# Patient Record
Sex: FEMALE | Race: WHITE | Employment: OTHER | URBAN - NONMETROPOLITAN AREA
[De-identification: names, ages, dates, MRNs, and addresses within clinical notes are randomized per-mention and may not be internally consistent; named-entity substitution may affect disease eponyms.]

---

## 2017-01-27 ENCOUNTER — HOSPITAL ENCOUNTER (OUTPATIENT)
Dept: CT IMAGING | Age: 72
Discharge: HOME | End: 2017-01-27
Payer: MEDICARE

## 2017-01-27 DIAGNOSIS — R91.1 LUNG NODULE: ICD-10-CM

## 2017-01-27 PROCEDURE — 71250 CT THORAX DX C-: CPT

## 2017-03-23 ENCOUNTER — TRANSCRIBE ORDERS (OUTPATIENT)
Dept: ADMINISTRATIVE | Facility: HOSPITAL | Age: 72
End: 2017-03-23

## 2017-03-23 DIAGNOSIS — Z12.31 VISIT FOR SCREENING MAMMOGRAM: Primary | ICD-10-CM

## 2017-03-27 ENCOUNTER — HOSPITAL ENCOUNTER (OUTPATIENT)
Dept: MAMMOGRAPHY | Facility: HOSPITAL | Age: 72
Discharge: HOME | End: 2017-03-27
Attending: FAMILY MEDICINE | Admitting: FAMILY MEDICINE

## 2017-03-27 DIAGNOSIS — Z12.31 VISIT FOR SCREENING MAMMOGRAM: ICD-10-CM

## 2017-03-27 PROCEDURE — 77063 BREAST TOMOSYNTHESIS BI: CPT

## 2017-03-27 PROCEDURE — G0202 SCR MAMMO BI INCL CAD: HCPCS

## 2017-04-06 DIAGNOSIS — K76.0 FATTY LIVER: Primary | ICD-10-CM

## 2017-04-24 ENCOUNTER — APPOINTMENT (OUTPATIENT)
Dept: ULTRASOUND IMAGING | Facility: HOSPITAL | Age: 72
End: 2017-04-24

## 2017-05-11 ENCOUNTER — HOSPITAL ENCOUNTER (OUTPATIENT)
Dept: ULTRASOUND IMAGING | Facility: HOSPITAL | Age: 72
Discharge: HOME | End: 2017-05-11
Admitting: CLINICAL NURSE SPECIALIST

## 2017-05-11 DIAGNOSIS — K76.0 FATTY LIVER: ICD-10-CM

## 2017-05-11 PROCEDURE — 76705 ECHO EXAM OF ABDOMEN: CPT

## 2017-05-15 ENCOUNTER — HOSPITAL ENCOUNTER (OUTPATIENT)
Dept: GENERAL RADIOLOGY | Facility: HOSPITAL | Age: 72
Discharge: HOME | End: 2017-05-15
Admitting: ORTHOPAEDIC SURGERY

## 2017-05-15 ENCOUNTER — APPOINTMENT (OUTPATIENT)
Dept: PREADMISSION TESTING | Facility: HOSPITAL | Age: 72
End: 2017-05-15

## 2017-05-15 VITALS
HEART RATE: 62 BPM | RESPIRATION RATE: 18 BRPM | SYSTOLIC BLOOD PRESSURE: 118 MMHG | WEIGHT: 111 LBS | OXYGEN SATURATION: 99 % | DIASTOLIC BLOOD PRESSURE: 62 MMHG | BODY MASS INDEX: 18.95 KG/M2 | HEIGHT: 64 IN

## 2017-05-15 LAB
ALBUMIN SERPL-MCNC: 4.2 G/DL (ref 3.5–5)
ALBUMIN/GLOB SERPL: 1.4 G/DL (ref 1.1–2.5)
ALP SERPL-CCNC: 54 U/L (ref 24–120)
ALT SERPL W P-5'-P-CCNC: 22 U/L (ref 0–54)
ANION GAP SERPL CALCULATED.3IONS-SCNC: 10 MMOL/L (ref 4–13)
APTT PPP: 31.5 SECONDS (ref 24.1–34.8)
AST SERPL-CCNC: 45 U/L (ref 7–45)
BACTERIA UR QL AUTO: ABNORMAL /HPF
BASOPHILS # BLD AUTO: 0.02 10*3/MM3 (ref 0–0.2)
BASOPHILS NFR BLD AUTO: 0.3 % (ref 0–2)
BILIRUB SERPL-MCNC: 0.4 MG/DL (ref 0.1–1)
BILIRUB UR QL STRIP: NEGATIVE
BUN BLD-MCNC: 15 MG/DL (ref 5–21)
BUN/CREAT SERPL: 19.5 (ref 7–25)
CALCIUM SPEC-SCNC: 9.5 MG/DL (ref 8.4–10.4)
CHLORIDE SERPL-SCNC: 100 MMOL/L (ref 98–110)
CLARITY UR: CLEAR
CO2 SERPL-SCNC: 30 MMOL/L (ref 24–31)
COLOR UR: YELLOW
CREAT BLD-MCNC: 0.77 MG/DL (ref 0.5–1.4)
DEPRECATED RDW RBC AUTO: 46.3 FL (ref 40–54)
EOSINOPHIL # BLD AUTO: 0.21 10*3/MM3 (ref 0–0.7)
EOSINOPHIL NFR BLD AUTO: 3.6 % (ref 0–4)
ERYTHROCYTE [DISTWIDTH] IN BLOOD BY AUTOMATED COUNT: 14 % (ref 12–15)
GFR SERPL CREATININE-BSD FRML MDRD: 74 ML/MIN/1.73
GLOBULIN UR ELPH-MCNC: 2.9 GM/DL
GLUCOSE BLD-MCNC: 81 MG/DL (ref 70–100)
GLUCOSE UR STRIP-MCNC: NEGATIVE MG/DL
HCT VFR BLD AUTO: 37.5 % (ref 37–47)
HGB BLD-MCNC: 12.2 G/DL (ref 12–16)
HGB UR QL STRIP.AUTO: NEGATIVE
HYALINE CASTS UR QL AUTO: ABNORMAL /LPF
IMM GRANULOCYTES # BLD: 0 10*3/MM3 (ref 0–0.03)
IMM GRANULOCYTES NFR BLD: 0 % (ref 0–5)
INR PPP: 0.92 (ref 0.91–1.09)
KETONES UR QL STRIP: NEGATIVE
LEUKOCYTE ESTERASE UR QL STRIP.AUTO: ABNORMAL
LYMPHOCYTES # BLD AUTO: 2.61 10*3/MM3 (ref 0.72–4.86)
LYMPHOCYTES NFR BLD AUTO: 44.5 % (ref 15–45)
MCH RBC QN AUTO: 29.5 PG (ref 28–32)
MCHC RBC AUTO-ENTMCNC: 32.5 G/DL (ref 33–36)
MCV RBC AUTO: 90.8 FL (ref 82–98)
MONOCYTES # BLD AUTO: 0.58 10*3/MM3 (ref 0.19–1.3)
MONOCYTES NFR BLD AUTO: 9.9 % (ref 4–12)
NEUTROPHILS # BLD AUTO: 2.44 10*3/MM3 (ref 1.87–8.4)
NEUTROPHILS NFR BLD AUTO: 41.7 % (ref 39–78)
NITRITE UR QL STRIP: NEGATIVE
PH UR STRIP.AUTO: 6.5 [PH] (ref 5–8)
PLATELET # BLD AUTO: 152 10*3/MM3 (ref 130–400)
PMV BLD AUTO: 10.4 FL (ref 6–12)
POTASSIUM BLD-SCNC: 4 MMOL/L (ref 3.5–5.3)
PROT SERPL-MCNC: 7.1 G/DL (ref 6.3–8.7)
PROT UR QL STRIP: NEGATIVE
PROTHROMBIN TIME: 12.6 SECONDS (ref 11.9–14.6)
RBC # BLD AUTO: 4.13 10*6/MM3 (ref 4.2–5.4)
RBC # UR: ABNORMAL /HPF
REF LAB TEST METHOD: ABNORMAL
SODIUM BLD-SCNC: 140 MMOL/L (ref 135–145)
SP GR UR STRIP: 1.01 (ref 1–1.03)
SQUAMOUS #/AREA URNS HPF: ABNORMAL /HPF
UROBILINOGEN UR QL STRIP: ABNORMAL
WBC NRBC COR # BLD: 5.86 10*3/MM3 (ref 4.8–10.8)
WBC UR QL AUTO: ABNORMAL /HPF

## 2017-05-15 PROCEDURE — 71010 HC CHEST PA OR AP: CPT

## 2017-05-15 PROCEDURE — 85610 PROTHROMBIN TIME: CPT | Performed by: ORTHOPAEDIC SURGERY

## 2017-05-15 PROCEDURE — 81001 URINALYSIS AUTO W/SCOPE: CPT | Performed by: ORTHOPAEDIC SURGERY

## 2017-05-15 PROCEDURE — 85730 THROMBOPLASTIN TIME PARTIAL: CPT | Performed by: ORTHOPAEDIC SURGERY

## 2017-05-15 PROCEDURE — 93005 ELECTROCARDIOGRAM TRACING: CPT

## 2017-05-15 PROCEDURE — 93010 ELECTROCARDIOGRAM REPORT: CPT | Performed by: INTERNAL MEDICINE

## 2017-05-15 PROCEDURE — 85025 COMPLETE CBC W/AUTO DIFF WBC: CPT | Performed by: ORTHOPAEDIC SURGERY

## 2017-05-15 PROCEDURE — 80053 COMPREHEN METABOLIC PANEL: CPT | Performed by: ORTHOPAEDIC SURGERY

## 2017-05-15 RX ORDER — UBIDECARENONE 75 MG
50 CAPSULE ORAL DAILY
COMMUNITY
End: 2020-07-27 | Stop reason: ALTCHOICE

## 2017-05-15 RX ORDER — CHLORAL HYDRATE 500 MG
1000 CAPSULE ORAL
COMMUNITY
End: 2020-07-27 | Stop reason: ALTCHOICE

## 2017-05-15 RX ORDER — ASPIRIN 81 MG/1
81 TABLET ORAL DAILY
Status: ON HOLD | COMMUNITY
End: 2017-05-22

## 2017-05-22 ENCOUNTER — HOSPITAL ENCOUNTER (INPATIENT)
Facility: HOSPITAL | Age: 72
LOS: 1 days | Discharge: HOME | End: 2017-05-23
Attending: ORTHOPAEDIC SURGERY | Admitting: ORTHOPAEDIC SURGERY

## 2017-05-22 ENCOUNTER — ANESTHESIA EVENT (OUTPATIENT)
Dept: PERIOP | Facility: HOSPITAL | Age: 72
End: 2017-05-22

## 2017-05-22 ENCOUNTER — APPOINTMENT (OUTPATIENT)
Dept: GENERAL RADIOLOGY | Facility: HOSPITAL | Age: 72
End: 2017-05-22

## 2017-05-22 ENCOUNTER — ANESTHESIA (OUTPATIENT)
Dept: PERIOP | Facility: HOSPITAL | Age: 72
End: 2017-05-22

## 2017-05-22 DIAGNOSIS — Z78.9 DECREASED ACTIVITIES OF DAILY LIVING (ADL): ICD-10-CM

## 2017-05-22 PROBLEM — M48.02 SPINAL STENOSIS, CERVICAL REGION: Status: ACTIVE | Noted: 2017-05-22

## 2017-05-22 LAB
ABO GROUP BLD: NORMAL
BLD GP AB SCN SERPL QL: NEGATIVE
RH BLD: POSITIVE

## 2017-05-22 PROCEDURE — C1713 ANCHOR/SCREW BN/BN,TIS/BN: HCPCS | Performed by: ORTHOPAEDIC SURGERY

## 2017-05-22 PROCEDURE — 25010000002 MIDAZOLAM PER 1 MG: Performed by: ANESTHESIOLOGY

## 2017-05-22 PROCEDURE — 25010000002 PROPOFOL 1000 MG/ML EMULSION: Performed by: NURSE ANESTHETIST, CERTIFIED REGISTERED

## 2017-05-22 PROCEDURE — 25010000002 PROPOFOL 10 MG/ML EMULSION: Performed by: NURSE ANESTHETIST, CERTIFIED REGISTERED

## 2017-05-22 PROCEDURE — 25010000002 SUCCINYLCHOLINE PER 20 MG: Performed by: NURSE ANESTHETIST, CERTIFIED REGISTERED

## 2017-05-22 PROCEDURE — 86850 RBC ANTIBODY SCREEN: CPT | Performed by: ORTHOPAEDIC SURGERY

## 2017-05-22 PROCEDURE — 86900 BLOOD TYPING SEROLOGIC ABO: CPT | Performed by: ORTHOPAEDIC SURGERY

## 2017-05-22 PROCEDURE — 25010000002 DEXAMETHASONE PER 1 MG: Performed by: NURSE ANESTHETIST, CERTIFIED REGISTERED

## 2017-05-22 PROCEDURE — 25010000003 CEFAZOLIN PER 500 MG: Performed by: ORTHOPAEDIC SURGERY

## 2017-05-22 PROCEDURE — 63710000001 DIPHENHYDRAMINE PER 50 MG: Performed by: ORTHOPAEDIC SURGERY

## 2017-05-22 PROCEDURE — 0RB50ZZ EXCISION OF CERVICOTHORACIC VERTEBRAL DISC, OPEN APPROACH: ICD-10-PCS | Performed by: ORTHOPAEDIC SURGERY

## 2017-05-22 PROCEDURE — 25010000002 ONDANSETRON PER 1 MG: Performed by: NURSE ANESTHETIST, CERTIFIED REGISTERED

## 2017-05-22 PROCEDURE — 86901 BLOOD TYPING SEROLOGIC RH(D): CPT | Performed by: ORTHOPAEDIC SURGERY

## 2017-05-22 PROCEDURE — 76000 FLUOROSCOPY <1 HR PHYS/QHP: CPT

## 2017-05-22 PROCEDURE — 25010000002 HYDROMORPHONE PER 4 MG: Performed by: ORTHOPAEDIC SURGERY

## 2017-05-22 PROCEDURE — 94799 UNLISTED PULMONARY SVC/PX: CPT

## 2017-05-22 PROCEDURE — 72040 X-RAY EXAM NECK SPINE 2-3 VW: CPT

## 2017-05-22 PROCEDURE — 0RG40A0 FUSION OF CERVICOTHORACIC VERTEBRAL JOINT WITH INTERBODY FUSION DEVICE, ANTERIOR APPROACH, ANTERIOR COLUMN, OPEN APPROACH: ICD-10-PCS | Performed by: ORTHOPAEDIC SURGERY

## 2017-05-22 PROCEDURE — 25010000003 CEFAZOLIN PER 500 MG: Performed by: NURSE ANESTHETIST, CERTIFIED REGISTERED

## 2017-05-22 DEVICE — MATRX BONE PRIMAGEN CELL 1CC: Type: IMPLANTABLE DEVICE | Status: FUNCTIONAL

## 2017-05-22 DEVICE — SPACR PEEK COHERE 7D LRD 7X14X16MM: Type: IMPLANTABLE DEVICE | Status: FUNCTIONAL

## 2017-05-22 DEVICE — SCRW BONE MAXAN VA 4X14MM: Type: IMPLANTABLE DEVICE | Status: FUNCTIONAL

## 2017-05-22 DEVICE — IMPLANTABLE DEVICE: Type: IMPLANTABLE DEVICE | Status: FUNCTIONAL

## 2017-05-22 RX ORDER — SODIUM CHLORIDE 0.9 % (FLUSH) 0.9 %
1-10 SYRINGE (ML) INJECTION AS NEEDED
Status: DISCONTINUED | OUTPATIENT
Start: 2017-05-22 | End: 2017-05-23 | Stop reason: HOSPADM

## 2017-05-22 RX ORDER — SODIUM CHLORIDE 9 MG/ML
75 INJECTION, SOLUTION INTRAVENOUS CONTINUOUS
Status: DISCONTINUED | OUTPATIENT
Start: 2017-05-22 | End: 2017-05-23 | Stop reason: HOSPADM

## 2017-05-22 RX ORDER — LIDOCAINE HYDROCHLORIDE 20 MG/ML
INJECTION, SOLUTION INFILTRATION; PERINEURAL AS NEEDED
Status: DISCONTINUED | OUTPATIENT
Start: 2017-05-22 | End: 2017-05-22 | Stop reason: SURG

## 2017-05-22 RX ORDER — FAMOTIDINE 10 MG/ML
20 INJECTION, SOLUTION INTRAVENOUS 2 TIMES DAILY
Status: DISCONTINUED | OUTPATIENT
Start: 2017-05-22 | End: 2017-05-22 | Stop reason: SDUPTHER

## 2017-05-22 RX ORDER — NALOXONE HCL 0.4 MG/ML
0.04 VIAL (ML) INJECTION AS NEEDED
Status: DISCONTINUED | OUTPATIENT
Start: 2017-05-22 | End: 2017-05-22 | Stop reason: HOSPADM

## 2017-05-22 RX ORDER — BENAZEPRIL HYDROCHLORIDE 5 MG/1
5 TABLET, FILM COATED ORAL DAILY
Status: DISCONTINUED | OUTPATIENT
Start: 2017-05-22 | End: 2017-05-23 | Stop reason: HOSPADM

## 2017-05-22 RX ORDER — MIDAZOLAM HYDROCHLORIDE 1 MG/ML
1 INJECTION INTRAMUSCULAR; INTRAVENOUS
Status: DISCONTINUED | OUTPATIENT
Start: 2017-05-22 | End: 2017-05-23 | Stop reason: HOSPADM

## 2017-05-22 RX ORDER — SODIUM CHLORIDE, SODIUM LACTATE, POTASSIUM CHLORIDE, CALCIUM CHLORIDE 600; 310; 30; 20 MG/100ML; MG/100ML; MG/100ML; MG/100ML
INJECTION, SOLUTION INTRAVENOUS CONTINUOUS PRN
Status: DISCONTINUED | OUTPATIENT
Start: 2017-05-22 | End: 2017-05-22 | Stop reason: SURG

## 2017-05-22 RX ORDER — ONDANSETRON 2 MG/ML
INJECTION INTRAMUSCULAR; INTRAVENOUS AS NEEDED
Status: DISCONTINUED | OUTPATIENT
Start: 2017-05-22 | End: 2017-05-22 | Stop reason: SURG

## 2017-05-22 RX ORDER — SODIUM CHLORIDE 9 MG/ML
INJECTION, SOLUTION INTRAVENOUS AS NEEDED
Status: DISCONTINUED | OUTPATIENT
Start: 2017-05-22 | End: 2017-05-22 | Stop reason: HOSPADM

## 2017-05-22 RX ORDER — MORPHINE SULFATE 2 MG/ML
2 INJECTION, SOLUTION INTRAMUSCULAR; INTRAVENOUS AS NEEDED
Status: DISCONTINUED | OUTPATIENT
Start: 2017-05-22 | End: 2017-05-22 | Stop reason: HOSPADM

## 2017-05-22 RX ORDER — ROCURONIUM BROMIDE 10 MG/ML
INJECTION, SOLUTION INTRAVENOUS AS NEEDED
Status: DISCONTINUED | OUTPATIENT
Start: 2017-05-22 | End: 2017-05-22 | Stop reason: SURG

## 2017-05-22 RX ORDER — OXYCODONE HCL 20 MG/1
20 TABLET, FILM COATED, EXTENDED RELEASE ORAL ONCE
Status: COMPLETED | OUTPATIENT
Start: 2017-05-22 | End: 2017-05-22

## 2017-05-22 RX ORDER — SODIUM CHLORIDE, SODIUM LACTATE, POTASSIUM CHLORIDE, CALCIUM CHLORIDE 600; 310; 30; 20 MG/100ML; MG/100ML; MG/100ML; MG/100ML
100 INJECTION, SOLUTION INTRAVENOUS CONTINUOUS
Status: DISCONTINUED | OUTPATIENT
Start: 2017-05-22 | End: 2017-05-23 | Stop reason: HOSPADM

## 2017-05-22 RX ORDER — METOCLOPRAMIDE HYDROCHLORIDE 5 MG/ML
5 INJECTION INTRAMUSCULAR; INTRAVENOUS
Status: DISCONTINUED | OUTPATIENT
Start: 2017-05-22 | End: 2017-05-22 | Stop reason: HOSPADM

## 2017-05-22 RX ORDER — SUCCINYLCHOLINE CHLORIDE 20 MG/ML
INJECTION INTRAMUSCULAR; INTRAVENOUS AS NEEDED
Status: DISCONTINUED | OUTPATIENT
Start: 2017-05-22 | End: 2017-05-22 | Stop reason: SURG

## 2017-05-22 RX ORDER — CARVEDILOL 6.25 MG/1
12.5 TABLET ORAL 2 TIMES DAILY WITH MEALS
Status: DISCONTINUED | OUTPATIENT
Start: 2017-05-22 | End: 2017-05-23 | Stop reason: HOSPADM

## 2017-05-22 RX ORDER — DEXAMETHASONE SODIUM PHOSPHATE 4 MG/ML
INJECTION, SOLUTION INTRA-ARTICULAR; INTRALESIONAL; INTRAMUSCULAR; INTRAVENOUS; SOFT TISSUE AS NEEDED
Status: DISCONTINUED | OUTPATIENT
Start: 2017-05-22 | End: 2017-05-22 | Stop reason: SURG

## 2017-05-22 RX ORDER — ONDANSETRON 2 MG/ML
4 INJECTION INTRAMUSCULAR; INTRAVENOUS EVERY 6 HOURS PRN
Status: DISCONTINUED | OUTPATIENT
Start: 2017-05-22 | End: 2017-05-22 | Stop reason: SDUPTHER

## 2017-05-22 RX ORDER — MAGNESIUM HYDROXIDE 1200 MG/15ML
LIQUID ORAL AS NEEDED
Status: DISCONTINUED | OUTPATIENT
Start: 2017-05-22 | End: 2017-05-22 | Stop reason: HOSPADM

## 2017-05-22 RX ORDER — MEPERIDINE HYDROCHLORIDE 25 MG/ML
12.5 INJECTION INTRAMUSCULAR; INTRAVENOUS; SUBCUTANEOUS
Status: DISCONTINUED | OUTPATIENT
Start: 2017-05-22 | End: 2017-05-22 | Stop reason: HOSPADM

## 2017-05-22 RX ORDER — IPRATROPIUM BROMIDE AND ALBUTEROL SULFATE 2.5; .5 MG/3ML; MG/3ML
3 SOLUTION RESPIRATORY (INHALATION) ONCE AS NEEDED
Status: DISCONTINUED | OUTPATIENT
Start: 2017-05-22 | End: 2017-05-22 | Stop reason: HOSPADM

## 2017-05-22 RX ORDER — OXYCODONE AND ACETAMINOPHEN 10; 325 MG/1; MG/1
2 TABLET ORAL EVERY 4 HOURS PRN
Status: DISCONTINUED | OUTPATIENT
Start: 2017-05-22 | End: 2017-05-23 | Stop reason: HOSPADM

## 2017-05-22 RX ORDER — PROPOFOL 10 MG/ML
VIAL (ML) INTRAVENOUS AS NEEDED
Status: DISCONTINUED | OUTPATIENT
Start: 2017-05-22 | End: 2017-05-22 | Stop reason: SURG

## 2017-05-22 RX ORDER — ONDANSETRON 4 MG/1
4 TABLET, FILM COATED ORAL EVERY 6 HOURS PRN
Status: DISCONTINUED | OUTPATIENT
Start: 2017-05-22 | End: 2017-05-23 | Stop reason: HOSPADM

## 2017-05-22 RX ORDER — MIDAZOLAM HYDROCHLORIDE 1 MG/ML
2 INJECTION INTRAMUSCULAR; INTRAVENOUS
Status: DISCONTINUED | OUTPATIENT
Start: 2017-05-22 | End: 2017-05-23 | Stop reason: HOSPADM

## 2017-05-22 RX ORDER — ONDANSETRON 4 MG/1
4 TABLET, ORALLY DISINTEGRATING ORAL EVERY 6 HOURS PRN
Status: DISCONTINUED | OUTPATIENT
Start: 2017-05-22 | End: 2017-05-23 | Stop reason: HOSPADM

## 2017-05-22 RX ORDER — HYDRALAZINE HYDROCHLORIDE 20 MG/ML
5 INJECTION INTRAMUSCULAR; INTRAVENOUS
Status: DISCONTINUED | OUTPATIENT
Start: 2017-05-22 | End: 2017-05-22 | Stop reason: HOSPADM

## 2017-05-22 RX ORDER — ONDANSETRON 2 MG/ML
4 INJECTION INTRAMUSCULAR; INTRAVENOUS EVERY 6 HOURS PRN
Status: DISCONTINUED | OUTPATIENT
Start: 2017-05-22 | End: 2017-05-23 | Stop reason: HOSPADM

## 2017-05-22 RX ORDER — HYDROCHLOROTHIAZIDE 25 MG/1
12.5 TABLET ORAL DAILY
Status: DISCONTINUED | OUTPATIENT
Start: 2017-05-22 | End: 2017-05-23 | Stop reason: HOSPADM

## 2017-05-22 RX ORDER — FAMOTIDINE 20 MG/1
20 TABLET, FILM COATED ORAL 2 TIMES DAILY
Status: DISCONTINUED | OUTPATIENT
Start: 2017-05-22 | End: 2017-05-22 | Stop reason: SDUPTHER

## 2017-05-22 RX ORDER — FLUMAZENIL 0.1 MG/ML
0.2 INJECTION INTRAVENOUS AS NEEDED
Status: DISCONTINUED | OUTPATIENT
Start: 2017-05-22 | End: 2017-05-22 | Stop reason: HOSPADM

## 2017-05-22 RX ORDER — LIDOCAINE HYDROCHLORIDE 40 MG/ML
SOLUTION TOPICAL AS NEEDED
Status: DISCONTINUED | OUTPATIENT
Start: 2017-05-22 | End: 2017-05-22 | Stop reason: SURG

## 2017-05-22 RX ORDER — LABETALOL HYDROCHLORIDE 5 MG/ML
5 INJECTION, SOLUTION INTRAVENOUS
Status: DISCONTINUED | OUTPATIENT
Start: 2017-05-22 | End: 2017-05-22 | Stop reason: HOSPADM

## 2017-05-22 RX ORDER — ONDANSETRON 2 MG/ML
4 INJECTION INTRAMUSCULAR; INTRAVENOUS AS NEEDED
Status: DISCONTINUED | OUTPATIENT
Start: 2017-05-22 | End: 2017-05-22 | Stop reason: HOSPADM

## 2017-05-22 RX ORDER — SUFENTANIL CITRATE 50 UG/ML
INJECTION EPIDURAL; INTRAVENOUS AS NEEDED
Status: DISCONTINUED | OUTPATIENT
Start: 2017-05-22 | End: 2017-05-22 | Stop reason: SURG

## 2017-05-22 RX ORDER — CEFAZOLIN SODIUM 1 G/3ML
INJECTION, POWDER, FOR SOLUTION INTRAMUSCULAR; INTRAVENOUS AS NEEDED
Status: DISCONTINUED | OUTPATIENT
Start: 2017-05-22 | End: 2017-05-22 | Stop reason: SURG

## 2017-05-22 RX ORDER — DIPHENHYDRAMINE HCL 25 MG
25 CAPSULE ORAL NIGHTLY PRN
Status: DISCONTINUED | OUTPATIENT
Start: 2017-05-22 | End: 2017-05-23 | Stop reason: HOSPADM

## 2017-05-22 RX ORDER — FAMOTIDINE 20 MG/1
40 TABLET, FILM COATED ORAL 2 TIMES DAILY
Status: DISCONTINUED | OUTPATIENT
Start: 2017-05-22 | End: 2017-05-23 | Stop reason: HOSPADM

## 2017-05-22 RX ADMIN — LIDOCAINE HYDROCHLORIDE 1 EACH: 40 SOLUTION TOPICAL at 11:10

## 2017-05-22 RX ADMIN — LIDOCAINE HYDROCHLORIDE 0.5 ML: 10 INJECTION, SOLUTION EPIDURAL; INFILTRATION; INTRACAUDAL; PERINEURAL at 10:10

## 2017-05-22 RX ADMIN — CARVEDILOL 12.5 MG: 6.25 TABLET, FILM COATED ORAL at 17:00

## 2017-05-22 RX ADMIN — PROPOFOL 75 MCG/KG/MIN: 10 INJECTION, EMULSION INTRAVENOUS at 11:13

## 2017-05-22 RX ADMIN — SUFENTANIL CITRATE 10 MCG: 50 INJECTION, SOLUTION EPIDURAL; INTRAVENOUS at 11:45

## 2017-05-22 RX ADMIN — CEFAZOLIN SODIUM 1 G: 1 INJECTION, SOLUTION INTRAVENOUS at 18:05

## 2017-05-22 RX ADMIN — SODIUM CHLORIDE 75 ML/HR: 9 INJECTION, SOLUTION INTRAVENOUS at 14:14

## 2017-05-22 RX ADMIN — SUCCINYLCHOLINE CHLORIDE 100 MG: 20 INJECTION, SOLUTION INTRAMUSCULAR; INTRAVENOUS at 11:09

## 2017-05-22 RX ADMIN — OXYCODONE HYDROCHLORIDE AND ACETAMINOPHEN 2 TABLET: 10; 325 TABLET ORAL at 23:48

## 2017-05-22 RX ADMIN — OXYCODONE HYDROCHLORIDE 20 MG: 20 TABLET, FILM COATED, EXTENDED RELEASE ORAL at 09:33

## 2017-05-22 RX ADMIN — FAMOTIDINE 40 MG: 20 TABLET, FILM COATED ORAL at 17:00

## 2017-05-22 RX ADMIN — SODIUM CHLORIDE, POTASSIUM CHLORIDE, SODIUM LACTATE AND CALCIUM CHLORIDE: 600; 310; 30; 20 INJECTION, SOLUTION INTRAVENOUS at 11:05

## 2017-05-22 RX ADMIN — HYDROCHLOROTHIAZIDE 12.5 MG: 25 TABLET ORAL at 14:48

## 2017-05-22 RX ADMIN — LIDOCAINE HYDROCHLORIDE 50 MG: 20 INJECTION, SOLUTION INFILTRATION; PERINEURAL at 11:09

## 2017-05-22 RX ADMIN — PROPOFOL 100 MG: 10 INJECTION, EMULSION INTRAVENOUS at 11:09

## 2017-05-22 RX ADMIN — DEXAMETHASONE SODIUM PHOSPHATE 4 MG: 4 INJECTION, SOLUTION INTRAMUSCULAR; INTRAVENOUS at 13:19

## 2017-05-22 RX ADMIN — ONDANSETRON HYDROCHLORIDE 4 MG: 2 SOLUTION INTRAMUSCULAR; INTRAVENOUS at 13:19

## 2017-05-22 RX ADMIN — DIPHENHYDRAMINE HYDROCHLORIDE 25 MG: 25 CAPSULE ORAL at 23:50

## 2017-05-22 RX ADMIN — ROCURONIUM BROMIDE 5 MG: 10 INJECTION INTRAVENOUS at 11:09

## 2017-05-22 RX ADMIN — MIDAZOLAM HYDROCHLORIDE 2 MG: 1 INJECTION, SOLUTION INTRAMUSCULAR; INTRAVENOUS at 10:10

## 2017-05-22 RX ADMIN — HYDROMORPHONE HYDROCHLORIDE 1 MG: 1 INJECTION, SOLUTION INTRAMUSCULAR; INTRAVENOUS; SUBCUTANEOUS at 14:31

## 2017-05-22 RX ADMIN — SODIUM CHLORIDE, POTASSIUM CHLORIDE, SODIUM LACTATE AND CALCIUM CHLORIDE: 600; 310; 30; 20 INJECTION, SOLUTION INTRAVENOUS at 12:22

## 2017-05-22 RX ADMIN — SUFENTANIL CITRATE 10 MCG: 50 INJECTION, SOLUTION EPIDURAL; INTRAVENOUS at 12:21

## 2017-05-22 RX ADMIN — SUFENTANIL CITRATE 20 MCG: 50 INJECTION, SOLUTION EPIDURAL; INTRAVENOUS at 11:35

## 2017-05-22 RX ADMIN — HYDROMORPHONE HYDROCHLORIDE 1 MG: 1 INJECTION, SOLUTION INTRAMUSCULAR; INTRAVENOUS; SUBCUTANEOUS at 18:06

## 2017-05-22 RX ADMIN — SUFENTANIL CITRATE 10 MCG: 50 INJECTION, SOLUTION EPIDURAL; INTRAVENOUS at 11:09

## 2017-05-22 RX ADMIN — OXYCODONE HYDROCHLORIDE AND ACETAMINOPHEN 2 TABLET: 10; 325 TABLET ORAL at 19:48

## 2017-05-22 RX ADMIN — SODIUM CHLORIDE, POTASSIUM CHLORIDE, SODIUM LACTATE AND CALCIUM CHLORIDE 100 ML/HR: 600; 310; 30; 20 INJECTION, SOLUTION INTRAVENOUS at 10:10

## 2017-05-22 RX ADMIN — BENAZEPRIL HYDROCHLORIDE 5 MG: 5 TABLET ORAL at 14:47

## 2017-05-22 RX ADMIN — CEFAZOLIN 1 G: 1 INJECTION, POWDER, FOR SOLUTION INTRAVENOUS at 11:30

## 2017-05-23 VITALS
TEMPERATURE: 99 F | RESPIRATION RATE: 18 BRPM | DIASTOLIC BLOOD PRESSURE: 64 MMHG | SYSTOLIC BLOOD PRESSURE: 129 MMHG | BODY MASS INDEX: 22.87 KG/M2 | WEIGHT: 116.5 LBS | HEART RATE: 87 BPM | OXYGEN SATURATION: 99 % | HEIGHT: 60 IN

## 2017-05-23 LAB
ANION GAP SERPL CALCULATED.3IONS-SCNC: 9 MMOL/L (ref 4–13)
BASOPHILS # BLD AUTO: 0.01 10*3/MM3 (ref 0–0.2)
BASOPHILS NFR BLD AUTO: 0.1 % (ref 0–2)
BUN BLD-MCNC: 13 MG/DL (ref 5–21)
BUN/CREAT SERPL: 15.7 (ref 7–25)
CALCIUM SPEC-SCNC: 9.1 MG/DL (ref 8.4–10.4)
CHLORIDE SERPL-SCNC: 104 MMOL/L (ref 98–110)
CO2 SERPL-SCNC: 29 MMOL/L (ref 24–31)
CREAT BLD-MCNC: 0.83 MG/DL (ref 0.5–1.4)
DEPRECATED RDW RBC AUTO: 47.6 FL (ref 40–54)
EOSINOPHIL # BLD AUTO: 0 10*3/MM3 (ref 0–0.7)
EOSINOPHIL NFR BLD AUTO: 0 % (ref 0–4)
ERYTHROCYTE [DISTWIDTH] IN BLOOD BY AUTOMATED COUNT: 14.3 % (ref 12–15)
GFR SERPL CREATININE-BSD FRML MDRD: 68 ML/MIN/1.73
GLUCOSE BLD-MCNC: 133 MG/DL (ref 70–100)
HCT VFR BLD AUTO: 35.4 % (ref 37–47)
HGB BLD-MCNC: 11.5 G/DL (ref 12–16)
IMM GRANULOCYTES # BLD: 0.01 10*3/MM3 (ref 0–0.03)
IMM GRANULOCYTES NFR BLD: 0.1 % (ref 0–5)
LYMPHOCYTES # BLD AUTO: 1.28 10*3/MM3 (ref 0.72–4.86)
LYMPHOCYTES NFR BLD AUTO: 16.1 % (ref 15–45)
MCH RBC QN AUTO: 29.7 PG (ref 28–32)
MCHC RBC AUTO-ENTMCNC: 32.5 G/DL (ref 33–36)
MCV RBC AUTO: 91.5 FL (ref 82–98)
MONOCYTES # BLD AUTO: 0.84 10*3/MM3 (ref 0.19–1.3)
MONOCYTES NFR BLD AUTO: 10.6 % (ref 4–12)
NEUTROPHILS # BLD AUTO: 5.82 10*3/MM3 (ref 1.87–8.4)
NEUTROPHILS NFR BLD AUTO: 73.1 % (ref 39–78)
PLATELET # BLD AUTO: 172 10*3/MM3 (ref 130–400)
PMV BLD AUTO: 10.4 FL (ref 6–12)
POTASSIUM BLD-SCNC: 4.7 MMOL/L (ref 3.5–5.3)
RBC # BLD AUTO: 3.87 10*6/MM3 (ref 4.2–5.4)
SODIUM BLD-SCNC: 142 MMOL/L (ref 135–145)
WBC NRBC COR # BLD: 7.96 10*3/MM3 (ref 4.8–10.8)

## 2017-05-23 PROCEDURE — G8988 SELF CARE GOAL STATUS: HCPCS

## 2017-05-23 PROCEDURE — G8989 SELF CARE D/C STATUS: HCPCS

## 2017-05-23 PROCEDURE — G8987 SELF CARE CURRENT STATUS: HCPCS

## 2017-05-23 PROCEDURE — 85025 COMPLETE CBC W/AUTO DIFF WBC: CPT | Performed by: ORTHOPAEDIC SURGERY

## 2017-05-23 PROCEDURE — 80048 BASIC METABOLIC PNL TOTAL CA: CPT | Performed by: ORTHOPAEDIC SURGERY

## 2017-05-23 PROCEDURE — 25010000003 CEFAZOLIN PER 500 MG: Performed by: ORTHOPAEDIC SURGERY

## 2017-05-23 PROCEDURE — 97165 OT EVAL LOW COMPLEX 30 MIN: CPT

## 2017-05-23 RX ORDER — OXYCODONE AND ACETAMINOPHEN 10; 325 MG/1; MG/1
1 TABLET ORAL EVERY 4 HOURS PRN
Qty: 120 TABLET | Refills: 0
Start: 2017-05-23 | End: 2019-01-23

## 2017-05-23 RX ADMIN — CEFAZOLIN SODIUM 1 G: 1 INJECTION, SOLUTION INTRAVENOUS at 03:28

## 2017-05-23 RX ADMIN — OXYCODONE HYDROCHLORIDE AND ACETAMINOPHEN 2 TABLET: 10; 325 TABLET ORAL at 03:58

## 2017-05-23 RX ADMIN — HYDROCHLOROTHIAZIDE 12.5 MG: 25 TABLET ORAL at 08:52

## 2017-05-23 RX ADMIN — CARVEDILOL 12.5 MG: 6.25 TABLET, FILM COATED ORAL at 08:52

## 2017-05-23 RX ADMIN — OXYCODONE HYDROCHLORIDE AND ACETAMINOPHEN 2 TABLET: 10; 325 TABLET ORAL at 08:53

## 2017-05-23 RX ADMIN — BENAZEPRIL HYDROCHLORIDE 5 MG: 5 TABLET ORAL at 08:53

## 2017-05-23 RX ADMIN — FAMOTIDINE 40 MG: 20 TABLET, FILM COATED ORAL at 08:53

## 2017-05-23 RX ADMIN — SODIUM CHLORIDE 75 ML/HR: 9 INJECTION, SOLUTION INTRAVENOUS at 03:28

## 2017-07-05 ENCOUNTER — HOSPITAL ENCOUNTER (OUTPATIENT)
Dept: CT IMAGING | Age: 72
Discharge: HOME | End: 2017-07-05
Payer: MEDICARE

## 2017-07-05 DIAGNOSIS — R91.8 LUNG NODULES: ICD-10-CM

## 2017-07-05 PROCEDURE — 71250 CT THORAX DX C-: CPT

## 2018-01-09 ENCOUNTER — HOSPITAL ENCOUNTER (OUTPATIENT)
Dept: CT IMAGING | Age: 73
Discharge: HOME | End: 2018-01-09
Payer: MEDICARE

## 2018-01-09 DIAGNOSIS — R91.8 LUNG NODULES: ICD-10-CM

## 2018-01-09 PROCEDURE — 71250 CT THORAX DX C-: CPT

## 2018-04-04 ENCOUNTER — TRANSCRIBE ORDERS (OUTPATIENT)
Dept: ADMINISTRATIVE | Facility: HOSPITAL | Age: 73
End: 2018-04-04

## 2018-04-04 DIAGNOSIS — Z12.31 ENCOUNTER FOR SCREENING MAMMOGRAM FOR MALIGNANT NEOPLASM OF BREAST: Primary | ICD-10-CM

## 2018-04-18 ENCOUNTER — TRANSCRIBE ORDERS (OUTPATIENT)
Dept: ADMINISTRATIVE | Facility: HOSPITAL | Age: 73
End: 2018-04-18

## 2018-04-18 ENCOUNTER — HOSPITAL ENCOUNTER (OUTPATIENT)
Dept: MAMMOGRAPHY | Facility: HOSPITAL | Age: 73
Discharge: HOME | End: 2018-04-18
Attending: FAMILY MEDICINE | Admitting: NURSE PRACTITIONER

## 2018-04-18 DIAGNOSIS — Z12.31 ENCOUNTER FOR SCREENING MAMMOGRAM FOR MALIGNANT NEOPLASM OF BREAST: ICD-10-CM

## 2018-04-18 DIAGNOSIS — Z12.39 SCREENING BREAST EXAMINATION: ICD-10-CM

## 2018-04-18 DIAGNOSIS — Z12.39 SCREENING BREAST EXAMINATION: Primary | ICD-10-CM

## 2018-04-18 PROCEDURE — 77063 BREAST TOMOSYNTHESIS BI: CPT

## 2018-04-18 PROCEDURE — 77067 SCR MAMMO BI INCL CAD: CPT

## 2018-06-08 ENCOUNTER — TRANSCRIBE ORDERS (OUTPATIENT)
Dept: ADMINISTRATIVE | Facility: HOSPITAL | Age: 73
End: 2018-06-08

## 2018-06-08 DIAGNOSIS — R91.8 LUNG MASS: Primary | ICD-10-CM

## 2018-07-16 ENCOUNTER — HOSPITAL ENCOUNTER (OUTPATIENT)
Dept: CT IMAGING | Age: 73
Discharge: HOME | End: 2018-07-16
Payer: MEDICARE

## 2018-07-16 DIAGNOSIS — R91.8 LUNG MASS: ICD-10-CM

## 2018-07-16 PROCEDURE — 71250 CT THORAX DX C-: CPT

## 2018-08-02 ENCOUNTER — HOSPITAL ENCOUNTER (OUTPATIENT)
Dept: ULTRASOUND IMAGING | Age: 73
Discharge: HOME | End: 2018-08-02
Payer: MEDICARE

## 2018-08-02 DIAGNOSIS — N28.1 ACQUIRED CYST OF KIDNEY: ICD-10-CM

## 2018-08-02 PROCEDURE — 76770 US EXAM ABDO BACK WALL COMP: CPT

## 2018-11-26 ENCOUNTER — OFFICE VISIT (OUTPATIENT)
Dept: OTOLARYNGOLOGY | Age: 73
End: 2018-11-26
Payer: MEDICARE

## 2018-11-26 VITALS
BODY MASS INDEX: 19.63 KG/M2 | OXYGEN SATURATION: 97 % | WEIGHT: 115 LBS | SYSTOLIC BLOOD PRESSURE: 140 MMHG | HEART RATE: 67 BPM | TEMPERATURE: 98.1 F | RESPIRATION RATE: 16 BRPM | DIASTOLIC BLOOD PRESSURE: 80 MMHG | HEIGHT: 64 IN

## 2018-11-26 DIAGNOSIS — H92.02 REFERRED OTALGIA OF LEFT EAR: ICD-10-CM

## 2018-11-26 DIAGNOSIS — M77.9 STYLOHYOID TENDINITIS: ICD-10-CM

## 2018-11-26 DIAGNOSIS — M79.18 MYOFASCIAL PAIN: ICD-10-CM

## 2018-11-26 DIAGNOSIS — Z98.890 HX OF CERVICAL SPINE SURGERY: ICD-10-CM

## 2018-11-26 DIAGNOSIS — Z87.898 HISTORY OF MULTIPLE PULMONARY NODULES: Primary | ICD-10-CM

## 2018-11-26 PROCEDURE — G8420 CALC BMI NORM PARAMETERS: HCPCS | Performed by: OTOLARYNGOLOGY

## 2018-11-26 PROCEDURE — 99203 OFFICE O/P NEW LOW 30 MIN: CPT | Performed by: OTOLARYNGOLOGY

## 2018-11-26 PROCEDURE — 1090F PRES/ABSN URINE INCON ASSESS: CPT | Performed by: OTOLARYNGOLOGY

## 2018-11-26 PROCEDURE — G8598 ASA/ANTIPLAT THER USED: HCPCS | Performed by: OTOLARYNGOLOGY

## 2018-11-26 PROCEDURE — G8427 DOCREV CUR MEDS BY ELIG CLIN: HCPCS | Performed by: OTOLARYNGOLOGY

## 2018-11-26 PROCEDURE — 1123F ACP DISCUSS/DSCN MKR DOCD: CPT | Performed by: OTOLARYNGOLOGY

## 2018-11-26 PROCEDURE — 1036F TOBACCO NON-USER: CPT | Performed by: OTOLARYNGOLOGY

## 2018-11-26 PROCEDURE — 4040F PNEUMOC VAC/ADMIN/RCVD: CPT | Performed by: OTOLARYNGOLOGY

## 2018-11-26 PROCEDURE — 1101F PT FALLS ASSESS-DOCD LE1/YR: CPT | Performed by: OTOLARYNGOLOGY

## 2018-11-26 PROCEDURE — G8484 FLU IMMUNIZE NO ADMIN: HCPCS | Performed by: OTOLARYNGOLOGY

## 2018-11-26 PROCEDURE — 3017F COLORECTAL CA SCREEN DOC REV: CPT | Performed by: OTOLARYNGOLOGY

## 2018-11-26 PROCEDURE — G8400 PT W/DXA NO RESULTS DOC: HCPCS | Performed by: OTOLARYNGOLOGY

## 2018-11-26 NOTE — PROGRESS NOTES
Pulse 67   Temp 98.1 °F (36.7 °C)   Resp 16   Ht 5' 3.5\" (1.613 m)   Wt 115 lb (52.2 kg)   SpO2 97%   BMI 20.05 kg/m²     Assessment:      Diagnosis Orders   1. History of multiple pulmonary nodules      2 nodules     2. Referred otalgia of left ear           Plan:      Impression:  Myofacial pain and associated symptoms. The patient has a negative head and neck examination other than the discomfort or pain that can be elicited upon palpation indicating a myofascial pain phenomenon most notably of the muscles, tendons, and ligaments of the deep neck and along the stylohyoid ligament. Return to spine surgeon to manage neck muscle discomfort. Antoinette Wu am scribing for and in the presence of Dr. Anjana Davidson November 26, 2018/9:56 AM/Conchis Davidson. Kaylie Merritt MD,  I personally performed the services described in this documentation as scribed by Waldemar Guzman in my presence and it appears accurate and complete.

## 2018-12-14 ENCOUNTER — TRANSCRIBE ORDERS (OUTPATIENT)
Dept: PULMONOLOGY | Facility: CLINIC | Age: 73
End: 2018-12-14

## 2018-12-14 DIAGNOSIS — R91.8 LUNG NODULE, MULTIPLE: Primary | ICD-10-CM

## 2019-01-15 ENCOUNTER — HOSPITAL ENCOUNTER (OUTPATIENT)
Dept: CT IMAGING | Age: 74
Discharge: HOME | End: 2019-01-15
Payer: MEDICARE

## 2019-01-15 DIAGNOSIS — R91.8 LUNG NODULES: ICD-10-CM

## 2019-01-15 PROCEDURE — 71250 CT THORAX DX C-: CPT

## 2019-01-16 DIAGNOSIS — R91.8 LUNG MASS: ICD-10-CM

## 2019-01-23 ENCOUNTER — OFFICE VISIT (OUTPATIENT)
Dept: PULMONOLOGY | Facility: CLINIC | Age: 74
End: 2019-01-23

## 2019-01-23 VITALS
HEART RATE: 71 BPM | SYSTOLIC BLOOD PRESSURE: 120 MMHG | DIASTOLIC BLOOD PRESSURE: 70 MMHG | OXYGEN SATURATION: 97 % | WEIGHT: 121 LBS | BODY MASS INDEX: 21.44 KG/M2 | HEIGHT: 63 IN

## 2019-01-23 DIAGNOSIS — R91.8 LUNG NODULES: ICD-10-CM

## 2019-01-23 DIAGNOSIS — R63.6 UNDERWEIGHT: ICD-10-CM

## 2019-01-23 DIAGNOSIS — J47.9 BRONCHIECTASIS WITHOUT COMPLICATION (HCC): Primary | ICD-10-CM

## 2019-01-23 PROCEDURE — 99214 OFFICE O/P EST MOD 30 MIN: CPT | Performed by: NURSE PRACTITIONER

## 2019-01-23 RX ORDER — LEVOTHYROXINE SODIUM 50 UG/1
CAPSULE ORAL
Refills: 0 | COMMUNITY
Start: 2019-01-08 | End: 2020-07-27 | Stop reason: ALTCHOICE

## 2019-01-23 NOTE — PROGRESS NOTES
GEREMIAS Mathis  [facility]   [place] [address]  Phone: [phone]  Fax: [phone]     Trinidad Zhu is a 73 y.o. female.   : 1945  CC:   Chief Complaint   Patient presents with   • Lung Nodule     had a scan has 3 nodules on right side      HPI: Trinidad Zhu is a pleasant 73 y.o. female. The patient is here today for follow up of lung nodules.  CT of the chest reviewed as stated below.  The patient has no shortness of breath, fever, chills.  The patient's PCP is Andrew Layne MD.    The following portions of the patient's history were reviewed and updated as appropriate: allergies, current medications, past family history, past medical history, past social history, past surgical history and problem list.  Past Medical History:   Diagnosis Date   • Antral ulcer    • Bladder cystocele    • C. difficile diarrhea    • CAD (coronary artery disease)    • Colon cancer screening     PREOPERATIVE    • Colon polyp    • Diarrhea    • Difficulty swallowing    • Gastritis    • Generalized OA    • GERD (gastroesophageal reflux disease)    • Hyperlipidemia    • Hypertension    • Liver cyst    • Lung nodule    • Microscopic colitis    • Neck pain    • Neuropathy    • Normal body mass index    • NSAID induced gastritis    • Numbness in both hands    • Ulcer of pyloric antrum     UNSPECIFIED ULCER CHRONICITY   • UTI (urinary tract infection)    • Weight loss      Family History   Problem Relation Age of Onset   • Breast cancer Maternal Aunt    • GI problems Neg Hx         MALIGNANCIES   • Colon cancer Neg Hx    • Colon polyps Neg Hx      Social History     Socioeconomic History   • Marital status:      Spouse name: Not on file   • Number of children: Not on file   • Years of education: Not on file   • Highest education level: Not on file   Social Needs   • Financial resource strain: Not on file   • Food insecurity - worry: Not  "on file   • Food insecurity - inability: Not on file   • Transportation needs - medical: Not on file   • Transportation needs - non-medical: Not on file   Occupational History   • Not on file   Tobacco Use   • Smoking status: Former Smoker   • Smokeless tobacco: Never Used   • Tobacco comment: QUIT 20 YRS AGO   Substance and Sexual Activity   • Alcohol use: No   • Drug use: No   • Sexual activity: Defer   Other Topics Concern   • Not on file   Social History Narrative   • Not on file     Review of Systems   Constitutional: Negative for chills and fever.   HENT: Negative for congestion.    Eyes: Negative for blurred vision.   Respiratory: Negative for cough and shortness of breath.    Cardiovascular: Negative for chest pain.   Gastrointestinal: Negative for diarrhea, nausea and vomiting.   Endocrine: Negative for cold intolerance and heat intolerance.   Genitourinary: Negative for dysuria.   Musculoskeletal: Negative for arthralgias.   Skin: Negative for rash.   Neurological: Negative for dizziness, weakness and light-headedness.   Hematological: Does not bruise/bleed easily.   Psychiatric/Behavioral: Negative for agitation. The patient is not nervous/anxious.      /70   Pulse 71   Ht 160 cm (63\")   Wt 54.9 kg (121 lb)   SpO2 97% Comment: RA  Breastfeeding? No   BMI 21.43 kg/m²   Physical Exam   Constitutional: She is oriented to person, place, and time. She appears well-developed and well-nourished. No distress.   HENT:   Head: Normocephalic and atraumatic.   Eyes: Conjunctivae and EOM are normal. Pupils are equal, round, and reactive to light. No scleral icterus.   Neck: Normal range of motion. Neck supple.   Cardiovascular: Normal rate, regular rhythm and normal heart sounds. Exam reveals no friction rub.   No murmur heard.  Pulmonary/Chest: Effort normal and breath sounds normal. No respiratory distress. She has no wheezes. She has no rales.   Abdominal: Soft. Bowel sounds are normal. She exhibits no " distension. There is no tenderness.   Musculoskeletal: Normal range of motion. She exhibits no edema.   Neurological: She is alert and oriented to person, place, and time.   Skin: Skin is warm and dry.   Psychiatric: She has a normal mood and affect. Her behavior is normal. Judgment and thought content normal.   Nursing note and vitals reviewed.    Pulmonary Functions Testing Results:  No results found for: FEV1, FVC, NEU0OPU, TLC, DLCO  My PFT Interpretation: none   Imaging: CT scan of the chest from [facility] was reviewed by myself and compared to previous study 7/16/18.  It does appear that the 13 mm nodule is stable.    Radiologist interpretation:  1. Stable CT of the chest without contrast with multiple noncalcified  pulmonary nodules in the right upper lobe, with associated bronchial  wall thickening and mild tubular bronchiectasis, most likely related  to a chronic indolent infectious or inflammatory process. No new  nodules are identified. The largest nodule, in the right lung apex  abuts the lateral pleural surface and measures approximately 13 mm.  There is surrounding mild retractive fibrotic change. Consider repeat  chest CT in 6 months. There are are also moderate changes of COPD has  before. No intrathoracic lymphadenopathy is identified. Stable heavy  atherosclerotic calcification is noted within the aortic arch and  coronary arteries.    Assessment and Plan:   Trinidad was seen today for lung nodule.    Diagnoses and all orders for this visit:    Bronchiectasis without complication (CMS/HCC)  Stable.  No respiratory infections or hospitalizations since last office visit.  Lung nodules  -     CT Chest Without Contrast; Future  CT of the chest is stable.  We will repeat in 6 months.  Underweight     Health maintenance:   Influenza vaccine: yes    Pneumovax 23:yes  Prevnar 13:yes  Patient's Body mass index is 21.43 kg/m². BMI is below normal parameters. Recommendations include: treating the  underlying disease process.    Follow up: 6 months with complete pulmonary function tests and follow-up CT scan.  Brittni Christianson, APRN  1/23/2019  1:57 PM

## 2019-03-07 ENCOUNTER — TRANSCRIBE ORDERS (OUTPATIENT)
Dept: ADMINISTRATIVE | Facility: HOSPITAL | Age: 74
End: 2019-03-07

## 2019-03-07 DIAGNOSIS — Z78.0 POSTMENOPAUSAL: ICD-10-CM

## 2019-03-07 DIAGNOSIS — Z12.39 SCREENING BREAST EXAMINATION: Primary | ICD-10-CM

## 2019-04-25 ENCOUNTER — HOSPITAL ENCOUNTER (OUTPATIENT)
Dept: BONE DENSITY | Facility: HOSPITAL | Age: 74
Discharge: HOME | End: 2019-04-25

## 2019-04-25 ENCOUNTER — HOSPITAL ENCOUNTER (OUTPATIENT)
Dept: MAMMOGRAPHY | Facility: HOSPITAL | Age: 74
Discharge: HOME | End: 2019-04-25
Admitting: FAMILY MEDICINE

## 2019-04-25 PROCEDURE — 77063 BREAST TOMOSYNTHESIS BI: CPT

## 2019-04-25 PROCEDURE — 77067 SCR MAMMO BI INCL CAD: CPT

## 2019-04-25 PROCEDURE — 77080 DXA BONE DENSITY AXIAL: CPT

## 2019-05-22 ENCOUNTER — TRANSCRIBE ORDERS (OUTPATIENT)
Dept: ADMINISTRATIVE | Facility: HOSPITAL | Age: 74
End: 2019-05-22

## 2019-05-22 ENCOUNTER — HOSPITAL ENCOUNTER (OUTPATIENT)
Dept: GENERAL RADIOLOGY | Facility: HOSPITAL | Age: 74
Discharge: HOME | End: 2019-05-22
Admitting: FAMILY MEDICINE

## 2019-05-22 ENCOUNTER — HOSPITAL ENCOUNTER (OUTPATIENT)
Dept: GENERAL RADIOLOGY | Facility: HOSPITAL | Age: 74
Discharge: HOME | End: 2019-05-22

## 2019-05-22 DIAGNOSIS — M25.572 ACUTE LEFT ANKLE PAIN: ICD-10-CM

## 2019-05-22 DIAGNOSIS — M25.572 ACUTE LEFT ANKLE PAIN: Primary | ICD-10-CM

## 2019-05-22 DIAGNOSIS — M79.672 LEFT FOOT PAIN: ICD-10-CM

## 2019-05-22 PROCEDURE — 73610 X-RAY EXAM OF ANKLE: CPT

## 2019-05-22 PROCEDURE — 73630 X-RAY EXAM OF FOOT: CPT

## 2019-06-26 ENCOUNTER — OFFICE VISIT (OUTPATIENT)
Dept: OBSTETRICS AND GYNECOLOGY | Facility: CLINIC | Age: 74
End: 2019-06-26

## 2019-06-26 VITALS
SYSTOLIC BLOOD PRESSURE: 118 MMHG | WEIGHT: 122 LBS | DIASTOLIC BLOOD PRESSURE: 68 MMHG | HEIGHT: 63 IN | BODY MASS INDEX: 21.62 KG/M2

## 2019-06-26 DIAGNOSIS — R35.1 NOCTURIA: ICD-10-CM

## 2019-06-26 DIAGNOSIS — N81.11 CYSTOCELE, MIDLINE: Primary | ICD-10-CM

## 2019-06-26 DIAGNOSIS — N39.0 FREQUENT UTI: ICD-10-CM

## 2019-06-26 PROCEDURE — 99214 OFFICE O/P EST MOD 30 MIN: CPT | Performed by: NURSE PRACTITIONER

## 2019-06-26 RX ORDER — FAMOTIDINE 40 MG/1
40 TABLET, FILM COATED ORAL 2 TIMES DAILY
Refills: 3 | COMMUNITY
Start: 2019-04-15

## 2019-06-26 RX ORDER — BENAZEPRIL HYDROCHLORIDE 5 MG/1
5 TABLET, FILM COATED ORAL DAILY
Status: ON HOLD | COMMUNITY
Start: 2019-06-19 | End: 2023-04-06 | Stop reason: SDUPTHER

## 2019-06-26 RX ORDER — DIAZEPAM 5 MG/1
2.5 TABLET ORAL EVERY 6 HOURS PRN
Refills: 0 | COMMUNITY
Start: 2019-05-26 | End: 2022-01-04 | Stop reason: ALTCHOICE

## 2019-06-26 RX ORDER — LEVOTHYROXINE SODIUM 0.05 MG/1
50 TABLET ORAL DAILY
Refills: 3 | COMMUNITY
Start: 2019-05-08

## 2019-06-26 RX ORDER — ALENDRONATE SODIUM 70 MG/1
70 TABLET ORAL
Refills: 3 | COMMUNITY
Start: 2019-05-21

## 2019-06-26 RX ORDER — TRAMADOL HYDROCHLORIDE 50 MG/1
50 TABLET ORAL
Refills: 0 | COMMUNITY
Start: 2019-05-28

## 2019-06-26 NOTE — PROGRESS NOTES
Trinidad Zhu is a 74 y.o.      Chief Complaint   Patient presents with   • Vaginal Prolapse     Pt is here for bladder prolapse PT is having recurrent UTI's PT has had a bladder lift in  PT is having alot of bladder infections.             HPI - Trinidad is in today for evaluation of bladder prolapse  She has had pelvic prolapse repair and states several years later, she had an A and P repair by Dr. Wu.. She had  a sling procedure  With one of these procedure  She has recurrent UTI's, several this year and just finished an antibiotic.  She states she has had blood at times on her urinalysis.  A few years ago she saw Dr. Mckeon. She is interested in surgical repair if possible.  Her records from Dr. Louise's   do not date that far back in [employer]..  Dr. Layne's NP examined her and told her bladder had dropped.  She has no incontinence, feels like she empties her bladder, up 3 plus times per night but no frequency during the day. She want the prolapse surgically repaired if possible.          The following portions of the patient's history were reviewed and updated as appropriate:vital signs, allergies, current medications, past family history, past medical history, past social history, past surgical history and problem list.      Current Outpatient Medications:   •  alendronate (FOSAMAX) 70 MG tablet, , Disp: , Rfl: 3  •  benazepril (LOTENSIN) 5 MG tablet, , Disp: , Rfl:   •  Calcium Citrate (CITRACAL PO), Take 2 tablets by mouth 2 (Two) Times a Day., Disp: , Rfl:   •  carvedilol (COREG) 12.5 MG tablet, Take 12.5 mg by mouth 2 (Two) Times a Day With Meals., Disp: , Rfl:   •  cetirizine (ZyrTEC) 10 MG tablet, Take 10 mg by mouth as needed for allergies., Disp: , Rfl:   •  colestipol (COLESTID) 1 G tablet, Take 1 g by mouth 2 (two) times a day as needed (ONLT TAKES ON DAY 3 OF HAVING DIARRHEA)., Disp: , Rfl:   •  diazePAM (VALIUM) 5 MG tablet, TK 1/2 T PO QID PRN, Disp: , Rfl: 0  •   diclofenac (VOLTAREN) 1 % gel gel, MOHSEN EXT AA BID UTD, Disp: , Rfl: 3  •  famotidine (PEPCID) 40 MG tablet, Take 40 mg by mouth 2 (Two) Times a Day., Disp: , Rfl: 3  •  levothyroxine (SYNTHROID, LEVOTHROID) 50 MCG tablet, TK 1 T PO QAM ON AN EMPTY STOMACH, Disp: , Rfl: 3  •  MULTIPLE MINERALS-VITAMINS PO, Take  by mouth., Disp: , Rfl:   •  Multiple Vitamins-Minerals (MULTIVITAMIN PO), Take  by mouth daily., Disp: , Rfl:   •  Omega-3 Fatty Acids (FISH OIL) 1000 MG capsule capsule, Take 1,000 mg by mouth Daily With Breakfast., Disp: , Rfl:   •  pravastatin (PRAVACHOL) 80 MG tablet, Take 80 mg by mouth daily. DOESN'T TAKE ON DAYS SHE HAS TO TAKE COLESTID, Disp: , Rfl:   •  Pyridoxine HCl (VITAMIN B6 PO), Take 1 tablet by mouth Daily., Disp: , Rfl:   •  traMADol (ULTRAM) 50 MG tablet, TK 1 T PO Q 6 H PRN, Disp: , Rfl: 0  •  vitamin B-12 (CYANOCOBALAMIN) 100 MCG tablet, Take 50 mcg by mouth Daily., Disp: , Rfl:   •  hydrochlorothiazide (HYDRODIURIL) 25 MG tablet, Take 12.5 mg by mouth Daily., Disp: , Rfl:   •  levothyroxine (SYNTHROID, LEVOTHROID) 25 MCG tablet, TK 1 T PO QAM ON AN EMPTY STOMACH, Disp: , Rfl: 0  •  MULTIPLE VITAMINS-MINERALS PO, Take 1 tablet by mouth., Disp: , Rfl:     Review of Systems   Constitutional: Negative for activity change, appetite change and fatigue.   HENT: Negative for congestion, drooling and hearing loss.    Eyes: Negative for pain.   Respiratory: Negative for apnea, choking and stridor.    Cardiovascular: Negative for chest pain.   Gastrointestinal: Negative for blood in stool and indigestion.        History of ulcer   Endocrine: Negative for cold intolerance and polydipsia.   Genitourinary: Negative for difficulty urinating, dyspareunia, vaginal bleeding, vaginal discharge and vaginal pain.        Frequent UTI's  with hematuria  Frequency at HS   Musculoskeletal: Positive for arthralgias, myalgias and neck pain.   Neurological: Negative for seizures, light-headedness and confusion.      "   Neuropathy   Psychiatric/Behavioral: Negative for agitation, decreased concentration and hallucinations.           Objective      /68   Ht 160 cm (63\")   Wt 55.3 kg (122 lb)   BMI 21.61 kg/m²       Physical Exam   Constitutional: She is oriented to person, place, and time. She appears well-developed and well-nourished.   HENT:   Head: Normocephalic and atraumatic.   Eyes: Right eye exhibits no discharge. Left eye exhibits no discharge.   Pulmonary/Chest: Effort normal. No respiratory distress.   Abdominal: Soft. She exhibits no distension. There is no tenderness.   Genitourinary: There is no rash, tenderness or lesion on the right labia. There is no rash, tenderness or lesion on the left labia. There is erythema in the vagina. No tenderness or bleeding in the vagina. No foreign body in the vagina. No signs of injury around the vagina. No vaginal discharge found. There is a cystocele (grade 2, with valsalva at introitus) present in the vagina.  Genitourinary Comments: Large cystocele  Large cystocele with minimal relaxation of the posterior wall.     Musculoskeletal: She exhibits no edema or deformity.   Neurological: She is alert and oriented to person, place, and time.   Skin: Skin is warm.   Psychiatric: Her behavior is normal.   Nursing note and vitals reviewed.       Assessment/Plan     Trinidad was seen today for vaginal prolapse.    Diagnoses and all orders for this visit:    Cystocele, midline  Comments:  2 previous surgeries (pelvic prolapse) by Dr. Wu, the last was A and P repair.  She has had a sling also.  She is interested in surgical correction of the prolapse.   I suggested a trial of a pessary which would be temporary to see how her bladder functions when in the correct position since she denies any incontinence now.  She declined, I explained the reason behind doing this.  She wants to talk to Dr. Irby first.  Her old records will have to be obtained., I reviewed Dr. Louise's " office notes (2012)  and there was mention of clipping an area of exposed mesh.        Frequent UTI  Comments:  She just finishes her antibiotics , she states she has had blood in her microscopic urinalysis (per the patient)  If she has persistent microscopic blood , she will need a cystoscope.    Orders:  -     Urinalysis With Microscopic - Urine, Clean Catch  -     Urine Culture, Comprehen ([facility]) - Urine, Clean Catch    Nocturia  Comments:  up at HS 3 plus times per night, she states she feels like she is emptying her bladder, patient is likely not completely emptying her bladder.      I will have [place] try to get her records (Dr. Wu's from Dr. Louise's records)              Stephanie Greenberg, APRN  6/26/2019

## 2019-06-30 LAB
APPEARANCE UR: CLEAR
BACTERIA #/AREA URNS HPF: ABNORMAL /HPF
BACTERIA UR CULT: ABNORMAL
BACTERIA UR CULT: ABNORMAL
BILIRUB UR QL STRIP: NEGATIVE
COLOR UR: YELLOW
EPI CELLS #/AREA URNS HPF: ABNORMAL /HPF
GLUCOSE UR QL: NEGATIVE
HGB UR QL STRIP: NEGATIVE
KETONES UR QL STRIP: NEGATIVE
LEUKOCYTE ESTERASE UR QL STRIP: (no result)
NITRITE UR QL STRIP: NEGATIVE
OTHER ANTIBIOTIC SUSC ISLT: ABNORMAL
PH UR STRIP: 6 [PH] (ref 5–8)
PROT UR QL STRIP: NEGATIVE
RBC #/AREA URNS HPF: ABNORMAL /HPF
SP GR UR: 1.02 (ref 1–1.03)
UROBILINOGEN UR STRIP-MCNC: (no result) MG/DL
WBC #/AREA URNS HPF: ABNORMAL /HPF

## 2019-07-03 ENCOUNTER — TELEPHONE (OUTPATIENT)
Dept: OBSTETRICS AND GYNECOLOGY | Facility: CLINIC | Age: 74
End: 2019-07-03

## 2019-07-08 ENCOUNTER — TELEPHONE (OUTPATIENT)
Dept: OBSTETRICS AND GYNECOLOGY | Facility: CLINIC | Age: 74
End: 2019-07-08

## 2019-07-08 NOTE — TELEPHONE ENCOUNTER
Trinidad is advised that her urinalysis was ok.  She has anappointment with Dr. Irby.  GEREMIAS Mcdonnell

## 2019-07-22 ENCOUNTER — HOSPITAL ENCOUNTER (OUTPATIENT)
Dept: CT IMAGING | Age: 74
Discharge: HOME | End: 2019-07-22
Payer: MEDICARE

## 2019-07-22 DIAGNOSIS — R91.1 LUNG NODULE: ICD-10-CM

## 2019-07-22 PROCEDURE — 71250 CT THORAX DX C-: CPT

## 2019-07-23 DIAGNOSIS — R91.8 LUNG NODULES: ICD-10-CM

## 2019-07-30 ENCOUNTER — OFFICE VISIT (OUTPATIENT)
Dept: PULMONOLOGY | Facility: CLINIC | Age: 74
End: 2019-07-30

## 2019-07-30 ENCOUNTER — OFFICE VISIT (OUTPATIENT)
Dept: OBSTETRICS AND GYNECOLOGY | Facility: CLINIC | Age: 74
End: 2019-07-30

## 2019-07-30 VITALS
BODY MASS INDEX: 21.26 KG/M2 | HEIGHT: 63 IN | HEART RATE: 68 BPM | OXYGEN SATURATION: 94 % | SYSTOLIC BLOOD PRESSURE: 130 MMHG | WEIGHT: 120 LBS | DIASTOLIC BLOOD PRESSURE: 70 MMHG

## 2019-07-30 VITALS
HEIGHT: 63 IN | DIASTOLIC BLOOD PRESSURE: 60 MMHG | SYSTOLIC BLOOD PRESSURE: 100 MMHG | BODY MASS INDEX: 21.09 KG/M2 | WEIGHT: 119 LBS

## 2019-07-30 DIAGNOSIS — R91.8 LUNG NODULES: Primary | ICD-10-CM

## 2019-07-30 DIAGNOSIS — J47.9 BRONCHIECTASIS WITHOUT COMPLICATION (HCC): ICD-10-CM

## 2019-07-30 DIAGNOSIS — N81.9 VAGINAL VAULT PROLAPSE: ICD-10-CM

## 2019-07-30 DIAGNOSIS — R63.6 UNDERWEIGHT: ICD-10-CM

## 2019-07-30 DIAGNOSIS — N81.11 CYSTOCELE, MIDLINE: Primary | ICD-10-CM

## 2019-07-30 LAB
DIFF CAP.CO: NORMAL ML/MMHG SEC
FEV1/FVC: NORMAL %
FEV1: NORMAL LITERS
FVC VOL RESPIRATORY: NORMAL LITERS
TLC: NORMAL LITERS

## 2019-07-30 PROCEDURE — 99214 OFFICE O/P EST MOD 30 MIN: CPT | Performed by: NURSE PRACTITIONER

## 2019-07-30 PROCEDURE — 94010 BREATHING CAPACITY TEST: CPT | Performed by: NURSE PRACTITIONER

## 2019-07-30 PROCEDURE — 94727 GAS DIL/WSHOT DETER LNG VOL: CPT | Performed by: NURSE PRACTITIONER

## 2019-07-30 PROCEDURE — 94729 DIFFUSING CAPACITY: CPT | Performed by: NURSE PRACTITIONER

## 2019-07-30 PROCEDURE — 99214 OFFICE O/P EST MOD 30 MIN: CPT | Performed by: OBSTETRICS & GYNECOLOGY

## 2019-07-30 NOTE — PROGRESS NOTES
"Subjective     Chief Complaint   Patient presents with   • Bladder Prolapse     Pt recently saw Estrella with c/o bladder prolapse. She is here to discuss surgery.       Trinidad Zhu is a 74 y.o. year old who presents to be seen for pelvic prolapse.  She was recently seen by Estrella Greenberg and referred to me.  She has a h/o a \"very involved\" \"four-and-a-half hour\" pelvic reconstruction with \"bladder tie up\" in 2004 in [place].     The patient is s/p hysterectomy.  She reports positive bulge outside her body, urinary incontinence (only when having an infection) and urinary retention causing recurrent bladder infections, but denies pelvic pain and stool trapping.  The patient feels like the problem began about 7 years ago.  She is not currently sexually active, and is not interested in being sexually active in the future.  Trinidad Zhu has had surgery for this problem in the past.    Past Medical History:   Diagnosis Date   • Antral ulcer    • Bladder cystocele    • C. difficile diarrhea    • CAD (coronary artery disease)    • Colon cancer screening     PREOPERATIVE    • Colon polyp    • Diarrhea    • Difficulty swallowing    • Gastritis    • Generalized OA    • GERD (gastroesophageal reflux disease)    • Hyperlipidemia    • Hypertension    • Liver cyst    • Lung nodule    • Microscopic colitis    • Neck pain    • Neuropathy    • Normal body mass index    • NSAID induced gastritis    • Numbness in both hands    • Ulcer of pyloric antrum     UNSPECIFIED ULCER CHRONICITY   • UTI (urinary tract infection)    • Weight loss        Current Outpatient Medications:   •  alendronate (FOSAMAX) 70 MG tablet, , Disp: , Rfl: 3  •  benazepril (LOTENSIN) 5 MG tablet, , Disp: , Rfl:   •  Calcium Citrate (CITRACAL PO), Take 2 tablets by mouth 2 (Two) Times a Day., Disp: , Rfl:   •  carvedilol (COREG) 12.5 MG tablet, Take 12.5 mg by mouth 2 (Two) Times a Day With Meals., Disp: , Rfl:   •  cetirizine (ZyrTEC) 10 MG tablet, Take 10 " mg by mouth as needed for allergies., Disp: , Rfl:   •  colestipol (COLESTID) 1 G tablet, Take 1 g by mouth 2 (two) times a day as needed (ONLT TAKES ON DAY 3 OF HAVING DIARRHEA)., Disp: , Rfl:   •  diazePAM (VALIUM) 5 MG tablet, TK 1/2 T PO QID PRN, Disp: , Rfl: 0  •  diclofenac (VOLTAREN) 1 % gel gel, MOHSEN EXT AA BID UTD, Disp: , Rfl: 3  •  famotidine (PEPCID) 40 MG tablet, Take 40 mg by mouth 2 (Two) Times a Day., Disp: , Rfl: 3  •  hydrochlorothiazide (HYDRODIURIL) 25 MG tablet, Take 12.5 mg by mouth Daily., Disp: , Rfl:   •  levothyroxine (SYNTHROID, LEVOTHROID) 50 MCG tablet, TK 1 T PO QAM ON AN EMPTY STOMACH, Disp: , Rfl: 3  •  MULTIPLE MINERALS-VITAMINS PO, Take  by mouth., Disp: , Rfl:   •  Omega-3 Fatty Acids (FISH OIL) 1000 MG capsule capsule, Take 1,000 mg by mouth Daily With Breakfast., Disp: , Rfl:   •  pravastatin (PRAVACHOL) 80 MG tablet, Take 80 mg by mouth daily. DOESN'T TAKE ON DAYS SHE HAS TO TAKE COLESTID, Disp: , Rfl:   •  Pyridoxine HCl (VITAMIN B6 PO), Take 1 tablet by mouth Daily., Disp: , Rfl:   •  traMADol (ULTRAM) 50 MG tablet, TK 1 T PO Q 6 H PRN, Disp: , Rfl: 0  •  vitamin B-12 (CYANOCOBALAMIN) 100 MCG tablet, Take 50 mcg by mouth Daily., Disp: , Rfl:   •  levothyroxine (SYNTHROID, LEVOTHROID) 25 MCG tablet, TK 1 T PO QAM ON AN EMPTY STOMACH, Disp: , Rfl: 0  •  Multiple Vitamins-Minerals (MULTIVITAMIN PO), Take  by mouth daily., Disp: , Rfl:   •  MULTIPLE VITAMINS-MINERALS PO, Take 1 tablet by mouth., Disp: , Rfl:   Family History   Problem Relation Age of Onset   • Breast cancer Maternal Aunt    • GI problems Neg Hx         MALIGNANCIES   • Colon cancer Neg Hx    • Colon polyps Neg Hx      Social History     Socioeconomic History   • Marital status:      Spouse name: Not on file   • Number of children: Not on file   • Years of education: Not on file   • Highest education level: Not on file   Tobacco Use   • Smoking status: Former Smoker   • Smokeless tobacco: Never Used   •  "Tobacco comment: QUIT 20 YRS AGO   Substance and Sexual Activity   • Alcohol use: No   • Drug use: No   • Sexual activity: Not Currently     Allergies   Allergen Reactions   • Baclofen Other (See Comments)     MUSCULOSKELETAL THERAPY AGENTS knocked her out for a day     • Gabapentin Confusion   • Sulfa Antibiotics Rash     Mouth blisters       Family History   Problem Relation Age of Onset   • Breast cancer Maternal Aunt    • GI problems Neg Hx         MALIGNANCIES   • Colon cancer Neg Hx    • Colon polyps Neg Hx      Review of Systems   Constitutional: Negative for activity change and unexpected weight change.   Respiratory: Negative for shortness of breath.    Cardiovascular: Negative for chest pain.   Genitourinary: Positive for difficulty urinating (has to lean forward to get completely empty), enuresis (occasionally, more when she has an infection) and vaginal pain (pressure from \"bulge\"). Negative for urgency, vaginal bleeding and vaginal discharge.        + Nocturia at night, q2 hours           Objective   /60   Ht 160 cm (63\")   Wt 54 kg (119 lb)   Breastfeeding? No   BMI 21.08 kg/m²     Physical Exam   Constitutional: She is oriented to person, place, and time. She appears well-developed and well-nourished. No distress.   HENT:   Head: Normocephalic and atraumatic.   Eyes: EOM are normal.   Neck: Normal range of motion.   Pulmonary/Chest: Effort normal.   Abdominal: Soft. She exhibits no distension. There is no tenderness.   Genitourinary:   Genitourinary Comments: Vaginal mucosa with atrophy.  Grade III cystocele with poor vault support.  External  unremarkable.   Musculoskeletal: Normal range of motion.   Neurological: She is alert and oriented to person, place, and time.   Skin: Skin is warm and dry.   Psychiatric: She has a normal mood and affect. Her behavior is normal. Judgment normal.   Nursing note and vitals reviewed.      Imaging   No data reviewed       Assessment & Plan    Trinidad was " seen today for bladder prolapse.    Diagnoses and all orders for this visit:    Cystocele, midline: Anterior repair and/or Posterior repair were discussed with the patient as one options for management of her condition.  The patient was advised that expectant management or the use of a pessary were more conservative options and that surgery for prolapse is only necessary if the patient is having bladder or bowel dysfunction.  As surgical options we have reviewed laparoscopic sacral colpopexy, anterior posterior repair, or colpocleisis.  Based on bimanual exam, I do not feel that an anterior posterior repair would last the patient very long as I do not palpate a good place to put vault suspension sutures.  The patient has not been sexually active in over 10 years; I have encouraged her to consider colpocleisis.  The patient feels like she has a lot of options to consider, after reviewing all the surgical risks and benefits of each option.  She will return the office in 3 to 4 weeks for further discussion.    Vaginal vault prolapse    BMI 21.0-21.9, adult      Clary Irby MD  7/30/2019  9:15 AM

## 2019-07-30 NOTE — PROGRESS NOTES
GEREMIAS Mathis  [facility]   Respiratory [place] [address]  Phone: [phone]  Fax: [phone]     Trinidad Zhu is a 74 y.o. female.   : 1945  CC:   Chief Complaint   Patient presents with   • Lung Nodule      HPI: Trinidad Zhu is a pleasant 74 y.o. female. The patient is here today for follow up of lung nodules and bronchiectasis.    The patient states she is been doing well from a pulmonary standpoint.  No recent hospitalizations. No increasing shortness of breath, no fever, no chills, no cough with purulent sputum.     The patient's PCP is Andrew Layne MD.    The following portions of the patient's history were reviewed and updated as appropriate: allergies, current medications, past family history, past medical history, past social history, past surgical history and problem list.  Past Medical History:   Diagnosis Date   • Antral ulcer    • Bladder cystocele    • C. difficile diarrhea    • CAD (coronary artery disease)    • Colon cancer screening     PREOPERATIVE    • Colon polyp    • Diarrhea    • Difficulty swallowing    • Gastritis    • Generalized OA    • GERD (gastroesophageal reflux disease)    • Hyperlipidemia    • Hypertension    • Liver cyst    • Lung nodule    • Microscopic colitis    • Neck pain    • Neuropathy    • Normal body mass index    • NSAID induced gastritis    • Numbness in both hands    • Ulcer of pyloric antrum     UNSPECIFIED ULCER CHRONICITY   • UTI (urinary tract infection)    • Weight loss      Family History   Problem Relation Age of Onset   • Breast cancer Maternal Aunt    • GI problems Neg Hx         MALIGNANCIES   • Colon cancer Neg Hx    • Colon polyps Neg Hx      Social History     Socioeconomic History   • Marital status:      Spouse name: Not on file   • Number of children: Not on file   • Years of education: Not on file   • Highest education level: Not on file   Tobacco Use   •  "Smoking status: Former Smoker   • Smokeless tobacco: Never Used   • Tobacco comment: QUIT 20 YRS AGO   Substance and Sexual Activity   • Alcohol use: No   • Drug use: No   • Sexual activity: Not Currently     Review of Systems   Constitutional: Negative for chills and fever.   HENT: Negative for congestion.    Eyes: Negative for blurred vision.   Respiratory: Negative for cough and shortness of breath.    Cardiovascular: Negative for chest pain.   Gastrointestinal: Negative for diarrhea, nausea and vomiting.   Endocrine: Negative for cold intolerance and heat intolerance.   Genitourinary: Negative for dysuria.   Musculoskeletal: Negative for arthralgias.   Skin: Negative for rash.   Neurological: Negative for dizziness, weakness and light-headedness.   Hematological: Does not bruise/bleed easily.   Psychiatric/Behavioral: Negative for agitation. The patient is not nervous/anxious.      /70   Pulse 68   Ht 160 cm (63\")   Wt 54.4 kg (120 lb)   SpO2 94% Comment: RA  Breastfeeding? No   BMI 21.26 kg/m²   Physical Exam   Constitutional: She is oriented to person, place, and time. She appears well-developed and well-nourished. No distress.   HENT:   Head: Normocephalic and atraumatic.   Eyes: Conjunctivae and EOM are normal. Pupils are equal, round, and reactive to light. No scleral icterus.   Neck: Normal range of motion. Neck supple.   Cardiovascular: Normal rate, regular rhythm and normal heart sounds. Exam reveals no friction rub.   No murmur heard.  Pulmonary/Chest: Effort normal and breath sounds normal. No respiratory distress. She has no wheezes. She has no rales.   Abdominal: Soft. Bowel sounds are normal. She exhibits no distension. There is no tenderness.   Musculoskeletal: Normal range of motion. She exhibits no edema.   Neurological: She is alert and oriented to person, place, and time.   Skin: Skin is warm and dry.   Psychiatric: She has a normal mood and affect. Her behavior is normal. Judgment " and thought content normal.   Nursing note and vitals reviewed.    Pulmonary Functions Testing Results:  FEV1   Date Value Ref Range Status   07/30/2019 69% liters Final     FVC   Date Value Ref Range Status   07/30/2019 75% liters Final     FEV1/FVC   Date Value Ref Range Status   07/30/2019 71.41% % Final     TLC   Date Value Ref Range Status   07/30/2019 82% liters Final     DLCO   Date Value Ref Range Status   07/30/2019 51% ml/mmHg sec Final     My PFT Interpretation: Spirometry shows moderate restrictive lung disease and moderate small airway disease.  Lung volumes show a normal total lung capacity, reduced vital capacity, and normal residual volume.  Diffusion shows a moderate diffusion impairment that remains moderate when corrected for alveolar volume.  Imaging:   [facility] CT chest wo contrast 7/22/19  The stable CT scan of the chest. The stable infiltrate and  nodules in the right upper and middle lobe since the previous study in  2016.  A follow-up unenhanced CT scan of the chest in one year may be  obtained.    Assessment and Plan:   Trinidad was seen today for lung nodule.    Diagnoses and all orders for this visit:    Lung nodules  -     Pulmonary Function Test  -     CT Chest Without Contrast; Future  Lung nodules are stable.  Repeat CT of the chest in 1 year.  Bronchiectasis without complication (CMS/HCC)  Stable.  No recent hospitalizations.  No recent respiratory infections.  Underweight  Defer to PCP.    Health maintenance:   Influenza vaccine: yes    Pneumovax 23: yes   Prevnar 13: yes   Patient's Body mass index is 21.26 kg/m². BMI is below normal parameters. Recommendations include: defer to PCP.    Follow up: 1 year   GEREMIAS Mathis  7/30/2019  4:05 PM    Please note that portions of this note were completed with a voice recognition program.

## 2019-08-23 ENCOUNTER — OFFICE VISIT (OUTPATIENT)
Dept: OBSTETRICS AND GYNECOLOGY | Facility: CLINIC | Age: 74
End: 2019-08-23

## 2019-08-23 VITALS
SYSTOLIC BLOOD PRESSURE: 118 MMHG | BODY MASS INDEX: 21.62 KG/M2 | DIASTOLIC BLOOD PRESSURE: 72 MMHG | WEIGHT: 122 LBS | HEIGHT: 63 IN

## 2019-08-23 DIAGNOSIS — N81.11 CYSTOCELE, MIDLINE: ICD-10-CM

## 2019-08-23 DIAGNOSIS — N81.9 VAGINAL VAULT PROLAPSE: ICD-10-CM

## 2019-08-23 DIAGNOSIS — N39.0 URINARY TRACT INFECTION WITHOUT HEMATURIA, SITE UNSPECIFIED: Primary | ICD-10-CM

## 2019-08-23 PROCEDURE — 99214 OFFICE O/P EST MOD 30 MIN: CPT | Performed by: OBSTETRICS & GYNECOLOGY

## 2019-08-23 RX ORDER — NITROFURANTOIN 25; 75 MG/1; MG/1
100 CAPSULE ORAL 2 TIMES DAILY
Qty: 10 CAPSULE | Refills: 0 | Status: SHIPPED | OUTPATIENT
Start: 2019-08-23 | End: 2019-08-28

## 2019-08-23 NOTE — PROGRESS NOTES
"Subjective     Chief Complaint   Patient presents with   • Vaginal Prolapse     pt here today to discuss options given to her at last visit for prolapse. pt also voices she may have a UTI. pt voices no concerns.        Trinidad Zhu is a 74 y.o. year old who presents to be seen for pelvic prolapse.  She was recently seen by Etsrella Greenberg and referred to me for discussion of her surgical options.  She has a h/o a \"very involved\" \"four-and-a-half hour\" pelvic reconstruction with \"bladder tie up\" in 2004 in [place] - although I do not have the OP note.  We will obtain previous records if patient decides to proceed in a way that makes those details relevant.  We first talked about three weeks ago, and the patient returns today with her  to re-discuss those same options because she wants him to hear.     The patient is s/p hysterectomy.  She reports positive bulge outside her body, urinary incontinence (only when having an infection) and urinary retention causing recurrent bladder infections, but denies pelvic pain and stool trapping.  The patient feels like the problem began about 7 years ago.  She is not currently sexually active, and is not interested in being sexually active in the future.  Trinidad Zhu has had surgery for this problem in the past.    Past Medical History:   Diagnosis Date   • Antral ulcer    • Bladder cystocele    • C. difficile diarrhea    • CAD (coronary artery disease)    • Colon cancer screening     PREOPERATIVE    • Colon polyp    • Diarrhea    • Difficulty swallowing    • Gastritis    • Generalized OA    • GERD (gastroesophageal reflux disease)    • Hyperlipidemia    • Hypertension    • Liver cyst    • Lung nodule    • Microscopic colitis    • Neck pain    • Neuropathy    • Normal body mass index    • NSAID induced gastritis    • Numbness in both hands    • Ulcer of pyloric antrum     UNSPECIFIED ULCER CHRONICITY   • UTI (urinary tract infection)    • Weight loss        Current " Outpatient Medications:   •  alendronate (FOSAMAX) 70 MG tablet, , Disp: , Rfl: 3  •  benazepril (LOTENSIN) 5 MG tablet, , Disp: , Rfl:   •  Calcium Citrate (CITRACAL PO), Take 2 tablets by mouth 2 (Two) Times a Day., Disp: , Rfl:   •  carvedilol (COREG) 12.5 MG tablet, Take 12.5 mg by mouth 2 (Two) Times a Day With Meals., Disp: , Rfl:   •  cetirizine (ZyrTEC) 10 MG tablet, Take 10 mg by mouth as needed for allergies., Disp: , Rfl:   •  colestipol (COLESTID) 1 G tablet, Take 1 g by mouth 2 (two) times a day as needed (ONLT TAKES ON DAY 3 OF HAVING DIARRHEA)., Disp: , Rfl:   •  diazePAM (VALIUM) 5 MG tablet, TK 1/2 T PO QID PRN, Disp: , Rfl: 0  •  diclofenac (VOLTAREN) 1 % gel gel, MOHSEN EXT AA BID UTD, Disp: , Rfl: 3  •  famotidine (PEPCID) 40 MG tablet, Take 40 mg by mouth 2 (Two) Times a Day., Disp: , Rfl: 3  •  hydrochlorothiazide (HYDRODIURIL) 25 MG tablet, Take 12.5 mg by mouth Daily., Disp: , Rfl:   •  levothyroxine (SYNTHROID, LEVOTHROID) 50 MCG tablet, TK 1 T PO QAM ON AN EMPTY STOMACH, Disp: , Rfl: 3  •  levothyroxine sodium (TIROSINT) 50 MCG capsule, TK 1 T PO QAM ON AN EMPTY STOMACH, Disp: , Rfl: 0  •  MULTIPLE MINERALS-VITAMINS PO, Take  by mouth., Disp: , Rfl:   •  Multiple Vitamins-Minerals (MULTIVITAMIN PO), Take  by mouth daily., Disp: , Rfl:   •  MULTIPLE VITAMINS-MINERALS PO, Take 1 tablet by mouth., Disp: , Rfl:   •  Omega-3 Fatty Acids (FISH OIL) 1000 MG capsule capsule, Take 1,000 mg by mouth Daily With Breakfast., Disp: , Rfl:   •  pravastatin (PRAVACHOL) 80 MG tablet, Take 80 mg by mouth daily. DOESN'T TAKE ON DAYS SHE HAS TO TAKE COLESTID, Disp: , Rfl:   •  Pyridoxine HCl (VITAMIN B6 PO), Take 1 tablet by mouth Daily., Disp: , Rfl:   •  traMADol (ULTRAM) 50 MG tablet, TK 1 T PO Q 6 H PRN, Disp: , Rfl: 0  •  vitamin B-12 (CYANOCOBALAMIN) 100 MCG tablet, Take 50 mcg by mouth Daily., Disp: , Rfl:      Family History   Problem Relation Age of Onset   • Breast cancer Maternal Aunt    • GI problems  "Neg Hx         MALIGNANCIES   • Colon cancer Neg Hx    • Colon polyps Neg Hx      Social History     Socioeconomic History   • Marital status:      Spouse name: Not on file   • Number of children: Not on file   • Years of education: Not on file   • Highest education level: Not on file   Tobacco Use   • Smoking status: Former Smoker   • Smokeless tobacco: Never Used   • Tobacco comment: QUIT 20 YRS AGO   Substance and Sexual Activity   • Alcohol use: No   • Drug use: No   • Sexual activity: Not Currently     Allergies   Allergen Reactions   • Baclofen Other (See Comments)     MUSCULOSKELETAL THERAPY AGENTS knocked her out for a day     • Gabapentin Confusion   • Sulfa Antibiotics Rash     Mouth blisters       Family History   Problem Relation Age of Onset   • Breast cancer Maternal Aunt    • GI problems Neg Hx         MALIGNANCIES   • Colon cancer Neg Hx    • Colon polyps Neg Hx      Review of Systems   Constitutional: Negative for activity change and unexpected weight change.   Respiratory: Negative for shortness of breath.    Cardiovascular: Negative for chest pain.   Genitourinary: Positive for difficulty urinating (has to lean forward to get completely empty), enuresis (occasionally, more when she has an infection) and vaginal pain (pressure from \"bulge\"). Negative for urgency, vaginal bleeding and vaginal discharge.        + Nocturia at night, q2 hours           Objective   /72   Ht 160 cm (63\")   Wt 55.3 kg (122 lb)   BMI 21.61 kg/m²     Physical Exam   Constitutional: She is oriented to person, place, and time. She appears well-developed and well-nourished. No distress.   HENT:   Head: Normocephalic and atraumatic.   Eyes: EOM are normal.   Neck: Normal range of motion.   Pulmonary/Chest: Effort normal.   Abdominal: Soft. She exhibits no distension. There is no tenderness.   Genitourinary:   Genitourinary Comments: Vaginal mucosa with atrophy.  Grade III cystocele with poor vault support.  " External  unremarkable.   Musculoskeletal: Normal range of motion.   Neurological: She is alert and oriented to person, place, and time.   Skin: Skin is warm and dry.   Psychiatric: She has a normal mood and affect. Her behavior is normal. Judgment normal.   Nursing note and vitals reviewed.      Imaging   No data reviewed       Assessment & Plan    Trinidad was seen today for bladder prolapse.    Diagnoses and all orders for this visit:    Cystocele, midline: Anterior repair and/or Posterior repair were discussed with the patient as one option for management of her condition - although she has been advised against this since I do not feel that it would have good durability based on exam, and since she has already failed a previous repair.  The patient was advised that expectant management or the use of a pessary were more conservative options and that surgery for prolapse is only necessary if the patient is having bladder or bowel dysfunction.  As surgical options we have reviewed laparoscopic sacral colpopexy, anterior/posterior repair, or colpocleisis.  Based on bimanual exam, I do not feel that an anterior posterior repair would last the patient very long as I do not palpate a good place to put vault suspension sutures.  The patient has not been sexually active in over 10 years; I have encouraged her to consider colpocleisis.  The patient feels like she has a lot of options to consider, after reviewing all the surgical risks and benefits of each option.  She and  will discuss options.  45 minutes spent with couple today.    Vaginal vault prolapse    BMI 21.0-21.9, adult    Dysuria: macrobid sent to pharmacy.  Will also sent culture and sensitivities on today's urine      Clary Irby MD  8/23/2019  11:43 AM

## 2019-08-27 LAB
APPEARANCE UR: ABNORMAL
BACTERIA #/AREA URNS HPF: ABNORMAL /HPF
BACTERIA UR CULT: ABNORMAL
BACTERIA UR CULT: ABNORMAL
BILIRUB UR QL STRIP: NEGATIVE
CASTS URNS MICRO: ABNORMAL
COLOR UR: YELLOW
EPI CELLS #/AREA URNS HPF: ABNORMAL /HPF
GLUCOSE UR QL: NEGATIVE
HGB UR QL STRIP: ABNORMAL
KETONES UR QL STRIP: NEGATIVE
LEUKOCYTE ESTERASE UR QL STRIP: ABNORMAL
NITRITE UR QL STRIP: NEGATIVE
OTHER ANTIBIOTIC SUSC ISLT: ABNORMAL
PH UR STRIP: 7.5 [PH] (ref 5–8)
PROT UR QL STRIP: NEGATIVE
RBC #/AREA URNS HPF: ABNORMAL /HPF
SP GR UR: 1.01 (ref 1–1.03)
UROBILINOGEN UR STRIP-MCNC: ABNORMAL MG/DL
WBC #/AREA URNS HPF: ABNORMAL /HPF

## 2019-08-27 NOTE — PROGRESS NOTES
Please let patient know that macrobid sent for her UTI a few days ago looks like a great choice to treat her infection, based on culture sensitivities.  She needs to let us know if symptoms do not resolved.  Jorge

## 2020-03-20 ENCOUNTER — TRANSCRIBE ORDERS (OUTPATIENT)
Dept: ADMINISTRATIVE | Facility: HOSPITAL | Age: 75
End: 2020-03-20

## 2020-03-20 DIAGNOSIS — Z12.31 ENCOUNTER FOR SCREENING MAMMOGRAM FOR MALIGNANT NEOPLASM OF BREAST: Primary | ICD-10-CM

## 2020-05-08 ENCOUNTER — HOSPITAL ENCOUNTER (OUTPATIENT)
Dept: MAMMOGRAPHY | Facility: HOSPITAL | Age: 75
Discharge: HOME | End: 2020-05-08
Admitting: FAMILY MEDICINE

## 2020-05-08 DIAGNOSIS — Z12.31 ENCOUNTER FOR SCREENING MAMMOGRAM FOR MALIGNANT NEOPLASM OF BREAST: ICD-10-CM

## 2020-05-08 PROCEDURE — 77063 BREAST TOMOSYNTHESIS BI: CPT

## 2020-05-08 PROCEDURE — 77067 SCR MAMMO BI INCL CAD: CPT

## 2020-06-19 NOTE — PROGRESS NOTES
Subjective    Ms. Zhu is 75 y.o. female    Chief Complaint: Urge Incontinence    History of Present Illness     Urinary Incontinence  Patient complains of urinary incontinence. This has been present for several months. Symptoms have gradually worsened . Patient uses 2 pads/day.   The patient leaks urine with with urge, with a full bladder. Patient describes the symptoms as urge to urinate with little or no warning. Factors associated with symptoms include following abdominal sacrocolpopexy. Evaluation to date includes none. Treatment to date includes none.        The following portions of the patient's history were reviewed and updated as appropriate: allergies, current medications, past family history, past medical history, past social history, past surgical history and problem list.    Review of Systems   Constitutional: Negative for appetite change, diaphoresis and fever.   HENT: Negative for facial swelling and sore throat.    Eyes: Negative for discharge and visual disturbance.   Respiratory: Negative for cough and shortness of breath.    Cardiovascular: Negative for chest pain and leg swelling.   Gastrointestinal: Negative for anal bleeding and vomiting.   Endocrine: Negative for cold intolerance and heat intolerance.   Genitourinary: Positive for frequency and urgency. Negative for dysuria, flank pain, hematuria and pelvic pain.   Musculoskeletal: Negative for back pain and gait problem.   Skin: Negative for pallor and rash.   Allergic/Immunologic: Negative for immunocompromised state.   Neurological: Negative for seizures and headaches.   Hematological: Negative for adenopathy. Does not bruise/bleed easily.   Psychiatric/Behavioral: Negative for dysphoric mood, self-injury and suicidal ideas.         Current Outpatient Medications:   •  amLODIPine (NORVASC) 10 MG tablet, , Disp: , Rfl:   •  aspirin 81 MG chewable tablet, Chew 81 mg Daily., Disp: , Rfl:   •  benazepril (LOTENSIN) 5 MG tablet, , Disp: ,  Rfl:   •  Calcium Citrate (CITRACAL PO), Take 2 tablets by mouth 2 (Two) Times a Day., Disp: , Rfl:   •  carvedilol (COREG) 12.5 MG tablet, Take 12.5 mg by mouth 2 (Two) Times a Day With Meals., Disp: , Rfl:   •  estradiol (ESTRACE) 0.1 MG/GM vaginal cream, Apply nightly to  vagina for 1 week, then Monday/Wednesday/ Friday, Disp: , Rfl:   •  famotidine (PEPCID) 40 MG tablet, Take 40 mg by mouth 2 (Two) Times a Day., Disp: , Rfl: 3  •  hydrochlorothiazide (HYDRODIURIL) 25 MG tablet, Take 12.5 mg by mouth Daily., Disp: , Rfl:   •  levothyroxine (SYNTHROID, LEVOTHROID) 50 MCG tablet, TK 1 T PO QAM ON AN EMPTY STOMACH, Disp: , Rfl: 3  •  MULTIPLE MINERALS-VITAMINS PO, Take  by mouth., Disp: , Rfl:   •  Omega-3 Fatty Acids (FISH OIL) 1000 MG capsule capsule, Take 1,000 mg by mouth Daily With Breakfast., Disp: , Rfl:   •  pravastatin (PRAVACHOL) 80 MG tablet, Take 80 mg by mouth daily. DOESN'T TAKE ON DAYS SHE HAS TO TAKE COLESTID, Disp: , Rfl:   •  vitamin B-12 (CYANOCOBALAMIN) 100 MCG tablet, Take 50 mcg by mouth Daily., Disp: , Rfl:   •  alendronate (FOSAMAX) 70 MG tablet, , Disp: , Rfl: 3  •  cetirizine (ZyrTEC) 10 MG tablet, Take 10 mg by mouth as needed for allergies., Disp: , Rfl:   •  colestipol (COLESTID) 1 G tablet, Take 1 g by mouth 2 (two) times a day as needed (ONLT TAKES ON DAY 3 OF HAVING DIARRHEA)., Disp: , Rfl:   •  diazePAM (VALIUM) 5 MG tablet, TK 1/2 T PO QID PRN, Disp: , Rfl: 0  •  diclofenac (VOLTAREN) 1 % gel gel, MOHSEN EXT AA BID UTD, Disp: , Rfl: 3  •  levothyroxine sodium (TIROSINT) 50 MCG capsule, TK 1 T PO QAM ON AN EMPTY STOMACH, Disp: , Rfl: 0  •  Multiple Vitamins-Minerals (MULTIVITAMIN PO), Take  by mouth daily., Disp: , Rfl:   •  MULTIPLE VITAMINS-MINERALS PO, Take 1 tablet by mouth., Disp: , Rfl:   •  Pyridoxine HCl (VITAMIN B6 PO), Take 1 tablet by mouth Daily., Disp: , Rfl:   •  traMADol (ULTRAM) 50 MG tablet, TK 1 T PO Q 6 H PRN, Disp: , Rfl: 0    Past Medical History:   Diagnosis Date    • Antral ulcer    • Bladder cystocele    • C. difficile diarrhea    • CAD (coronary artery disease)    • Colon cancer screening     PREOPERATIVE    • Colon polyp    • Diarrhea    • Difficulty swallowing    • Gastritis    • Generalized OA    • GERD (gastroesophageal reflux disease)    • Hyperlipidemia    • Hypertension    • Liver cyst    • Lung nodule    • Microscopic colitis    • Neck pain    • Neuropathy    • Normal body mass index    • NSAID induced gastritis    • Numbness in both hands    • Ulcer of pyloric antrum     UNSPECIFIED ULCER CHRONICITY   • UTI (urinary tract infection)    • Weight loss        Past Surgical History:   Procedure Laterality Date   • ANTERIOR CERVICAL DISCECTOMY W/ FUSION N/A 5/22/2017    Procedure: CERVICAL DISCECTOMY ANTERIOR FUSION WITH INSTRUMENTATION;  Surgeon: NADINE Sarabia MD;  Location:  [facility] OR;  Service:    • BLADDER SUSPENSION      also had pelvic reconstructive surgery   • BREAST EXCISIONAL BIOPSY Right 1985   • CERVICAL CORPECTOMY N/A 5/22/2017    Procedure:  ANTERIOR CERVICAL DISCECTOMY FUSION C-6 to T1,  ANTERIOR FUSION WITH INSTRUMENTATION C-5 T-1;  Surgeon: NADINE Sarabia MD;  Location:  [facility] OR;  Service:    • COLONOSCOPY  08/19/2015    TIC BX RT COLON   • COLONOSCOPY  12/04/2013    RT COLON BX RECALL 5YR   • COLONOSCOPY N/A 11/29/2016    Procedure: LOWER LIMITED COLONOSCOPY WITH ANESTHESIA;  Surgeon: Marco Antonio Vallejo MD;  Location:  [facility] ENDOSCOPY;  Service:    • ENDOSCOPY  12/03/2015    HEALED ULCER SLANT BX   • HYSTERECTOMY     • OTHER SURGICAL HISTORY      KNEE CARTILAGE REMOVED   • OTHER SURGICAL HISTORY      BENIGN FIBROID TUMOR OF BREAST 1985 REMOVED   • TONSILLECTOMY         Social History     Socioeconomic History   • Marital status:      Spouse name: Not on file   • Number of children: Not on file   • Years of education: Not on file   • Highest education level: Not on file   Tobacco Use   • Smoking status: Former Smoker   • Smokeless tobacco:  "Never Used   • Tobacco comment: QUIT 20 YRS AGO   Substance and Sexual Activity   • Alcohol use: No   • Drug use: No   • Sexual activity: Not Currently       Family History   Problem Relation Age of Onset   • Breast cancer Maternal Aunt    • GI problems Neg Hx         MALIGNANCIES   • Colon cancer Neg Hx    • Colon polyps Neg Hx        Objective    Temp 97.6 °F (36.4 °C) (Temporal)   Ht 162.6 cm (64\")   Wt 53.5 kg (118 lb)   BMI 20.25 kg/m²     Physical Exam   Constitutional: She is oriented to person, place, and time. She appears well-developed and well-nourished. No distress.   HENT:   Head: Normocephalic and atraumatic.   Right Ear: External ear and ear canal normal.   Left Ear: External ear and ear canal normal.   Nose: No nasal deformity. No epistaxis.   Mouth/Throat: Oropharynx is clear and moist. Mucous membranes are not pale, not dry and not cyanotic. Normal dentition. No oropharyngeal exudate.   Neck: Trachea normal. No tracheal tenderness present. No tracheal deviation present. No thyroid mass and no thyromegaly present.   Pulmonary/Chest: Effort normal. No accessory muscle usage. No respiratory distress. Chest wall is not dull to percussion (No flatness or hyperresonance). She exhibits no mass and no tenderness.   On palpation, no tactile fremitus. All movements are symmetric. No intercostal retraction noted.    Abdominal: Soft. Normal appearance. She exhibits no distension and no mass. There is no hepatosplenomegaly. There is tenderness. No hernia.   Rectal examination or stool specimen is not indicated.    Musculoskeletal:   Normal gait and station. The spine, ribs, and pelvis are examined. No obvious misalignment or asymmetry. ROM is reasonable for age. No instability. No obvious atrophy, flaccidity or spasticity.    Lymphadenopathy:     She has no cervical adenopathy.        Right: No inguinal adenopathy present.        Left: No inguinal adenopathy present.   Neurological: She is alert and oriented " to person, place, and time.   Skin: Skin is warm, dry and intact. No lesion and no rash noted. She is not diaphoretic. No cyanosis. No pallor. Nails show no clubbing.   On palpation, there were no induration, subcutaneous nodules, or tightening   Psychiatric: Her speech is normal and behavior is normal. Judgment and thought content normal. Her mood appears not anxious. Her affect is not labile. She does not exhibit a depressed mood.   Vitals reviewed.          Results for orders placed or performed in visit on 06/22/20   POC Urinalysis Dipstick, Multipro   Result Value Ref Range    Color Yellow Yellow, Straw, Dark Yellow, Clary    Clarity, UA Clear Clear    Glucose, UA Negative Negative, 1000 mg/dL (3+) mg/dL    Bilirubin Negative Negative    Ketones, UA Trace (A) Negative    Specific Gravity  1.020 1.005 - 1.030    Blood, UA Trace (A) Negative    pH, Urine 6.0 5.0 - 8.0    Protein, POC 1+ (A) Negative mg/dL    Urobilinogen, UA Normal Normal    Nitrite, UA Negative Negative    Leukocytes Negative Negative   Bladder Scan interpretation  Estimation of residual urine via abdominal ultrasound  Residual Urine: 29 ml  Indication: Incontinence  Position: Supine  Examination: Incremental scanning of the suprapubic area using 3 MHz transducer using copious amounts of acoustic gel.   Findings: An anechoic area was demonstrated which represented the bladder, with measurement of residual urine as noted. I inspected this myself. In that the residual urine was stable or insignificant, no treatment will be necessary at this time.         Assessment and Plan    Diagnoses and all orders for this visit:    Urge incontinence of urine  -     POC Urinalysis Dipstick, Multipro          Patient is status post abdominal sacrocolpopexy with rectus fascia in January 2020 at [facility].  She is had subsequent urgency incontinence since then.  I reviewed all pertinent notes related to this.  She is having significant urge  incontinence.  She has not tried any sort of medication.      I discussed anticholinergics as well as Myrbetriq with her she does not want to try anticholinergics due to the risk of dementia as well as dry mouth and constipation.  I am going to have her start Myrbetriq.  She will follow-up in 3 months.  We also discussed InterStim as well as Botox if she fails medications.

## 2020-06-22 ENCOUNTER — OFFICE VISIT (OUTPATIENT)
Dept: UROLOGY | Facility: CLINIC | Age: 75
End: 2020-06-22

## 2020-06-22 VITALS — HEIGHT: 64 IN | BODY MASS INDEX: 20.14 KG/M2 | WEIGHT: 118 LBS | TEMPERATURE: 97.6 F

## 2020-06-22 DIAGNOSIS — N39.41 URGE INCONTINENCE OF URINE: Primary | ICD-10-CM

## 2020-06-22 LAB
BILIRUB BLD-MCNC: NEGATIVE MG/DL
CLARITY, POC: CLEAR
COLOR UR: YELLOW
GLUCOSE UR STRIP-MCNC: NEGATIVE MG/DL
KETONES UR QL: ABNORMAL
LEUKOCYTE EST, POC: NEGATIVE
NITRITE UR-MCNC: NEGATIVE MG/ML
PH UR: 6 [PH] (ref 5–8)
PROT UR STRIP-MCNC: ABNORMAL MG/DL
RBC # UR STRIP: ABNORMAL /UL
SP GR UR: 1.02 (ref 1–1.03)
UROBILINOGEN UR QL: NORMAL

## 2020-06-22 PROCEDURE — 51798 US URINE CAPACITY MEASURE: CPT | Performed by: UROLOGY

## 2020-06-22 PROCEDURE — 99204 OFFICE O/P NEW MOD 45 MIN: CPT | Performed by: UROLOGY

## 2020-06-22 PROCEDURE — 81003 URINALYSIS AUTO W/O SCOPE: CPT | Performed by: UROLOGY

## 2020-06-22 RX ORDER — ESTRADIOL 0.1 MG/G
CREAM VAGINAL
COMMUNITY
Start: 2020-06-01 | End: 2020-07-27 | Stop reason: ALTCHOICE

## 2020-06-22 RX ORDER — ASPIRIN 81 MG/1
81 TABLET, CHEWABLE ORAL NIGHTLY
COMMUNITY

## 2020-06-22 RX ORDER — AMLODIPINE BESYLATE 10 MG/1
TABLET ORAL
COMMUNITY
Start: 2009-04-27 | End: 2020-07-27 | Stop reason: ALTCHOICE

## 2020-07-06 ENCOUNTER — TRANSCRIBE ORDERS (OUTPATIENT)
Dept: ADMINISTRATIVE | Facility: HOSPITAL | Age: 75
End: 2020-07-06

## 2020-07-06 DIAGNOSIS — R91.8 LUNG MASS: Primary | ICD-10-CM

## 2020-07-20 ENCOUNTER — HOSPITAL ENCOUNTER (OUTPATIENT)
Dept: CT IMAGING | Age: 75
Discharge: HOME | End: 2020-07-20
Payer: MEDICARE

## 2020-07-20 DIAGNOSIS — R91.8 LUNG MASS: ICD-10-CM

## 2020-07-20 PROCEDURE — 71250 CT THORAX DX C-: CPT

## 2020-07-26 PROBLEM — Z87.891 PERSONAL HISTORY OF NICOTINE DEPENDENCE: Status: ACTIVE | Noted: 2020-07-26

## 2020-07-26 PROBLEM — J98.4 SMALL AIRWAYS DISEASE: Status: ACTIVE | Noted: 2020-07-26

## 2020-07-26 NOTE — PROGRESS NOTES
GEREMIAS Mathis  [facility]   Respiratory [place] [address]  Phone: [phone]  Fax: [phone]     Trinidad Zhu is a 75 y.o. female.   : 1945  CC:   Chief Complaint   Patient presents with   • lung nodules     finished last of the azithromycin yesterday      HPI: Trinidad Zhu is a pleasant 75 y.o. female. The patient is here today for follow up of lung nodules and bronchiectasis.  The patient states that she just finished Zithromax for respiratory infection.  A HRCT was reviewed as noted below.   The patient states she has had some increasing shortness of breath and sweating.  She denies fevers, chills, purulent sputum production.  The patient's PCP is Andrew Layne MD.    The following portions of the patient's history were reviewed and updated as appropriate: allergies, current medications, past family history, past medical history, past social history, past surgical history and problem list.  Past Medical History:   Diagnosis Date   • Antral ulcer    • Bladder cystocele    • C. difficile diarrhea    • CAD (coronary artery disease)    • Colon cancer screening     PREOPERATIVE    • Colon polyp    • Diarrhea    • Difficulty swallowing    • Gastritis    • Generalized OA    • GERD (gastroesophageal reflux disease)    • Hyperlipidemia    • Hypertension    • Liver cyst    • Lung nodule    • Microscopic colitis    • Neck pain    • Neuropathy    • Normal body mass index    • NSAID induced gastritis    • Numbness in both hands    • Ulcer of pyloric antrum     UNSPECIFIED ULCER CHRONICITY   • UTI (urinary tract infection)    • Weight loss      Family History   Problem Relation Age of Onset   • Breast cancer Maternal Aunt    • GI problems Neg Hx         MALIGNANCIES   • Colon cancer Neg Hx    • Colon polyps Neg Hx      Social History     Socioeconomic History   • Marital status:      Spouse name: Not on file   • Number of  "children: Not on file   • Years of education: Not on file   • Highest education level: Not on file   Tobacco Use   • Smoking status: Former Smoker     Packs/day: 1.50     Years: 20.00     Pack years: 30.00     Last attempt to quit:      Years since quittin.5   • Smokeless tobacco: Never Used   Substance and Sexual Activity   • Alcohol use: No   • Drug use: No   • Sexual activity: Not Currently     Review of Systems   Constitutional: Negative for chills and fever.   HENT: Negative for congestion.    Eyes: Negative for blurred vision.   Respiratory: Positive for cough and shortness of breath.    Cardiovascular: Negative for chest pain.   Gastrointestinal: Negative for diarrhea, nausea and vomiting.   Endocrine: Negative for cold intolerance and heat intolerance.   Genitourinary: Negative for dysuria.   Musculoskeletal: Negative for arthralgias.   Skin: Negative for rash.   Neurological: Negative for dizziness, weakness and light-headedness.   Hematological: Does not bruise/bleed easily.   Psychiatric/Behavioral: Negative for agitation. The patient is not nervous/anxious.      /80   Pulse 87   Temp 99.7 °F (37.6 °C)   Ht 162.6 cm (64\")   Wt 54 kg (119 lb)   SpO2 95% Comment: RA  Breastfeeding No   BMI 20.43 kg/m²   Physical Exam   Constitutional: She is oriented to person, place, and time. She appears well-developed and well-nourished. No distress.   HENT:   Head: Normocephalic and atraumatic.   Eyes: Pupils are equal, round, and reactive to light. Conjunctivae and EOM are normal. No scleral icterus.   Neck: Normal range of motion. Neck supple.   Cardiovascular: Normal rate, regular rhythm and normal heart sounds. Exam reveals no friction rub.   No murmur heard.  Pulmonary/Chest: Effort normal and breath sounds normal. No respiratory distress. She has no wheezes. She has no rales.   Abdominal: Soft. Bowel sounds are normal. She exhibits no distension. There is no tenderness.   Musculoskeletal: " Normal range of motion. She exhibits no edema.   Neurological: She is alert and oriented to person, place, and time.   Skin: Skin is warm and dry.   Psychiatric: She has a normal mood and affect. Her behavior is normal. Judgment and thought content normal.   Nursing note and vitals reviewed.    Pulmonary Functions Testing Results:  PFT Values        Some values may be hidden. Unless noted otherwise, only the newest values recorded on each date are displayed.         Old Values PFT Results 7/30/19   FVC 75%   FEV1 69%   FEV1/FVC 71.41%   DLCO 51%   TLC 82%      Pre Drug PFT Results 7/30/19   No data to display.      Post Drug PFT Results 7/30/19   No data to display.      Other Tests PFT Results 7/30/19   No data to display.              My PFT Interpretation: none to review today     Imaging:   CT CHEST HIGH RESOLUTION - 7/20/2020 12:25 PM  Indication: Follow-up lung mass and pulmonary nodules  Comparison: 7/22/2019, 1/15/2019  Findings:  Irregular masslike lesion in the RIGHT apex is unchanged in size from  recent studies measuring 1.5 cm. However, comparison to studies dating  back to 2016 demonstrates very slight interval growth (previously  measured 1.2 cm). There is some surrounding groundglass opacity which also appears unchanged. There is a new area of clustered pulmonary nodules in the RIGHT lower lobe on series 3 image 43 numerous clustered pulmonary nodules in the inferior RIGHT upper lobe have  increased in size. For example 1.3 cm nodule on image 33. No new or  enlarging pulmonary nodule in the LEFT lung.  Central airways are clear. Moderate emphysematous change. No pleural  effusion. No enlarged axillary, supraclavicular, mediastinal, or hilar  lymph nodes. Main pulmonary trunk is nondilated. Thoracic aorta is  nonaneurysmal contains atherosclerotic calcification. Thyroid is  uniform. No pericardial effusion. Moderate coronary artery  calcification. No aggressive osseous lesions.  IMPRESSION:  1.  No  change in 1.5 cm RIGHT upper lobe pulmonary nodule. However,  there has been very slight increase in size compared to multiple prior  studies dating back to 2016. Recommend continued imaging surveillance.  2.  Increased size of clustered nodules in the inferior RIGHT upper  lobe and new cluster of tree-in-bud nodularity in the RIGHT lower  lobe. Differential includes an indolent infectious/inflammatory  process such as atypical mycobacterial infection (NAE). Recommend  continued follow-up.  Signed by Dr Patrick Hylton on 7/20/2020 1:59 PM    Assessment and Plan:   Trinidad was seen today for lung nodules.    Diagnoses and all orders for this visit:    Lung nodules  HRCT 7/20/20 No change in 1.5 cm RIGHT upper lobe pulmonary nodule. However, there has been very slight increase in size compared to multiple prior studies dating back to 2016. Recommend continued imaging surveillance. Increased size of clustered nodules in the inferior RIGHT upper lobe and new cluster of tree-in-bud nodularity in the RIGHT lower lobe.   Bronchiectasis without complication (CMS/HCC)  Consider obtaining immunoglobulins   Restrictive lung disease  Repeat FVL when covid-19 restrictions are lifted   Small airways disease  Repeat FVL when covid-19 restrictions are lifted   Personal history of nicotine dependence  Former smoker   Pulmonary emphysema, unspecified emphysema type (CMS/HCC)  Continue to monitor   Ground glass opacity present on imaging of lung  -     CT Chest Without Contrast; Future  -     methylPREDNISolone acetate (DEPO-medrol) injection 80 mg  Repeat CT chest in 4 weeks     Health maintenance:   Influenza vaccine: yes  Pneumovax 23: yes   Prevnar 13: yes   Patient's Body mass index is 20.43 kg/m². BMI is below normal parameters. Recommendations include: defer to PCP .    Follow up: CT chest in 4 weeks   Brittni Christianson, APRN  7/27/2020  17:11    Please note that portions of this note were completed with a voice recognition  program.

## 2020-07-27 ENCOUNTER — OFFICE VISIT (OUTPATIENT)
Dept: PULMONOLOGY | Facility: CLINIC | Age: 75
End: 2020-07-27

## 2020-07-27 VITALS
OXYGEN SATURATION: 95 % | HEART RATE: 87 BPM | DIASTOLIC BLOOD PRESSURE: 80 MMHG | HEIGHT: 64 IN | WEIGHT: 119 LBS | SYSTOLIC BLOOD PRESSURE: 160 MMHG | TEMPERATURE: 99.7 F | BODY MASS INDEX: 20.32 KG/M2

## 2020-07-27 DIAGNOSIS — J98.4 SMALL AIRWAYS DISEASE: ICD-10-CM

## 2020-07-27 DIAGNOSIS — Z87.891 PERSONAL HISTORY OF NICOTINE DEPENDENCE: ICD-10-CM

## 2020-07-27 DIAGNOSIS — R91.8 GROUND GLASS OPACITY PRESENT ON IMAGING OF LUNG: ICD-10-CM

## 2020-07-27 DIAGNOSIS — R91.8 LUNG NODULES: Primary | ICD-10-CM

## 2020-07-27 DIAGNOSIS — J43.9 PULMONARY EMPHYSEMA, UNSPECIFIED EMPHYSEMA TYPE (HCC): ICD-10-CM

## 2020-07-27 DIAGNOSIS — J47.9 BRONCHIECTASIS WITHOUT COMPLICATION (HCC): ICD-10-CM

## 2020-07-27 DIAGNOSIS — J98.4 RESTRICTIVE LUNG DISEASE: ICD-10-CM

## 2020-07-27 PROCEDURE — 96372 THER/PROPH/DIAG INJ SC/IM: CPT | Performed by: NURSE PRACTITIONER

## 2020-07-27 PROCEDURE — 99214 OFFICE O/P EST MOD 30 MIN: CPT | Performed by: NURSE PRACTITIONER

## 2020-07-27 RX ORDER — METHYLPREDNISOLONE ACETATE 80 MG/ML
80 INJECTION, SUSPENSION INTRA-ARTICULAR; INTRALESIONAL; INTRAMUSCULAR; SOFT TISSUE ONCE
Status: COMPLETED | OUTPATIENT
Start: 2020-07-27 | End: 2020-07-27

## 2020-07-27 RX ADMIN — METHYLPREDNISOLONE ACETATE 80 MG: 80 INJECTION, SUSPENSION INTRA-ARTICULAR; INTRALESIONAL; INTRAMUSCULAR; SOFT TISSUE at 14:57

## 2020-07-27 NOTE — PATIENT INSTRUCTIONS
Bronchiectasis    Bronchiectasis is a condition in which the airways in the lungs (bronchi) are damaged and widened. The condition makes it hard for the lungs to get rid of mucus, and it causes mucus to gather in the bronchi. This condition often leads to lung infections, which can make the condition worse.  What are the causes?  You can be born with this condition or you can develop it later in life. Common causes of this condition include:  · Cystic fibrosis.  · Repeated lung infections, such as pneumonia or tuberculosis.  · An object or other blockage in the lungs.  · Breathing in fluid, food, or other objects (aspiration).  · A problem with the immune system and lung structure that is present at birth (congenital).  Sometimes the cause is not known.  What are the signs or symptoms?  Common symptoms of this condition include:  · A daily cough that brings up mucus and lasts for more than 3 weeks.  · Lung infections that happen often.  · Shortness of breath and wheezing.  · Weakness and fatigue.  How is this diagnosed?  This condition is diagnosed with tests, such as:  · Chest X-rays or CT scans. These are done to check for changes in the lungs.  · Breathing tests. These are done to check how well your lungs are working.  · A test of a sample of your saliva (sputum culture). This test is done to check for infection.  · Blood tests and other tests. These are done to check for related diseases or causes.  How is this treated?  Treatment for this condition depends on the severity of the illness and its cause. Treatment may include:  · Medicines that loosen mucus so it can be coughed up (expectorants).  · Medicines that relax the muscles of the bronchi (bronchodilators).  · Antibiotic medicines to prevent or treat infection.  · Physical therapy to help clear mucus from the lungs. Techniques may include:  ? Postural drainage. This is when you sit or lie in certain positions so that mucus can drain by gravity.  ? Chest  percussion. This involves tapping the chest or back with a cupped hand.  ? Chest vibration. For this therapy, a hand or special equipment vibrates your chest and back.  · Surgery to remove the affected part of the lung. This may be done in severe cases.  Follow these instructions at home:  Medicines  · Take over-the-counter and prescription medicines only as told by your health care provider.  · If you were prescribed an antibiotic medicine, take it as told by your health care provider. Do not stop taking the antibiotic even if you start to feel better.  · Avoid taking sedatives and antihistamines unless your health care provider tells you to take them. These medicines tend to thicken the mucus in the lungs.  Managing symptoms  · Perform breathing exercises or techniques to clear your lungs as told by your health care provider.  · Consider using a cold steam vaporizer or humidifier in your room or home to help loosen secretions.  · If you have a cough that gets worse at night, try sleeping in a semi-upright position.  General instructions  · Get plenty of rest.  · Drink enough fluid to keep your urine clear or pale yellow.  · Stay inside when pollution and ozone levels are high.  · Stay up to date with vaccinations and immunizations.  · Avoid cigarette smoke and other lung irritants.  · Do not use any products that contain nicotine or tobacco, such as cigarettes and e-cigarettes. If you need help quitting, ask your health care provider.  · Keep all follow-up visits as told by your health care provider. This is important.  Contact a health care provider if:  · You cough up more sputum than before and the sputum is yellow or green in color.  · You have a fever.  · You cannot control your cough and are losing sleep.  Get help right away if:  · You cough up blood.  · You have chest pain.  · You have increasing shortness of breath.  · You have pain that gets worse or is not controlled with medicines.  · You have a fever  and your symptoms suddenly get worse.  Summary  · Bronchiectasis is a condition in which the airways in the lungs (bronchi) are damaged and widened. The condition makes it hard for the lungs to get rid of mucus, and it causes mucus to gather in the bronchi.  · Treatment usually includes therapy to help clear mucus from the lungs.  · Stay up to date with vaccinations and immunizations.  This information is not intended to replace advice given to you by your health care provider. Make sure you discuss any questions you have with your health care provider.  Document Released: 10/14/2008 Document Revised: 11/30/2018 Document Reviewed: 01/22/2018  Elsevier Patient Education © 2020 [employer]

## 2020-09-03 ENCOUNTER — HOSPITAL ENCOUNTER (OUTPATIENT)
Dept: CT IMAGING | Age: 75
Discharge: HOME | End: 2020-09-03
Payer: MEDICARE

## 2020-09-03 PROCEDURE — 71250 CT THORAX DX C-: CPT

## 2020-09-04 DIAGNOSIS — R91.8 GROUND GLASS OPACITY PRESENT ON IMAGING OF LUNG: ICD-10-CM

## 2020-09-10 NOTE — PROGRESS NOTES
GEREMIAS Mathis  [facility]   [place] [address]  Phone: [phone]  Fax: [phone]     Trinidad Zhu is a 75 y.o. female.   : 1945  CC:   Chief Complaint   Patient presents with   • lung nodules      HPI: Trinidad Zhu is a pleasant 75 y.o. female. The patient is here today for follow up of lung nodules.  The patient has known restrictive lung disease, lung nodules, bronchiectasis, emphysema, and personal history of nicotine dependence.  CT of the chest reviewed as noted below.  The patient has no new pulmonary complaints today.  She is still concerned about playing bridge with her friends.  She states that she does use her mask but her friends are not using their mask.  I recommend she use caution in that type of environment during the coronavirus pandemic.  She states that she understands.  The patient is agreeable to having immunoglobulins obtained given her bronchiectasis.  Labs were drawn today.  He denies fevers, chills, cough with purulent sputum.  The patient's PCP is Andrew Layne MD.    The following portions of the patient's history were reviewed and updated as appropriate: allergies, current medications, past family history, past medical history, past social history, past surgical history and problem list.  Past Medical History:   Diagnosis Date   • Antral ulcer    • Bladder cystocele    • C. difficile diarrhea    • CAD (coronary artery disease)    • Colon cancer screening     PREOPERATIVE    • Colon polyp    • Diarrhea    • Difficulty swallowing    • Gastritis    • Generalized OA    • GERD (gastroesophageal reflux disease)    • History of bladder surgery 2020   • Hyperlipidemia    • Hypertension    • Liver cyst    • Lung nodule    • Microscopic colitis    • Neck pain    • Neuropathy    • Normal body mass index    • NSAID induced gastritis    • Numbness in both hands    • Ulcer of pyloric antrum      "UNSPECIFIED ULCER CHRONICITY   • UTI (urinary tract infection)    • Weight loss      Family History   Problem Relation Age of Onset   • Breast cancer Maternal Aunt    • GI problems Neg Hx         MALIGNANCIES   • Colon cancer Neg Hx    • Colon polyps Neg Hx      Social History     Socioeconomic History   • Marital status:      Spouse name: Not on file   • Number of children: Not on file   • Years of education: Not on file   • Highest education level: Not on file   Tobacco Use   • Smoking status: Former Smoker     Packs/day: 1.50     Years: 20.00     Pack years: 30.00     Last attempt to quit:      Years since quittin.7   • Smokeless tobacco: Never Used   Substance and Sexual Activity   • Alcohol use: No   • Drug use: No   • Sexual activity: Not Currently     Review of Systems   Constitutional: Negative for chills and fever.   HENT: Negative for congestion.    Eyes: Negative for blurred vision.   Respiratory: Negative for cough and shortness of breath.    Cardiovascular: Negative for chest pain.   Gastrointestinal: Negative for diarrhea, nausea and vomiting.   Endocrine: Negative for cold intolerance and heat intolerance.   Genitourinary: Negative for dysuria.   Musculoskeletal: Negative for arthralgias.   Skin: Negative for rash.   Neurological: Negative for dizziness, weakness and light-headedness.   Hematological: Does not bruise/bleed easily.   Psychiatric/Behavioral: Negative for agitation. The patient is not nervous/anxious.      /74   Pulse 76   Temp 97.9 °F (36.6 °C)   Ht 162.6 cm (64\")   Wt 54.1 kg (119 lb 3.2 oz)   SpO2 97% Comment: RA  BMI 20.46 kg/m²   Physical Exam   Constitutional: She is oriented to person, place, and time. She appears well-developed and well-nourished. No distress. Face mask in place.   HENT:   Head: Normocephalic and atraumatic.   Eyes: Pupils are equal, round, and reactive to light. Conjunctivae and EOM are normal. No scleral icterus.   Neck: Normal range " of motion. Neck supple.   Cardiovascular: Normal rate, regular rhythm and normal heart sounds. Exam reveals no friction rub.   No murmur heard.  Pulmonary/Chest: Effort normal and breath sounds normal. No respiratory distress. She has no wheezes. She has no rales.   Abdominal: Soft. Bowel sounds are normal. She exhibits no distension. There is no tenderness.   Musculoskeletal: Normal range of motion. She exhibits no edema.   Neurological: She is alert and oriented to person, place, and time.   Skin: Skin is warm and dry.   Psychiatric: She has a normal mood and affect. Her behavior is normal. Judgment and thought content normal.   Nursing note and vitals reviewed.    Pulmonary Functions Testing Results:  PFT Values        Some values may be hidden. Unless noted otherwise, only the newest values recorded on each date are displayed.         Old Values PFT Results 7/30/19   FVC 75%   FEV1 69%   FEV1/FVC 71.41%   DLCO 51%   TLC 82%      Pre Drug PFT Results 7/30/19   No data to display.      Post Drug PFT Results 7/30/19   No data to display.      Other Tests PFT Results 7/30/19   No data to display.            My PFT Interpretation: none to review today     Imaging:   Examination. CT CHEST WO CONTRAST 9/3/2020 2:37 PM  History: Groundglass opacity and nodules in the lungs from a previous  examination.  DLP: 188 mGycm.  A CT scan of the chest is performed without intravenous contrast  enhancement. The images are acquired in axial plane with subsequent  reconstruction in coronal and sagittal planes.  The comparison is made with the previous study dated 7/20/2020,  7/22/2019 and 1/9/2018.  A spiculated nodule in the right upper lobe laterally, image #13 axial  plane, is reidentified. The solid component measures 1.3 cm x 1.2 cm  and has not significantly changed since the previous studies.  A nodular infiltrate in the anterior inferior part of the right upper  lobe adjacent and above the right minor fissure has not  significantly  changed since 2018. There are dilated bronchi and bronchioles with  evidence of mucous plugging and urethral atelectasis. No significant  change since 2018.  The remaining lungs bilaterally are well-expanded and clear. No other  areas of infiltrate, consolidation, mass or nodule. The included part  of the neck appears unremarkable. No mass or lymphadenopathy.  The visualized thyroid gland appears unremarkable.  There is no axillary lymphadenopathy.  Atheromatous changes thoracic aorta are seen. No visible dilatation.  Severe atheromatous changes of coronary arteries are noted.  There is no evidence of mediastinal lymphadenopathy.  There is a small sliding-type hiatal hernia.  The included part of the unenhanced liver and spleen appear  unremarkable. The gallbladder and kidneys are incompletely visualized  and not evaluated. There do glands are normal. The pancreas is  incompletely visualized and not evaluated.  The images reviewed in bone window show no acute bony abnormality.  There is a mild levoscoliosis of the cervicothoracic junction. There  is hardware fusion of the lower cervical spine which is incompletely  visualized and evaluated. The ribs appear unremarkable.  IMPRESSION:  The stable spiculated nodule in the right upper lobe and  nodular infiltrate with bronchiectasis and mucus plugging in the  anterior inferior right upper lobe are stable since 2018. This is  represent a chronic benign process. The etiology and clinical  significance is not certain. This may represent an atypical MAC  infection as suggested previously.  The remaining lungs are unremarkable.  Signed by Dr Clayton Raines on 9/3/2020 4:02 PM    Assessment and Plan:   Trinidad was seen today for lung nodules.    Diagnoses and all orders for this visit:    Restrictive lung disease  Stable.  Repeat flow volume loop when COVID-19 restrictions are lifted.  Lung nodules  -     CT Chest Without Contrast; Future  The patient has a  stable spiculated nodule in the right upper lobe present since 2018.  We will repeat CT of the chest in 1 year.  Bronchiectasis without complication (CMS/HCC)  -     IgE  -     IgG  -     IgM  -     IgA  -     IgG subclasses (1-4)  Obtain immunoglobulins today.  Continue Mucinex as needed.  Pulmonary emphysema, unspecified emphysema type (CMS/HCC)  Stable  Personal history of nicotine dependence  Former smoker    Health maintenance:   Influenza vaccine: yes  Pneumovax 23: yes  Prevnar 13: yes   Patient's Body mass index is 20.46 kg/m². BMI is within normal parameters. No follow-up required.    Follow up: 6 months  Brittni Christianson, APRN  9/11/2020  14:27    Please note that portions of this note were completed with a voice recognition program.

## 2020-09-11 ENCOUNTER — OFFICE VISIT (OUTPATIENT)
Dept: PULMONOLOGY | Facility: CLINIC | Age: 75
End: 2020-09-11

## 2020-09-11 VITALS
SYSTOLIC BLOOD PRESSURE: 122 MMHG | HEART RATE: 76 BPM | DIASTOLIC BLOOD PRESSURE: 74 MMHG | WEIGHT: 119.2 LBS | OXYGEN SATURATION: 97 % | TEMPERATURE: 97.9 F | HEIGHT: 64 IN | BODY MASS INDEX: 20.35 KG/M2

## 2020-09-11 DIAGNOSIS — J98.4 RESTRICTIVE LUNG DISEASE: Primary | ICD-10-CM

## 2020-09-11 DIAGNOSIS — R91.8 LUNG NODULES: ICD-10-CM

## 2020-09-11 DIAGNOSIS — J43.9 PULMONARY EMPHYSEMA, UNSPECIFIED EMPHYSEMA TYPE (HCC): ICD-10-CM

## 2020-09-11 DIAGNOSIS — Z87.891 PERSONAL HISTORY OF NICOTINE DEPENDENCE: ICD-10-CM

## 2020-09-11 DIAGNOSIS — J47.9 BRONCHIECTASIS WITHOUT COMPLICATION (HCC): ICD-10-CM

## 2020-09-11 PROCEDURE — 99214 OFFICE O/P EST MOD 30 MIN: CPT | Performed by: NURSE PRACTITIONER

## 2020-09-14 LAB
IGA SERPL-MCNC: 280 MG/DL (ref 64–422)
IGE SERPL-ACNC: 18 IU/ML (ref 6–495)
IGG SERPL-MCNC: 671 MG/DL (ref 586–1602)
IGG1 SER-MCNC: 383 MG/DL (ref 248–810)
IGG2 SER-MCNC: 150 MG/DL (ref 130–555)
IGG3 SER-MCNC: 24 MG/DL (ref 15–102)
IGG4 SER-MCNC: 12 MG/DL (ref 2–96)
IGM SERPL-MCNC: 35 MG/DL (ref 26–217)

## 2020-09-17 ENCOUNTER — TELEPHONE (OUTPATIENT)
Dept: UROLOGY | Facility: CLINIC | Age: 75
End: 2020-09-17

## 2020-09-17 NOTE — TELEPHONE ENCOUNTER
Pt called just to make sure we had received all of her records. Pt stated that she does not need a call back.

## 2020-09-21 NOTE — PROGRESS NOTES
Subjective    Ms. Zhu is 75 y.o. female    Chief Complaint: Urge Incontinence.     History of Present Illness  Urinary Incontinence  Patient complains of urinary incontinence. This has been present for several months. Symptoms have gradually worsened . Patient uses 0 pads/day; sometimes liner.   The patient leaks urine with with urge, with a full bladder. Patient describes the symptoms as urge to urinate with little or no warning. Factors associated with symptoms include following abdominal sacrocolpopexy. Evaluation to date includes none. Treatment to date includes none.    The following portions of the patient's history were reviewed and updated as appropriate: allergies, current medications, past family history, past medical history, past social history, past surgical history and problem list.    Review of Systems   Constitutional: Negative for chills and fever.   Gastrointestinal: Negative for abdominal pain, anal bleeding and blood in stool.   Genitourinary: Negative for dysuria, frequency, hematuria and urgency.         Current Outpatient Medications:   •  alendronate (FOSAMAX) 70 MG tablet, , Disp: , Rfl: 3  •  aspirin 81 MG chewable tablet, Chew 81 mg Daily., Disp: , Rfl:   •  benazepril (LOTENSIN) 5 MG tablet, , Disp: , Rfl:   •  Calcium Citrate (CITRACAL PO), Take 2 tablets by mouth 2 (Two) Times a Day., Disp: , Rfl:   •  carvedilol (COREG) 12.5 MG tablet, Take 12.5 mg by mouth 2 (Two) Times a Day With Meals., Disp: , Rfl:   •  cetirizine (ZyrTEC) 10 MG tablet, Take 10 mg by mouth as needed for allergies., Disp: , Rfl:   •  diazePAM (VALIUM) 5 MG tablet, TK 1/2 T PO QID PRN, Disp: , Rfl: 0  •  famotidine (PEPCID) 40 MG tablet, Take 40 mg by mouth 2 (Two) Times a Day., Disp: , Rfl: 3  •  hydrochlorothiazide (HYDRODIURIL) 25 MG tablet, Take 12.5 mg by mouth Daily., Disp: , Rfl:   •  levothyroxine (SYNTHROID, LEVOTHROID) 50 MCG tablet, 88 mcg., Disp: , Rfl: 3  •  MULTIPLE MINERALS-VITAMINS PO, Take  by  mouth., Disp: , Rfl:   •  MULTIPLE VITAMINS-MINERALS PO, Take 1 tablet by mouth., Disp: , Rfl:   •  pravastatin (PRAVACHOL) 80 MG tablet, Take 80 mg by mouth daily. DOESN'T TAKE ON DAYS SHE HAS TO TAKE COLESTID, Disp: , Rfl:   •  traMADol (ULTRAM) 50 MG tablet, TK 1 T PO Q 6 H PRN, Disp: , Rfl: 0    Past Medical History:   Diagnosis Date   • Antral ulcer    • Bladder cystocele    • C. difficile diarrhea    • CAD (coronary artery disease)    • Colon cancer screening     PREOPERATIVE    • Colon polyp    • Diarrhea    • Difficulty swallowing    • Gastritis    • Generalized OA    • GERD (gastroesophageal reflux disease)    • History of bladder surgery 01/07/2020   • Hyperlipidemia    • Hypertension    • Liver cyst    • Lung nodule    • Microscopic colitis    • Neck pain    • Neuropathy    • Normal body mass index    • NSAID induced gastritis    • Numbness in both hands    • Ulcer of pyloric antrum     UNSPECIFIED ULCER CHRONICITY   • UTI (urinary tract infection)    • Weight loss        Past Surgical History:   Procedure Laterality Date   • ANTERIOR CERVICAL DISCECTOMY W/ FUSION N/A 5/22/2017    Procedure: CERVICAL DISCECTOMY ANTERIOR FUSION WITH INSTRUMENTATION;  Surgeon: NADINE Sarabia MD;  Location: [facility] OR;  Service:    • BLADDER SUSPENSION      also had pelvic reconstructive surgery   • BREAST EXCISIONAL BIOPSY Right 1985   • CERVICAL CORPECTOMY N/A 5/22/2017    Procedure:  ANTERIOR CERVICAL DISCECTOMY FUSION C-6 to T1,  ANTERIOR FUSION WITH INSTRUMENTATION C-5 T-1;  Surgeon: NADINE Sarabia MD;  Location: [facility] OR;  Service:    • COLONOSCOPY  08/19/2015    TIC BX RT COLON   • COLONOSCOPY  12/04/2013    RT COLON BX RECALL 5YR   • COLONOSCOPY N/A 11/29/2016    Procedure: LOWER LIMITED COLONOSCOPY WITH ANESTHESIA;  Surgeon: Marco Antonio Vallejo MD;  Location: [facility] ENDOSCOPY;  Service:    • ENDOSCOPY  12/03/2015    HEALED ULCER SLANT BX   • HYSTERECTOMY     • OTHER SURGICAL HISTORY      KNEE CARTILAGE REMOVED  "  • OTHER SURGICAL HISTORY      BENIGN FIBROID TUMOR OF BREAST 1985 REMOVED   • TONSILLECTOMY         Social History     Socioeconomic History   • Marital status:      Spouse name: Not on file   • Number of children: Not on file   • Years of education: Not on file   • Highest education level: Not on file   Tobacco Use   • Smoking status: Former Smoker     Packs/day: 1.50     Years: 20.00     Pack years: 30.00     Quit date:      Years since quittin.7   • Smokeless tobacco: Never Used   Substance and Sexual Activity   • Alcohol use: No   • Drug use: No   • Sexual activity: Not Currently       Family History   Problem Relation Age of Onset   • Breast cancer Maternal Aunt    • GI problems Neg Hx         MALIGNANCIES   • Colon cancer Neg Hx    • Colon polyps Neg Hx        Objective    Temp 97.2 °F (36.2 °C) (Temporal)   Ht 161.3 cm (63.5\")   Wt 54.5 kg (120 lb 3.2 oz)   BMI 20.96 kg/m²     Physical Exam  Vitals signs reviewed.   Constitutional:       General: She is not in acute distress.     Appearance: She is well-developed. She is not diaphoretic.   Pulmonary:      Effort: Pulmonary effort is normal.   Abdominal:      General: There is no distension.      Palpations: Abdomen is soft. There is no mass.      Tenderness: There is no abdominal tenderness. There is no guarding or rebound.      Hernia: No hernia is present.   Skin:     General: Skin is warm and dry.   Neurological:      Mental Status: She is alert and oriented to person, place, and time.             Results for orders placed or performed in visit on 20   IgE    Specimen: Arm, Left; Blood    BLOOD   Result Value Ref Range    IgE 18 6 - 495 IU/mL   IgM    Specimen: Arm, Left; Blood    BLOOD   Result Value Ref Range    IgM 35 26 - 217 mg/dL   IgA    Specimen: Arm, Left; Blood    BLOOD   Result Value Ref Range    IgA 280 64 - 422 mg/dL   IgG subclasses (1-4)    Specimen: Arm, Left; Blood    BLOOD   Result Value Ref Range    IgG 671 586 " - 1,602 mg/dL    IgG Subclass 1 383 248 - 810 mg/dL    IgG Subclass 2 150 130 - 555 mg/dL    IgG Subclass 3 24 15 - 102 mg/dL    IgG Subclass 4 12 2 - 96 mg/dL     Assessment and Plan    Diagnoses and all orders for this visit:    Urge incontinence of urine  -     Cancel: POC Urinalysis Dipstick, Multipro    Nocturia    Patient is status post abdominal sacrocolpopexy with rectus fascia in January 2020 at [facility].  She is had subsequent urgency incontinence since then.  I reviewed all pertinent notes related to this.  She is having significant urge incontinence.  She has not tried any sort of medication.       I discussed anticholinergics as well as Myrbetriq with her she does not want to try anticholinergics due to the risk of dementia as well as dry mouth and constipation.    Myrbetriq has been very effective.  Follow up in one year.

## 2020-09-23 ENCOUNTER — OFFICE VISIT (OUTPATIENT)
Dept: UROLOGY | Facility: CLINIC | Age: 75
End: 2020-09-23

## 2020-09-23 VITALS — WEIGHT: 120.2 LBS | TEMPERATURE: 97.2 F | BODY MASS INDEX: 20.52 KG/M2 | HEIGHT: 64 IN

## 2020-09-23 DIAGNOSIS — R35.1 NOCTURIA: ICD-10-CM

## 2020-09-23 DIAGNOSIS — N39.41 URGE INCONTINENCE OF URINE: Primary | ICD-10-CM

## 2020-09-23 PROCEDURE — 99213 OFFICE O/P EST LOW 20 MIN: CPT | Performed by: UROLOGY

## 2020-09-28 ENCOUNTER — TRANSCRIBE ORDERS (OUTPATIENT)
Dept: ADMINISTRATIVE | Facility: HOSPITAL | Age: 75
End: 2020-09-28

## 2020-09-28 ENCOUNTER — HOSPITAL ENCOUNTER (OUTPATIENT)
Dept: ULTRASOUND IMAGING | Facility: HOSPITAL | Age: 75
Discharge: HOME | End: 2020-09-28
Admitting: PODIATRIST

## 2020-09-28 DIAGNOSIS — I73.9 PERIPHERAL VASCULAR DISEASE (HCC): Primary | ICD-10-CM

## 2020-09-28 DIAGNOSIS — I73.9 PERIPHERAL VASCULAR DISEASE (HCC): ICD-10-CM

## 2020-09-28 PROCEDURE — 93923 UPR/LXTR ART STDY 3+ LVLS: CPT

## 2020-10-19 ENCOUNTER — TRANSCRIBE ORDERS (OUTPATIENT)
Dept: ADMINISTRATIVE | Facility: HOSPITAL | Age: 75
End: 2020-10-19

## 2020-10-19 DIAGNOSIS — M54.16 LUMBAR RADICULOPATHY: Primary | ICD-10-CM

## 2020-10-22 ENCOUNTER — TELEPHONE (OUTPATIENT)
Dept: VASCULAR SURGERY | Facility: CLINIC | Age: 75
End: 2020-10-22

## 2020-10-22 NOTE — TELEPHONE ENCOUNTER
Left message reminding Mrs Zhu of her appointment for Friday, October 23rd, 2020 at 11 am with Silvia ARCHULETA. Also advised Mrs Zhu if she had any questions or needed to reschedule to please call the office at [phone].

## 2020-10-23 ENCOUNTER — OFFICE VISIT (OUTPATIENT)
Dept: VASCULAR SURGERY | Facility: CLINIC | Age: 75
End: 2020-10-23

## 2020-10-23 VITALS
HEART RATE: 65 BPM | BODY MASS INDEX: 20.49 KG/M2 | OXYGEN SATURATION: 97 % | SYSTOLIC BLOOD PRESSURE: 126 MMHG | DIASTOLIC BLOOD PRESSURE: 78 MMHG | WEIGHT: 120 LBS | HEIGHT: 64 IN

## 2020-10-23 DIAGNOSIS — Z51.81 ENCOUNTER FOR MONITORING ANTIPLATELET THERAPY: ICD-10-CM

## 2020-10-23 DIAGNOSIS — Z79.02 ENCOUNTER FOR MONITORING ANTIPLATELET THERAPY: ICD-10-CM

## 2020-10-23 DIAGNOSIS — Z01.818 PREOP TESTING: ICD-10-CM

## 2020-10-23 DIAGNOSIS — I73.9 PAD (PERIPHERAL ARTERY DISEASE) (HCC): Primary | ICD-10-CM

## 2020-10-23 PROCEDURE — 99214 OFFICE O/P EST MOD 30 MIN: CPT | Performed by: NURSE PRACTITIONER

## 2020-10-23 RX ORDER — MIRABEGRON 50 MG/1
50 TABLET, FILM COATED, EXTENDED RELEASE ORAL DAILY
COMMUNITY
Start: 2020-10-15 | End: 2021-09-13

## 2020-10-23 NOTE — PROGRESS NOTES
10/23/2020      Sherif Gonzáles, SHIMAM  [address] [place] [address]    Trinidad Zhu  1945    Chief Complaint   Patient presents with   • Establish Care     Referred over by Dr Sherif Gonzáles for Abnormal OTTO and Leg Pain. Test [id number]  pad ankle / brach ind ext comp. Patient denies any stroke like symptoms.    • other     Patient states she been having trouble with Left Ankle and was getting injections but then Dr Gonzáles asked her bilateral feet were always this cold, she stated yes so he done testing Left Leg is worse than the Right.    • Smoking/Non-Smoker     Patient is Former Smoker - Quit 1996   • Med Management     Verified medications from list patient brought in. Ms Zhu verbalized all medications are correct and up to date.        Dear Sherif Gonzáles DPM:      HPI  I had the pleasure of seeing your patient Trinidad Zhu in the office today.  Thank you kindly for this consultation.  As you recall, Trinidad Zhu is a 75 y.o.  female who you are currently following for left ankle problems.  During exam, her feet were noted to be cold.  Dr. Gonzáles did order noninvasive testing, which I have reviewed.  She is maintained on aspirin and Pravachol.  Upon questioning, she does report claudication to her left leg.  She does state she used to walk a lot and has not been able to do that in the past 2 years.  She had a previous CT however this was from 4 years ago that showed extensive aortoiliofemoral calcifications.  She does also report cramping in her feet and toes at night.  She denies any significant swelling to her lower extremities.      Past Medical History:   Diagnosis Date   • Antral ulcer    • Bladder cystocele    • C. difficile diarrhea    • CAD (coronary artery disease)    • Colon cancer screening     PREOPERATIVE    • Colon polyp    • Diarrhea    • Difficulty swallowing    • Gastritis    • Generalized OA    • GERD (gastroesophageal reflux disease)    • History of  bladder surgery 01/07/2020   • Hyperlipidemia    • Hypertension    • Liver cyst    • Lung nodule    • Microscopic colitis    • Neck pain    • Neuropathy    • Normal body mass index    • NSAID induced gastritis    • Numbness in both hands    • Ulcer of pyloric antrum     UNSPECIFIED ULCER CHRONICITY   • UTI (urinary tract infection)    • Weight loss        Past Surgical History:   Procedure Laterality Date   • ANTERIOR CERVICAL DISCECTOMY W/ FUSION N/A 5/22/2017    Procedure: CERVICAL DISCECTOMY ANTERIOR FUSION WITH INSTRUMENTATION;  Surgeon: NADINE Sarabia MD;  Location: [facility] OR;  Service:    • BLADDER SUSPENSION      also had pelvic reconstructive surgery   • BREAST EXCISIONAL BIOPSY Right 1985   • CERVICAL CORPECTOMY N/A 5/22/2017    Procedure:  ANTERIOR CERVICAL DISCECTOMY FUSION C-6 to T1,  ANTERIOR FUSION WITH INSTRUMENTATION C-5 T-1;  Surgeon: NADINE Sarabia MD;  Location: [facility] OR;  Service:    • COLONOSCOPY  08/19/2015    TIC BX RT COLON   • COLONOSCOPY  12/04/2013    RT COLON BX RECALL 5YR   • COLONOSCOPY N/A 11/29/2016    Procedure: LOWER LIMITED COLONOSCOPY WITH ANESTHESIA;  Surgeon: Marco Antonio Vallejo MD;  Location:  [facility] ENDOSCOPY;  Service:    • ENDOSCOPY  12/03/2015    HEALED ULCER SLANT BX   • HYSTERECTOMY     • OTHER SURGICAL HISTORY      KNEE CARTILAGE REMOVED   • OTHER SURGICAL HISTORY      BENIGN FIBROID TUMOR OF BREAST 1985 REMOVED   • TONSILLECTOMY         Family History   Problem Relation Age of Onset   • Breast cancer Maternal Aunt    • GI problems Neg Hx         MALIGNANCIES   • Colon cancer Neg Hx    • Colon polyps Neg Hx        Social History     Socioeconomic History   • Marital status:      Spouse name: Not on file   • Number of children: Not on file   • Years of education: Not on file   • Highest education level: Not on file   Tobacco Use   • Smoking status: Former Smoker     Packs/day: 1.50     Years: 20.00     Pack years: 30.00     Quit date: 1996     Years since  "quittin.8   • Smokeless tobacco: Never Used   Substance and Sexual Activity   • Alcohol use: No   • Drug use: No   • Sexual activity: Not Currently       Allergies   Allergen Reactions   • Oxycodone GI Intolerance   • Baclofen Other (See Comments)     MUSCULOSKELETAL THERAPY AGENTS knocked her out for a day     • Gabapentin Confusion   • Sulfa Antibiotics Rash     Mouth blisters   • Amitriptyline Hcl Confusion   • Oxycodone-Acetaminophen GI Intolerance     Current Outpatient Medications   Medication Instructions   • alendronate (FOSAMAX) 70 MG tablet No dose, route, or frequency recorded.   • aspirin 81 mg, Oral, Daily   • benazepril (LOTENSIN) 5 MG tablet No dose, route, or frequency recorded.   • Calcium Citrate (CITRACAL PO) 2 tablets, Oral, 2 Times Daily   • carvedilol (COREG) 12.5 mg, Oral, 2 Times Daily With Meals   • cetirizine (ZYRTEC) 10 mg, Oral, As Needed   • diazePAM (VALIUM) 5 MG tablet TK 1/2 T PO QID PRN   • famotidine (PEPCID) 40 mg, Oral, 2 Times Daily   • hydroCHLOROthiazide (HYDRODIURIL) 12.5 mg, Oral, Daily   • levothyroxine (SYNTHROID, LEVOTHROID) 50 MCG tablet 50 mcg.   • Myrbetriq 50 mg, Oral, Daily   • pravastatin (PRAVACHOL) 80 mg, Oral, Daily, DOESN'T TAKE ON DAYS SHE HAS TO TAKE COLESTID   • traMADol (ULTRAM) 50 MG tablet TK 1 T PO Q 6 H PRN        Review of Systems   Constitutional: Negative.    HENT: Negative.    Eyes: Negative.    Respiratory: Negative.    Cardiovascular: Negative.    Gastrointestinal: Negative.    Endocrine: Negative.    Genitourinary: Negative.    Musculoskeletal: Positive for back pain.   Skin: Negative.    Allergic/Immunologic: Negative.    Neurological: Positive for numbness (Tingling to 2 toes on her left foot).   Hematological: Negative.    Psychiatric/Behavioral: Negative.        /78 (BP Location: Right arm, Patient Position: Sitting, Cuff Size: Adult)   Pulse 65   Ht 161.9 cm (63.75\")   Wt 54.4 kg (120 lb)   SpO2 97%   BMI 20.76 kg/m²   Physical " Exam  Vitals signs and nursing note reviewed.   Constitutional:       General: She is not in acute distress.     Appearance: She is well-developed. She is not diaphoretic.   HENT:      Head: Normocephalic and atraumatic.   Eyes:      General: No scleral icterus.     Pupils: Pupils are equal, round, and reactive to light.   Neck:      Musculoskeletal: Normal range of motion and neck supple.      Thyroid: No thyromegaly.      Vascular: No carotid bruit or JVD.   Cardiovascular:      Rate and Rhythm: Normal rate and regular rhythm.      Pulses:           Femoral pulses are 2+ on the right side and 2+ on the left side.       Dorsalis pedis pulses are 2+ on the right side and detected w/ Doppler on the left side.        Posterior tibial pulses are detected w/ Doppler on the right side and detected w/ Doppler on the left side.      Heart sounds: Normal heart sounds and S2 normal. No murmur. No friction rub. No gallop.       Comments: Varicose veins to bilateral lower extremities  Pulmonary:      Effort: Pulmonary effort is normal.      Breath sounds: Normal breath sounds.   Abdominal:      General: Bowel sounds are normal.      Palpations: Abdomen is soft.   Musculoskeletal: Normal range of motion.   Skin:     General: Skin is warm and dry.   Neurological:      Mental Status: She is alert and oriented to person, place, and time.      Cranial Nerves: No cranial nerve deficit.   Psychiatric:         Behavior: Behavior normal.         Thought Content: Thought content normal.         Judgment: Judgment normal.         Us Ankle / Brachial Indices Extremity Complete    Result Date: 9/28/2020  Narrative: US ANKLE / BRACHIAL INDICES EXTREMITY COMPLETE- 9/28/2020 3:31 PM CDT  HISTORY: peripheral vascular disease; I73.9-Peripheral vascular disease, unspecified  COMPARISON: NONE  FINDINGS: Systolic pressures were obtained from bilateral brachial, posterior tibial and dorsalis pedis arteries. Systolic pressure within the right  brachial artery was measured at 154 mmHg, while pressure within the left brachial artery was measured at 154 mmHg.  Systolic pressures within bilateral posterior tibial and dorsalis pedis arteries are less than the corresponding brachial pressures.  Calculated ankle/brachial index is 0.87 on the right and 0.69 on the left.      Impression: 1. Abnormal OTTO Doppler study. 2. Low OTTO bilaterally, left greater than right.  This report was finalized on 09/28/2020 16:40 by Dr. Mirna Segura MD.      Patient Active Problem List   Diagnosis   • Spinal stenosis, cervical region   • Lung nodules   • Bronchiectasis without complication (CMS/[facility])   • Underweight   • Personal history of nicotine dependence   • Restrictive lung disease   • Pulmonary emphysema (CMS/[facility])         ICD-10-CM ICD-9-CM   1. PAD (peripheral artery disease) (CMS/[facility])  I73.9 443.9   2. Preop testing  Z01.818 V72.84   3. Encounter for monitoring antiplatelet therapy  Z51.81 V58.83    Z79.02 V58.63         Plan: After thoroughly evaluating Trinidda Zhu, I believe the best course of action is to proceed with a left lower extremity angiogram.  I did review her testing which shows her OTTO to be 0.69 on the left and 0.87 on the right.  Upon questioning she does have claudication to her calf as well as this new pain to her ankle.  She has palpable femoral pulses however did see previous CAT scan from [facility] showing extensive aorto iliofemoral calcifications.  Risks of angiogram were discussed.  These include, but are not limited to, bleeding, infection, vessel damage, nerve damage, embolus, and loss of limb.  The patient understands these risks and wishes to proceed with procedure.  I did discuss vascular risk factors as they pertain to the progression of vascular disease including controlling her hypertension and hyperlipidemia.  Her blood pressure is stable on her current medication regimen.  She is maintained on Pravachol for her hyperlipidemia.   The patient can continue taking their current medication regimen as previously planned.  This was all discussed in full with complete understanding.    Thank you for allowing me to participate in the care of your patient.  Please do not hesitate with any questions or concerns.  I will keep you aware of any further encounters with Trinidad Zhu.        Sincerely yours,         GEREMIAS Head

## 2020-10-26 ENCOUNTER — TELEPHONE (OUTPATIENT)
Dept: VASCULAR SURGERY | Facility: CLINIC | Age: 75
End: 2020-10-26

## 2020-10-26 PROBLEM — I73.9 PAD (PERIPHERAL ARTERY DISEASE): Status: ACTIVE | Noted: 2020-10-26

## 2020-10-26 PROBLEM — Z01.818 PREOP TESTING: Status: ACTIVE | Noted: 2020-10-26

## 2020-10-26 NOTE — TELEPHONE ENCOUNTER
Spoke with patient and advised of upcoming procedure.  Patient pre work is scheduled for 11/2/2020 at 145 pm.  Patient procedure is scheduled for 11/9/2020 at 500 am.  Patient advised of covid 19, masking, and visitation. Patient advised of location time and prep.  Patient expressed understanding for all that was discussed.

## 2020-10-27 ENCOUNTER — APPOINTMENT (OUTPATIENT)
Dept: MRI IMAGING | Facility: HOSPITAL | Age: 75
End: 2020-10-27

## 2020-10-29 ENCOUNTER — HOSPITAL ENCOUNTER (OUTPATIENT)
Dept: MRI IMAGING | Facility: HOSPITAL | Age: 75
Discharge: HOME | End: 2020-10-29
Admitting: PAIN MEDICINE

## 2020-10-29 DIAGNOSIS — M54.16 LUMBAR RADICULOPATHY: ICD-10-CM

## 2020-10-29 PROCEDURE — 72148 MRI LUMBAR SPINE W/O DYE: CPT

## 2020-11-02 ENCOUNTER — APPOINTMENT (OUTPATIENT)
Dept: PREADMISSION TESTING | Facility: HOSPITAL | Age: 75
End: 2020-11-02

## 2020-11-02 ENCOUNTER — TRANSCRIBE ORDERS (OUTPATIENT)
Dept: ADMINISTRATIVE | Facility: HOSPITAL | Age: 75
End: 2020-11-02

## 2020-11-02 VITALS
DIASTOLIC BLOOD PRESSURE: 84 MMHG | RESPIRATION RATE: 18 BRPM | OXYGEN SATURATION: 97 % | HEART RATE: 89 BPM | BODY MASS INDEX: 20.59 KG/M2 | WEIGHT: 120.59 LBS | HEIGHT: 64 IN | SYSTOLIC BLOOD PRESSURE: 144 MMHG

## 2020-11-02 DIAGNOSIS — Z79.02 ENCOUNTER FOR MONITORING ANTIPLATELET THERAPY: ICD-10-CM

## 2020-11-02 DIAGNOSIS — Z01.818 PREOP TESTING: Primary | ICD-10-CM

## 2020-11-02 DIAGNOSIS — Z01.818 PREOP TESTING: ICD-10-CM

## 2020-11-02 DIAGNOSIS — Z51.81 ENCOUNTER FOR MONITORING ANTIPLATELET THERAPY: ICD-10-CM

## 2020-11-02 DIAGNOSIS — I73.9 PAD (PERIPHERAL ARTERY DISEASE) (HCC): ICD-10-CM

## 2020-11-02 LAB
ANION GAP SERPL CALCULATED.3IONS-SCNC: 9 MMOL/L (ref 5–15)
APTT PPP: 29.4 SECONDS (ref 24.1–35)
BASOPHILS # BLD AUTO: 0.04 10*3/MM3 (ref 0–0.2)
BASOPHILS NFR BLD AUTO: 0.6 % (ref 0–1.5)
BUN SERPL-MCNC: 14 MG/DL (ref 8–23)
BUN/CREAT SERPL: 19.4 (ref 7–25)
CALCIUM SPEC-SCNC: 10 MG/DL (ref 8.6–10.5)
CHLORIDE SERPL-SCNC: 98 MMOL/L (ref 98–107)
CO2 SERPL-SCNC: 31 MMOL/L (ref 22–29)
CREAT SERPL-MCNC: 0.72 MG/DL (ref 0.57–1)
DEPRECATED RDW RBC AUTO: 43 FL (ref 37–54)
EOSINOPHIL # BLD AUTO: 0.19 10*3/MM3 (ref 0–0.4)
EOSINOPHIL NFR BLD AUTO: 2.8 % (ref 0.3–6.2)
ERYTHROCYTE [DISTWIDTH] IN BLOOD BY AUTOMATED COUNT: 13 % (ref 12.3–15.4)
GFR SERPL CREATININE-BSD FRML MDRD: 79 ML/MIN/1.73
GLUCOSE SERPL-MCNC: 80 MG/DL (ref 65–99)
HCT VFR BLD AUTO: 34.8 % (ref 34–46.6)
HGB BLD-MCNC: 12 G/DL (ref 12–15.9)
IMM GRANULOCYTES # BLD AUTO: 0.02 10*3/MM3 (ref 0–0.05)
IMM GRANULOCYTES NFR BLD AUTO: 0.3 % (ref 0–0.5)
INR PPP: 1.04 (ref 0.91–1.09)
LYMPHOCYTES # BLD AUTO: 2.12 10*3/MM3 (ref 0.7–3.1)
LYMPHOCYTES NFR BLD AUTO: 31 % (ref 19.6–45.3)
MCH RBC QN AUTO: 31.7 PG (ref 26.6–33)
MCHC RBC AUTO-ENTMCNC: 34.5 G/DL (ref 31.5–35.7)
MCV RBC AUTO: 91.8 FL (ref 79–97)
MONOCYTES # BLD AUTO: 0.76 10*3/MM3 (ref 0.1–0.9)
MONOCYTES NFR BLD AUTO: 11.1 % (ref 5–12)
NEUTROPHILS NFR BLD AUTO: 3.7 10*3/MM3 (ref 1.7–7)
NEUTROPHILS NFR BLD AUTO: 54.2 % (ref 42.7–76)
NRBC BLD AUTO-RTO: 0 /100 WBC (ref 0–0.2)
PLATELET # BLD AUTO: 225 10*3/MM3 (ref 140–450)
PMV BLD AUTO: 9.6 FL (ref 6–12)
POTASSIUM SERPL-SCNC: 4.3 MMOL/L (ref 3.5–5.2)
PROTHROMBIN TIME: 13.2 SECONDS (ref 11.9–14.6)
RBC # BLD AUTO: 3.79 10*6/MM3 (ref 3.77–5.28)
SODIUM SERPL-SCNC: 138 MMOL/L (ref 136–145)
WBC # BLD AUTO: 6.83 10*3/MM3 (ref 3.4–10.8)

## 2020-11-02 PROCEDURE — 80048 BASIC METABOLIC PNL TOTAL CA: CPT | Performed by: NURSE PRACTITIONER

## 2020-11-02 PROCEDURE — 93005 ELECTROCARDIOGRAM TRACING: CPT

## 2020-11-02 PROCEDURE — 93010 ELECTROCARDIOGRAM REPORT: CPT | Performed by: INTERNAL MEDICINE

## 2020-11-02 PROCEDURE — 36415 COLL VENOUS BLD VENIPUNCTURE: CPT

## 2020-11-02 PROCEDURE — 85610 PROTHROMBIN TIME: CPT | Performed by: NURSE PRACTITIONER

## 2020-11-02 PROCEDURE — 85025 COMPLETE CBC W/AUTO DIFF WBC: CPT | Performed by: NURSE PRACTITIONER

## 2020-11-02 PROCEDURE — 85730 THROMBOPLASTIN TIME PARTIAL: CPT | Performed by: NURSE PRACTITIONER

## 2020-11-02 RX ORDER — IBUPROFEN 400 MG/1
400 TABLET ORAL EVERY 6 HOURS PRN
COMMUNITY
End: 2021-03-09

## 2020-11-02 NOTE — DISCHARGE INSTRUCTIONS
DAY OF SURGERY INSTRUCTIONS        YOUR SURGEON: Dr. Sukhdev Condon    PROCEDURE: Left lower extremity angiogram    DATE OF SURGERY: November 9, 2020    ARRIVAL TIME: AS DIRECTED BY OFFICE    YOU MAY TAKE THE FOLLOWING MEDICATION(S) THE MORNING OF SURGERY WITH A SIP OF WATER: Carvedilol (COREG), Diazepam (VALIUM) if needed for anxiety, Tramadol (ULTRAM) if needed for pain      ALL OTHER HOME MEDICATION CHECK WITH YOUR PHYSICIAN      DO NOT TAKE ANY ERECTILE DYSFUNCTION MEDICATIONS (EX: CIALIS, VIAGRA) 24 HOURS PRIOR TO SURGERY                      MANAGING PAIN AFTER SURGERY    We know you are probably wondering what your pain will be like after surgery.  Following surgery it is unrealistic to expect you will not have pain.   Pain is how our bodies let us know that something is wrong or cautions us to be careful.  That said, our goal is to make your pain tolerable.    Methods we may use to treat your pain include (oral or IV medications, PCAs, epidurals, nerve blocks, etc.)   While some procedures require IV pain medications for a short time after surgery, transitioning to pain medications by mouth allows for better management of pain.   Your nurse will encourage you to take oral pain medications whenever possible.  IV medications work almost immediately, but only last a short while.  Taking medications by mouth allows for a more constant level of medication in your blood stream for a longer period of time.      Once your pain is out of control it is harder to get back under control.  It is important you are aware when your next dose of pain medication is due.  If you are admitted, your nurse may write the time of your next dose on the white board in your room to help you remember.      We are interested in your pain and encourage you to inform us about aggravating factors during your visit.   Many times a simple repositioning every few hours can make a big difference.    If your physician says it is okay, do not  let your pain prevent you from getting out of bed. Be sure to call your nurse for assistance prior to getting up so you do not fall.      Before surgery, please decide your tolerable pain goal.  These faces help describe the pain ratings we use on a 0-10 scale.   Be prepared to tell us your goal and whether or not you take pain or anxiety medications at home.          BEFORE YOU COME TO THE HOSPITAL  (Pre-op instructions)  • Do not eat, drink, smoke or chew gum after midnight the night before surgery.  This also includes no mints.  • Morning of surgery take only the medicines you have been instructed with a sip of water unless otherwise instructed  by your physician.  • Do not shave, wear makeup or dark nail polish.  • Remove all jewelry including rings.  • Leave anything you consider valuable at home.  • Leave your suitcase in the car until after your surgery.  • Bring the following with you if applicable:  o Picture ID and insurance, Medicare or Medicaid cards  o Co-pay/deductible required by insurance (cash, check, credit card)  o Copy of advance directive, living will or power-of- documents if not brought to PAT  o CPAP or BIPAP mask and tubing  o Relaxation aids ( book, magazine), etc.  o Hearing aids                        ON THE DAY OF SURGERY  · On the day of surgery check in at registration located at the main entrance of the hospital.   ? You will be registered and given a beeper with instructions where to wait in the main lobby.  ? When your beeper lights up and vibrates a member of the Outpatient Surgery staff will meet you at the double doors under the stair steps and escort you to your preoperative room.   · You may have cloth compression devices placed on your legs. These help to prevent blood clots and reduce swelling in your legs.  · An IV may be inserted into one of your veins.  · In the operating room, you may be given one or more of the following:  ? A medicine to help you relax  "(sedative).  ? A medicine to numb the area (local anesthetic).  ? A medicine to make you fall asleep (general anesthetic).  ? A medicine that is injected into an area of your body to numb everything below the injection site (regional anesthetic).  · Your surgical site will be marked or identified.  · You may be given an antibiotic through your IV to help prevent infection.  Contact a health care provider if you:  · Develop a fever of more than 100.4°F (38°C) or other feelings of illness during the 48 hours before your surgery.  · Have symptoms that get worse.  Have questions or concerns about your surgery    General Anesthesia/Surgery, Adult  General anesthesia is the use of medicines to make a person \"go to sleep\" (unconscious) for a medical procedure. General anesthesia must be used for certain procedures, and is often recommended for procedures that:  · Last a long time.  · Require you to be still or in an unusual position.  · Are major and can cause blood loss.  The medicines used for general anesthesia are called general anesthetics. As well as making you unconscious for a certain amount of time, these medicines:  · Prevent pain.  · Control your blood pressure.  · Relax your muscles.  Tell a health care provider about:  · Any allergies you have.  · All medicines you are taking, including vitamins, herbs, eye drops, creams, and over-the-counter medicines.  · Any problems you or family members have had with anesthetic medicines.  · Types of anesthetics you have had in the past.  · Any blood disorders you have.  · Any surgeries you have had.  · Any medical conditions you have.  · Any recent upper respiratory, chest, or ear infections.  · Any history of:  ? Heart or lung conditions, such as heart failure, sleep apnea, asthma, or chronic obstructive pulmonary disease (COPD).  ?  service.  ? Depression or anxiety.  · Any tobacco or drug use, including marijuana or alcohol use.  · Whether you are pregnant or " may be pregnant.  What are the risks?  Generally, this is a safe procedure. However, problems may occur, including:  · Allergic reaction.  · Lung and heart problems.  · Inhaling food or liquid from the stomach into the lungs (aspiration).  · Nerve injury.  · Air in the bloodstream, which can lead to stroke.  · Extreme agitation or confusion (delirium) when you wake up from the anesthetic.  · Waking up during your procedure and being unable to move. This is rare.  These problems are more likely to develop if you are having a major surgery or if you have an advanced or serious medical condition. You can prevent some of these complications by answering all of your health care provider's questions thoroughly and by following all instructions before your procedure.  General anesthesia can cause side effects, including:  · Nausea or vomiting.  · A sore throat from the breathing tube.  · Hoarseness.  · Wheezing or coughing.  · Shaking chills.  · Tiredness.  · Body aches.  · Anxiety.  · Sleepiness or drowsiness.  · Confusion or agitation.  RISKS AND COMPLICATIONS OF SURGERY  Your health care provider will discuss possible risks and complications with you before surgery. Common risks and complications include:    · Problems due to the use of anesthetics.  · Blood loss and replacement (does not apply to minor surgical procedures).  · Temporary increase in pain due to surgery.  · Uncorrected pain or problems that the surgery was meant to correct.  · Infection.  · New damage.    What happens before the procedure?    Medicines  Ask your health care provider about:  · Changing or stopping your regular medicines. This is especially important if you are taking diabetes medicines or blood thinners.  · Taking medicines such as aspirin and ibuprofen. These medicines can thin your blood. Do not take these medicines unless your health care provider tells you to take them.  · Taking over-the-counter medicines, vitamins, herbs, and  supplements. Do not take these during the week before your procedure unless your health care provider approves them.  General instructions  · Starting 3-6 weeks before the procedure, do not use any products that contain nicotine or tobacco, such as cigarettes and e-cigarettes. If you need help quitting, ask your health care provider.  · If you brush your teeth on the morning of the procedure, make sure to spit out all of the toothpaste.  · Tell your health care provider if you become ill or develop a cold, cough, or fever.  · If instructed by your health care provider, bring your sleep apnea device with you on the day of your surgery (if applicable).  · Ask your health care provider if you will be going home the same day, the following day, or after a longer hospital stay.  ? Plan to have someone take you home from the hospital or clinic.  ? Plan to have a responsible adult care for you for at least 24 hours after you leave the hospital or clinic. This is important.  What happens during the procedure?  · You will be given anesthetics through both of the following:  ? A mask placed over your nose and mouth.  ? An IV in one of your veins.  · You may receive a medicine to help you relax (sedative).  · After you are unconscious, a breathing tube may be inserted down your throat to help you breathe. This will be removed before you wake up.  · An anesthesia specialist will stay with you throughout your procedure. He or she will:  ? Keep you comfortable and safe by continuing to give you medicines and adjusting the amount of medicine that you get.  ? Monitor your blood pressure, pulse, and oxygen levels to make sure that the anesthetics do not cause any problems.  The procedure may vary among health care providers and hospitals.  What happens after the procedure?  · Your blood pressure, temperature, heart rate, breathing rate, and blood oxygen level will be monitored until the medicines you were given have worn off.  · You  will wake up in a recovery area. You may wake up slowly.  · If you feel anxious or agitated, you may be given medicine to help you calm down.  · If you will be going home the same day, your health care provider may check to make sure you can walk, drink, and urinate.  · Your health care provider will treat any pain or side effects you have before you go home.  · Do not drive for 24 hours if you were given a sedative.  Summary  · General anesthesia is used to keep you still and prevent pain during a procedure.  · It is important to tell your healthcare provider about your medical history and any surgeries you have had, and previous experience with anesthesia.  · Follow your healthcare provider’s instructions about when to stop eating, drinking, or taking certain medicines before your procedure.  · Plan to have someone take you home from the hospital or clinic.  This information is not intended to replace advice given to you by your health care provider. Make sure you discuss any questions you have with your health care provider.  Document Released: 03/26/2009 Document Revised: 08/03/2018 Document Reviewed: 08/03/2018  [employer] Interactive Patient Education © 2019 [employer]       Fall Prevention in Hospitals, Adult  As a hospital patient, your condition and the treatments you receive can increase your risk for falls. Some additional risk factors for falls in a hospital include:  · Being in an unfamiliar environment.  · Being on bed rest.  · Your surgery.  · Taking certain medicines.  · Your tubing requirements, such as intravenous (IV) therapy or catheters.  It is important that you learn how to decrease fall risks while at the hospital. Below are important tips that can help prevent falls.  SAFETY TIPS FOR PREVENTING FALLS  Talk about your risk of falling.  · Ask your health care provider why you are at risk for falling. Is it your medicine, illness, tubing placement, or something else?  · Make a plan with your  health care provider to keep you safe from falls.  · Ask your health care provider or pharmacist about side effects of your medicines. Some medicines can make you dizzy or affect your coordination.  Ask for help.  · Ask for help before getting out of bed. You may need to press your call button.  · Ask for assistance in getting safely to the toilet.  · Ask for a walker or cane to be put at your bedside. Ask that most of the side rails on your bed be placed up before your health care provider leaves the room.  · Ask family or friends to sit with you.  · Ask for things that are out of your reach, such as your glasses, hearing aids, telephone, bedside table, or call button.  Follow these tips to avoid falling:  · Stay lying or seated, rather than standing, while waiting for help.  · Wear rubber-soled slippers or shoes whenever you walk in the hospital.  · Avoid quick, sudden movements.  ¨ Change positions slowly.  ¨ Sit on the side of your bed before standing.  ¨ Stand up slowly and wait before you start to walk.  · Let your health care provider know if there is a spill on the floor.  · Pay careful attention to the medical equipment, electrical cords, and tubes around you.  · When you need help, use your call button by your bed or in the bathroom. Wait for one of your health care providers to help you.  · If you feel dizzy or unsure of your footing, return to bed and wait for assistance.  · Avoid being distracted by the TV, telephone, or another person in your room.  · Do not lean or support yourself on rolling objects, such as IV poles or bedside tables.     This information is not intended to replace advice given to you by your health care provider. Make sure you discuss any questions you have with your health care provider.     Document Released: 12/15/2001 Document Revised: 01/08/2016 Document Reviewed: 08/25/2013  [employer] Interactive Patient Education ©2016 [employer]       [facility]  Patient Instruction Sheet    Chlorhexidine Before Surgery  Chlorhexidine gluconate (CHG) is a germ-killing (antiseptic) solution that is used to clean the skin. It gets rid of the bacteria that normally live on the skin. Cleaning your skin with CHG before surgery helps lower the risk for infection after surgery.    How to use CHG solution  · You will take 2 showers, one shower the night before surgery, the second shower the morning of surgery before coming to the hospital.  · Use CHG only as told by your health care provider, and follow the instructions on the label.  · Use CHG solution while taking a shower. Follow these steps when using CHG solution (unless your health care provider gives you different instructions):  1. Start the shower.  2. Use your normal soap and shampoo to wash your face and hair.  3. Turn off the shower or move out of the shower stream.  4. Pour the CHG onto a clean washcloth. Do not use any type of brush or rough-edged sponge.  5. Starting at your neck, lather your body down to your toes. Make sure you:  6. Pay special attention to the part of your body where you will be having surgery. Scrub this area for at least 1 minute.  7. Use the full amount of CHG as directed. Usually, this is one half bottle for each shower.  8. Do not use CHG on your head or face. If the solution gets into your ears or eyes, rinse them well with water.  9. Avoid your genital area.  10. Avoid any areas of skin that have broken skin, cuts, or scrapes.  11. Scrub your back and under your arms. Make sure to wash skin folds.  12. Let the lather sit on your skin for 1-2 minutes or as long as told by your health care  provider.  13. Thoroughly rinse your entire body in the shower. Make sure that all body creases and crevices are rinsed well.  14. Dry off with a clean towel. Do not put any substances on your body afterward, such as powder, lotion, or perfume.  15. Put on clean clothes or pajamas.  16. If it is the night  before your surgery, sleep in clean sheets.    What are the risks?  Risks of using CHG include:  · A skin reaction.  · Hearing loss, if CHG gets in your ears.  · Eye injury, if CHG gets in your eyes and is not rinsed out.  · The CHG product catching fire.  Make sure that you avoid smoking and flames after applying CHG to your skin.  Do not use CHG:  · If you have a chlorhexidine allergy or have previously reacted to chlorhexidine.  · On babies younger than 2 months of age.      On the day of surgery, when you are taken to your room in Outpatient Surgery you will be given a CHG prepackaged cloth to wipe the site for your surgery.  How to use CHG prepackaged cloths  · Follow the instructions on the label.  · Use the CHG cloth on clean, dry skin. Follow these steps when using a CHG cloth (unless your health care provider gives you different instructions):  1. Using the CHG cloth, vigorously scrub the part of your body where you will be having surgery. Scrub using a back-and-forth motion for 3 minutes. The area on your body should be completely wet with CHG when you are finished scrubbing.  2. Do not rinse. Discard the cloth and let the area air-dry for 1 minute. Do not put any substances on your body afterward, such as powder, lotion, or perfume.  Contact a health care provider if:  · Your skin gets irritated after scrubbing.  · You have questions about using your solution or cloth.  Get help right away if:  · Your eyes become very red or swollen.  · Your eyes itch badly.  · Your skin itches badly and is red or swollen.  · Your hearing changes.  · You have trouble seeing.  · You have swelling or tingling in your mouth or throat.  · You have trouble breathing.  · You swallow any chlorhexidine.  Summary  · Chlorhexidine gluconate (CHG) is a germ-killing (antiseptic) solution that is used to clean the skin. Cleaning your skin with CHG before surgery helps lower the risk for infection after surgery.  · You may be given CHG  to use at home. It may be in a bottle or in a prepackaged cloth to use on your skin. Carefully follow your health care provider's instructions and the instructions on the product label.  · Do not use CHG if you have a chlorhexidine allergy.  · Contact your health care provider if your skin gets irritated after scrubbing.  This information is not intended to replace advice given to you by your health care provider. Make sure you discuss any questions you have with your health care provider.  Document Released: 09/11/2013 Document Revised: 11/15/2018 Document Reviewed: 11/15/2018  [employer] Interactive Patient Education © 2019 [employer]          PATIENT/FAMILY/RESPONSIBLE PARTY VERBALIZES UNDERSTANDING OF ABOVE EDUCATION.  COPY OF PAIN SCALE GIVEN AND REVIEWED WITH VERBALIZED UNDERSTANDING.

## 2020-11-03 LAB
QT INTERVAL: 380 MS
QTC INTERVAL: 430 MS

## 2020-11-06 ENCOUNTER — LAB (OUTPATIENT)
Dept: LAB | Facility: HOSPITAL | Age: 75
End: 2020-11-06

## 2020-11-06 ENCOUNTER — TELEPHONE (OUTPATIENT)
Dept: VASCULAR SURGERY | Facility: CLINIC | Age: 75
End: 2020-11-06

## 2020-11-06 PROCEDURE — C9803 HOPD COVID-19 SPEC COLLECT: HCPCS | Performed by: SURGERY

## 2020-11-06 PROCEDURE — U0003 INFECTIOUS AGENT DETECTION BY NUCLEIC ACID (DNA OR RNA); SEVERE ACUTE RESPIRATORY SYNDROME CORONAVIRUS 2 (SARS-COV-2) (CORONAVIRUS DISEASE [COVID-19]), AMPLIFIED PROBE TECHNIQUE, MAKING USE OF HIGH THROUGHPUT TECHNOLOGIES AS DESCRIBED BY CMS-2020-01-R: HCPCS | Performed by: SURGERY

## 2020-11-06 NOTE — TELEPHONE ENCOUNTER
Spoke with patient and advised that her arrival time for her procedure with Dr. Condon had been moved to 700 am.  Patient expressed understanding for all that was discussed.

## 2020-11-08 LAB
COVID LABCORP PRIORITY: NORMAL
SARS-COV-2 RNA RESP QL NAA+PROBE: NOT DETECTED

## 2020-11-09 ENCOUNTER — APPOINTMENT (OUTPATIENT)
Dept: INTERVENTIONAL RADIOLOGY/VASCULAR | Facility: HOSPITAL | Age: 75
End: 2020-11-09

## 2020-11-09 ENCOUNTER — HOSPITAL ENCOUNTER (OUTPATIENT)
Facility: HOSPITAL | Age: 75
Setting detail: HOSPITAL OUTPATIENT SURGERY
Discharge: HOME | End: 2020-11-09
Attending: SURGERY | Admitting: SURGERY

## 2020-11-09 ENCOUNTER — APPOINTMENT (OUTPATIENT)
Dept: ULTRASOUND IMAGING | Facility: HOSPITAL | Age: 75
End: 2020-11-09

## 2020-11-09 ENCOUNTER — ANESTHESIA (OUTPATIENT)
Dept: PERIOP | Facility: HOSPITAL | Age: 75
End: 2020-11-09

## 2020-11-09 ENCOUNTER — ANESTHESIA EVENT (OUTPATIENT)
Dept: PERIOP | Facility: HOSPITAL | Age: 75
End: 2020-11-09

## 2020-11-09 VITALS
DIASTOLIC BLOOD PRESSURE: 67 MMHG | SYSTOLIC BLOOD PRESSURE: 124 MMHG | OXYGEN SATURATION: 99 % | RESPIRATION RATE: 16 BRPM | TEMPERATURE: 97 F | HEART RATE: 61 BPM

## 2020-11-09 DIAGNOSIS — I73.9 PAD (PERIPHERAL ARTERY DISEASE) (HCC): ICD-10-CM

## 2020-11-09 DIAGNOSIS — Z01.818 PREOP TESTING: ICD-10-CM

## 2020-11-09 LAB
ABO GROUP BLD: NORMAL
BLD GP AB SCN SERPL QL: NEGATIVE
RH BLD: POSITIVE
T&S EXPIRATION DATE: NORMAL

## 2020-11-09 PROCEDURE — C1884 EMBOLIZATION PROTECT SYST: HCPCS | Performed by: SURGERY

## 2020-11-09 PROCEDURE — 37227 PR REVSC OPN/PRQ FEM/POP W/STNT/ATHRC/ANGIOP SM VSL: CPT | Performed by: SURGERY

## 2020-11-09 PROCEDURE — C2623 CATH, TRANSLUMIN, DRUG-COAT: HCPCS | Performed by: SURGERY

## 2020-11-09 PROCEDURE — C1876 STENT, NON-COA/NON-COV W/DEL: HCPCS | Performed by: SURGERY

## 2020-11-09 PROCEDURE — 75625 CONTRAST EXAM ABDOMINL AORTA: CPT

## 2020-11-09 PROCEDURE — 86850 RBC ANTIBODY SCREEN: CPT | Performed by: NURSE PRACTITIONER

## 2020-11-09 PROCEDURE — 75710 ARTERY X-RAYS ARM/LEG: CPT

## 2020-11-09 PROCEDURE — 25010000002 HEPARIN (PORCINE) PER 1000 UNITS: Performed by: NURSE ANESTHETIST, CERTIFIED REGISTERED

## 2020-11-09 PROCEDURE — 25010000002 HEPARIN (PORCINE) PER 1000 UNITS: Performed by: SURGERY

## 2020-11-09 PROCEDURE — 75710 ARTERY X-RAYS ARM/LEG: CPT | Performed by: SURGERY

## 2020-11-09 PROCEDURE — C1760 CLOSURE DEV, VASC: HCPCS | Performed by: SURGERY

## 2020-11-09 PROCEDURE — C1894 INTRO/SHEATH, NON-LASER: HCPCS | Performed by: SURGERY

## 2020-11-09 PROCEDURE — C1769 GUIDE WIRE: HCPCS | Performed by: SURGERY

## 2020-11-09 PROCEDURE — C1887 CATHETER, GUIDING: HCPCS | Performed by: SURGERY

## 2020-11-09 PROCEDURE — 86900 BLOOD TYPING SEROLOGIC ABO: CPT | Performed by: NURSE PRACTITIONER

## 2020-11-09 PROCEDURE — 86901 BLOOD TYPING SEROLOGIC RH(D): CPT | Performed by: NURSE PRACTITIONER

## 2020-11-09 PROCEDURE — 25010000002 ONDANSETRON PER 1 MG: Performed by: SURGERY

## 2020-11-09 PROCEDURE — 76000 FLUOROSCOPY <1 HR PHYS/QHP: CPT

## 2020-11-09 PROCEDURE — 25010000002 IOPAMIDOL 61 % SOLUTION: Performed by: SURGERY

## 2020-11-09 PROCEDURE — C1725 CATH, TRANSLUMIN NON-LASER: HCPCS | Performed by: SURGERY

## 2020-11-09 PROCEDURE — 75625 CONTRAST EXAM ABDOMINL AORTA: CPT | Performed by: SURGERY

## 2020-11-09 PROCEDURE — C1714 CATH, TRANS ATHERECTOMY, DIR: HCPCS | Performed by: SURGERY

## 2020-11-09 PROCEDURE — 25010000002 PROPOFOL 10 MG/ML EMULSION: Performed by: NURSE ANESTHETIST, CERTIFIED REGISTERED

## 2020-11-09 PROCEDURE — 94799 UNLISTED PULMONARY SVC/PX: CPT

## 2020-11-09 DEVICE — STNT PERIPH EVERFLEX ENTRUST 5F 6X100MM 120CM: Type: IMPLANTABLE DEVICE | Site: GROIN | Status: FUNCTIONAL

## 2020-11-09 RX ORDER — CLOPIDOGREL BISULFATE 75 MG/1
75 TABLET ORAL DAILY
Qty: 30 TABLET | Refills: 5 | Status: SHIPPED | OUTPATIENT
Start: 2020-11-09 | End: 2020-12-09

## 2020-11-09 RX ORDER — LABETALOL HYDROCHLORIDE 5 MG/ML
5 INJECTION, SOLUTION INTRAVENOUS
Status: DISCONTINUED | OUTPATIENT
Start: 2020-11-09 | End: 2020-11-09 | Stop reason: HOSPADM

## 2020-11-09 RX ORDER — BUPIVACAINE HYDROCHLORIDE 5 MG/ML
INJECTION, SOLUTION EPIDURAL; INTRACAUDAL AS NEEDED
Status: DISCONTINUED | OUTPATIENT
Start: 2020-11-09 | End: 2020-11-09 | Stop reason: HOSPADM

## 2020-11-09 RX ORDER — SODIUM CHLORIDE 0.9 % (FLUSH) 0.9 %
10 SYRINGE (ML) INJECTION EVERY 12 HOURS SCHEDULED
Status: DISCONTINUED | OUTPATIENT
Start: 2020-11-09 | End: 2020-11-09 | Stop reason: HOSPADM

## 2020-11-09 RX ORDER — OXYCODONE AND ACETAMINOPHEN 7.5; 325 MG/1; MG/1
2 TABLET ORAL EVERY 4 HOURS PRN
Status: DISCONTINUED | OUTPATIENT
Start: 2020-11-09 | End: 2020-11-09 | Stop reason: HOSPADM

## 2020-11-09 RX ORDER — HYDROCODONE BITARTRATE AND ACETAMINOPHEN 5; 325 MG/1; MG/1
1 TABLET ORAL ONCE AS NEEDED
Status: DISCONTINUED | OUTPATIENT
Start: 2020-11-09 | End: 2020-11-09 | Stop reason: HOSPADM

## 2020-11-09 RX ORDER — LIDOCAINE HYDROCHLORIDE 10 MG/ML
0.5 INJECTION, SOLUTION EPIDURAL; INFILTRATION; INTRACAUDAL; PERINEURAL ONCE AS NEEDED
Status: DISCONTINUED | OUTPATIENT
Start: 2020-11-09 | End: 2020-11-09 | Stop reason: HOSPADM

## 2020-11-09 RX ORDER — CLOPIDOGREL BISULFATE 75 MG/1
150 TABLET ORAL ONCE
Status: COMPLETED | OUTPATIENT
Start: 2020-11-09 | End: 2020-11-09

## 2020-11-09 RX ORDER — BUPIVACAINE HCL/0.9 % NACL/PF 0.1 %
2 PLASTIC BAG, INJECTION (ML) EPIDURAL ONCE
Status: COMPLETED | OUTPATIENT
Start: 2020-11-09 | End: 2020-11-09

## 2020-11-09 RX ORDER — SODIUM CHLORIDE 0.9 % (FLUSH) 0.9 %
10 SYRINGE (ML) INJECTION AS NEEDED
Status: DISCONTINUED | OUTPATIENT
Start: 2020-11-09 | End: 2020-11-09 | Stop reason: HOSPADM

## 2020-11-09 RX ORDER — FENTANYL CITRATE 50 UG/ML
25 INJECTION, SOLUTION INTRAMUSCULAR; INTRAVENOUS
Status: DISCONTINUED | OUTPATIENT
Start: 2020-11-09 | End: 2020-11-09 | Stop reason: HOSPADM

## 2020-11-09 RX ORDER — SODIUM CHLORIDE, SODIUM LACTATE, POTASSIUM CHLORIDE, CALCIUM CHLORIDE 600; 310; 30; 20 MG/100ML; MG/100ML; MG/100ML; MG/100ML
9 INJECTION, SOLUTION INTRAVENOUS CONTINUOUS
Status: DISCONTINUED | OUTPATIENT
Start: 2020-11-09 | End: 2020-11-09 | Stop reason: HOSPADM

## 2020-11-09 RX ORDER — DEXTROSE MONOHYDRATE 25 G/50ML
12.5 INJECTION, SOLUTION INTRAVENOUS AS NEEDED
Status: DISCONTINUED | OUTPATIENT
Start: 2020-11-09 | End: 2020-11-09 | Stop reason: HOSPADM

## 2020-11-09 RX ORDER — NALOXONE HCL 0.4 MG/ML
0.4 VIAL (ML) INJECTION AS NEEDED
Status: DISCONTINUED | OUTPATIENT
Start: 2020-11-09 | End: 2020-11-09 | Stop reason: HOSPADM

## 2020-11-09 RX ORDER — OXYCODONE AND ACETAMINOPHEN 10; 325 MG/1; MG/1
1 TABLET ORAL ONCE AS NEEDED
Status: DISCONTINUED | OUTPATIENT
Start: 2020-11-09 | End: 2020-11-09 | Stop reason: HOSPADM

## 2020-11-09 RX ORDER — IBUPROFEN 600 MG/1
600 TABLET ORAL ONCE AS NEEDED
Status: DISCONTINUED | OUTPATIENT
Start: 2020-11-09 | End: 2020-11-09 | Stop reason: HOSPADM

## 2020-11-09 RX ORDER — FLUMAZENIL 0.1 MG/ML
0.2 INJECTION INTRAVENOUS AS NEEDED
Status: DISCONTINUED | OUTPATIENT
Start: 2020-11-09 | End: 2020-11-09 | Stop reason: HOSPADM

## 2020-11-09 RX ORDER — ONDANSETRON 2 MG/ML
4 INJECTION INTRAMUSCULAR; INTRAVENOUS ONCE AS NEEDED
Status: COMPLETED | OUTPATIENT
Start: 2020-11-09 | End: 2020-11-09

## 2020-11-09 RX ORDER — SODIUM CHLORIDE, SODIUM LACTATE, POTASSIUM CHLORIDE, CALCIUM CHLORIDE 600; 310; 30; 20 MG/100ML; MG/100ML; MG/100ML; MG/100ML
1000 INJECTION, SOLUTION INTRAVENOUS CONTINUOUS
Status: DISCONTINUED | OUTPATIENT
Start: 2020-11-09 | End: 2020-11-09 | Stop reason: HOSPADM

## 2020-11-09 RX ORDER — SODIUM CHLORIDE 0.9 % (FLUSH) 0.9 %
3 SYRINGE (ML) INJECTION AS NEEDED
Status: DISCONTINUED | OUTPATIENT
Start: 2020-11-09 | End: 2020-11-09 | Stop reason: HOSPADM

## 2020-11-09 RX ORDER — ONDANSETRON 2 MG/ML
4 INJECTION INTRAMUSCULAR; INTRAVENOUS ONCE AS NEEDED
Status: DISCONTINUED | OUTPATIENT
Start: 2020-11-09 | End: 2020-11-09 | Stop reason: HOSPADM

## 2020-11-09 RX ORDER — PROPOFOL 10 MG/ML
VIAL (ML) INTRAVENOUS AS NEEDED
Status: DISCONTINUED | OUTPATIENT
Start: 2020-11-09 | End: 2020-11-09 | Stop reason: SURG

## 2020-11-09 RX ORDER — HEPARIN SODIUM 1000 [USP'U]/ML
INJECTION, SOLUTION INTRAVENOUS; SUBCUTANEOUS AS NEEDED
Status: DISCONTINUED | OUTPATIENT
Start: 2020-11-09 | End: 2020-11-09 | Stop reason: SURG

## 2020-11-09 RX ADMIN — HEPARIN SODIUM 1000 UNITS: 1000 INJECTION, SOLUTION INTRAVENOUS; SUBCUTANEOUS at 11:02

## 2020-11-09 RX ADMIN — OXYCODONE HYDROCHLORIDE AND ACETAMINOPHEN 2 TABLET: 7.5; 325 TABLET ORAL at 12:54

## 2020-11-09 RX ADMIN — LIDOCAINE HYDROCHLORIDE 100 MG: 20 INJECTION, SOLUTION INTRAVENOUS at 10:50

## 2020-11-09 RX ADMIN — PROPOFOL 100 MCG/KG/MIN: 10 INJECTION, EMULSION INTRAVENOUS at 10:50

## 2020-11-09 RX ADMIN — DEXMEDETOMIDINE HYDROCHLORIDE 10 MCG: 4 INJECTION, SOLUTION INTRAVENOUS at 11:25

## 2020-11-09 RX ADMIN — ONDANSETRON HYDROCHLORIDE 4 MG: 2 SOLUTION INTRAMUSCULAR; INTRAVENOUS at 12:54

## 2020-11-09 RX ADMIN — SODIUM CHLORIDE, POTASSIUM CHLORIDE, SODIUM LACTATE AND CALCIUM CHLORIDE 1000 ML: 600; 310; 30; 20 INJECTION, SOLUTION INTRAVENOUS at 08:50

## 2020-11-09 RX ADMIN — CLOPIDOGREL BISULFATE 150 MG: 75 TABLET ORAL at 12:54

## 2020-11-09 RX ADMIN — HEPARIN SODIUM 4000 UNITS: 1000 INJECTION, SOLUTION INTRAVENOUS; SUBCUTANEOUS at 11:09

## 2020-11-09 RX ADMIN — Medication 2 G: at 10:47

## 2020-11-09 RX ADMIN — PROPOFOL 100 MG: 10 INJECTION, EMULSION INTRAVENOUS at 10:50

## 2020-11-09 RX ADMIN — DEXMEDETOMIDINE HYDROCHLORIDE 20 MCG: 4 INJECTION, SOLUTION INTRAVENOUS at 11:11

## 2020-11-09 NOTE — ANESTHESIA POSTPROCEDURE EVALUATION
Patient: Trinidad Zhu    Procedure Summary     Date: 11/09/20 Room / Location: [facility] OR  /  PAD HYBRID OR 12    Anesthesia Start: 1047 Anesthesia Stop: 1225    Procedure: left lower extremtiy angiogram, hawk athrectomy, balloon angioplasty, stent placement, mynx closure (Left Leg Lower) Diagnosis:       PAD (peripheral artery disease) (CMS/HCC)      Preop testing      (PAD (peripheral artery disease) (CMS/HCC) [I73.9])      (Preop testing [Z01.818])    Surgeon: Sukhdev Condon DO Provider: JACOBO Carrillo CRNA    Anesthesia Type: MAC ASA Status: 3          Anesthesia Type: MAC    Vitals  Vitals Value Taken Time   /64 11/09/20 1258   Temp 97 °F (36.1 °C) 11/09/20 1258   Pulse 65 11/09/20 1259   Resp 14 11/09/20 1258   SpO2 96 % 11/09/20 1259   Vitals shown include unvalidated device data.        Post Anesthesia Care and Evaluation    Patient location during evaluation: PACU  Patient participation: complete - patient participated  Level of consciousness: awake and alert  Pain management: adequate  Airway patency: patent  Anesthetic complications: No anesthetic complications    Cardiovascular status: acceptable  Respiratory status: acceptable  Hydration status: acceptable    Comments: Blood pressure 120/64, pulse 65, temperature 97 °F (36.1 °C), temperature source Temporal, resp. rate 14, SpO2 95 %, not currently breastfeeding.    Pt discharged from PACU based on desmond score >8

## 2020-11-09 NOTE — H&P
11/9/2020         Sherif Gonzáles, SHIMAM  [address]  [place] [address]     Trinidad Zhu  1945          Chief Complaint   Patient presents with   • Establish Care       Referred over by Dr Sherif Gonzáles for Abnormal OTTO and Leg Pain. Test [id number]  pad ankle / brach ind ext comp. Patient denies any stroke like symptoms.    • other       Patient states she been having trouble with Left Ankle and was getting injections but then Dr Gonzáles asked her bilateral feet were always this cold, she stated yes so he done testing Left Leg is worse than the Right.    • Smoking/Non-Smoker       Patient is Former Smoker - Quit 1996   • Med Management       Verified medications from list patient brought in. Ms Zhu verbalized all medications are correct and up to date.          Dear Sherif Gonzáles DPM:        HPI  I had the pleasure of seeing your patient Trinidad Zhu in the office today.  Thank you kindly for this consultation.  As you recall, Trinidad Zhu is a 75 y.o.  female who you are currently following for left ankle problems.  During exam, her feet were noted to be cold.  Dr. Gonzáles did order noninvasive testing, which I have reviewed.  She is maintained on aspirin and Pravachol.  Upon questioning, she does report claudication to her left leg.  She does state she used to walk a lot and has not been able to do that in the past 2 years.  She had a previous CT however this was from 4 years ago that showed extensive aortoiliofemoral calcifications.  She does also report cramping in her feet and toes at night.  She denies any significant swelling to her lower extremities.       Medical History        Past Medical History:   Diagnosis Date   • Antral ulcer     • Bladder cystocele     • C. difficile diarrhea     • CAD (coronary artery disease)     • Colon cancer screening       PREOPERATIVE    • Colon polyp     • Diarrhea     • Difficulty swallowing     • Gastritis     • Generalized OA     •  GERD (gastroesophageal reflux disease)     • History of bladder surgery 01/07/2020   • Hyperlipidemia     • Hypertension     • Liver cyst     • Lung nodule     • Microscopic colitis     • Neck pain     • Neuropathy     • Normal body mass index     • NSAID induced gastritis     • Numbness in both hands     • Ulcer of pyloric antrum       UNSPECIFIED ULCER CHRONICITY   • UTI (urinary tract infection)     • Weight loss             Surgical History         Past Surgical History:   Procedure Laterality Date   • ANTERIOR CERVICAL DISCECTOMY W/ FUSION N/A 5/22/2017     Procedure: CERVICAL DISCECTOMY ANTERIOR FUSION WITH INSTRUMENTATION;  Surgeon: NADINE Sarabia MD;  Location: [facility] OR;  Service:    • BLADDER SUSPENSION         also had pelvic reconstructive surgery   • BREAST EXCISIONAL BIOPSY Right 1985   • CERVICAL CORPECTOMY N/A 5/22/2017     Procedure:  ANTERIOR CERVICAL DISCECTOMY FUSION C-6 to T1,  ANTERIOR FUSION WITH INSTRUMENTATION C-5 T-1;  Surgeon: NADINE Sarabia MD;  Location: [facility] OR;  Service:    • COLONOSCOPY   08/19/2015     TIC BX RT COLON   • COLONOSCOPY   12/04/2013     RT COLON BX RECALL 5YR   • COLONOSCOPY N/A 11/29/2016     Procedure: LOWER LIMITED COLONOSCOPY WITH ANESTHESIA;  Surgeon: Marco Antonio Vallejo MD;  Location: [facility] ENDOSCOPY;  Service:    • ENDOSCOPY   12/03/2015     HEALED ULCER SLANT BX   • HYSTERECTOMY       • OTHER SURGICAL HISTORY         KNEE CARTILAGE REMOVED   • OTHER SURGICAL HISTORY         BENIGN FIBROID TUMOR OF BREAST 1985 REMOVED   • TONSILLECTOMY                     Family History   Problem Relation Age of Onset   • Breast cancer Maternal Aunt     • GI problems Neg Hx           MALIGNANCIES   • Colon cancer Neg Hx     • Colon polyps Neg Hx           Social History   Social History            Socioeconomic History   • Marital status:        Spouse name: Not on file   • Number of children: Not on file   • Years of education: Not on file   • Highest education  level: Not on file   Tobacco Use   • Smoking status: Former Smoker       Packs/day: 1.50       Years: 20.00       Pack years: 30.00       Quit date:        Years since quittin.8   • Smokeless tobacco: Never Used   Substance and Sexual Activity   • Alcohol use: No   • Drug use: No   • Sexual activity: Not Currently                 Allergies   Allergen Reactions   • Oxycodone GI Intolerance   • Baclofen Other (See Comments)       MUSCULOSKELETAL THERAPY AGENTS knocked her out for a day      • Gabapentin Confusion   • Sulfa Antibiotics Rash       Mouth blisters   • Amitriptyline Hcl Confusion   • Oxycodone-Acetaminophen GI Intolerance           Current Outpatient Medications   Medication Instructions   • alendronate (FOSAMAX) 70 MG tablet No dose, route, or frequency recorded.   • aspirin 81 mg, Oral, Daily   • benazepril (LOTENSIN) 5 MG tablet No dose, route, or frequency recorded.   • Calcium Citrate (CITRACAL PO) 2 tablets, Oral, 2 Times Daily   • carvedilol (COREG) 12.5 mg, Oral, 2 Times Daily With Meals   • cetirizine (ZYRTEC) 10 mg, Oral, As Needed   • diazePAM (VALIUM) 5 MG tablet TK 1/2 T PO QID PRN   • famotidine (PEPCID) 40 mg, Oral, 2 Times Daily   • hydroCHLOROthiazide (HYDRODIURIL) 12.5 mg, Oral, Daily   • levothyroxine (SYNTHROID, LEVOTHROID) 50 MCG tablet 50 mcg.   • Myrbetriq 50 mg, Oral, Daily   • pravastatin (PRAVACHOL) 80 mg, Oral, Daily, DOESN'T TAKE ON DAYS SHE HAS TO TAKE COLESTID   • traMADol (ULTRAM) 50 MG tablet TK 1 T PO Q 6 H PRN         Review of Systems   Constitutional: Negative.    HENT: Negative.    Eyes: Negative.    Respiratory: Negative.    Cardiovascular: Negative.    Gastrointestinal: Negative.    Endocrine: Negative.    Genitourinary: Negative.    Musculoskeletal: Positive for back pain.   Skin: Negative.    Allergic/Immunologic: Negative.    Neurological: Positive for numbness (Tingling to 2 toes on her left foot).   Hematological: Negative.    Psychiatric/Behavioral:  "Negative.          /78 (BP Location: Right arm, Patient Position: Sitting, Cuff Size: Adult)   Pulse 65   Ht 161.9 cm (63.75\")   Wt 54.4 kg (120 lb)   SpO2 97%   BMI 20.76 kg/m²   Physical Exam  Vitals signs and nursing note reviewed.   Constitutional:       General: She is not in acute distress.     Appearance: She is well-developed. She is not diaphoretic.   HENT:      Head: Normocephalic and atraumatic.   Eyes:      General: No scleral icterus.     Pupils: Pupils are equal, round, and reactive to light.   Neck:      Musculoskeletal: Normal range of motion and neck supple.      Thyroid: No thyromegaly.      Vascular: No carotid bruit or JVD.   Cardiovascular:      Rate and Rhythm: Normal rate and regular rhythm.      Pulses:           Femoral pulses are 2+ on the right side and 2+ on the left side.       Dorsalis pedis pulses are 2+ on the right side and detected w/ Doppler on the left side.        Posterior tibial pulses are detected w/ Doppler on the right side and detected w/ Doppler on the left side.      Heart sounds: Normal heart sounds and S2 normal. No murmur. No friction rub. No gallop.       Comments: Varicose veins to bilateral lower extremities  Pulmonary:      Effort: Pulmonary effort is normal.      Breath sounds: Normal breath sounds.   Abdominal:      General: Bowel sounds are normal.      Palpations: Abdomen is soft.   Musculoskeletal: Normal range of motion.   Skin:     General: Skin is warm and dry.   Neurological:      Mental Status: She is alert and oriented to person, place, and time.      Cranial Nerves: No cranial nerve deficit.   Psychiatric:         Behavior: Behavior normal.         Thought Content: Thought content normal.         Judgment: Judgment normal.            Us Ankle / Brachial Indices Extremity Complete     Result Date: 9/28/2020  Narrative: US ANKLE / BRACHIAL INDICES EXTREMITY COMPLETE- 9/28/2020 3:31 PM CDT  HISTORY: peripheral vascular disease; I73.9-Peripheral " vascular disease, unspecified  COMPARISON: NONE  FINDINGS: Systolic pressures were obtained from bilateral brachial, posterior tibial and dorsalis pedis arteries. Systolic pressure within the right brachial artery was measured at 154 mmHg, while pressure within the left brachial artery was measured at 154 mmHg.  Systolic pressures within bilateral posterior tibial and dorsalis pedis arteries are less than the corresponding brachial pressures.  Calculated ankle/brachial index is 0.87 on the right and 0.69 on the left.       Impression: 1. Abnormal OTTO Doppler study. 2. Low OTTO bilaterally, left greater than right.  This report was finalized on 09/28/2020 16:40 by Dr. Mirna Segura MD.            Patient Active Problem List   Diagnosis   • Spinal stenosis, cervical region   • Lung nodules   • Bronchiectasis without complication (CMS/[facility])   • Underweight   • Personal history of nicotine dependence   • Restrictive lung disease   • Pulmonary emphysema (CMS/[facility])             ICD-10-CM ICD-9-CM   1. PAD (peripheral artery disease) (CMS/[facility])  I73.9 443.9   2. Preop testing  Z01.818 V72.84   3. Encounter for monitoring antiplatelet therapy  Z51.81 V58.83     Z79.02 V58.63            Plan: After thoroughly evaluating Trinidad Zhu, I believe the best course of action is to proceed with a left lower extremity angiogram.  I did review her testing which shows her OTTO to be 0.69 on the left and 0.87 on the right.  Upon questioning she does have claudication to her calf as well as this new pain to her ankle.  She has palpable femoral pulses however did see previous CAT scan from [facility] showing extensive aorto iliofemoral calcifications.  Risks of angiogram were discussed.  These include, but are not limited to, bleeding, infection, vessel damage, nerve damage, embolus, and loss of limb.  The patient understands these risks and wishes to proceed with procedure.  I did discuss vascular risk factors as they pertain to the  progression of vascular disease including controlling her hypertension and hyperlipidemia.  Her blood pressure is stable on her current medication regimen.  She is maintained on Pravachol for her hyperlipidemia.  The patient can continue taking their current medication regimen as previously planned.  This was all discussed in full with complete understanding.     Thank you for allowing me to participate in the care of your patient.  Please do not hesitate with any questions or concerns.  I will keep you aware of any further encounters with Trinidad Zhu.           Sincerely yours,         Sukhdev Condon, DO

## 2020-11-09 NOTE — DISCHARGE INSTRUCTIONS

## 2020-11-09 NOTE — OP NOTE
Trinidad Zhu  11/9/2020     PREOPERATIVE DIAGNOSIS: PAD (peripheral artery disease) (CMS/[facility]) [I73.9]  Preop testing [Z01.818]     POSTOPERATIVE DIAGNOSIS: Post-Op Diagnosis Codes:     * PAD (peripheral artery disease) (CMS/[facility]) [I73.9]     * Preop testing [Z01.818]     PROCEDURE PERFORMED:   1.  Introduction of catheter/sheath into the aorta  2.  Aortoiliac angiogram with left lower extremity runoff  3.  Contralateral cannulation of the left common iliac artery  4.  Crossing of a long superficial femoral artery chronic total occlusion  5.  Placement of a 5 mm spider distal embolic protection device in the popliteal artery  6.  Hawkone directional atherectomy of the entire superficial femoral artery  7.  Balloon angioplasty of the entire superficial femoral artery and popliteal artery with a 6 x 60 mm and a 6 x 250 mm Medtronic drug-coated balloons  8.  Stenting of the proximal SFA with a 6 x 100 mm ever flex stent.  Postdilatation of the stent with a 5 x 100 mm ever cross balloon  9.  Retrieval of the spider distal embolic protection device  10.  Completion left lower extremity angiogram with radiographic supervision and interpretation  11.  Minx closure of the right common femoral artery     SURGEON: Sukhdev Condon DO      ANESTHESIA: MAC    PREPARATION: Routine.    STAFF: Circulator: Dayana Matthew RN  Scrub Person: Pooja Tavarez  Assistant: David Nobles  Vascular Radiology Technician: Kathleen Suarez    Estimated Blood Loss: minimal    SPECIMENS: None    COMPLICATIONS: None    INDICATIONS: Trinidad Zhu is a 75 y.o. female who currently has left ankle problems.  During exam, her feet were noted to be cold.  Dr. Gonzáles did order noninvasive testing, which I have reviewed.  She is maintained on aspirin and Pravachol.  Upon questioning, she does report claudication to her left leg.  She does state she used to walk a lot and has not been able to do that in the past 2 years.  She had a  previous CT however this was from 4 years ago that showed extensive aortoiliofemoral calcifications.  She does also report cramping in her feet and toes at night.  She denies any significant swelling to her lower extremities.  The indications, risks, and possible complications of the procedure were explained to the patient, who voiced understanding and wished to proceed with surgery.     PROCEDURE IN DETAIL: The patient was taken to the operating room and placed on the operating table in a supine position. After MAC anesthesia was obtained, the bilateral groins was prepped and draped in a sterile manner.  5 cc of 0.5% Marcaine plain was used to infiltrate the right groin for local anesthesia.  Using a micropuncture technique the right common femoral artery was cannulated and a microsheath was placed.  Advantage Glidewire was advanced into the aorta and a 6 Citizen of [place] sheath was placed.  The patient was given 1000 units of intravenous heparin.  The Omni Flush catheter was advanced into the aorta and an aortoiliac angiogram was performed.  Findings are as follows:  1.  Patent renal arteries bilaterally without stenosis  2.  Patent aorta without stenosis  3.  Patent iliac systems bilaterally without stenosis but heavy plaque burden is noted    Contralateral cannulation was established of the left common iliac artery.  The catheter was then brought down to the level of the femoral head.  An angiogram with runoff was performed.  Findings are as follows:  1.  Patent common femoral and profunda femoris arteries without stenosis  2.  Flush occlusion of the superficial femoral artery with reconstitution of the distal SFA at the adductor hiatus  3.  Patent popliteal artery with focal stenosis behind the knee  4.  Patent two-vessel runoff to the foot by the posterior tibial and peroneal arteries.  The anterior tibial artery was occluded from the takeoff.     At this point, the decision was made to revascularize the SFA.  A 7  Vincentian by 45 cm destination sheath was placed down into the distal external iliac artery.  The patient was given 4000 units of intravenous heparin.  Under roadmap guidance the SFA was successfully cannulated and the  was crossed.  An angiogram was performed distally to ensure that I was in true lumen.  A 5 mm spider distal embolic protection device was placed in the popliteal artery.  Hawkone directional atherectomy was then performed of the entire SFA chronic total occlusion.  Multiple passes were made achieving significant luminal gain.  The entire superficial femoral artery and popliteal artery down to the knee joint was balloon angioplastied with a 6 x 60 mm and a 6 x 250 mm Medtronic drug-coated balloons.  Completion angiogram was performed which showed rapid flow down through the SFA without any evidence of stenosis, dissection, or occlusion.  In the proximal SFA just past the takeoff, the decision was made to place a 6 x 100 mm ever flex stent.  It was postdilated with a 5 x 100 mm ever cross balloon.  Completion angiogram was performed showed rapid flow down through the SFA and popliteal arteries.  There was still maintained two-vessel runoff to the foot.  The spider was successfully retrieved.  At this point, I felt no further intervention was warranted.  The sheath and wire were removed.  A minx device was used to seal off the right common femoral artery.  Direct pressure was held for an additional 10 to 15 minutes to help ensure hemostasis.  Sterile dressings were applied. The patient tolerated the procedure well. Sponge and needle counts were correct. The patient was then awakened in the operating room and taken to the recovery room in good condition.    Sukhdev Condon, DO  Date: 11/9/2020 Time: 12:15 CST     CC:Sherif Gonzáles DPM

## 2020-11-09 NOTE — ANESTHESIA PREPROCEDURE EVALUATION
Anesthesia Evaluation     Patient summary reviewed   NPO Solid Status: > 8 hours             Airway   Mallampati: II  TM distance: >3 FB  Neck ROM: full  Dental      Pulmonary    (-) COPD, asthma, sleep apnea, not a smoker  Cardiovascular   Exercise tolerance: good (4-7 METS)    ECG reviewed    (+) hypertension, CAD (non obstructive), PVD, hyperlipidemia,   (-) pacemaker, past MI, angina, cardiac stents      Neuro/Psych  (-) seizures, TIA, CVA  GI/Hepatic/Renal/Endo    (+)  GERD,  thyroid problem hypothyroidism  (-) liver disease, no renal disease, diabetes    Musculoskeletal     Abdominal    Substance History      OB/GYN          Other                        Anesthesia Plan    ASA 3     MAC       Anesthetic plan, all risks, benefits, and alternatives have been provided, discussed and informed consent has been obtained with: patient.

## 2020-11-16 ENCOUNTER — TELEPHONE (OUTPATIENT)
Dept: VASCULAR SURGERY | Facility: CLINIC | Age: 75
End: 2020-11-16

## 2020-11-16 NOTE — TELEPHONE ENCOUNTER
Spoke with  Audra reminding her of her appointment for Tuesday, November 17th, 2020 at 930 am with Dr Condon. Mrs Zhu confirmed she would be here.

## 2020-11-17 ENCOUNTER — OFFICE VISIT (OUTPATIENT)
Dept: VASCULAR SURGERY | Facility: CLINIC | Age: 75
End: 2020-11-17

## 2020-11-17 VITALS
SYSTOLIC BLOOD PRESSURE: 124 MMHG | HEIGHT: 63 IN | DIASTOLIC BLOOD PRESSURE: 72 MMHG | BODY MASS INDEX: 21.44 KG/M2 | WEIGHT: 121 LBS

## 2020-11-17 DIAGNOSIS — I73.9 PAD (PERIPHERAL ARTERY DISEASE) (HCC): Primary | ICD-10-CM

## 2020-11-17 DIAGNOSIS — Z79.02 ENCOUNTER FOR MONITORING ANTIPLATELET THERAPY: ICD-10-CM

## 2020-11-17 DIAGNOSIS — Z51.81 ENCOUNTER FOR MONITORING ANTIPLATELET THERAPY: ICD-10-CM

## 2020-11-17 DIAGNOSIS — I65.23 BILATERAL CAROTID ARTERY STENOSIS: ICD-10-CM

## 2020-11-17 PROCEDURE — 99213 OFFICE O/P EST LOW 20 MIN: CPT | Performed by: SURGERY

## 2020-11-17 NOTE — PROGRESS NOTES
11/17/2020       Andrew Layne MD  [address] [facility] [address]    Trinidad Zhu  1945    Chief Complaint   Patient presents with   • Follow-up     2 Week Post-Op Follow Up For left lower extremtiy angiogram, hawk athrectomy, balloon angioplasty, stent placement, mynx closure. Patient denies any stroke like symptoms.    • other     Patient states still having some pain and discomfort in her legs as well as swelling but they are better than they were.    • Former Smoker     Patient is a Former Smoker - Quit 1996   • Med Management     Verbally verified medications with patient.    • Medication Problem     Patient is concerned in regards to the Plavix stating she feels nauseated, bowel discoloration, and nasty feeling.        Dear Andrew Layne MD       HPI  I had the pleasure of seeing your patient Trinidad Zhu in the office today.   As you recall, Trinidad Zhu is a 75 y.o.  female who you are currently following for routine health maintenance.  Dr. Gonzáles did order noninvasive testing, which I have reviewed.  She is maintained on aspirin and Pravachol.  She had a previous CT however this was from 4 years ago that showed extensive aortoiliofemoral calcifications.  She does also report cramping in her feet and toes at night.  She does wear compression stockings. She did undergo a left lower extremity angiogram on 11/9/2020.  She has had some discomfort and swelling since the procedure.  Her PCP did reach out to Dr. Condon.  Her groin has healed.        Review of Systems   Constitutional: Negative.    HENT: Negative.    Eyes: Negative.    Respiratory: Negative.    Cardiovascular: Negative.    Gastrointestinal: Negative.    Endocrine: Negative.    Genitourinary: Negative.    Musculoskeletal: Positive for back pain.   Skin: Negative.    Allergic/Immunologic: Negative.    Neurological: Positive for numbness (Tingling to 2 toes on her left foot).   Hematological: Negative.   "  Psychiatric/Behavioral: Negative.        /72 (BP Location: Right arm, Patient Position: Sitting, Cuff Size: Adult)   Ht 160 cm (63\")   Wt 54.9 kg (121 lb)   BMI 21.43 kg/m²   Physical Exam  Vitals signs and nursing note reviewed.   Constitutional:       General: She is not in acute distress.     Appearance: Normal appearance. She is well-developed and normal weight. She is not diaphoretic.   HENT:      Head: Normocephalic and atraumatic.   Eyes:      General: No scleral icterus.     Pupils: Pupils are equal, round, and reactive to light.   Neck:      Musculoskeletal: Normal range of motion and neck supple.      Thyroid: No thyromegaly.      Vascular: No carotid bruit or JVD.   Cardiovascular:      Rate and Rhythm: Normal rate and regular rhythm.      Pulses:           Femoral pulses are 2+ on the right side and 2+ on the left side.       Dorsalis pedis pulses are detected w/ Doppler on the left side.        Posterior tibial pulses are detected w/ Doppler on the right side and detected w/ Doppler on the left side.      Heart sounds: Normal heart sounds and S2 normal. No murmur. No friction rub. No gallop.       Comments: Varicose veins to bilateral lower extremities  Groin healed   Left :Doppler PT/peroneal  Pulmonary:      Effort: Pulmonary effort is normal.      Breath sounds: Normal breath sounds.   Abdominal:      General: Bowel sounds are normal.      Palpations: Abdomen is soft.   Musculoskeletal: Normal range of motion.   Skin:     General: Skin is warm and dry.   Neurological:      General: No focal deficit present.      Mental Status: She is alert and oriented to person, place, and time.      Cranial Nerves: No cranial nerve deficit.   Psychiatric:         Mood and Affect: Mood normal.         Behavior: Behavior normal.         Thought Content: Thought content normal.         Judgment: Judgment normal.       Patient Active Problem List   Diagnosis   • Spinal stenosis, cervical region   • Lung " nodules   • Bronchiectasis without complication (CMS/H[facility])   • Underweight   • Personal history of nicotine dependence   • Restrictive lung disease   • Pulmonary emphysema (CMS/[facility])   • PAD (peripheral artery disease) (CMS/[facility])   • Preop testing         ICD-10-CM ICD-9-CM   1. PAD (peripheral artery disease) (CMS/[facility])  I73.9 443.9   2. Encounter for monitoring antiplatelet therapy  Z51.81 V58.83    Z79.02 V58.63   3. Bilateral carotid artery stenosis  I65.23 433.10     433.30         Plan: After thoroughly evaluating Trinidad Zhu, I am pleased to report she is doing well status post left lower extremity angiogram.  She foot is nice and warm with doppler PT/peroneal signals.  She is free to resume normal activity at this time.  We will see her back in 6 months with repeat noninvasive testing for continued surveillance, including ABIs.  I will also obtain a carotid duplex for screening purposes.  I did discuss vascular risk factors as they pertain to the progression of vascular disease including controlling her hypertension and hyperlipidemia.  Her blood pressure is stable on her current medication regimen.  She is maintained on Pravachol for her hyperlipidemia.  Patient's Body mass index is 21.43 kg/m². BMI is within normal parameters. No follow-up required.. The patient can continue taking their current medication regimen as previously planned.  This was all discussed in full with complete understanding.    Thank you for allowing me to participate in the care of your patient.  Please do not hesitate with any questions or concerns.  I will keep you aware of any further encounters with Trinidad Zhu.        Sincerely yours,         GEREMIAS Head

## 2020-12-14 ENCOUNTER — TELEPHONE (OUTPATIENT)
Dept: NEUROSURGERY | Facility: CLINIC | Age: 75
End: 2020-12-14

## 2020-12-14 NOTE — TELEPHONE ENCOUNTER
Contacting patient for scheduling of initial appointment. Spoke with patient and appointment provided. Requesting to be seen sooner rather than later. Appointment agreed with GEREMIAS Hernandez on a day Dr. Bright in office. Paperwork mailed.

## 2021-01-04 ENCOUNTER — HOSPITAL ENCOUNTER (OUTPATIENT)
Dept: GENERAL RADIOLOGY | Facility: HOSPITAL | Age: 76
Discharge: HOME | End: 2021-01-04
Admitting: NURSE PRACTITIONER

## 2021-01-04 ENCOUNTER — OFFICE VISIT (OUTPATIENT)
Dept: NEUROSURGERY | Facility: CLINIC | Age: 76
End: 2021-01-04

## 2021-01-04 VITALS — WEIGHT: 117.6 LBS | BODY MASS INDEX: 19.59 KG/M2 | HEIGHT: 65 IN

## 2021-01-04 DIAGNOSIS — M25.552 LEFT HIP PAIN: ICD-10-CM

## 2021-01-04 DIAGNOSIS — M79.605 PAIN OF LEFT LOWER EXTREMITY: ICD-10-CM

## 2021-01-04 DIAGNOSIS — Z87.891 PERSONAL HISTORY OF NICOTINE DEPENDENCE: ICD-10-CM

## 2021-01-04 DIAGNOSIS — R63.6 UNDERWEIGHT: ICD-10-CM

## 2021-01-04 DIAGNOSIS — M54.50 LUMBAR BACK PAIN: ICD-10-CM

## 2021-01-04 DIAGNOSIS — M54.50 LUMBAR BACK PAIN: Primary | ICD-10-CM

## 2021-01-04 PROCEDURE — 73502 X-RAY EXAM HIP UNI 2-3 VIEWS: CPT

## 2021-01-04 PROCEDURE — 72100 X-RAY EXAM L-S SPINE 2/3 VWS: CPT

## 2021-01-04 PROCEDURE — 99214 OFFICE O/P EST MOD 30 MIN: CPT | Performed by: NURSE PRACTITIONER

## 2021-01-04 RX ORDER — CLOPIDOGREL BISULFATE 75 MG/1
TABLET ORAL
COMMUNITY
Start: 2020-12-09 | End: 2021-02-08

## 2021-01-04 NOTE — PATIENT INSTRUCTIONS
Tobacco Use Disorder  Tobacco use disorder (TUD) occurs when a person craves, seeks, and uses tobacco, regardless of the consequences. This disorder can cause problems with mental and physical health. It can affect your ability to have healthy relationships, and it can keep you from meeting your responsibilities at work, home, or school.  Tobacco may be:  · Smoked as a cigarette or cigar.  · Inhaled using e-cigarettes.  · Smoked in a pipe or hookah.  · Chewed as smokeless tobacco.  · Inhaled into the nostrils as snuff.  Tobacco products contain a dangerous chemical called nicotine, which is very addictive. Nicotine triggers hormones that make the body feel stimulated and works on areas of the brain that make you feel good. These effects can make it hard for people to quit nicotine.  Tobacco contains many other unsafe chemicals that can damage almost every organ in the body. Smoking tobacco also puts others in danger due to fire risk and possible health problems caused by breathing in secondhand smoke.  What are the signs or symptoms?  Symptoms of TUD may include:  · Being unable to slow down or stop your tobacco use.  · Spending an abnormal amount of time getting or using tobacco.  · Craving tobacco. Cravings may last for up to 6 months after quitting.  · Tobacco use that:  ? Interferes with your work, school, or home life.  ? Interferes with your personal and social relationships.  ? Makes you give up activities that you once enjoyed or found important.  · Using tobacco even though you know that it is:  ? Dangerous or bad for your health or someone else's health.  ? Causing problems in your life.  · Needing more and more of the substance to get the same effect (developing tolerance).  · Experiencing unpleasant symptoms if you do not use the substance (withdrawal). Withdrawal symptoms may include:  ? Depressed, anxious, or irritable mood.  ? Difficulty concentrating.  ? Increased appetite.  ? Restlessness or trouble  sleeping.  · Using the substance to avoid withdrawal.  How is this diagnosed?  This condition may be diagnosed based on:  · Your current and past tobacco use. Your health care provider may ask questions about how your tobacco use affects your life.  · A physical exam.  You may be diagnosed with TUD if you have at least two symptoms within a 12-month period.  How is this treated?  This condition is treated by stopping tobacco use. Many people are unable to quit on their own and need help. Treatment may include:  · Nicotine replacement therapy (NRT). NRT provides nicotine without the other harmful chemicals in tobacco. NRT gradually lowers the dosage of nicotine in the body and reduces withdrawal symptoms. NRT is available as:  ? Over-the-counter gums, lozenges, and skin patches.  ? Prescription mouth inhalers and nasal sprays.  · Medicine that acts on the brain to reduce cravings and withdrawal symptoms.  · A type of talk therapy that examines your triggers for tobacco use, how to avoid them, and how to cope with cravings (behavioral therapy).  · Hypnosis. This may help with withdrawal symptoms.  · Joining a support group for others coping with TUD.  The best treatment for TUD is usually a combination of medicine, talk therapy, and support groups. Recovery can be a long process. Many people start using tobacco again after stopping (relapse). If you relapse, it does not mean that treatment will not work.  Follow these instructions at home:    Lifestyle  · Do not use any products that contain nicotine or tobacco, such as cigarettes and e-cigarettes.  · Avoid things that trigger tobacco use as much as you can. Triggers include people and situations that usually cause you to use tobacco.  · Avoid drinks that contain caffeine, including coffee. These may worsen some withdrawal symptoms.  · Find ways to manage stress. Wanting to smoke may cause stress, and stress can make you want to smoke. Relaxation techniques such as  deep breathing, meditation, and yoga may help.  · Attend support groups as needed. These groups are an important part of long-term recovery for many people.  General instructions  · Take over-the-counter and prescription medicines only as told by your health care provider.  · Check with your health care provider before taking any new prescription or over-the-counter medicines.  · Decide on a friend, family member, or smoking quit-line (such as 1-800-QUIT-NOW in the [place] that you can call or text when you feel the urge to smoke or when you need help coping with cravings.  · Keep all follow-up visits as told by your health care provider and therapist. This is important.  Contact a health care provider if:  · You are not able to take your medicines as prescribed.  · Your symptoms get worse, even with treatment.  Summary  · Tobacco use disorder (TUD) occurs when a person craves, seeks, and uses tobacco regardless of the consequences.  · This condition may be diagnosed based on your current and past tobacco use and a physical exam.  · Many people are unable to quit on their own and need help. Recovery can be a long process.  · The most effective treatment for TUD is usually a combination of medicine, talk therapy, and support groups.  This information is not intended to replace advice given to you by your health care provider. Make sure you discuss any questions you have with your health care provider.  Document Revised: 12/05/2018 Document Reviewed: 12/05/2018  Elsevier Patient Education © 2020 [employer]      Advance Care Planning and Advance Directives     You make decisions on a daily basis - decisions about where you want to live, your career, your home, your life. Perhaps one of the most important decisions you face is your choice for future medical care. Take time to talk with your family and your healthcare team and start planning today.  Advance Care Planning is a process that can help you:  · Understand  possible future healthcare decisions in light of your own experiences  · Reflect on those decision in light of your goals and values  · Discuss your decisions with those closest to you and the healthcare professionals that care for you  · Make a plan by creating a document that reflects your wishes    Surrogate Decision Maker  In the event of a medical emergency, which has left you unable to communicate or to make your own decisions, you would need someone to make decisions for you.  It is important to discuss your preferences for medical treatment with this person while you are in good health.     Qualities of a surrogate decision maker:  • Willing to take on this role and responsibility  • Knows what you want for future medical care  • Willing to follow your wishes even if they don't agree with them  • Able to make difficult medical decisions under stressful circumstances    Advance Directives  These are legal documents you can create that will guide your healthcare team and decision maker(s) when needed. These documents can be stored in the electronic medical record.    · Living Will - a legal document to guide your care if you have a terminal condition or a serious illness and are unable to communicate. States vary by statute in document names/types, but most forms may include one or more of the following:        -  Directions regarding life-prolonging treatments        -  Directions regarding artificially provided nutrition/hydration        -  Choosing a healthcare decision maker        -  Direction regarding organ/tissue donation    · Durable Power of  for Healthcare - this document names an -in-fact to make medical decisions for you, but it may also allow this person to make personal and financial decisions for you. Please seek the advice of an  if you need this type of document.    **Advance Directives are not required and no one may discriminate against you if you do not sign  one.    Medical Orders  Many states allow specific forms/orders signed by your physician to record your wishes for medical treatment in your current state of health. This form, signed in personal communication with your physician, addresses resuscitation and other medical interventions that you may or may not want.      For more information or to schedule a time with a [facility] Advance Care Planning Facilitator contact: [facility] or call [phone] and someone will contact you dir  Piriformis Syndrome    Piriformis syndrome is a condition that can cause pain and numbness in your buttocks and down the back of your leg. Piriformis syndrome happens when the small muscle that connects the base of your spine to your hip (piriformis muscle) presses on the nerve that runs down the back of your leg (sciatic nerve).  The piriformis muscle helps your hip rotate and helps to bring your leg back and out. It also helps shift your weight to keep you stable while you are walking. The sciatic nerve runs under or through the piriformis muscle. Damage to the piriformis muscle can cause spasms that put pressure on the nerve below. This causes pain and discomfort while sitting and moving. The pain may feel as if it begins in the buttock and spreads (radiates) down your hip and thigh.  What are the causes?  This condition is caused by pressure on the sciatic nerve from the piriformis muscle. The piriformis muscle can get irritated with overuse, especially if other hip muscles are weak and the piriformis muscle has to do extra work.  Piriformis syndrome can also occur after an injury, like a fall onto your buttocks.  What increases the risk?  You are more likely to develop this condition if you:  · Are a woman.  · Sit for long periods of time.  · Are a cyclist.  · Have weak buttocks muscles (gluteal muscles).  What are the signs or symptoms?  Symptoms of this condition include:  · Pain, tingling, or numbness that  starts in the buttock and runs down the back of your leg (sciatica).  · Pain in the groin or thigh area.  Your symptoms may get worse:  · The longer you sit.  · When you walk, run, or climb stairs.  · When straining to have a bowel movement.  How is this diagnosed?  This condition is diagnosed based on your symptoms, medical history, and physical exam.  · During the exam, your health care provider may:  ? Move your leg into different positions to check for pain.  ? Press on the muscles of your hip and buttock to see if that increases your symptoms.  · You may also have tests, including:  ? Imaging tests such as X-rays, MRI, or ultrasound.  ? Electromyogram (EMG). This test measures electrical signals sent by your nerves into the muscles.  ? Nerve conduction study. This test measures how well electrical signals pass through your nerves.  How is this treated?  This condition may be treated by:  · Stopping all activities that cause pain or make your condition worse.  · Applying ice or using heat therapy.  · Taking medicines to reduce pain and swelling.  · Taking a muscle relaxer (muscle relaxant) to stop muscle spasms.  · Doing range-of-motion and strengthening exercises (physical therapy) as told by your health care provider.  · Massaging the area.  · Having acupuncture.  · Getting an injection of medicine in the piriformis muscle. Your health care provider will choose the medicine based on your condition. He or she may inject:  ? An anti-inflammatory medicine (steroid) to reduce swelling.  ? A numbing medicine (local anesthetic) to block the pain.  ? Botulinum toxin. The toxin blocks nerve impulses to specific muscles to reduce muscle tension.  In rare cases, you may need surgery to cut the muscle and release pressure on the nerve if other treatments do not work.  Follow these instructions at home:  Activity  · Do not sit for long periods. Get up and walk around every 20 minutes or as often as told by your health  care provider.  ? When driving long distances, make sure to take frequent stops to get up and stretch.  · Use a cushion when you sit on hard surfaces.  · Do exercises as told by your health care provider.  · Return to your normal activities as told by your health care provider. Ask your health care provider what activities are safe for you.  Managing pain, stiffness, and swelling         · If directed, apply heat to the affected area as often as told by your health care provider. Use the heat source that your health care provider recommends, such as a moist heat pack or a heating pad.  ? Place a towel between your skin and the heat source.  ? Leave the heat on for 20-30 minutes.  ? Remove the heat if your skin turns bright red. This is especially important if you are unable to feel pain, heat, or cold. You may have a greater risk of getting burned.  · If directed, put ice on the injured area.  ? Put ice in a plastic bag.  ? Place a towel between your skin and the bag.  ? Leave the ice on for 20 minutes, 2-3 times a day.  General instructions  · Take over-the-counter and prescription medicines only as told by your health care provider.  · Ask your health care provider if the medicine prescribed to you requires you to avoid driving or using heavy machinery.  · You may need to take actions to prevent or treat constipation, such as:  ? Drink enough fluid to keep your urine pale yellow.  ? Take over-the-counter or prescription medicines.  ? Eat foods that are high in fiber, such as beans, whole grains, and fresh fruits and vegetables.  ? Limit foods that are high in fat and processed sugars, such as fried or sweet foods.  · Keep all follow-up visits as told by your health care provider. This is important.  How is this prevented?  · Do not sit for longer than 20 minutes at a time. When you sit, choose padded surfaces.  · Warm up and stretch before being active.  · Cool down and stretch after being active.  · Give your  body time to rest between periods of activity.  · Make sure to use equipment that fits you.  · Maintain physical fitness, including:  ? Strength.  ? Flexibility.  Contact a health care provider if:  · Your pain and stiffness continue or get worse.  · Your leg or hip becomes weak.  · You have changes in your bowel function or bladder function.  Summary  · Piriformis syndrome is a condition that can cause pain, tingling, and numbness in your buttocks and down the back of your leg.  · You may try applying heat or ice to relieve the pain.  · Do not sit for long periods. Get up and walk around every 20 minutes or as often as told by your health care provider.  This information is not intended to replace advice given to you by your health care provider. Make sure you discuss any questions you have with your health care provider.  Document Revised: 04/09/2020 Document Reviewed: 08/14/2019  Elsevier Patient Education © 2020 [employer]

## 2021-01-05 ENCOUNTER — TELEPHONE (OUTPATIENT)
Dept: NEUROSURGERY | Facility: CLINIC | Age: 76
End: 2021-01-05

## 2021-01-05 NOTE — TELEPHONE ENCOUNTER
Provider: SHLOMO DUNN  Caller: ERIN BOYD  Relationship to Patient: SELF  Pharmacy: [facility]  Phone Number: [phone]  Reason for Call: AT THE APPOINTMENT YESTERDAY SHLOMO MONA TOLD HER THAT SHE MIGHT HAVE AN ISSUE WITH HER HIPS IN ADDITION TO HER BACK. SHE WANTED TO BE CALLED TO DISCUSS THAT FURTHER, SHE WANTED TO KNOW THE NAME OF THE HIP CONDITION THAT WAS MENTIONED AT THE APPOINTMENT AND IF PHYSICAL THERAPY AND NERVE CONDUCTION TESTS ARE STILL NECESSARY IF THIS IS A PROBLEM WITH HER HIP.  When was the patient last seen: 1/4/21

## 2021-02-02 ENCOUNTER — TRANSCRIBE ORDERS (OUTPATIENT)
Dept: ADMINISTRATIVE | Facility: HOSPITAL | Age: 76
End: 2021-02-02

## 2021-02-02 DIAGNOSIS — G57.02 PIRIFORMIS SYNDROME OF LEFT SIDE: Primary | ICD-10-CM

## 2021-02-08 RX ORDER — CLOPIDOGREL BISULFATE 75 MG/1
TABLET ORAL
Qty: 30 TABLET | Refills: 4 | Status: SHIPPED | OUTPATIENT
Start: 2021-02-08 | End: 2021-06-14

## 2021-02-15 ENCOUNTER — APPOINTMENT (OUTPATIENT)
Dept: NEUROLOGY | Facility: HOSPITAL | Age: 76
End: 2021-02-15

## 2021-02-23 ENCOUNTER — HOSPITAL ENCOUNTER (OUTPATIENT)
Dept: NEUROLOGY | Facility: HOSPITAL | Age: 76
Discharge: HOME | End: 2021-02-23
Admitting: FAMILY MEDICINE

## 2021-02-23 DIAGNOSIS — G57.02 PIRIFORMIS SYNDROME OF LEFT SIDE: ICD-10-CM

## 2021-02-23 PROCEDURE — 95886 MUSC TEST DONE W/N TEST COMP: CPT

## 2021-02-23 PROCEDURE — 95909 NRV CNDJ TST 5-6 STUDIES: CPT

## 2021-03-02 ENCOUNTER — TELEPHONE (OUTPATIENT)
Dept: VASCULAR SURGERY | Facility: CLINIC | Age: 76
End: 2021-03-02

## 2021-03-02 ENCOUNTER — TRANSCRIBE ORDERS (OUTPATIENT)
Dept: ADMINISTRATIVE | Facility: HOSPITAL | Age: 76
End: 2021-03-02

## 2021-03-02 ENCOUNTER — HOSPITAL ENCOUNTER (OUTPATIENT)
Dept: ULTRASOUND IMAGING | Facility: HOSPITAL | Age: 76
Discharge: HOME | End: 2021-03-02
Admitting: FAMILY MEDICINE

## 2021-03-02 DIAGNOSIS — G57.02 PIRIFORMIS SYNDROME OF LEFT SIDE: Primary | ICD-10-CM

## 2021-03-02 PROCEDURE — 93926 LOWER EXTREMITY STUDY: CPT

## 2021-03-08 ENCOUNTER — TELEPHONE (OUTPATIENT)
Dept: VASCULAR SURGERY | Facility: CLINIC | Age: 76
End: 2021-03-08

## 2021-03-08 NOTE — TELEPHONE ENCOUNTER
Spoke with Mr Zhu reminding him of Mrs Zhu appointment for Tuesday, March 9th, 2021 at 130 pm with Dr Condon.  Audra confirmed Mrs Zhu would be here.         Test Completed [id number]

## 2021-03-09 ENCOUNTER — OFFICE VISIT (OUTPATIENT)
Dept: VASCULAR SURGERY | Facility: CLINIC | Age: 76
End: 2021-03-09

## 2021-03-09 VITALS
HEART RATE: 83 BPM | OXYGEN SATURATION: 96 % | HEIGHT: 64 IN | DIASTOLIC BLOOD PRESSURE: 80 MMHG | BODY MASS INDEX: 20.32 KG/M2 | WEIGHT: 119 LBS | SYSTOLIC BLOOD PRESSURE: 132 MMHG

## 2021-03-09 DIAGNOSIS — Z01.818 PREOP TESTING: ICD-10-CM

## 2021-03-09 DIAGNOSIS — Z79.02 ENCOUNTER FOR MONITORING ANTIPLATELET THERAPY: ICD-10-CM

## 2021-03-09 DIAGNOSIS — Z51.81 ENCOUNTER FOR MONITORING ANTIPLATELET THERAPY: ICD-10-CM

## 2021-03-09 DIAGNOSIS — I73.9 PAD (PERIPHERAL ARTERY DISEASE) (HCC): Primary | ICD-10-CM

## 2021-03-09 PROCEDURE — 99214 OFFICE O/P EST MOD 30 MIN: CPT | Performed by: NURSE PRACTITIONER

## 2021-03-09 RX ORDER — CILOSTAZOL 50 MG/1
50 TABLET ORAL 2 TIMES DAILY
COMMUNITY
End: 2021-08-03

## 2021-03-09 NOTE — PATIENT INSTRUCTIONS
Angiogram    An angiogram is a procedure used to examine the blood vessels. In this procedure, contrast dye is injected through a long, thin tube (catheter) into an artery. X-rays are then taken, which show if there is a blockage or problem in a blood vessel.  The catheter may be inserted in:  · The groin area. This is the most common.  · The fold of the arm, near the elbow.  · The wrist.  Tell a health care provider about:  · Any allergies you have, including allergies to medicines, shellfish, contrast dye, or iodine.  · All medicines you are taking, including vitamins, herbs, eye drops, creams, and over-the-counter medicines.  · Any problems you or family members have had with anesthetic medicines.  · Any blood disorders you have.  · Any surgeries you have had.  · Any medical conditions you have or have had, including any kidney problems or kidney failure.  · Whether you are pregnant or may be pregnant.  · Whether you are breastfeeding.  What are the risks?  Generally, this is a safe procedure. However, problems may occur, including:  · Infection.  · Bleeding and bruising.  · Allergic reactions to medicines or dyes, including the contrast dye used.  · Damage to nearby structures or organs, including damage to blood vessels and kidney damage from the contrast dye.  · Blood clots that can lead to a stroke or heart attack.  What happens before the procedure?  Staying hydrated  Follow instructions from your health care provider about hydration, which may include:  · Up to 2 hours before the procedure - you may continue to drink clear liquids, such as water, clear fruit juice, black coffee, and plain tea.    Eating and drinking restrictions  Follow instructions from your health care provider about eating and drinking, which may include:  · 8 hours before the procedure - stop eating heavy meals or foods, such as meat, fried foods, or fatty foods.  · 6 hours before the procedure - stop eating light meals or foods, such  as toast or cereal.  · 2 hours before the procedure - stop drinking clear liquids.  Medicines  Ask your health care provider about:  · Changing or stopping your regular medicines. This is especially important if you are taking diabetes medicines or blood thinners.  · Taking medicines such as aspirin and ibuprofen. These medicines can thin your blood. Do not take these medicines unless your health care provider tells you to take them.  · Taking over-the-counter medicines, vitamins, herbs, and supplements.  Surgery safety  Ask your health care provider:  · How your insertion site will be marked.  · What steps will be taken to help prevent infection. These may include:  ? Removing hair at the insertion site.  ? Washing skin with a germ-killing soap.  ? Taking antibiotic medicine.  General instructions  · Do not use any products that contain nicotine or tobacco for at least 4 weeks before the procedure. These products include cigarettes, e-cigarettes, and chewing tobacco. If you need help quitting, ask your health care provider.  · You may have blood samples taken.  · Plan to have someone take you home from the hospital or clinic.  · If you will be going home right after the procedure, plan to have someone with you for 24 hours.  What happens during the procedure?  · You will lie on your back on an X-ray table. You may be strapped to the table if it is tilted.  · An IV will be inserted into one of your veins.  · Electrodes may be placed on your chest to monitor your heart rate during the procedure.  · You will be given one or both of the following:  ? A medicine to help you relax (sedative).  ? A medicine to numb the area where the catheter will be inserted (local anesthetic).  · A small incision will be made for catheter insertion.  · The catheter will be inserted into an artery using a guide wire. An X-ray procedure (fluoroscopy) will be used to help guide the catheter to the blood vessel to be examined.  · A contrast  dye will then be injected into the catheter, and X-rays will be taken. The contrast will help to show where any narrowing or blockages are located in the blood vessels. You may feel flushed as the contrast dye is injected.  · Tell your health care provider if you develop chest pain or trouble breathing.  · After the fluoroscopy is complete, the catheter will be removed.  · A bandage (dressing) will be placed over the site where the catheter was inserted. Pressure will be applied to help stop any bleeding.  · The IV will be removed.  The procedure may vary among health care providers and hospitals.  What happens after the procedure?  · Your blood pressure, heart rate, breathing rate, and blood oxygen level will be monitored until you leave the hospital or clinic.  · You will be kept in bed lying flat for 6 hours. If the catheter was inserted through your leg, you will be instructed not to bend or cross your legs.  · The insertion area and the pulse in your feet or wrist will be checked frequently.  · You will be instructed to drink plenty of fluids. This will help wash the contrast dye out of your body.  · You may have more blood tests and X-rays. You may also have a test that records the electrical activity of your heart (electrocardiogram, or ECG).  · Do not drive for 24 hours if you were given a sedative during your procedure.  · It is up to you to get the results of your procedure. Ask your health care provider, or the department that is doing the procedure, when your results will be ready.  Summary  · An angiogram is a procedure used to examine the blood vessels.  · Before the procedure, follow your health care provider's instructions about eating and drinking restrictions. You may be asked to stop eating and drinking several hours before the procedure.  · During the procedure, contrast dye is injected through a thin tube (catheter) into an artery. X-rays are then taken.  · After the procedure, you will need to  drink plenty of fluids and lie flat for 6 hour.  This information is not intended to replace advice given to you by your health care provider. Make sure you discuss any questions you have with your health care provider.  Document Revised: 07/01/2020 Document Reviewed: 07/01/2020  Elsevier Patient Education © 2020 [employer]

## 2021-03-09 NOTE — PROGRESS NOTES
3/9/2021       Andrew Layne MD  [address] [facility] [address]    Trinidad Zhu  1945    Chief Complaint   Patient presents with   • Follow-up     Referred back by Dr Layne for Left Cold Foot with Increasing Pain. Test [id number] US Arterial Doppler Lower Extremity Left. Patient denies any stroke like symptoms.    • Former Smoker     Patient is a Former Smoker - Quit 1996   • other     Patient states the Left Leg has been hurting for about a month and half and has slowly gotten worse. Patient states they could barely get a pulse and the pain is from the knee down and is ice cold and cant hardly get it warm    • Med Management     Verified medications from list patient brought in        Dear Andrew Layne MD       HPI  I had the pleasure of seeing your patient Trinidad Zhu in the office today.   As you recall, Trinidad Zhu is a 76 y.o.  female who we are following for lower extremity PAD.  She was emergently sent by Dr. Gonzáles with complaints of foot pain.  She does also report cramping in her feet and toes at night.  She does wear compression stockings.  She did undergo a left lower extremity angiogram on 11/9/2020.  Following surgery she did well and reports her foot pain essentially resolved.  She states for the past 6 weeks her left foot and leg have been hurting worsened and discussed with her primary care provider who ordered noninvasive testing.  This wakes her up at night because it is cold and she reports unbearable.  She is maintained on aspirin, Plavix, and Pravachol.    Review of Systems   Constitutional: Negative.    HENT: Negative.    Eyes: Negative.    Respiratory: Negative.    Cardiovascular: Negative.    Gastrointestinal: Negative.    Endocrine: Negative.    Genitourinary: Negative.    Musculoskeletal: Positive for back pain.   Skin: Positive for color change.   Allergic/Immunologic: Negative.    Neurological: Negative.    Hematological: Negative.   "  Psychiatric/Behavioral: Negative.         /80 (BP Location: Right arm, Patient Position: Sitting, Cuff Size: Adult)   Pulse 83   Ht 161.3 cm (63.5\")   Wt 54 kg (119 lb)   SpO2 96%   BMI 20.75 kg/m²   Physical Exam  Vitals and nursing note reviewed.   Constitutional:       General: She is not in acute distress.     Appearance: She is well-developed. She is not diaphoretic.   HENT:      Head: Normocephalic and atraumatic.   Eyes:      General: No scleral icterus.     Pupils: Pupils are equal, round, and reactive to light.   Neck:      Thyroid: No thyromegaly.      Vascular: No carotid bruit or JVD.   Cardiovascular:      Rate and Rhythm: Normal rate and regular rhythm.      Pulses:           Femoral pulses are 2+ on the right side and 2+ on the left side.       Dorsalis pedis pulses are detected w/ Doppler on the right side and 0 on the left side.        Posterior tibial pulses are detected w/ Doppler on the right side and detected w/ Doppler on the left side.      Heart sounds: Normal heart sounds and S2 normal. No murmur. No friction rub. No gallop.       Comments: Left: PT/peroneal doppler signals   Pulmonary:      Effort: Pulmonary effort is normal.      Breath sounds: Normal breath sounds.   Abdominal:      General: Bowel sounds are normal.      Palpations: Abdomen is soft.   Musculoskeletal:         General: Normal range of motion.      Cervical back: Normal range of motion and neck supple.   Skin:     General: Skin is warm and dry.      Comments: Left foot ruborous   Neurological:      General: No focal deficit present.      Mental Status: She is alert and oriented to person, place, and time.      Cranial Nerves: No cranial nerve deficit.   Psychiatric:         Mood and Affect: Mood normal.         Behavior: Behavior normal.         Thought Content: Thought content normal.         Judgment: Judgment normal.        US Arterial Doppler Lower Extremity Left    Result Date: 3/2/2021  Narrative: " HISTORY: Left lower extremity angioplasty and stent placement 11/9/2020. Cold foot with increasing pain.  Left lower extremity arterial Doppler ultrasound: 2-D grayscale, color Doppler, spectral analysis of the arteries of the left lower extremity performed.  There is a stent within the left common femoral artery extending into the proximal superficial femoral artery. The stent does appear to be patent. Dense predominantly calcified plaque of the left popliteal artery. Diminished visible color Doppler flow of the left posterior tibial and peroneal arteries.  Left lower extremity peak systolic velocity measurements: CFA 44 cm/s Proximal SFA 45 cm/s Mid SFA 45 cm/s Distal SFA 26 cm/s Popliteal 6 cm/s Anterior tibial 41 cm/s Posterior tibial 15 cm/s Peroneal 13 cm/s.      Impression: 1. The left common femoral/proximal superficial femoral artery stent appears patent with mild luminal stenosis considered. 2. Moderate densely calcified plaque of the left popliteal artery with diminished flow and pressures the left posterior tibial and peroneal arteries. Findings concerning for significant atherosclerotic disease. Vascular consultation recommended with possible lower extremity runoff. This report was finalized on 03/02/2021 12:51 by Dr. Melanie Baron MD.          Patient Active Problem List   Diagnosis   • Spinal stenosis, cervical region   • Lung nodules   • Bronchiectasis without complication (CMS/HCC)   • Underweight   • Personal history of nicotine dependence   • Restrictive lung disease   • Pulmonary emphysema (CMS/HCC)   • PAD (peripheral artery disease) (CMS/HCC)   • Preop testing         ICD-10-CM ICD-9-CM   1. PAD (peripheral artery disease) (CMS/HCC)  I73.9 443.9   2. Encounter for monitoring antiplatelet therapy  Z51.81 V58.83    Z79.02 V58.63   3. Preop testing  Z01.818 V72.84         Plan: After thoroughly evaluating Trinidad RONNIE Zhu, I believe the best course of action is to proceed with a left lower  extremity angiogram.  I did review her testing and her SFA stent is patent however narrowing the distal aspect and diminished popliteal and tibial.  Risks of angiogram were discussed.  These include, but are not limited to, bleeding, infection, vessel damage, nerve damage, embolus, and loss of limb.  The patient understands these risks and wishes to proceed with procedure.  Did discuss vascular risk factors as they pertain to the progression of vascular disease including controlling her hypertension and hyperlipidemia.  I did discuss vascular risk factors as they pertain to the progression of vascular disease including controlling her hypertension and hyperlipidemia.  Her blood pressure is stable on her current medication regimen.  She is maintained on Pravachol for hyperlipidemia.  Patient's Body mass index is 20.75 kg/m². BMI is within normal parameters. No follow-up required..  The patient can continue taking their current medication regimen as previously planned.  This was all discussed in full with complete understanding.    Thank you for allowing me to participate in the care of your patient.  Please do not hesitate with any questions or concerns.  I will keep you aware of any further encounters with Trinidad Zhu.        Sincerely yours,         GEREMIAS Head

## 2021-03-11 ENCOUNTER — TELEPHONE (OUTPATIENT)
Dept: VASCULAR SURGERY | Facility: CLINIC | Age: 76
End: 2021-03-11

## 2021-03-11 PROBLEM — K21.9 GASTROESOPHAGEAL REFLUX DISEASE: Status: ACTIVE | Noted: 2021-03-11

## 2021-03-11 PROBLEM — K21.9 GASTROESOPHAGEAL REFLUX DISEASE: Chronic | Status: ACTIVE | Noted: 2021-03-11

## 2021-03-11 PROBLEM — Z87.891 PERSONAL HISTORY OF NICOTINE DEPENDENCE: Chronic | Status: ACTIVE | Noted: 2020-07-26

## 2021-03-11 PROBLEM — R91.8 LUNG NODULES: Chronic | Status: ACTIVE | Noted: 2019-01-23

## 2021-03-11 PROBLEM — J98.4 RESTRICTIVE LUNG DISEASE: Chronic | Status: ACTIVE | Noted: 2020-07-26

## 2021-03-11 PROBLEM — J47.9 BRONCHIECTASIS WITHOUT COMPLICATION: Chronic | Status: ACTIVE | Noted: 2019-01-23

## 2021-03-11 PROBLEM — J43.9 PULMONARY EMPHYSEMA: Chronic | Status: ACTIVE | Noted: 2020-07-27

## 2021-03-11 NOTE — TELEPHONE ENCOUNTER
Spoke with patient and advised of upcoming procedure.  Patient pre work is scheduled for 3/23/2021 at 145 pm.  Patient procedure is scheduled for 3/26/2021 at 700 am.  Patient advised of covid 19 testing and visitation.   Patient advised of location time and prep.  Patient expressed understanding for all that was discussed.

## 2021-03-11 NOTE — PROGRESS NOTES
" GEREMIAS Mathis  [facility]   Res[facility]  [address] [place] [address]  Phone: [phone]  Fax: [phone]       Chief Complaint  Lung Nodule    Subjective    History of Present Illness      Trinidad Zhu presents to [facility]   History of Present Illness   The patient has known restrictive lung disease, lung nodules, bronchiectasis, emphysema, and personal history of nicotine dependence.  Patient does not utilize inhalers.  She has had no recent respiratory infections.  She states that she had vascular surgery in November with Dr. Condon.  She has mild edema noted to her left lower extremity.  She states that she will likely be having another vascular surgery soon.  She is due for repeat CT of the chest in September to monitor lung nodules. No other aggravating or alleviating factors.     Objective   Vital Signs:   /70   Pulse 80   Temp 97.3 °F (36.3 °C)   Ht 161.3 cm (63.5\")   Wt 53.5 kg (118 lb)   SpO2 99% Comment: RA  BMI 20.57 kg/m²     Physical Exam  Vitals reviewed.   Constitutional:       General: She is not in acute distress.     Appearance: She is well-developed.      Interventions: Face mask in place.   HENT:      Head: Normocephalic and atraumatic.   Eyes:      General: No scleral icterus.     Conjunctiva/sclera: Conjunctivae normal.      Pupils: Pupils are equal, round, and reactive to light.   Cardiovascular:      Rate and Rhythm: Normal rate and regular rhythm.      Heart sounds: Normal heart sounds. No murmur. No friction rub.   Pulmonary:      Effort: Pulmonary effort is normal. No respiratory distress.      Breath sounds: Normal breath sounds. No wheezing or rales.   Musculoskeletal:         General: Normal range of motion.      Cervical back: Normal range of motion and neck supple.      Left lower leg: Edema (mild) present.   Skin:     General: Skin is warm and dry. "   Neurological:      Mental Status: She is alert and oriented to person, place, and time.   Psychiatric:         Behavior: Behavior normal.         Thought Content: Thought content normal.         Judgment: Judgment normal.        Result Review :   The following data was reviewed by: GEREMIAS Mathis on 03/12/2021:      PFT Values        Some values may be hidden. Unless noted otherwise, only the newest values recorded on each date are displayed.         Old Values PFT Results 7/30/19   FVC 75%   FEV1 69%   FEV1/FVC 71.41%   DLCO 51%   TLC 82%      Pre Drug PFT Results 7/30/19   No data to display.      Post Drug PFT Results 7/30/19   No data to display.      Other Tests PFT Results 7/30/19   No data to display.                    Assessment and Plan    Diagnoses and all orders for this visit:    1. Bronchiectasis without complication (CMS/HCC) (Primary)  Comments:  Stable.  No recent respiratory infections.    2. Restrictive lung disease  Comments:  Stable.  Repeat flow volume loop at next office visit.    3. Pulmonary emphysema, unspecified emphysema type (CMS/HCC)  Comments:  Stable.    4. Lung nodules  Comments:  Identified October 8, 2016.  1.5 cm RUL nodule stable.  Follow-up CT of the chest in September 2021.  Orders:  -     CT Chest Without Contrast; Future    5. Personal history of nicotine dependence    6. Gastroesophageal reflux disease, unspecified whether esophagitis present  Comments:  Continue Pepcid    7. Allergic rhinitis, unspecified seasonality, unspecified trigger  Comments:  Continue Zyrtec      Health maintenance:   Influenza vaccine: yes  Pneumovax 23: yes  Prevnar 13: yes  Covid 19: yes   Patient's Body mass index is 20.57 kg/m². BMI is within normal parameters. No follow-up required.    Follow Up   GEREMIAS Mathis  3/12/2021  15:06 CST  Return in about 6 months (around 9/12/2021) for CT.  Patient was given instructions and counseling regarding her condition or for health  maintenance advice. Please see specific information pulled into the AVS if appropriate.

## 2021-03-12 ENCOUNTER — OFFICE VISIT (OUTPATIENT)
Dept: PULMONOLOGY | Facility: CLINIC | Age: 76
End: 2021-03-12

## 2021-03-12 VITALS
TEMPERATURE: 97.3 F | DIASTOLIC BLOOD PRESSURE: 70 MMHG | OXYGEN SATURATION: 99 % | SYSTOLIC BLOOD PRESSURE: 118 MMHG | WEIGHT: 118 LBS | HEART RATE: 80 BPM | HEIGHT: 64 IN | BODY MASS INDEX: 20.14 KG/M2

## 2021-03-12 DIAGNOSIS — R91.8 LUNG NODULES: Chronic | ICD-10-CM

## 2021-03-12 DIAGNOSIS — K21.9 GASTROESOPHAGEAL REFLUX DISEASE, UNSPECIFIED WHETHER ESOPHAGITIS PRESENT: ICD-10-CM

## 2021-03-12 DIAGNOSIS — J47.9 BRONCHIECTASIS WITHOUT COMPLICATION (HCC): Primary | Chronic | ICD-10-CM

## 2021-03-12 DIAGNOSIS — J30.9 ALLERGIC RHINITIS, UNSPECIFIED SEASONALITY, UNSPECIFIED TRIGGER: ICD-10-CM

## 2021-03-12 DIAGNOSIS — Z87.891 PERSONAL HISTORY OF NICOTINE DEPENDENCE: Chronic | ICD-10-CM

## 2021-03-12 DIAGNOSIS — J98.4 RESTRICTIVE LUNG DISEASE: Chronic | ICD-10-CM

## 2021-03-12 DIAGNOSIS — J43.9 PULMONARY EMPHYSEMA, UNSPECIFIED EMPHYSEMA TYPE (HCC): Chronic | ICD-10-CM

## 2021-03-12 PROCEDURE — 99214 OFFICE O/P EST MOD 30 MIN: CPT | Performed by: NURSE PRACTITIONER

## 2021-03-12 RX ORDER — MULTIPLE VITAMINS W/ MINERALS TAB 9MG-400MCG
1 TAB ORAL DAILY
COMMUNITY

## 2021-03-12 NOTE — PATIENT INSTRUCTIONS
Bronchiectasis    Bronchiectasis is a condition in which the airways in the lungs (bronchi) are damaged and widened. The condition makes it hard for the lungs to get rid of mucus, and it causes mucus to gather in the bronchi. This condition often leads to lung infections, which can make the condition worse.  What are the causes?  You can be born with this condition or you can develop it later in life. Common causes of this condition include:  · Cystic fibrosis.  · Repeated lung infections, such as pneumonia or tuberculosis.  · An object or other blockage in the lungs.  · Breathing in fluid, food, or other objects (aspiration).  · A problem with the immune system and lung structure that is present at birth (congenital).  Sometimes the cause is not known.  What are the signs or symptoms?  Common symptoms of this condition include:  · A daily cough that brings up mucus and lasts for more than 3 weeks.  · Lung infections that happen often.  · Shortness of breath and wheezing.  · Weakness and fatigue.  How is this diagnosed?  This condition is diagnosed with tests, such as:  · Chest X-rays or CT scans. These are done to check for changes in the lungs.  · Breathing tests. These are done to check how well your lungs are working.  · A test of a sample of your saliva (sputum culture). This test is done to check for infection.  · Blood tests and other tests. These are done to check for related diseases or causes.  How is this treated?  Treatment for this condition depends on the severity of the illness and its cause. Treatment may include:  · Medicines that loosen mucus so it can be coughed up (expectorants).  · Medicines that relax the muscles of the bronchi (bronchodilators).  · Antibiotic medicines to prevent or treat infection.  · Physical therapy to help clear mucus from the lungs. Techniques may include:  ? Postural drainage. This is when you sit or lie in certain positions so that mucus can drain by gravity.  ? Chest  percussion. This involves tapping the chest or back with a cupped hand.  ? Chest vibration. For this therapy, a hand or special equipment vibrates your chest and back.  · Surgery to remove the affected part of the lung. This may be done in severe cases.  Follow these instructions at home:  Medicines  · Take over-the-counter and prescription medicines only as told by your health care provider.  · If you were prescribed an antibiotic medicine, take it as told by your health care provider. Do not stop taking the antibiotic even if you start to feel better.  · Avoid taking sedatives and antihistamines unless your health care provider tells you to take them. These medicines tend to thicken the mucus in the lungs.  Managing symptoms  · Perform breathing exercises or techniques to clear your lungs as told by your health care provider.  · Consider using a cold steam vaporizer or humidifier in your room or home to help loosen secretions.  · If you have a cough that gets worse at night, try sleeping in a semi-upright position.  General instructions  · Get plenty of rest.  · Drink enough fluid to keep your urine clear or pale yellow.  · Stay inside when pollution and ozone levels are high.  · Stay up to date with vaccinations and immunizations.  · Avoid cigarette smoke and other lung irritants.  · Do not use any products that contain nicotine or tobacco, such as cigarettes and e-cigarettes. If you need help quitting, ask your health care provider.  · Keep all follow-up visits as told by your health care provider. This is important.  Contact a health care provider if:  · You cough up more sputum than before and the sputum is yellow or green in color.  · You have a fever.  · You cannot control your cough and are losing sleep.  Get help right away if:  · You cough up blood.  · You have chest pain.  · You have increasing shortness of breath.  · You have pain that gets worse or is not controlled with medicines.  · You have a fever  and your symptoms suddenly get worse.  Summary  · Bronchiectasis is a condition in which the airways in the lungs (bronchi) are damaged and widened. The condition makes it hard for the lungs to get rid of mucus, and it causes mucus to gather in the bronchi.  · Treatment usually includes therapy to help clear mucus from the lungs.  · Stay up to date with vaccinations and immunizations.  This information is not intended to replace advice given to you by your health care provider. Make sure you discuss any questions you have with your health care provider.  Document Revised: 11/30/2018 Document Reviewed: 01/22/2018  [employer]vier Patient Education © 2020 [employer]

## 2021-03-19 ENCOUNTER — TRANSCRIBE ORDERS (OUTPATIENT)
Dept: ADMINISTRATIVE | Facility: HOSPITAL | Age: 76
End: 2021-03-19

## 2021-03-23 ENCOUNTER — TRANSCRIBE ORDERS (OUTPATIENT)
Dept: LAB | Facility: HOSPITAL | Age: 76
End: 2021-03-23

## 2021-03-23 ENCOUNTER — LAB (OUTPATIENT)
Dept: LAB | Facility: HOSPITAL | Age: 76
End: 2021-03-23

## 2021-03-23 ENCOUNTER — APPOINTMENT (OUTPATIENT)
Dept: PREADMISSION TESTING | Facility: HOSPITAL | Age: 76
End: 2021-03-23

## 2021-03-23 VITALS
BODY MASS INDEX: 21 KG/M2 | WEIGHT: 123.02 LBS | HEIGHT: 64 IN | OXYGEN SATURATION: 100 % | SYSTOLIC BLOOD PRESSURE: 131 MMHG | HEART RATE: 88 BPM | DIASTOLIC BLOOD PRESSURE: 66 MMHG | RESPIRATION RATE: 18 BRPM

## 2021-03-23 DIAGNOSIS — Z01.818 PREOP TESTING: ICD-10-CM

## 2021-03-23 DIAGNOSIS — Z79.02 ENCOUNTER FOR MONITORING ANTIPLATELET THERAPY: ICD-10-CM

## 2021-03-23 DIAGNOSIS — Z01.818 PRE-OP TESTING: Primary | ICD-10-CM

## 2021-03-23 DIAGNOSIS — Z51.81 ENCOUNTER FOR MONITORING ANTIPLATELET THERAPY: ICD-10-CM

## 2021-03-23 DIAGNOSIS — I73.9 PAD (PERIPHERAL ARTERY DISEASE) (HCC): ICD-10-CM

## 2021-03-23 LAB
ANION GAP SERPL CALCULATED.3IONS-SCNC: 9 MMOL/L (ref 5–15)
APTT PPP: 30.9 SECONDS (ref 24.1–35)
BASOPHILS # BLD AUTO: 0.04 10*3/MM3 (ref 0–0.2)
BASOPHILS NFR BLD AUTO: 0.6 % (ref 0–1.5)
BUN SERPL-MCNC: 10 MG/DL (ref 8–23)
BUN/CREAT SERPL: 12.8 (ref 7–25)
CALCIUM SPEC-SCNC: 9.5 MG/DL (ref 8.6–10.5)
CHLORIDE SERPL-SCNC: 97 MMOL/L (ref 98–107)
CO2 SERPL-SCNC: 31 MMOL/L (ref 22–29)
CREAT SERPL-MCNC: 0.78 MG/DL (ref 0.57–1)
DEPRECATED RDW RBC AUTO: 42.7 FL (ref 37–54)
EOSINOPHIL # BLD AUTO: 0.33 10*3/MM3 (ref 0–0.4)
EOSINOPHIL NFR BLD AUTO: 4.9 % (ref 0.3–6.2)
ERYTHROCYTE [DISTWIDTH] IN BLOOD BY AUTOMATED COUNT: 13.2 % (ref 12.3–15.4)
GFR SERPL CREATININE-BSD FRML MDRD: 72 ML/MIN/1.73
GLUCOSE SERPL-MCNC: 73 MG/DL (ref 65–99)
HCT VFR BLD AUTO: 34.5 % (ref 34–46.6)
HGB BLD-MCNC: 11.4 G/DL (ref 12–15.9)
IMM GRANULOCYTES # BLD AUTO: 0.02 10*3/MM3 (ref 0–0.05)
IMM GRANULOCYTES NFR BLD AUTO: 0.3 % (ref 0–0.5)
INR PPP: 1.04 (ref 0.91–1.09)
LYMPHOCYTES # BLD AUTO: 2.08 10*3/MM3 (ref 0.7–3.1)
LYMPHOCYTES NFR BLD AUTO: 30.9 % (ref 19.6–45.3)
MCH RBC QN AUTO: 29.7 PG (ref 26.6–33)
MCHC RBC AUTO-ENTMCNC: 33 G/DL (ref 31.5–35.7)
MCV RBC AUTO: 89.8 FL (ref 79–97)
MONOCYTES # BLD AUTO: 0.81 10*3/MM3 (ref 0.1–0.9)
MONOCYTES NFR BLD AUTO: 12 % (ref 5–12)
NEUTROPHILS NFR BLD AUTO: 3.45 10*3/MM3 (ref 1.7–7)
NEUTROPHILS NFR BLD AUTO: 51.3 % (ref 42.7–76)
NRBC BLD AUTO-RTO: 0 /100 WBC (ref 0–0.2)
PLATELET # BLD AUTO: 236 10*3/MM3 (ref 140–450)
PMV BLD AUTO: 10.4 FL (ref 6–12)
POTASSIUM SERPL-SCNC: 3.6 MMOL/L (ref 3.5–5.2)
PROTHROMBIN TIME: 13.2 SECONDS (ref 11.9–14.6)
RBC # BLD AUTO: 3.84 10*6/MM3 (ref 3.77–5.28)
SODIUM SERPL-SCNC: 137 MMOL/L (ref 136–145)
WBC # BLD AUTO: 6.73 10*3/MM3 (ref 3.4–10.8)

## 2021-03-23 PROCEDURE — 85610 PROTHROMBIN TIME: CPT

## 2021-03-23 PROCEDURE — 85730 THROMBOPLASTIN TIME PARTIAL: CPT

## 2021-03-23 PROCEDURE — 36415 COLL VENOUS BLD VENIPUNCTURE: CPT

## 2021-03-23 PROCEDURE — U0005 INFEC AGEN DETEC AMPLI PROBE: HCPCS | Performed by: SURGERY

## 2021-03-23 PROCEDURE — U0004 COV-19 TEST NON-CDC HGH THRU: HCPCS | Performed by: SURGERY

## 2021-03-23 PROCEDURE — 85025 COMPLETE CBC W/AUTO DIFF WBC: CPT

## 2021-03-23 PROCEDURE — C9803 HOPD COVID-19 SPEC COLLECT: HCPCS | Performed by: SURGERY

## 2021-03-23 PROCEDURE — 80048 BASIC METABOLIC PNL TOTAL CA: CPT

## 2021-03-23 NOTE — DISCHARGE INSTRUCTIONS
DAY OF SURGERY INSTRUCTIONS        YOUR SURGEON: Dr Condon    PROCEDURE: Left Lower Extremity Angiogram    DATE OF SURGERY: March 26    ARRIVAL TIME: AS DIRECTED BY OFFICE    YOU MAY TAKE CARVEDILOL AND TRAMADOL THE MORNING OF SURGERY WITH A SIP OF WATER:      ALL OTHER HOME MEDICATION CHECK WITH YOUR PHYSICIAN      DO NOT TAKE BENAZEPRIL  24 HOURS PRIOR TO SURGERY                      MANAGING PAIN AFTER SURGERY    We know you are probably wondering what your pain will be like after surgery.  Following surgery it is unrealistic to expect you will not have pain.   Pain is how our bodies let us know that something is wrong or cautions us to be careful.  That said, our goal is to make your pain tolerable.    Methods we may use to treat your pain include (oral or IV medications, PCAs, epidurals, nerve blocks, etc.)   While some procedures require IV pain medications for a short time after surgery, transitioning to pain medications by mouth allows for better management of pain.   Your nurse will encourage you to take oral pain medications whenever possible.  IV medications work almost immediately, but only last a short while.  Taking medications by mouth allows for a more constant level of medication in your blood stream for a longer period of time.      Once your pain is out of control it is harder to get back under control.  It is important you are aware when your next dose of pain medication is due.  If you are admitted, your nurse may write the time of your next dose on the white board in your room to help you remember.      We are interested in your pain and encourage you to inform us about aggravating factors during your visit.   Many times a simple repositioning every few hours can make a big difference.    If your physician says it is okay, do not let your pain prevent you from getting out of bed. Be sure to call your nurse for assistance prior to getting up so you do not fall.      Before surgery, please  decide your tolerable pain goal.  These faces help describe the pain ratings we use on a 0-10 scale.   Be prepared to tell us your goal and whether or not you take pain or anxiety medications at home.          BEFORE YOU COME TO THE HOSPITAL  (Pre-op instructions)  • Do not eat, drink, smoke or chew gum after midnight the night before surgery.  This also includes no mints.  • Morning of surgery take only the medicines you have been instructed with a sip of water unless otherwise instructed  by your physician.  • Do not shave, wear makeup or dark nail polish.  • Remove all jewelry including rings.  • Leave anything you consider valuable at home.  • Leave your suitcase in the car until after your surgery.  • Bring the following with you if applicable:  o Picture ID and insurance, Medicare or Medicaid cards  o Co-pay/deductible required by insurance (cash, check, credit card)  o Copy of advance directive, living will or power-of- documents if not brought to PAT  o CPAP or BIPAP mask and tubing  o Relaxation aids ( book, magazine), etc.  o Hearing aids                        ON THE DAY OF SURGERY  · On the day of surgery check in at registration located at the main entrance of the hospital.   ? You will be registered and given a beeper with instructions where to wait in the main lobby.  ? When your beeper lights up and vibrates a member of the Outpatient Surgery staff will meet you at the double doors under the stair steps and escort you to your preoperative room.   · You may have cloth compression devices placed on your legs. These help to prevent blood clots and reduce swelling in your legs.  · An IV may be inserted into one of your veins.  · In the operating room, you may be given one or more of the following:  ? A medicine to help you relax (sedative).  ? A medicine to numb the area (local anesthetic).  ? A medicine to make you fall asleep (general anesthetic).  ? A medicine that is injected into an area of  "your body to numb everything below the injection site (regional anesthetic).  · Your surgical site will be marked or identified.  · You may be given an antibiotic through your IV to help prevent infection.  Contact a health care provider if you:  · Develop a fever of more than 100.4°F (38°C) or other feelings of illness during the 48 hours before your surgery.  · Have symptoms that get worse.  Have questions or concerns about your surgery    General Anesthesia/Surgery, Adult  General anesthesia is the use of medicines to make a person \"go to sleep\" (unconscious) for a medical procedure. General anesthesia must be used for certain procedures, and is often recommended for procedures that:  · Last a long time.  · Require you to be still or in an unusual position.  · Are major and can cause blood loss.  The medicines used for general anesthesia are called general anesthetics. As well as making you unconscious for a certain amount of time, these medicines:  · Prevent pain.  · Control your blood pressure.  · Relax your muscles.  Tell a health care provider about:  · Any allergies you have.  · All medicines you are taking, including vitamins, herbs, eye drops, creams, and over-the-counter medicines.  · Any problems you or family members have had with anesthetic medicines.  · Types of anesthetics you have had in the past.  · Any blood disorders you have.  · Any surgeries you have had.  · Any medical conditions you have.  · Any recent upper respiratory, chest, or ear infections.  · Any history of:  ? Heart or lung conditions, such as heart failure, sleep apnea, asthma, or chronic obstructive pulmonary disease (COPD).  ?  service.  ? Depression or anxiety.  · Any tobacco or drug use, including marijuana or alcohol use.  · Whether you are pregnant or may be pregnant.  What are the risks?  Generally, this is a safe procedure. However, problems may occur, including:  · Allergic reaction.  · Lung and heart " problems.  · Inhaling food or liquid from the stomach into the lungs (aspiration).  · Nerve injury.  · Air in the bloodstream, which can lead to stroke.  · Extreme agitation or confusion (delirium) when you wake up from the anesthetic.  · Waking up during your procedure and being unable to move. This is rare.  These problems are more likely to develop if you are having a major surgery or if you have an advanced or serious medical condition. You can prevent some of these complications by answering all of your health care provider's questions thoroughly and by following all instructions before your procedure.  General anesthesia can cause side effects, including:  · Nausea or vomiting.  · A sore throat from the breathing tube.  · Hoarseness.  · Wheezing or coughing.  · Shaking chills.  · Tiredness.  · Body aches.  · Anxiety.  · Sleepiness or drowsiness.  · Confusion or agitation.  RISKS AND COMPLICATIONS OF SURGERY  Your health care provider will discuss possible risks and complications with you before surgery. Common risks and complications include:    · Problems due to the use of anesthetics.  · Blood loss and replacement (does not apply to minor surgical procedures).  · Temporary increase in pain due to surgery.  · Uncorrected pain or problems that the surgery was meant to correct.  · Infection.  · New damage.    What happens before the procedure?    Medicines  Ask your health care provider about:  · Changing or stopping your regular medicines. This is especially important if you are taking diabetes medicines or blood thinners.  · Taking medicines such as aspirin and ibuprofen. These medicines can thin your blood. Do not take these medicines unless your health care provider tells you to take them.  · Taking over-the-counter medicines, vitamins, herbs, and supplements. Do not take these during the week before your procedure unless your health care provider approves them.  General instructions  · Starting 3-6 weeks  before the procedure, do not use any products that contain nicotine or tobacco, such as cigarettes and e-cigarettes. If you need help quitting, ask your health care provider.  · If you brush your teeth on the morning of the procedure, make sure to spit out all of the toothpaste.  · Tell your health care provider if you become ill or develop a cold, cough, or fever.  · If instructed by your health care provider, bring your sleep apnea device with you on the day of your surgery (if applicable).  · Ask your health care provider if you will be going home the same day, the following day, or after a longer hospital stay.  ? Plan to have someone take you home from the hospital or clinic.  ? Plan to have a responsible adult care for you for at least 24 hours after you leave the hospital or clinic. This is important.  What happens during the procedure?  · You will be given anesthetics through both of the following:  ? A mask placed over your nose and mouth.  ? An IV in one of your veins.  · You may receive a medicine to help you relax (sedative).  · After you are unconscious, a breathing tube may be inserted down your throat to help you breathe. This will be removed before you wake up.  · An anesthesia specialist will stay with you throughout your procedure. He or she will:  ? Keep you comfortable and safe by continuing to give you medicines and adjusting the amount of medicine that you get.  ? Monitor your blood pressure, pulse, and oxygen levels to make sure that the anesthetics do not cause any problems.  The procedure may vary among health care providers and hospitals.  What happens after the procedure?  · Your blood pressure, temperature, heart rate, breathing rate, and blood oxygen level will be monitored until the medicines you were given have worn off.  · You will wake up in a recovery area. You may wake up slowly.  · If you feel anxious or agitated, you may be given medicine to help you calm down.  · If you will be  going home the same day, your health care provider may check to make sure you can walk, drink, and urinate.  · Your health care provider will treat any pain or side effects you have before you go home.  · Do not drive for 24 hours if you were given a sedative.  Summary  · General anesthesia is used to keep you still and prevent pain during a procedure.  · It is important to tell your healthcare provider about your medical history and any surgeries you have had, and previous experience with anesthesia.  · Follow your healthcare provider’s instructions about when to stop eating, drinking, or taking certain medicines before your procedure.  · Plan to have someone take you home from the hospital or clinic.  This information is not intended to replace advice given to you by your health care provider. Make sure you discuss any questions you have with your health care provider.  Document Released: 03/26/2009 Document Revised: 08/03/2018 Document Reviewed: 08/03/2018  [employer] Interactive Patient Education © 2019 [employer]       Fall Prevention in Hospitals, Adult  As a hospital patient, your condition and the treatments you receive can increase your risk for falls. Some additional risk factors for falls in a hospital include:  · Being in an unfamiliar environment.  · Being on bed rest.  · Your surgery.  · Taking certain medicines.  · Your tubing requirements, such as intravenous (IV) therapy or catheters.  It is important that you learn how to decrease fall risks while at the hospital. Below are important tips that can help prevent falls.  SAFETY TIPS FOR PREVENTING FALLS  Talk about your risk of falling.  · Ask your health care provider why you are at risk for falling. Is it your medicine, illness, tubing placement, or something else?  · Make a plan with your health care provider to keep you safe from falls.  · Ask your health care provider or pharmacist about side effects of your medicines. Some medicines can make  you dizzy or affect your coordination.  Ask for help.  · Ask for help before getting out of bed. You may need to press your call button.  · Ask for assistance in getting safely to the toilet.  · Ask for a walker or cane to be put at your bedside. Ask that most of the side rails on your bed be placed up before your health care provider leaves the room.  · Ask family or friends to sit with you.  · Ask for things that are out of your reach, such as your glasses, hearing aids, telephone, bedside table, or call button.  Follow these tips to avoid falling:  · Stay lying or seated, rather than standing, while waiting for help.  · Wear rubber-soled slippers or shoes whenever you walk in the hospital.  · Avoid quick, sudden movements.  ¨ Change positions slowly.  ¨ Sit on the side of your bed before standing.  ¨ Stand up slowly and wait before you start to walk.  · Let your health care provider know if there is a spill on the floor.  · Pay careful attention to the medical equipment, electrical cords, and tubes around you.  · When you need help, use your call button by your bed or in the bathroom. Wait for one of your health care providers to help you.  · If you feel dizzy or unsure of your footing, return to bed and wait for assistance.  · Avoid being distracted by the TV, telephone, or another person in your room.  · Do not lean or support yourself on rolling objects, such as IV poles or bedside tables.     This information is not intended to replace advice given to you by your health care provider. Make sure you discuss any questions you have with your health care provider.     Document Released: 12/15/2001 Document Revised: 01/08/2016 Document Reviewed: 08/25/2013  [employer] Interactive Patient Education ©2016 [employer]       [facility]  CHG 4% Patient Instruction Sheet    Chlorhexidine Before Surgery  Chlorhexidine gluconate (CHG) is a germ-killing (antiseptic) solution that is used to clean the skin. It  gets rid of the bacteria that normally live on the skin. Cleaning your skin with CHG before surgery helps lower the risk for infection after surgery.    How to use CHG solution  · You will take 2 showers, one shower the night before surgery, the second shower the morning of surgery before coming to the hospital.  · Use CHG only as told by your health care provider, and follow the instructions on the label.  · Use CHG solution while taking a shower. Follow these steps when using CHG solution (unless your health care provider gives you different instructions):  1. Start the shower.  2. Use your normal soap and shampoo to wash your face and hair.  3. Turn off the shower or move out of the shower stream.  4. Pour the CHG onto a clean washcloth. Do not use any type of brush or rough-edged sponge.  5. Starting at your neck, lather your body down to your toes. Make sure you:  6. Pay special attention to the part of your body where you will be having surgery. Scrub this area for at least 1 minute.  7. Use the full amount of CHG as directed. Usually, this is one half bottle for each shower.  8. Do not use CHG on your head or face. If the solution gets into your ears or eyes, rinse them well with water.  9. Avoid your genital area.  10. Avoid any areas of skin that have broken skin, cuts, or scrapes.  11. Scrub your back and under your arms. Make sure to wash skin folds.  12. Let the lather sit on your skin for 1-2 minutes or as long as told by your health care  provider.  13. Thoroughly rinse your entire body in the shower. Make sure that all body creases and crevices are rinsed well.  14. Dry off with a clean towel. Do not put any substances on your body afterward, such as powder, lotion, or perfume.  15. Put on clean clothes or pajamas.  16. If it is the night before your surgery, sleep in clean sheets.    What are the risks?  Risks of using CHG include:  · A skin reaction.  · Hearing loss, if CHG gets in your ears.  · Eye  injury, if CHG gets in your eyes and is not rinsed out.  · The CHG product catching fire.  Make sure that you avoid smoking and flames after applying CHG to your skin.  Do not use CHG:  · If you have a chlorhexidine allergy or have previously reacted to chlorhexidine.  · On babies younger than 2 months of age.      On the day of surgery, when you are taken to your room in Outpatient Surgery you will be given a CHG prepackaged cloth to wipe the site for your surgery.  How to use CHG prepackaged cloths  · Follow the instructions on the label.  · Use the CHG cloth on clean, dry skin. Follow these steps when using a CHG cloth (unless your health care provider gives you different instructions):  1. Using the CHG cloth, vigorously scrub the part of your body where you will be having surgery. Scrub using a back-and-forth motion for 3 minutes. The area on your body should be completely wet with CHG when you are finished scrubbing.  2. Do not rinse. Discard the cloth and let the area air-dry for 1 minute. Do not put any substances on your body afterward, such as powder, lotion, or perfume.  Contact a health care provider if:  · Your skin gets irritated after scrubbing.  · You have questions about using your solution or cloth.  Get help right away if:  · Your eyes become very red or swollen.  · Your eyes itch badly.  · Your skin itches badly and is red or swollen.  · Your hearing changes.  · You have trouble seeing.  · You have swelling or tingling in your mouth or throat.  · You have trouble breathing.  · You swallow any chlorhexidine.  Summary  · Chlorhexidine gluconate (CHG) is a germ-killing (antiseptic) solution that is used to clean the skin. Cleaning your skin with CHG before surgery helps lower the risk for infection after surgery.  · You may be given CHG to use at home. It may be in a bottle or in a prepackaged cloth to use on your skin. Carefully follow your health care provider's instructions and the instructions  on the product label.  · Do not use CHG if you have a chlorhexidine allergy.  · Contact your health care provider if your skin gets irritated after scrubbing.  This information is not intended to replace advice given to you by your health care provider. Make sure you discuss any questions you have with your health care provider.  Document Released: 09/11/2013 Document Revised: 11/15/2018 Document Reviewed: 11/15/2018  [email or web address] Interactive Patient Education © 2019 [employer]

## 2021-03-24 LAB — SARS-COV-2 ORF1AB RESP QL NAA+PROBE: NOT DETECTED

## 2021-03-25 ENCOUNTER — TELEPHONE (OUTPATIENT)
Dept: VASCULAR SURGERY | Facility: CLINIC | Age: 76
End: 2021-03-25

## 2021-03-25 NOTE — TELEPHONE ENCOUNTER
Spoke with patient and reminded of arrival time of 700 am for her procedure with Dr. Condon.  Patient expressed understanding for all that was discussed.

## 2021-03-26 ENCOUNTER — HOSPITAL ENCOUNTER (OUTPATIENT)
Facility: HOSPITAL | Age: 76
Setting detail: HOSPITAL OUTPATIENT SURGERY
Discharge: HOME | End: 2021-03-26
Attending: SURGERY | Admitting: SURGERY

## 2021-03-26 ENCOUNTER — APPOINTMENT (OUTPATIENT)
Dept: INTERVENTIONAL RADIOLOGY/VASCULAR | Facility: HOSPITAL | Age: 76
End: 2021-03-26

## 2021-03-26 ENCOUNTER — ANESTHESIA EVENT (OUTPATIENT)
Dept: PERIOP | Facility: HOSPITAL | Age: 76
End: 2021-03-26

## 2021-03-26 ENCOUNTER — APPOINTMENT (OUTPATIENT)
Dept: ULTRASOUND IMAGING | Facility: HOSPITAL | Age: 76
End: 2021-03-26

## 2021-03-26 ENCOUNTER — ANESTHESIA (OUTPATIENT)
Dept: PERIOP | Facility: HOSPITAL | Age: 76
End: 2021-03-26

## 2021-03-26 VITALS
SYSTOLIC BLOOD PRESSURE: 118 MMHG | OXYGEN SATURATION: 95 % | DIASTOLIC BLOOD PRESSURE: 56 MMHG | HEART RATE: 83 BPM | TEMPERATURE: 97.4 F | RESPIRATION RATE: 16 BRPM

## 2021-03-26 DIAGNOSIS — Z01.818 PREOP TESTING: ICD-10-CM

## 2021-03-26 DIAGNOSIS — I73.9 PAD (PERIPHERAL ARTERY DISEASE) (HCC): ICD-10-CM

## 2021-03-26 PROCEDURE — 25010000002 CEFAZOLIN PER 500 MG: Performed by: NURSE PRACTITIONER

## 2021-03-26 PROCEDURE — 37228 PR REVSC OPN/PRQ TIB/PERO W/ANGIOPLASTY UNI: CPT | Performed by: SURGERY

## 2021-03-26 PROCEDURE — 75625 CONTRAST EXAM ABDOMINL AORTA: CPT

## 2021-03-26 PROCEDURE — 75710 ARTERY X-RAYS ARM/LEG: CPT | Performed by: SURGERY

## 2021-03-26 PROCEDURE — 25010000002 HEPARIN (PORCINE) PER 1000 UNITS: Performed by: NURSE ANESTHETIST, CERTIFIED REGISTERED

## 2021-03-26 PROCEDURE — 86850 RBC ANTIBODY SCREEN: CPT | Performed by: NURSE PRACTITIONER

## 2021-03-26 PROCEDURE — 76937 US GUIDE VASCULAR ACCESS: CPT

## 2021-03-26 PROCEDURE — C1769 GUIDE WIRE: HCPCS | Performed by: SURGERY

## 2021-03-26 PROCEDURE — C1894 INTRO/SHEATH, NON-LASER: HCPCS | Performed by: SURGERY

## 2021-03-26 PROCEDURE — C1874 STENT, COATED/COV W/DEL SYS: HCPCS | Performed by: SURGERY

## 2021-03-26 PROCEDURE — C1884 EMBOLIZATION PROTECT SYST: HCPCS | Performed by: SURGERY

## 2021-03-26 PROCEDURE — C1876 STENT, NON-COA/NON-COV W/DEL: HCPCS | Performed by: SURGERY

## 2021-03-26 PROCEDURE — 75710 ARTERY X-RAYS ARM/LEG: CPT

## 2021-03-26 PROCEDURE — 25010000002 PROPOFOL 10 MG/ML EMULSION: Performed by: NURSE ANESTHETIST, CERTIFIED REGISTERED

## 2021-03-26 PROCEDURE — C1887 CATHETER, GUIDING: HCPCS | Performed by: SURGERY

## 2021-03-26 PROCEDURE — 37226 PR REVSC OPN/PRQ FEM/POP W/STNT/ANGIOP SM VSL: CPT | Performed by: SURGERY

## 2021-03-26 PROCEDURE — C1725 CATH, TRANSLUMIN NON-LASER: HCPCS | Performed by: SURGERY

## 2021-03-26 PROCEDURE — C1760 CLOSURE DEV, VASC: HCPCS | Performed by: SURGERY

## 2021-03-26 PROCEDURE — 75625 CONTRAST EXAM ABDOMINL AORTA: CPT | Performed by: SURGERY

## 2021-03-26 PROCEDURE — 25010000002 HEPARIN (PORCINE) PER 1000 UNITS: Performed by: SURGERY

## 2021-03-26 PROCEDURE — 25010000002 IOPAMIDOL 61 % SOLUTION: Performed by: SURGERY

## 2021-03-26 PROCEDURE — 86901 BLOOD TYPING SEROLOGIC RH(D): CPT | Performed by: NURSE PRACTITIONER

## 2021-03-26 PROCEDURE — 76000 FLUOROSCOPY <1 HR PHYS/QHP: CPT

## 2021-03-26 PROCEDURE — 86900 BLOOD TYPING SEROLOGIC ABO: CPT | Performed by: NURSE PRACTITIONER

## 2021-03-26 DEVICE — STNT PERIPH EVERFLEX ENTRUST 5F 6X100MM 120CM: Type: IMPLANTABLE DEVICE | Site: LEG | Status: FUNCTIONAL

## 2021-03-26 DEVICE — STENTGR ENDOPROSTH VIABAHN RO HEP 7F 7MM 10X120CM: Type: IMPLANTABLE DEVICE | Site: LEG | Status: FUNCTIONAL

## 2021-03-26 DEVICE — STNT PERIPH EVERFLEX ENTRUST 5F 6X80MM 120CM: Type: IMPLANTABLE DEVICE | Site: LEG | Status: FUNCTIONAL

## 2021-03-26 RX ORDER — IBUPROFEN 600 MG/1
600 TABLET ORAL ONCE AS NEEDED
Status: DISCONTINUED | OUTPATIENT
Start: 2021-03-26 | End: 2021-03-26 | Stop reason: HOSPADM

## 2021-03-26 RX ORDER — ONDANSETRON 2 MG/ML
4 INJECTION INTRAMUSCULAR; INTRAVENOUS ONCE AS NEEDED
Status: DISCONTINUED | OUTPATIENT
Start: 2021-03-26 | End: 2021-03-26 | Stop reason: HOSPADM

## 2021-03-26 RX ORDER — PROPOFOL 10 MG/ML
VIAL (ML) INTRAVENOUS AS NEEDED
Status: DISCONTINUED | OUTPATIENT
Start: 2021-03-26 | End: 2021-03-26 | Stop reason: SURG

## 2021-03-26 RX ORDER — LIDOCAINE HYDROCHLORIDE 20 MG/ML
INJECTION, SOLUTION EPIDURAL; INFILTRATION; INTRACAUDAL; PERINEURAL AS NEEDED
Status: DISCONTINUED | OUTPATIENT
Start: 2021-03-26 | End: 2021-03-26 | Stop reason: SURG

## 2021-03-26 RX ORDER — LIDOCAINE HYDROCHLORIDE 10 MG/ML
0.5 INJECTION, SOLUTION EPIDURAL; INFILTRATION; INTRACAUDAL; PERINEURAL ONCE AS NEEDED
Status: DISCONTINUED | OUTPATIENT
Start: 2021-03-26 | End: 2021-03-26 | Stop reason: HOSPADM

## 2021-03-26 RX ORDER — SODIUM CHLORIDE, SODIUM LACTATE, POTASSIUM CHLORIDE, CALCIUM CHLORIDE 600; 310; 30; 20 MG/100ML; MG/100ML; MG/100ML; MG/100ML
100 INJECTION, SOLUTION INTRAVENOUS CONTINUOUS
Status: DISCONTINUED | OUTPATIENT
Start: 2021-03-26 | End: 2021-03-26 | Stop reason: HOSPADM

## 2021-03-26 RX ORDER — BUPIVACAINE HCL/0.9 % NACL/PF 0.1 %
2 PLASTIC BAG, INJECTION (ML) EPIDURAL ONCE
Status: COMPLETED | OUTPATIENT
Start: 2021-03-26 | End: 2021-03-26

## 2021-03-26 RX ORDER — CLOPIDOGREL BISULFATE 75 MG/1
75 TABLET ORAL ONCE
Status: COMPLETED | OUTPATIENT
Start: 2021-03-26 | End: 2021-03-26

## 2021-03-26 RX ORDER — OXYCODONE HYDROCHLORIDE AND ACETAMINOPHEN 5; 325 MG/1; MG/1
1 TABLET ORAL ONCE AS NEEDED
Status: DISCONTINUED | OUTPATIENT
Start: 2021-03-26 | End: 2021-03-26 | Stop reason: HOSPADM

## 2021-03-26 RX ORDER — HYDROCODONE BITARTRATE AND ACETAMINOPHEN 5; 325 MG/1; MG/1
1 TABLET ORAL ONCE AS NEEDED
Status: DISCONTINUED | OUTPATIENT
Start: 2021-03-26 | End: 2021-03-26 | Stop reason: HOSPADM

## 2021-03-26 RX ORDER — HEPARIN SODIUM 1000 [USP'U]/ML
INJECTION, SOLUTION INTRAVENOUS; SUBCUTANEOUS AS NEEDED
Status: DISCONTINUED | OUTPATIENT
Start: 2021-03-26 | End: 2021-03-26 | Stop reason: SURG

## 2021-03-26 RX ORDER — SODIUM CHLORIDE, SODIUM LACTATE, POTASSIUM CHLORIDE, CALCIUM CHLORIDE 600; 310; 30; 20 MG/100ML; MG/100ML; MG/100ML; MG/100ML
1000 INJECTION, SOLUTION INTRAVENOUS CONTINUOUS
Status: DISCONTINUED | OUTPATIENT
Start: 2021-03-26 | End: 2021-03-26 | Stop reason: HOSPADM

## 2021-03-26 RX ORDER — LABETALOL HYDROCHLORIDE 5 MG/ML
5 INJECTION, SOLUTION INTRAVENOUS
Status: DISCONTINUED | OUTPATIENT
Start: 2021-03-26 | End: 2021-03-26 | Stop reason: HOSPADM

## 2021-03-26 RX ORDER — BUPIVACAINE HYDROCHLORIDE 5 MG/ML
INJECTION, SOLUTION EPIDURAL; INTRACAUDAL AS NEEDED
Status: DISCONTINUED | OUTPATIENT
Start: 2021-03-26 | End: 2021-03-26 | Stop reason: HOSPADM

## 2021-03-26 RX ORDER — OXYCODONE AND ACETAMINOPHEN 7.5; 325 MG/1; MG/1
1 TABLET ORAL EVERY 4 HOURS PRN
Status: DISCONTINUED | OUTPATIENT
Start: 2021-03-26 | End: 2021-03-26 | Stop reason: HOSPADM

## 2021-03-26 RX ORDER — FENTANYL CITRATE 50 UG/ML
25 INJECTION, SOLUTION INTRAMUSCULAR; INTRAVENOUS
Status: DISCONTINUED | OUTPATIENT
Start: 2021-03-26 | End: 2021-03-26 | Stop reason: HOSPADM

## 2021-03-26 RX ORDER — FLUMAZENIL 0.1 MG/ML
0.2 INJECTION INTRAVENOUS AS NEEDED
Status: DISCONTINUED | OUTPATIENT
Start: 2021-03-26 | End: 2021-03-26 | Stop reason: HOSPADM

## 2021-03-26 RX ORDER — NALOXONE HCL 0.4 MG/ML
0.4 VIAL (ML) INJECTION AS NEEDED
Status: DISCONTINUED | OUTPATIENT
Start: 2021-03-26 | End: 2021-03-26 | Stop reason: HOSPADM

## 2021-03-26 RX ORDER — SODIUM CHLORIDE 0.9 % (FLUSH) 0.9 %
3-10 SYRINGE (ML) INJECTION AS NEEDED
Status: DISCONTINUED | OUTPATIENT
Start: 2021-03-26 | End: 2021-03-26 | Stop reason: HOSPADM

## 2021-03-26 RX ORDER — SODIUM CHLORIDE 0.9 % (FLUSH) 0.9 %
3 SYRINGE (ML) INJECTION AS NEEDED
Status: DISCONTINUED | OUTPATIENT
Start: 2021-03-26 | End: 2021-03-26 | Stop reason: HOSPADM

## 2021-03-26 RX ORDER — SODIUM CHLORIDE 0.9 % (FLUSH) 0.9 %
3 SYRINGE (ML) INJECTION EVERY 12 HOURS SCHEDULED
Status: DISCONTINUED | OUTPATIENT
Start: 2021-03-26 | End: 2021-03-26 | Stop reason: HOSPADM

## 2021-03-26 RX ADMIN — HEPARIN SODIUM 1000 UNITS: 1000 INJECTION, SOLUTION INTRAVENOUS; SUBCUTANEOUS at 09:43

## 2021-03-26 RX ADMIN — CLOPIDOGREL BISULFATE 75 MG: 75 TABLET ORAL at 12:05

## 2021-03-26 RX ADMIN — OXYCODONE HYDROCHLORIDE AND ACETAMINOPHEN 1 TABLET: 7.5; 325 TABLET ORAL at 12:52

## 2021-03-26 RX ADMIN — Medication 20 MCG: at 09:34

## 2021-03-26 RX ADMIN — HEPARIN SODIUM 1000 UNITS: 1000 INJECTION, SOLUTION INTRAVENOUS; SUBCUTANEOUS at 11:11

## 2021-03-26 RX ADMIN — SODIUM CHLORIDE, POTASSIUM CHLORIDE, SODIUM LACTATE AND CALCIUM CHLORIDE 1000 ML: 600; 310; 30; 20 INJECTION, SOLUTION INTRAVENOUS at 07:42

## 2021-03-26 RX ADMIN — HEPARIN SODIUM 4000 UNITS: 1000 INJECTION, SOLUTION INTRAVENOUS; SUBCUTANEOUS at 09:49

## 2021-03-26 RX ADMIN — PROPOFOL 50 MG: 10 INJECTION, EMULSION INTRAVENOUS at 09:33

## 2021-03-26 RX ADMIN — LIDOCAINE HYDROCHLORIDE 100 MG: 20 INJECTION, SOLUTION EPIDURAL; INFILTRATION; INTRACAUDAL; PERINEURAL at 09:33

## 2021-03-26 RX ADMIN — PROPOFOL 150 MCG/KG/MIN: 10 INJECTION, EMULSION INTRAVENOUS at 09:33

## 2021-03-26 RX ADMIN — Medication 2 G: at 09:30

## 2021-03-26 RX ADMIN — SODIUM CHLORIDE, POTASSIUM CHLORIDE, SODIUM LACTATE AND CALCIUM CHLORIDE: 600; 310; 30; 20 INJECTION, SOLUTION INTRAVENOUS at 11:26

## 2021-03-26 NOTE — ANESTHESIA PREPROCEDURE EVALUATION
Anesthesia Evaluation     Patient summary reviewed   no history of anesthetic complications:  NPO Solid Status: > 8 hours  NPO Liquid Status: > 8 hours           Airway   Mallampati: II  TM distance: >3 FB  Neck ROM: full  Dental      Pulmonary    (+) a smoker (quit 1996) Former,   (-) COPD, asthma, sleep apnea    ROS comment: bronchiectasis  Cardiovascular   Exercise tolerance: good (4-7 METS)    ECG reviewed  Patient on routine beta blocker and Beta blocker given within 24 hours of surgery    (+) hypertension, CAD (non obstructive), PVD, hyperlipidemia,   (-) pacemaker, past MI, angina, cardiac stents      Neuro/Psych  (-) seizures, TIA, CVA  GI/Hepatic/Renal/Endo    (+)  GERD,  thyroid problem hypothyroidism  (-) liver disease, no renal disease, diabetes    Musculoskeletal     Abdominal    Substance History      OB/GYN          Other                          Anesthesia Plan    ASA 3     MAC       Anesthetic plan, all risks, benefits, and alternatives have been provided, discussed and informed consent has been obtained with: patient.

## 2021-03-26 NOTE — ANESTHESIA POSTPROCEDURE EVALUATION
Patient: Trinidad Zhu    Procedure Summary     Date: 03/26/21 Room / Location:  [facility] OR  /  [facility] OR 12    Anesthesia Start: 0932 Anesthesia Stop: 1134    Procedure: LEFT LOWER EXTREMITY ANGIOGRAM, BALLOON ANGIOPLASTY, STENT PLACEMENT, MYNX CLOSURE (Left Leg Lower) Diagnosis:       PAD (peripheral artery disease) (CMS/HCC)      Preop testing      (PAD (peripheral artery disease) (CMS/HCC) [I73.9])      (Preop testing [Z01.818])    Surgeons: Sukhdev oCndon DO Provider: JACOBO Carrillo CRNA    Anesthesia Type: MAC ASA Status: 3          Anesthesia Type: MAC    Vitals  Vitals Value Taken Time   /57 03/26/21 1222   Temp 97.4 °F (36.3 °C) 03/26/21 1215   Pulse 76 03/26/21 1224   Resp 16 03/26/21 1215   SpO2 94 % 03/26/21 1224   Vitals shown include unvalidated device data.        Post Anesthesia Care and Evaluation    Patient location during evaluation: PACU  Patient participation: complete - patient participated  Level of consciousness: awake and alert  Pain management: adequate  Airway patency: patent  Anesthetic complications: No anesthetic complications    Cardiovascular status: acceptable  Respiratory status: acceptable  Hydration status: acceptable    Comments: Blood pressure 110/54, pulse 81, temperature 97.4 °F (36.3 °C), temperature source Temporal, resp. rate 16, SpO2 93 %, not currently breastfeeding.    Pt discharged from PACU based on desmond score >8

## 2021-04-16 ENCOUNTER — TELEPHONE (OUTPATIENT)
Dept: VASCULAR SURGERY | Facility: CLINIC | Age: 76
End: 2021-04-16

## 2021-04-16 NOTE — TELEPHONE ENCOUNTER
Left message reminding Mrs Zhu of her appointment for Monday, April 19th, 2021 at 145 pm with Silvia ARCHULETA

## 2021-04-19 ENCOUNTER — OFFICE VISIT (OUTPATIENT)
Dept: VASCULAR SURGERY | Facility: CLINIC | Age: 76
End: 2021-04-19

## 2021-04-19 VITALS
HEIGHT: 64 IN | OXYGEN SATURATION: 99 % | SYSTOLIC BLOOD PRESSURE: 110 MMHG | WEIGHT: 116 LBS | HEART RATE: 81 BPM | DIASTOLIC BLOOD PRESSURE: 78 MMHG | BODY MASS INDEX: 19.81 KG/M2

## 2021-04-19 DIAGNOSIS — I65.23 BILATERAL CAROTID ARTERY STENOSIS: ICD-10-CM

## 2021-04-19 DIAGNOSIS — I73.9 PAD (PERIPHERAL ARTERY DISEASE) (HCC): Primary | ICD-10-CM

## 2021-04-19 DIAGNOSIS — R06.09 DYSPNEA ON EXERTION: ICD-10-CM

## 2021-04-19 PROCEDURE — 99213 OFFICE O/P EST LOW 20 MIN: CPT | Performed by: NURSE PRACTITIONER

## 2021-04-19 RX ORDER — EVOLOCUMAB 140 MG/ML
140 INJECTION, SOLUTION SUBCUTANEOUS
COMMUNITY
Start: 2021-03-16 | End: 2021-10-21

## 2021-04-19 NOTE — PROGRESS NOTES
4/19/2021       Andrew Layne MD  [address]  [facility] [address]    Trinidad Zhu  1945    Chief Complaint   Patient presents with   • Follow-up     2 Week Post-Op Follow Up For LEFT LOWER EXTREMITY ANGIOGRAM, BALLOON ANGIOPLASTY, STENT PLACEMENT, MYNX CLOSURE. Patient denies any stroke like symptoms.    • FOrmer Smoker     Patient is a FOrmer Smoker - Quit 1996   • other     patient states she is feeling some better    • Med Management     Verbally verified medications with patient        Dear Andrew Layne MD       HPI  I had the pleasure of seeing your patient Trinidad Zhu in the office today.   As you recall, Trinidad Zhu is a 76 y.o.  female who we are following for lower extremity PAD.  Initially she was seen by Dr. Gonzáles with complaints of foot pain.  She does also report cramping in her feet and toes at night.  She does wear compression stockings.  She did undergo a left lower extremity angiogram on 11/9/2020.  Following surgery she did well and reports her foot pain essentially resolved.  She states for the past 6 weeks her left foot and leg have been hurting worsened and discussed with her primary care provider who ordered noninvasive testing.  This wakes her up at night because it is cold and she reports unbearable.  She is maintained on aspirin, Plavix, and Pravachol.  She was also started on Repatha.  She did undergo a left lower extremity angiogram with retrograde crossing of the superficial femoral artery chronic total occlusion with balloon angioplasty and placement of Viabahn stent and ever flex stent on 3/26/2021.  He does report that the pain has resolved to her leg.  She does report she has had some shortness of breath with exertion that she feels is worsening.  She did previously talk about seeing cardiology due to previous history.    Review of Systems   Constitutional: Negative.    HENT: Negative.    Eyes: Negative.    Respiratory: Positive for shortness of  "breath.    Cardiovascular: Negative.    Gastrointestinal: Negative.    Endocrine: Negative.    Genitourinary: Negative.    Musculoskeletal: Positive for back pain.   Skin: Positive for color change.   Allergic/Immunologic: Negative.    Neurological: Negative.    Hematological: Negative.    Psychiatric/Behavioral: Negative.         /78 (BP Location: Right arm, Patient Position: Sitting, Cuff Size: Adult)   Pulse 81   Ht 161.3 cm (63.5\")   Wt 52.6 kg (116 lb)   SpO2 99%   BMI 20.23 kg/m²   Physical Exam  Vitals and nursing note reviewed.   Constitutional:       General: She is not in acute distress.     Appearance: Normal appearance. She is well-developed and normal weight. She is not diaphoretic.   HENT:      Head: Normocephalic and atraumatic.   Eyes:      General: No scleral icterus.     Pupils: Pupils are equal, round, and reactive to light.   Neck:      Thyroid: No thyromegaly.      Vascular: No carotid bruit or JVD.   Cardiovascular:      Rate and Rhythm: Normal rate and regular rhythm.      Pulses:           Femoral pulses are 2+ on the right side and 2+ on the left side.       Dorsalis pedis pulses are detected w/ Doppler on the right side and 0 on the left side.        Posterior tibial pulses are detected w/ Doppler on the right side and detected w/ Doppler on the left side.      Heart sounds: Normal heart sounds and S2 normal. No murmur heard.   No friction rub. No gallop.       Comments: Left: Doppler PT, peroneal  Pulmonary:      Effort: Pulmonary effort is normal.      Breath sounds: Normal breath sounds.   Abdominal:      General: Bowel sounds are normal.      Palpations: Abdomen is soft.   Musculoskeletal:         General: Normal range of motion.      Cervical back: Normal range of motion and neck supple.   Skin:     General: Skin is warm and dry.   Neurological:      General: No focal deficit present.      Mental Status: She is alert and oriented to person, place, and time.      Cranial " Nerves: No cranial nerve deficit.   Psychiatric:         Mood and Affect: Mood normal.         Behavior: Behavior normal.         Thought Content: Thought content normal.         Judgment: Judgment normal.        US Guided Vascular Access    Result Date: 3/26/2021  Narrative: History: PAD  Comment: Limited left lower extremity arterial duplex was performed at the time of operative angiogram to assist with access.  FINDINGS: The distal left posterior tibial artery was visualized successfully cannulated under direct ultrasound guidance.      Impression: Successful cannulation of the distal left posterior tibial arteries the time of operative angiogram. Please see operative report for further details of the procedure. This report was finalized on 03/26/2021 17:07 by Dr. Paul Richards MD.    IR Angiogram Extremity    Result Date: 3/30/2021  Narrative: Performed by Dr. Condon. Please see procedure note. This report was finalized on  by Dr. Sukhdev Condon MD.    FL C Arm During Surgery    Result Date: 3/30/2021  Narrative: Performed by Dr. Condon. Please see procedure note. This report was finalized on  by Dr. Sukhdev Condon MD.          Patient Active Problem List   Diagnosis   • Spinal stenosis, cervical region   • Lung nodules   • Bronchiectasis without complication (CMS/HCC)   • Underweight   • Personal history of nicotine dependence   • Restrictive lung disease   • Pulmonary emphysema (CMS/HCC)   • PAD (peripheral artery disease) (CMS/HCC)   • Preop testing   • Gastroesophageal reflux disease   • Allergic rhinitis         ICD-10-CM ICD-9-CM   1. PAD (peripheral artery disease) (CMS/HCC)  I73.9 443.9   2. Dyspnea on exertion  R06.00 786.09   3. Bilateral carotid artery stenosis  I65.23 433.10     433.30         Plan: After thoroughly evaluating Trinidad Zhu, I am pleased to report that overall she is doing well status post left lower extremity angiogram.  She reports the pain has resolved to her left leg.   Groin has healed.  Her foot is nice and warm with Doppler PT and peroneal signals.  She would like a referral to cardiology as previously discussed with Dr. Condon.  She has a history of blockage years ago and saw Dr. Hylton.  She reports she has not had any follow-up testing for some time.  She does report shortness of breath upon exertion which has recently increased.  I did discuss vascular risk factors as they pertain to the progression of vascular disease including controlling her hypertension and hyperlipidemia.  Her blood pressure is stable on her current medication regimen.  She is maintained on Pravachol and Repatha for her hyperlipidemia.  She does already have an upcoming appointment scheduled with repeat testing.  We will keep this appointment.  Patient's Body mass index is 20.23 kg/m². BMI is within normal parameters. No follow-up required.  The patient can continue taking their current medication regimen as previously planned.  This was all discussed in full with complete understanding.    Thank you for allowing me to participate in the care of your patient.  Please do not hesitate with any questions or concerns.  I will keep you aware of any further encounters with Trinidad Zhu.        Sincerely yours,         GEREMIAS Head

## 2021-04-28 ENCOUNTER — OFFICE VISIT (OUTPATIENT)
Dept: CARDIOLOGY | Facility: CLINIC | Age: 76
End: 2021-04-28

## 2021-04-28 VITALS
HEIGHT: 64 IN | DIASTOLIC BLOOD PRESSURE: 61 MMHG | HEART RATE: 74 BPM | WEIGHT: 116 LBS | BODY MASS INDEX: 19.81 KG/M2 | SYSTOLIC BLOOD PRESSURE: 110 MMHG

## 2021-04-28 DIAGNOSIS — I73.9 PAD (PERIPHERAL ARTERY DISEASE) (HCC): ICD-10-CM

## 2021-04-28 DIAGNOSIS — R06.09 DOE (DYSPNEA ON EXERTION): Primary | ICD-10-CM

## 2021-04-28 DIAGNOSIS — E78.5 HYPERLIPIDEMIA, UNSPECIFIED HYPERLIPIDEMIA TYPE: ICD-10-CM

## 2021-04-28 DIAGNOSIS — I25.119 CORONARY ARTERY DISEASE INVOLVING NATIVE CORONARY ARTERY OF NATIVE HEART WITH ANGINA PECTORIS (HCC): ICD-10-CM

## 2021-04-28 PROCEDURE — 93000 ELECTROCARDIOGRAM COMPLETE: CPT | Performed by: INTERNAL MEDICINE

## 2021-04-28 PROCEDURE — 99204 OFFICE O/P NEW MOD 45 MIN: CPT | Performed by: INTERNAL MEDICINE

## 2021-04-28 NOTE — PROGRESS NOTES
"Subjective    Trinidad Zhu is a 76 y.o. female. - referred by Dr Forbes for ortiz    History of Present Illness     ORTIZ / KNOWN CAD:  She had a cath by me 25 yrs ago showing mild prox LAD plaque and 60% mid RCA dz and was started on statin tx at that time. She was having non-cardiac cp. About 1 yr ago she began noting ORTIZ and that has slowly gotten worse and especially over the past 1.5 months. She has no soa at rest and her walking is also limited by claudication of the LLE. EKG today is nsc.    PAD:  She had a recent L ComFem-SFA stent 3/26/21. She has had some swelling and discoloration of her left foot since but the pain is now gone.    HLD:  This is fu closely with her pcp and is now on the statin + a PCSK-9 inh and relates \"my chol is good now\". She mostly follows a Mediterranean style of eating and we have discussed that diet today.    The following portions of the patient's history were reviewed and updated as appropriate: allergies, current medications, past family history, past medical history, past social history, past surgical history and problem list.    Patient Active Problem List   Diagnosis   • Spinal stenosis, cervical region   • Lung nodules   • Bronchiectasis without complication (CMS/HCC)   • Underweight   • Personal history of nicotine dependence   • Restrictive lung disease   • Pulmonary emphysema (CMS/HCC)   • PAD (peripheral artery disease) (CMS/HCC)   • Preop testing   • Gastroesophageal reflux disease   • Allergic rhinitis   • ORTIZ (dyspnea on exertion)   • CAD (coronary artery disease)   • Hyperlipidemia       Allergies   Allergen Reactions   • Baclofen Other (See Comments)     MUSCULOSKELETAL THERAPY AGENTS knocked her out for a day     • Gabapentin Confusion   • Sulfa Antibiotics Rash     Mouth blisters   • Amitriptyline Hcl Confusion       Family History   Problem Relation Age of Onset   • Breast cancer Maternal Aunt    • Heart disease Mother    • Heart disease Father    • GI " problems Neg Hx         MALIGNANCIES   • Colon cancer Neg Hx    • Colon polyps Neg Hx        Social History     Socioeconomic History   • Marital status:      Spouse name: Not on file   • Number of children: Not on file   • Years of education: Not on file   • Highest education level: Not on file   Tobacco Use   • Smoking status: Former Smoker     Packs/day: 1.50     Years: 20.00     Pack years: 30.00     Types: Cigarettes     Quit date:      Years since quittin.3   • Smokeless tobacco: Never Used   Vaping Use   • Vaping Use: Never used   Substance and Sexual Activity   • Alcohol use: Yes     Comment: occasionally   • Drug use: No   • Sexual activity: Not Currently         Current Outpatient Medications:   •  alendronate (FOSAMAX) 70 MG tablet, Take 70 mg by mouth Every 7 (Seven) Days., Disp: , Rfl: 3  •  aspirin 81 MG chewable tablet, Chew 81 mg Daily., Disp: , Rfl:   •  benazepril (LOTENSIN) 5 MG tablet, Take 5 mg by mouth Daily., Disp: , Rfl:   •  Calcium Citrate (CITRACAL PO), Take 2 tablets by mouth 2 (Two) Times a Day., Disp: , Rfl:   •  carvedilol (COREG) 12.5 MG tablet, Take 12.5 mg by mouth 2 (Two) Times a Day With Meals., Disp: , Rfl:   •  cetirizine (ZyrTEC) 10 MG tablet, Take 10 mg by mouth as needed for allergies., Disp: , Rfl:   •  clopidogrel (PLAVIX) 75 MG tablet, TAKE 1 TABLET BY MOUTH EVERY DAY , Disp: 30 tablet, Rfl: 4  •  diazePAM (VALIUM) 5 MG tablet, TK 1/2 T PO QID PRN, Disp: , Rfl: 0  •  famotidine (PEPCID) 40 MG tablet, Take 40 mg by mouth 2 (Two) Times a Day., Disp: , Rfl: 3  •  hydroCHLOROthiazide (HYDRODIURIL) 12.5 MG tablet, Take 12.5 mg by mouth Daily., Disp: , Rfl:   •  levothyroxine (SYNTHROID, LEVOTHROID) 50 MCG tablet, Take 50 mcg by mouth Daily., Disp: , Rfl: 3  •  multivitamin with minerals (MULTIVITAMIN ADULT PO), Take 1 tablet by mouth Daily., Disp: , Rfl:   •  Myrbetriq 50 MG tablet sustained-release 24 hour 24 hr tablet, Take 50 mg by mouth Daily., Disp: , Rfl:    •  pravastatin (PRAVACHOL) 80 MG tablet, Take 80 mg by mouth daily. DOESN'T TAKE ON DAYS SHE HAS TO TAKE COLESTID, Disp: , Rfl:   •  Repatha SureClick solution auto-injector SureClick injection, Inject 140 mg under the skin into the appropriate area as directed Every 14 (Fourteen) Days., Disp: , Rfl:   •  traMADol (ULTRAM) 50 MG tablet, TK 1 T PO Q 6 H PRN, Disp: , Rfl: 0  •  cilostazol (PLETAL) 50 MG tablet, Take 50 mg by mouth 2 (Two) Times a Day., Disp: , Rfl:     Past Surgical History:   Procedure Laterality Date   • ANTERIOR CERVICAL DISCECTOMY W/ FUSION N/A 5/22/2017    Procedure: CERVICAL DISCECTOMY ANTERIOR FUSION WITH INSTRUMENTATION;  Surgeon: NADINE Sarabia MD;  Location: [facility] OR;  Service:    • AORTAGRAM Left 11/9/2020    Procedure: left lower extremtiy angiogram, hawk athrectomy, balloon angioplasty, stent placement, mynx closure;  Surgeon: Sukhdev Condon DO;  Location: [facility] HYBRID OR 12;  Service: Vascular;  Laterality: Left;   • AORTAGRAM Left 3/26/2021    Procedure: LEFT LOWER EXTREMITY ANGIOGRAM, BALLOON ANGIOPLASTY, STENT PLACEMENT, MYNX CLOSURE;  Surgeon: Sukhdev Condon DO;  Location: [facility] HYBRID OR 12;  Service: Vascular;  Laterality: Left;   • BLADDER SUSPENSION      also had pelvic reconstructive surgery   • BREAST EXCISIONAL BIOPSY Right 1985   • CERVICAL CORPECTOMY N/A 5/22/2017    Procedure:  ANTERIOR CERVICAL DISCECTOMY FUSION C-6 to T1,  ANTERIOR FUSION WITH INSTRUMENTATION C-5 T-1;  Surgeon: NADINE Sarabia MD;  Location: [facility] OR;  Service:    • COLONOSCOPY  08/19/2015    TIC BX RT COLON   • COLONOSCOPY  12/04/2013    RT COLON BX RECALL 5YR   • COLONOSCOPY N/A 11/29/2016    Procedure: LOWER LIMITED COLONOSCOPY WITH ANESTHESIA;  Surgeon: Marco Antonio Vallejo MD;  Location: [facility] ENDOSCOPY;  Service:    • ENDOSCOPY  12/03/2015    HEALED ULCER SLANT BX   • HYSTERECTOMY     • OTHER SURGICAL HISTORY      KNEE CARTILAGE REMOVED   • OTHER SURGICAL HISTORY      BENIGN  "FIBROID TUMOR OF BREAST 1985 REMOVED   • TONSILLECTOMY         Review of Systems   Constitutional: Negative for activity change, appetite change, fatigue and unexpected weight change.   Respiratory: Positive for shortness of breath. Negative for wheezing.    Cardiovascular: Positive for leg swelling.   Gastrointestinal: Positive for abdominal pain.   Genitourinary: Negative for difficulty urinating.   Musculoskeletal: Positive for arthralgias.       /61   Pulse 74   Ht 161.3 cm (63.5\")   Wt 52.6 kg (116 lb)   BMI 20.23 kg/m²   Procedures    Objective   Physical Exam  Constitutional:       Appearance: Normal appearance.   Eyes:      Pupils: Pupils are equal, round, and reactive to light.   Cardiovascular:      Rate and Rhythm: Normal rate and regular rhythm.      Pulses:           Dorsalis pedis pulses are 2+ on the right side and 0 on the left side.        Posterior tibial pulses are 0 on the left side.      Heart sounds: Normal heart sounds. No murmur heard.   No friction rub. No gallop.    Pulmonary:      Effort: Pulmonary effort is normal.      Breath sounds: Normal breath sounds. No wheezing, rhonchi or rales.   Abdominal:      General: Bowel sounds are normal.      Tenderness: There is no abdominal tenderness.   Musculoskeletal:      Right lower leg: No edema.      Left lower leg: No edema.   Skin:     General: Skin is warm and dry.   Neurological:      General: No focal deficit present.      Mental Status: She is alert and oriented to person, place, and time.   Psychiatric:         Mood and Affect: Mood normal.         Behavior: Behavior normal.         Assessment/Plan   Diagnoses and all orders for this visit:    1. ORTIZ (dyspnea on exertion) (Primary)  Comments:  uncertain etiology - will check DSE. if the stress test is neg, needs to see  Pulm   Orders:  -     ECG 12 Lead  -     Adult Stress Echo W/ Cont or Stress Agent if Necessary Per Protocol    2. PAD (peripheral artery disease) (CMS/[facility])    3. " Coronary artery disease involving native coronary artery of native heart with angina pectoris (CMS/[facility])  Comments:  mild 25 years ago    4. Hyperlipidemia, unspecified hyperlipidemia type  Comments:  on potent statin tx and fu with pcp        Mod compex visit with review of records and decision of up testing. 37 mins with chart and face to face         Return in about 1 year (around 4/28/2022).  Orders Placed This Encounter   Procedures   • ECG 12 Lead     Order Specific Question:   Reason for Exam:     Answer:   SOB     Order Specific Question:   Release to patient     Answer:   Immediate   • Adult Stress Echo W/ Cont or Stress Agent if Necessary Per Protocol     Order Specific Question:   What stress agent will be used?     Answer:   Dobutamine     Order Specific Question:   Difficulty walking criteria?     Answer:   Musculoskeletal (hips, knees, feet, back, amputee)     Order Specific Question:   Reason for exam?     Answer:   Dyspnea

## 2021-05-03 ENCOUNTER — TELEPHONE (OUTPATIENT)
Dept: VASCULAR SURGERY | Facility: CLINIC | Age: 76
End: 2021-05-03

## 2021-05-03 ENCOUNTER — HOSPITAL ENCOUNTER (OUTPATIENT)
Dept: CARDIOLOGY | Facility: HOSPITAL | Age: 76
Discharge: HOME | End: 2021-05-03
Admitting: INTERNAL MEDICINE

## 2021-05-03 VITALS
BODY MASS INDEX: 19.8 KG/M2 | WEIGHT: 115.96 LBS | SYSTOLIC BLOOD PRESSURE: 151 MMHG | DIASTOLIC BLOOD PRESSURE: 78 MMHG | HEIGHT: 64 IN | HEART RATE: 78 BPM

## 2021-05-03 PROCEDURE — 93350 STRESS TTE ONLY: CPT

## 2021-05-03 PROCEDURE — 93350 STRESS TTE ONLY: CPT | Performed by: INTERNAL MEDICINE

## 2021-05-03 PROCEDURE — 93017 CV STRESS TEST TRACING ONLY: CPT

## 2021-05-03 PROCEDURE — 25010000002 PERFLUTREN 6.52 MG/ML SUSPENSION: Performed by: INTERNAL MEDICINE

## 2021-05-03 PROCEDURE — 93352 ADMIN ECG CONTRAST AGENT: CPT | Performed by: INTERNAL MEDICINE

## 2021-05-03 PROCEDURE — 25010000003 DOBUTAMINE PER 250 MG: Performed by: INTERNAL MEDICINE

## 2021-05-03 PROCEDURE — 93018 CV STRESS TEST I&R ONLY: CPT | Performed by: INTERNAL MEDICINE

## 2021-05-03 RX ORDER — DOBUTAMINE HYDROCHLORIDE 100 MG/100ML
10-50 INJECTION INTRAVENOUS CONTINUOUS
Status: DISCONTINUED | OUTPATIENT
Start: 2021-05-03 | End: 2021-05-04 | Stop reason: HOSPADM

## 2021-05-03 RX ADMIN — PERFLUTREN 8.48 MG: 6.52 INJECTION, SUSPENSION INTRAVENOUS at 14:40

## 2021-05-03 RX ADMIN — ATROPINE SULFATE 1 MG: 0.1 INJECTION INTRAVENOUS at 15:26

## 2021-05-03 RX ADMIN — Medication 10 MCG/KG/MIN: at 14:40

## 2021-05-03 NOTE — TELEPHONE ENCOUNTER
Left a message for the patient to call back to see if she was aware of her appt date and time (her AVS and appt was given to another patient).

## 2021-05-04 LAB
BH CV STRESS BP STAGE 1: NORMAL
BH CV STRESS BP STAGE 2: NORMAL
BH CV STRESS BP STAGE 3: NORMAL
BH CV STRESS BP STAGE 4: NORMAL
BH CV STRESS DOB - ATROPINE STAGE 3: 1
BH CV STRESS DOSE DOBUTAMINE STAGE 1: 10
BH CV STRESS DOSE DOBUTAMINE STAGE 2: 20
BH CV STRESS DOSE DOBUTAMINE STAGE 3: 30
BH CV STRESS DOSE DOBUTAMINE STAGE 4: 40
BH CV STRESS DURATION MIN STAGE 1: 3
BH CV STRESS DURATION MIN STAGE 2: 3
BH CV STRESS DURATION MIN STAGE 3: 3
BH CV STRESS DURATION MIN STAGE 4: 1
BH CV STRESS DURATION SEC STAGE 1: 0
BH CV STRESS DURATION SEC STAGE 2: 0
BH CV STRESS DURATION SEC STAGE 3: 0
BH CV STRESS DURATION SEC STAGE 4: 13
BH CV STRESS HR STAGE 1: 73
BH CV STRESS HR STAGE 2: 69
BH CV STRESS HR STAGE 3: 123
BH CV STRESS HR STAGE 4: 127
BH CV STRESS PROTOCOL 1: NORMAL
BH CV STRESS RECOVERY BP: NORMAL MMHG
BH CV STRESS RECOVERY HR: 109 BPM
BH CV STRESS STAGE 1: 1
BH CV STRESS STAGE 2: 2
BH CV STRESS STAGE 3: 3
BH CV STRESS STAGE 4: 4
PERCENT MAX PREDICTED HR: 87.59 %
STRESS BASELINE BP: NORMAL MMHG
STRESS BASELINE HR: 74 BPM
STRESS PERCENT HR: 103 %
STRESS POST EXERCISE DUR MIN: 10 MIN
STRESS POST EXERCISE DUR SEC: 13 SEC
STRESS POST PEAK BP: NORMAL MMHG
STRESS POST PEAK HR: 127 BPM

## 2021-05-17 ENCOUNTER — TELEPHONE (OUTPATIENT)
Dept: VASCULAR SURGERY | Facility: CLINIC | Age: 76
End: 2021-05-17

## 2021-05-18 ENCOUNTER — OFFICE VISIT (OUTPATIENT)
Dept: VASCULAR SURGERY | Facility: CLINIC | Age: 76
End: 2021-05-18

## 2021-05-18 ENCOUNTER — HOSPITAL ENCOUNTER (OUTPATIENT)
Dept: ULTRASOUND IMAGING | Facility: HOSPITAL | Age: 76
Discharge: HOME | End: 2021-05-18

## 2021-05-18 VITALS
OXYGEN SATURATION: 98 % | BODY MASS INDEX: 19.12 KG/M2 | WEIGHT: 112 LBS | HEIGHT: 64 IN | DIASTOLIC BLOOD PRESSURE: 76 MMHG | HEART RATE: 74 BPM | SYSTOLIC BLOOD PRESSURE: 118 MMHG

## 2021-05-18 DIAGNOSIS — E78.5 HYPERLIPIDEMIA, UNSPECIFIED HYPERLIPIDEMIA TYPE: ICD-10-CM

## 2021-05-18 DIAGNOSIS — I73.9 PAD (PERIPHERAL ARTERY DISEASE) (HCC): Primary | ICD-10-CM

## 2021-05-18 DIAGNOSIS — I73.9 PAD (PERIPHERAL ARTERY DISEASE) (HCC): ICD-10-CM

## 2021-05-18 DIAGNOSIS — I65.23 BILATERAL CAROTID ARTERY STENOSIS: ICD-10-CM

## 2021-05-18 PROCEDURE — 93880 EXTRACRANIAL BILAT STUDY: CPT | Performed by: SURGERY

## 2021-05-18 PROCEDURE — 93880 EXTRACRANIAL BILAT STUDY: CPT

## 2021-05-18 PROCEDURE — 99213 OFFICE O/P EST LOW 20 MIN: CPT | Performed by: SURGERY

## 2021-05-18 PROCEDURE — 93923 UPR/LXTR ART STDY 3+ LVLS: CPT | Performed by: SURGERY

## 2021-05-18 PROCEDURE — 93923 UPR/LXTR ART STDY 3+ LVLS: CPT

## 2021-05-18 NOTE — PROGRESS NOTES
5/18/2021       Andrew Layne MD  [address]  [facility] [address]    Trinidad Zhu  1945    Chief Complaint   Patient presents with   • Follow-up     6 Month Follow UP For Peripheral Artery Disease and Bilateral Carotid Artery Stenosis. Test [id number] US pad ankle / brach ind ext comp and US pad carotid bilateral. Patient denies any stroke like symptoms.    • Former Smoker     Patient is a Former Smoker - Quit 1996   • Med Management     Verbally verified medications with patient        Dear Andrew Layne MD       HPI  I had the pleasure of seeing your patient Trinidad Zhu in the office today.   As you recall, Trinidad Zhu is a 76 y.o.  female who we are following for lower extremity PAD.  Initially she was seen by Dr. Gonzáles with complaints of foot pain.  She does also report cramping in her feet and toes at night.  She does wear compression stockings.  She did undergo a left lower extremity angiogram on 11/9/2020.  Initially, she did well postoperatively but began having pain again which was waking her up at night.  She did undergo a left lower extremity angiogram with crossing of superficial femoral artery chronic total occlusion and stent placement on 3/26/2021.  She has maintained on aspirin, Pletal, Plavix, and Pravachol.  She does report she has recently been started on Repatha.  The significant pain she was having has resolved.  She does have some intermittent claudication to her calf.  She also has some mild swelling noted.  She did have noninvasive testing performed today, which I did review in office.      Review of Systems   Constitutional: Negative.    HENT: Negative.    Eyes: Negative.    Respiratory: Positive for shortness of breath.    Cardiovascular: Negative.    Gastrointestinal: Negative.    Endocrine: Negative.    Genitourinary: Negative.    Musculoskeletal: Positive for back pain.   Skin: Positive for color change.        Itching to leg   Allergic/Immunologic:  "Negative.    Neurological: Negative.    Hematological: Negative.    Psychiatric/Behavioral: Negative.         /76 (BP Location: Right arm, Patient Position: Sitting, Cuff Size: Adult)   Pulse 74   Ht 161.3 cm (63.5\")   Wt 50.8 kg (112 lb)   SpO2 98%   BMI 19.53 kg/m²   Physical Exam  Vitals and nursing note reviewed.   Constitutional:       General: She is not in acute distress.     Appearance: Normal appearance. She is well-developed and normal weight. She is not diaphoretic.   HENT:      Head: Normocephalic and atraumatic.   Eyes:      General: No scleral icterus.     Pupils: Pupils are equal, round, and reactive to light.   Neck:      Thyroid: No thyromegaly.      Vascular: No carotid bruit or JVD.   Cardiovascular:      Rate and Rhythm: Normal rate and regular rhythm.      Pulses:           Femoral pulses are 2+ on the right side and 2+ on the left side.       Dorsalis pedis pulses are detected w/ Doppler on the right side and 0 on the left side.        Posterior tibial pulses are detected w/ Doppler on the right side and detected w/ Doppler on the left side.      Heart sounds: Normal heart sounds and S2 normal. No murmur heard.   No friction rub. No gallop.       Comments: Left: Doppler PT, peroneal  Pulmonary:      Effort: Pulmonary effort is normal.      Breath sounds: Normal breath sounds.   Abdominal:      General: Bowel sounds are normal.      Palpations: Abdomen is soft.   Musculoskeletal:         General: Normal range of motion.      Cervical back: Normal range of motion and neck supple.      Left lower leg: Edema (Mild) present.   Skin:     General: Skin is warm and dry.   Neurological:      General: No focal deficit present.      Mental Status: She is alert and oriented to person, place, and time.      Cranial Nerves: No cranial nerve deficit.   Psychiatric:         Mood and Affect: Mood normal.         Behavior: Behavior normal. Behavior is cooperative.         Thought Content: Thought " content normal.         Judgment: Judgment normal.        Diagnostic data:  ABIs show right OTTO of 0.92 and a left OTTO of 0.96.  Carotid duplex shows less than 50% carotid stenosis bilaterally with bilateral antegrade vertebral flow.    Patient Active Problem List   Diagnosis   • Spinal stenosis, cervical region   • Lung nodules   • Bronchiectasis without complication (CMS/HCC)   • Underweight   • Personal history of nicotine dependence   • Restrictive lung disease   • Pulmonary emphysema (CMS/HCC)   • PAD (peripheral artery disease) (CMS/[facility])   • Preop testing   • Gastroesophageal reflux disease   • Allergic rhinitis   • ORTIZ (dyspnea on exertion)   • CAD (coronary artery disease)   • Hyperlipidemia         ICD-10-CM ICD-9-CM   1. PAD (peripheral artery disease) (CMS/HCC)  I73.9 443.9   2. Bilateral carotid artery stenosis  I65.23 433.10     433.30   3. Hyperlipidemia, unspecified hyperlipidemia type  E78.5 272.4         Plan: After thoroughly evaluating Trinidad Zhu, I believe the best course of action is to remain conservative from vascular surgery standpoint.  Currently she is doing well reports no significant pain has still resolved to her left lower extremity.  She does have some mild swelling noted as well as some intermittent calf claudication at times.  Her testing shows mild right arterial insufficiency and no arterial insufficiency to the left lower extremity.  Her carotid duplex shows less than 50% carotid stenosis bilaterally.  We will see her back in 6 months with repeat noninvasive testing for continued surveillance, including ABIs.  We will recheck a carotid duplex in 1 year.  I did discuss vascular risk factors as they pertain to the progression of vascular disease including controlling her hypertension and hyperlipidemia.  Her blood pressure is stable on her current medication regimen.  She is maintained on Pravachol and Repatha for her hyperlipidemia.   Patient's Body mass index is 19.53  kg/m². BMI is within normal parameters. No follow-up required.  The patient can continue taking their current medication regimen as previously planned.  This was all discussed in full with complete understanding.    Thank you for allowing me to participate in the care of your patient.  Please do not hesitate with any questions or concerns.  I will keep you aware of any further encounters with Trinidad Zhu.        Sincerely yours,         GEREMIAS Head

## 2021-05-28 ENCOUNTER — TRANSCRIBE ORDERS (OUTPATIENT)
Dept: ADMINISTRATIVE | Facility: HOSPITAL | Age: 76
End: 2021-05-28

## 2021-05-28 DIAGNOSIS — E55.9 VITAMIN D DEFICIENCY: Primary | ICD-10-CM

## 2021-05-28 DIAGNOSIS — Z12.31 ENCOUNTER FOR SCREENING MAMMOGRAM FOR MALIGNANT NEOPLASM OF BREAST: ICD-10-CM

## 2021-05-28 DIAGNOSIS — M81.0 AGE-RELATED OSTEOPOROSIS WITHOUT CURRENT PATHOLOGICAL FRACTURE: ICD-10-CM

## 2021-06-03 ENCOUNTER — APPOINTMENT (OUTPATIENT)
Dept: BONE DENSITY | Facility: HOSPITAL | Age: 76
End: 2021-06-03

## 2021-06-03 ENCOUNTER — APPOINTMENT (OUTPATIENT)
Dept: MAMMOGRAPHY | Facility: HOSPITAL | Age: 76
End: 2021-06-03

## 2021-06-10 ENCOUNTER — APPOINTMENT (OUTPATIENT)
Dept: BONE DENSITY | Facility: HOSPITAL | Age: 76
End: 2021-06-10

## 2021-06-10 ENCOUNTER — APPOINTMENT (OUTPATIENT)
Dept: MAMMOGRAPHY | Facility: HOSPITAL | Age: 76
End: 2021-06-10

## 2021-06-14 RX ORDER — CLOPIDOGREL BISULFATE 75 MG/1
TABLET ORAL
Qty: 30 TABLET | Refills: 4 | Status: ON HOLD | OUTPATIENT
Start: 2021-06-14 | End: 2023-04-01

## 2021-06-17 ENCOUNTER — HOSPITAL ENCOUNTER (OUTPATIENT)
Dept: BONE DENSITY | Facility: HOSPITAL | Age: 76
Discharge: HOME | End: 2021-06-17

## 2021-06-17 ENCOUNTER — HOSPITAL ENCOUNTER (OUTPATIENT)
Dept: MAMMOGRAPHY | Facility: HOSPITAL | Age: 76
Discharge: HOME | End: 2021-06-17

## 2021-06-17 DIAGNOSIS — E55.9 VITAMIN D DEFICIENCY: ICD-10-CM

## 2021-06-17 DIAGNOSIS — Z12.31 ENCOUNTER FOR SCREENING MAMMOGRAM FOR MALIGNANT NEOPLASM OF BREAST: ICD-10-CM

## 2021-06-17 DIAGNOSIS — M81.0 AGE-RELATED OSTEOPOROSIS WITHOUT CURRENT PATHOLOGICAL FRACTURE: ICD-10-CM

## 2021-06-17 PROCEDURE — 77080 DXA BONE DENSITY AXIAL: CPT

## 2021-06-17 PROCEDURE — 77067 SCR MAMMO BI INCL CAD: CPT

## 2021-06-17 PROCEDURE — 77063 BREAST TOMOSYNTHESIS BI: CPT

## 2021-06-30 ENCOUNTER — OFFICE VISIT (OUTPATIENT)
Dept: NEUROSURGERY | Facility: CLINIC | Age: 76
End: 2021-06-30

## 2021-06-30 VITALS — HEIGHT: 64 IN | BODY MASS INDEX: 19.74 KG/M2 | WEIGHT: 115.6 LBS

## 2021-06-30 DIAGNOSIS — G57.02 PIRIFORMIS SYNDROME, LEFT: Primary | ICD-10-CM

## 2021-06-30 DIAGNOSIS — S33.6XXD SPRAIN OF SACROILIAC LIGAMENT, SUBSEQUENT ENCOUNTER: ICD-10-CM

## 2021-06-30 PROCEDURE — 99214 OFFICE O/P EST MOD 30 MIN: CPT | Performed by: NEUROLOGICAL SURGERY

## 2021-06-30 RX ORDER — CELECOXIB 100 MG/1
100 CAPSULE ORAL 2 TIMES DAILY PRN
Qty: 60 CAPSULE | Refills: 0 | Status: SHIPPED | OUTPATIENT
Start: 2021-06-30 | End: 2021-10-21

## 2021-06-30 RX ORDER — NITROFURANTOIN 25; 75 MG/1; MG/1
CAPSULE ORAL
COMMUNITY
Start: 2021-06-25 | End: 2021-08-03

## 2021-06-30 NOTE — PATIENT INSTRUCTIONS
PATIENT TO CONTINUE TO FOLLOW UP WITH HER PRIMARY CARE PROVIDER FOR YEARLY PHYSICAL EXAMS TO ENSURE COMPLETE HEALTH MAINTENANCE          Sacroiliac Joint Injection    A sacroiliac (SI) joint injection is a procedure to inject a numbing medicine (anesthetic block)--and sometimes a strong anti-inflammatory medicine (steroid)--into the SI joint. The SI joint is the joint between two bones of the pelvis called the sacrum and the ilium. The sacrum is the bone at the base of the spine. The ilium is the large bone that forms the hip.  You may need this procedure if you have pain because of an inflamed or diseased SI joint. Various conditions can cause pain in the SI joint, including rheumatoid arthritis, gout, psoriatic arthritis, infection, or injury. SI joint pain is a common cause of low back pain. It may also cause pain in your buttock or leg.  SI joint injection may be done to:  · Find out if an anesthetic block relieves pain. This can confirm that the SI joint is the cause of pain (diagnostic use).  · Treat a painful SI joint with steroids, anesthetic medicine, or both (therapeutic use).  Tell a health care provider about:  · Any allergies you have.  · All medicines you are taking, including vitamins, herbs, eye drops, creams, and over-the-counter medicines.  · Any problems you or family members have had with anesthetic medicines.  · Any blood disorders you have.  · Any surgeries you have had.  · Any medical conditions you have.  · Whether you are pregnant or may be pregnant.  What are the risks?  Generally, this is a safe procedure. However, problems may occur, including:  · Infection.  · Bleeding.  · Nerve injury.  · Temporary increase in pain.  · Headache.  · Failure of the procedure to relieve pain.  · Bruising or soreness at the joint, in deep tissues, or at the injection site.  · Allergic reactions to medicines or dyes.  · Side effects from the steroid medicine. These may include facial flushing, increased  appetite, diarrhea, and increased blood sugar.  What happens before the procedure?  · You may have a physical exam.  · You may have imaging tests, such as an X-ray, CT scan, or MRI.  · Ask your health care provider about:  ? Changing or stopping your regular medicines. This is especially important if you are taking diabetes medicines or blood thinners.  ? Taking medicines such as aspirin and ibuprofen. These medicines can thin your blood. Do not take these medicines unless your health care provider tells you to take them.  ? Taking over-the-counter medicines, vitamins, herbs, and supplements.  · Plan to have someone take you home from the hospital or clinic.  What happens during the procedure?  · To lower your risk of infection:  ? Your health care team will wash or sanitize their hands.  ? Your skin will be washed with a germ-killing (antiseptic) solution.  · You may be given one or more of the following:  ? A medicine to help you relax (sedative).  ? A medicine to numb the area (local anesthetic). Your health care provider will inject a local anesthetic into the skin above your SI joint.  · You will be placed in the proper position on a procedure table to give the health care team the best access to your SI joint.  · An X-ray machine that produces moving X-ray images (fluoroscopy) will be placed above the procedure table.  · A long, thin needle will be inserted through your skin and down to your SI joint.  · The position of the needle will be checked with fluoroscopy imaging.  · An X-ray dye (contrast media) will be injected to make sure the needle enters the joint space. You may be asked if you feel any pain.  · Long-acting anesthetic medicine will be injected. Long-acting steroid medicine may also be injected.  · The needle will be removed, and a bandage will be placed over the injection site.  The procedure may vary among health care providers and hospitals.  What happens after the procedure?  · Your blood  pressure, heart rate, breathing rate, and blood oxygen level will be monitored until the medicines you were given have worn off.  · If dye was used, you will be told to drink plenty of water to wash (flush) the dye out of your body.  · You may be asked if you have pain relief from the injection.  · You will likely be able to go home shortly after the procedure.  · Your health care provider will give you instructions for taking care of yourself after the procedure. These may include instructions for doing physical therapy exercises.  · Do not drive for 24 hours if you were given a sedative during the procedure.  Summary  · A sacroiliac (SI) joint injection is an injection of a numbing medicine (anesthetic block)--and sometimes a strong anti-inflammatory medicine (steroid)--into the SI joint.  · You will be awake during the procedure, but the injection area will be made numb.  · If you were given a medicine to help you relax (sedative during the procedure, do not drive for at least 24 hours.  This information is not intended to replace advice given to you by your health care provider. Make sure you discuss any questions you have with your health care provider.  Document Revised: 11/30/2018 Document Reviewed: 09/24/2018  [employer] Patient Education © 2021 [employer]    Sacroiliac Joint Dysfunction    Sacroiliac joint dysfunction is a condition that causes inflammation on one or both sides of the sacroiliac (SI) joint. The SI joint connects the lower part of the spine (sacrum) with the two upper portions of the pelvis (ilium). This condition causes deep aching or burning pain in the low back. In some cases, the pain may also spread into one or both buttocks, hips, or thighs.  What are the causes?  This condition may be caused by:  · Pregnancy. During pregnancy, extra stress is put on the SI joints because the pelvis widens.  · Injury, such as:  ? Injuries from car accidents.  ? Sports-related injuries.  ? Work-related  injuries.  · Having one leg that is shorter than the other.  · Conditions that affect the joints, such as:  ? Rheumatoid arthritis.  ? Gout.  ? Psoriatic arthritis.  ? Joint infection (septic arthritis).  Sometimes, the cause of SI joint dysfunction is not known.  What are the signs or symptoms?  Symptoms of this condition include:  · Aching or burning pain in the lower back. The pain may also spread to other areas, such as:  ? Buttocks.  ? Groin.  ? Thighs.  · Muscle spasms in or around the painful areas.  · Increased pain when standing, walking, running, stair climbing, bending, or lifting.  How is this diagnosed?  This condition is diagnosed with a physical exam and medical history. During the exam, the health care provider may move one or both of your legs to different positions to check for pain. Various tests may be done to confirm the diagnosis, including:  · Imaging tests to look for other causes of pain. These may include:  ? MRI.  ? CT scan.  ? Bone scan.  · Diagnostic injection. A numbing medicine is injected into the SI joint using a needle. If your pain is temporarily improved or stopped after the injection, this can indicate that SI joint dysfunction is the problem.  How is this treated?  Treatment depends on the cause and severity of your condition. Treatment options may include:  · Ice or heat applied to the lower back area after an injury. This may help reduce pain and muscle spasms.  · Medicines to relieve pain or inflammation or to relax the muscles.  · Wearing a back brace (sacroiliac brace) to help support the joint while your back is healing.  · Physical therapy to increase muscle strength around the joint and flexibility at the joint. This may also involve learning proper body positions and ways of moving to relieve stress on the joint.  · Direct manipulation of the SI joint.  · Injections of steroid medicine into the joint to reduce pain and swelling.  · Radiofrequency ablation to burn away  nerves that are carrying pain messages from the joint.  · Use of a device that provides electrical stimulation to help reduce pain at the joint.  · Surgery to put in screws and plates that limit or prevent joint motion. This is rare.  Follow these instructions at home:  Medicines  · Take over-the-counter and prescription medicines only as told by your health care provider.  · Do not drive or use heavy machinery while taking prescription pain medicine.  · If you are taking prescription pain medicine, take actions to prevent or treat constipation. Your health care provider may recommend that you:  ? Drink enough fluid to keep your urine pale yellow.  ? Eat foods that are high in fiber, such as fresh fruits and vegetables, whole grains, and beans.  ? Limit foods that are high in fat and processed sugars, such as fried or sweet foods.  ? Take an over-the-counter or prescription medicine for constipation.  If you have a brace:  · Wear the brace as told by your health care provider. Remove it only as told by your health care provider.  · Keep the brace clean.  · If the brace is not waterproof:  ? Do not let it get wet.  ? Cover it with a watertight covering when you take a bath or a shower.  Managing pain, stiffness, and swelling         · Icing can help with pain and swelling. Heat may help with muscle tension or spasms. Ask your health care provider if you should use ice or heat.  · If directed, put ice on the affected area:  ? If you have a removable brace, remove it as told by your health care provider.  ? Put ice in a plastic bag.  ? Place a towel between your skin and the bag.  ? Leave the ice on for 20 minutes, 2-3 times a day.  · If directed, apply heat to the affected area. Use the heat source that your health care provider recommends, such as a moist heat pack or a heating pad.  ? Place a towel between your skin and the heat source.  ? Leave the heat on for 20-30 minutes.  ? Remove the heat if your skin turns  bright red. This is especially important if you are unable to feel pain, heat, or cold. You may have a greater risk of getting burned.  General instructions  · Rest as needed. Ask your health care provider what activities are safe for you.  · Return to your normal activities as told by your health care provider.  · Exercise as directed by your health care provider or physical therapist.  · Do not use any products that contain nicotine or tobacco, such as cigarettes and e-cigarettes. These can delay bone healing. If you need help quitting, ask your health care provider.  · Keep all follow-up visits as told by your health care provider. This is important.  Contact a health care provider if:  · Your pain is not controlled with medicine.  · You have a fever.  · Your pain is getting worse.  Get help right away if:  · You have weakness, numbness, or tingling in your legs or feet.  · You lose control of your bladder or bowel.  Summary  · Sacroiliac joint dysfunction is a condition that causes inflammation on one or both sides of the sacroiliac (SI) joint.  · This condition causes deep aching or burning pain in the low back. In some cases, the pain may also spread into one or both buttocks, hips, or thighs.  · Treatment depends on the cause and severity of your condition. It may include medicines to reduce pain and swelling or to relax muscles.  This information is not intended to replace advice given to you by your health care provider. Make sure you discuss any questions you have with your health care provider.  Document Revised: 08/14/2019 Document Reviewed: 01/28/2019  [employer] Patient Education © 2021 [employer]    Piriformis Syndrome    Piriformis syndrome is a condition that can cause pain and numbness in your buttocks and down the back of your leg. Piriformis syndrome happens when the small muscle that connects the base of your spine to your hip (piriformis muscle) presses on the nerve that runs down the back of  your leg (sciatic nerve).  The piriformis muscle helps your hip rotate and helps to bring your leg back and out. It also helps shift your weight to keep you stable while you are walking. The sciatic nerve runs under or through the piriformis muscle. Damage to the piriformis muscle can cause spasms that put pressure on the nerve below. This causes pain and discomfort while sitting and moving. The pain may feel as if it begins in the buttock and spreads (radiates) down your hip and thigh.  What are the causes?  This condition is caused by pressure on the sciatic nerve from the piriformis muscle. The piriformis muscle can get irritated with overuse, especially if other hip muscles are weak and the piriformis muscle has to do extra work.  Piriformis syndrome can also occur after an injury, like a fall onto your buttocks.  What increases the risk?  You are more likely to develop this condition if you:  · Are a woman.  · Sit for long periods of time.  · Are a cyclist.  · Have weak buttocks muscles (gluteal muscles).  What are the signs or symptoms?  Symptoms of this condition include:  · Pain, tingling, or numbness that starts in the buttock and runs down the back of your leg (sciatica).  · Pain in the groin or thigh area.  Your symptoms may get worse:  · The longer you sit.  · When you walk, run, or climb stairs.  · When straining to have a bowel movement.  How is this diagnosed?  This condition is diagnosed based on your symptoms, medical history, and physical exam.  · During the exam, your health care provider may:  ? Move your leg into different positions to check for pain.  ? Press on the muscles of your hip and buttock to see if that increases your symptoms.  · You may also have tests, including:  ? Imaging tests such as X-rays, MRI, or ultrasound.  ? Electromyogram (EMG). This test measures electrical signals sent by your nerves into the muscles.  ? Nerve conduction study. This test measures how well electrical  signals pass through your nerves.  How is this treated?  This condition may be treated by:  · Stopping all activities that cause pain or make your condition worse.  · Applying ice or using heat therapy.  · Taking medicines to reduce pain and swelling.  · Taking a muscle relaxer (muscle relaxant) to stop muscle spasms.  · Doing range-of-motion and strengthening exercises (physical therapy) as told by your health care provider.  · Massaging the area.  · Having acupuncture.  · Getting an injection of medicine in the piriformis muscle. Your health care provider will choose the medicine based on your condition. He or she may inject:  ? An anti-inflammatory medicine (steroid) to reduce swelling.  ? A numbing medicine (local anesthetic) to block the pain.  ? Botulinum toxin. The toxin blocks nerve impulses to specific muscles to reduce muscle tension.  In rare cases, you may need surgery to cut the muscle and release pressure on the nerve if other treatments do not work.  Follow these instructions at home:  Activity  · Do not sit for long periods. Get up and walk around every 20 minutes or as often as told by your health care provider.  ? When driving long distances, make sure to take frequent stops to get up and stretch.  · Use a cushion when you sit on hard surfaces.  · Do exercises as told by your health care provider.  · Return to your normal activities as told by your health care provider. Ask your health care provider what activities are safe for you.  Managing pain, stiffness, and swelling         · If directed, apply heat to the affected area as often as told by your health care provider. Use the heat source that your health care provider recommends, such as a moist heat pack or a heating pad.  ? Place a towel between your skin and the heat source.  ? Leave the heat on for 20-30 minutes.  ? Remove the heat if your skin turns bright red. This is especially important if you are unable to feel pain, heat, or cold. You  may have a greater risk of getting burned.  · If directed, put ice on the injured area.  ? Put ice in a plastic bag.  ? Place a towel between your skin and the bag.  ? Leave the ice on for 20 minutes, 2-3 times a day.  General instructions  · Take over-the-counter and prescription medicines only as told by your health care provider.  · Ask your health care provider if the medicine prescribed to you requires you to avoid driving or using heavy machinery.  · You may need to take actions to prevent or treat constipation, such as:  ? Drink enough fluid to keep your urine pale yellow.  ? Take over-the-counter or prescription medicines.  ? Eat foods that are high in fiber, such as beans, whole grains, and fresh fruits and vegetables.  ? Limit foods that are high in fat and processed sugars, such as fried or sweet foods.  · Keep all follow-up visits as told by your health care provider. This is important.  How is this prevented?  · Do not sit for longer than 20 minutes at a time. When you sit, choose padded surfaces.  · Warm up and stretch before being active.  · Cool down and stretch after being active.  · Give your body time to rest between periods of activity.  · Make sure to use equipment that fits you.  · Maintain physical fitness, including:  ? Strength.  ? Flexibility.  Contact a health care provider if:  · Your pain and stiffness continue or get worse.  · Your leg or hip becomes weak.  · You have changes in your bowel function or bladder function.  Summary  · Piriformis syndrome is a condition that can cause pain, tingling, and numbness in your buttocks and down the back of your leg.  · You may try applying heat or ice to relieve the pain.  · Do not sit for long periods. Get up and walk around every 20 minutes or as often as told by your health care provider.  This information is not intended to replace advice given to you by your health care provider. Make sure you discuss any questions you have with your health  care provider.  Document Revised: 04/09/2020 Document Reviewed: 08/14/2019  [employer] Patient Education © 2021 [employer]    Piriformis Syndrome Rehab  Ask your health care provider which exercises are safe for you. Do exercises exactly as told by your health care provider and adjust them as directed. It is normal to feel mild stretching, pulling, tightness, or discomfort as you do these exercises. Stop right away if you feel sudden pain or your pain gets worse. Do not begin these exercises until told by your health care provider.  Stretching and range-of-motion exercises  These exercises warm up your muscles and joints and improve the movement and flexibility of your hip and pelvis. The exercises also help to relieve pain, numbness, and tingling.  Hip rotation  This is an exercise in which you lie on your back and stretch the muscles that rotate your hip (hip rotators) to stretch your buttocks.  1. Lie on your back on a firm surface.  2. Pull your left / right knee toward your same shoulder with your left / right hand until your knee is pointing toward the ceiling. Hold your left / right ankle with your other hand.  3. Keeping your knee steady, gently pull your left / right ankle toward your other shoulder until you feel a stretch in your buttocks.  4. Hold this position for __________ seconds.  Repeat __________ times. Complete this exercise __________ times a day.  Hip extensor  This is an exercise in which you lie on your back and pull your knee to your chest.  1. Lie on your back on a firm surface. Both of your legs should be straight.  2. Pull your left / right knee to your chest. Hold your leg in this position by holding onto the back of your thigh or the front of your knee.  3. Hold this position for __________ seconds.  4. Slowly return to the starting position.  Repeat __________ times. Complete this exercise __________ times a day.  Strengthening exercises  These exercises build strength and endurance  in your hip and thigh muscles. Endurance is the ability to use your muscles for a long time, even after they get tired.  Straight leg raises, side-lying  This exercise strengthens the muscles that rotate the leg at the hip and move it away from your body (hip abductors).  1. Lie on your side with your left / right leg in the top position. Lie so your head, shoulder, knee, and hip line up. Bend your bottom knee to help you balance.  2. Lift your top leg 4-6 inches (10-15 cm) while keeping your toes pointed straight ahead.  3. Hold this position for __________ seconds.  4. Slowly lower your leg to the starting position.  5. Let your muscles relax completely after each repetition.  Repeat __________ times. Complete this exercise __________ times a day.  Hip abduction and rotation  This is sometimes called quadruped (on hands and knees) exercises.  1. Get on your hands and knees on a firm, lightly padded surface. Your hands should be directly below your shoulders, and your knees should be directly below your hips.  2. Lift your left / right knee out to the side. Keep your knee bent. Do not twist your body.  3. Hold this position for __________ seconds.  4. Slowly lower your leg.  Repeat __________ times. Complete this exercise __________ times a day.  Straight leg raises, face-down  This exercise stretches the muscles that move your hips away from the front of the pelvis (hip extensors).  1. Lie on your abdomen on a bed or a firm surface with a pillow under your hips.  2. Squeeze your buttocks muscles and lift your left / right leg about 4-6 inches (10-15 cm) off the bed. Do not let your back arch.  3. Hold this position for __________ seconds.  4. Slowly lower your leg to the starting position.  5. Let your muscles relax completely after each repetition.  Repeat __________ times. Complete this exercise __________ times a day.  This information is not intended to replace advice given to you by your health care provider.  Make sure you discuss any questions you have with your health care provider.  Document Revised: 04/09/2020 Document Reviewed: 10/10/2019  [employer] Patient Education © 2021 [employer]

## 2021-06-30 NOTE — PROGRESS NOTES
Chief complaint:   Chief Complaint   Patient presents with   • lumbar back pain       Subjective     HPI:   Interval History: Trinidad returns today for follow-up of left lumbosacral pain radiating into her left posterior thigh.  Pain originally began greater than 10 years ago but is worsening over the last 6 years.  She is previously seen both her primary care physician for hip evaluation as well as Dr. Lewis for the last 4 years.  She initially had an ablation which was helpful for 1 year, her second ablation was helpful for 9 months and her third ablation did not produce any significant benefit.    Her pain is located in her lumbosacral spine asymmetric to the left is predominantly at the left and radiates to her posterior knee without going past her knee.  Not associate with numbness and tingling in the feet.  It comes and goes and is present with sitting and crossing her legs and is relieved with standing.  She was previously evaluated by physical therapy who felt that she might have piriformis syndrome.  At her last visit she was suspected to have sacroiliitis.  She cannot provide me with a numerical pain score today.    Oswestry Disability Index Lumbar = 40%   Score   Pain Intensity Fairly severe pain-3   Personal Care Look after myslef but very painful-1   Lifting Only very light weights-4   Walking Pain prevents > 1/4 mile-2   Sitting Pain prevents sitting > 10 min-4   Standing Stand as long as I like-0   Sleeping Occasionally disturbed-1   Sex Life (if applicable) Not applicable-0   Social Life Social life normal, but increases pain-1   Traveling Pain restricts to <30 min-4   ([place] et al, 1980)    SCORE INTERPRETATION OF THE OSWESTRY LBP DISABILITY QUESTIONNAIRE     20-40% Moderate disability This group experiences more pain and problems with sitting, lifting, and standing. Travel and social life are more difficult and they may well be off work. Personal care, sexual activity, and sleeping are not  grossly affected, and the back condition can usually be managed by conservative means.           Review of Systems   Constitutional: Negative.    HENT: Negative.    Eyes: Negative.    Respiratory: Negative.    Cardiovascular: Negative.    Gastrointestinal: Negative.    Endocrine: Negative.    Genitourinary: Negative.    Musculoskeletal: Positive for back pain.   Skin: Negative.    Allergic/Immunologic: Negative.    Neurological: Negative.    Hematological: Negative.    Psychiatric/Behavioral: Negative.        PFSH:  Past Medical History:   Diagnosis Date   • Antral ulcer    • Bladder cystocele    • C. difficile diarrhea    • CAD (coronary artery disease)    • Colon cancer screening     PREOPERATIVE    • Colon polyp    • Diarrhea    • Difficulty swallowing    • Disease of thyroid gland    • Elevated cholesterol    • Gastritis    • Generalized OA    • GERD (gastroesophageal reflux disease)    • History of bladder surgery 01/07/2020   • Hyperlipidemia    • Hypertension    • Liver cyst    • Lung nodule    • Microscopic colitis    • Multiple gastric ulcers     last 5 years ago   • Neck pain    • Neuropathy    • Normal body mass index    • NSAID induced gastritis    • Numbness in both hands    • Ulcer of pyloric antrum     UNSPECIFIED ULCER CHRONICITY   • UTI (urinary tract infection)    • Weight loss        Past Surgical History:   Procedure Laterality Date   • ANTERIOR CERVICAL DISCECTOMY W/ FUSION N/A 5/22/2017    Procedure: CERVICAL DISCECTOMY ANTERIOR FUSION WITH INSTRUMENTATION;  Surgeon: NADINE Sarabia MD;  Location:  [facility] OR;  Service:    • AORTAGRAM Left 11/9/2020    Procedure: left lower extremtiy angiogram, hawk athrectomy, balloon angioplasty, stent placement, mynx closure;  Surgeon: Sukhdev Condon DO;  Location:  [facility] HYBRID OR 12;  Service: Vascular;  Laterality: Left;   • AORTAGRAM Left 3/26/2021    Procedure: LEFT LOWER EXTREMITY ANGIOGRAM, BALLOON ANGIOPLASTY, STENT PLACEMENT, MYNX CLOSURE;   Surgeon: Sukhdev Condon, DO;  Location:  [facility] HYBRID OR 12;  Service: Vascular;  Laterality: Left;   • BLADDER SUSPENSION      also had pelvic reconstructive surgery   • BREAST EXCISIONAL BIOPSY Right 1985   • CERVICAL CORPECTOMY N/A 5/22/2017    Procedure:  ANTERIOR CERVICAL DISCECTOMY FUSION C-6 to T1,  ANTERIOR FUSION WITH INSTRUMENTATION C-5 T-1;  Surgeon: NADINE Sarabia MD;  Location:  [facility] OR;  Service:    • COLONOSCOPY  08/19/2015    TIC BX RT COLON   • COLONOSCOPY  12/04/2013    RT COLON BX RECALL 5YR   • COLONOSCOPY N/A 11/29/2016    Procedure: LOWER LIMITED COLONOSCOPY WITH ANESTHESIA;  Surgeon: Marco Antonio Vallejo MD;  [facility]: [facility];  Service:    • ENDOSCOPY  12/03/2015    HEALED ULCER SLANT BX   • HYSTERECTOMY     • OTHER SURGICAL HISTORY      KNEE CARTILAGE REMOVED   • OTHER SURGICAL HISTORY      BENIGN FIBROID TUMOR OF BREAST 1985 REMOVED   • TONSILLECTOMY         Objective      Current Outpatient Medications   Medication Sig Dispense Refill   • alendronate (FOSAMAX) 70 MG tablet Take 70 mg by mouth Every 7 (Seven) Days.  3   • aspirin 81 MG chewable tablet Chew 81 mg Daily.     • benazepril (LOTENSIN) 5 MG tablet Take 5 mg by mouth Daily.     • Calcium Citrate (CITRACAL PO) Take 2 tablets by mouth 2 (Two) Times a Day.     • carvedilol (COREG) 12.5 MG tablet Take 12.5 mg by mouth 2 (Two) Times a Day With Meals.     • cetirizine (ZyrTEC) 10 MG tablet Take 10 mg by mouth as needed for allergies.     • cilostazol (PLETAL) 50 MG tablet Take 50 mg by mouth 2 (Two) Times a Day.     • clopidogrel (PLAVIX) 75 MG tablet TAKE 1 TABLET BY MOUTH EVERY DAY  30 tablet 4   • diazePAM (VALIUM) 5 MG tablet TK 1/2 T PO QID PRN  0   • famotidine (PEPCID) 40 MG tablet Take 40 mg by mouth 2 (Two) Times a Day.  3   • hydroCHLOROthiazide (HYDRODIURIL) 12.5 MG tablet Take 12.5 mg by mouth Daily.     • levothyroxine (SYNTHROID, LEVOTHROID) 50 MCG tablet Take 50 mcg by mouth Daily.  3   • multivitamin with  "minerals (MULTIVITAMIN ADULT PO) Take 1 tablet by mouth Daily.     • Myrbetriq 50 MG tablet sustained-release 24 hour 24 hr tablet Take 50 mg by mouth Daily.     • pravastatin (PRAVACHOL) 80 MG tablet Take 80 mg by mouth daily. DOESN'T TAKE ON DAYS SHE HAS TO TAKE COLESTID     • Repatha SureClick solution auto-injector SureClick injection Inject 140 mg under the skin into the appropriate area as directed Every 14 (Fourteen) Days.     • traMADol (ULTRAM) 50 MG tablet TK 1 T PO Q 6 H PRN  0     No current facility-administered medications for this visit.       Vital Signs  Ht 161.3 cm (63.5\")   Wt 52.4 kg (115 lb 9.6 oz)   Breastfeeding No   BMI 20.16 kg/m²   Physical Exam  Eyes:      Extraocular Movements: EOM normal.      Pupils: Pupils are equal, round, and reactive to light.   Neurological:      Mental Status: She is oriented to person, place, and time.      Gait: Gait is intact.      Deep Tendon Reflexes:      Reflex Scores:       Tricep reflexes are 2+ on the right side and 2+ on the left side.       Bicep reflexes are 2+ on the right side and 2+ on the left side.       Brachioradialis reflexes are 2+ on the right side and 2+ on the left side.       Patellar reflexes are 2+ on the right side and 2+ on the left side.       Achilles reflexes are 2+ on the right side and 2+ on the left side.  Psychiatric:         Speech: Speech normal.       Neurologic Exam     Mental Status   Oriented to person, place, and time.   Speech: speech is normal     Cranial Nerves     CN II   Visual fields full to confrontation.     CN III, IV, VI   Pupils are equal, round, and reactive to light.  Extraocular motions are normal.     CN V   Right facial sensation deficit: none  Left facial sensation deficit: none    CN VII   Facial expression full, symmetric.     CN VIII   Hearing: intact    CN IX, X   Palate: symmetric    CN XI   Right sternocleidomastoid strength: normal  Left sternocleidomastoid strength: normal    CN XII   Tongue " deviation: none    Motor Exam     Strength   Right deltoid: 5/5  Left deltoid: 5/5  Right biceps: 5/5  Left biceps: 5/5  Right triceps: 5/5  Left triceps: 5/5  Right interossei: 5/5  Left interossei: 5/5  Right iliopsoas: 5/5  Left iliopsoas: 5/5  Right quadriceps: 5/5  Left quadriceps: 5/5  Right anterior tibial: 5/5  Left anterior tibial: 5/5  Right gastroc: 5/5  Left gastroc: 5/5    Sensory Exam   Right arm light touch: normal  Left arm light touch: normal  Right leg light touch: normal  Left leg light touch: normal    Gait, Coordination, and Reflexes     Gait  Gait: normal    Reflexes   Right brachioradialis: 2+  Left brachioradialis: 2+  Right biceps: 2+  Left biceps: 2+  Right triceps: 2+  Left triceps: 2+  Right patellar: 2+  Left patellar: 2+  Right achilles: 2+  Left achilles: 2+  Right Carrizales: absent  Left Carrizales: absent    Reproduction of her pain with crossing her legs    Negative IKER    No pain to palpation over the left sacroiliac joint but there is pain with palpation of the sciatic notch  (12 bullet pts)    Results Review:   DEXA Bone Density Axial    Result Date: 6/17/2021  Impression:  1. Osteopenia. Low bone mass. The bone density is between 1.0 and 2.5 standard deviations below the mean for a young adult woman. There is an increased risk of fracture in these patients. This report was finalized on 06/17/2021 15:17 by Dr. Freddie Johnson MD.    Mammo Screening Digital Tomosynthesis Bilateral With CAD    Result Date: 6/17/2021  1.  No mammographic evidence of malignancy within either breast. Annual screening mammogram recommended. 2.  BI-RADS Category 1-Negative. 3.  The patient will receive a letter notifying her of the results of this exam. 4.  Breast Density Category B  This report was finalized on 06/17/2021 15:53 by Dr. Freddie Johnson MD.      10/19/2020.  MRI of the lumbar spine shows no acute fractures or malalignment, no STIR signal change, the conus terminates at normal level, Schmorl  nodes noted at L2 and L3, questionable fatty filum within the central cord on T1 weighted imaging from L1-L2, multilevel facet arthropathy and ligamentous flavum hypertrophy resulting in mild central canal and bilateral foraminal narrowing that appears worse at L4-5.  No significant central canal or foraminal stenosis noted within the lumbar spine.                            ASSESSMENT and PLAN  Trinidad Zhu is a 76 y.o. female with a significant medical history of CAD, anticoagulation on Plavix and aspirin, hypertension, hyperlipidemia, hypothyroidism, C. difficile, liver cyst, lung nodule, and she is underweight.  She presents with a known problem of left lumbosacral pain radiating into her left thigh and stopping at her knee, 60% left leg, 40% back. REBECCA: 32,40.  Physical exam findings of negative left IKER otherwise neurologically intact.    Reproduction of pain with palpation of her sciatic notch and crossing of her legs in sitting.  MRI of her lumbar spine shows widening of the left SI joint, mild degeneration at L2/3, and even milder degeneration at L5-S1 without significant compression of the nerve roots.     TREATMENT RECOMMENDATIONS ...  Fatty filum  Facet Arthropathy  Mild L2/3 degenerative disc disease  Patient does not present with signs or symptoms of fatty filum which include high arches, hammertoes, and spasticity in the lower extremity and a stocking glove distribution numbness.  Also this is rarely present any unilateral manner.  Based on these findings I would treat her fatty filum asymptomatically.  Additionally her facet arthropathy does not appear to match her story which would be more consistent with a morning pain that improves with mobility and motion.  Hers is a pain that worsens with sitting and recumbency.  In a similar manner I believe that the very mild L2/3 degeneration is unlikely to be causing her pain.  Her pain is not present in L2 or L3 distribution.  She has no associated  numbness.  Flexion-extension films no evidence of mobility at this level.  Would handle conservatively as well.    Unilateral Back Radiating to the Thigh but not below the Knee with Numbness:      Different diagnosis includes ...  Degenerative spine disease without neurological compression: spondylolisthesis, degenerative disc disease.   Neurogenic: complex regional pain syndrome, common peroneal nerve entrapment  Vascular insufficiency or DVT  MSK: IT band syndrome, trochanteric bursitis, sacroiliitis, Piriformis syndrome.  Psychological/other: multiple myeloma, myofascial pain, conversion disorder, or malingering    Trinidad's signs and symptoms are most concerning for left piriformis syndrome versus sacroiliitis given her synptoms of left pain reproducible with palpation of the sciatic notch radiating to her knee but not beyond it and pain with sitting that is relieved with standing and signs of neurologically intact.  Furthermore her imaging shows widening of the left SI joint. Trinidad currently lacks pain, sensory changes, and weakness in a dermatomal distribution to suggest neurological compression.    Piriformis syndrome: This is a controversial cause.  Piriformis muscle passes through the greater sciatic notch to attach to the greater trochanter of the femur.  It is innervated by L5 to S1.  It is a principal external rotator of the extended hip that may be irritated or compressed by the sciatic nerve mimicking a herniated disc.  The superior gluteal nerve is spared in this syndrome.Freiberg Test, pain with forced internal rotation of the hip with thigh extension. Pace Test, pain on resisted abduction/external rotation of the hip.  There are no well studied therapies but advocated therapies include physical therapy, stretching, injection of the muscle and in some centers transection of the piriformis muscle.  Use of botulinum toxin injections has been described.    Sacroiliitis: Pain reproducible with palpation  of the SI joint just lateral to the sacrum.  Often with referred pain to the ipsilateral hip.  Also common in patient with gait abnormalities or significant limp.  May produce pain and tenderness over one or both SI joints.  Pelvic x-rays may show sclerosis of one or both SI joints. Assess for HLA-B27 antigen. Best treated with direct injection of SI joint by pain management.  Will rarely require SI joint fusion which is performed by an orthopedic surgeon.     Treatment Options: Jefferson is unable to tolerate nonsteroidal anti-inflammatory secondary to diarrhea.  She has not previously tried Celebrex which can be gentler on her GI tract.    -Celebrex 100 twice daily  -Recommendations for Dr. Lewis.  Agree with SI joint injections and piriformis injections on the left.    -Previously given piriformis exercises which she says are painful.  Informed her that reproduction of this pain is indicative of piriformis syndrome and that she must fully stretch this muscle to keep it from spasming and causing her pain.      Underweight  BMI today is 19.57.  I recommended Ensure or equivalent nutritional supplement 3 times daily with each meal as a dietary supplement.      No diagnosis found.    Recommendations:  There are no diagnoses linked to this encounter.    No follow-ups on file.    I spent 37 minutes caring for Trinidad on this date of service. This time includes time spent by me in the following activities: preparing for the visit, reviewing tests, obtaining and/or reviewing a separately obtained history, performing a medically appropriate examination and/or evaluation, counseling and educating the patient/family/caregiver and ordering medications, tests, or procedures.     Thank you, for allowing me to continue to participate in the care of this patient.    Sincerely,  Sherif Bright MD

## 2021-07-19 ENCOUNTER — TELEPHONE (OUTPATIENT)
Dept: VASCULAR SURGERY | Facility: CLINIC | Age: 76
End: 2021-07-19

## 2021-07-20 ENCOUNTER — TRANSCRIBE ORDERS (OUTPATIENT)
Dept: ADMINISTRATIVE | Facility: HOSPITAL | Age: 76
End: 2021-07-20

## 2021-07-20 DIAGNOSIS — M79.606 PAIN OF LOWER EXTREMITY, UNSPECIFIED LATERALITY: Primary | ICD-10-CM

## 2021-07-20 DIAGNOSIS — I73.9 PAD (PERIPHERAL ARTERY DISEASE) (HCC): ICD-10-CM

## 2021-07-20 DIAGNOSIS — I73.9 PAD (PERIPHERAL ARTERY DISEASE) (HCC): Primary | ICD-10-CM

## 2021-07-21 ENCOUNTER — HOSPITAL ENCOUNTER (OUTPATIENT)
Dept: ULTRASOUND IMAGING | Facility: HOSPITAL | Age: 76
Discharge: HOME | End: 2021-07-21
Admitting: NURSE PRACTITIONER

## 2021-07-21 ENCOUNTER — OFFICE VISIT (OUTPATIENT)
Dept: VASCULAR SURGERY | Facility: CLINIC | Age: 76
End: 2021-07-21

## 2021-07-21 VITALS
HEIGHT: 63 IN | HEART RATE: 73 BPM | WEIGHT: 115 LBS | SYSTOLIC BLOOD PRESSURE: 126 MMHG | OXYGEN SATURATION: 94 % | DIASTOLIC BLOOD PRESSURE: 62 MMHG | BODY MASS INDEX: 20.38 KG/M2

## 2021-07-21 DIAGNOSIS — I73.9 PAD (PERIPHERAL ARTERY DISEASE) (HCC): Primary | ICD-10-CM

## 2021-07-21 DIAGNOSIS — I73.9 PAD (PERIPHERAL ARTERY DISEASE) (HCC): ICD-10-CM

## 2021-07-21 DIAGNOSIS — M79.606 PAIN OF LOWER EXTREMITY, UNSPECIFIED LATERALITY: ICD-10-CM

## 2021-07-21 DIAGNOSIS — Z51.81 ENCOUNTER FOR MONITORING ANTIPLATELET THERAPY: ICD-10-CM

## 2021-07-21 DIAGNOSIS — Z01.818 PREOP TESTING: ICD-10-CM

## 2021-07-21 DIAGNOSIS — Z79.02 ENCOUNTER FOR MONITORING ANTIPLATELET THERAPY: ICD-10-CM

## 2021-07-21 PROCEDURE — 99214 OFFICE O/P EST MOD 30 MIN: CPT | Performed by: NURSE PRACTITIONER

## 2021-07-21 PROCEDURE — 93923 UPR/LXTR ART STDY 3+ LVLS: CPT | Performed by: SURGERY

## 2021-07-21 PROCEDURE — 93923 UPR/LXTR ART STDY 3+ LVLS: CPT

## 2021-07-21 NOTE — PROGRESS NOTES
7/21/2021       Andrew Layne MD  [address] [facility] [address]    Trinidad Zhu  1945    Chief Complaint   Patient presents with   • Follow-up     FOLLOW UP WITH TESTING. PERIPHERAL ARTERY DISEASE. pt states she is not diabetic. pt does not smoke.                • Med Management     verbally reviewed medication with pt.       Dear Andrew Layne MD       HPI  I had the pleasure of seeing your patient Trinidad Zhu in the office today.   As you recall, Trinidad Zhu is a 76 y.o.  female who we are following for lower extremity PAD.  Initially she was seen by Dr. Gonzáles with complaints of foot pain.  She does also report cramping in her feet and toes at night.  She does wear compression stockings.  She did undergo a left lower extremity angiogram on 11/9/2020.  Initially, she did well postoperatively but began having pain again which was waking her up at night.  She did undergo a left lower extremity angiogram with crossing of superficial femoral artery chronic total occlusion and stent placement on 3/26/2021.  She has maintained on aspirin, Pletal, Plavix, and Pravachol.   She is back to having pain she had prior to her first surgery in November.  Her foot has already appearance.  She reports this began about a month ago.  She did have noninvasive testing performed, which I did review in office.    Review of Systems   Constitutional: Negative.    HENT: Negative.    Eyes: Negative.    Respiratory: Positive for shortness of breath.    Cardiovascular: Negative.    Gastrointestinal: Negative.    Endocrine: Negative.    Genitourinary: Negative.    Musculoskeletal: Negative for back pain.        Hip pain left   Skin: Positive for color change (left foot nichole).   Allergic/Immunologic: Negative.    Neurological: Negative.    Hematological: Negative.    Psychiatric/Behavioral: Negative.         /62 (BP Location: Left arm, Patient Position: Sitting, Cuff Size: Adult)   Pulse 73   Ht  "160 cm (63\")   Wt 52.2 kg (115 lb)   SpO2 94%   BMI 20.37 kg/m²   Physical Exam  Vitals and nursing note reviewed.   Constitutional:       General: She is not in acute distress.     Appearance: Normal appearance. She is well-developed and normal weight. She is not diaphoretic.   HENT:      Head: Normocephalic and atraumatic.   Eyes:      General: No scleral icterus.     Pupils: Pupils are equal, round, and reactive to light.   Neck:      Thyroid: No thyromegaly.      Vascular: No carotid bruit or JVD.   Cardiovascular:      Rate and Rhythm: Normal rate and regular rhythm.      Pulses:           Femoral pulses are 2+ on the right side and 2+ on the left side.       Dorsalis pedis pulses are detected w/ Doppler on the right side and 0 on the left side.        Posterior tibial pulses are detected w/ Doppler on the right side and detected w/ Doppler on the left side.      Heart sounds: Normal heart sounds and S2 normal. No murmur heard.   No friction rub. No gallop.       Comments: Left: Doppler PT, peroneal, DP, left foot nichole appearance  Pulmonary:      Effort: Pulmonary effort is normal.      Breath sounds: Normal breath sounds.   Abdominal:      General: Bowel sounds are normal.      Palpations: Abdomen is soft.   Musculoskeletal:         General: Normal range of motion.      Cervical back: Normal range of motion and neck supple.      Left lower leg: Edema (Mild) present.   Skin:     General: Skin is warm and dry.   Neurological:      General: No focal deficit present.      Mental Status: She is alert and oriented to person, place, and time.      Cranial Nerves: No cranial nerve deficit.   Psychiatric:         Mood and Affect: Mood normal.         Behavior: Behavior normal. Behavior is cooperative.         Thought Content: Thought content normal.         Judgment: Judgment normal.        Diagnostic data:  US Ankle / Brachial Indices Extremity Complete    Result Date: 7/21/2021  Narrative:  History: PAD  " Comments: Bilateral lower extremity arterial with multi-level pulse volume recordings and segmental pressures were performed at rest and stress.  The right ankle/brachial index is 0.83. The waveforms are biphasic without dampening.These findings are consistent with mild arterial insufficiency of the right lower extremity at rest.  There is likely underlying disease in the distal SFA.  The left ankle/brachial index is 0.51. The waveforms are biphasic with mild dampening. These findings are consistent with moderate to severe arterial insufficiency of the left lower extremity at rest.    There is likely underlying disease in the distal SFA.      Impression: Impression: 1. Mild arterial insufficiency of the right lower extremity at rest. 2. Moderate to severe arterial insufficiency of the left lower extremity at rest.   This report was finalized on 07/21/2021 14:32 by Dr. Sukhdev Condon MD.       Patient Active Problem List   Diagnosis   • Spinal stenosis, cervical region   • Lung nodules   • Bronchiectasis without complication (CMS/HCC)   • Underweight   • Personal history of nicotine dependence   • Restrictive lung disease   • Pulmonary emphysema (CMS/HCC)   • PAD (peripheral artery disease) (CMS/HCC)   • Preop testing   • Gastroesophageal reflux disease   • Allergic rhinitis   • ORTIZ (dyspnea on exertion)   • CAD (coronary artery disease)   • Hyperlipidemia         ICD-10-CM ICD-9-CM   1. PAD (peripheral artery disease) (CMS/HCC)  I73.9 443.9   2. Encounter for monitoring antiplatelet therapy  Z51.81 V58.83    Z79.02 V58.63   3. Preop testing  Z01.818 V72.84         Plan: After thoroughly evaluating Trinidad Zhu, I believe the best course of action is to proceed with a left lower extremity angiogram.  Her testing does show severe arterial insufficiency to her left lower extremity.  Her foot is warm with Doppler signals present.  Risks of angiogram were discussed.  These include, but are not limited to,  bleeding, infection, vessel damage, nerve damage, embolus, and loss of limb.  The patient understands these risks and wishes to proceed with procedure.  She will likely require anticoagulation following this procedure.  We will proceed with angiogram in the next 2 weeks.  I would like her to continue taking her aspirin.  She does inquire about holding Plavix for injections with Dr. Colmenares.  I discussed with Dr. Condon and instructed her to try to do this prior to angiogram as she will be on anticoagulation after that point. I did discuss vascular risk factors as they pertain to the progression of vascular disease including controlling her hypertension and hyperlipidemia.  Her blood pressure is stable on her current medication regimen.  She is maintained on Pravachol and Repatha for her hyperlipidemia.  From vascular standpoint she can remain on Pravachol as she reports Repatha is high cost.  Patient's Body mass index is 20.37 kg/m². BMI is within normal parameters. No follow-up required.  The patient can continue taking their current medication regimen as previously planned.  This was all discussed in full with complete understanding.    Thank you for allowing me to participate in the care of your patient.  Please do not hesitate with any questions or concerns.  I will keep you aware of any further encounters with Trinidad Zhu.        Sincerely yours,         GEREMIAS Head

## 2021-07-26 ENCOUNTER — TELEPHONE (OUTPATIENT)
Dept: VASCULAR SURGERY | Facility: CLINIC | Age: 76
End: 2021-07-26

## 2021-07-26 NOTE — TELEPHONE ENCOUNTER
Spoke with patient and advised of upcoming procedure.  Patient pre work is scheduled for 8/3/2021 at 815 am.  Patient procedure is scheduled for 8/6/2021 at 600 am.  Patient advised of Covid testing and visitation.    Patient advised of location time and prep.  Patient expressed understanding for all that was discussed.

## 2021-07-30 ENCOUNTER — TRANSCRIBE ORDERS (OUTPATIENT)
Dept: LAB | Facility: HOSPITAL | Age: 76
End: 2021-07-30

## 2021-07-30 DIAGNOSIS — Z11.59 SCREENING FOR VIRAL DISEASE: Primary | ICD-10-CM

## 2021-08-03 ENCOUNTER — PRE-ADMISSION TESTING (OUTPATIENT)
Dept: PREADMISSION TESTING | Facility: HOSPITAL | Age: 76
End: 2021-08-03

## 2021-08-03 ENCOUNTER — LAB (OUTPATIENT)
Dept: LAB | Facility: HOSPITAL | Age: 76
End: 2021-08-03

## 2021-08-03 VITALS
DIASTOLIC BLOOD PRESSURE: 74 MMHG | SYSTOLIC BLOOD PRESSURE: 155 MMHG | RESPIRATION RATE: 16 BRPM | WEIGHT: 115.74 LBS | OXYGEN SATURATION: 100 % | HEART RATE: 86 BPM | BODY MASS INDEX: 19.76 KG/M2 | HEIGHT: 64 IN

## 2021-08-03 DIAGNOSIS — Z79.02 ENCOUNTER FOR MONITORING ANTIPLATELET THERAPY: ICD-10-CM

## 2021-08-03 DIAGNOSIS — I73.9 PAD (PERIPHERAL ARTERY DISEASE) (HCC): ICD-10-CM

## 2021-08-03 DIAGNOSIS — Z01.818 PREOP TESTING: ICD-10-CM

## 2021-08-03 DIAGNOSIS — Z51.81 ENCOUNTER FOR MONITORING ANTIPLATELET THERAPY: ICD-10-CM

## 2021-08-03 LAB
ANION GAP SERPL CALCULATED.3IONS-SCNC: 12 MMOL/L (ref 5–15)
APTT PPP: 28 SECONDS (ref 24.1–35)
BASOPHILS # BLD AUTO: 0.05 10*3/MM3 (ref 0–0.2)
BASOPHILS NFR BLD AUTO: 0.5 % (ref 0–1.5)
BUN SERPL-MCNC: 14 MG/DL (ref 8–23)
BUN/CREAT SERPL: 22.2 (ref 7–25)
CALCIUM SPEC-SCNC: 9.9 MG/DL (ref 8.6–10.5)
CHLORIDE SERPL-SCNC: 98 MMOL/L (ref 98–107)
CO2 SERPL-SCNC: 29 MMOL/L (ref 22–29)
CREAT SERPL-MCNC: 0.63 MG/DL (ref 0.57–1)
DEPRECATED RDW RBC AUTO: 49.4 FL (ref 37–54)
EOSINOPHIL # BLD AUTO: 0.06 10*3/MM3 (ref 0–0.4)
EOSINOPHIL NFR BLD AUTO: 0.6 % (ref 0.3–6.2)
ERYTHROCYTE [DISTWIDTH] IN BLOOD BY AUTOMATED COUNT: 14.5 % (ref 12.3–15.4)
GFR SERPL CREATININE-BSD FRML MDRD: 92 ML/MIN/1.73
GLUCOSE SERPL-MCNC: 97 MG/DL (ref 65–99)
HCT VFR BLD AUTO: 38.2 % (ref 34–46.6)
HGB BLD-MCNC: 12.3 G/DL (ref 12–15.9)
IMM GRANULOCYTES # BLD AUTO: 0.04 10*3/MM3 (ref 0–0.05)
IMM GRANULOCYTES NFR BLD AUTO: 0.4 % (ref 0–0.5)
INR PPP: 1.09 (ref 0.91–1.09)
LYMPHOCYTES # BLD AUTO: 1.97 10*3/MM3 (ref 0.7–3.1)
LYMPHOCYTES NFR BLD AUTO: 20.3 % (ref 19.6–45.3)
MCH RBC QN AUTO: 29.8 PG (ref 26.6–33)
MCHC RBC AUTO-ENTMCNC: 32.2 G/DL (ref 31.5–35.7)
MCV RBC AUTO: 92.5 FL (ref 79–97)
MONOCYTES # BLD AUTO: 0.89 10*3/MM3 (ref 0.1–0.9)
MONOCYTES NFR BLD AUTO: 9.2 % (ref 5–12)
NEUTROPHILS NFR BLD AUTO: 6.71 10*3/MM3 (ref 1.7–7)
NEUTROPHILS NFR BLD AUTO: 69 % (ref 42.7–76)
NRBC BLD AUTO-RTO: 0 /100 WBC (ref 0–0.2)
PLATELET # BLD AUTO: 244 10*3/MM3 (ref 140–450)
PMV BLD AUTO: 9.9 FL (ref 6–12)
POTASSIUM SERPL-SCNC: 4.3 MMOL/L (ref 3.5–5.2)
PROTHROMBIN TIME: 13.3 SECONDS (ref 11.5–13.4)
RBC # BLD AUTO: 4.13 10*6/MM3 (ref 3.77–5.28)
SARS-COV-2 ORF1AB RESP QL NAA+PROBE: NOT DETECTED
SODIUM SERPL-SCNC: 139 MMOL/L (ref 136–145)
WBC # BLD AUTO: 9.72 10*3/MM3 (ref 3.4–10.8)

## 2021-08-03 PROCEDURE — U0005 INFEC AGEN DETEC AMPLI PROBE: HCPCS | Performed by: SURGERY

## 2021-08-03 PROCEDURE — 85610 PROTHROMBIN TIME: CPT

## 2021-08-03 PROCEDURE — 85730 THROMBOPLASTIN TIME PARTIAL: CPT

## 2021-08-03 PROCEDURE — 85025 COMPLETE CBC W/AUTO DIFF WBC: CPT

## 2021-08-03 PROCEDURE — 80048 BASIC METABOLIC PNL TOTAL CA: CPT

## 2021-08-03 PROCEDURE — 36415 COLL VENOUS BLD VENIPUNCTURE: CPT

## 2021-08-03 PROCEDURE — U0004 COV-19 TEST NON-CDC HGH THRU: HCPCS | Performed by: SURGERY

## 2021-08-03 PROCEDURE — C9803 HOPD COVID-19 SPEC COLLECT: HCPCS | Performed by: SURGERY

## 2021-08-03 PROCEDURE — 93005 ELECTROCARDIOGRAM TRACING: CPT

## 2021-08-03 PROCEDURE — 93010 ELECTROCARDIOGRAM REPORT: CPT | Performed by: INTERNAL MEDICINE

## 2021-08-03 NOTE — DISCHARGE INSTRUCTIONS
DAY OF SURGERY INSTRUCTIONS        YOUR SURGEON: Dr. Sukhdev Condon    PROCEDURE: Left lower extremity angiogram    DATE OF SURGERY: Friday August 6, 2021    ARRIVAL TIME: AS DIRECTED BY OFFICE    YOU MAY TAKE THE FOLLOWING MEDICATION(S) THE MORNING OF SURGERY WITH A SIP OF WATER: Valium if needed for anxiety, Tramadol if needed for pain, Carvedilol    Do NOT take your Benazepril within 24 hours of surgery as directed by anesthesia      ALL OTHER HOME MEDICATION CHECK WITH YOUR PHYSICIAN            MANAGING PAIN AFTER SURGERY    We know you are probably wondering what your pain will be like after surgery.  Following surgery it is unrealistic to expect you will not have pain.   Pain is how our bodies let us know that something is wrong or cautions us to be careful.  That said, our goal is to make your pain tolerable.    Methods we may use to treat your pain include (oral or IV medications, PCAs, epidurals, nerve blocks, etc.)   While some procedures require IV pain medications for a short time after surgery, transitioning to pain medications by mouth allows for better management of pain.   Your nurse will encourage you to take oral pain medications whenever possible.  IV medications work almost immediately, but only last a short while.  Taking medications by mouth allows for a more constant level of medication in your blood stream for a longer period of time.      Once your pain is out of control it is harder to get back under control.  It is important you are aware when your next dose of pain medication is due.  If you are admitted, your nurse may write the time of your next dose on the white board in your room to help you remember.      We are interested in your pain and encourage you to inform us about aggravating factors during your visit.   Many times a simple repositioning every few hours can make a big difference.    If your physician says it is okay, do not let your pain prevent you from getting out of  bed. Be sure to call your nurse for assistance prior to getting up so you do not fall.      Before surgery, please decide your tolerable pain goal.  These faces help describe the pain ratings we use on a 0-10 scale.   Be prepared to tell us your goal and whether or not you take pain or anxiety medications at home.          BEFORE YOU COME TO THE HOSPITAL  (Pre-op instructions)  • Do not eat, drink, smoke or chew gum after midnight the night before surgery.  This also includes no mints.  • Morning of surgery take only the medicines you have been instructed with a sip of water unless otherwise instructed  by your physician.  • Do not shave, wear makeup or dark nail polish.  • Remove all jewelry including rings.  • Leave anything you consider valuable at home.  • Leave your suitcase in the car until after your surgery.  • Bring the following with you if applicable:  o Picture ID and insurance, Medicare or Medicaid cards  o Co-pay/deductible required by insurance (cash, check, credit card)  o Copy of advance directive, living will or power-of- documents if not brought to PAT  o CPAP or BIPAP mask and tubing  o Relaxation aids ( book, magazine), etc.  o Hearing aids                        ON THE DAY OF SURGERY  · On the day of surgery check in at registration located at the main entrance of the hospital.   ? You will be registered and given a beeper with instructions where to wait in the main lobby.  ? When your beeper lights up and vibrates a member of the Outpatient Surgery staff will meet you at the double doors under the stair steps and escort you to your preoperative room.   · You may have cloth compression devices placed on your legs. These help to prevent blood clots and reduce swelling in your legs.  · An IV may be inserted into one of your veins.  · In the operating room, you may be given one or more of the following:  ? A medicine to help you relax (sedative).  ? A medicine to numb the area (local  "anesthetic).  ? A medicine to make you fall asleep (general anesthetic).  ? A medicine that is injected into an area of your body to numb everything below the injection site (regional anesthetic).  · Your surgical site will be marked or identified.  · You may be given an antibiotic through your IV to help prevent infection.  Contact a health care provider if you:  · Develop a fever of more than 100.4°F (38°C) or other feelings of illness during the 48 hours before your surgery.  · Have symptoms that get worse.  Have questions or concerns about your surgery    General Anesthesia/Surgery, Adult  General anesthesia is the use of medicines to make a person \"go to sleep\" (unconscious) for a medical procedure. General anesthesia must be used for certain procedures, and is often recommended for procedures that:  · Last a long time.  · Require you to be still or in an unusual position.  · Are major and can cause blood loss.  The medicines used for general anesthesia are called general anesthetics. As well as making you unconscious for a certain amount of time, these medicines:  · Prevent pain.  · Control your blood pressure.  · Relax your muscles.  Tell a health care provider about:  · Any allergies you have.  · All medicines you are taking, including vitamins, herbs, eye drops, creams, and over-the-counter medicines.  · Any problems you or family members have had with anesthetic medicines.  · Types of anesthetics you have had in the past.  · Any blood disorders you have.  · Any surgeries you have had.  · Any medical conditions you have.  · Any recent upper respiratory, chest, or ear infections.  · Any history of:  ? Heart or lung conditions, such as heart failure, sleep apnea, asthma, or chronic obstructive pulmonary disease (COPD).  ?  service.  ? Depression or anxiety.  · Any tobacco or drug use, including marijuana or alcohol use.  · Whether you are pregnant or may be pregnant.  What are the risks?  Generally, " this is a safe procedure. However, problems may occur, including:  · Allergic reaction.  · Lung and heart problems.  · Inhaling food or liquid from the stomach into the lungs (aspiration).  · Nerve injury.  · Air in the bloodstream, which can lead to stroke.  · Extreme agitation or confusion (delirium) when you wake up from the anesthetic.  · Waking up during your procedure and being unable to move. This is rare.  These problems are more likely to develop if you are having a major surgery or if you have an advanced or serious medical condition. You can prevent some of these complications by answering all of your health care provider's questions thoroughly and by following all instructions before your procedure.  General anesthesia can cause side effects, including:  · Nausea or vomiting.  · A sore throat from the breathing tube.  · Hoarseness.  · Wheezing or coughing.  · Shaking chills.  · Tiredness.  · Body aches.  · Anxiety.  · Sleepiness or drowsiness.  · Confusion or agitation.  RISKS AND COMPLICATIONS OF SURGERY  Your health care provider will discuss possible risks and complications with you before surgery. Common risks and complications include:    · Problems due to the use of anesthetics.  · Blood loss and replacement (does not apply to minor surgical procedures).  · Temporary increase in pain due to surgery.  · Uncorrected pain or problems that the surgery was meant to correct.  · Infection.  · New damage.    What happens before the procedure?    Medicines  Ask your health care provider about:  · Changing or stopping your regular medicines. This is especially important if you are taking diabetes medicines or blood thinners.  · Taking medicines such as aspirin and ibuprofen. These medicines can thin your blood. Do not take these medicines unless your health care provider tells you to take them.  · Taking over-the-counter medicines, vitamins, herbs, and supplements. Do not take these during the week before  your procedure unless your health care provider approves them.  General instructions  · Starting 3-6 weeks before the procedure, do not use any products that contain nicotine or tobacco, such as cigarettes and e-cigarettes. If you need help quitting, ask your health care provider.  · If you brush your teeth on the morning of the procedure, make sure to spit out all of the toothpaste.  · Tell your health care provider if you become ill or develop a cold, cough, or fever.  · If instructed by your health care provider, bring your sleep apnea device with you on the day of your surgery (if applicable).  · Ask your health care provider if you will be going home the same day, the following day, or after a longer hospital stay.  ? Plan to have someone take you home from the hospital or clinic.  ? Plan to have a responsible adult care for you for at least 24 hours after you leave the hospital or clinic. This is important.  What happens during the procedure?  · You will be given anesthetics through both of the following:  ? A mask placed over your nose and mouth.  ? An IV in one of your veins.  · You may receive a medicine to help you relax (sedative).  · After you are unconscious, a breathing tube may be inserted down your throat to help you breathe. This will be removed before you wake up.  · An anesthesia specialist will stay with you throughout your procedure. He or she will:  ? Keep you comfortable and safe by continuing to give you medicines and adjusting the amount of medicine that you get.  ? Monitor your blood pressure, pulse, and oxygen levels to make sure that the anesthetics do not cause any problems.  The procedure may vary among health care providers and hospitals.  What happens after the procedure?  · Your blood pressure, temperature, heart rate, breathing rate, and blood oxygen level will be monitored until the medicines you were given have worn off.  · You will wake up in a recovery area. You may wake up  slowly.  · If you feel anxious or agitated, you may be given medicine to help you calm down.  · If you will be going home the same day, your health care provider may check to make sure you can walk, drink, and urinate.  · Your health care provider will treat any pain or side effects you have before you go home.  · Do not drive for 24 hours if you were given a sedative.  Summary  · General anesthesia is used to keep you still and prevent pain during a procedure.  · It is important to tell your healthcare provider about your medical history and any surgeries you have had, and previous experience with anesthesia.  · Follow your healthcare provider’s instructions about when to stop eating, drinking, or taking certain medicines before your procedure.  · Plan to have someone take you home from the hospital or clinic.  This information is not intended to replace advice given to you by your health care provider. Make sure you discuss any questions you have with your health care provider.  Document Released: 03/26/2009 Document Revised: 08/03/2018 Document Reviewed: 08/03/2018  [employer] Interactive Patient Education © 2019 [employer]       Fall Prevention in Hospitals, Adult  As a hospital patient, your condition and the treatments you receive can increase your risk for falls. Some additional risk factors for falls in a hospital include:  · Being in an unfamiliar environment.  · Being on bed rest.  · Your surgery.  · Taking certain medicines.  · Your tubing requirements, such as intravenous (IV) therapy or catheters.  It is important that you learn how to decrease fall risks while at the hospital. Below are important tips that can help prevent falls.  SAFETY TIPS FOR PREVENTING FALLS  Talk about your risk of falling.  · Ask your health care provider why you are at risk for falling. Is it your medicine, illness, tubing placement, or something else?  · Make a plan with your health care provider to keep you safe from  falls.  · Ask your health care provider or pharmacist about side effects of your medicines. Some medicines can make you dizzy or affect your coordination.  Ask for help.  · Ask for help before getting out of bed. You may need to press your call button.  · Ask for assistance in getting safely to the toilet.  · Ask for a walker or cane to be put at your bedside. Ask that most of the side rails on your bed be placed up before your health care provider leaves the room.  · Ask family or friends to sit with you.  · Ask for things that are out of your reach, such as your glasses, hearing aids, telephone, bedside table, or call button.  Follow these tips to avoid falling:  · Stay lying or seated, rather than standing, while waiting for help.  · Wear rubber-soled slippers or shoes whenever you walk in the hospital.  · Avoid quick, sudden movements.  ¨ Change positions slowly.  ¨ Sit on the side of your bed before standing.  ¨ Stand up slowly and wait before you start to walk.  · Let your health care provider know if there is a spill on the floor.  · Pay careful attention to the medical equipment, electrical cords, and tubes around you.  · When you need help, use your call button by your bed or in the bathroom. Wait for one of your health care providers to help you.  · If you feel dizzy or unsure of your footing, return to bed and wait for assistance.  · Avoid being distracted by the TV, telephone, or another person in your room.  · Do not lean or support yourself on rolling objects, such as IV poles or bedside tables.     This information is not intended to replace advice given to you by your health care provider. Make sure you discuss any questions you have with your health care provider.     Document Released: 12/15/2001 Document Revised: 01/08/2016 Document Reviewed: 08/25/2013  [employer] Interactive Patient Education ©2016 [employer]       [facility] 4% Patient Instruction Sheet    Chlorhexidine Before  Surgery  Chlorhexidine gluconate (CHG) is a germ-killing (antiseptic) solution that is used to clean the skin. It gets rid of the bacteria that normally live on the skin. Cleaning your skin with CHG before surgery helps lower the risk for infection after surgery.    How to use CHG solution  · You will take 2 showers, one shower the night before surgery, the second shower the morning of surgery before coming to the hospital.  · Use CHG only as told by your health care provider, and follow the instructions on the label.  · Use CHG solution while taking a shower. Follow these steps when using CHG solution (unless your health care provider gives you different instructions):  1. Start the shower.  2. Use your normal soap and shampoo to wash your face and hair.  3. Turn off the shower or move out of the shower stream.  4. Pour the CHG onto a clean washcloth. Do not use any type of brush or rough-edged sponge.  5. Starting at your neck, lather your body down to your toes. Make sure you:  6. Pay special attention to the part of your body where you will be having surgery. Scrub this area for at least 1 minute.  7. Use the full amount of CHG as directed. Usually, this is one half bottle for each shower.  8. Do not use CHG on your head or face. If the solution gets into your ears or eyes, rinse them well with water.  9. Avoid your genital area.  10. Avoid any areas of skin that have broken skin, cuts, or scrapes.  11. Scrub your back and under your arms. Make sure to wash skin folds.  12. Let the lather sit on your skin for 1-2 minutes or as long as told by your health care  provider.  13. Thoroughly rinse your entire body in the shower. Make sure that all body creases and crevices are rinsed well.  14. Dry off with a clean towel. Do not put any substances on your body afterward, such as powder, lotion, or perfume.  15. Put on clean clothes or pajamas.  16. If it is the night before your surgery, sleep in clean sheets.    What  are the risks?  Risks of using CHG include:  · A skin reaction.  · Hearing loss, if CHG gets in your ears.  · Eye injury, if CHG gets in your eyes and is not rinsed out.  · The CHG product catching fire.  Make sure that you avoid smoking and flames after applying CHG to your skin.  Do not use CHG:  · If you have a chlorhexidine allergy or have previously reacted to chlorhexidine.  · On babies younger than 2 months of age.      On the day of surgery, when you are taken to your room in Outpatient Surgery you will be given a CHG prepackaged cloth to wipe the site for your surgery.  How to use CHG prepackaged cloths  · Follow the instructions on the label.  · Use the CHG cloth on clean, dry skin. Follow these steps when using a CHG cloth (unless your health care provider gives you different instructions):  1. Using the CHG cloth, vigorously scrub the part of your body where you will be having surgery. Scrub using a back-and-forth motion for 3 minutes. The area on your body should be completely wet with CHG when you are finished scrubbing.  2. Do not rinse. Discard the cloth and let the area air-dry for 1 minute. Do not put any substances on your body afterward, such as powder, lotion, or perfume.  Contact a health care provider if:  · Your skin gets irritated after scrubbing.  · You have questions about using your solution or cloth.  Get help right away if:  · Your eyes become very red or swollen.  · Your eyes itch badly.  · Your skin itches badly and is red or swollen.  · Your hearing changes.  · You have trouble seeing.  · You have swelling or tingling in your mouth or throat.  · You have trouble breathing.  · You swallow any chlorhexidine.  Summary  · Chlorhexidine gluconate (CHG) is a germ-killing (antiseptic) solution that is used to clean the skin. Cleaning your skin with CHG before surgery helps lower the risk for infection after surgery.  · You may be given CHG to use at home. It may be in a bottle or in a  prepackaged cloth to use on your skin. Carefully follow your health care provider's instructions and the instructions on the product label.  · Do not use CHG if you have a chlorhexidine allergy.  · Contact your health care provider if your skin gets irritated after scrubbing.  This information is not intended to replace advice given to you by your health care provider. Make sure you discuss any questions you have with your health care provider.  Document Released: 09/11/2013 Document Revised: 11/15/2018 Document Reviewed: 11/15/2018  [employer] Interactive Patient Education © 2019 [employer]          PATIENT/FAMILY/RESPONSIBLE PARTY VERBALIZES UNDERSTANDING OF ABOVE EDUCATION.  COPY OF PAIN SCALE GIVEN AND REVIEWED WITH VERBALIZED UNDERSTANDING.

## 2021-08-04 LAB
QT INTERVAL: 390 MS
QTC INTERVAL: 414 MS

## 2021-08-05 ENCOUNTER — TELEPHONE (OUTPATIENT)
Dept: VASCULAR SURGERY | Facility: CLINIC | Age: 76
End: 2021-08-05

## 2021-08-05 NOTE — TELEPHONE ENCOUNTER
Spoke with patient and reminded of 600 am arrival time for her procedure with Dr. Condon.  Patient expressed understanding for all that was discussed.

## 2021-08-05 NOTE — H&P
8/5/2021         Andrew Layne MD  [address] [facility] [address]     Trinidad Zhu  1945          Chief Complaint   Patient presents with   • Follow-up       FOLLOW UP WITH TESTING. PERIPHERAL ARTERY DISEASE. pt states she is not diabetic. pt does not smoke.                • Med Management       verbally reviewed medication with pt.         Dear Andrew Layne MD         HPI  I had the pleasure of seeing your patient Trinidad Zhu in the office today.   As you recall, Trinidad Zhu is a 76 y.o.  female who we are following for lower extremity PAD.  Initially she was seen by Dr. Gonzáles with complaints of foot pain.  She does also report cramping in her feet and toes at night.  She does wear compression stockings.  She did undergo a left lower extremity angiogram on 11/9/2020.  Initially, she did well postoperatively but began having pain again which was waking her up at night.  She did undergo a left lower extremity angiogram with crossing of superficial femoral artery chronic total occlusion and stent placement on 3/26/2021.  She has maintained on aspirin, Pletal, Plavix, and Pravachol.   She is back to having pain she had prior to her first surgery in November.  Her foot has already appearance.  She reports this began about a month ago.  She did have noninvasive testing performed, which I did review in office.    Past Medical History:   Diagnosis Date   • Antral ulcer    • Bladder cystocele    • C. difficile diarrhea    • CAD (coronary artery disease)    • Colon cancer screening     PREOPERATIVE    • Colon polyp    • Diarrhea    • Difficulty swallowing    • Disease of thyroid gland    • Elevated cholesterol    • Gastritis    • Generalized OA    • GERD (gastroesophageal reflux disease)    • History of bladder surgery 01/07/2020   • Hyperlipidemia    • Hypertension    • Liver cyst    • Lung nodule    • Microscopic colitis    • Multiple gastric ulcers     last 5 years ago   • Neck pain     • Neuropathy    • Normal body mass index    • NSAID induced gastritis    • Numbness in both hands    • Ulcer of pyloric antrum     UNSPECIFIED ULCER CHRONICITY   • UTI (urinary tract infection)    • Weight loss      Past Surgical History:   Procedure Laterality Date   • ANTERIOR CERVICAL DISCECTOMY W/ FUSION N/A 5/22/2017    Procedure: CERVICAL DISCECTOMY ANTERIOR FUSION WITH INSTRUMENTATION;  Surgeon: NADINE Sarabia MD;  Location: [facility] OR;  Service:    • AORTAGRAM Left 11/9/2020    Procedure: left lower extremtiy angiogram, hawk athrectomy, balloon angioplasty, stent placement, mynx closure;  Surgeon: Sukhdev Condon DO;  Location:  [facility] OR 12;  Service: Vascular;  Laterality: Left;   • AORTAGRAM Left 3/26/2021    Procedure: LEFT LOWER EXTREMITY ANGIOGRAM, BALLOON ANGIOPLASTY, STENT PLACEMENT, MYNX CLOSURE;  Surgeon: Sukhdev Condon DO;  Location:  [facility] 12;  Service: Vascular;  Laterality: Left;   • BLADDER SUSPENSION      also had pelvic reconstructive surgery   • BREAST EXCISIONAL BIOPSY Right 1985   • CERVICAL CORPECTOMY N/A 5/22/2017    Procedure:  ANTERIOR CERVICAL DISCECTOMY FUSION C-6 to T1,  ANTERIOR FUSION WITH INSTRUMENTATION C-5 T-1;  Surgeon: NADINE Sarabia MD;  Location: [facility] OR;  Service:    • COLONOSCOPY  08/19/2015    TIC BX RT COLON   • COLONOSCOPY  12/04/2013    RT COLON BX RECALL 5YR   • COLONOSCOPY N/A 11/29/2016    Procedure: LOWER LIMITED COLONOSCOPY WITH ANESTHESIA;  Surgeon: Marco Antonio Vallejo MD;  Location: [facility] ENDOSCOPY;  Service:    • ENDOSCOPY  12/03/2015    HEALED ULCER SLANT BX   • HYSTERECTOMY     • OTHER SURGICAL HISTORY      KNEE CARTILAGE REMOVED   • OTHER SURGICAL HISTORY      BENIGN FIBROID TUMOR OF BREAST 1985 REMOVED   • TONSILLECTOMY       Family History   Problem Relation Age of Onset   • Breast cancer Maternal Aunt    • Heart disease Mother    • Heart disease Father    • GI problems Neg Hx         MALIGNANCIES   • Colon cancer Neg Hx     • Colon polyps Neg Hx      Social History     Tobacco Use   • Smoking status: Former Smoker     Packs/day: 1.50     Years: 20.00     Pack years: 30.00     Types: Cigarettes     Quit date:      Years since quittin.6   • Smokeless tobacco: Never Used   Vaping Use   • Vaping Use: Never used   Substance Use Topics   • Alcohol use: Yes     Comment: occasionally   • Drug use: No     Allergies   Allergen Reactions   • Baclofen Other (See Comments)     MUSCULOSKELETAL THERAPY AGENTS knocked her out for a day     • Gabapentin Confusion   • Sulfa Antibiotics Rash     Mouth blisters   • Amitriptyline Hcl Confusion     Current Outpatient Medications   Medication Instructions   • alendronate (FOSAMAX) 70 mg, Oral, Every 7 Days   • aspirin 81 mg, Oral, Daily   • benazepril (LOTENSIN) 5 mg, Oral, Daily   • Calcium Citrate (CITRACAL PO) 2 tablets, Oral, 2 Times Daily   • carvedilol (COREG) 12.5 mg, Oral, 2 Times Daily With Meals   • celecoxib (CELEBREX) 100 mg, Oral, 2 Times Daily PRN   • cetirizine (ZYRTEC) 10 mg, Oral, As Needed   • clopidogrel (PLAVIX) 75 MG tablet TAKE 1 TABLET BY MOUTH EVERY DAY    • diazePAM (VALIUM) 5 MG tablet TK 1/2 T PO QID PRN   • famotidine (PEPCID) 40 mg, Oral, 2 Times Daily   • hydroCHLOROthiazide (HYDRODIURIL) 12.5 mg, Oral, Daily   • levothyroxine (SYNTHROID, LEVOTHROID) 50 mcg, Oral, Daily   • multivitamin with minerals (MULTIVITAMIN ADULT PO) 1 tablet, Oral, Daily   • Myrbetriq 50 mg, Oral, Daily   • pravastatin (PRAVACHOL) 80 mg, Oral, Daily, DOESN'T TAKE ON DAYS SHE HAS TO TAKE COLESTID   • Repatha SureClick 140 mg, Subcutaneous, Every 14 Days   • traMADol (ULTRAM) 50 MG tablet TK 1 T PO Q 6 H PRN            Review of Systems   Constitutional: Negative.    HENT: Negative.    Eyes: Negative.    Respiratory: Positive for shortness of breath.    Cardiovascular: Negative.    Gastrointestinal: Negative.    Endocrine: Negative.    Genitourinary: Negative.    Musculoskeletal: Negative for  "back pain.        Hip pain left   Skin: Positive for color change (left foot nichole).   Allergic/Immunologic: Negative.    Neurological: Negative.    Hematological: Negative.    Psychiatric/Behavioral: Negative.          /62 (BP Location: Left arm, Patient Position: Sitting, Cuff Size: Adult)   Pulse 73   Ht 160 cm (63\")   Wt 52.2 kg (115 lb)   SpO2 94%   BMI 20.37 kg/m²   Physical Exam  Vitals and nursing note reviewed.   Constitutional:       General: She is not in acute distress.     Appearance: Normal appearance. She is well-developed and normal weight. She is not diaphoretic.   HENT:      Head: Normocephalic and atraumatic.   Eyes:      General: No scleral icterus.     Pupils: Pupils are equal, round, and reactive to light.   Neck:      Thyroid: No thyromegaly.      Vascular: No carotid bruit or JVD.   Cardiovascular:      Rate and Rhythm: Normal rate and regular rhythm.      Pulses:           Femoral pulses are 2+ on the right side and 2+ on the left side.       Dorsalis pedis pulses are detected w/ Doppler on the right side and 0 on the left side.        Posterior tibial pulses are detected w/ Doppler on the right side and detected w/ Doppler on the left side.      Heart sounds: Normal heart sounds and S2 normal. No murmur heard.   No friction rub. No gallop.       Comments: Left: Doppler PT, peroneal, DP, left foot nichole appearance  Pulmonary:      Effort: Pulmonary effort is normal.      Breath sounds: Normal breath sounds.   Abdominal:      General: Bowel sounds are normal.      Palpations: Abdomen is soft.   Musculoskeletal:         General: Normal range of motion.      Cervical back: Normal range of motion and neck supple.      Left lower leg: Edema (Mild) present.   Skin:     General: Skin is warm and dry.   Neurological:      General: No focal deficit present.      Mental Status: She is alert and oriented to person, place, and time.      Cranial Nerves: No cranial nerve deficit. "   Psychiatric:         Mood and Affect: Mood normal.         Behavior: Behavior normal. Behavior is cooperative.         Thought Content: Thought content normal.         Judgment: Judgment normal.         Diagnostic data:  US Ankle / Brachial Indices Extremity Complete     Result Date: 7/21/2021  Narrative:  History: PAD  Comments: Bilateral lower extremity arterial with multi-level pulse volume recordings and segmental pressures were performed at rest and stress.  The right ankle/brachial index is 0.83. The waveforms are biphasic without dampening.These findings are consistent with mild arterial insufficiency of the right lower extremity at rest.  There is likely underlying disease in the distal SFA.  The left ankle/brachial index is 0.51. The waveforms are biphasic with mild dampening. These findings are consistent with moderate to severe arterial insufficiency of the left lower extremity at rest.    There is likely underlying disease in the distal SFA.       Impression: Impression: 1. Mild arterial insufficiency of the right lower extremity at rest. 2. Moderate to severe arterial insufficiency of the left lower extremity at rest.   This report was finalized on 07/21/2021 14:32 by Dr. Sukhdev Condon MD.            Patient Active Problem List   Diagnosis   • Spinal stenosis, cervical region   • Lung nodules   • Bronchiectasis without complication (CMS/HCC)   • Underweight   • Personal history of nicotine dependence   • Restrictive lung disease   • Pulmonary emphysema (CMS/HCC)   • PAD (peripheral artery disease) (CMS/HCC)   • Preop testing   • Gastroesophageal reflux disease   • Allergic rhinitis   • ORTIZ (dyspnea on exertion)   • CAD (coronary artery disease)   • Hyperlipidemia         Visit Diagnosis       ICD-10-CM ICD-9-CM   1. PAD (peripheral artery disease) (CMS/HCC)  I73.9 443.9   2. Encounter for monitoring antiplatelet therapy  Z51.81 V58.83     Z79.02 V58.63   3. Preop testing  Z01.818 V72.84                Plan: After thoroughly evaluating Trinidad Zhu, I believe the best course of action is to proceed with a left lower extremity angiogram.  Her testing does show severe arterial insufficiency to her left lower extremity.  Her foot is warm with Doppler signals present.  Risks of angiogram were discussed.  These include, but are not limited to, bleeding, infection, vessel damage, nerve damage, embolus, and loss of limb.  The patient understands these risks and wishes to proceed with procedure.  She will likely require anticoagulation following this procedure.  We will proceed with angiogram in the next 2 weeks.  I would like her to continue taking her aspirin.  She does inquire about holding Plavix for injections with Dr. Colmenares.  I instructed her to try to do this prior to angiogram as she will be on anticoagulation after that point. I did discuss vascular risk factors as they pertain to the progression of vascular disease including controlling her hypertension and hyperlipidemia.  Her blood pressure is stable on her current medication regimen.  She is maintained on Pravachol and Repatha for her hyperlipidemia.  From vascular standpoint she can remain on Pravachol as she reports Repatha is high cost.  Patient's Body mass index is 20.37 kg/m². BMI is within normal parameters. No follow-up required.  The patient can continue taking their current medication regimen as previously planned.  This was all discussed in full with complete understanding.     Thank you for allowing me to participate in the care of your patient.  Please do not hesitate with any questions or concerns.  I will keep you aware of any further encounters with Trinidad Zhu.           Sincerely yours,           Sukhdev Condon, DO

## 2021-08-06 ENCOUNTER — APPOINTMENT (OUTPATIENT)
Dept: INTERVENTIONAL RADIOLOGY/VASCULAR | Facility: HOSPITAL | Age: 76
End: 2021-08-06

## 2021-08-06 ENCOUNTER — ANESTHESIA (OUTPATIENT)
Dept: PERIOP | Facility: HOSPITAL | Age: 76
End: 2021-08-06

## 2021-08-06 ENCOUNTER — ANESTHESIA EVENT (OUTPATIENT)
Dept: PERIOP | Facility: HOSPITAL | Age: 76
End: 2021-08-06

## 2021-08-06 ENCOUNTER — HOSPITAL ENCOUNTER (OUTPATIENT)
Facility: HOSPITAL | Age: 76
Setting detail: HOSPITAL OUTPATIENT SURGERY
Discharge: HOME | End: 2021-08-06
Attending: SURGERY | Admitting: SURGERY

## 2021-08-06 VITALS
HEART RATE: 70 BPM | RESPIRATION RATE: 18 BRPM | TEMPERATURE: 97.5 F | OXYGEN SATURATION: 93 % | SYSTOLIC BLOOD PRESSURE: 142 MMHG | DIASTOLIC BLOOD PRESSURE: 70 MMHG

## 2021-08-06 DIAGNOSIS — I73.9 PAD (PERIPHERAL ARTERY DISEASE) (HCC): ICD-10-CM

## 2021-08-06 DIAGNOSIS — Z01.818 PREOP TESTING: ICD-10-CM

## 2021-08-06 PROCEDURE — 25010000002 FENTANYL CITRATE (PF) 50 MCG/ML SOLUTION: Performed by: ANESTHESIOLOGY

## 2021-08-06 PROCEDURE — 94799 UNLISTED PULMONARY SVC/PX: CPT

## 2021-08-06 PROCEDURE — C1760 CLOSURE DEV, VASC: HCPCS | Performed by: SURGERY

## 2021-08-06 PROCEDURE — C1884 EMBOLIZATION PROTECT SYST: HCPCS | Performed by: SURGERY

## 2021-08-06 PROCEDURE — 75710 ARTERY X-RAYS ARM/LEG: CPT

## 2021-08-06 PROCEDURE — 25010000002 HEPARIN (PORCINE) PER 1000 UNITS: Performed by: SURGERY

## 2021-08-06 PROCEDURE — 75625 CONTRAST EXAM ABDOMINL AORTA: CPT | Performed by: SURGERY

## 2021-08-06 PROCEDURE — C1894 INTRO/SHEATH, NON-LASER: HCPCS | Performed by: SURGERY

## 2021-08-06 PROCEDURE — 25010000002 CEFAZOLIN PER 500 MG: Performed by: NURSE PRACTITIONER

## 2021-08-06 PROCEDURE — C2623 CATH, TRANSLUMIN, DRUG-COAT: HCPCS | Performed by: SURGERY

## 2021-08-06 PROCEDURE — 86900 BLOOD TYPING SEROLOGIC ABO: CPT | Performed by: NURSE PRACTITIONER

## 2021-08-06 PROCEDURE — C1887 CATHETER, GUIDING: HCPCS | Performed by: SURGERY

## 2021-08-06 PROCEDURE — 75625 CONTRAST EXAM ABDOMINL AORTA: CPT

## 2021-08-06 PROCEDURE — 37229 PR REVSC OPN/PRQ TIB/PERO W/ATHRC/ANGIOP SM VSL: CPT | Performed by: SURGERY

## 2021-08-06 PROCEDURE — 86850 RBC ANTIBODY SCREEN: CPT | Performed by: NURSE PRACTITIONER

## 2021-08-06 PROCEDURE — C1769 GUIDE WIRE: HCPCS | Performed by: SURGERY

## 2021-08-06 PROCEDURE — 25010000002 PROPOFOL 10 MG/ML EMULSION: Performed by: NURSE ANESTHETIST, CERTIFIED REGISTERED

## 2021-08-06 PROCEDURE — C1725 CATH, TRANSLUMIN NON-LASER: HCPCS | Performed by: SURGERY

## 2021-08-06 PROCEDURE — 75710 ARTERY X-RAYS ARM/LEG: CPT | Performed by: SURGERY

## 2021-08-06 PROCEDURE — 37225 PR REVSC OPN/PRQ FEM/POP W/ATHRC/ANGIOP SM VSL: CPT | Performed by: SURGERY

## 2021-08-06 PROCEDURE — 86901 BLOOD TYPING SEROLOGIC RH(D): CPT | Performed by: NURSE PRACTITIONER

## 2021-08-06 PROCEDURE — 25010000002 HEPARIN (PORCINE) PER 1000 UNITS: Performed by: NURSE ANESTHETIST, CERTIFIED REGISTERED

## 2021-08-06 PROCEDURE — C1714 CATH, TRANS ATHERECTOMY, DIR: HCPCS | Performed by: SURGERY

## 2021-08-06 PROCEDURE — 25010000002 IOPAMIDOL 61 % SOLUTION: Performed by: SURGERY

## 2021-08-06 PROCEDURE — 76000 FLUOROSCOPY <1 HR PHYS/QHP: CPT

## 2021-08-06 RX ORDER — OXYCODONE AND ACETAMINOPHEN 7.5; 325 MG/1; MG/1
1 TABLET ORAL EVERY 4 HOURS PRN
Status: DISCONTINUED | OUTPATIENT
Start: 2021-08-06 | End: 2021-08-06 | Stop reason: HOSPADM

## 2021-08-06 RX ORDER — IBUPROFEN 600 MG/1
600 TABLET ORAL ONCE AS NEEDED
Status: DISCONTINUED | OUTPATIENT
Start: 2021-08-06 | End: 2021-08-06 | Stop reason: HOSPADM

## 2021-08-06 RX ORDER — SODIUM CHLORIDE, SODIUM LACTATE, POTASSIUM CHLORIDE, CALCIUM CHLORIDE 600; 310; 30; 20 MG/100ML; MG/100ML; MG/100ML; MG/100ML
100 INJECTION, SOLUTION INTRAVENOUS CONTINUOUS
Status: DISCONTINUED | OUTPATIENT
Start: 2021-08-06 | End: 2021-08-06 | Stop reason: HOSPADM

## 2021-08-06 RX ORDER — BUPIVACAINE HYDROCHLORIDE 5 MG/ML
INJECTION, SOLUTION EPIDURAL; INTRACAUDAL AS NEEDED
Status: DISCONTINUED | OUTPATIENT
Start: 2021-08-06 | End: 2021-08-06 | Stop reason: HOSPADM

## 2021-08-06 RX ORDER — OXYCODONE HYDROCHLORIDE AND ACETAMINOPHEN 5; 325 MG/1; MG/1
1 TABLET ORAL ONCE AS NEEDED
Status: DISCONTINUED | OUTPATIENT
Start: 2021-08-06 | End: 2021-08-06 | Stop reason: HOSPADM

## 2021-08-06 RX ORDER — SODIUM CHLORIDE 0.9 % (FLUSH) 0.9 %
3 SYRINGE (ML) INJECTION EVERY 12 HOURS SCHEDULED
Status: DISCONTINUED | OUTPATIENT
Start: 2021-08-06 | End: 2021-08-06 | Stop reason: HOSPADM

## 2021-08-06 RX ORDER — HEPARIN SODIUM 1000 [USP'U]/ML
INJECTION, SOLUTION INTRAVENOUS; SUBCUTANEOUS AS NEEDED
Status: DISCONTINUED | OUTPATIENT
Start: 2021-08-06 | End: 2021-08-06 | Stop reason: SURG

## 2021-08-06 RX ORDER — LIDOCAINE HYDROCHLORIDE 20 MG/ML
INJECTION, SOLUTION EPIDURAL; INFILTRATION; INTRACAUDAL; PERINEURAL AS NEEDED
Status: DISCONTINUED | OUTPATIENT
Start: 2021-08-06 | End: 2021-08-06 | Stop reason: SURG

## 2021-08-06 RX ORDER — HYDROCODONE BITARTRATE AND ACETAMINOPHEN 5; 325 MG/1; MG/1
1 TABLET ORAL ONCE AS NEEDED
Status: DISCONTINUED | OUTPATIENT
Start: 2021-08-06 | End: 2021-08-06 | Stop reason: HOSPADM

## 2021-08-06 RX ORDER — PROPOFOL 10 MG/ML
VIAL (ML) INTRAVENOUS AS NEEDED
Status: DISCONTINUED | OUTPATIENT
Start: 2021-08-06 | End: 2021-08-06 | Stop reason: SURG

## 2021-08-06 RX ORDER — LIDOCAINE HYDROCHLORIDE 10 MG/ML
0.5 INJECTION, SOLUTION EPIDURAL; INFILTRATION; INTRACAUDAL; PERINEURAL ONCE AS NEEDED
Status: DISCONTINUED | OUTPATIENT
Start: 2021-08-06 | End: 2021-08-06 | Stop reason: HOSPADM

## 2021-08-06 RX ORDER — BUPIVACAINE HCL/0.9 % NACL/PF 0.1 %
2 PLASTIC BAG, INJECTION (ML) EPIDURAL ONCE
Status: COMPLETED | OUTPATIENT
Start: 2021-08-06 | End: 2021-08-06

## 2021-08-06 RX ORDER — SODIUM CHLORIDE 0.9 % (FLUSH) 0.9 %
3-10 SYRINGE (ML) INJECTION AS NEEDED
Status: DISCONTINUED | OUTPATIENT
Start: 2021-08-06 | End: 2021-08-06 | Stop reason: HOSPADM

## 2021-08-06 RX ORDER — FENTANYL CITRATE 50 UG/ML
25 INJECTION, SOLUTION INTRAMUSCULAR; INTRAVENOUS
Status: DISCONTINUED | OUTPATIENT
Start: 2021-08-06 | End: 2021-08-06 | Stop reason: HOSPADM

## 2021-08-06 RX ORDER — SODIUM CHLORIDE 0.9 % (FLUSH) 0.9 %
3 SYRINGE (ML) INJECTION AS NEEDED
Status: DISCONTINUED | OUTPATIENT
Start: 2021-08-06 | End: 2021-08-06 | Stop reason: HOSPADM

## 2021-08-06 RX ORDER — LIDOCAINE HYDROCHLORIDE 10 MG/ML
0.5 INJECTION, SOLUTION EPIDURAL; INFILTRATION; INTRACAUDAL; PERINEURAL ONCE AS NEEDED
Status: DISCONTINUED | OUTPATIENT
Start: 2021-08-06 | End: 2021-08-06 | Stop reason: SDUPTHER

## 2021-08-06 RX ORDER — ONDANSETRON 2 MG/ML
4 INJECTION INTRAMUSCULAR; INTRAVENOUS ONCE AS NEEDED
Status: DISCONTINUED | OUTPATIENT
Start: 2021-08-06 | End: 2021-08-06 | Stop reason: HOSPADM

## 2021-08-06 RX ORDER — SODIUM CHLORIDE, SODIUM LACTATE, POTASSIUM CHLORIDE, CALCIUM CHLORIDE 600; 310; 30; 20 MG/100ML; MG/100ML; MG/100ML; MG/100ML
1000 INJECTION, SOLUTION INTRAVENOUS CONTINUOUS
Status: DISCONTINUED | OUTPATIENT
Start: 2021-08-06 | End: 2021-08-06 | Stop reason: HOSPADM

## 2021-08-06 RX ORDER — LABETALOL HYDROCHLORIDE 5 MG/ML
5 INJECTION, SOLUTION INTRAVENOUS
Status: DISCONTINUED | OUTPATIENT
Start: 2021-08-06 | End: 2021-08-06 | Stop reason: HOSPADM

## 2021-08-06 RX ORDER — FLUMAZENIL 0.1 MG/ML
0.2 INJECTION INTRAVENOUS AS NEEDED
Status: DISCONTINUED | OUTPATIENT
Start: 2021-08-06 | End: 2021-08-06 | Stop reason: HOSPADM

## 2021-08-06 RX ORDER — NALOXONE HCL 0.4 MG/ML
0.4 VIAL (ML) INJECTION AS NEEDED
Status: DISCONTINUED | OUTPATIENT
Start: 2021-08-06 | End: 2021-08-06 | Stop reason: HOSPADM

## 2021-08-06 RX ADMIN — LIDOCAINE HYDROCHLORIDE 50 MG: 20 INJECTION, SOLUTION EPIDURAL; INFILTRATION; INTRACAUDAL; PERINEURAL at 08:26

## 2021-08-06 RX ADMIN — HEPARIN SODIUM 1000 UNITS: 1000 INJECTION, SOLUTION INTRAVENOUS; SUBCUTANEOUS at 08:40

## 2021-08-06 RX ADMIN — Medication 1 G: at 08:27

## 2021-08-06 RX ADMIN — SODIUM CHLORIDE, POTASSIUM CHLORIDE, SODIUM LACTATE AND CALCIUM CHLORIDE 1000 ML: 600; 310; 30; 20 INJECTION, SOLUTION INTRAVENOUS at 06:57

## 2021-08-06 RX ADMIN — HEPARIN SODIUM 3000 UNITS: 1000 INJECTION, SOLUTION INTRAVENOUS; SUBCUTANEOUS at 08:59

## 2021-08-06 RX ADMIN — OXYCODONE HYDROCHLORIDE AND ACETAMINOPHEN 1 TABLET: 7.5; 325 TABLET ORAL at 12:28

## 2021-08-06 RX ADMIN — PROPOFOL 125 MCG/KG/MIN: 10 INJECTION, EMULSION INTRAVENOUS at 08:26

## 2021-08-06 RX ADMIN — FENTANYL CITRATE 25 MCG: 50 INJECTION, SOLUTION INTRAMUSCULAR; INTRAVENOUS at 10:36

## 2021-08-06 RX ADMIN — PROPOFOL 50 MG: 10 INJECTION, EMULSION INTRAVENOUS at 08:26

## 2021-08-06 NOTE — ANESTHESIA POSTPROCEDURE EVALUATION
Patient: Trinidad Zhu    Procedure Summary     Date: 08/06/21 Room / Location:  [facility] OR  /  [facility] 12    Anesthesia Start: 0822 Anesthesia Stop: 1004    Procedure: LEFT LOWER EXTREMITY ANGIOGRAM, HAWK ATHERECTOMY, BALLOON ANGIOPLASTY, MYNX CLOSURE (Left Leg Lower) Diagnosis:       PAD (peripheral artery disease) (CMS/HCC)      Preop testing      (PAD (peripheral artery disease) (CMS/HCC) [I73.9])      (Preop testing [Z01.818])    Surgeons: Sukhdev Condon DO Provider: Alexx Boyce CRNA    Anesthesia Type: MAC ASA Status: 3          Anesthesia Type: MAC    Vitals  Vitals Value Taken Time   /71 08/06/21 1147   Temp 97.5 °F (36.4 °C) 08/06/21 1000   Pulse 70 08/06/21 1147   Resp 20 08/06/21 1147   SpO2 96 % 08/06/21 1147           Post Anesthesia Care and Evaluation    Patient location during evaluation: PACU  Patient participation: complete - patient participated  Level of consciousness: awake and alert  Pain management: adequate  Airway patency: patent  Anesthetic complications: No anesthetic complications    Cardiovascular status: acceptable  Respiratory status: acceptable  Hydration status: acceptable    Comments: Blood pressure 154/78, pulse 64, temperature 97.5 °F (36.4 °C), temperature source Temporal, resp. rate 18, SpO2 96 %, not currently breastfeeding.    Pt discharged from PACU based on desmond score >8

## 2021-08-06 NOTE — OP NOTE
Trinidad Zhu  8/6/2021     PREOPERATIVE DIAGNOSIS: PAD (peripheral artery disease) (CMS/formerly [facility]) [I73.9]  Preop testing [Z01.818]     POSTOPERATIVE DIAGNOSIS: Post-Op Diagnosis Codes:     * PAD (peripheral artery disease) (CMS/formerly [facility]) [I73.9]     * Preop testing [Z01.818]     PROCEDURE PERFORMED:   1.  Introduction of catheter/sheath into the aorta  2.  Aortoiliac angiogram with left lower extremity runoff  3.  Contralateral cannulation of the left common iliac artery  4.  Placement of a 4 mm spider distal embolic protection device in the tibioperoneal trunk  5.  Hawkone directional atherectomy of the popliteal artery  6.  Balloon angioplasty of the popliteal artery with a 4 x 80 mm Medtronic drug-coated balloon  7.  Retrieval of the spider distal embolic protection device  8.  Selective cannulation and crossing of a long posterior tibial artery chronic total occlusion  9.  Hawkone directional atherectomy of the entire posterior tibial artery from the takeoff all the way into the ankle  10.  Balloon angioplasty of the entire posterior tibial artery using a 3-2.5 x 210 mm [place] cross balloon  11.  Completion left lower extremity angiogram with radiographic supervision and interpretation  12.  Minx closure of the right common femoral artery     SURGEON: Sukhdev Cnodon DO      ANESTHESIA: MAC    PREPARATION: Routine.    STAFF: Circulator: Dayana Matthew RN; Aide Bailey RN  Scrub Person: David Nobles; Pooja Tavarez  Vascular Radiology Technician: Gabriela Zamora    Estimated Blood Loss: minimal    SPECIMENS: None    COMPLICATIONS: None    INDICATIONS: Trinidad Zhu is a 76 y.o. female who we are following for lower extremity PAD.  Initially she was seen by Dr. Gonzáles with complaints of foot pain.  She does also report cramping in her feet and toes at night.  She does wear compression stockings.  She did undergo a left lower extremity angiogram on 11/9/2020.  Initially, she did well postoperatively  but began having pain again which was waking her up at night.  She did undergo a left lower extremity angiogram with crossing of superficial femoral artery chronic total occlusion and stent placement on 3/26/2021.  She has maintained on aspirin, Pletal, Plavix, and Pravachol.   She is back to having pain she had prior to her first surgery in November.  Her foot has already appearance.  She reports this began about a month ago.  She did have noninvasive testing performed, which I did review in office. The indications, risks, and possible complications of the procedure were explained to the patient, who voiced understanding and wished to proceed with surgery.     PROCEDURE IN DETAIL: The patient was taken to the operating room and placed on the operating table in a supine position. After MAC anesthesia was obtained, the bilateral groins was prepped and draped in a sterile manner.  5 cc of 0.5% Marcaine plain was used to infiltrate the right groin for local anesthesia.  Using a micropuncture technique the right common femoral artery was cannulated and a microsheath was placed.  Advantage Glidewire was advanced to the aorta and a short 6 Slovenian sheath was placed.  The patient was given 1000 units of intravenous heparin.  The Omni Flush catheter was advanced into the aorta and an aortoiliac angiogram was performed.  Findings are as follows:  1.  Patent renal arteries bilaterally without stenosis  2.  Patent aorta without stenosis  3.  Patent iliac systems bilaterally without stenosis    Contralateral cannulation was established of the left common iliac artery.  The catheter was then brought down to the level of the femoral head.  An angiogram with runoff was performed.  Findings are as follows:  1.  Patent common femoral and profunda femoris arteries without stenosis  2.  Patent SFA stents without stenosis  3.  Moderate tandem stenosis in the popliteal artery around the knee joint  4.  Patent one-vessel runoff to the  foot via the peroneal artery which reconstituted the dorsalis pedis and posterior tibial arteries at the ankle.  The anterior tibial artery was occluded just past the takeoff.  The posterior tibial artery was flush occluded from the takeoff.     At this point, the decision was made to revascularize the popliteal and tibial arteries.  A 6 Afghan by 65 cm destination sheath was placed down into the distal SFA.  The patient was given 3000 units of intravenous heparin.  With the help of the Lucas cross catheter the tandem stenoses in the popliteal artery were traversed.  A 4 mm spider distal embolic protection device was placed in the tibial peroneal trunk.  Hawkone directional atherectomy was then performed of the popliteal lesions.  Multiple passes were made achieving significant luminal gain.  Next, the popliteal lesions were balloon angioplastied with a 4 x 80 mm Medtronic drug-coated balloon.  Completion angiogram was performed which showed rapid flow down through the popliteal artery without any residual stenosis, dissection, or occlusion.  The spider was then successfully retrieved.  Next, the attention was then turned to the tibial vessels.  Under roadmap guidance, the posterior tibial artery  was engaged and traversed all the way in to the foot.  An angiogram was performed distally to ensure that I was in true lumen.  An 014 advantage Glidewire was exchanged.  Hawkone directional atherectomy was then performed of the entire posterior tibial artery from the takeoff all the way into the ankle.  Multiple passes were made achieving significant luminal gain.  The entire posterior tibial artery was then balloon angioplastied with a 3-2.5 x 210 mm China cross balloon.  Completion angiogram was performed which showed rapid two-vessel runoff to the foot by the peroneal and posterior tibial arteries.  There was no evidence of stenosis, dissection, or occlusion.  At this point, I felt the result was adequate and no  further intervention was warranted.  The sheath and wire were removed.  A minx was used to seal off the right common femoral artery.  Direct pressure was held for an additional 10 to 15 minutes to help ensure hemostasis.  Sterile dressings were applied. The patient tolerated the procedure well. Sponge and needle counts were correct. The patient was then awakened in the operating room and taken to the recovery room in good condition.    Sukhdev Condon, DO  Date: 8/6/2021 Time: 09:47 CDT     CC:Andrew Layne MD

## 2021-08-06 NOTE — DISCHARGE INSTRUCTIONS

## 2021-08-06 NOTE — ANESTHESIA PREPROCEDURE EVALUATION
Anesthesia Evaluation     Patient summary reviewed and Nursing notes reviewed   no history of anesthetic complications:  NPO Solid Status: > 8 hours  NPO Liquid Status: > 8 hours           Airway   Mallampati: I  TM distance: >3 FB  Neck ROM: full  No difficulty expected  Dental      Pulmonary    (+) a smoker Former,   Cardiovascular   Exercise tolerance: good (4-7 METS)    (+) hypertension, CAD (60% blockage 20 years ago, medical management), PVD, hyperlipidemia,     ROS comment: Stress test 5/2021  Adult Stress Echo W/ Cont or Stress Agent if Necessary Per Protocol     · Left ventricular ejection fraction appears to be 61 - 65%. Left   ventricular systolic function is normal.       LOW RISK FOR ISCHEMIA     Neuro/Psych  GI/Hepatic/Renal/Endo    (+)  GERD, PUD,      Musculoskeletal     Abdominal    Substance History      OB/GYN          Other   arthritis,                      Anesthesia Plan    ASA 3     MAC     intravenous induction     Anesthetic plan, all risks, benefits, and alternatives have been provided, discussed and informed consent has been obtained with: patient.

## 2021-08-23 ENCOUNTER — TELEPHONE (OUTPATIENT)
Dept: VASCULAR SURGERY | Facility: CLINIC | Age: 76
End: 2021-08-23

## 2021-08-24 ENCOUNTER — OFFICE VISIT (OUTPATIENT)
Dept: VASCULAR SURGERY | Facility: CLINIC | Age: 76
End: 2021-08-24

## 2021-08-24 VITALS
HEART RATE: 71 BPM | WEIGHT: 115 LBS | SYSTOLIC BLOOD PRESSURE: 126 MMHG | BODY MASS INDEX: 20.38 KG/M2 | OXYGEN SATURATION: 98 % | DIASTOLIC BLOOD PRESSURE: 76 MMHG | HEIGHT: 63 IN

## 2021-08-24 DIAGNOSIS — I10 ESSENTIAL HYPERTENSION: ICD-10-CM

## 2021-08-24 DIAGNOSIS — I73.9 PAD (PERIPHERAL ARTERY DISEASE) (HCC): Primary | ICD-10-CM

## 2021-08-24 DIAGNOSIS — E78.5 HYPERLIPIDEMIA, UNSPECIFIED HYPERLIPIDEMIA TYPE: ICD-10-CM

## 2021-08-24 PROCEDURE — 99213 OFFICE O/P EST LOW 20 MIN: CPT | Performed by: SURGERY

## 2021-08-24 NOTE — PROGRESS NOTES
8/24/2021       Andrew Layne MD  [address] [facility] [address]    Trinidad Zhu  1945    Chief Complaint   Patient presents with   • Follow-up     2 Week Post-Op Follow Up For LEFT LOWER EXTREMITY ANGIOGRAM, HAWK ATHERECTOMY, BALLOON ANGIOPLASTY, MYNX CLOSURE. Patient denies any stroke like symptoms.    • Former Smoker     Patient is a Former Smoker - Quit 1996   • Med Management     Verbally verified medications with patient        Dear Andrew Layne MD       HPI  I had the pleasure of seeing your patient Trinidad Zhu in the office today.   As you recall, Trinidad Zhu is a 76 y.o.  female who we are following for lower extremity PAD.  Initially she was seen by Dr. Gonzáles with complaints of foot pain.  She does also report cramping in her feet and toes at night.  She does wear compression stockings.  She did undergo a left lower extremity angiogram on 11/9/2020.  Initially, she did well postoperatively but began having pain again which was waking her up at night.  She did undergo a left lower extremity angiogram with crossing of superficial femoral artery chronic total occlusion and stent placement on 3/26/2021.  Recently, she began having pain she had prior to her first surgery in November.  Her foot did have a ready appearance.  She did undergo a left lower extremity angiogram 8/6/2021 with atherectomy and balloon angioplasty of popliteal artery and entire posterior tibial artery.  Her foot is nice and warm and color has returned to normal.  She reports this does feel better.    Review of Systems   Constitutional: Negative.    HENT: Negative.    Eyes: Negative.    Respiratory: Negative.    Cardiovascular: Negative.    Gastrointestinal: Negative.    Endocrine: Negative.    Genitourinary: Negative.    Musculoskeletal: Negative.    Skin: Negative.    Allergic/Immunologic: Negative.    Neurological: Negative.    Hematological: Negative.    Psychiatric/Behavioral: Negative.      "    /76 (BP Location: Left arm, Patient Position: Sitting, Cuff Size: Adult)   Pulse 71   Ht 160 cm (63\")   Wt 52.2 kg (115 lb)   SpO2 98%   BMI 20.37 kg/m²   Physical Exam  Vitals and nursing note reviewed.   Constitutional:       General: She is not in acute distress.     Appearance: She is well-developed. She is not diaphoretic.   HENT:      Head: Normocephalic and atraumatic.   Eyes:      General: No scleral icterus.     Pupils: Pupils are equal, round, and reactive to light.   Neck:      Thyroid: No thyromegaly.      Vascular: No carotid bruit or JVD.   Cardiovascular:      Rate and Rhythm: Normal rate and regular rhythm.      Pulses:           Dorsalis pedis pulses are detected w/ Doppler on the right side and detected w/ Doppler on the left side.        Posterior tibial pulses are detected w/ Doppler on the right side and detected w/ Doppler on the left side.      Heart sounds: Normal heart sounds and S2 normal. No murmur heard.   No friction rub. No gallop.       Comments: Right groin healed, left with Doppler DP/PT/peroneal  Pulmonary:      Effort: Pulmonary effort is normal.      Breath sounds: Normal breath sounds.   Abdominal:      General: Bowel sounds are normal.      Palpations: Abdomen is soft.   Musculoskeletal:         General: Normal range of motion.      Cervical back: Normal range of motion and neck supple.   Skin:     General: Skin is warm and dry.   Neurological:      Mental Status: She is alert and oriented to person, place, and time.      Cranial Nerves: No cranial nerve deficit.   Psychiatric:         Behavior: Behavior normal.         Thought Content: Thought content normal.         Judgment: Judgment normal.             Diagnostic data:  IR Angiogram Extremity, FL C Arm During Surgery    Result Date: 8/6/2021  Narrative: Performed by Dr. Condon. Please see procedure note. This report was finalized on 08/06/2021 13:28 by Dr. Sukhdev Condon MD.       Patient Active Problem " List   Diagnosis   • Spinal stenosis, cervical region   • Lung nodules   • Bronchiectasis without complication (CMS/[facility])   • Underweight   • Personal history of nicotine dependence   • Restrictive lung disease   • Pulmonary emphysema (CMS/[facility])   • PAD (peripheral artery disease) (CMS/[facility])   • Preop testing   • Gastroesophageal reflux disease   • Allergic rhinitis   • ORTIZ (dyspnea on exertion)   • CAD (coronary artery disease)   • Hyperlipidemia   • Neuropathy   • Hypertension         ICD-10-CM ICD-9-CM   1. PAD (peripheral artery disease) (CMS/[facility])  I73.9 443.9   2. Hyperlipidemia, unspecified hyperlipidemia type  E78.5 272.4   3. Essential hypertension  I10 401.9         Plan: After thoroughly evaluating Trinidad Zhu, I am pleased to report she is doing well status post left lower extremity angiogram.  Her foot does feel better.  Color has returned to normal.  Her foot is nice and warm with Doppler DP/PT/peroneal signals.  She is free to resume normal activity at this time.  We did have a discussion regarding statin medication and Repatha.  She has continued concerns regarding cost of Repatha.  From a vascular standpoint as long as she maintains on statin medication as well as aspirin and Plavix, this would be sufficient.  We will see her back in 6 months with repeat noninvasive testing for continued surveillance, including ABIs.  We will repeat her carotid duplex in 1 year.   I did discuss vascular risk factors as they pertain to the progression of vascular disease including controlling her hypertension and hyperlipidemia.  Her blood pressure is stable on her current medication regimen. Patient's Body mass index is 20.37 kg/m². indicating that she is within normal range (BMI 18.5-24.9). No BMI management plan needed..  The patient can continue taking their current medication regimen as previously planned.  This was all discussed in full with complete understanding.    Thank you for allowing me to participate  in the care of your patient.  Please do not hesitate with any questions or concerns.  I will keep you aware of any further encounters with Trinidad Zhu.        Sincerely yours,         GEREMIAS Head

## 2021-09-03 ENCOUNTER — HOSPITAL ENCOUNTER (OUTPATIENT)
Dept: CT IMAGING | Facility: HOSPITAL | Age: 76
Discharge: HOME | End: 2021-09-03
Admitting: NURSE PRACTITIONER

## 2021-09-03 DIAGNOSIS — R91.8 LUNG NODULES: Chronic | ICD-10-CM

## 2021-09-03 PROCEDURE — 71250 CT THORAX DX C-: CPT

## 2021-09-07 ENCOUNTER — TRANSCRIBE ORDERS (OUTPATIENT)
Dept: LAB | Facility: HOSPITAL | Age: 76
End: 2021-09-07

## 2021-09-07 DIAGNOSIS — Z01.818 PREOPERATIVE TESTING: Primary | ICD-10-CM

## 2021-09-07 DIAGNOSIS — R91.1 LUNG NODULE: Primary | ICD-10-CM

## 2021-09-07 DIAGNOSIS — Z01.812 ENCOUNTER FOR PREPROCEDURE SCREENING LABORATORY TESTING FOR SEVERE ACUTE RESPIRATORY SYNDROME CORONAVIRUS 2 (SARS-COV-2): ICD-10-CM

## 2021-09-07 DIAGNOSIS — Z20.822 ENCOUNTER FOR PREPROCEDURE SCREENING LABORATORY TESTING FOR SEVERE ACUTE RESPIRATORY SYNDROME CORONAVIRUS 2 (SARS-COV-2): ICD-10-CM

## 2021-09-08 ENCOUNTER — LAB (OUTPATIENT)
Dept: LAB | Facility: HOSPITAL | Age: 76
End: 2021-09-08

## 2021-09-08 PROCEDURE — U0004 COV-19 TEST NON-CDC HGH THRU: HCPCS | Performed by: NURSE PRACTITIONER

## 2021-09-08 PROCEDURE — U0005 INFEC AGEN DETEC AMPLI PROBE: HCPCS | Performed by: NURSE PRACTITIONER

## 2021-09-08 PROCEDURE — C9803 HOPD COVID-19 SPEC COLLECT: HCPCS | Performed by: NURSE PRACTITIONER

## 2021-09-09 LAB — SARS-COV-2 ORF1AB RESP QL NAA+PROBE: NOT DETECTED

## 2021-09-09 NOTE — PROGRESS NOTES
" GEREMIAS Mathis  [facility]   [facility]  [address] [place] [address]  Phone: [phone]  Fax: [phone]       Chief Complaint  Bronchiectasis without complication (CMS/HCC)Bronchiectasis , Restrictive lung disease, and Lung nodules    Subjective    History of Present Illness    Trinidad Zhu presents to [facility]   History of Present Illness   The patient has known restrictive lung disease, lung nodules, bronchiectasis, emphysema, and personal history of nicotine dependence.  Patient does not utilize inhalers.  The patient reports that her allergies have really been bothering her recently.  She states that she is taking Zyrtec daily.  She denies fevers, chills, cough with purulent sputum.  CT of the chest and PET scan were reviewed with the patient today.  The CT scan from September 3, 2021 shows the right upper lobe nodule measuring 2.1 x 1.3 x 1.2 cm previously measured 1.6 x 1.1 x 1.2 cm.  The nodule is spiculated and a PET scan was recommended.  PET scan obtained on September 10, 2021 and shows FDG uptake within the spiculated 2 cm right upper lobe nodule with SUV of 2.85 concerning for malignancy.  Mild FDG uptake within the reticular nodular changes of the more inferior right upper lobe and right middle lobe with SUV of 1.1 favoring an inflammatory/infectious process.  Findings were discussed with Dr. Cortés.  Given the increase in size of the pulmonary nodule read recommend referral to Dr. Rodas for possible robotic navigational bronchoscopy.  The patient and her spouse were agreeable.  Referral was made.  No other aggravating or alleviating factors.     Objective   Vital Signs:   /76   Pulse 71   Ht 160 cm (63\")   Wt 51.5 kg (113 lb 9.6 oz)   SpO2 100% Comment: ra  BMI 20.12 kg/m²     Physical Exam  Vitals reviewed.   Constitutional:       General: She is not in acute distress.     " Appearance: She is well-developed.      Interventions: Face mask in place.   HENT:      Head: Normocephalic and atraumatic.   Eyes:      General: No scleral icterus.     Conjunctiva/sclera: Conjunctivae normal.      Pupils: Pupils are equal, round, and reactive to light.   Cardiovascular:      Rate and Rhythm: Normal rate.   Pulmonary:      Effort: Pulmonary effort is normal. No respiratory distress.      Breath sounds: Normal breath sounds. No wheezing or rales.   Musculoskeletal:         General: Normal range of motion.      Cervical back: Normal range of motion and neck supple.   Skin:     General: Skin is warm and dry.   Neurological:      Mental Status: She is alert and oriented to person, place, and time.   Psychiatric:         Behavior: Behavior normal.         Thought Content: Thought content normal.         Judgment: Judgment normal.        Result Review :  The following data was reviewed by: GEREMIAS Mathis on 09/10/2021:  CT Chest Without Contrast Diagnostic (09/03/2021 10:33)  NM PET/CT Skull Base to Mid Thigh (09/10/2021 09:42)    PFT Values        Some values may be hidden. Unless noted otherwise, only the newest values recorded on each date are displayed.         Old Values PFT Results 9/10/21   No data to display.      Pre Drug PFT Results 9/10/21   FVC 80   FEV1 64   FEF 25-75% 28   FEV1/FVC 61.74      Post Drug PFT Results 9/10/21   No data to display.      Other Tests PFT Results 9/10/21   No data to display.             Results for orders placed in visit on 09/10/21    Pulmonary Function Test    Narrative  Pulmonary Function Test  Performed by: Samantha Cantor, RRT  Authorized by: Brittni Christianson APRN    Pre Drug % Predicted  FVC: 80%  FEV1: 64%  FEF 25-75%: 28%  FEV1/FVC: 61.74%    Interpretation  Spirometry  Spirometry shows moderate obstruction. There is reduced midflow suggesting small airway/airflow obstruction.  Review of FVL curve  Patient's effort is  normal.      Results for orders placed in visit on 07/30/19    Pulmonary Function Test           Assessment and Plan  Diagnoses and all orders for this visit:    1. Lung nodules (Primary)  -     Ambulatory Referral to Pulmonology  The CT scan from September 3, 2021 shows the right upper lobe nodule measuring 2.1 x 1.3 x 1.2 cm previously measured 1.6 x 1.1 x 1.2 cm.  The nodule is spiculated and a PET scan was recommended.  PET scan obtained on September 10, 2021 and shows FDG uptake within the spiculated 2 cm right upper lobe nodule with SUV of 2.85 concerning for malignancy.  Mild FDG uptake within the reticular nodular changes of the more inferior right upper lobe and right middle lobe with SUV of 1.1 favoring an inflammatory/infectious process.  Findings were discussed with Dr. Cortés.  Given the increase in size of the pulmonary nodule read recommend referral to Dr. Rodas for possible robotic navigational bronchoscopy.  The patient and her spouse were agreeable.  Referral was made.    2. Bronchiectasis without complication (CMS/HCC)  Comments:  Stable.  The patient denies recent respiratory infections.  Orders:  -     Pulmonary Function Test    3. Restrictive lung disease  Comments:  The patient's pulmonary function test was consistent with moderate obstructive disease today.  The decline could be related to her allergies being flared up at this time.  We will continue to monitor.    4. Pulmonary emphysema, unspecified emphysema type (CMS/HCC)              Known pulmonary emphysema.  The patient does not utilize inhalers and is not interested in trialing any at this time.  5. Allergic rhinitis, unspecified seasonality, unspecified trigger     Continue Zyrtec.  6. Abnormal PET scan, lung  -     Ambulatory Referral to Pulmonology  PET scan obtained on September 10, 2021 and shows FDG uptake within the spiculated 2 cm right upper lobe nodule with SUV of 2.85 concerning for malignancy.  Mild FDG uptake within the  reticular nodular changes of the more inferior right upper lobe and right middle lobe with SUV of 1.1 favoring an inflammatory/infectious process.    Health maintenance:   Influenza vaccine: Yes  Pneumovax 23: Yes  Prevnar 13: Yes  Covid 19: Yes  Follow Up  Brittni Christianson, APRN  9/10/2021  13:59 CDT  Return in about 2 months (around 11/10/2021).  Patient was given instructions and counseling regarding her condition or for health maintenance advice. Please see specific information pulled into the AVS if appropriate.   Please note that portions of this note were completed with a voice recognition program.

## 2021-09-10 ENCOUNTER — OFFICE VISIT (OUTPATIENT)
Dept: PULMONOLOGY | Facility: CLINIC | Age: 76
End: 2021-09-10

## 2021-09-10 ENCOUNTER — HOSPITAL ENCOUNTER (OUTPATIENT)
Dept: CT IMAGING | Facility: HOSPITAL | Age: 76
Discharge: HOME | End: 2021-09-10

## 2021-09-10 VITALS
HEIGHT: 63 IN | HEART RATE: 71 BPM | SYSTOLIC BLOOD PRESSURE: 142 MMHG | DIASTOLIC BLOOD PRESSURE: 76 MMHG | OXYGEN SATURATION: 100 % | BODY MASS INDEX: 20.13 KG/M2 | WEIGHT: 113.6 LBS

## 2021-09-10 DIAGNOSIS — R94.2 ABNORMAL PET SCAN, LUNG: ICD-10-CM

## 2021-09-10 DIAGNOSIS — R91.8 LUNG NODULES: Primary | Chronic | ICD-10-CM

## 2021-09-10 DIAGNOSIS — R91.1 LUNG NODULE: ICD-10-CM

## 2021-09-10 DIAGNOSIS — J43.9 PULMONARY EMPHYSEMA, UNSPECIFIED EMPHYSEMA TYPE (HCC): Chronic | ICD-10-CM

## 2021-09-10 DIAGNOSIS — J30.9 ALLERGIC RHINITIS, UNSPECIFIED SEASONALITY, UNSPECIFIED TRIGGER: Chronic | ICD-10-CM

## 2021-09-10 DIAGNOSIS — J98.4 RESTRICTIVE LUNG DISEASE: Chronic | ICD-10-CM

## 2021-09-10 DIAGNOSIS — J47.9 BRONCHIECTASIS WITHOUT COMPLICATION (HCC): ICD-10-CM

## 2021-09-10 PROCEDURE — 94010 BREATHING CAPACITY TEST: CPT | Performed by: NURSE PRACTITIONER

## 2021-09-10 PROCEDURE — 99214 OFFICE O/P EST MOD 30 MIN: CPT | Performed by: NURSE PRACTITIONER

## 2021-09-10 PROCEDURE — 0 FLUDEOXYGLUCOSE F18 SOLUTION: Performed by: NURSE PRACTITIONER

## 2021-09-10 PROCEDURE — 78815 PET IMAGE W/CT SKULL-THIGH: CPT

## 2021-09-10 PROCEDURE — A9552 F18 FDG: HCPCS | Performed by: NURSE PRACTITIONER

## 2021-09-10 RX ORDER — ROSUVASTATIN CALCIUM 40 MG/1
40 TABLET, COATED ORAL NIGHTLY
COMMUNITY
Start: 2021-09-02

## 2021-09-10 RX ADMIN — FLUDEOXYGLUCOSE F18 1 DOSE: 300 INJECTION INTRAVENOUS at 08:22

## 2021-09-10 NOTE — PROCEDURES
Pulmonary Function Test  Performed by: Samantha Cantor, RRT  Authorized by: Brittni Christianson APRN      Pre Drug % Predicted    FVC: 80%   FEV1: 64%   FEF 25-75%: 28%   FEV1/FVC: 61.74%    Interpretation   Spirometry   Spirometry shows moderate obstruction. There is reduced midflow suggesting small airway/airflow obstruction.   Review of FVL curve   Patient's effort is normal.

## 2021-09-11 DIAGNOSIS — N39.41 URGE INCONTINENCE OF URINE: Primary | ICD-10-CM

## 2021-09-13 RX ORDER — MIRABEGRON 50 MG/1
TABLET, FILM COATED, EXTENDED RELEASE ORAL
Qty: 90 TABLET | Refills: 3 | Status: ON HOLD | OUTPATIENT
Start: 2021-09-13 | End: 2021-11-06

## 2021-09-15 ENCOUNTER — TELEPHONE (OUTPATIENT)
Dept: PULMONOLOGY | Facility: CLINIC | Age: 76
End: 2021-09-15

## 2021-09-15 DIAGNOSIS — Z01.818 PREOP TESTING: Primary | ICD-10-CM

## 2021-09-15 NOTE — TELEPHONE ENCOUNTER
Patient received a call from Dr. Rodas's office. She will be having her procedure next Tuesday in [place]. They want you to order a rapid covid test to be done on Monday. Patient will need a copy of the results to take with her.

## 2021-09-16 NOTE — TELEPHONE ENCOUNTER
Order place.  Brittni you just need to cosign it.  Covid scheduling will call the pt to schedule for Monday.

## 2021-09-20 ENCOUNTER — LAB (OUTPATIENT)
Dept: LAB | Facility: HOSPITAL | Age: 76
End: 2021-09-20

## 2021-09-20 DIAGNOSIS — Z01.818 PREOP TESTING: ICD-10-CM

## 2021-09-20 LAB — SARS-COV-2 RNA PNL SPEC NAA+PROBE: NOT DETECTED

## 2021-09-20 PROCEDURE — C9803 HOPD COVID-19 SPEC COLLECT: HCPCS | Performed by: NURSE PRACTITIONER

## 2021-09-20 PROCEDURE — 87635 SARS-COV-2 COVID-19 AMP PRB: CPT | Performed by: NURSE PRACTITIONER

## 2021-09-23 ENCOUNTER — TELEPHONE (OUTPATIENT)
Dept: PULMONOLOGY | Facility: CLINIC | Age: 76
End: 2021-09-23

## 2021-09-23 NOTE — TELEPHONE ENCOUNTER
Received call from Dr. Rodas.  Bronch identified copious pus, in setting of bronchiectasis.  Pt was rxed augmentin, breo, albuterol, mucinex, spacer.  Pt will need to follow up with us sooner to make sure airway clearance is improving, then consider surgery to resect lesion   98.3

## 2021-09-28 NOTE — PROGRESS NOTES
Subjective    Ms. Zhu is 76 y.o. female    Chief Complaint: 1 year follow up for Urge Incontinence    History of Present Illness  Urinary incontinence  Patient is a 76-year-old female here for annual follow-up with history of urge incontinence.  In the past the patient leaks urine with urgency and a full bladder.  Patient describes the symptoms as urge to urinate with little or no warning.  Evaluation to date includes none treatment to date includes Myrbetriq 50 mg which has improved her symptoms.  She did not want to take anticholinergics.  Only change she has some occasional hesitancy.    The following portions of the patient's history were reviewed and updated as appropriate: allergies, current medications, past family history, past medical history, past social history, past surgical history and problem list.    Review of Systems   Constitutional: Negative for chills and fever.   Gastrointestinal: Negative for abdominal pain, anal bleeding and blood in stool.   Genitourinary: Negative for dysuria, flank pain, frequency, hematuria and urgency.         Current Outpatient Medications:   •  albuterol sulfate  (90 Base) MCG/ACT inhaler, Inhale 2 puffs Every 4 (Four) Hours As Needed for Wheezing., Disp: , Rfl:   •  alendronate (FOSAMAX) 70 MG tablet, Take 70 mg by mouth Every 7 (Seven) Days., Disp: , Rfl: 3  •  amoxicillin-clavulanate (AUGMENTIN) 875-125 MG per tablet, Take 1 tablet by mouth 2 (Two) Times a Day., Disp: , Rfl:   •  aspirin 81 MG chewable tablet, Chew 81 mg Daily., Disp: , Rfl:   •  benazepril (LOTENSIN) 5 MG tablet, Take 5 mg by mouth Daily., Disp: , Rfl:   •  Calcium Citrate (CITRACAL PO), Take 2 tablets by mouth 2 (Two) Times a Day., Disp: , Rfl:   •  carvedilol (COREG) 12.5 MG tablet, Take 12.5 mg by mouth 2 (Two) Times a Day With Meals., Disp: , Rfl:   •  celecoxib (CeleBREX) 100 MG capsule, Take 1 capsule by mouth 2 (Two) Times a Day As Needed for Mild Pain ., Disp: 60 capsule, Rfl:  0  •  cetirizine (ZyrTEC) 10 MG tablet, Take 10 mg by mouth as needed for allergies., Disp: , Rfl:   •  clopidogrel (PLAVIX) 75 MG tablet, TAKE 1 TABLET BY MOUTH EVERY DAY , Disp: 30 tablet, Rfl: 4  •  diazePAM (VALIUM) 5 MG tablet, TK 1/2 T PO QID PRN, Disp: , Rfl: 0  •  famotidine (PEPCID) 40 MG tablet, Take 40 mg by mouth 2 (Two) Times a Day., Disp: , Rfl: 3  •  Fluticasone Furoate-Vilanterol (Breo Ellipta) 200-25 MCG/INH inhaler, Inhale 1 puff Daily., Disp: , Rfl:   •  guaiFENesin (MUCINEX) 600 MG 12 hr tablet, Take 600 mg by mouth 2 (Two) Times a Day., Disp: , Rfl:   •  hydroCHLOROthiazide (HYDRODIURIL) 12.5 MG tablet, Take 12.5 mg by mouth Daily., Disp: , Rfl:   •  levothyroxine (SYNTHROID, LEVOTHROID) 50 MCG tablet, Take 50 mcg by mouth Daily., Disp: , Rfl: 3  •  multivitamin with minerals (MULTIVITAMIN ADULT PO), Take 1 tablet by mouth Daily., Disp: , Rfl:   •  Myrbetriq 50 MG tablet sustained-release 24 hour 24 hr tablet, TAKE 1 TABLET BY MOUTH DAILY, Disp: 90 tablet, Rfl: 3  •  rosuvastatin (CRESTOR) 40 MG tablet, , Disp: , Rfl:   •  traMADol (ULTRAM) 50 MG tablet, TK 1 T PO Q 6 H PRN, Disp: , Rfl: 0  •  pravastatin (PRAVACHOL) 80 MG tablet, Take 80 mg by mouth daily. DOESN'T TAKE ON DAYS SHE HAS TO TAKE COLESTID, Disp: , Rfl:   •  Repatha SureClick solution auto-injector SureClick injection, Inject 140 mg under the skin into the appropriate area as directed Every 14 (Fourteen) Days., Disp: , Rfl:     Past Medical History:   Diagnosis Date   • Antral ulcer    • Bladder cystocele    • C. difficile diarrhea    • CAD (coronary artery disease)    • Colon cancer screening     PREOPERATIVE    • Colon polyp    • Diarrhea    • Difficulty swallowing    • Disease of thyroid gland    • Elevated cholesterol    • Gastritis    • Generalized OA    • GERD (gastroesophageal reflux disease)    • History of bladder surgery 01/07/2020   • Hyperlipidemia    • Hypertension    • Liver cyst    • Lung nodule    • Microscopic  colitis    • Multiple gastric ulcers     last 5 years ago   • Neck pain    • Neuropathy    • Normal body mass index    • NSAID induced gastritis    • Numbness in both hands    • Ulcer of pyloric antrum     UNSPECIFIED ULCER CHRONICITY   • UTI (urinary tract infection)    • Weight loss        Past Surgical History:   Procedure Laterality Date   • ANTERIOR CERVICAL DISCECTOMY W/ FUSION N/A 5/22/2017    Procedure: CERVICAL DISCECTOMY ANTERIOR FUSION WITH INSTRUMENTATION;  Surgeon: NADINE Sarabia MD;  Location:  [facility] OR;  Service:    • AORTAGRAM Left 11/9/2020    Procedure: left lower extremtiy angiogram, hawk athrectomy, balloon angioplasty, stent placement, mynx closure;  Surgeon: Sukhdev Condon DO;  Location:  [facility] HYBRID OR 12;  Service: Vascular;  Laterality: Left;   • AORTAGRAM Left 3/26/2021    Procedure: LEFT LOWER EXTREMITY ANGIOGRAM, BALLOON ANGIOPLASTY, STENT PLACEMENT, MYNX CLOSURE;  Surgeon: Sukhdev Condon DO;  Location:  [facility] HYBRID OR 12;  Service: Vascular;  Laterality: Left;   • AORTAGRAM Left 8/6/2021    Procedure: LEFT LOWER EXTREMITY ANGIOGRAM, HAWK ATHERECTOMY, BALLOON ANGIOPLASTY, MYNX CLOSURE;  Surgeon: Sukhdev Condon DO;  Location:  [facility] HYBRID OR 12;  Service: Vascular;  Laterality: Left;   • BLADDER SUSPENSION      also had pelvic reconstructive surgery   • BREAST EXCISIONAL BIOPSY Right 1985   • CERVICAL CORPECTOMY N/A 5/22/2017    Procedure:  ANTERIOR CERVICAL DISCECTOMY FUSION C-6 to T1,  ANTERIOR FUSION WITH INSTRUMENTATION C-5 T-1;  Surgeon: NADINE Sarabia MD;  Location: [facility] OR;  Service:    • COLONOSCOPY  08/19/2015    TIC BX RT COLON   • COLONOSCOPY  12/04/2013    RT COLON BX RECALL 5YR   • COLONOSCOPY N/A 11/29/2016    Procedure: LOWER LIMITED COLONOSCOPY WITH ANESTHESIA;  Surgeon: Marco Antonio Vallejo MD;  Location: [facility] ENDOSCOPY;  Service:    • ENDOSCOPY  12/03/2015    HEALED ULCER SLANT BX   • HYSTERECTOMY     • OTHER SURGICAL HISTORY      KNEE CARTILAGE  "REMOVED   • OTHER SURGICAL HISTORY      BENIGN FIBROID TUMOR OF BREAST 1985 REMOVED   • TONSILLECTOMY         Social History     Socioeconomic History   • Marital status:      Spouse name: Not on file   • Number of children: Not on file   • Years of education: Not on file   • Highest education level: Not on file   Tobacco Use   • Smoking status: Former Smoker     Packs/day: 1.50     Years: 20.00     Pack years: 30.00     Types: Cigarettes     Quit date:      Years since quittin.7   • Smokeless tobacco: Never Used   Vaping Use   • Vaping Use: Never used   Substance and Sexual Activity   • Alcohol use: Yes     Comment: occasionally   • Drug use: No   • Sexual activity: Not Currently       Family History   Problem Relation Age of Onset   • Breast cancer Maternal Aunt    • Heart disease Mother    • Heart disease Father    • GI problems Neg Hx         MALIGNANCIES   • Colon cancer Neg Hx    • Colon polyps Neg Hx        Objective    Temp 98.2 °F (36.8 °C) (Temporal)   Ht 161.3 cm (63.5\")   Wt 50.3 kg (110 lb 12.8 oz)   BMI 19.32 kg/m²     Physical Exam  Vitals reviewed.   Constitutional:       Appearance: Normal appearance.   HENT:      Head: Normocephalic and atraumatic.      Right Ear: External ear normal.      Left Ear: External ear normal.      Nose: No congestion.   Eyes:      Conjunctiva/sclera: Conjunctivae normal.   Pulmonary:      Effort: Pulmonary effort is normal.   Neurological:      General: No focal deficit present.      Mental Status: She is alert and oriented to person, place, and time.   Psychiatric:         Mood and Affect: Mood normal.         Behavior: Behavior normal.         Bladder Scan interpretation  Estimation of residual urine via abdominal ultrasound  Residual Urine: 115 ml  Indication: Urge Incontinence  Position: Supine  Examination: Incremental scanning of the suprapubic area using 3 MHz transducer using copious amounts of acoustic gel.   Findings: An anechoic area was " demonstrated which represented the bladder, with measurement of residual urine as noted. I inspected this myself. In that the residual urine was stable or insignificant, no treatment will be necessary at this time.         Results for orders placed or performed in visit on 09/30/21   POC Urinalysis Dipstick, Multipro    Specimen: Urine   Result Value Ref Range    Color Yellow Yellow, Straw, Dark Yellow, Clary    Clarity, UA Clear Clear    Glucose, UA Negative Negative, 1000 mg/dL (3+) mg/dL    Bilirubin Negative Negative    Ketones, UA Negative Negative    Specific Gravity  1.015 1.005 - 1.030    Blood, UA Trace (A) Negative    pH, Urine 7.0 5.0 - 8.0    Protein, POC Negative Negative mg/dL    Urobilinogen, UA Normal Normal    Nitrite, UA Negative Negative    Leukocytes Negative Negative     Assessment and Plan    Diagnoses and all orders for this visit:    1. Urge incontinence of urine (Primary)  -     POC Urinalysis Dipstick, Multipro    Patient has had fairly good response to Myrbetriq 50 mg she was given samples.  She does not want to be on anticholinergics.  Urine is clear bladder scan was 115 mL.  We will continue Myrbetriq follow-up 1 year.

## 2021-09-29 NOTE — PROGRESS NOTES
" GEREMIAS Mathis  [facility]   Respiratory Disease Clinic  [address] [place] [address]  Phone: [phone]  Fax: [phone]       Chief Complaint  Lung Nodule and Recurrent Pneumonia    Subjective    History of Present Illness    Trinidad Zhu presents to [facility] PULMONARY & CRITICAL CARE MEDICINE   History of Present Illness   The patient has moderate COPD/RLD, lung nodules, bronchiectasis, emphysema, and personal history of nicotine dependence.  The patient is accompanied by her .    She presents today for follow up lung nodule after visit with Dr. Rodas.  The patient underwent robotic navigational bronchoscopy with Dr. Rodas.  Per office note from September 23, 2021 states that the right upper lobe nodule biopsy consistent with stage 1A non-small cell cancer.  The patient could be a candidate for wedge or lobe resection if her bronchiectasis could be optimized.  She was treated for bronchiectasis exacerbation with Augmentin, fluconazole, Mucinex, Breo, and albuterol HFA.  She has been referred to Dr. Ignacio and Dr. Noe for further evaluation and treatment recommendations.  AFB and fungal cx were also obtained.  Dr. Rodas will f/u with pt via telehealth.  The patient states that she has no new pulmonary complaints.  She is not coughing up any sputum.  She is taking the medications Dr. Rodas prescribed.  She states she has an appointment with Dr. Noe and Dr. Ignacio October 7.  She will keep those appointments.No other aggravating or alleviating factors.     Objective   Vital Signs:   /88   Pulse 81   Ht 160 cm (63\")   Wt 50.3 kg (111 lb)   SpO2 98% Comment: ra  BMI 19.66 kg/m²     Physical Exam  Vitals reviewed.   Constitutional:       General: She is not in acute distress.     Appearance: She is well-developed.      Interventions: Face mask in place.   HENT:      Head: Normocephalic and atraumatic.   Eyes:      General: No " scleral icterus.     Conjunctiva/sclera: Conjunctivae normal.      Pupils: Pupils are equal, round, and reactive to light.   Cardiovascular:      Rate and Rhythm: Normal rate.   Pulmonary:      Effort: Pulmonary effort is normal. No respiratory distress.      Breath sounds: Normal breath sounds. No wheezing or rales.   Musculoskeletal:         General: Normal range of motion.      Cervical back: Normal range of motion and neck supple.   Skin:     General: Skin is warm and dry.   Neurological:      Mental Status: She is alert and oriented to person, place, and time.   Psychiatric:         Behavior: Behavior normal.         Thought Content: Thought content normal.         Judgment: Judgment normal.        Result Review :  The following data was reviewed by: GEREMIAS Mathis on 09/30/2021:  PHYSICIAN PROGRESS NOTES - SCAN - DR. MELBA RUVALCABA [id number]-18/23/21 (09/23/2021)      PFT Values        Some values may be hidden. Unless noted otherwise, only the newest values recorded on each date are displayed.         Old Values PFT Results 9/10/21   No data to display.      Pre Drug PFT Results 9/10/21   FVC 80   FEV1 64   FEF 25-75% 28   FEV1/FVC 61.74      Post Drug PFT Results 9/10/21   No data to display.      Other Tests PFT Results 9/10/21   No data to display.             Results for orders placed in visit on 09/10/21    Pulmonary Function Test    Narrative  Pulmonary Function Test  Performed by: Samantha Cantor, RRT  Authorized by: Brittni Christianson APRN    Pre Drug % Predicted  FVC: 80%  FEV1: 64%  FEF 25-75%: 28%  FEV1/FVC: 61.74%    Interpretation  Spirometry  Spirometry shows moderate obstruction. There is reduced midflow suggesting small airway/airflow obstruction.  Review of FVL curve  Patient's effort is normal.      Results for orders placed in visit on 07/30/19    Pulmonary Function Test           Assessment and Plan  Diagnoses and all orders for this visit:    1. Stage 2 moderate  COPD by GOLD classification ([facility]) (Primary)    2. Bronchiectasis without complication (CMS/[facility])  Comments:  Continue Mucinex and add flutter.   Orders:  -     Cancel: OSCILLATING POSITIVE EXIRATORY PRESSURE (OPEP); Future  -     OSCILLATING POSITIVE EXIRATORY PRESSURE (OPEP); Future    3. Pulmonary emphysema, unspecified emphysema type (CMS/[facility])  Comments:  Continue Breo and albuterol HFA    4. Allergic rhinitis, unspecified seasonality, unspecified trigger  Comments:  Continue Zyrtec    5. Personal history of nicotine dependence  Comments:  Former smoker    6. Gastroesophageal reflux disease, unspecified whether esophagitis present  Comments:  Continue Pepcid    7. Bronchiectasis with acute exacerbation (CMS/[facility])  Comments:  Finish antibiotic and fluconazole prescribed per Dr. Rodas.  Obtain follow-up CT of the chest in approximately 1 week.  Orders:  -     CT Chest Without Contrast; Future    8. Lung nodules  Comments:  The patient has been referred to Dr. Noe and Dr. Ignacio.  Keep upcoming appointments.      Health maintenance:   Influenza vaccine: will take  Pneumovax 23: yes  Prevnar 13: yes  Covid 19: yes  Follow Up  Brittni Christianson, APRN  9/30/2021  14:00 CDT  Return in about 3 months (around 12/30/2021).  Patient was given instructions and counseling regarding her condition or for health maintenance advice. Please see specific information pulled into the AVS if appropriate.   Please note that portions of this note were completed with a voice recognition program.

## 2021-09-30 ENCOUNTER — OFFICE VISIT (OUTPATIENT)
Dept: PULMONOLOGY | Facility: CLINIC | Age: 76
End: 2021-09-30

## 2021-09-30 ENCOUNTER — OFFICE VISIT (OUTPATIENT)
Dept: UROLOGY | Facility: CLINIC | Age: 76
End: 2021-09-30

## 2021-09-30 VITALS
DIASTOLIC BLOOD PRESSURE: 88 MMHG | OXYGEN SATURATION: 98 % | BODY MASS INDEX: 19.67 KG/M2 | SYSTOLIC BLOOD PRESSURE: 152 MMHG | WEIGHT: 111 LBS | HEART RATE: 81 BPM | HEIGHT: 63 IN

## 2021-09-30 VITALS — BODY MASS INDEX: 18.92 KG/M2 | WEIGHT: 110.8 LBS | HEIGHT: 64 IN | TEMPERATURE: 98.2 F

## 2021-09-30 DIAGNOSIS — J30.9 ALLERGIC RHINITIS, UNSPECIFIED SEASONALITY, UNSPECIFIED TRIGGER: Chronic | ICD-10-CM

## 2021-09-30 DIAGNOSIS — N39.41 URGE INCONTINENCE OF URINE: Primary | ICD-10-CM

## 2021-09-30 DIAGNOSIS — R91.8 LUNG NODULES: Chronic | ICD-10-CM

## 2021-09-30 DIAGNOSIS — K21.9 GASTROESOPHAGEAL REFLUX DISEASE, UNSPECIFIED WHETHER ESOPHAGITIS PRESENT: Chronic | ICD-10-CM

## 2021-09-30 DIAGNOSIS — Z87.891 PERSONAL HISTORY OF NICOTINE DEPENDENCE: Chronic | ICD-10-CM

## 2021-09-30 DIAGNOSIS — J43.9 PULMONARY EMPHYSEMA, UNSPECIFIED EMPHYSEMA TYPE (HCC): Chronic | ICD-10-CM

## 2021-09-30 DIAGNOSIS — J44.9 STAGE 2 MODERATE COPD BY GOLD CLASSIFICATION (HCC): Primary | ICD-10-CM

## 2021-09-30 DIAGNOSIS — J47.1 BRONCHIECTASIS WITH ACUTE EXACERBATION (HCC): ICD-10-CM

## 2021-09-30 DIAGNOSIS — J47.9 BRONCHIECTASIS WITHOUT COMPLICATION (HCC): Chronic | ICD-10-CM

## 2021-09-30 LAB
BILIRUB BLD-MCNC: NEGATIVE MG/DL
CLARITY, POC: CLEAR
COLOR UR: YELLOW
GLUCOSE UR STRIP-MCNC: NEGATIVE MG/DL
KETONES UR QL: NEGATIVE
LEUKOCYTE EST, POC: NEGATIVE
NITRITE UR-MCNC: NEGATIVE MG/ML
PH UR: 7 [PH] (ref 5–8)
PROT UR STRIP-MCNC: NEGATIVE MG/DL
RBC # UR STRIP: ABNORMAL /UL
SP GR UR: 1.01 (ref 1–1.03)
UROBILINOGEN UR QL: NORMAL

## 2021-09-30 PROCEDURE — 81001 URINALYSIS AUTO W/SCOPE: CPT | Performed by: PHYSICIAN ASSISTANT

## 2021-09-30 PROCEDURE — 99213 OFFICE O/P EST LOW 20 MIN: CPT | Performed by: PHYSICIAN ASSISTANT

## 2021-09-30 PROCEDURE — 51798 US URINE CAPACITY MEASURE: CPT | Performed by: PHYSICIAN ASSISTANT

## 2021-09-30 PROCEDURE — 99214 OFFICE O/P EST MOD 30 MIN: CPT | Performed by: NURSE PRACTITIONER

## 2021-09-30 RX ORDER — AMOXICILLIN AND CLAVULANATE POTASSIUM 875; 125 MG/1; MG/1
1 TABLET, FILM COATED ORAL 2 TIMES DAILY
COMMUNITY
End: 2021-10-21

## 2021-09-30 RX ORDER — ALBUTEROL SULFATE 90 UG/1
2 AEROSOL, METERED RESPIRATORY (INHALATION) EVERY 4 HOURS PRN
COMMUNITY

## 2021-09-30 RX ORDER — GUAIFENESIN 600 MG/1
600 TABLET, EXTENDED RELEASE ORAL DAILY
COMMUNITY
End: 2022-01-04 | Stop reason: ALTCHOICE

## 2021-10-06 PROBLEM — Z87.891 FORMER SMOKER: Status: ACTIVE | Noted: 2021-10-06

## 2021-10-07 ENCOUNTER — OFFICE VISIT (OUTPATIENT)
Dept: CARDIAC SURGERY | Facility: CLINIC | Age: 76
End: 2021-10-07

## 2021-10-07 ENCOUNTER — TELEPHONE (OUTPATIENT)
Dept: CARDIAC SURGERY | Facility: CLINIC | Age: 76
End: 2021-10-07

## 2021-10-07 ENCOUNTER — HOSPITAL ENCOUNTER (OUTPATIENT)
Dept: RADIATION ONCOLOGY | Facility: HOSPITAL | Age: 76
Setting detail: RADIATION/ONCOLOGY SERIES
End: 2021-10-07

## 2021-10-07 ENCOUNTER — HOSPITAL ENCOUNTER (OUTPATIENT)
Dept: CT IMAGING | Facility: HOSPITAL | Age: 76
Discharge: HOME | End: 2021-10-07
Admitting: NURSE PRACTITIONER

## 2021-10-07 ENCOUNTER — CONSULT (OUTPATIENT)
Dept: RADIATION ONCOLOGY | Facility: HOSPITAL | Age: 76
End: 2021-10-07

## 2021-10-07 ENCOUNTER — DOCUMENTATION (OUTPATIENT)
Dept: RADIATION ONCOLOGY | Facility: HOSPITAL | Age: 76
End: 2021-10-07

## 2021-10-07 ENCOUNTER — PREP FOR SURGERY (OUTPATIENT)
Dept: OTHER | Facility: HOSPITAL | Age: 76
End: 2021-10-07

## 2021-10-07 VITALS
RESPIRATION RATE: 18 BRPM | BODY MASS INDEX: 18.78 KG/M2 | HEART RATE: 80 BPM | HEIGHT: 64 IN | OXYGEN SATURATION: 95 % | DIASTOLIC BLOOD PRESSURE: 54 MMHG | SYSTOLIC BLOOD PRESSURE: 122 MMHG | WEIGHT: 110 LBS

## 2021-10-07 VITALS
OXYGEN SATURATION: 96 % | HEART RATE: 78 BPM | HEIGHT: 64 IN | WEIGHT: 110.8 LBS | BODY MASS INDEX: 18.92 KG/M2 | SYSTOLIC BLOOD PRESSURE: 118 MMHG | DIASTOLIC BLOOD PRESSURE: 70 MMHG

## 2021-10-07 DIAGNOSIS — R91.8 LUNG NODULES: Chronic | ICD-10-CM

## 2021-10-07 DIAGNOSIS — Z87.891 FORMER SMOKER: ICD-10-CM

## 2021-10-07 DIAGNOSIS — J47.9 BRONCHIECTASIS WITHOUT COMPLICATION (HCC): Chronic | ICD-10-CM

## 2021-10-07 DIAGNOSIS — R91.1 LUNG NODULE: Primary | ICD-10-CM

## 2021-10-07 DIAGNOSIS — R91.8 LUNG NODULES: Primary | Chronic | ICD-10-CM

## 2021-10-07 DIAGNOSIS — J47.1 BRONCHIECTASIS WITH ACUTE EXACERBATION (HCC): ICD-10-CM

## 2021-10-07 DIAGNOSIS — R06.02 SOB (SHORTNESS OF BREATH): ICD-10-CM

## 2021-10-07 DIAGNOSIS — J44.9 STAGE 2 MODERATE COPD BY GOLD CLASSIFICATION (HCC): Chronic | ICD-10-CM

## 2021-10-07 PROCEDURE — 71250 CT THORAX DX C-: CPT

## 2021-10-07 PROCEDURE — G0463 HOSPITAL OUTPT CLINIC VISIT: HCPCS | Performed by: RADIOLOGY

## 2021-10-07 PROCEDURE — 99204 OFFICE O/P NEW MOD 45 MIN: CPT | Performed by: SURGERY

## 2021-10-07 RX ORDER — BUPIVACAINE HCL/0.9 % NACL/PF 0.1 %
2 PLASTIC BAG, INJECTION (ML) EPIDURAL ONCE
Status: CANCELLED | OUTPATIENT
Start: 2021-11-05

## 2021-10-07 RX ORDER — HEPARIN SODIUM 5000 [USP'U]/ML
5000 INJECTION, SOLUTION INTRAVENOUS; SUBCUTANEOUS ONCE
Status: CANCELLED | OUTPATIENT
Start: 2021-11-05

## 2021-10-07 RX ORDER — SODIUM CHLORIDE 0.9 % (FLUSH) 0.9 %
10 SYRINGE (ML) INJECTION AS NEEDED
Status: CANCELLED | OUTPATIENT
Start: 2021-10-07

## 2021-10-07 RX ORDER — SODIUM CHLORIDE 0.9 % (FLUSH) 0.9 %
10 SYRINGE (ML) INJECTION EVERY 12 HOURS SCHEDULED
Status: CANCELLED | OUTPATIENT
Start: 2021-10-07

## 2021-10-07 RX ORDER — ALBUTEROL SULFATE 1.25 MG/3ML
1.25 SOLUTION RESPIRATORY (INHALATION)
Status: CANCELLED | OUTPATIENT
Start: 2021-10-07

## 2021-10-07 NOTE — TELEPHONE ENCOUNTER
Surgery orders are in for 11/5/2021.  Please notify patient that last dose of Plavix should be taken on 10/30/2021

## 2021-10-07 NOTE — PROGRESS NOTES
[facility], [facility] HospitalRyderRyder Noe MD      Benjamin Feldman. APRN  ____________________________________________________________               [facility]  Department of Radiation Oncology  [address] [place] [id number]  Office:  [phone]  Fax: [phone]    DATE: 10/07/2021  PATIENT: Trinidad Zhu 1945   Medical record #: [id number]                                                       REASON FOR CONSULTATION:   Chief Complaint   Patient presents with   • Appointment     Suspected luing cancer     Trinidad Zhu is a 76 y.o. female that has been referred to our clinic to be evaluated for consideration of radiotherapy to the lung.  Reports appetite change and hot flashes. Denies activity change, unexpected weight change, chest tightness, cough, hemoptysis, SOB, wheezing, nausea/vomiting, diarrhea, light-headedness, weakness, and headaches. She follows .            HISTORY OF PRESENT ILLNESS  Diagnosed in September 2021 with Stage IA3 (T1c, N0, cM0) PET+ non-small cell cancer of the right lung, RUL, 2 cm (SUV 2.85).    10/08/2016 - CT Chest with contrast:  • There is a suspicious 2 cm ill-defined groundglass right upper lobe nodule. This is worrisome for malignancy. PET/CT scan is recommended to evaluate further.   • There is clustering of nodules in the inferior aspect of the right upper lobe near the minor fissure. Given the clustered nature, these are probably inflammatory nodules. The largest nodule in this area measures 9 mm. Metastatic disease is felt to be less likely.   • Emphysema. Dependent atelectasis.   • Probable fatty infiltration of the liver adjacent to the falciform ligament. A metastatic liver lesion is felt to be less likely. There is a cyst in the upper pole of the left kidney.     10/21/2021 - PET Scan:  • There is low-grade FDG activity within one of the 2 right-sided pulmonary nodules seen on previous chest CT but still well   below  the threshold to be considered suspicious. Followup CT the chest without contrast is recommended to reevaluate the lung findings in 3-4  months.   • Colonoscopy is recommended to evaluate the rectosigmoid junction which appears thickened on today's fusion CT. There is   increased activity in this location on PET, however it is not possible  to determine if this is normal physiologic intraluminal activity  related to stool or due to abnormal activity within the wall of the rectum.      01/27/2017 - CT chest without contrast:  • No significant change in size or appearance of a sub-solid nodule in the right upper lobe. Numerous additional nodules are stable in size and appearance, without new nodules identified. Lack of interval change and low metabolic activity on interval PET exam suggests benignity. Continued followup is recommended to assess for stability.   • Emphysema.   • Atherosclerosis of the aorta and coronary arteries.      07/05/2017 - CT Chest without contrast:  • Stable nodular opacities within the in the right upper lobe, similar to the study dating back to 10/8/2016. Continued follow-up recommended in 6 months.   • New left lower lobe opacities may be inflammatory. These can be followed on subsequent imaging.   • No pathologic lymphadenopathy.   • Vascular calcification involving the coronary arteries.    01/09/2018 - CT Chest without contrast:  • The essentially stable nodular opacities predominantly in the right upper lobe. A previous PET/CT study did not show any significant abnormal metabolic activity in these nodules. Another follow-up examination in 6 months is recommended.     07/16/2018 - CT Chest without contrast:  • The decrease in size of most of the nodules in the right upper lobe since the previous study in January 2018.   No new nodule. No increase in size of any of the nodules. This probably represent a patent process. Another follow-up study after 6 months may be obtained to ensure  stability.   • A small low-density nodule in the left kidney may represent a cyst. Further correlation with sonography may be obtained.     01/15/2019 - CT Chest without contrast:  • Stable CT of the chest without contrast with multiple noncalcified pulmonary nodules in the right upper lobe, with associated bronchial wall thickening and mild tubular bronchiectasis, most likely related to a chronic indolent infectious or inflammatory process.  • No new nodules are identified.   • The largest nodule, in the right lung apex abuts the lateral pleural surface and measures approximately 13 mm.   • There is surrounding mild retractive fibrotic change.   • Consider repeat chest CT in 6 months.   • There are are also moderate changes of COPD has before.   • No intrathoracic lymphadenopathy is identified.   • Stable heavy atherosclerotic calcification is noted within the aortic arch and coronary arteries.     07/22/2019 - CT Chest without contrast:  • The stable CT scan of the chest. The stable infiltrate and nodules in the right upper and middle lobe since the previous study in 2016. A follow-up unenhanced CT scan of the chest in one year may be obtained.     07/20/2020 - CT Chest high resolution:  • No change in 1.5 cm RIGHT upper lobe pulmonary nodule. However, there has been very slight increase in size compared to multiple prior studies dating back to 2016. Recommend continued imaging surveillance.   • Increased size of clustered nodules in the inferior RIGHT upper lobe and new cluster of tree-in-bud nodularity in the RIGHT lower lobe. Differential includes an indolent infectious/inflammatory process such as atypical mycobacterial infection (NAE). Recommend continued follow-up.     09/03/2020 - CT Chest without contrast:  • The stable spiculated nodule in the right upper lobe and nodular infiltrate with bronchiectasis and mucus plugging in the anterior inferior right upper lobe are stable since 2018. This is represent a  chronic benign process. The etiology and clinical   significance is not certain. This may represent an atypical MAC infection as suggested previously.   • The remaining lungs are unremarkable.     09/03/2021 - CT Chest without contrast:  • Spiculated right upper lobe nodule is redemonstrated, measuring 2.1 x 1.3 x 1.2 cm, previously 1.6 x 1.1 x 1.2 cm.  • Associated peripheral groundglass opacities.   • Emphysema.   • Bronchiectasis in the right upper and middle lobe redemonstrated.   • Right fissure-based nodule measuring 5 mm, previously 3 (image 82, series 3. Additional nodularities along the right minor fissure are similar to decrease.  Impression:   • Spiculated nodule in the right upper lobe have slightly increased in size. Recommend PET/CT.    09/10/2021 - PET Scan:  • The 2 cm spiculated right upper lobe pulmonary nodule demonstrates increased FDG uptake within SUV of 2.85.   • The reticulonodular changes of the inferior right upper and right middle lobe demonstrated mild FDG uptake with maximum SUV 1.4.   • Review of the mediastinum reveals no hypermetabolic lymphadenopathy.   Impression:  • FDG uptake within the spiculated 2 cm right upper lobe nodule with SUV of 2.85 concerning for malignancy.  • Mild FDG uptake within the reticulonodular changes of the more inferior right upper lobe and right middle lobe with SUV of 1.1 favoring an inflammatory/infectious process.  • No evidence of distant metastasis.    09/10/2021- Pulmonary Function Tests:  • Pre Drug % Predicted   • FVC: 80%  • FEV1: 64%  • FEF 25-75%: 28%  • FEV1/FVC: 61.74%  Interpretation Spirometry   • Spirometry shows moderate obstruction. There is reduced midflow suggesting small airway/airflow obstruction.   Review of FVL curve   • Patient's effort is normal.     09/10/2021 - Appointment with Brittni Christianson APRN - Pulmonary Disease:  • The CT scan from September 3, 2021 shows the right upper lobe nodule measuring 2.1 x 1.3 x 1.2 cm  previously measured 1.6 x 1.1 x 1.2 cm.  The nodule is spiculated and a PET scan was recommended.  PET scan obtained on September 10, 2021 and shows FDG uptake within the spiculated 2 cm right upper lobe nodule with SUV of 2.85 concerning for malignancy.  Mild FDG uptake within the reticular nodular changes of the more inferior right upper lobe and right middle lobe with SUV of 1.1 favoring an inflammatory/infectious process.    • Findings were discussed with Dr. Cortés.    • Given the increase in size of the pulmonary nodule read recommend referral to Dr. Rodas for possible robotic navigational bronchoscopy.    • The patient and her spouse were agreeable.    • Referral was made.      09/21/2021 - Mike bronchoscopy RUL biopsy per :  • Lung, Right upper lobe; transbronchial biopsy:  o Atypical proliferation, highly suspicious for well-differentiated adenocarcinoma  Comments: Recuts are evaluated.  The atypical proliferation is highlighted with immunohistochemical TIF1 staining (control appropriate).  The appearance could suggest a lepidic, well differentiated, or scar-carcinoma type lesion.  • Lymph node, 10R FNA:   o Lymph node sampling  • Bronchoalveolar Lavage - right lung:    o Inflammation, inflammatory debris, and macrophages   • Bronchoalveolar lavage, right upper lobe lung:  o Rare groups of mildly atypical epithelial cells  o Cartilage, blood, macrophages, and few groups f respiratory epithelial cells.     09/23/2021 - Appointment with :  • RUL nodule biopsy highly suggestive of lepidic adenocarcinoma and node 10 R negative for cancer with lymphocytes seen. This appears to be consistent with Stage 1A non-small cell at the edge of the lung and could be a candidate for wedge or lobe if her bronchiectasis could be optimized. I will treat for bronchiectasis and attempt to optimize and consider surgical resection with  - if not felt to be operative candidate could consider SBRT so will  have her see surgery and rad onc for next steps with .   • Follow up in 6 weeks.    09/23/2021 - Documentation per :  • Received call from Dr. Rodas.    • Bronch identified copious pus, in setting of bronchiectasis.    • Pt was rxed augmentin, breo, albuterol, mucinex, spacer.    • Pt will need to follow up with us sooner to make sure airway clearance is improving, then consider surgery to resect lesion    09/30/2021 - Appointment with Brittni Christianson APRN - Pulmonary Disease:  Stage 2 moderate COPD by GOLD classification   Bronchiectasis without complication   • Continue Mucinex and add flutter.   Pulmonary emphysema, unspecified emphysema type  • Continue Breo and albuterol HFA   Bronchiectasis with acute exacerbation (CMS/HCC)  • Finish antibiotic and fluconazole prescribed per Dr. Rodas.  Obtain follow-up CT of the chest in approximately 1 week.  Lung nodules  • The patient has been referred to Dr. Noe and Dr. Ignacio.  Keep upcoming appointments.  Follow Up  • Return in about 3 months (around 12/30/2021).    10/07/2021 -patient reports consultation with Dr. Ignacio from cardiothoracic surgery earlier today and where definitive surgical management was offered.  She has a tentative appointment for surgery in a few weeks.    History obtained from  PATIENT, FAMILY, and CHART    PAST MEDICAL HISTORY  Past Medical History:   Diagnosis Date   • Antral ulcer    • Bladder cystocele    • C. difficile diarrhea    • CAD (coronary artery disease)    • Colon cancer screening     PREOPERATIVE    • Colon polyp    • Diarrhea    • Difficulty swallowing    • Disease of thyroid gland    • Elevated cholesterol    • Gastritis    • Generalized OA    • GERD (gastroesophageal reflux disease)    • History of bladder surgery 01/07/2020   • Hyperlipidemia    • Hypertension    • Liver cyst    • Lung nodule    • Microscopic colitis    • Multiple gastric ulcers     last 5 years ago   • Neck pain    • Neuropathy    •  Normal body mass index    • NSAID induced gastritis    • Numbness in both hands    • Ulcer of pyloric antrum     UNSPECIFIED ULCER CHRONICITY   • UTI (urinary tract infection)    • Weight loss       PAST SURGICAL HISTORY  Past Surgical History:   Procedure Laterality Date   • ANTERIOR CERVICAL DISCECTOMY W/ FUSION N/A 5/22/2017    Procedure: CERVICAL DISCECTOMY ANTERIOR FUSION WITH INSTRUMENTATION;  Surgeon: NADINE Sarabia MD;  Location: [facility] OR;  Service:    • AORTAGRAM Left 11/9/2020    Procedure: left lower extremtiy angiogram, hawk athrectomy, balloon angioplasty, stent placement, mynx closure;  Surgeon: Sukhdev Condon DO;  Location:  [facility];  Service: Vascular;  Laterality: Left;   • AORTAGRAM Left 3/26/2021    Procedure: LEFT LOWER EXTREMITY ANGIOGRAM, BALLOON ANGIOPLASTY, STENT PLACEMENT, MYNX CLOSURE;  Surgeon: Sukhdev Condon DO;  Location:  [facility];  Service: Vascular;  Laterality: Left;   • AORTAGRAM Left 8/6/2021    Procedure: LEFT LOWER EXTREMITY ANGIOGRAM, HAWK ATHERECTOMY, BALLOON ANGIOPLASTY, MYNX CLOSURE;  Surgeon: Sukdhev Condon DO;  Location:  [facility];  Service: Vascular;  Laterality: Left;   • BLADDER SUSPENSION      also had pelvic reconstructive surgery   • BREAST EXCISIONAL BIOPSY Right 1985   • CERVICAL CORPECTOMY N/A 5/22/2017    Procedure:  ANTERIOR CERVICAL DISCECTOMY FUSION C-6 to T1,  ANTERIOR FUSION WITH INSTRUMENTATION C-5 T-1;  Surgeon: NADINE Sarabia MD;  Location: [facility] OR;  Service:    • COLONOSCOPY  08/19/2015    TIC BX RT COLON   • COLONOSCOPY  12/04/2013    RT COLON BX RECALL 5YR   • COLONOSCOPY N/A 11/29/2016    Procedure: LOWER LIMITED COLONOSCOPY WITH ANESTHESIA;  Surgeon: Marco Antonio Vallejo MD;  Location: [facility] ENDOSCOPY;  Service:    • ENDOSCOPY  12/03/2015    HEALED ULCER SLANT BX   • HYSTERECTOMY     • OTHER SURGICAL HISTORY      KNEE CARTILAGE REMOVED   • OTHER SURGICAL HISTORY      BENIGN FIBROID TUMOR OF BREAST  1985 REMOVED   • TONSILLECTOMY        PAST ONCOLOGIC HISTORY: Patient denies previous history of malignancies, chemotherapy or radiation therapy.    FAMILY HISTORY  family history includes Breast cancer in her maternal aunt; Heart disease in her father and mother.     SOCIAL HISTORY  Social History     Tobacco Use   • Smoking status: Former Smoker     Packs/day: 1.50     Years: 20.00     Pack years: 30.00     Types: Cigarettes     Quit date:      Years since quittin.7   • Smokeless tobacco: Never Used   Vaping Use   • Vaping Use: Never used   Substance Use Topics   • Alcohol use: Yes     Comment: occasionally   • Drug use: No      ALLERGIES  Baclofen, Gabapentin, Sulfa antibiotics, Sulfacetamide sodium, Amitriptyline hcl, and Oxycodone-acetaminophen     MEDICATIONS  Current Outpatient Medications   Medication Sig Dispense Refill   • albuterol sulfate  (90 Base) MCG/ACT inhaler Inhale 2 puffs Every 4 (Four) Hours As Needed for Wheezing.     • alendronate (FOSAMAX) 70 MG tablet Take 70 mg by mouth Every 7 (Seven) Days.  3   • amoxicillin-clavulanate (AUGMENTIN) 875-125 MG per tablet Take 1 tablet by mouth 2 (Two) Times a Day.     • aspirin 81 MG chewable tablet Chew 81 mg Daily.     • benazepril (LOTENSIN) 5 MG tablet Take 5 mg by mouth Daily.     • Calcium Citrate (CITRACAL PO) Take 2 tablets by mouth 2 (Two) Times a Day.     • carvedilol (COREG) 12.5 MG tablet Take 12.5 mg by mouth 2 (Two) Times a Day With Meals.     • celecoxib (CeleBREX) 100 MG capsule Take 1 capsule by mouth 2 (Two) Times a Day As Needed for Mild Pain . 60 capsule 0   • cetirizine (ZyrTEC) 10 MG tablet Take 10 mg by mouth as needed for allergies.     • clopidogrel (PLAVIX) 75 MG tablet TAKE 1 TABLET BY MOUTH EVERY DAY  30 tablet 4   • diazePAM (VALIUM) 5 MG tablet TK 1/2 T PO QID PRN  0   • famotidine (PEPCID) 40 MG tablet Take 40 mg by mouth 2 (Two) Times a Day.  3   • Fluticasone Furoate-Vilanterol (Breo Ellipta) 200-25 MCG/INH  inhaler Inhale 1 puff Daily.     • guaiFENesin (MUCINEX) 600 MG 12 hr tablet Take 600 mg by mouth 2 (Two) Times a Day.     • hydroCHLOROthiazide (HYDRODIURIL) 12.5 MG tablet Take 12.5 mg by mouth Daily.     • levothyroxine (SYNTHROID, LEVOTHROID) 50 MCG tablet Take 50 mcg by mouth Daily.  3   • multivitamin with minerals (MULTIVITAMIN ADULT PO) Take 1 tablet by mouth Daily.     • Myrbetriq 50 MG tablet sustained-release 24 hour 24 hr tablet TAKE 1 TABLET BY MOUTH DAILY 90 tablet 3   • pravastatin (PRAVACHOL) 80 MG tablet Take 80 mg by mouth daily. DOESN'T TAKE ON DAYS SHE HAS TO TAKE COLESTID     • Repatha SureClick solution auto-injector SureClick injection Inject 140 mg under the skin into the appropriate area as directed Every 14 (Fourteen) Days.     • rosuvastatin (CRESTOR) 40 MG tablet      • traMADol (ULTRAM) 50 MG tablet TK 1 T PO Q 6 H PRN  0     No current facility-administered medications for this visit.     The following portions of the patient's history were reviewed and updated as appropriate: allergies, current medications, past family history, past medical history, past social history, past surgical history and problem list.    REVIEW OF SYSTEMS  Review of Systems   Constitutional: Positive for appetite change. Negative for chills, diaphoresis, fatigue, fever and unexpected weight change.   HENT:  Negative.         Sinus/ allergy problems   Eyes: Negative.    Respiratory: Negative for chest tightness, cough, hemoptysis, shortness of breath and wheezing.    Cardiovascular: Negative.    Gastrointestinal: Negative.    Endocrine: Positive for hot flashes.   Genitourinary: Negative.     Musculoskeletal: Negative.         Arthritis   Skin: Negative.    Neurological: Negative.    Hematological: Negative.    Psychiatric/Behavioral: Negative for confusion, decreased concentration, depression, sleep disturbance and suicidal ideas. The patient is nervous/anxious.      I have reviewed and confirmed the accuracy  "of the ROS as documented by the MA/LPN/RN Sudhakar Sosa MD    PHYSICAL EXAM  VITAL SIGNS:   Vitals:    10/07/21 1325   BP: 122/54   Pulse: 80   Resp: 18   SpO2: 95%  Comment: Room Air   Weight: 49.9 kg (110 lb)   Height: 161.3 cm (63.5\")   PainSc: 0-No pain      Physical Exam    General:  Alert and oriented, in no acute distress, well-developed, vitals reviewed.  Head:  Normocephalic, without obvious abnormality    Nose/Sinuses:  Nares normal externally  Mouth/Throat:  Mucosa moist, without erythema  Neck:  supple, No evidence of adenopathy in the cervical or supraclavicular areas.  Eyes: No gross abnormalities   Ears: Ears intact with no external abnormalities noted  Chest:  Respiratory efforts are normal and unlabored, chest is clear to auscultation.  Cardiovascular: Regular rate and rhythm without murmurs, rubs, or gallops.   Abdomen:  Soft, non-tender, normal bowel sounds;   Extremities:  GOODWIN well, warm to touch, no cyanosis or edema.  Skin: No suspicious lesions or rashes of concern  Neurologic: non focal exam, strength and sensation grossly normal  Psych: Mood and affect are appropriate    Performance Status: ECOG (0) Fully active, able to carry on all predisease performance without restriction    Clinical Quality Measures  -Pain Documented by Standardized Tool, FPS Trinidad Zhu reports a pain score of 0. Given her pain assessment as noted, treatment options were discussed and the following options were decided upon as a follow-up plan to address the patient's pain: No pain, no plan given   Pain Medications             aspirin 81 MG chewable tablet Chew 81 mg Daily.    celecoxib (CeleBREX) 100 MG capsule Take 1 capsule by mouth 2 (Two) Times a Day As Needed for Mild Pain .    traMADol (ULTRAM) 50 MG tablet TK 1 T PO Q 6 H PRN        -Advanced Care Planning   Advance Care Planning   ACP discussion was held with the patient during this visit. Patient does not have an advance directive, information " provided.    -Body Mass Index Screening and Follow-Up Plan Patient's Body mass index is 19.18 kg/m². indicating that she is within normal range (BMI 18.5-24.9). No BMI management plan needed..    -Tobacco Use: Screening and Cessation Intervention Social History    Tobacco Use      Smoking status: Former Smoker        Packs/day: 1.50        Years: 20.00        Pack years: 30        Types: Cigarettes        Quit date:         Years since quittin.7      Smokeless tobacco: Never Used    ASSESSMENT AND PLAN  1. Lung nodules    2. Stage 2 moderate COPD by GOLD classification (HCC)    3. Former smoker      Orders Placed This Encounter   Procedures   • Ambulatory Referral to ONC Social Work     RECOMMENDATIONS: Trinidad Zhu was diagnosed in 2021 with Stage IA3 (T1c, N0, cM0) PET+ non-small cell cancer of the right lung, RUL, ~2 cm (SUV 2.85).    She met with Dr. Ignacio earlier today who apparently offered her definitive surgical management and is tentatively planned to undergo this in a few weeks.  Patient is here to discuss role of radiation therapy and radiation therapy options.    The indications and rationale of lung stereotactic/external beam radiation therapy according to the NCCN Guidelines has been discussed today. I have extensively reviewed the risks, benefits and alternatives of therapy with this diagnosis. The risks of radiation therapy includes but is not limited to radiation induced pulmonary fibrosis, progression of disease in spite of therapy with either local or systemic failure.  Pros and cons of ablative radiation therapy and published treatment outcomes were discussed.  I have seen, examined and reviewed this patient's medication list, appropriate labs and imaging studies as well as other physician notes. We discussed the goals and plans of care with the patient and family and answered all questions.  Her and her  understood that ablative radiation therapy approaches can be  used with curative intent and is an excellent treatment choice in patients who are not good surgical candidates or who do not wish to proceed with surgery.  For early stage lung cancer, surgery remains the gold standard.    After an extensive discussion, question and answer session, patient wished to move forward with definitive surgical options and wished to return only on an as-needed basis.    Continue ongoing management per primary care physician and other specialists. Thank you for allowing me to assist in this patients care.     Time Spent: I spent 54 minutes caring for Trinidad on this date of service. This time includes time spent by me in the following activities: preparing for the visit, reviewing tests, obtaining and/or reviewing a separately obtained history, performing a medically appropriate examination and/or evaluation, counseling and educating the patient/family/caregiver, ordering medications, tests, or procedures, referring and communicating with other health care professionals, documenting information in the medical record and independently interpreting results and communicating that information with the patient/family/caregiver.  I saw this patient in consultation while covering for Dr. Bladimir Noe, radiation oncologist.  Sudhakar Sosa MD  10/07/2021

## 2021-10-07 NOTE — TELEPHONE ENCOUNTER
Pt left vm message stating she found 2 missed calls from our office when she got home.  She is returning our call. /hansa

## 2021-10-07 NOTE — PROGRESS NOTES
TAMARA met with Mrs. Zhu due to her high distress score. She is here for a radiation consultation for lung nodules. TAMARA introduced self and explained role and source of support. She is 76 years old and lives with her spouse. She has a strong support system including her spouse, two daughters, and Jehovah's witness family. She is a member at [employer]. She said her main stressors is treatment decisions. She has received a lot of information today from her scan and having two doctors’ appointments. She states she is still taking everything in and processing the information. Reassurance was provided. Mrs. Zhu does not take any medication for anxiety or depression and does not see a therapist. She tries to keep herself busy so she does not get overwhelmed with her health conditions. TAMARA did provide her with support group information and resources on how to reduce anxiety. She was thankful for the information. Mrs. Zhu states she plans to have pursue the surgical options. TAMARA encouraged her to call if assistance is needed in the future.

## 2021-10-08 ENCOUNTER — TELEPHONE (OUTPATIENT)
Dept: VASCULAR SURGERY | Facility: CLINIC | Age: 76
End: 2021-10-08

## 2021-10-08 NOTE — TELEPHONE ENCOUNTER
Pt called stating the following:     Left foot and toes are very cold, had last angiogram in august. Pt concerned that this may or may not be normal. Coldness started last week. Pt is having lung nodule removed on November 5th, cancerous. Pt would like to know if she does have a blockage or narrowing if this would affect her surgery coming up. Please give pt a call back    Number:[phone]

## 2021-10-08 NOTE — PROGRESS NOTES
Spoke with patient and relayed test results. Patient voiced understanding. She states she is not having any pulmonary problems and seems to be feeling fine. She is scheduled for surgery on 11/5/21.

## 2021-10-12 ENCOUNTER — PATIENT ROUNDING (BHMG ONLY) (OUTPATIENT)
Dept: CARDIAC SURGERY | Facility: CLINIC | Age: 76
End: 2021-10-12

## 2021-10-12 NOTE — PROGRESS NOTES
October 12, 2021    Hello, may I speak with Trinidad Zhu?    My name is Michelle Gatica, RN     I am  with [facility]ARD[facility]OTH[facility]  [address], [address]  [place] [address] [phone].    Before we get started may I verify your date of birth? 1945    I am calling to officially welcome you to our practice and ask about your recent visit. Is this a good time to talk? YES     Tell me about your visit with us. What things went well?  It was a good visit. My mind was blown with the info I received but I left with an understanding of why things have to be done.       We're always looking for ways to make our patients' experiences even better. Do you have recommendations on ways we may improve?  NO    Overall were you satisfied with your first visit to our practice? YES        I appreciate you taking the time to speak with me today. Is there anything else I can do for you? NO      Thank you, and have a great day.

## 2021-10-13 ENCOUNTER — TELEPHONE (OUTPATIENT)
Dept: VASCULAR SURGERY | Facility: CLINIC | Age: 76
End: 2021-10-13

## 2021-10-13 NOTE — TELEPHONE ENCOUNTER
Returned Mrs Zhu call in regards to her foot. Mrs Zhu states her foot is hurting and burning all the time. Mrs Zhu states her Left Foot is still cold, it wakes her at night freezing so she will get out of bed and get in the recliner wraps the foot and it will get warmer. Mrs Zhu did state the color is back to normal since after her surgery. Mrs Zhu states her toes are hurting severely especially when her foot is cold, but she is continuing to walk daily on her treadmill even with the burning and pain.

## 2021-10-14 ENCOUNTER — TELEPHONE (OUTPATIENT)
Dept: PULMONOLOGY | Facility: CLINIC | Age: 76
End: 2021-10-14

## 2021-10-14 DIAGNOSIS — A31.0 MYCOBACTERIUM AVIUM COMPLEX (HCC): Primary | ICD-10-CM

## 2021-10-18 ENCOUNTER — TELEPHONE (OUTPATIENT)
Dept: VASCULAR SURGERY | Facility: CLINIC | Age: 76
End: 2021-10-18

## 2021-10-18 NOTE — TELEPHONE ENCOUNTER
Spoke with Mrs Zhu letting her know that we have her testing scheduled for Thursday, October 21st, 2021 [facility] for 7 o'clock testing and follow up afterwards at 8 am with Dr Condon. Mrs Zhu stated she would be here.

## 2021-10-20 ENCOUNTER — TELEPHONE (OUTPATIENT)
Dept: VASCULAR SURGERY | Facility: CLINIC | Age: 76
End: 2021-10-20

## 2021-10-20 NOTE — TELEPHONE ENCOUNTER
Spoke with Mrs Zhu reminding her of her appointment for Thursday, October 21st, 2021. Reminded Mrs Zhu to [facility] at 630 am for 7 o'clock testing and follow up afterwards at 8 am with Dr Condon. Mrs Zhu confirmed she would be here.

## 2021-10-21 ENCOUNTER — TELEPHONE (OUTPATIENT)
Dept: PULMONOLOGY | Facility: CLINIC | Age: 76
End: 2021-10-21

## 2021-10-21 ENCOUNTER — HOSPITAL ENCOUNTER (OUTPATIENT)
Dept: ULTRASOUND IMAGING | Facility: HOSPITAL | Age: 76
Discharge: HOME | End: 2021-10-21
Admitting: NURSE PRACTITIONER

## 2021-10-21 ENCOUNTER — OFFICE VISIT (OUTPATIENT)
Dept: VASCULAR SURGERY | Facility: CLINIC | Age: 76
End: 2021-10-21

## 2021-10-21 VITALS
BODY MASS INDEX: 19.46 KG/M2 | HEIGHT: 64 IN | OXYGEN SATURATION: 100 % | SYSTOLIC BLOOD PRESSURE: 122 MMHG | DIASTOLIC BLOOD PRESSURE: 70 MMHG | WEIGHT: 114 LBS | HEART RATE: 65 BPM

## 2021-10-21 DIAGNOSIS — I65.23 BILATERAL CAROTID ARTERY STENOSIS: ICD-10-CM

## 2021-10-21 DIAGNOSIS — E78.5 HYPERLIPIDEMIA, UNSPECIFIED HYPERLIPIDEMIA TYPE: ICD-10-CM

## 2021-10-21 DIAGNOSIS — I73.9 PAD (PERIPHERAL ARTERY DISEASE) (HCC): ICD-10-CM

## 2021-10-21 DIAGNOSIS — I10 ESSENTIAL HYPERTENSION: ICD-10-CM

## 2021-10-21 DIAGNOSIS — I73.9 PAD (PERIPHERAL ARTERY DISEASE) (HCC): Primary | ICD-10-CM

## 2021-10-21 PROCEDURE — 93923 UPR/LXTR ART STDY 3+ LVLS: CPT

## 2021-10-21 PROCEDURE — 93923 UPR/LXTR ART STDY 3+ LVLS: CPT | Performed by: SURGERY

## 2021-10-21 PROCEDURE — 99214 OFFICE O/P EST MOD 30 MIN: CPT | Performed by: SURGERY

## 2021-10-21 NOTE — TELEPHONE ENCOUNTER
Left message for patient to call me back regarding appointment status and to confirm appt with Infectious Disease/Dr. Alexx Cardenas on Thursday 10/28/21 at 11:00am.

## 2021-10-21 NOTE — PROGRESS NOTES
10/21/2021       Andrew Layne MD  [address]  [place] [address]    Trinidad Zhu  1945    Chief Complaint   Patient presents with   • Follow-up     Follow Up For Pain and Numbness in Left Leg and Foot. Test [id number] US pad ankle / brach ind ext comp. Patient denies any stroke like symptoms.    • Former Smoker     Patient is a Former Smoker    • Med Management     Verbally verified medications with patient/        Dear Andrew Layne MD       HPI  I had the pleasure of seeing your patient Trinidad Zhu in the office today.   As you recall, Trinidad Zhu is a 76 y.o.  female who we are following for lower extremity PAD.  Initially she was seen by Dr. Gonzáles with complaints of foot pain.  She does also report cramping in her feet and toes at night.  She does wear compression stockings.  She did undergo a left lower extremity angiogram on 11/9/2020.  Initially, she did well postoperatively but began having pain again which was waking her up at night.  She did undergo a left lower extremity angiogram with crossing of superficial femoral artery chronic total occlusion and stent placement on 3/26/2021.  Sshe began having pain she had prior to her first surgery in November.  Her foot did have a nichole appearance.  She did undergo a left lower extremity angiogram 8/6/2021 with atherectomy and balloon angioplasty of popliteal artery and entire posterior tibial artery.  Overall she is doing well denies any claudication.  She does report numbness in her toes and foot.  She reports her foot gets very cold at night.  She is maintained on aspirin, Plavix, and Crestor.  She did have noninvasive testing performed today, which I did review in office.    Review of Systems   Constitutional: Negative.    HENT: Negative.    Eyes: Negative.    Respiratory: Negative.    Cardiovascular: Negative.    Gastrointestinal: Negative.    Endocrine: Negative.    Genitourinary: Negative.    Musculoskeletal:  "Negative.    Skin: Negative.    Allergic/Immunologic: Negative.    Neurological: Positive for numbness.   Hematological: Negative.    Psychiatric/Behavioral: Negative.         /70 (BP Location: Left arm, Patient Position: Sitting, Cuff Size: Adult)   Pulse 65   Ht 161.3 cm (63.5\")   Wt 51.7 kg (114 lb)   SpO2 100%   BMI 19.88 kg/m²   Physical Exam  Vitals and nursing note reviewed.   Constitutional:       General: She is not in acute distress.     Appearance: Normal appearance. She is well-developed. She is not diaphoretic.   HENT:      Head: Normocephalic and atraumatic.   Eyes:      General: No scleral icterus.     Pupils: Pupils are equal, round, and reactive to light.   Neck:      Thyroid: No thyromegaly.      Vascular: No carotid bruit or JVD.   Cardiovascular:      Rate and Rhythm: Normal rate and regular rhythm.      Pulses:           Dorsalis pedis pulses are detected w/ Doppler on the right side and detected w/ Doppler on the left side.        Posterior tibial pulses are detected w/ Doppler on the right side and detected w/ Doppler on the left side.      Heart sounds: Normal heart sounds and S2 normal. No murmur heard.  No friction rub. No gallop.       Comments: Left doppler DP/PT/peroneal  Pulmonary:      Effort: Pulmonary effort is normal.      Breath sounds: Normal breath sounds.   Abdominal:      General: Bowel sounds are normal.      Palpations: Abdomen is soft.   Musculoskeletal:         General: Normal range of motion.      Cervical back: Normal range of motion and neck supple.   Skin:     General: Skin is warm and dry.   Neurological:      General: No focal deficit present.      Mental Status: She is alert and oriented to person, place, and time.      Cranial Nerves: No cranial nerve deficit.   Psychiatric:         Mood and Affect: Mood normal.         Behavior: Behavior normal.         Thought Content: Thought content normal.         Judgment: Judgment normal.             Diagnostic " data:      Patient Active Problem List   Diagnosis   • Spinal stenosis, cervical region   • Lung nodules   • Bronchiectasis without complication ([facility])   • Underweight   • Personal history of nicotine dependence   • Restrictive lung disease   • Pulmonary emphysema ([facility])   • PAD (peripheral artery disease) ([facility])   • Preop testing   • Gastroesophageal reflux disease   • Allergic rhinitis   • ORTIZ (dyspnea on exertion)   • CAD (coronary artery disease)   • Hyperlipidemia   • Neuropathy   • Hypertension   • 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 PAD (peripheral artery disease) ([facility])  I73.9 443.9   2. Bilateral carotid artery stenosis  I65.23 433.10     433.30   3. Hyperlipidemia, unspecified hyperlipidemia type  E78.5 272.4   4. Essential hypertension  I10 401.9         Plan: After thoroughly evaluating Trinidad Zhu, I believe the best course of action is to remain conservative from vascular surgery standpoint.  Currently she denies any claudication to her legs.  She does report some numbness to her left toes and then feeling cold at night.  She did have noninvasive testing performed today, which shows mild arterial insufficiency to her lower extremities.  Her left foot is nice and warm with Doppler DP/PT/peroneal signals.  Numbness could just be a neurogenic issue.  We encouraged her to continue to walk as much as possible.  She is to continue on her current medications.  With repeat noninvasive testing for continued limits, including ABIs and a carotid duplex.    I did discuss vascular risk factors as they pertain to the progression of vascular disease including controlling her hypertension and hyperlipidemia.  Her blood pressure is stable on her current medication regimen.  She is maintained on Crestor for hyperlipidemia.  Patient's Body mass index is 19.88 kg/m². indicating that she is within normal range (BMI 18.5-24.9). No BMI management plan  needed..  The patient can continue taking their current medication regimen as previously planned.  This was all discussed in full with complete understanding.    Thank you for allowing me to participate in the care of your patient.  Please do not hesitate with any questions or concerns.  I will keep you aware of any further encounters with Trinidad Zhu.        Sincerely yours,         GEREMIAS Head

## 2021-10-22 ENCOUNTER — TELEPHONE (OUTPATIENT)
Dept: PULMONOLOGY | Facility: CLINIC | Age: 76
End: 2021-10-22

## 2021-10-22 DIAGNOSIS — Z01.812 ENCOUNTER FOR PREPROCEDURE SCREENING LABORATORY TESTING FOR COVID-19: Primary | ICD-10-CM

## 2021-10-22 DIAGNOSIS — Z20.822 ENCOUNTER FOR PREPROCEDURE SCREENING LABORATORY TESTING FOR COVID-19: Primary | ICD-10-CM

## 2021-10-22 NOTE — TELEPHONE ENCOUNTER
Patient called asking if you thought she should get the Moderna Booster before or after her surgery on 11/05/21?

## 2021-10-22 NOTE — TELEPHONE ENCOUNTER
Left message with patient regarding Brittni's recommendation regarding the Moderna booster and to call back if she has any questions.

## 2021-10-22 NOTE — TELEPHONE ENCOUNTER
If she is going to take the booster then I would recommend her getting it now versus right before her surgery.  Just in case she were to have a reaction to the vaccine and end up with a fever anything I do not want her surgery to be canceled because of that.

## 2021-10-22 NOTE — TELEPHONE ENCOUNTER
Patient aware of prework date/time and OR date/arrival time 0500/npo/no meds. Aware to take last dose of Plavix on 10/30.

## 2021-10-25 NOTE — PROGRESS NOTES
Thoracic Surgery Consultation    Referring Physician: Dr. Kimberley Rodas    Primary Care Physician: Dr. Jose Layne    Chief Complaint   Patient presents with   • Lung Nodule     New pt from [facility]         Subjective     Ms. Zhu is a 76-year-old female who presents to me with an enlarging right upper lobe lung nodule.  She has known about this for about 5 to 6 years and has been followed, slowly enlarge subsequent PET scan was performed showing SUV of 2.8 and eventually she underwent bronchoscopic biopsy by Dr. Rodas.  Her CT scan is also consistent with bronchiectasis.  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  Given this she was referred to me for consideration of surgical resection.  Prior to this she has had minimal lung symptoms.  She does have an occasional cough is sometimes productive sometimes not and never had hemoptysis.  She quit smoking 27 years ago and prior to that smoked for 25 years and 1-1/2 pack/day.  She does have mild coronary artery disease recently saw Dr. Hylton who is cleared her for operative intervention.  She has never had surgery on her heart lungs or chest.  She is currently undergoing antibiotic and mucus clearance for bronchiectasis and will finish that a couple weeks.        Review of Systems   Constitutional: Negative for activity change, fatigue and unexpected weight change.   Respiratory: Positive for cough. Negative for chest tightness, shortness of breath and wheezing.    Cardiovascular: Negative for chest pain and leg swelling.        A complete 10 system review of systems was performed, is negative except stated above.    Past Medical History:   Diagnosis Date   • Antral ulcer    • Bladder cystocele    • C. difficile diarrhea    • CAD (coronary artery disease)    • Colon cancer screening     PREOPERATIVE    • Colon polyp    • Diarrhea    • Difficulty swallowing    • Disease of thyroid gland    • Elevated cholesterol    •  Gastritis    • Generalized OA    • GERD (gastroesophageal reflux disease)    • History of bladder surgery 01/07/2020   • Hyperlipidemia    • Hypertension    • Liver cyst    • Lung nodule    • Microscopic colitis    • Multiple gastric ulcers     last 5 years ago   • Neck pain    • Neuropathy    • Normal body mass index    • NSAID induced gastritis    • Numbness in both hands    • Ulcer of pyloric antrum     UNSPECIFIED ULCER CHRONICITY   • UTI (urinary tract infection)    • Weight loss      Past Surgical History:   Procedure Laterality Date   • ANTERIOR CERVICAL DISCECTOMY W/ FUSION N/A 5/22/2017    Procedure: CERVICAL DISCECTOMY ANTERIOR FUSION WITH INSTRUMENTATION;  Surgeon: NADINE Sarabia MD;  Location: [facility] OR;  Service:    • AORTAGRAM Left 11/9/2020    Procedure: left lower extremtiy angiogram, hawk athrectomy, balloon angioplasty, stent placement, mynx closure;  Surgeon: Sukhdev Condon DO;  Location:  [facility]BRID OR 12;  Service: Vascular;  Laterality: Left;   • AORTAGRAM Left 3/26/2021    Procedure: LEFT LOWER EXTREMITY ANGIOGRAM, BALLOON ANGIOPLASTY, STENT PLACEMENT, MYNX CLOSURE;  Surgeon: Sukhdev Condon DO;  Location:  [facility] OR 12;  Service: Vascular;  Laterality: Left;   • AORTAGRAM Left 8/6/2021    Procedure: LEFT LOWER EXTREMITY ANGIOGRAM, HAWK ATHERECTOMY, BALLOON ANGIOPLASTY, MYNX CLOSURE;  Surgeon: Sukhdev Condon DO;  Location:  [facility] OR 12;  Service: Vascular;  Laterality: Left;   • BLADDER SUSPENSION      also had pelvic reconstructive surgery   • BREAST EXCISIONAL BIOPSY Right 1985   • CERVICAL CORPECTOMY N/A 5/22/2017    Procedure:  ANTERIOR CERVICAL DISCECTOMY FUSION C-6 to T1,  ANTERIOR FUSION WITH INSTRUMENTATION C-5 T-1;  Surgeon: NADINE Sarabia MD;  Location:  [facility] OR;  Service:    • COLONOSCOPY  08/19/2015    TIC BX RT COLON   • COLONOSCOPY  12/04/2013    RT COLON BX RECALL 5YR   • COLONOSCOPY N/A 11/29/2016    Procedure: LOWER LIMITED COLONOSCOPY  WITH ANESTHESIA;  Surgeon: Marco Antonio Vallejo MD;  Location: [facility] ENDOSCOPY;  Service:    • ENDOSCOPY  2015    HEALED ULCER SLANT BX   • HYSTERECTOMY     • OTHER SURGICAL HISTORY      KNEE CARTILAGE REMOVED   • OTHER SURGICAL HISTORY      BENIGN FIBROID TUMOR OF BREAST 1985 REMOVED   • TONSILLECTOMY       Family History   Problem Relation Age of Onset   • Breast cancer Maternal Aunt    • Heart disease Mother    • Heart disease Father    • GI problems Neg Hx         MALIGNANCIES   • Colon cancer Neg Hx    • Colon polyps Neg Hx      Social History     Tobacco Use   • Smoking status: Former Smoker     Packs/day: 1.50     Years: 20.00     Pack years: 30.00     Types: Cigarettes     Quit date:      Years since quittin.8   • Smokeless tobacco: Never Used   Vaping Use   • Vaping Use: Never used   Substance Use Topics   • Alcohol use: Yes     Comment: occasionally   • Drug use: No     Current Outpatient Medications   Medication Sig Dispense Refill   • albuterol sulfate  (90 Base) MCG/ACT inhaler Inhale 2 puffs Every 4 (Four) Hours As Needed for Wheezing.     • alendronate (FOSAMAX) 70 MG tablet Take 70 mg by mouth Every 7 (Seven) Days.  3   • aspirin 81 MG chewable tablet Chew 81 mg Daily.     • benazepril (LOTENSIN) 5 MG tablet Take 5 mg by mouth Daily.     • Calcium Citrate (CITRACAL PO) Take 2 tablets by mouth 2 (Two) Times a Day.     • carvedilol (COREG) 12.5 MG tablet Take 12.5 mg by mouth 2 (Two) Times a Day With Meals.     • cetirizine (ZyrTEC) 10 MG tablet Take 10 mg by mouth as needed for allergies.     • clopidogrel (PLAVIX) 75 MG tablet TAKE 1 TABLET BY MOUTH EVERY DAY  30 tablet 4   • diazePAM (VALIUM) 5 MG tablet TK 1/2 T PO QID PRN  0   • famotidine (PEPCID) 40 MG tablet Take 40 mg by mouth 2 (Two) Times a Day.  3   • Fluticasone Furoate-Vilanterol (Breo Ellipta) 200-25 MCG/INH inhaler Inhale 1 puff Daily.     • guaiFENesin (MUCINEX) 600 MG 12 hr tablet Take 600 mg by mouth 2 (Two) Times a  "Day.     • hydroCHLOROthiazide (HYDRODIURIL) 12.5 MG tablet Take 12.5 mg by mouth Daily.     • levothyroxine (SYNTHROID, LEVOTHROID) 50 MCG tablet Take 50 mcg by mouth Daily.  3   • multivitamin with minerals (MULTIVITAMIN ADULT PO) Take 1 tablet by mouth Daily.     • Myrbetriq 50 MG tablet sustained-release 24 hour 24 hr tablet TAKE 1 TABLET BY MOUTH DAILY 90 tablet 3   • rosuvastatin (CRESTOR) 40 MG tablet      • traMADol (ULTRAM) 50 MG tablet TK 1 T PO Q 6 H PRN  0     No current facility-administered medications for this visit.     Allergies:  Baclofen, Gabapentin, Sulfa antibiotics, Sulfacetamide sodium, Amitriptyline hcl, and Oxycodone-acetaminophen    Objective      Vital Signs  Visit Vitals  /70 (BP Location: Right arm, Patient Position: Sitting, Cuff Size: Adult)   Pulse 78   Ht 161.3 cm (63.5\")   Wt 50.3 kg (110 lb 12.8 oz)   SpO2 96%   BMI 19.32 kg/m²         Physical Exam  Vitals reviewed.   Constitutional:       General: She is not in acute distress.     Appearance: She is well-developed. She is not diaphoretic.   HENT:      Head: Normocephalic and atraumatic.   Eyes:      General: No scleral icterus.     Pupils: Pupils are equal, round, and reactive to light.   Neck:      Vascular: No JVD.      Trachea: No tracheal deviation.   Cardiovascular:      Rate and Rhythm: Normal rate and regular rhythm.      Heart sounds: Normal heart sounds. No murmur heard.      Pulmonary:      Effort: Pulmonary effort is normal. No respiratory distress.      Breath sounds: Normal breath sounds. No stridor. No wheezing.   Abdominal:      General: There is no distension.      Palpations: Abdomen is soft.      Tenderness: There is no abdominal tenderness.   Musculoskeletal:         General: No deformity. Normal range of motion.      Cervical back: Normal range of motion and neck supple.   Skin:     General: Skin is warm and dry.      Capillary Refill: Capillary refill takes less than 2 seconds.      Coloration: Skin is " not pale.      Findings: No erythema or rash.   Neurological:      Mental Status: She is alert and oriented to person, place, and time.      Cranial Nerves: No cranial nerve deficit.   Psychiatric:         Behavior: Behavior normal.         Thought Content: Thought content normal.         Judgment: Judgment normal.         Results Review:     WBC   Date Value Ref Range Status   08/03/2021 9.72 3.40 - 10.80 10*3/mm3 Final     RBC   Date Value Ref Range Status   08/03/2021 4.13 3.77 - 5.28 10*6/mm3 Final     Hemoglobin   Date Value Ref Range Status   08/03/2021 12.3 12.0 - 15.9 g/dL Final     Hematocrit   Date Value Ref Range Status   08/03/2021 38.2 34.0 - 46.6 % Final   12/19/2019 40 36 - 43 % Final     Comment:     This test was performed at: [address],[facility]# [id number],Aurelio Lee MD, PhD, Medical Director,[address] [place] [address], [address] [id number],[address]     MCV   Date Value Ref Range Status   08/03/2021 92.5 79.0 - 97.0 fL Final     MCH   Date Value Ref Range Status   08/03/2021 29.8 26.6 - 33.0 pg Final     MCHC   Date Value Ref Range Status   08/03/2021 32.2 31.5 - 35.7 g/dL Final     RDW   Date Value Ref Range Status   08/03/2021 14.5 12.3 - 15.4 % Final     RDW-SD   Date Value Ref Range Status   08/03/2021 49.4 37.0 - 54.0 fl Final     MPV   Date Value Ref Range Status   08/03/2021 9.9 6.0 - 12.0 fL Final     Platelets   Date Value Ref Range Status   08/03/2021 244 140 - 450 10*3/mm3 Final     Neutrophil %   Date Value Ref Range Status   08/03/2021 69.0 42.7 - 76.0 % Final     Lymphocyte %   Date Value Ref Range Status   08/03/2021 20.3 19.6 - 45.3 % Final     Monocyte %   Date Value Ref Range Status   08/03/2021 9.2 5.0 - 12.0 % Final     Eosinophil %   Date Value Ref Range Status   08/03/2021 0.6 0.3 - 6.2 % Final     Basophil %   Date Value Ref Range Status   08/03/2021 0.5 0.0 - 1.5 % Final     Immature Grans %   Date Value Ref Range Status   08/03/2021 0.4 0.0 - 0.5 %  Final     Neutrophils, Absolute   Date Value Ref Range Status   08/03/2021 6.71 1.70 - 7.00 10*3/mm3 Final     Lymphocytes, Absolute   Date Value Ref Range Status   08/03/2021 1.97 0.70 - 3.10 10*3/mm3 Final     Monocytes, Absolute   Date Value Ref Range Status   08/03/2021 0.89 0.10 - 0.90 10*3/mm3 Final     Eosinophils, Absolute   Date Value Ref Range Status   08/03/2021 0.06 0.00 - 0.40 10*3/mm3 Final     Basophils, Absolute   Date Value Ref Range Status   08/03/2021 0.05 0.00 - 0.20 10*3/mm3 Final     Immature Grans, Absolute   Date Value Ref Range Status   08/03/2021 0.04 0.00 - 0.05 10*3/mm3 Final     nRBC   Date Value Ref Range Status   08/03/2021 0.0 0.0 - 0.2 /100 WBC Final     Glucose   Date Value Ref Range Status   08/03/2021 97 65 - 99 mg/dL Final     Sodium   Date Value Ref Range Status   08/03/2021 139 136 - 145 mmol/L Final     Potassium   Date Value Ref Range Status   08/03/2021 4.3 3.5 - 5.2 mmol/L Final     CO2   Date Value Ref Range Status   08/03/2021 29.0 22.0 - 29.0 mmol/L Final     Chloride   Date Value Ref Range Status   08/03/2021 98 98 - 107 mmol/L Final     Anion Gap   Date Value Ref Range Status   08/03/2021 12.0 5.0 - 15.0 mmol/L Final     Creatinine   Date Value Ref Range Status   08/03/2021 0.63 0.57 - 1.00 mg/dL Final     BUN   Date Value Ref Range Status   08/03/2021 14 8 - 23 mg/dL Final     BUN/Creatinine Ratio   Date Value Ref Range Status   08/03/2021 22.2 7.0 - 25.0 Final     Calcium   Date Value Ref Range Status   08/03/2021 9.9 8.6 - 10.5 mg/dL Final     eGFR Non  Amer   Date Value Ref Range Status   08/03/2021 92 >60 mL/min/1.73 Final     Alkaline Phosphatase   Date Value Ref Range Status   05/15/2017 54 24 - 120 U/L Final     Total Protein   Date Value Ref Range Status   05/15/2017 7.1 6.3 - 8.7 g/dL Final     ALT (SGPT)   Date Value Ref Range Status   05/15/2017 22 0 - 54 U/L Final     AST (SGOT)   Date Value Ref Range Status   05/15/2017 45 7 - 45 U/L Final      Total Bilirubin   Date Value Ref Range Status   05/15/2017 0.4 0.1 - 1.0 mg/dL Final     Albumin   Date Value Ref Range Status   05/15/2017 4.20 3.50 - 5.00 g/dL Final     Globulin   Date Value Ref Range Status   05/15/2017 2.9 gm/dL Final        I reviewed the patient's clinical results and discussed with patient.    CT Chest:  IMPRESSION:  1. Spiculated pleural-based right upper lobe nodule that appears stable  compared to 9/3/2021 examination.  2. Right lung nodularity with peribronchial thickening and  bronchiectasis, stable.  3. Developing mild reticular nodular opacities posteriorly in the left  lower lobe and minimally in the right lower lobe. Acute inflammatory  change suspected. Correlate with patient presentation.  4. Continued follow-up recommended.  This report was finalized on 10/07/2021 10:05 by Dr. Vijay Tavarez MD.    PET Scan:  IMPRESSION:  1. FDG uptake within the spiculated 2 cm right upper lobe nodule with  SUV of 2.85 concerning for malignancy.  2. Mild FDG uptake within the reticulonodular changes of the more  inferior right upper lobe and right middle lobe with SUV of 1.1 favoring  an inflammatory/infectious process.  3. No evidence of distant metastasis.        This report was finalized on 09/10/2021 10:26 by Dr. Melanie Baron MD.    I personally reviewed images of following exams, the following is my interpretation:    CT Chest:  Spiculated right upper lobe nodule along the lateral pleura slightly posteriorly in the upper one third of the right upper lobe, no pathologically enlarged mediastinal lymph nodes, changes consistent with bronchiectasis    PET Scan:  Moderate FDG uptake at site of interest, no significant uptake in the mediastinum or distant metastatic foci           Assessment/Plan     Ms. Zhu is a 76-year-old female who presents with an enlarging right upper lobe lung nodule, this is now biopsy-proven lipidic adenocarcinoma via navigational bronchoscopy.  She also had  a level 10 lymph node biopsy which was negative for carcinoma.  Her case is complicated by bronchiectasis, she is currently undergoing optimization with antibiotics and mucus clearance.  She is to have her PFTs performed at the beginning of November.  I discussed with her the natural history of non-small cell lung cancer such as hers the stage and treatment options including SBRT versus surgical resection with wedge versus lobectomy pending pulmonary reserve on PFTs.  She understands these options and agrees that the best treatment moving forward with for her would be surgical resection.    We discussed the risk and benefits of surgery and alternatives including but not limited to bleeding, infection, injury to major vessels or organs, need for transfusion, need for conversion to open operation, pneumonia, prolonged airleak, risk of anesthesia, and/or death.  She understands these risk and agrees to proceed.    Based on her PFTs we will plan either large wedge resection versus right upper lobectomy after completion of her bronchiectasis treatment.  Thank you for trusting me with the care of Ms. Zhu.  Please do not hesitate to call with any questions or concerns.    Jarad Ignacio M.D.  Cardiothoracic Surgeon

## 2021-10-26 DIAGNOSIS — Z01.812 ENCOUNTER FOR PREPROCEDURE SCREENING LABORATORY TESTING FOR COVID-19: Primary | ICD-10-CM

## 2021-10-26 DIAGNOSIS — Z20.822 ENCOUNTER FOR PREPROCEDURE SCREENING LABORATORY TESTING FOR COVID-19: Primary | ICD-10-CM

## 2021-10-29 ENCOUNTER — HOSPITAL ENCOUNTER (OUTPATIENT)
Dept: GENERAL RADIOLOGY | Facility: HOSPITAL | Age: 76
Discharge: HOME | End: 2021-10-29

## 2021-10-29 ENCOUNTER — LAB (OUTPATIENT)
Dept: LAB | Facility: HOSPITAL | Age: 76
End: 2021-10-29

## 2021-10-29 ENCOUNTER — PRE-ADMISSION TESTING (OUTPATIENT)
Dept: PREADMISSION TESTING | Facility: HOSPITAL | Age: 76
End: 2021-10-29

## 2021-10-29 VITALS
HEART RATE: 77 BPM | BODY MASS INDEX: 19.84 KG/M2 | OXYGEN SATURATION: 98 % | DIASTOLIC BLOOD PRESSURE: 78 MMHG | HEIGHT: 63 IN | SYSTOLIC BLOOD PRESSURE: 134 MMHG | WEIGHT: 111.99 LBS | RESPIRATION RATE: 20 BRPM

## 2021-10-29 DIAGNOSIS — R06.02 SOB (SHORTNESS OF BREATH): ICD-10-CM

## 2021-10-29 DIAGNOSIS — R91.1 LUNG NODULE: ICD-10-CM

## 2021-10-29 DIAGNOSIS — Z01.812 ENCOUNTER FOR PREPROCEDURE SCREENING LABORATORY TESTING FOR COVID-19: ICD-10-CM

## 2021-10-29 DIAGNOSIS — Z20.822 ENCOUNTER FOR PREPROCEDURE SCREENING LABORATORY TESTING FOR COVID-19: ICD-10-CM

## 2021-10-29 LAB
ALBUMIN SERPL-MCNC: 4.5 G/DL (ref 3.5–5.2)
ALBUMIN/GLOB SERPL: 2.6 G/DL
ALP SERPL-CCNC: 55 U/L (ref 39–117)
ALT SERPL W P-5'-P-CCNC: 22 U/L (ref 1–33)
ANION GAP SERPL CALCULATED.3IONS-SCNC: 9 MMOL/L (ref 5–15)
APTT PPP: 30.5 SECONDS (ref 24.1–35)
AST SERPL-CCNC: 42 U/L (ref 1–32)
BASOPHILS # BLD AUTO: 0.03 10*3/MM3 (ref 0–0.2)
BASOPHILS NFR BLD AUTO: 0.4 % (ref 0–1.5)
BILIRUB SERPL-MCNC: 0.5 MG/DL (ref 0–1.2)
BUN SERPL-MCNC: 11 MG/DL (ref 8–23)
BUN/CREAT SERPL: 16.4 (ref 7–25)
CALCIUM SPEC-SCNC: 9.3 MG/DL (ref 8.6–10.5)
CHLORIDE SERPL-SCNC: 95 MMOL/L (ref 98–107)
CO2 SERPL-SCNC: 32 MMOL/L (ref 22–29)
CREAT SERPL-MCNC: 0.67 MG/DL (ref 0.57–1)
DEPRECATED RDW RBC AUTO: 51.5 FL (ref 37–54)
EOSINOPHIL # BLD AUTO: 0.17 10*3/MM3 (ref 0–0.4)
EOSINOPHIL NFR BLD AUTO: 2.3 % (ref 0.3–6.2)
ERYTHROCYTE [DISTWIDTH] IN BLOOD BY AUTOMATED COUNT: 14.7 % (ref 12.3–15.4)
GFR SERPL CREATININE-BSD FRML MDRD: 86 ML/MIN/1.73
GLOBULIN UR ELPH-MCNC: 1.7 GM/DL
GLUCOSE SERPL-MCNC: 87 MG/DL (ref 65–99)
HCT VFR BLD AUTO: 34.4 % (ref 34–46.6)
HGB BLD-MCNC: 11.5 G/DL (ref 12–15.9)
IMM GRANULOCYTES # BLD AUTO: 0.01 10*3/MM3 (ref 0–0.05)
IMM GRANULOCYTES NFR BLD AUTO: 0.1 % (ref 0–0.5)
INR PPP: 1.1 (ref 0.91–1.09)
LYMPHOCYTES # BLD AUTO: 2.09 10*3/MM3 (ref 0.7–3.1)
LYMPHOCYTES NFR BLD AUTO: 28.4 % (ref 19.6–45.3)
MCH RBC QN AUTO: 32.3 PG (ref 26.6–33)
MCHC RBC AUTO-ENTMCNC: 33.4 G/DL (ref 31.5–35.7)
MCV RBC AUTO: 96.6 FL (ref 79–97)
MONOCYTES # BLD AUTO: 1.05 10*3/MM3 (ref 0.1–0.9)
MONOCYTES NFR BLD AUTO: 14.3 % (ref 5–12)
NEUTROPHILS NFR BLD AUTO: 4.01 10*3/MM3 (ref 1.7–7)
NEUTROPHILS NFR BLD AUTO: 54.5 % (ref 42.7–76)
NRBC BLD AUTO-RTO: 0 /100 WBC (ref 0–0.2)
PLATELET # BLD AUTO: 162 10*3/MM3 (ref 140–450)
PMV BLD AUTO: 9.6 FL (ref 6–12)
POTASSIUM SERPL-SCNC: 3.8 MMOL/L (ref 3.5–5.2)
PROT SERPL-MCNC: 6.2 G/DL (ref 6–8.5)
PROTHROMBIN TIME: 13.8 SECONDS (ref 11.9–14.6)
RBC # BLD AUTO: 3.56 10*6/MM3 (ref 3.77–5.28)
SARS-COV-2 ORF1AB RESP QL NAA+PROBE: NOT DETECTED
SODIUM SERPL-SCNC: 136 MMOL/L (ref 136–145)
WBC # BLD AUTO: 7.36 10*3/MM3 (ref 3.4–10.8)

## 2021-10-29 PROCEDURE — C9803 HOPD COVID-19 SPEC COLLECT: HCPCS

## 2021-10-29 PROCEDURE — 80053 COMPREHEN METABOLIC PANEL: CPT

## 2021-10-29 PROCEDURE — U0004 COV-19 TEST NON-CDC HGH THRU: HCPCS

## 2021-10-29 PROCEDURE — 93010 ELECTROCARDIOGRAM REPORT: CPT | Performed by: INTERNAL MEDICINE

## 2021-10-29 PROCEDURE — 85610 PROTHROMBIN TIME: CPT

## 2021-10-29 PROCEDURE — 36415 COLL VENOUS BLD VENIPUNCTURE: CPT

## 2021-10-29 PROCEDURE — U0005 INFEC AGEN DETEC AMPLI PROBE: HCPCS

## 2021-10-29 PROCEDURE — 93005 ELECTROCARDIOGRAM TRACING: CPT

## 2021-10-29 PROCEDURE — 85730 THROMBOPLASTIN TIME PARTIAL: CPT

## 2021-10-29 PROCEDURE — 71046 X-RAY EXAM CHEST 2 VIEWS: CPT

## 2021-10-29 PROCEDURE — 85025 COMPLETE CBC W/AUTO DIFF WBC: CPT

## 2021-10-30 LAB
QT INTERVAL: 386 MS
QTC INTERVAL: 419 MS

## 2021-11-01 ENCOUNTER — HOSPITAL ENCOUNTER (OUTPATIENT)
Dept: PULMONOLOGY | Facility: HOSPITAL | Age: 76
Discharge: HOME | End: 2021-11-01
Admitting: SURGERY

## 2021-11-01 ENCOUNTER — TELEPHONE (OUTPATIENT)
Dept: CARDIAC SURGERY | Facility: CLINIC | Age: 76
End: 2021-11-01

## 2021-11-01 DIAGNOSIS — Z87.891 FORMER SMOKER: ICD-10-CM

## 2021-11-01 DIAGNOSIS — R91.1 LUNG NODULE: ICD-10-CM

## 2021-11-01 LAB
ARTERIAL PATENCY WRIST A: POSITIVE
ATMOSPHERIC PRESS: 759 MMHG
BASE EXCESS BLDA CALC-SCNC: 5.5 MMOL/L (ref 0–2)
BDY SITE: ABNORMAL
BODY TEMPERATURE: 37 C
HCO3 BLDA-SCNC: 30.3 MMOL/L (ref 20–26)
Lab: ABNORMAL
MODALITY: ABNORMAL
PCO2 BLDA: 44.3 MM HG (ref 35–45)
PCO2 TEMP ADJ BLD: 44.3 MM HG (ref 35–45)
PH BLDA: 7.44 PH UNITS (ref 7.35–7.45)
PH, TEMP CORRECTED: 7.44 PH UNITS (ref 7.35–7.45)
PO2 BLDA: 82.5 MM HG (ref 83–108)
PO2 TEMP ADJ BLD: 82.5 MM HG (ref 83–108)
SAO2 % BLDCOA: 97 % (ref 94–99)
VENTILATOR MODE: ABNORMAL

## 2021-11-01 PROCEDURE — 94060 EVALUATION OF WHEEZING: CPT | Performed by: INTERNAL MEDICINE

## 2021-11-01 PROCEDURE — 94726 PLETHYSMOGRAPHY LUNG VOLUMES: CPT | Performed by: INTERNAL MEDICINE

## 2021-11-01 PROCEDURE — 94060 EVALUATION OF WHEEZING: CPT

## 2021-11-01 PROCEDURE — 94726 PLETHYSMOGRAPHY LUNG VOLUMES: CPT

## 2021-11-01 PROCEDURE — 94729 DIFFUSING CAPACITY: CPT

## 2021-11-01 PROCEDURE — 94729 DIFFUSING CAPACITY: CPT | Performed by: INTERNAL MEDICINE

## 2021-11-01 PROCEDURE — 82803 BLOOD GASES ANY COMBINATION: CPT

## 2021-11-01 PROCEDURE — 36600 WITHDRAWAL OF ARTERIAL BLOOD: CPT

## 2021-11-01 RX ORDER — ALBUTEROL SULFATE 2.5 MG/3ML
2.5 SOLUTION RESPIRATORY (INHALATION) ONCE
Status: COMPLETED | OUTPATIENT
Start: 2021-11-01 | End: 2021-11-01

## 2021-11-01 RX ADMIN — ALBUTEROL SULFATE 2.5 MG: 2.5 SOLUTION RESPIRATORY (INHALATION) at 13:21

## 2021-11-01 NOTE — TELEPHONE ENCOUNTER
Per Dr. Ignacio, he will provide a letter stating that he recommend that she be excused from jury duty for 6 weeks following surgery.

## 2021-11-01 NOTE — TELEPHONE ENCOUNTER
Pt calling.  She is sched for surgery this Friday with Dr Ignacio and she just rec'd a summons for jury duty.  She is needing a letter from Dr Ignacio stating she will be unable to serve this year.  Can reach her at #[phone]/hansa

## 2021-11-02 ENCOUNTER — LAB (OUTPATIENT)
Dept: LAB | Facility: HOSPITAL | Age: 76
End: 2021-11-02

## 2021-11-02 DIAGNOSIS — Z20.822 ENCOUNTER FOR PREPROCEDURE SCREENING LABORATORY TESTING FOR COVID-19: ICD-10-CM

## 2021-11-02 DIAGNOSIS — Z01.812 ENCOUNTER FOR PREPROCEDURE SCREENING LABORATORY TESTING FOR COVID-19: ICD-10-CM

## 2021-11-02 LAB — SARS-COV-2 ORF1AB RESP QL NAA+PROBE: NOT DETECTED

## 2021-11-02 PROCEDURE — C9803 HOPD COVID-19 SPEC COLLECT: HCPCS

## 2021-11-02 PROCEDURE — U0005 INFEC AGEN DETEC AMPLI PROBE: HCPCS

## 2021-11-02 PROCEDURE — U0004 COV-19 TEST NON-CDC HGH THRU: HCPCS

## 2021-11-05 ENCOUNTER — HOSPITAL ENCOUNTER (INPATIENT)
Facility: HOSPITAL | Age: 76
LOS: 12 days | Discharge: HOME | End: 2021-11-17
Attending: SURGERY | Admitting: SURGERY

## 2021-11-05 ENCOUNTER — ANESTHESIA EVENT (OUTPATIENT)
Dept: PERIOP | Facility: HOSPITAL | Age: 76
End: 2021-11-05

## 2021-11-05 ENCOUNTER — ANESTHESIA (OUTPATIENT)
Dept: PERIOP | Facility: HOSPITAL | Age: 76
End: 2021-11-05

## 2021-11-05 ENCOUNTER — APPOINTMENT (OUTPATIENT)
Dept: GENERAL RADIOLOGY | Facility: HOSPITAL | Age: 76
End: 2021-11-05

## 2021-11-05 DIAGNOSIS — R91.1 LUNG NODULE: ICD-10-CM

## 2021-11-05 DIAGNOSIS — R91.8 MASS OF RIGHT LUNG: Primary | ICD-10-CM

## 2021-11-05 LAB
ABO GROUP BLD: NORMAL
ANION GAP SERPL CALCULATED.3IONS-SCNC: 11 MMOL/L (ref 5–15)
BLD GP AB SCN SERPL QL: NEGATIVE
BUN SERPL-MCNC: 13 MG/DL (ref 8–23)
BUN/CREAT SERPL: 23.6 (ref 7–25)
CALCIUM SPEC-SCNC: 8.4 MG/DL (ref 8.6–10.5)
CHLORIDE SERPL-SCNC: 99 MMOL/L (ref 98–107)
CO2 SERPL-SCNC: 26 MMOL/L (ref 22–29)
CREAT SERPL-MCNC: 0.55 MG/DL (ref 0.57–1)
DEPRECATED RDW RBC AUTO: 50.4 FL (ref 37–54)
ERYTHROCYTE [DISTWIDTH] IN BLOOD BY AUTOMATED COUNT: 14.5 % (ref 12.3–15.4)
GFR SERPL CREATININE-BSD FRML MDRD: 108 ML/MIN/1.73
GLUCOSE SERPL-MCNC: 107 MG/DL (ref 65–99)
HCT VFR BLD AUTO: 32.7 % (ref 34–46.6)
HGB BLD-MCNC: 11.1 G/DL (ref 12–15.9)
MCH RBC QN AUTO: 32.4 PG (ref 26.6–33)
MCHC RBC AUTO-ENTMCNC: 33.9 G/DL (ref 31.5–35.7)
MCV RBC AUTO: 95.3 FL (ref 79–97)
PLATELET # BLD AUTO: 163 10*3/MM3 (ref 140–450)
PMV BLD AUTO: 9.2 FL (ref 6–12)
POTASSIUM SERPL-SCNC: 3.9 MMOL/L (ref 3.5–5.2)
RBC # BLD AUTO: 3.43 10*6/MM3 (ref 3.77–5.28)
RH BLD: POSITIVE
SODIUM SERPL-SCNC: 136 MMOL/L (ref 136–145)
T&S EXPIRATION DATE: NORMAL
WBC # BLD AUTO: 6.28 10*3/MM3 (ref 3.4–10.8)

## 2021-11-05 PROCEDURE — 25010000002 ONDANSETRON PER 1 MG: Performed by: NURSE ANESTHETIST, CERTIFIED REGISTERED

## 2021-11-05 PROCEDURE — C1889 IMPLANT/INSERT DEVICE, NOC: HCPCS | Performed by: SURGERY

## 2021-11-05 PROCEDURE — 25010000002 PROPOFOL 10 MG/ML EMULSION: Performed by: NURSE ANESTHETIST, CERTIFIED REGISTERED

## 2021-11-05 PROCEDURE — 86850 RBC ANTIBODY SCREEN: CPT | Performed by: NURSE PRACTITIONER

## 2021-11-05 PROCEDURE — 86901 BLOOD TYPING SEROLOGIC RH(D): CPT | Performed by: NURSE PRACTITIONER

## 2021-11-05 PROCEDURE — 88360 TUMOR IMMUNOHISTOCHEM/MANUAL: CPT

## 2021-11-05 PROCEDURE — 88312 SPECIAL STAINS GROUP 1: CPT | Performed by: SURGERY

## 2021-11-05 PROCEDURE — 25010000002 HEPARIN (PORCINE) PER 1000 UNITS: Performed by: NURSE PRACTITIONER

## 2021-11-05 PROCEDURE — C1729 CATH, DRAINAGE: HCPCS | Performed by: SURGERY

## 2021-11-05 PROCEDURE — 94799 UNLISTED PULMONARY SVC/PX: CPT

## 2021-11-05 PROCEDURE — 25010000002 PHENYLEPHRINE 10 MG/ML SOLUTION 1 ML VIAL: Performed by: NURSE ANESTHETIST, CERTIFIED REGISTERED

## 2021-11-05 PROCEDURE — 25010000002 DEXAMETHASONE PER 1 MG: Performed by: NURSE ANESTHETIST, CERTIFIED REGISTERED

## 2021-11-05 PROCEDURE — 88307 TISSUE EXAM BY PATHOLOGIST: CPT | Performed by: SURGERY

## 2021-11-05 PROCEDURE — 25010000002 CEFAZOLIN PER 500 MG: Performed by: NURSE PRACTITIONER

## 2021-11-05 PROCEDURE — 8E0W4CZ ROBOTIC ASSISTED PROCEDURE OF TRUNK REGION, PERCUTANEOUS ENDOSCOPIC APPROACH: ICD-10-PCS | Performed by: SURGERY

## 2021-11-05 PROCEDURE — 25010000002 HYDROMORPHONE PER 4 MG: Performed by: NURSE ANESTHETIST, CERTIFIED REGISTERED

## 2021-11-05 PROCEDURE — 81210 BRAF GENE: CPT

## 2021-11-05 PROCEDURE — 0BJ08ZZ INSPECTION OF TRACHEOBRONCHIAL TREE, VIA NATURAL OR ARTIFICIAL OPENING ENDOSCOPIC: ICD-10-PCS | Performed by: SURGERY

## 2021-11-05 PROCEDURE — 86901 BLOOD TYPING SEROLOGIC RH(D): CPT

## 2021-11-05 PROCEDURE — 0 BUPIVACAINE LIPOSOME 1.3 % SUSPENSION 20 ML VIAL: Performed by: SURGERY

## 2021-11-05 PROCEDURE — 86900 BLOOD TYPING SEROLOGIC ABO: CPT

## 2021-11-05 PROCEDURE — 07B73ZX EXCISION OF THORAX LYMPHATIC, PERCUTANEOUS APPROACH, DIAGNOSTIC: ICD-10-PCS | Performed by: SURGERY

## 2021-11-05 PROCEDURE — 80048 BASIC METABOLIC PNL TOTAL CA: CPT | Performed by: SURGERY

## 2021-11-05 PROCEDURE — 25010000002 MIDAZOLAM PER 1 MG: Performed by: NURSE ANESTHETIST, CERTIFIED REGISTERED

## 2021-11-05 PROCEDURE — 32663 THORACOSCOPY W/LOBECTOMY: CPT | Performed by: SURGERY

## 2021-11-05 PROCEDURE — 88305 TISSUE EXAM BY PATHOLOGIST: CPT | Performed by: SURGERY

## 2021-11-05 PROCEDURE — 25010000002 FENTANYL CITRATE (PF) 50 MCG/ML SOLUTION: Performed by: NURSE ANESTHETIST, CERTIFIED REGISTERED

## 2021-11-05 PROCEDURE — C9290 INJ, BUPIVACAINE LIPOSOME: HCPCS | Performed by: SURGERY

## 2021-11-05 PROCEDURE — 86900 BLOOD TYPING SEROLOGIC ABO: CPT | Performed by: NURSE PRACTITIONER

## 2021-11-05 PROCEDURE — 88387 MACROSCOPIC XM DSJ&PREP TISS: CPT

## 2021-11-05 PROCEDURE — 25010000002 FENTANYL CITRATE (PF) 100 MCG/2ML SOLUTION: Performed by: NURSE ANESTHETIST, CERTIFIED REGISTERED

## 2021-11-05 PROCEDURE — 71045 X-RAY EXAM CHEST 1 VIEW: CPT

## 2021-11-05 PROCEDURE — 88377 M/PHMTRC ALYS ISHQUANT/SEMIQ: CPT

## 2021-11-05 PROCEDURE — 32674 THORACOSCOPY LYMPH NODE EXC: CPT | Performed by: SURGERY

## 2021-11-05 PROCEDURE — 94640 AIRWAY INHALATION TREATMENT: CPT

## 2021-11-05 PROCEDURE — 88309 TISSUE EXAM BY PATHOLOGIST: CPT | Performed by: SURGERY

## 2021-11-05 PROCEDURE — 85027 COMPLETE CBC AUTOMATED: CPT | Performed by: SURGERY

## 2021-11-05 PROCEDURE — 0BTC4ZZ RESECTION OF RIGHT UPPER LUNG LOBE, PERCUTANEOUS ENDOSCOPIC APPROACH: ICD-10-PCS | Performed by: SURGERY

## 2021-11-05 PROCEDURE — 25010000002 CEFAZOLIN PER 500 MG: Performed by: SURGERY

## 2021-11-05 PROCEDURE — 81235 EGFR GENE COM VARIANTS: CPT

## 2021-11-05 PROCEDURE — 86923 COMPATIBILITY TEST ELECTRIC: CPT

## 2021-11-05 PROCEDURE — C2615 SEALANT, PULMONARY, LIQUID: HCPCS | Performed by: SURGERY

## 2021-11-05 DEVICE — SEALANT PLURAL AIRLEAK PROGEL W/APPL 4ML: Type: IMPLANTABLE DEVICE | Site: PLEURAL SPACE | Status: FUNCTIONAL

## 2021-11-05 DEVICE — SUREFORM 45 RELOAD GREEN
Type: IMPLANTABLE DEVICE | Site: PLEURAL SPACE | Status: FUNCTIONAL
Brand: SUREFORM

## 2021-11-05 DEVICE — ABSORBABLE HEMOSTAT (OXIDIZED REGENERATED CELLULOSE, U.S.P.)
Type: IMPLANTABLE DEVICE | Site: PLEURAL SPACE | Status: FUNCTIONAL
Brand: SURGICEL

## 2021-11-05 DEVICE — SUREFORM 45 RELOAD BLUE
Type: IMPLANTABLE DEVICE | Site: PLEURAL SPACE | Status: FUNCTIONAL
Brand: SUREFORM

## 2021-11-05 DEVICE — CLIP LIG HEMOLOK PA LG 6CT PRP: Type: IMPLANTABLE DEVICE | Site: PLEURAL SPACE | Status: FUNCTIONAL

## 2021-11-05 DEVICE — SUREFORM 45 RELOAD WHITE
Type: IMPLANTABLE DEVICE | Site: PLEURAL SPACE | Status: FUNCTIONAL
Brand: SUREFORM

## 2021-11-05 RX ORDER — ONDANSETRON 2 MG/ML
4 INJECTION INTRAMUSCULAR; INTRAVENOUS AS NEEDED
Status: DISCONTINUED | OUTPATIENT
Start: 2021-11-05 | End: 2021-11-05 | Stop reason: HOSPADM

## 2021-11-05 RX ORDER — GUAIFENESIN 600 MG/1
600 TABLET, EXTENDED RELEASE ORAL DAILY
Status: DISCONTINUED | OUTPATIENT
Start: 2021-11-05 | End: 2021-11-17 | Stop reason: HOSPADM

## 2021-11-05 RX ORDER — CETIRIZINE HYDROCHLORIDE 10 MG/1
10 TABLET ORAL NIGHTLY PRN
Status: DISCONTINUED | OUTPATIENT
Start: 2021-11-05 | End: 2021-11-17 | Stop reason: HOSPADM

## 2021-11-05 RX ORDER — NEOSTIGMINE METHYLSULFATE 5 MG/5 ML
SYRINGE (ML) INTRAVENOUS AS NEEDED
Status: DISCONTINUED | OUTPATIENT
Start: 2021-11-05 | End: 2021-11-05 | Stop reason: SURG

## 2021-11-05 RX ORDER — LIDOCAINE HYDROCHLORIDE 10 MG/ML
0.5 INJECTION, SOLUTION EPIDURAL; INFILTRATION; INTRACAUDAL; PERINEURAL ONCE AS NEEDED
Status: DISCONTINUED | OUTPATIENT
Start: 2021-11-05 | End: 2021-11-05 | Stop reason: HOSPADM

## 2021-11-05 RX ORDER — LEVOTHYROXINE SODIUM 0.05 MG/1
50 TABLET ORAL
Status: DISCONTINUED | OUTPATIENT
Start: 2021-11-05 | End: 2021-11-17 | Stop reason: HOSPADM

## 2021-11-05 RX ORDER — ACETYLCYSTEINE 200 MG/ML
3 SOLUTION ORAL; RESPIRATORY (INHALATION)
Status: DISCONTINUED | OUTPATIENT
Start: 2021-11-05 | End: 2021-11-06

## 2021-11-05 RX ORDER — MIDAZOLAM HYDROCHLORIDE 1 MG/ML
2 INJECTION INTRAMUSCULAR; INTRAVENOUS
Status: DISCONTINUED | OUTPATIENT
Start: 2021-11-05 | End: 2021-11-05 | Stop reason: HOSPADM

## 2021-11-05 RX ORDER — ASPIRIN 81 MG/1
81 TABLET, CHEWABLE ORAL DAILY
Status: DISCONTINUED | OUTPATIENT
Start: 2021-11-05 | End: 2021-11-17 | Stop reason: HOSPADM

## 2021-11-05 RX ORDER — ROSUVASTATIN CALCIUM 20 MG/1
40 TABLET, COATED ORAL NIGHTLY
Status: DISCONTINUED | OUTPATIENT
Start: 2021-11-05 | End: 2021-11-17 | Stop reason: HOSPADM

## 2021-11-05 RX ORDER — MAGNESIUM HYDROXIDE 1200 MG/15ML
LIQUID ORAL AS NEEDED
Status: DISCONTINUED | OUTPATIENT
Start: 2021-11-05 | End: 2021-11-05 | Stop reason: HOSPADM

## 2021-11-05 RX ORDER — ONDANSETRON 4 MG/1
4 TABLET, FILM COATED ORAL EVERY 6 HOURS PRN
Status: DISCONTINUED | OUTPATIENT
Start: 2021-11-05 | End: 2021-11-17 | Stop reason: HOSPADM

## 2021-11-05 RX ORDER — OXYBUTYNIN CHLORIDE 5 MG/1
10 TABLET, EXTENDED RELEASE ORAL DAILY
Refills: 3 | Status: DISCONTINUED | OUTPATIENT
Start: 2021-11-05 | End: 2021-11-17 | Stop reason: HOSPADM

## 2021-11-05 RX ORDER — FENTANYL CITRATE 50 UG/ML
25 INJECTION, SOLUTION INTRAMUSCULAR; INTRAVENOUS
Status: COMPLETED | OUTPATIENT
Start: 2021-11-05 | End: 2021-11-05

## 2021-11-05 RX ORDER — FENTANYL CITRATE 50 UG/ML
INJECTION, SOLUTION INTRAMUSCULAR; INTRAVENOUS AS NEEDED
Status: DISCONTINUED | OUTPATIENT
Start: 2021-11-05 | End: 2021-11-05 | Stop reason: SURG

## 2021-11-05 RX ORDER — SODIUM CHLORIDE 0.9 % (FLUSH) 0.9 %
10 SYRINGE (ML) INJECTION AS NEEDED
Status: DISCONTINUED | OUTPATIENT
Start: 2021-11-05 | End: 2021-11-05 | Stop reason: HOSPADM

## 2021-11-05 RX ORDER — SODIUM CHLORIDE, SODIUM LACTATE, POTASSIUM CHLORIDE, CALCIUM CHLORIDE 600; 310; 30; 20 MG/100ML; MG/100ML; MG/100ML; MG/100ML
100 INJECTION, SOLUTION INTRAVENOUS CONTINUOUS
Status: DISCONTINUED | OUTPATIENT
Start: 2021-11-05 | End: 2021-11-05 | Stop reason: HOSPADM

## 2021-11-05 RX ORDER — ONDANSETRON 2 MG/ML
4 INJECTION INTRAMUSCULAR; INTRAVENOUS EVERY 6 HOURS PRN
Status: DISCONTINUED | OUTPATIENT
Start: 2021-11-05 | End: 2021-11-17 | Stop reason: HOSPADM

## 2021-11-05 RX ORDER — POLYETHYLENE GLYCOL 3350 17 G/17G
17 POWDER, FOR SOLUTION ORAL DAILY
Status: DISCONTINUED | OUTPATIENT
Start: 2021-11-05 | End: 2021-11-17 | Stop reason: HOSPADM

## 2021-11-05 RX ORDER — ACETAMINOPHEN 325 MG/1
650 TABLET ORAL EVERY 4 HOURS PRN
Status: DISCONTINUED | OUTPATIENT
Start: 2021-11-05 | End: 2021-11-17 | Stop reason: HOSPADM

## 2021-11-05 RX ORDER — ROCURONIUM BROMIDE 10 MG/ML
INJECTION, SOLUTION INTRAVENOUS AS NEEDED
Status: DISCONTINUED | OUTPATIENT
Start: 2021-11-05 | End: 2021-11-05 | Stop reason: SURG

## 2021-11-05 RX ORDER — OXYCODONE AND ACETAMINOPHEN 10; 325 MG/1; MG/1
1 TABLET ORAL ONCE AS NEEDED
Status: COMPLETED | OUTPATIENT
Start: 2021-11-05 | End: 2021-11-05

## 2021-11-05 RX ORDER — HEPARIN SODIUM 5000 [USP'U]/ML
5000 INJECTION, SOLUTION INTRAVENOUS; SUBCUTANEOUS ONCE
Status: COMPLETED | OUTPATIENT
Start: 2021-11-05 | End: 2021-11-05

## 2021-11-05 RX ORDER — DEXAMETHASONE SODIUM PHOSPHATE 4 MG/ML
INJECTION, SOLUTION INTRA-ARTICULAR; INTRALESIONAL; INTRAMUSCULAR; INTRAVENOUS; SOFT TISSUE AS NEEDED
Status: DISCONTINUED | OUTPATIENT
Start: 2021-11-05 | End: 2021-11-05 | Stop reason: SURG

## 2021-11-05 RX ORDER — SODIUM CHLORIDE 9 MG/ML
50 INJECTION, SOLUTION INTRAVENOUS CONTINUOUS
Status: DISCONTINUED | OUTPATIENT
Start: 2021-11-05 | End: 2021-11-08

## 2021-11-05 RX ORDER — PANTOPRAZOLE SODIUM 40 MG/1
40 TABLET, DELAYED RELEASE ORAL
Status: DISCONTINUED | OUTPATIENT
Start: 2021-11-06 | End: 2021-11-17 | Stop reason: HOSPADM

## 2021-11-05 RX ORDER — OXYCODONE HYDROCHLORIDE AND ACETAMINOPHEN 5; 325 MG/1; MG/1
1 TABLET ORAL
Status: DISCONTINUED | OUTPATIENT
Start: 2021-11-05 | End: 2021-11-17 | Stop reason: HOSPADM

## 2021-11-05 RX ORDER — SODIUM CHLORIDE 0.9 % (FLUSH) 0.9 %
3 SYRINGE (ML) INJECTION AS NEEDED
Status: DISCONTINUED | OUTPATIENT
Start: 2021-11-05 | End: 2021-11-05 | Stop reason: HOSPADM

## 2021-11-05 RX ORDER — SODIUM CHLORIDE 0.9 % (FLUSH) 0.9 %
10 SYRINGE (ML) INJECTION EVERY 12 HOURS SCHEDULED
Status: DISCONTINUED | OUTPATIENT
Start: 2021-11-05 | End: 2021-11-05 | Stop reason: HOSPADM

## 2021-11-05 RX ORDER — BUPIVACAINE HCL/0.9 % NACL/PF 0.1 %
2 PLASTIC BAG, INJECTION (ML) EPIDURAL ONCE
Status: COMPLETED | OUTPATIENT
Start: 2021-11-05 | End: 2021-11-05

## 2021-11-05 RX ORDER — LIDOCAINE HYDROCHLORIDE 20 MG/ML
INJECTION, SOLUTION EPIDURAL; INFILTRATION; INTRACAUDAL; PERINEURAL AS NEEDED
Status: DISCONTINUED | OUTPATIENT
Start: 2021-11-05 | End: 2021-11-05 | Stop reason: SURG

## 2021-11-05 RX ORDER — CARVEDILOL 6.25 MG/1
12.5 TABLET ORAL 2 TIMES DAILY WITH MEALS
Status: DISCONTINUED | OUTPATIENT
Start: 2021-11-05 | End: 2021-11-17 | Stop reason: HOSPADM

## 2021-11-05 RX ORDER — NALOXONE HCL 0.4 MG/ML
0.04 VIAL (ML) INJECTION AS NEEDED
Status: DISCONTINUED | OUTPATIENT
Start: 2021-11-05 | End: 2021-11-05 | Stop reason: HOSPADM

## 2021-11-05 RX ORDER — SODIUM CHLORIDE, SODIUM LACTATE, POTASSIUM CHLORIDE, CALCIUM CHLORIDE 600; 310; 30; 20 MG/100ML; MG/100ML; MG/100ML; MG/100ML
30 INJECTION, SOLUTION INTRAVENOUS CONTINUOUS
Status: DISCONTINUED | OUTPATIENT
Start: 2021-11-05 | End: 2021-11-05 | Stop reason: HOSPADM

## 2021-11-05 RX ORDER — BUDESONIDE AND FORMOTEROL FUMARATE DIHYDRATE 160; 4.5 UG/1; UG/1
2 AEROSOL RESPIRATORY (INHALATION)
Status: DISCONTINUED | OUTPATIENT
Start: 2021-11-05 | End: 2021-11-08

## 2021-11-05 RX ORDER — ACETAMINOPHEN 500 MG
1000 TABLET ORAL ONCE
Status: COMPLETED | OUTPATIENT
Start: 2021-11-05 | End: 2021-11-05

## 2021-11-05 RX ORDER — SODIUM CHLORIDE 0.9 % (FLUSH) 0.9 %
3 SYRINGE (ML) INJECTION EVERY 12 HOURS SCHEDULED
Status: DISCONTINUED | OUTPATIENT
Start: 2021-11-05 | End: 2021-11-05 | Stop reason: HOSPADM

## 2021-11-05 RX ORDER — PROPOFOL 10 MG/ML
VIAL (ML) INTRAVENOUS AS NEEDED
Status: DISCONTINUED | OUTPATIENT
Start: 2021-11-05 | End: 2021-11-05 | Stop reason: SURG

## 2021-11-05 RX ORDER — BISACODYL 10 MG
10 SUPPOSITORY, RECTAL RECTAL DAILY PRN
Status: DISCONTINUED | OUTPATIENT
Start: 2021-11-05 | End: 2021-11-17 | Stop reason: HOSPADM

## 2021-11-05 RX ORDER — ALBUTEROL SULFATE 1.25 MG/3ML
1.25 SOLUTION RESPIRATORY (INHALATION)
Status: DISCONTINUED | OUTPATIENT
Start: 2021-11-05 | End: 2021-11-05 | Stop reason: HOSPADM

## 2021-11-05 RX ORDER — IPRATROPIUM BROMIDE AND ALBUTEROL SULFATE 2.5; .5 MG/3ML; MG/3ML
3 SOLUTION RESPIRATORY (INHALATION)
Status: DISCONTINUED | OUTPATIENT
Start: 2021-11-05 | End: 2021-11-06

## 2021-11-05 RX ORDER — LABETALOL HYDROCHLORIDE 5 MG/ML
5 INJECTION, SOLUTION INTRAVENOUS
Status: DISCONTINUED | OUTPATIENT
Start: 2021-11-05 | End: 2021-11-05 | Stop reason: HOSPADM

## 2021-11-05 RX ORDER — HYDROCHLOROTHIAZIDE 25 MG/1
12.5 TABLET ORAL DAILY
Status: DISCONTINUED | OUTPATIENT
Start: 2021-11-05 | End: 2021-11-17 | Stop reason: HOSPADM

## 2021-11-05 RX ORDER — HYDROMORPHONE HYDROCHLORIDE 1 MG/ML
0.5 INJECTION, SOLUTION INTRAMUSCULAR; INTRAVENOUS; SUBCUTANEOUS
Status: DISCONTINUED | OUTPATIENT
Start: 2021-11-05 | End: 2021-11-05 | Stop reason: HOSPADM

## 2021-11-05 RX ORDER — BUPIVACAINE HCL/0.9 % NACL/PF 0.1 %
2 PLASTIC BAG, INJECTION (ML) EPIDURAL EVERY 8 HOURS
Status: COMPLETED | OUTPATIENT
Start: 2021-11-05 | End: 2021-11-05

## 2021-11-05 RX ORDER — FAMOTIDINE 20 MG/1
40 TABLET, FILM COATED ORAL 2 TIMES DAILY
Status: DISCONTINUED | OUTPATIENT
Start: 2021-11-05 | End: 2021-11-17 | Stop reason: HOSPADM

## 2021-11-05 RX ORDER — DOCUSATE SODIUM 100 MG/1
100 CAPSULE, LIQUID FILLED ORAL DAILY
Status: DISCONTINUED | OUTPATIENT
Start: 2021-11-05 | End: 2021-11-17 | Stop reason: HOSPADM

## 2021-11-05 RX ORDER — ONDANSETRON 2 MG/ML
INJECTION INTRAMUSCULAR; INTRAVENOUS AS NEEDED
Status: DISCONTINUED | OUTPATIENT
Start: 2021-11-05 | End: 2021-11-05 | Stop reason: SURG

## 2021-11-05 RX ORDER — SODIUM CHLORIDE, SODIUM LACTATE, POTASSIUM CHLORIDE, CALCIUM CHLORIDE 600; 310; 30; 20 MG/100ML; MG/100ML; MG/100ML; MG/100ML
1000 INJECTION, SOLUTION INTRAVENOUS CONTINUOUS
Status: DISCONTINUED | OUTPATIENT
Start: 2021-11-05 | End: 2021-11-05 | Stop reason: HOSPADM

## 2021-11-05 RX ORDER — SODIUM CHLORIDE 0.9 % (FLUSH) 0.9 %
3-10 SYRINGE (ML) INJECTION AS NEEDED
Status: DISCONTINUED | OUTPATIENT
Start: 2021-11-05 | End: 2021-11-05 | Stop reason: HOSPADM

## 2021-11-05 RX ORDER — LIDOCAINE HYDROCHLORIDE 10 MG/ML
0.5 INJECTION, SOLUTION EPIDURAL; INFILTRATION; INTRACAUDAL; PERINEURAL ONCE AS NEEDED
Status: COMPLETED | OUTPATIENT
Start: 2021-11-05 | End: 2021-11-05

## 2021-11-05 RX ORDER — FLUMAZENIL 0.1 MG/ML
0.2 INJECTION INTRAVENOUS AS NEEDED
Status: DISCONTINUED | OUTPATIENT
Start: 2021-11-05 | End: 2021-11-05 | Stop reason: HOSPADM

## 2021-11-05 RX ORDER — IBUPROFEN 600 MG/1
600 TABLET ORAL ONCE AS NEEDED
Status: DISCONTINUED | OUTPATIENT
Start: 2021-11-05 | End: 2021-11-05 | Stop reason: HOSPADM

## 2021-11-05 RX ADMIN — FAMOTIDINE 40 MG: 20 TABLET, FILM COATED ORAL at 20:26

## 2021-11-05 RX ADMIN — PROPOFOL 125 MG: 10 INJECTION, EMULSION INTRAVENOUS at 07:54

## 2021-11-05 RX ADMIN — HYDROMORPHONE HYDROCHLORIDE 0.5 MG: 1 INJECTION, SOLUTION INTRAMUSCULAR; INTRAVENOUS; SUBCUTANEOUS at 11:46

## 2021-11-05 RX ADMIN — Medication 2 G: at 08:00

## 2021-11-05 RX ADMIN — ONDANSETRON 4 MG: 2 INJECTION INTRAMUSCULAR; INTRAVENOUS at 10:44

## 2021-11-05 RX ADMIN — DEXAMETHASONE SODIUM PHOSPHATE 8 MG: 4 INJECTION, SOLUTION INTRA-ARTICULAR; INTRALESIONAL; INTRAMUSCULAR; INTRAVENOUS; SOFT TISSUE at 08:05

## 2021-11-05 RX ADMIN — ASPIRIN 81 MG: 81 TABLET, CHEWABLE ORAL at 16:17

## 2021-11-05 RX ADMIN — OXYBUTYNIN CHLORIDE 10 MG: 5 TABLET, EXTENDED RELEASE ORAL at 16:19

## 2021-11-05 RX ADMIN — OXYCODONE HYDROCHLORIDE AND ACETAMINOPHEN 1 TABLET: 5; 325 TABLET ORAL at 16:10

## 2021-11-05 RX ADMIN — OXYCODONE AND ACETAMINOPHEN 1 TABLET: 325; 10 TABLET ORAL at 13:53

## 2021-11-05 RX ADMIN — HYDROCHLOROTHIAZIDE 12.5 MG: 25 TABLET ORAL at 16:16

## 2021-11-05 RX ADMIN — PHENYLEPHRINE HYDROCHLORIDE 0.25 MCG/KG/MIN: 10 INJECTION INTRAVENOUS at 08:58

## 2021-11-05 RX ADMIN — ROCURONIUM BROMIDE 50 MG: 50 INJECTION INTRAVENOUS at 07:54

## 2021-11-05 RX ADMIN — SODIUM CHLORIDE, POTASSIUM CHLORIDE, SODIUM LACTATE AND CALCIUM CHLORIDE 30 ML/HR: 600; 310; 30; 20 INJECTION, SOLUTION INTRAVENOUS at 06:22

## 2021-11-05 RX ADMIN — FENTANYL CITRATE 25 MCG: 50 INJECTION INTRAMUSCULAR; INTRAVENOUS at 12:23

## 2021-11-05 RX ADMIN — MIDAZOLAM 2 MG: 1 INJECTION INTRAMUSCULAR; INTRAVENOUS at 06:56

## 2021-11-05 RX ADMIN — LIDOCAINE HYDROCHLORIDE 0.5 ML: 10 INJECTION, SOLUTION EPIDURAL; INFILTRATION; INTRACAUDAL; PERINEURAL at 06:22

## 2021-11-05 RX ADMIN — ACETYLCYSTEINE 3 ML: 200 INHALANT RESPIRATORY (INHALATION) at 15:10

## 2021-11-05 RX ADMIN — FAMOTIDINE 40 MG: 20 TABLET, FILM COATED ORAL at 16:16

## 2021-11-05 RX ADMIN — LIDOCAINE HYDROCHLORIDE 100 MG: 20 INJECTION, SOLUTION EPIDURAL; INFILTRATION; INTRACAUDAL; PERINEURAL at 07:54

## 2021-11-05 RX ADMIN — GUAIFENESIN 600 MG: 600 TABLET, EXTENDED RELEASE ORAL at 16:16

## 2021-11-05 RX ADMIN — Medication 4 MG: at 10:51

## 2021-11-05 RX ADMIN — BUDESONIDE AND FORMOTEROL FUMARATE DIHYDRATE 2 PUFF: 160; 4.5 AEROSOL RESPIRATORY (INHALATION) at 18:46

## 2021-11-05 RX ADMIN — FENTANYL CITRATE 75 MCG: 50 INJECTION, SOLUTION INTRAMUSCULAR; INTRAVENOUS at 08:47

## 2021-11-05 RX ADMIN — ROCURONIUM BROMIDE 20 MG: 50 INJECTION INTRAVENOUS at 09:30

## 2021-11-05 RX ADMIN — FENTANYL CITRATE 25 MCG: 50 INJECTION INTRAMUSCULAR; INTRAVENOUS at 12:33

## 2021-11-05 RX ADMIN — OXYCODONE HYDROCHLORIDE AND ACETAMINOPHEN 1 TABLET: 5; 325 TABLET ORAL at 20:36

## 2021-11-05 RX ADMIN — SODIUM CHLORIDE, POTASSIUM CHLORIDE, SODIUM LACTATE AND CALCIUM CHLORIDE 1000 ML: 600; 310; 30; 20 INJECTION, SOLUTION INTRAVENOUS at 06:28

## 2021-11-05 RX ADMIN — SODIUM CHLORIDE 50 ML/HR: 9 INJECTION, SOLUTION INTRAVENOUS at 14:49

## 2021-11-05 RX ADMIN — HYDROMORPHONE HYDROCHLORIDE 0.5 MG: 1 INJECTION, SOLUTION INTRAMUSCULAR; INTRAVENOUS; SUBCUTANEOUS at 12:08

## 2021-11-05 RX ADMIN — SUGAMMADEX 100 MG: 100 INJECTION, SOLUTION INTRAVENOUS at 11:09

## 2021-11-05 RX ADMIN — FENTANYL CITRATE 25 MCG: 50 INJECTION INTRAMUSCULAR; INTRAVENOUS at 12:28

## 2021-11-05 RX ADMIN — GLYCOPYRROLATE 0.4 MG: 0.2 INJECTION, SOLUTION INTRAMUSCULAR; INTRAVENOUS at 10:51

## 2021-11-05 RX ADMIN — ACETAMINOPHEN 1000 MG: 500 TABLET, FILM COATED ORAL at 06:43

## 2021-11-05 RX ADMIN — DOCUSATE SODIUM 100 MG: 100 CAPSULE, LIQUID FILLED ORAL at 16:17

## 2021-11-05 RX ADMIN — LEVOTHYROXINE SODIUM 50 MCG: 50 TABLET ORAL at 16:20

## 2021-11-05 RX ADMIN — CEFAZOLIN SODIUM 2 G: 10 INJECTION, POWDER, FOR SOLUTION INTRAVENOUS at 23:29

## 2021-11-05 RX ADMIN — IPRATROPIUM BROMIDE AND ALBUTEROL SULFATE 3 ML: 2.5; .5 SOLUTION RESPIRATORY (INHALATION) at 23:31

## 2021-11-05 RX ADMIN — FENTANYL CITRATE 100 MCG: 50 INJECTION, SOLUTION INTRAMUSCULAR; INTRAVENOUS at 07:54

## 2021-11-05 RX ADMIN — ACETYLCYSTEINE 3 ML: 200 INHALANT RESPIRATORY (INHALATION) at 23:31

## 2021-11-05 RX ADMIN — CEFAZOLIN SODIUM 2 G: 10 INJECTION, POWDER, FOR SOLUTION INTRAVENOUS at 18:12

## 2021-11-05 RX ADMIN — HEPARIN SODIUM 5000 UNITS: 5000 INJECTION INTRAVENOUS; SUBCUTANEOUS at 06:43

## 2021-11-05 RX ADMIN — ROSUVASTATIN CALCIUM 40 MG: 20 TABLET, FILM COATED ORAL at 20:26

## 2021-11-05 RX ADMIN — SODIUM CHLORIDE, POTASSIUM CHLORIDE, SODIUM LACTATE AND CALCIUM CHLORIDE 100 ML/HR: 600; 310; 30; 20 INJECTION, SOLUTION INTRAVENOUS at 12:16

## 2021-11-05 RX ADMIN — FENTANYL CITRATE 25 MCG: 50 INJECTION INTRAMUSCULAR; INTRAVENOUS at 12:18

## 2021-11-05 RX ADMIN — IPRATROPIUM BROMIDE AND ALBUTEROL SULFATE 3 ML: 2.5; .5 SOLUTION RESPIRATORY (INHALATION) at 15:10

## 2021-11-05 RX ADMIN — CARVEDILOL 12.5 MG: 6.25 TABLET, FILM COATED ORAL at 18:14

## 2021-11-05 RX ADMIN — FENTANYL CITRATE 25 MCG: 50 INJECTION, SOLUTION INTRAMUSCULAR; INTRAVENOUS at 08:31

## 2021-11-05 NOTE — ANESTHESIA PREPROCEDURE EVALUATION
Anesthesia Evaluation     Patient summary reviewed and Nursing notes reviewed   no history of anesthetic complications:  NPO Solid Status: > 8 hours  NPO Liquid Status: > 8 hours           Airway   Mallampati: I  TM distance: >3 FB  Neck ROM: full  No difficulty expected  Dental          Pulmonary - normal exam   (+) a smoker Former, shortness of breath,   Cardiovascular   Exercise tolerance: good (4-7 METS)    ECG reviewed  Patient on routine beta blocker and Beta blocker not taken-may be given intraoperatively  Rhythm: regular  Rate: normal    (+) hypertension, CAD (60% blockage 20 years ago, medical management), PVD, hyperlipidemia,     ROS comment: Stress test 5/2021  Adult Stress Echo W/ Cont or Stress Agent if Necessary Per Protocol     · Left ventricular ejection fraction appears to be 61 - 65%. Left   ventricular systolic function is normal.       LOW RISK FOR ISCHEMIA     Neuro/Psych  GI/Hepatic/Renal/Endo    (+)  GERD, PUD,  thyroid problem hypothyroidism    Musculoskeletal     (+) neck pain,   Abdominal  - normal exam   Substance History      OB/GYN          Other   arthritis,                        Anesthesia Plan    ASA 3     general     intravenous induction     Anesthetic plan, all risks, benefits, and alternatives have been provided, discussed and informed consent has been obtained with: patient.

## 2021-11-05 NOTE — ANESTHESIA PROCEDURE NOTES
Arterial Line       Performed By   CRNA: Vinicio Weathers CRNA  Preanesthetic Checklist  Completed: patient identified, IV checked, site marked, risks and benefits discussed, surgical consent, monitors and equipment checked, pre-op evaluation and timeout performed  Arterial Line Prep   Sterile Tech: cap, gloves and sterile barriers  Prep: ChloraPrep  Patient monitoring: EKG, continuous pulse oximetry and blood pressure monitoring  Arterial Line Procedure   Laterality:left  Location:  radial artery  Catheter size: 20 G   Guidance: ultrasound guided  PROCEDURE NOTE/ULTRASOUND INTERPRETATION.  Using ultrasound guidance the potential vascular sites for insertion of the catheter were visualized to determine the patency of the vessel to be used for vascular access.  After selecting the appropriate site for insertion, the needle was visualized under ultrasound being inserted into the radial artery, followed by ultrasound confirmation of wire and catheter placement. There were no abnormalities seen on ultrasound; an image was taken; and the patient tolerated the procedure with no complications.   Number of attempts: 1  Successful placement: yes  Post Assessment   Dressing Type: wrist guard applied, secured with tape and occlusive dressing applied.   Complications no  Circ/Move/Sens Assessment: normal and unchanged.   Patient Tolerance: patient tolerated the procedure well with no apparent complications

## 2021-11-05 NOTE — ANESTHESIA POSTPROCEDURE EVALUATION
Patient: Trinidad Zhu    Procedure Summary     Date: 11/05/21 Room / Location: [facility] OR  /  [facility] OR    Anesthesia Start: 0750 Anesthesia Stop: 1125    Procedure: RIGHT THORACOSCOPY WITH DAVINCI ROBOT, RIGHT UPPER LOBE LOBECTOMY (Right Chest) Diagnosis:       Lung nodule      (Lung nodule [R91.1])    Surgeons: Jarad Ignacio MD Provider: Vinicio Weathers CRNA    Anesthesia Type: general ASA Status: 3          Anesthesia Type: general    Vitals  Vitals Value Taken Time   /75 11/05/21 1408   Temp 100.3 °F (37.9 °C) 11/05/21 1406   Pulse 102 11/05/21 1413   Resp 18 11/05/21 1406   SpO2 100 % 11/05/21 1413   Vitals shown include unvalidated device data.        Post Anesthesia Care and Evaluation    Patient location during evaluation: PACU  Patient participation: complete - patient participated  Level of consciousness: awake and alert  Pain management: adequate  Airway patency: patent  Anesthetic complications: No anesthetic complications    Cardiovascular status: acceptable  Respiratory status: acceptable  Hydration status: acceptable    Comments: Blood pressure 141/75, pulse 98, temperature 100.3 °F (37.9 °C), temperature source Temporal, resp. rate 18, SpO2 100 %, not currently breastfeeding.    Pt discharged from PACU based on desmond score >8

## 2021-11-05 NOTE — OP NOTE
Cardiothoracic Surgery Operative Note     Pre-op diagnosis:  Lung cancer right upper lobe  Bronchiectasis  Hyperlipidemia  Hx of gastric ulcers  Hypothyroidism    Post-op diagnosis:  Same  Procedure:  1. Diagnostic Bronchoscopy , CPT 23362  2. Right Robot Assisted VATS Upper Lobectomy, CPT 50054     Surgeon: Jarad Ignacio M.D.  Assistant: Gertrude Farooq  Anesthesia: GETA- double lumen  EBL: 50 ml  Drains: 24 Fijian straight  right pleural tube      Specimens:  1. Level 7 lymph nodes  2. Level 8 Lymph nodes  3. Level 10 Lymph node by main PA  4. Right upper lobectomy (Frozen section of bronchial margin negative for carcinoma)  5. Wedge resection from pexy of RML and RLL     Operative indications:    Ms. Zhu is a 76-year-old female who presented with right upper lobe lung nodule, this is been biopsy-proven to be non-small cell lung cancer.  She also has bronchiectasis that was evident on a navigational bronchoscopy for biopsy of her lung nodule.  She is undergone treatment for this with mucus clearance and antibiotics and is much improved.  I discussed with her the risk and benefits of right upper lobectomy via the robot she understands and agrees to proceed.  Her lung reserve is adequate to tolerate a right upper lobectomy.     Operative findings  Bronchoscopy:  Normal endobronchial anatomy, no endobronchial lesions, minimal secretion burden     Intrathoracic Findings: Mildly anthracitic appearing lungs.    Lung cancer easily identified on the lateral pleural margin of the upper lobe.    Inflammation was more than average likely due to history of bronchiectasis.  I examined the paratracheal area extensively, no level 4R lymph nodes were found.  Multiple level 10 lymph nodes were identified and were swept up with the main specimen.  Level 7 lymph nodes taken.    Level 8 lymph node identified and taken with division of inferior pulmonary ligament.     Operative description in detail:  The patient was taken to  the operative suite where she was placed in a supine position. General anesthesia was induced without complication and a double lumen ET tube was placed by anesthesia. A timeout was performed. With that bronchoscopy was performed demonstrating no endobronchial lesion, normal appearing right bronchi, standard bronchial anatomy and minimal endobronchial mucus burden.     She was positioned in lateral decubitus position with right side up. All pressure point are protected. Single lung ventilation was initiated and double lumen endotracheal tube position confirmed.    She was then prepped and draped in the usual and sterile fashion.       An incision was made sharply in line with the middle axillary line at approximately the 8th intercostal space.  The pleural cavity was accessed and surveyed.  Additional robotic ports was placed in the eighth and ninth interspace.  Assistant port was placed laterally along the diaphragmatic margin or direct vision.  We then identified the right upper lobe mass.  The lung was retracted superiorly and inferiorly pulmonary ligament was divided level 8 lymph nodes were taken with this division.      The lung was retracted anteriorly.  The pleura overlying the subcarinal space was incised level 7 lymph nodes were taken.  I then dissected the space between the bronchus intermedius and the right upper lobe takeoff.  The level 10 lymph node was identified at the spot and swept up with the specimen.     We then incised the pleura over level 4R lymph nodes and explored this area extensively, there were no lymph nodes identified.  The lung was then retracted inferior and posterior and the soft tissue connections overlying the azygos vein were divided around in the anterior hilum was opened extensively.     The middle lobe vein was identified, as well as upper lobe vein.    Pulmonary vein was taken with vascular load staples after encircling.  We then moved to the major fissure which was partially  fused.  Dissection was carried down using bipolar electrocautery until the pulmonary artery was identified.  I then retracted the lung again inferior and posteriorly and identified the truncus branch of the right pulmonary artery going to the right upper lobe.  I carefully dissected around this and encircled with a vessel loop.  This dissection was tedious and had multiple adhesions that were dense.  Eventually the truncus branch was divided with a white load vascular staple.  I then identified the ascending branch going to the upper lobe.  The ascending branch was divided after encircling with a white load vascular stapler.     I divided part of the minor fissure using blue load staples to have a better angle for bronchial division.  With this the lobe was elevated and lymph nodes were swept up to the specimen along the bronchus.  The bronchus was encircled and then subsequently a green load stapler was clamped, a test inflation was performed with good inflation of both the middle and lower lobes.  With that the bronchus was divided.  The fissure was then completed with serial blue load staples.  I then placed the upper lobe specimen in a bag and removed from the chest by extending the assistant port.     Hemostasis was ensured in the operative site and pro-gel was applied over staple lines.  A 24 Welsh straight chest tube was placed through the third port site after tunneling.  I performed intercostal blocks using a one-to-one Marcaine/Exparel mixture.  All ports were removed under direct vision ensuring adequate hemostasis.  The lung was allowed to expand under direct vision and both lower and middle lobes expanded well and filled the thorax.  All port sites and assistant site were closed in anatomical layers using absorbable suture.  Skin affix was used as dressing.  The chest tube was connected to 20 cm of suction.     The patient tolerated the procedure well was extubated in the operating room and transferred  the PACU in stable condition.  All needle sponge and instrument counts were correct at the end of the case.     Complications: None  Disposition: Transfer to PACU extubated and in stable condition.      Jarad Ignacio M.D.  Cardiothoracic Surgeon

## 2021-11-05 NOTE — INTERVAL H&P NOTE
No changes since H&P, pulmonary reserve is adequate for lobectomy.  To OR today for robotic RUL lobectomy, any indicated.  She understands the risks and benefits and agrees to proceed.    Jarad Ignacio M.D.  Cardiothoracic Surgeon

## 2021-11-05 NOTE — ANESTHESIA PROCEDURE NOTES
Airway  Urgency: elective    Airway not difficult    General Information and Staff    Patient location during procedure: OR  CRNA: Vinicio Weathers CRNA    Indications and Patient Condition  Indications for airway management: airway protection    Preoxygenated: yes  MILS maintained throughout  Mask difficulty assessment: 1 - vent by mask    Final Airway Details  Final airway type: endotracheal airway      Successful airway: ETT and EBT - double lumen left  Cuffed: yes   Successful intubation technique: direct laryngoscopy  Endotracheal tube insertion site: oral  Blade: Hoover  Blade size: 3  ETT size (mm): 7.5  EBT DL size (fr): 37  Cormack-Lehane Classification: grade I - full view of glottis  Placement verified by: chest auscultation and capnometry   Cuff volume (mL): 5  Tracheal cuff pressure (cm H2O): 2  Measured from: lips  ETT/EBT  to lips (cm): 29  Number of attempts at approach: 1  Assessment: lips, teeth, and gum same as pre-op and atraumatic intubation

## 2021-11-06 ENCOUNTER — APPOINTMENT (OUTPATIENT)
Dept: GENERAL RADIOLOGY | Facility: HOSPITAL | Age: 76
End: 2021-11-06

## 2021-11-06 LAB
ANION GAP SERPL CALCULATED.3IONS-SCNC: 10 MMOL/L (ref 5–15)
BASOPHILS # BLD AUTO: 0.02 10*3/MM3 (ref 0–0.2)
BASOPHILS NFR BLD AUTO: 0.2 % (ref 0–1.5)
BUN SERPL-MCNC: 26 MG/DL (ref 8–23)
BUN/CREAT SERPL: 31 (ref 7–25)
CALCIUM SPEC-SCNC: 7.7 MG/DL (ref 8.6–10.5)
CHLORIDE SERPL-SCNC: 101 MMOL/L (ref 98–107)
CO2 SERPL-SCNC: 25 MMOL/L (ref 22–29)
CREAT SERPL-MCNC: 0.84 MG/DL (ref 0.57–1)
DEPRECATED RDW RBC AUTO: 54.3 FL (ref 37–54)
DEPRECATED RDW RBC AUTO: 56.1 FL (ref 37–54)
EOSINOPHIL # BLD AUTO: 0 10*3/MM3 (ref 0–0.4)
EOSINOPHIL NFR BLD AUTO: 0 % (ref 0.3–6.2)
ERYTHROCYTE [DISTWIDTH] IN BLOOD BY AUTOMATED COUNT: 15.3 % (ref 12.3–15.4)
ERYTHROCYTE [DISTWIDTH] IN BLOOD BY AUTOMATED COUNT: 17.2 % (ref 12.3–15.4)
GFR SERPL CREATININE-BSD FRML MDRD: 66 ML/MIN/1.73
GLUCOSE SERPL-MCNC: 127 MG/DL (ref 65–99)
HCT VFR BLD AUTO: 22.6 % (ref 34–46.6)
HCT VFR BLD AUTO: 27.4 % (ref 34–46.6)
HGB BLD-MCNC: 7.6 G/DL (ref 12–15.9)
HGB BLD-MCNC: 9.3 G/DL (ref 12–15.9)
IMM GRANULOCYTES # BLD AUTO: 0.05 10*3/MM3 (ref 0–0.05)
IMM GRANULOCYTES NFR BLD AUTO: 0.5 % (ref 0–0.5)
LYMPHOCYTES # BLD AUTO: 2.04 10*3/MM3 (ref 0.7–3.1)
LYMPHOCYTES NFR BLD AUTO: 21.8 % (ref 19.6–45.3)
MCH RBC QN AUTO: 30.7 PG (ref 26.6–33)
MCH RBC QN AUTO: 33 PG (ref 26.6–33)
MCHC RBC AUTO-ENTMCNC: 33.6 G/DL (ref 31.5–35.7)
MCHC RBC AUTO-ENTMCNC: 33.9 G/DL (ref 31.5–35.7)
MCV RBC AUTO: 90.4 FL (ref 79–97)
MCV RBC AUTO: 98.3 FL (ref 79–97)
MONOCYTES # BLD AUTO: 1.35 10*3/MM3 (ref 0.1–0.9)
MONOCYTES NFR BLD AUTO: 14.4 % (ref 5–12)
NEUTROPHILS NFR BLD AUTO: 5.89 10*3/MM3 (ref 1.7–7)
NEUTROPHILS NFR BLD AUTO: 63.1 % (ref 42.7–76)
NRBC BLD AUTO-RTO: 0 /100 WBC (ref 0–0.2)
PLATELET # BLD AUTO: 147 10*3/MM3 (ref 140–450)
PLATELET # BLD AUTO: 179 10*3/MM3 (ref 140–450)
PMV BLD AUTO: 9.2 FL (ref 6–12)
PMV BLD AUTO: 9.5 FL (ref 6–12)
POTASSIUM SERPL-SCNC: 3.9 MMOL/L (ref 3.5–5.2)
RBC # BLD AUTO: 2.3 10*6/MM3 (ref 3.77–5.28)
RBC # BLD AUTO: 3.03 10*6/MM3 (ref 3.77–5.28)
SODIUM SERPL-SCNC: 136 MMOL/L (ref 136–145)
WBC # BLD AUTO: 9.35 10*3/MM3 (ref 3.4–10.8)
WBC # BLD AUTO: 9.87 10*3/MM3 (ref 3.4–10.8)

## 2021-11-06 PROCEDURE — 71045 X-RAY EXAM CHEST 1 VIEW: CPT

## 2021-11-06 PROCEDURE — 25010000002 ONDANSETRON PER 1 MG: Performed by: SURGERY

## 2021-11-06 PROCEDURE — 94799 UNLISTED PULMONARY SVC/PX: CPT

## 2021-11-06 PROCEDURE — 80048 BASIC METABOLIC PNL TOTAL CA: CPT | Performed by: SURGERY

## 2021-11-06 PROCEDURE — 86900 BLOOD TYPING SEROLOGIC ABO: CPT

## 2021-11-06 PROCEDURE — 85027 COMPLETE CBC AUTOMATED: CPT | Performed by: SURGERY

## 2021-11-06 PROCEDURE — 36430 TRANSFUSION BLD/BLD COMPNT: CPT

## 2021-11-06 PROCEDURE — 25010000002 METOCLOPRAMIDE PER 10 MG: Performed by: SURGERY

## 2021-11-06 PROCEDURE — 25010000002 ENOXAPARIN PER 10 MG: Performed by: SURGERY

## 2021-11-06 PROCEDURE — 99024 POSTOP FOLLOW-UP VISIT: CPT | Performed by: SURGERY

## 2021-11-06 PROCEDURE — 85025 COMPLETE CBC W/AUTO DIFF WBC: CPT | Performed by: SURGERY

## 2021-11-06 PROCEDURE — P9016 RBC LEUKOCYTES REDUCED: HCPCS

## 2021-11-06 RX ORDER — TRIAMCINOLONE ACETONIDE 1 MG/G
1 CREAM TOPICAL 2 TIMES DAILY
COMMUNITY
End: 2021-11-17 | Stop reason: HOSPADM

## 2021-11-06 RX ORDER — METOCLOPRAMIDE HYDROCHLORIDE 5 MG/ML
10 INJECTION INTRAMUSCULAR; INTRAVENOUS EVERY 6 HOURS
Status: COMPLETED | OUTPATIENT
Start: 2021-11-06 | End: 2021-11-06

## 2021-11-06 RX ORDER — LEVALBUTEROL INHALATION SOLUTION 1.25 MG/3ML
1.25 SOLUTION RESPIRATORY (INHALATION)
Status: COMPLETED | OUTPATIENT
Start: 2021-11-06 | End: 2021-11-07

## 2021-11-06 RX ORDER — PROCHLORPERAZINE EDISYLATE 5 MG/ML
5 INJECTION INTRAMUSCULAR; INTRAVENOUS ONCE AS NEEDED
Status: DISCONTINUED | OUTPATIENT
Start: 2021-11-06 | End: 2021-11-17 | Stop reason: HOSPADM

## 2021-11-06 RX ORDER — DIAZEPAM 2 MG/1
2 TABLET ORAL EVERY 12 HOURS PRN
Status: DISCONTINUED | OUTPATIENT
Start: 2021-11-06 | End: 2021-11-07

## 2021-11-06 RX ORDER — PREGABALIN 50 MG/1
50 CAPSULE ORAL 2 TIMES DAILY
COMMUNITY
End: 2022-01-04 | Stop reason: ALTCHOICE

## 2021-11-06 RX ADMIN — OXYCODONE HYDROCHLORIDE AND ACETAMINOPHEN 1 TABLET: 5; 325 TABLET ORAL at 17:18

## 2021-11-06 RX ADMIN — ROSUVASTATIN CALCIUM 40 MG: 20 TABLET, FILM COATED ORAL at 20:35

## 2021-11-06 RX ADMIN — LEVALBUTEROL 1.25 MG: 1.25 SOLUTION RESPIRATORY (INHALATION) at 15:36

## 2021-11-06 RX ADMIN — ACETAMINOPHEN 650 MG: 325 TABLET ORAL at 11:20

## 2021-11-06 RX ADMIN — IPRATROPIUM BROMIDE 0.5 MG: 0.5 SOLUTION RESPIRATORY (INHALATION) at 23:03

## 2021-11-06 RX ADMIN — IPRATROPIUM BROMIDE AND ALBUTEROL SULFATE 3 ML: 2.5; .5 SOLUTION RESPIRATORY (INHALATION) at 07:44

## 2021-11-06 RX ADMIN — CARVEDILOL 12.5 MG: 6.25 TABLET, FILM COATED ORAL at 17:39

## 2021-11-06 RX ADMIN — ENOXAPARIN SODIUM 40 MG: 40 INJECTION SUBCUTANEOUS at 09:27

## 2021-11-06 RX ADMIN — METOCLOPRAMIDE HYDROCHLORIDE 10 MG: 5 INJECTION INTRAMUSCULAR; INTRAVENOUS at 06:12

## 2021-11-06 RX ADMIN — DOCUSATE SODIUM 100 MG: 100 CAPSULE, LIQUID FILLED ORAL at 09:27

## 2021-11-06 RX ADMIN — FAMOTIDINE 40 MG: 20 TABLET, FILM COATED ORAL at 20:35

## 2021-11-06 RX ADMIN — METOCLOPRAMIDE HYDROCHLORIDE 10 MG: 5 INJECTION INTRAMUSCULAR; INTRAVENOUS at 12:46

## 2021-11-06 RX ADMIN — LEVOTHYROXINE SODIUM 50 MCG: 50 TABLET ORAL at 05:36

## 2021-11-06 RX ADMIN — DIAZEPAM 2 MG: 2 TABLET ORAL at 13:22

## 2021-11-06 RX ADMIN — CARVEDILOL 12.5 MG: 6.25 TABLET, FILM COATED ORAL at 09:27

## 2021-11-06 RX ADMIN — METOCLOPRAMIDE HYDROCHLORIDE 10 MG: 5 INJECTION INTRAMUSCULAR; INTRAVENOUS at 20:35

## 2021-11-06 RX ADMIN — FAMOTIDINE 40 MG: 20 TABLET, FILM COATED ORAL at 09:27

## 2021-11-06 RX ADMIN — GUAIFENESIN 600 MG: 600 TABLET, EXTENDED RELEASE ORAL at 09:27

## 2021-11-06 RX ADMIN — BUDESONIDE AND FORMOTEROL FUMARATE DIHYDRATE 2 PUFF: 160; 4.5 AEROSOL RESPIRATORY (INHALATION) at 20:42

## 2021-11-06 RX ADMIN — LEVALBUTEROL 1.25 MG: 1.25 SOLUTION RESPIRATORY (INHALATION) at 23:04

## 2021-11-06 RX ADMIN — BUDESONIDE AND FORMOTEROL FUMARATE DIHYDRATE 2 PUFF: 160; 4.5 AEROSOL RESPIRATORY (INHALATION) at 07:50

## 2021-11-06 RX ADMIN — ONDANSETRON 4 MG: 2 INJECTION INTRAMUSCULAR; INTRAVENOUS at 02:29

## 2021-11-06 RX ADMIN — OXYBUTYNIN CHLORIDE 10 MG: 5 TABLET, EXTENDED RELEASE ORAL at 09:27

## 2021-11-06 RX ADMIN — ASPIRIN 81 MG: 81 TABLET, CHEWABLE ORAL at 09:27

## 2021-11-06 RX ADMIN — PANTOPRAZOLE SODIUM 40 MG: 40 TABLET, DELAYED RELEASE ORAL at 05:36

## 2021-11-06 RX ADMIN — OXYCODONE HYDROCHLORIDE AND ACETAMINOPHEN 1 TABLET: 5; 325 TABLET ORAL at 21:46

## 2021-11-06 RX ADMIN — POLYETHYLENE GLYCOL 3350 17 G: 17 POWDER, FOR SOLUTION ORAL at 09:27

## 2021-11-06 RX ADMIN — IPRATROPIUM BROMIDE 0.5 MG: 0.5 SOLUTION RESPIRATORY (INHALATION) at 15:36

## 2021-11-06 NOTE — PROGRESS NOTES
[facility] Cardiothoracic Surgery  PROGRESS NOTE   CC: Right upper lobe lung cancer status post right upper lobectomy    Subjective:  Doing well today, pain is better this morning, had some nausea overnight but improved with change in medications.  She tried to walk this morning but had some anxiety will try again later today.  Her hemoglobin did drop to the mid sevens from 11 postop and her chest x-ray shows some changes concerning for bleeding around the apical portion of the middle lobe.  Given this supple her chest to back some this morning and stripped her tubes and got an additional 400 cc out.  We will give her 1 unit of blood and recheck chest x-ray later today.    ROS: Hemodynamically stable, on room air no shortness of breath.    Objective:      BP 96/60 (BP Location: Right arm, Patient Position: Lying)   Pulse 97   Temp 97.5 °F (36.4 °C) (Oral)   Resp 16   SpO2 95%   Breastfeeding No       Intake/Output Summary (Last 24 hours) at 11/6/2021 1110  Last data filed at 11/6/2021 1057  Gross per 24 hour   Intake 420 ml   Output 2109 ml   Net -1689 ml       CT Outpt: 484 since the OR, additional 4-500 with stripping    PE:  Vitals:    11/06/21 0750   BP:    Pulse: 97   Resp: 16   Temp:    SpO2:      GENERAL: NAD, resting comfortably, normal color  CARDIOVASCULAR: regular, regular rate, sinus  PULMONARY: Normal bilateral breath sounds, no labored breathing, right-sided dressing clean dry and intact and chest tube with sanguinous output  ABDOMEN: soft, nt/nd  EXTREMITIES: mild peripheral edema, normal pulses, normal ROM        Lab Results (last 72 hours)     Procedure Component Value Units Date/Time    Basic Metabolic Panel [[id number]]  (Abnormal) Collected: 11/06/21 0831    Specimen: Blood Updated: 11/06/21 0913     Glucose 127 mg/dL      BUN 26 mg/dL      Creatinine 0.84 mg/dL      Sodium 136 mmol/L      Potassium 3.9 mmol/L      Chloride 101 mmol/L      CO2 25.0 mmol/L      Calcium  7.7 mg/dL      eGFR Non African Amer 66 mL/min/1.73      BUN/Creatinine Ratio 31.0     Anion Gap 10.0 mmol/L     Narrative:      GFR Normal >60  Chronic Kidney Disease <60  Kidney Failure <15      CBC (No Diff) [[id number]]  (Abnormal) Collected: 11/06/21 0831    Specimen: Blood Updated: 11/06/21 0850     WBC 9.87 10*3/mm3      RBC 2.30 10*6/mm3      Hemoglobin 7.6 g/dL      Hematocrit 22.6 %      MCV 98.3 fL      MCH 33.0 pg      MCHC 33.6 g/dL      RDW 15.3 %      RDW-SD 54.3 fl      MPV 9.5 fL      Platelets 179 10*3/mm3     Tissue Pathology Exam [[id number]] Collected: 11/05/21 0852    Specimen: Lymph Node; Lymph Node; Lymph Node; Lymph Node from Lung, Right Upper Lobe; Lymph Node from Lung, Right Upper Lobe Updated: 11/05/21 1309     Case Report --     Surgical Pathology Report                         Case: [id number]                                  Authorizing Provider:  Jarad Ignacio MD         Collected:           11/05/2021 10:35 AM          Ordering Location:     [facility] OR  Received:            11/05/2021 10:45 AM          Pathologist:           Maddie Burdick MD                                                        Intraop:               Maddie Burdick MD                                                        Specimen:    Lung, Right Upper Lobe, Right Upper Lobe for Bronchial Margin                               Intraoperative Consultation --       Specimen Lung, right upper lobectomy for evaluation of bronchial margin  FROZEN SECTION DIAGNOSIS: The bronchial margin is negative for malignancy.  Reported to Dr. Jarad Ignacio on 11/5/2021 at 11:00 CDT by Maddie Burdick MD.      Basic Metabolic Panel [[id number]]  (Abnormal) Collected: 11/05/21 1125    Specimen: Blood Updated: 11/05/21 1229     Glucose 107 mg/dL      BUN 13 mg/dL      Creatinine 0.55 mg/dL      Sodium 136 mmol/L      Potassium 3.9 mmol/L      Chloride 99 mmol/L      CO2 26.0 mmol/L      Calcium 8.4 mg/dL      eGFR  Non  Amer 108 mL/min/1.73      BUN/Creatinine Ratio 23.6     Anion Gap 11.0 mmol/L     Narrative:      GFR Normal >60  Chronic Kidney Disease <60  Kidney Failure <15      CBC (No Diff) [[id number]]  (Abnormal) Collected: 11/05/21 1125    Specimen: Blood Updated: 11/05/21 1159     WBC 6.28 10*3/mm3      RBC 3.43 10*6/mm3      Hemoglobin 11.1 g/dL      Hematocrit 32.7 %      MCV 95.3 fL      MCH 32.4 pg      MCHC 33.9 g/dL      RDW 14.5 %      RDW-SD 50.4 fl      MPV 9.2 fL      Platelets 163 10*3/mm3     SCANNED - LABS [[id number]] Resulted: 11/05/21     Updated: 11/04/21 0946              CXR: Better expanded right middle and lower lobes on today's chest x-ray but some apical Concerning for some bleeding, no shift of mediastinum, chest tube in stable position    Assessment/Plan     Ms. Zhu is a 76-year-old female who presented with right upper lobe lung cancer.  Her course was complicated by history of bronchiectasis.  She is postop day 1 from right robotic right upper lobectomy.    Some postoperative bleeding, transfuse 1 unit of blood today strict chest tubes and now output is appropriate with some additional output will recheck chest x-ray later today   out of bed walking is much as possible after transfusion of blood  Recheck labs this afternoon and tomorrow  Anticipate will be ready for discharge in 2 to 3 days if remains stable        Jarad Ignacio M.D.  Cardiothoracic Surgeon

## 2021-11-06 NOTE — PLAN OF CARE
Goal Outcome Evaluation:  Plan of Care Reviewed With: patient        Progress: no change  Outcome Summary: CT to right chest to 20cm dry suction.  NPO.  Quan to bsd.  IVF, abx.  Medicated for c/o pain.

## 2021-11-06 NOTE — PLAN OF CARE
Goal Outcome Evaluation:  Plan of Care Reviewed With: patient           Outcome Summary: pt c/o pain at CT site see MAR; pt recieved 1 unit RBCs per order; f/c dc'd dtv; ambulated in restrepo; IID; CT to dry suction safety maintained

## 2021-11-07 ENCOUNTER — APPOINTMENT (OUTPATIENT)
Dept: GENERAL RADIOLOGY | Facility: HOSPITAL | Age: 76
End: 2021-11-07

## 2021-11-07 LAB
ANION GAP SERPL CALCULATED.3IONS-SCNC: 8 MMOL/L (ref 5–15)
BH BB BLOOD EXPIRATION DATE: NORMAL
BH BB BLOOD TYPE BARCODE: 5100
BH BB DISPENSE STATUS: NORMAL
BH BB PRODUCT CODE: NORMAL
BH BB UNIT NUMBER: NORMAL
BUN SERPL-MCNC: 21 MG/DL (ref 8–23)
BUN/CREAT SERPL: 27.6 (ref 7–25)
CALCIUM SPEC-SCNC: 8 MG/DL (ref 8.6–10.5)
CHLORIDE SERPL-SCNC: 101 MMOL/L (ref 98–107)
CO2 SERPL-SCNC: 27 MMOL/L (ref 22–29)
CREAT SERPL-MCNC: 0.76 MG/DL (ref 0.57–1)
CROSSMATCH INTERPRETATION: NORMAL
DEPRECATED RDW RBC AUTO: 60 FL (ref 37–54)
ERYTHROCYTE [DISTWIDTH] IN BLOOD BY AUTOMATED COUNT: 18.4 % (ref 12.3–15.4)
GFR SERPL CREATININE-BSD FRML MDRD: 74 ML/MIN/1.73
GLUCOSE SERPL-MCNC: 150 MG/DL (ref 65–99)
HCT VFR BLD AUTO: 25.7 % (ref 34–46.6)
HGB BLD-MCNC: 8.8 G/DL (ref 12–15.9)
MCH RBC QN AUTO: 31.3 PG (ref 26.6–33)
MCHC RBC AUTO-ENTMCNC: 34.2 G/DL (ref 31.5–35.7)
MCV RBC AUTO: 91.5 FL (ref 79–97)
PLATELET # BLD AUTO: 139 10*3/MM3 (ref 140–450)
PMV BLD AUTO: 9.2 FL (ref 6–12)
POTASSIUM SERPL-SCNC: 3.5 MMOL/L (ref 3.5–5.2)
RBC # BLD AUTO: 2.81 10*6/MM3 (ref 3.77–5.28)
SODIUM SERPL-SCNC: 136 MMOL/L (ref 136–145)
UNIT  ABO: NORMAL
UNIT  RH: NORMAL
WBC # BLD AUTO: 8.56 10*3/MM3 (ref 3.4–10.8)

## 2021-11-07 PROCEDURE — 99024 POSTOP FOLLOW-UP VISIT: CPT | Performed by: SURGERY

## 2021-11-07 PROCEDURE — 94799 UNLISTED PULMONARY SVC/PX: CPT

## 2021-11-07 PROCEDURE — 85027 COMPLETE CBC AUTOMATED: CPT | Performed by: SURGERY

## 2021-11-07 PROCEDURE — 71045 X-RAY EXAM CHEST 1 VIEW: CPT

## 2021-11-07 PROCEDURE — 80048 BASIC METABOLIC PNL TOTAL CA: CPT | Performed by: SURGERY

## 2021-11-07 RX ORDER — DIAZEPAM 2 MG/1
2 TABLET ORAL EVERY 8 HOURS PRN
Status: DISCONTINUED | OUTPATIENT
Start: 2021-11-07 | End: 2021-11-17 | Stop reason: HOSPADM

## 2021-11-07 RX ADMIN — OXYCODONE HYDROCHLORIDE AND ACETAMINOPHEN 1 TABLET: 5; 325 TABLET ORAL at 17:16

## 2021-11-07 RX ADMIN — DIAZEPAM 2 MG: 2 TABLET ORAL at 20:38

## 2021-11-07 RX ADMIN — HYDROCHLOROTHIAZIDE 12.5 MG: 25 TABLET ORAL at 08:45

## 2021-11-07 RX ADMIN — ASPIRIN 81 MG: 81 TABLET, CHEWABLE ORAL at 08:45

## 2021-11-07 RX ADMIN — OXYCODONE HYDROCHLORIDE AND ACETAMINOPHEN 1 TABLET: 5; 325 TABLET ORAL at 11:46

## 2021-11-07 RX ADMIN — OXYCODONE HYDROCHLORIDE AND ACETAMINOPHEN 1 TABLET: 5; 325 TABLET ORAL at 07:05

## 2021-11-07 RX ADMIN — CARVEDILOL 12.5 MG: 6.25 TABLET, FILM COATED ORAL at 08:45

## 2021-11-07 RX ADMIN — BUDESONIDE AND FORMOTEROL FUMARATE DIHYDRATE 2 PUFF: 160; 4.5 AEROSOL RESPIRATORY (INHALATION) at 06:03

## 2021-11-07 RX ADMIN — OXYBUTYNIN CHLORIDE 10 MG: 5 TABLET, EXTENDED RELEASE ORAL at 08:45

## 2021-11-07 RX ADMIN — POLYETHYLENE GLYCOL 3350 17 G: 17 POWDER, FOR SOLUTION ORAL at 08:45

## 2021-11-07 RX ADMIN — GUAIFENESIN 600 MG: 600 TABLET, EXTENDED RELEASE ORAL at 08:45

## 2021-11-07 RX ADMIN — BUDESONIDE AND FORMOTEROL FUMARATE DIHYDRATE 2 PUFF: 160; 4.5 AEROSOL RESPIRATORY (INHALATION) at 19:27

## 2021-11-07 RX ADMIN — LEVOTHYROXINE SODIUM 50 MCG: 50 TABLET ORAL at 05:39

## 2021-11-07 RX ADMIN — FAMOTIDINE 40 MG: 20 TABLET, FILM COATED ORAL at 08:45

## 2021-11-07 RX ADMIN — DIAZEPAM 2 MG: 2 TABLET ORAL at 01:35

## 2021-11-07 RX ADMIN — LEVALBUTEROL 1.25 MG: 1.25 SOLUTION RESPIRATORY (INHALATION) at 06:03

## 2021-11-07 RX ADMIN — PANTOPRAZOLE SODIUM 40 MG: 40 TABLET, DELAYED RELEASE ORAL at 05:39

## 2021-11-07 RX ADMIN — ROSUVASTATIN CALCIUM 40 MG: 20 TABLET, FILM COATED ORAL at 20:36

## 2021-11-07 RX ADMIN — CARVEDILOL 12.5 MG: 6.25 TABLET, FILM COATED ORAL at 17:11

## 2021-11-07 RX ADMIN — DOCUSATE SODIUM 100 MG: 100 CAPSULE, LIQUID FILLED ORAL at 08:45

## 2021-11-07 RX ADMIN — FAMOTIDINE 40 MG: 20 TABLET, FILM COATED ORAL at 20:36

## 2021-11-07 RX ADMIN — IPRATROPIUM BROMIDE 0.5 MG: 0.5 SOLUTION RESPIRATORY (INHALATION) at 06:03

## 2021-11-07 NOTE — PLAN OF CARE
Goal Outcome Evaluation:  Plan of Care Reviewed With: patient           Outcome Summary: pt c/o pain x1 so far this shift see MAR; ambulating in restrepo multiple times ; voiding; IID; CT to -20cm dry suction; up in chair for meals; VSS: safety maintained

## 2021-11-07 NOTE — PLAN OF CARE
Goal Outcome Evaluation:  Plan of Care Reviewed With: patient, spouse        Progress: no change  Outcome Summary: Pt c/o pain and anxiety PRN meds given per orders,CT to right chest in place with serosang drainage, ambulating in restrepo and room with assist x1, will cont to monitor

## 2021-11-07 NOTE — PROGRESS NOTES
[facility] Cardiothoracic Surgery  PROGRESS NOTE   CC: Right upper lobe lung cancer    Subjective:  Doing better today.  Feels better after blood transfusion yesterday.  He is on room air.  Her vital signs have been very stable overnight.  Pain is well controlled.    ROS: No fevers or chills hemodynamically stable    Objective:      /65 (BP Location: Right arm, Patient Position: Sitting)   Pulse 103   Temp 98.5 °F (36.9 °C) (Oral)   Resp 16   SpO2 98%   Breastfeeding No       Intake/Output Summary (Last 24 hours) at 11/7/2021 1409  Last data filed at 11/7/2021 1335  Gross per 24 hour   Intake 780 ml   Output 910 ml   Net -130 ml       CT Outpt: 440 cc serosanguineous small 1 chamber airleak with forced expiration    PE:  Vitals:    11/07/21 1157   BP: 136/65   Pulse: 103   Resp: 16   Temp: 98.5 °F (36.9 °C)   SpO2: 98%     GENERAL: NAD, resting comfortably, normal color  CARDIOVASCULAR: regular, regular rate, sinus  PULMONARY: Normal bilateral breath sounds, no labored breathing, incisions clean dry and intact dressings in place serosanguineous output from chest tube  ABDOMEN: soft, nt/nd  EXTREMITIES: mild peripheral edema, normal pulses, normal ROM        Lab Results (last 72 hours)     Procedure Component Value Units Date/Time    Basic Metabolic Panel [[id number]]  (Abnormal) Collected: 11/07/21 0922    Specimen: Blood Updated: 11/07/21 0946     Glucose 150 mg/dL      BUN 21 mg/dL      Creatinine 0.76 mg/dL      Sodium 136 mmol/L      Potassium 3.5 mmol/L      Chloride 101 mmol/L      CO2 27.0 mmol/L      Calcium 8.0 mg/dL      eGFR Non African Amer 74 mL/min/1.73      BUN/Creatinine Ratio 27.6     Anion Gap 8.0 mmol/L     Narrative:      GFR Normal >60  Chronic Kidney Disease <60  Kidney Failure <15      CBC (No Diff) [[id number]]  (Abnormal) Collected: 11/07/21 0922    Specimen: Blood Updated: 11/07/21 0926     WBC 8.56 10*3/mm3      RBC 2.81 10*6/mm3      Hemoglobin 8.8 g/dL       Hematocrit 25.7 %      MCV 91.5 fL      MCH 31.3 pg      MCHC 34.2 g/dL      RDW 18.4 %      RDW-SD 60.0 fl      MPV 9.2 fL      Platelets 139 10*3/mm3     CBC & Differential [[id number]]  (Abnormal) Collected: 11/06/21 1915    Specimen: Blood Updated: 11/06/21 1922    Narrative:      The following orders were created for panel order CBC & Differential.  Procedure                               Abnormality         Status                     ---------                               -----------         ------                     CBC Auto Differential[[id number]]        Abnormal            Final result                 Please view results for these tests on the individual orders.    CBC Auto Differential [[id number]]  (Abnormal) Collected: 11/06/21 1915    Specimen: Blood Updated: 11/06/21 1922     WBC 9.35 10*3/mm3      RBC 3.03 10*6/mm3      Hemoglobin 9.3 g/dL      Hematocrit 27.4 %      MCV 90.4 fL      MCH 30.7 pg      MCHC 33.9 g/dL      RDW 17.2 %      RDW-SD 56.1 fl      MPV 9.2 fL      Platelets 147 10*3/mm3      Neutrophil % 63.1 %      Lymphocyte % 21.8 %      Monocyte % 14.4 %      Eosinophil % 0.0 %      Basophil % 0.2 %      Immature Grans % 0.5 %      Neutrophils, Absolute 5.89 10*3/mm3      Lymphocytes, Absolute 2.04 10*3/mm3      Monocytes, Absolute 1.35 10*3/mm3      Eosinophils, Absolute 0.00 10*3/mm3      Basophils, Absolute 0.02 10*3/mm3      Immature Grans, Absolute 0.05 10*3/mm3      nRBC 0.0 /100 WBC     Basic Metabolic Panel [[id number]]  (Abnormal) Collected: 11/06/21 0831    Specimen: Blood Updated: 11/06/21 0913     Glucose 127 mg/dL      BUN 26 mg/dL      Creatinine 0.84 mg/dL      Sodium 136 mmol/L      Potassium 3.9 mmol/L      Chloride 101 mmol/L      CO2 25.0 mmol/L      Calcium 7.7 mg/dL      eGFR Non African Amer 66 mL/min/1.73      BUN/Creatinine Ratio 31.0     Anion Gap 10.0 mmol/L     Narrative:      GFR Normal >60  Chronic Kidney Disease <60  Kidney Failure <15      CBC (No Diff)  [[id number]]  (Abnormal) Collected: 11/06/21 0831    Specimen: Blood Updated: 11/06/21 0850     WBC 9.87 10*3/mm3      RBC 2.30 10*6/mm3      Hemoglobin 7.6 g/dL      Hematocrit 22.6 %      MCV 98.3 fL      MCH 33.0 pg      MCHC 33.6 g/dL      RDW 15.3 %      RDW-SD 54.3 fl      MPV 9.5 fL      Platelets 179 10*3/mm3     Tissue Pathology Exam [[id number]] Collected: 11/05/21 0852    Specimen: Lymph Node; Lymph Node; Lymph Node; Lymph Node from Lung, Right Upper Lobe; Lymph Node from Lung, Right Upper Lobe Updated: 11/05/21 1309     Case Report --     Surgical Pathology Report                         Case: [id number]                                  Authorizing Provider:  Jarad Ignacio MD         Collected:           11/05/2021 10:35 AM          Ordering Location:     [facility] OR  Received:            11/05/2021 10:45 AM          Pathologist:           Maddie Burdick MD                                                        Intraop:               Maddie Burdick MD                                                        Specimen:    Lung, Right Upper Lobe, Right Upper Lobe for Bronchial Margin                               Intraoperative Consultation --       Specimen Lung, right upper lobectomy for evaluation of bronchial margin  FROZEN SECTION DIAGNOSIS: The bronchial margin is negative for malignancy.  Reported to Dr. Jarad Ignacio on 11/5/2021 at 11:00 CDT by Maddie Burdick MD.      Basic Metabolic Panel [[id number]]  (Abnormal) Collected: 11/05/21 1125    Specimen: Blood Updated: 11/05/21 1229     Glucose 107 mg/dL      BUN 13 mg/dL      Creatinine 0.55 mg/dL      Sodium 136 mmol/L      Potassium 3.9 mmol/L      Chloride 99 mmol/L      CO2 26.0 mmol/L      Calcium 8.4 mg/dL      eGFR Non African Amer 108 mL/min/1.73      BUN/Creatinine Ratio 23.6     Anion Gap 11.0 mmol/L     Narrative:      GFR Normal >60  Chronic Kidney Disease <60  Kidney Failure <15      CBC (No Diff) [[id number]]   (Abnormal) Collected: 11/05/21 1125    Specimen: Blood Updated: 11/05/21 1159     WBC 6.28 10*3/mm3      RBC 3.43 10*6/mm3      Hemoglobin 11.1 g/dL      Hematocrit 32.7 %      MCV 95.3 fL      MCH 32.4 pg      MCHC 33.9 g/dL      RDW 14.5 %      RDW-SD 50.4 fl      MPV 9.2 fL      Platelets 163 10*3/mm3               CXR:Stable appearing chest x-ray, right lung is better expanded and apical capping decreasing, Appropriate shift of upper mediastinal structures to the right    Assessment/Plan     Ms. Zhu is a 76-year-old female who presented with right upper lobe lung cancer.  Her course was complicated by history of bronchiectasis.  She is postop day 2 from right robotic right upper lobectomy.    Hemoglobin is stable, will continue to hold prophylactic DVT Lovenox for 1 more day  Continue chest tube to suction small air leak continue to monitor  Out of bed walking is much as possible  Anticipate will be ready for home discharge in 2 to 3 days    Jarad Ignacio M.D.  Cardiothoracic Surgeon

## 2021-11-08 ENCOUNTER — APPOINTMENT (OUTPATIENT)
Dept: GENERAL RADIOLOGY | Facility: HOSPITAL | Age: 76
End: 2021-11-08

## 2021-11-08 LAB
ANION GAP SERPL CALCULATED.3IONS-SCNC: 9 MMOL/L (ref 5–15)
BUN SERPL-MCNC: 13 MG/DL (ref 8–23)
BUN/CREAT SERPL: 21 (ref 7–25)
CALCIUM SPEC-SCNC: 8.6 MG/DL (ref 8.6–10.5)
CHLORIDE SERPL-SCNC: 102 MMOL/L (ref 98–107)
CO2 SERPL-SCNC: 27 MMOL/L (ref 22–29)
CREAT SERPL-MCNC: 0.62 MG/DL (ref 0.57–1)
DEPRECATED RDW RBC AUTO: 58.8 FL (ref 37–54)
ERYTHROCYTE [DISTWIDTH] IN BLOOD BY AUTOMATED COUNT: 17.7 % (ref 12.3–15.4)
GFR SERPL CREATININE-BSD FRML MDRD: 94 ML/MIN/1.73
GLUCOSE SERPL-MCNC: 169 MG/DL (ref 65–99)
HCT VFR BLD AUTO: 26.1 % (ref 34–46.6)
HGB BLD-MCNC: 8.8 G/DL (ref 12–15.9)
MCH RBC QN AUTO: 31.2 PG (ref 26.6–33)
MCHC RBC AUTO-ENTMCNC: 33.7 G/DL (ref 31.5–35.7)
MCV RBC AUTO: 92.6 FL (ref 79–97)
PLATELET # BLD AUTO: 172 10*3/MM3 (ref 140–450)
PMV BLD AUTO: 9.5 FL (ref 6–12)
POTASSIUM SERPL-SCNC: 3.3 MMOL/L (ref 3.5–5.2)
RBC # BLD AUTO: 2.82 10*6/MM3 (ref 3.77–5.28)
SODIUM SERPL-SCNC: 138 MMOL/L (ref 136–145)
WBC # BLD AUTO: 9.37 10*3/MM3 (ref 3.4–10.8)

## 2021-11-08 PROCEDURE — 71045 X-RAY EXAM CHEST 1 VIEW: CPT

## 2021-11-08 PROCEDURE — 99024 POSTOP FOLLOW-UP VISIT: CPT | Performed by: SURGERY

## 2021-11-08 PROCEDURE — 63710000001 DIPHENHYDRAMINE PER 50 MG: Performed by: NURSE PRACTITIONER

## 2021-11-08 PROCEDURE — 25010000002 FUROSEMIDE PER 20 MG: Performed by: NURSE PRACTITIONER

## 2021-11-08 PROCEDURE — 85027 COMPLETE CBC AUTOMATED: CPT | Performed by: SURGERY

## 2021-11-08 PROCEDURE — 80048 BASIC METABOLIC PNL TOTAL CA: CPT | Performed by: SURGERY

## 2021-11-08 RX ORDER — FUROSEMIDE 10 MG/ML
20 INJECTION INTRAMUSCULAR; INTRAVENOUS ONCE
Status: COMPLETED | OUTPATIENT
Start: 2021-11-08 | End: 2021-11-08

## 2021-11-08 RX ORDER — DIPHENHYDRAMINE HCL 25 MG
25 CAPSULE ORAL NIGHTLY PRN
Status: DISCONTINUED | OUTPATIENT
Start: 2021-11-08 | End: 2021-11-17 | Stop reason: HOSPADM

## 2021-11-08 RX ORDER — POTASSIUM CHLORIDE 750 MG/1
40 CAPSULE, EXTENDED RELEASE ORAL ONCE
Status: COMPLETED | OUTPATIENT
Start: 2021-11-08 | End: 2021-11-08

## 2021-11-08 RX ADMIN — FAMOTIDINE 40 MG: 20 TABLET, FILM COATED ORAL at 20:30

## 2021-11-08 RX ADMIN — CARVEDILOL 12.5 MG: 6.25 TABLET, FILM COATED ORAL at 08:40

## 2021-11-08 RX ADMIN — FUROSEMIDE 20 MG: 10 INJECTION, SOLUTION INTRAVENOUS at 10:10

## 2021-11-08 RX ADMIN — ASPIRIN 81 MG: 81 TABLET, CHEWABLE ORAL at 08:40

## 2021-11-08 RX ADMIN — DOCUSATE SODIUM 100 MG: 100 CAPSULE, LIQUID FILLED ORAL at 08:41

## 2021-11-08 RX ADMIN — OXYCODONE HYDROCHLORIDE AND ACETAMINOPHEN 1 TABLET: 5; 325 TABLET ORAL at 17:11

## 2021-11-08 RX ADMIN — OXYCODONE HYDROCHLORIDE AND ACETAMINOPHEN 1 TABLET: 5; 325 TABLET ORAL at 04:37

## 2021-11-08 RX ADMIN — OXYBUTYNIN CHLORIDE 10 MG: 5 TABLET, EXTENDED RELEASE ORAL at 08:41

## 2021-11-08 RX ADMIN — FAMOTIDINE 40 MG: 20 TABLET, FILM COATED ORAL at 08:41

## 2021-11-08 RX ADMIN — LEVOTHYROXINE SODIUM 50 MCG: 50 TABLET ORAL at 05:44

## 2021-11-08 RX ADMIN — POLYETHYLENE GLYCOL 3350 17 G: 17 POWDER, FOR SOLUTION ORAL at 08:42

## 2021-11-08 RX ADMIN — CARVEDILOL 12.5 MG: 6.25 TABLET, FILM COATED ORAL at 17:14

## 2021-11-08 RX ADMIN — ROSUVASTATIN CALCIUM 40 MG: 20 TABLET, FILM COATED ORAL at 20:30

## 2021-11-08 RX ADMIN — OXYCODONE HYDROCHLORIDE AND ACETAMINOPHEN 1 TABLET: 5; 325 TABLET ORAL at 12:49

## 2021-11-08 RX ADMIN — GUAIFENESIN 600 MG: 600 TABLET, EXTENDED RELEASE ORAL at 08:41

## 2021-11-08 RX ADMIN — OXYCODONE HYDROCHLORIDE AND ACETAMINOPHEN 1 TABLET: 5; 325 TABLET ORAL at 20:35

## 2021-11-08 RX ADMIN — DIAZEPAM 2 MG: 2 TABLET ORAL at 22:13

## 2021-11-08 RX ADMIN — OXYCODONE HYDROCHLORIDE AND ACETAMINOPHEN 1 TABLET: 5; 325 TABLET ORAL at 08:49

## 2021-11-08 RX ADMIN — POTASSIUM CHLORIDE 40 MEQ: 10 CAPSULE, COATED, EXTENDED RELEASE ORAL at 10:10

## 2021-11-08 RX ADMIN — HYDROCHLOROTHIAZIDE 12.5 MG: 25 TABLET ORAL at 08:41

## 2021-11-08 RX ADMIN — PANTOPRAZOLE SODIUM 40 MG: 40 TABLET, DELAYED RELEASE ORAL at 05:44

## 2021-11-08 RX ADMIN — DIPHENHYDRAMINE HYDROCHLORIDE 25 MG: 25 CAPSULE ORAL at 22:13

## 2021-11-08 NOTE — PLAN OF CARE
Goal Outcome Evaluation:  Plan of Care Reviewed With: patient        Progress: no change  Outcome Summary: Patient c/o pain x 1 so far this shift. IID'd. CT to -20 cm dry suction. Up x 1. Ambulated in restrepo x 4 this shift. Up in chair for most of shift. Patient c/o the inability to get rest overnight despite taking valium. Voiding. RA. Tele: tachycardic at times. Patient had a 20 beat run of VT per monitor room. No distress noted at this time. Safety maintained.

## 2021-11-08 NOTE — PROGRESS NOTES
"Patient name: Trinidad Zhu  Patient : 1945  VISIT # [id number]  MR #[id number]    Procedure:Procedure(s):  RIGHT THORACOSCOPY WITH DAVINCI ROBOT, RIGHT UPPER LOBE LOBECTOMY  Procedure Date:2021  POD:3 Days Post-Op    Subjective     Tolerating room air.  Pain better controlled.  States she did not sleep well.  Hemoglobin stable today at 8.8.  Has been ambulating in the hallway without difficulty.       Objective     Visit Vitals  /58 (BP Location: Right arm, Patient Position: Sitting)   Pulse 95   Temp 98.6 °F (37 °C) (Oral)   Resp 16   Ht 161 cm (63.39\")   Wt 50.8 kg (111 lb 15.9 oz)   SpO2 99%   Breastfeeding No   BMI 19.60 kg/m²       Intake/Output Summary (Last 24 hours) at 2021 0823  Last data filed at 2021 0545  Gross per 24 hour   Intake 480 ml   Output 900 ml   Net -420 ml     RCT: 100 ml/24 hours, serosanguineous, forced air leak noted    Lab:      IMAGES:       Imaging Results (Last 24 Hours)     Procedure Component Value Units Date/Time    XR Chest 1 View [[id number]] Collected: 21     Updated: 21    Narrative:      EXAM: XR CHEST 1 VW- 2021 3:22 AM CST     HISTORY: lung cancer, s/p RUL lobectomy       COMPARISON: 2021.     TECHNIQUE: Frontal radiograph of the chest     FINDINGS:   Left lung is clear. A right chest tube is present. Postsurgical changes  noted in the right chest. Right apical pneumothorax is stable.  Subcutaneous air is present soft tissues of the right chest and right  neck. The cardiomediastinal silhouette and pulmonary vascularity are  within normal limits.      The osseous structures and surrounding soft tissues demonstrate no acute  abnormality.          Impression:      1. Stable chest. Persistent right apical pneumothorax is unchanged     #2 subcutaneous emphysema is present soft tissues the right chest and  right neck  This report was finalized on 2021 07:11 by Dr. Sha Mosquera MD.    XR Chest 1 View " [[id number]] Collected: 11/07/21 0831     Updated: 11/07/21 0835    Narrative:      Frontal supine radiograph of the chest 11/7/2021 3:35 AM CST     History: lung cancer, s/p RUL lobectomy     Comparison: 11/6/2021      Findings:   Lines and tubes are stable in position. No new opacities or  pneumothoraces are visualized in the chest. The cardiomediastinal  silhouette and pulmonary vascularity are unchanged. The right apical  pneumothorax is stable.     No acute osseous or soft tissue abnormality is noted.        Impression:      Impression:   1. No significant interval change since previous exam.        This report was finalized on 11/07/2021 08:32 by Dr. Rigoberto Milian MD.        CXR: Right chest tube in stable position.  Noted subcutaneous emphysema right chest wall and right neck.  Decreasing right apical space.    Physical Exam:  General: Alert, oriented. No apparent distress.   Cardiovascular: Regular rate and rhythm without murmur, rubs, or gallops.    Pulmonary: Clear to auscultation bilaterally without wheezing, rubs, or rales.  Chest: Thoracic incisions clean, dry, and intact. Chest tube to 20 cm dry suction. Forced air leak. Fluid is serosanguineous.   Abdomen: Soft, non distended, and non tender.  Extremities: Warm, moves all extremities. No edema.   Neurologic:  Grossly intact with no focal deficits.            Impression:  Lung nodule  Former smoker        Plan:  Chest tube repositioned per Dr. Ignacio  Repeat portable cxr now  Lasix 20 mg x 1 dose today  Will add PRN Benadryl at night for sleep  Routine post thoracic surgery care  Encourage pulmonary toilet and ambulation  Anticipate DC home in 2-3 days        GEREMIAS Downs  11/08/21  08:23 CST

## 2021-11-08 NOTE — NURSING NOTE
Pt knocked over atrium container to chest tube, spilling serosangeous fluid over to other chambers. Replaced Atrium container.

## 2021-11-08 NOTE — PLAN OF CARE
Goal Outcome Evaluation:      Pt ambulating the halls numerous times with husbands assistance. Pt has required PO Oxycodone for pain 1 times today. Atrium from chest tube changed out today. Plan is for patient to go home in a few days.

## 2021-11-08 NOTE — CASE MANAGEMENT/SOCIAL WORK
Discharge Planning Assessment  [facility]     Patient Name: Trinidad Zhu  MRN: [id number]  Today's Date: 11/8/2021    Admit Date: 11/5/2021     Discharge Needs Assessment     Row Name 11/08/21 1217       Living Environment    Lives With spouse    Name(s) of Who Lives With Patient Yair Zhu    Current Living Arrangements home/apartment/condo    Primary Care Provided by self    Provides Primary Care For no one    Family Caregiver if Needed spouse    Quality of Family Relationships helpful; involved; supportive    Able to Return to Prior Arrangements yes       Resource/Environmental Concerns    Resource/Environmental Concerns none       Transition Planning    Patient/Family Anticipates Transition to home with family    Patient/Family Anticipated Services at Transition none    Transportation Anticipated family or friend will provide       Discharge Needs Assessment    Readmission Within the Last 30 Days no previous admission in last 30 days    Equipment Currently Used at Home none    Concerns to be Addressed no discharge needs identified; denies needs/concerns at this time    Anticipated Changes Related to Illness none    Equipment Needed After Discharge none    Discharge Coordination/Progress Patient lives with .  No DME.  She anticipates home as before and spouse to assist where needed.  SS/CM will continue to follow for any new needs and or orders.               Discharge Plan    No documentation.               Continued Care and Services - Admitted Since 11/5/2021    Coordination has not been started for this encounter.          Demographic Summary    No documentation.                Functional Status    No documentation.                Psychosocial    No documentation.                Abuse/Neglect    No documentation.                Legal    No documentation.                Substance Abuse    No documentation.                Patient Forms    No documentation.                   rAielle Mendoza RN

## 2021-11-09 ENCOUNTER — APPOINTMENT (OUTPATIENT)
Dept: GENERAL RADIOLOGY | Facility: HOSPITAL | Age: 76
End: 2021-11-09

## 2021-11-09 ENCOUNTER — APPOINTMENT (OUTPATIENT)
Dept: CT IMAGING | Facility: HOSPITAL | Age: 76
End: 2021-11-09

## 2021-11-09 PROBLEM — D62 POSTOPERATIVE ANEMIA DUE TO ACUTE BLOOD LOSS: Status: ACTIVE | Noted: 2021-11-09

## 2021-11-09 LAB
ANION GAP SERPL CALCULATED.3IONS-SCNC: 7 MMOL/L (ref 5–15)
ANION GAP SERPL CALCULATED.3IONS-SCNC: 9 MMOL/L (ref 5–15)
BUN SERPL-MCNC: 12 MG/DL (ref 8–23)
BUN SERPL-MCNC: 13 MG/DL (ref 8–23)
BUN/CREAT SERPL: 15.7 (ref 7–25)
BUN/CREAT SERPL: 20.7 (ref 7–25)
CALCIUM SPEC-SCNC: 8.6 MG/DL (ref 8.6–10.5)
CALCIUM SPEC-SCNC: 8.9 MG/DL (ref 8.6–10.5)
CHLORIDE SERPL-SCNC: 103 MMOL/L (ref 98–107)
CHLORIDE SERPL-SCNC: 105 MMOL/L (ref 98–107)
CO2 SERPL-SCNC: 29 MMOL/L (ref 22–29)
CO2 SERPL-SCNC: 30 MMOL/L (ref 22–29)
CREAT SERPL-MCNC: 0.58 MG/DL (ref 0.57–1)
CREAT SERPL-MCNC: 0.83 MG/DL (ref 0.57–1)
DEPRECATED RDW RBC AUTO: 57.7 FL (ref 37–54)
DEPRECATED RDW RBC AUTO: 59.9 FL (ref 37–54)
ERYTHROCYTE [DISTWIDTH] IN BLOOD BY AUTOMATED COUNT: 17.2 % (ref 12.3–15.4)
ERYTHROCYTE [DISTWIDTH] IN BLOOD BY AUTOMATED COUNT: 17.5 % (ref 12.3–15.4)
GFR SERPL CREATININE-BSD FRML MDRD: 101 ML/MIN/1.73
GFR SERPL CREATININE-BSD FRML MDRD: 67 ML/MIN/1.73
GLUCOSE SERPL-MCNC: 109 MG/DL (ref 65–99)
GLUCOSE SERPL-MCNC: 112 MG/DL (ref 65–99)
HCT VFR BLD AUTO: 23.7 % (ref 34–46.6)
HCT VFR BLD AUTO: 26.4 % (ref 34–46.6)
HGB BLD-MCNC: 7.8 G/DL (ref 12–15.9)
HGB BLD-MCNC: 8.7 G/DL (ref 12–15.9)
MCH RBC QN AUTO: 30.8 PG (ref 26.6–33)
MCH RBC QN AUTO: 31.8 PG (ref 26.6–33)
MCHC RBC AUTO-ENTMCNC: 32.9 G/DL (ref 31.5–35.7)
MCHC RBC AUTO-ENTMCNC: 33 G/DL (ref 31.5–35.7)
MCV RBC AUTO: 93.7 FL (ref 79–97)
MCV RBC AUTO: 96.4 FL (ref 79–97)
PLATELET # BLD AUTO: 143 10*3/MM3 (ref 140–450)
PLATELET # BLD AUTO: 166 10*3/MM3 (ref 140–450)
PMV BLD AUTO: 10.1 FL (ref 6–12)
PMV BLD AUTO: 9.3 FL (ref 6–12)
POTASSIUM SERPL-SCNC: 4.2 MMOL/L (ref 3.5–5.2)
POTASSIUM SERPL-SCNC: 4.5 MMOL/L (ref 3.5–5.2)
RBC # BLD AUTO: 2.53 10*6/MM3 (ref 3.77–5.28)
RBC # BLD AUTO: 2.74 10*6/MM3 (ref 3.77–5.28)
SODIUM SERPL-SCNC: 141 MMOL/L (ref 136–145)
SODIUM SERPL-SCNC: 142 MMOL/L (ref 136–145)
WBC # BLD AUTO: 7.42 10*3/MM3 (ref 3.4–10.8)
WBC # BLD AUTO: 7.85 10*3/MM3 (ref 3.4–10.8)

## 2021-11-09 PROCEDURE — 85027 COMPLETE CBC AUTOMATED: CPT | Performed by: NURSE PRACTITIONER

## 2021-11-09 PROCEDURE — 99024 POSTOP FOLLOW-UP VISIT: CPT | Performed by: SURGERY

## 2021-11-09 PROCEDURE — 80048 BASIC METABOLIC PNL TOTAL CA: CPT | Performed by: NURSE PRACTITIONER

## 2021-11-09 PROCEDURE — 25010000002 FUROSEMIDE PER 20 MG: Performed by: NURSE PRACTITIONER

## 2021-11-09 PROCEDURE — 25010000002 ENOXAPARIN PER 10 MG: Performed by: NURSE PRACTITIONER

## 2021-11-09 PROCEDURE — 71045 X-RAY EXAM CHEST 1 VIEW: CPT

## 2021-11-09 PROCEDURE — 71046 X-RAY EXAM CHEST 2 VIEWS: CPT

## 2021-11-09 PROCEDURE — 63710000001 DIPHENHYDRAMINE PER 50 MG: Performed by: NURSE PRACTITIONER

## 2021-11-09 PROCEDURE — 71250 CT THORAX DX C-: CPT

## 2021-11-09 RX ORDER — ACETAMINOPHEN 325 MG/1
650 TABLET ORAL EVERY 6 HOURS SCHEDULED
Status: DISCONTINUED | OUTPATIENT
Start: 2021-11-09 | End: 2021-11-17 | Stop reason: HOSPADM

## 2021-11-09 RX ORDER — FUROSEMIDE 10 MG/ML
20 INJECTION INTRAMUSCULAR; INTRAVENOUS
Status: COMPLETED | OUTPATIENT
Start: 2021-11-09 | End: 2021-11-09

## 2021-11-09 RX ORDER — SODIUM CHLORIDE FOR INHALATION 3 %
4 VIAL, NEBULIZER (ML) INHALATION
Status: DISCONTINUED | OUTPATIENT
Start: 2021-11-09 | End: 2021-11-15

## 2021-11-09 RX ORDER — POTASSIUM CHLORIDE 750 MG/1
20 CAPSULE, EXTENDED RELEASE ORAL 2 TIMES DAILY WITH MEALS
Status: COMPLETED | OUTPATIENT
Start: 2021-11-09 | End: 2021-11-09

## 2021-11-09 RX ORDER — BISACODYL 10 MG
10 SUPPOSITORY, RECTAL RECTAL ONCE
Status: COMPLETED | OUTPATIENT
Start: 2021-11-09 | End: 2021-11-09

## 2021-11-09 RX ORDER — METHOCARBAMOL 500 MG/1
500 TABLET, FILM COATED ORAL EVERY 8 HOURS SCHEDULED
Status: DISCONTINUED | OUTPATIENT
Start: 2021-11-09 | End: 2021-11-17 | Stop reason: HOSPADM

## 2021-11-09 RX ORDER — MULTIPLE VITAMINS W/ MINERALS TAB 9MG-400MCG
1 TAB ORAL DAILY
Status: DISCONTINUED | OUTPATIENT
Start: 2021-11-09 | End: 2021-11-10

## 2021-11-09 RX ADMIN — OXYCODONE HYDROCHLORIDE AND ACETAMINOPHEN 1 TABLET: 5; 325 TABLET ORAL at 14:30

## 2021-11-09 RX ADMIN — DIAZEPAM 2 MG: 2 TABLET ORAL at 14:30

## 2021-11-09 RX ADMIN — FAMOTIDINE 40 MG: 20 TABLET, FILM COATED ORAL at 21:19

## 2021-11-09 RX ADMIN — PANTOPRAZOLE SODIUM 40 MG: 40 TABLET, DELAYED RELEASE ORAL at 05:21

## 2021-11-09 RX ADMIN — GUAIFENESIN 600 MG: 600 TABLET, EXTENDED RELEASE ORAL at 08:58

## 2021-11-09 RX ADMIN — HYDROCHLOROTHIAZIDE 12.5 MG: 25 TABLET ORAL at 08:58

## 2021-11-09 RX ADMIN — POLYETHYLENE GLYCOL 3350 17 G: 17 POWDER, FOR SOLUTION ORAL at 08:59

## 2021-11-09 RX ADMIN — OXYCODONE HYDROCHLORIDE AND ACETAMINOPHEN 1 TABLET: 5; 325 TABLET ORAL at 03:19

## 2021-11-09 RX ADMIN — OXYCODONE HYDROCHLORIDE AND ACETAMINOPHEN 1 TABLET: 5; 325 TABLET ORAL at 06:47

## 2021-11-09 RX ADMIN — CARVEDILOL 12.5 MG: 6.25 TABLET, FILM COATED ORAL at 17:42

## 2021-11-09 RX ADMIN — DOCUSATE SODIUM 100 MG: 100 CAPSULE, LIQUID FILLED ORAL at 08:58

## 2021-11-09 RX ADMIN — OXYCODONE HYDROCHLORIDE AND ACETAMINOPHEN 1 TABLET: 5; 325 TABLET ORAL at 17:42

## 2021-11-09 RX ADMIN — ASPIRIN 81 MG: 81 TABLET, CHEWABLE ORAL at 08:59

## 2021-11-09 RX ADMIN — ACETAMINOPHEN 650 MG: 325 TABLET, FILM COATED ORAL at 08:57

## 2021-11-09 RX ADMIN — ACETAMINOPHEN 650 MG: 325 TABLET, FILM COATED ORAL at 17:42

## 2021-11-09 RX ADMIN — Medication 1 TABLET: at 10:44

## 2021-11-09 RX ADMIN — FAMOTIDINE 40 MG: 20 TABLET, FILM COATED ORAL at 08:58

## 2021-11-09 RX ADMIN — OXYCODONE HYDROCHLORIDE AND ACETAMINOPHEN 1 TABLET: 5; 325 TABLET ORAL at 21:18

## 2021-11-09 RX ADMIN — FUROSEMIDE 20 MG: 10 INJECTION, SOLUTION INTRAMUSCULAR; INTRAVENOUS at 17:42

## 2021-11-09 RX ADMIN — ACETAMINOPHEN 650 MG: 325 TABLET, FILM COATED ORAL at 13:08

## 2021-11-09 RX ADMIN — POTASSIUM CHLORIDE 20 MEQ: 10 CAPSULE, COATED, EXTENDED RELEASE ORAL at 17:42

## 2021-11-09 RX ADMIN — ENOXAPARIN SODIUM 30 MG: 30 INJECTION SUBCUTANEOUS at 08:59

## 2021-11-09 RX ADMIN — CARVEDILOL 12.5 MG: 6.25 TABLET, FILM COATED ORAL at 08:59

## 2021-11-09 RX ADMIN — METHOCARBAMOL 500 MG: 500 TABLET, FILM COATED ORAL at 13:08

## 2021-11-09 RX ADMIN — METHOCARBAMOL 500 MG: 500 TABLET, FILM COATED ORAL at 21:19

## 2021-11-09 RX ADMIN — OXYCODONE HYDROCHLORIDE AND ACETAMINOPHEN 1 TABLET: 5; 325 TABLET ORAL at 00:16

## 2021-11-09 RX ADMIN — BISACODYL 10 MG: 10 SUPPOSITORY RECTAL at 08:58

## 2021-11-09 RX ADMIN — OXYCODONE HYDROCHLORIDE AND ACETAMINOPHEN 1 TABLET: 5; 325 TABLET ORAL at 10:44

## 2021-11-09 RX ADMIN — LEVOTHYROXINE SODIUM 50 MCG: 50 TABLET ORAL at 05:21

## 2021-11-09 RX ADMIN — ROSUVASTATIN CALCIUM 40 MG: 20 TABLET, FILM COATED ORAL at 21:19

## 2021-11-09 RX ADMIN — OXYBUTYNIN CHLORIDE 10 MG: 5 TABLET, EXTENDED RELEASE ORAL at 08:58

## 2021-11-09 RX ADMIN — DIAZEPAM 2 MG: 2 TABLET ORAL at 22:40

## 2021-11-09 RX ADMIN — POTASSIUM CHLORIDE 20 MEQ: 10 CAPSULE, COATED, EXTENDED RELEASE ORAL at 08:58

## 2021-11-09 RX ADMIN — FUROSEMIDE 20 MG: 10 INJECTION, SOLUTION INTRAMUSCULAR; INTRAVENOUS at 08:59

## 2021-11-09 RX ADMIN — DIPHENHYDRAMINE HYDROCHLORIDE 25 MG: 25 CAPSULE ORAL at 22:40

## 2021-11-09 NOTE — PLAN OF CARE
Goal Outcome Evaluation:           Progress: no change  Outcome Summary: Pts CT was placed to waterseal this AM per APRN. Pt's cxr later showed worsening pneumo. Placed on -40cm dry per order. Cxr again worsened. CT chest completed. Pt with increased pain, O2 sats above 95% on room air, no SOA noted. PRN  meds given per orders. Pt to stayon suction at all times, even when ambulating. Pt is ambulating and doing IS frequently.

## 2021-11-09 NOTE — PLAN OF CARE
Goal Outcome Evaluation:  Plan of Care Reviewed With: patient        Progress: no change  Outcome Summary: CT to right side,  20cm dry suction.  Voiding.  Gave valium and benadryl for sleep.  Medicated for c/o pain x 2 so far.  Ambulated in restrepo with .  Tele-sinus.  IID x2.

## 2021-11-09 NOTE — PROGRESS NOTES
"Patient name: Trinidad Zhu  Patient : 1945  VISIT # [id number]  MR #[id number]    Procedure:Procedure(s):  RIGHT THORACOSCOPY WITH DAVINCI ROBOT, RIGHT UPPER LOBE LOBECTOMY  Procedure Date:2021  POD:3 Days Post-Op    Subjective     Tolerating room air.  Sitting up in chair having breakfast.  States she slept better with benadryl but pain is not controlled with Percocet.  Hemoglobin today is 7.8. (-) BM       Objective     Visit Vitals  /54 (BP Location: Right arm, Patient Position: Sitting)   Pulse 84   Temp 99 °F (37.2 °C) (Oral)   Resp 16   Ht 161 cm (63.39\")   Wt 50.8 kg (111 lb 15.9 oz)   SpO2 100%   Breastfeeding No   BMI 19.60 kg/m²       Intake/Output Summary (Last 24 hours) at 2021 0819  Last data filed at 2021 0525  Gross per 24 hour   Intake 720 ml   Output 556 ml   Net 164 ml     RCT: 269 ml/24 hours, serosanguineous, forced air leak noted    Lab:      IMAGES:       Imaging Results (Last 24 Hours)     Procedure Component Value Units Date/Time    XR Chest 1 View [[id number]] Collected: 21     Updated: 21    Narrative:      EXAM: XR CHEST 1 VW- 2021 3:15 AM CST     HISTORY: lung cancer, s/p RUL lobectomy       COMPARISON: 2021 and 2021.     TECHNIQUE: Frontal radiograph of the chest     FINDINGS:   The left lung is clear. Right chest tube is present. Right apical  pneumothorax is present there may be slightly improved expansion of the  right lung compared to . Trace of subcutaneous air is noted in  the soft tissues of the right chest this has decreased compared to  . Cardiac silhouette is normal..      The osseous structures and surrounding soft tissues demonstrate no acute  abnormality.          Impression:      1. Right chest tube is present with persistent small right apical  pneumothorax. There may been slight improved expansion of the right lung  compared to  and 2021      "   This report was finalized on 11/09/2021 07:13 by Dr. Sha Mosquera MD.    XR Chest 1 View [[id number]] Collected: 11/08/21 1206     Updated: 11/08/21 1210    Narrative:      EXAM: XR CHEST 1 VW- 11/8/2021 11:55 AM CST     HISTORY: post chest tube repositioning; R91.1-Solitary pulmonary nodule         COMPARISON: November 80 3:39 AM.     TECHNIQUE: Frontal radiograph of the chest     FINDINGS:   Left lung is clear. Right chest tube is present. Right apical  pneumothorax is present stable and unchanged. Subcutaneous emphysema is  present soft tissues of the right chest and right neck. The  cardiomediastinal silhouette and pulmonary vascularity are within normal  limits.      The osseous structures and surrounding soft tissues demonstrate no acute  abnormality.          Impression:      1. Right chest tube is present. Right apical pneumothorax is unchanged  from prior exam of same date..        This report was finalized on 11/08/2021 12:07 by Dr. Sha Mosquera MD.        CXR: Right chest tube in stable position.  Noted subcutaneous emphysema right chest wall and right neck, improving.  Decreasing right apical space.    Physical Exam:  General: Alert, oriented. No apparent distress.   Cardiovascular: Regular rate and rhythm without murmur, rubs, or gallops.    Pulmonary: Clear to auscultation bilaterally without wheezing, rubs, or rales.  Chest: Thoracic incisions clean, dry, and intact. Chest tube to 20 cm dry suction. Forced air leak. Fluid is serosanguineous.   Abdomen: Soft, non distended, and non tender.  Extremities: Warm, moves all extremities. No edema.   Neurologic:  Grossly intact with no focal deficits.            Impression:  Lung nodule  Former smoker        Plan:  Chest tube placed to waterseal.  Repeat 2 view chest xray today at 1200  Lasix 20 mg x 2 doses today  Repeat labs at 1300  Start multivitamin  Restart Lovenox for VTE prophylaxis  Add scheduled tylenol and robaxin  Suppository today  Routine  post thoracic surgery care  Encourage pulmonary toilet and ambulation  DW patient and nursing        Brittni Bergman, APRN  11/09/21  08:19 CST

## 2021-11-09 NOTE — PROGRESS NOTES
"Enter Query Response Below      Query Response:     Post operative anemia due to blood loss.    Electronically signed by Jarad Ignacio MD, 21, 2:06 PM CST.           If applicable, please update the problem list.       Patient: Trinidad Zhu        : 1945  Account: [id number]           Admit Date:          Options to Respond to Query:    1. Access the Encounter     a. From the To-Do Side bar, click Respond With Note.     b. Click New Note     c. Answer query within the yellow box.                d. Update the Problem List if applicable.     Brittni Corral, APRN:    Patient was admitted post right thoracoscopy with right upper lobe lobectomy on .   progress note indicates \"Some postoperative bleeding, transfuse 1 unit of blood today strict chest tubes and now output is appropriate with some additional output will recheck chest x-ray later today.\"  Her hemoglobin was 11.1 after surgery and 7.6 on post op day 1.  On post op day 2 hemoglobin was noted at 8.8.     Can you further specify the condition present and treated as -    > postoperative anemia due to acute blood loss  > expected postoperative blood loss anemia  > other _________  > unable to determine       By submitting this query, we are merely seeking further clarification of documentation to accurately reflect all conditions that you are monitoring, evaluating, treating or that extend the hospitalization or utilize additional resources of care. Please utilize your independent clinical judgment when addressing the question(s) above.     This query and your response, once completed, will be entered into the legal medical record.    Sincerely,  [email or web address]   Clinical Documentation Integrity Program     "

## 2021-11-10 ENCOUNTER — APPOINTMENT (OUTPATIENT)
Dept: GENERAL RADIOLOGY | Facility: HOSPITAL | Age: 76
End: 2021-11-10

## 2021-11-10 LAB
ANION GAP SERPL CALCULATED.3IONS-SCNC: 8 MMOL/L (ref 5–15)
BUN SERPL-MCNC: 17 MG/DL (ref 8–23)
BUN/CREAT SERPL: 22.4 (ref 7–25)
CALCIUM SPEC-SCNC: 8.6 MG/DL (ref 8.6–10.5)
CHLORIDE SERPL-SCNC: 100 MMOL/L (ref 98–107)
CO2 SERPL-SCNC: 30 MMOL/L (ref 22–29)
CREAT SERPL-MCNC: 0.76 MG/DL (ref 0.57–1)
DEPRECATED RDW RBC AUTO: 58.7 FL (ref 37–54)
ERYTHROCYTE [DISTWIDTH] IN BLOOD BY AUTOMATED COUNT: 17.8 % (ref 12.3–15.4)
GFR SERPL CREATININE-BSD FRML MDRD: 74 ML/MIN/1.73
GLUCOSE SERPL-MCNC: 112 MG/DL (ref 65–99)
HCT VFR BLD AUTO: 26.5 % (ref 34–46.6)
HGB BLD-MCNC: 8.9 G/DL (ref 12–15.9)
MCH RBC QN AUTO: 32.4 PG (ref 26.6–33)
MCHC RBC AUTO-ENTMCNC: 33.6 G/DL (ref 31.5–35.7)
MCV RBC AUTO: 96.4 FL (ref 79–97)
PLATELET # BLD AUTO: 192 10*3/MM3 (ref 140–450)
PMV BLD AUTO: 9.7 FL (ref 6–12)
POTASSIUM SERPL-SCNC: 4.3 MMOL/L (ref 3.5–5.2)
RBC # BLD AUTO: 2.75 10*6/MM3 (ref 3.77–5.28)
SODIUM SERPL-SCNC: 138 MMOL/L (ref 136–145)
WBC # BLD AUTO: 8.66 10*3/MM3 (ref 3.4–10.8)

## 2021-11-10 PROCEDURE — 80048 BASIC METABOLIC PNL TOTAL CA: CPT | Performed by: NURSE PRACTITIONER

## 2021-11-10 PROCEDURE — 71045 X-RAY EXAM CHEST 1 VIEW: CPT

## 2021-11-10 PROCEDURE — 63710000001 DIPHENHYDRAMINE PER 50 MG: Performed by: NURSE PRACTITIONER

## 2021-11-10 PROCEDURE — 94799 UNLISTED PULMONARY SVC/PX: CPT

## 2021-11-10 PROCEDURE — 85027 COMPLETE CBC AUTOMATED: CPT | Performed by: NURSE PRACTITIONER

## 2021-11-10 PROCEDURE — 25010000002 ENOXAPARIN PER 10 MG: Performed by: NURSE PRACTITIONER

## 2021-11-10 PROCEDURE — 25010000002 FUROSEMIDE PER 20 MG: Performed by: NURSE PRACTITIONER

## 2021-11-10 PROCEDURE — 99024 POSTOP FOLLOW-UP VISIT: CPT | Performed by: SURGERY

## 2021-11-10 RX ORDER — FUROSEMIDE 10 MG/ML
20 INJECTION INTRAMUSCULAR; INTRAVENOUS ONCE
Status: COMPLETED | OUTPATIENT
Start: 2021-11-10 | End: 2021-11-10

## 2021-11-10 RX ORDER — PRENATAL VIT/IRON FUM/FOLIC AC 27MG-0.8MG
1 TABLET ORAL DAILY
Status: DISCONTINUED | OUTPATIENT
Start: 2021-11-11 | End: 2021-11-17 | Stop reason: HOSPADM

## 2021-11-10 RX ADMIN — DOCUSATE SODIUM 100 MG: 100 CAPSULE, LIQUID FILLED ORAL at 08:35

## 2021-11-10 RX ADMIN — GUAIFENESIN 600 MG: 600 TABLET, EXTENDED RELEASE ORAL at 08:36

## 2021-11-10 RX ADMIN — OXYCODONE HYDROCHLORIDE AND ACETAMINOPHEN 1 TABLET: 5; 325 TABLET ORAL at 05:15

## 2021-11-10 RX ADMIN — DIPHENHYDRAMINE HYDROCHLORIDE 25 MG: 25 CAPSULE ORAL at 22:57

## 2021-11-10 RX ADMIN — METHOCARBAMOL 500 MG: 500 TABLET, FILM COATED ORAL at 15:17

## 2021-11-10 RX ADMIN — METHOCARBAMOL 500 MG: 500 TABLET, FILM COATED ORAL at 21:32

## 2021-11-10 RX ADMIN — METHOCARBAMOL 500 MG: 500 TABLET, FILM COATED ORAL at 05:15

## 2021-11-10 RX ADMIN — POLYETHYLENE GLYCOL 3350 17 G: 17 POWDER, FOR SOLUTION ORAL at 08:36

## 2021-11-10 RX ADMIN — LEVOTHYROXINE SODIUM 50 MCG: 50 TABLET ORAL at 05:15

## 2021-11-10 RX ADMIN — ENOXAPARIN SODIUM 30 MG: 30 INJECTION SUBCUTANEOUS at 08:36

## 2021-11-10 RX ADMIN — ACETAMINOPHEN 650 MG: 325 TABLET, FILM COATED ORAL at 17:17

## 2021-11-10 RX ADMIN — OXYCODONE HYDROCHLORIDE AND ACETAMINOPHEN 1 TABLET: 5; 325 TABLET ORAL at 15:17

## 2021-11-10 RX ADMIN — FAMOTIDINE 40 MG: 20 TABLET, FILM COATED ORAL at 21:31

## 2021-11-10 RX ADMIN — OXYCODONE HYDROCHLORIDE AND ACETAMINOPHEN 1 TABLET: 5; 325 TABLET ORAL at 21:31

## 2021-11-10 RX ADMIN — ASPIRIN 81 MG: 81 TABLET, CHEWABLE ORAL at 08:36

## 2021-11-10 RX ADMIN — Medication 1 TABLET: at 08:36

## 2021-11-10 RX ADMIN — OXYCODONE HYDROCHLORIDE AND ACETAMINOPHEN 1 TABLET: 5; 325 TABLET ORAL at 08:33

## 2021-11-10 RX ADMIN — PANTOPRAZOLE SODIUM 40 MG: 40 TABLET, DELAYED RELEASE ORAL at 05:15

## 2021-11-10 RX ADMIN — OXYBUTYNIN CHLORIDE 10 MG: 5 TABLET, EXTENDED RELEASE ORAL at 08:36

## 2021-11-10 RX ADMIN — FAMOTIDINE 40 MG: 20 TABLET, FILM COATED ORAL at 08:36

## 2021-11-10 RX ADMIN — CARVEDILOL 12.5 MG: 6.25 TABLET, FILM COATED ORAL at 17:17

## 2021-11-10 RX ADMIN — SODIUM CHLORIDE SOLN NEBU 3% 4 ML: 3 NEBU SOLN at 20:06

## 2021-11-10 RX ADMIN — ROSUVASTATIN CALCIUM 40 MG: 20 TABLET, FILM COATED ORAL at 21:31

## 2021-11-10 RX ADMIN — FUROSEMIDE 20 MG: 10 INJECTION, SOLUTION INTRAMUSCULAR; INTRAVENOUS at 08:36

## 2021-11-10 RX ADMIN — OXYCODONE HYDROCHLORIDE AND ACETAMINOPHEN 1 TABLET: 5; 325 TABLET ORAL at 11:41

## 2021-11-10 RX ADMIN — OXYCODONE HYDROCHLORIDE AND ACETAMINOPHEN 1 TABLET: 5; 325 TABLET ORAL at 18:26

## 2021-11-10 RX ADMIN — SODIUM CHLORIDE SOLN NEBU 3% 4 ML: 3 NEBU SOLN at 06:00

## 2021-11-10 RX ADMIN — DIAZEPAM 2 MG: 2 TABLET ORAL at 22:51

## 2021-11-10 RX ADMIN — CARVEDILOL 12.5 MG: 6.25 TABLET, FILM COATED ORAL at 08:36

## 2021-11-10 RX ADMIN — OXYCODONE HYDROCHLORIDE AND ACETAMINOPHEN 1 TABLET: 5; 325 TABLET ORAL at 00:18

## 2021-11-10 NOTE — PROGRESS NOTES
"Patient name: Trinidad Zhu  Patient : 1945  VISIT # [id number]  MR #[id number]    Procedure:Procedure(s):  RIGHT THORACOSCOPY WITH DAVINCI ROBOT, RIGHT UPPER LOBE LOBECTOMY  Procedure Date:2021  POD:5 Days Post-Op    Subjective     Tolerating room air.  Sitting up in chair.  Hemoglobin today is 8.9. (-) BM.  Reports pain is better controlled with Percocet.  CT chest performed yesterday and was reviewed by Dr. Ignacio.  750-1000 on incentive.  (+) BM.       Objective     Visit Vitals  /46 (BP Location: Left arm, Patient Position: Sitting)   Pulse 92   Temp 98.3 °F (36.8 °C) (Oral)   Resp 16   Ht 161 cm (63.39\")   Wt 50.8 kg (111 lb 15.9 oz)   SpO2 100%   Breastfeeding No   BMI 19.60 kg/m²       Intake/Output Summary (Last 24 hours) at 11/10/2021 0757  Last data filed at 11/10/2021 0526  Gross per 24 hour   Intake 380 ml   Output 51 ml   Net 329 ml     RCT: 51 ml/24 hours, serosanguineous, forced air leak noted    Lab:      IMAGES:       Imaging Results (Last 24 Hours)     Procedure Component Value Units Date/Time    XR Chest 1 View [[id number]] Collected: 11/10/21 0659     Updated: 11/10/21 0703    Narrative:      EXAMINATION:  XR CHEST 1 VW-  11/10/2021 4:14 AM CST     HISTORY: Lung cancer. Status post lobectomy. Right-sided pneumothorax.     COMPARISON: 2021.     FINDINGS:  There is a small right apical pneumothorax. A right chest  tube remains in place. There is subcutaneous emphysema in the right  chest wall and right neck. The left lung is essentially clear. The heart  is normal in size. Prior cervical fusion. No acute bony abnormality.       Impression:      1. Small right apical pneumothorax. Right chest tube remains in place.  2. Cutaneous emphysema on the right, as described.        This report was finalized on 11/10/2021 07:00 by Dr. Ramesh Ortiz MD.    CT Chest Without Contrast Diagnostic [[id number]] Collected: 21 161     Updated: 21 163    Narrative:      EXAM: CT " CHEST WO CONTRAST DIAGNOSTIC-     INDICATION: right pneumothorax; R91.1-Solitary pulmonary nodule     COMPARISON: Preoperative CT 10/7/2021     DOSE LENGTH PRODUCT: 345 mGy cm. Automated exposure control was also  utilized to decrease patient radiation dose.     FINDINGS:     Postoperative change of VATS RIGHT upper lobectomy. Fluid/soft tissue  with intermixed gas in the RIGHT apex extending into the RIGHT hilum  along the fissure is noted. A small right-sided pneumothorax is present.  A right-sided chest tube terminates at the RIGHT apex.     No left-sided pneumothorax. The LEFT lung is clear. No pleural effusion.  Some faint groundglass opacity in the RIGHT lung is likely postoperative  inflammatory change. No consolidative airspace process.     No enlarged axillary, supraclavicular, mediastinal, or hilar lymph  nodes. Main pulmonary artery is nondilated. Thoracic aorta is  nonaneurysmal and contains atherosclerotic calcification. No pericardial  effusion. Heavy coronary artery calcification.     Uniform thyroid. RIGHT chest wall soft tissue gas related to prior  surgery and chest tube placement. Bilateral renal cysts are noted. No  acute finding in the partially imaged upper abdomen. Partially imaged  cervical spine fusion hardware.       Impression:         Postoperative change of VATS RIGHT upper lobectomy. There is fluid/soft  tissue with intermixed gas in the RIGHT lung apex extending into the  RIGHT hilum. A small RIGHT pneumothorax is present with right-sided  chest tube in good position.  This report was finalized on 11/09/2021 16:27 by Dr. Patrick Hylton MD.    XR Chest 1 View [[id number]] Collected: 11/09/21 1439     Updated: 11/09/21 1442    Narrative:      EXAM: XR CHEST 1 VW- 11/9/2021 2:08 PM CST     HISTORY: right pneumothorax; R91.1-Solitary pulmonary nodule       COMPARISON: Prior exam of same date at 1:14 PM.     TECHNIQUE: Frontal radiograph of the chest     FINDINGS:   Left lung is clear. Right  chest tube is present. There is improved  expansion of the right lung. Small apical right pneumothorax is  present.. The cardiomediastinal silhouette and pulmonary vascularity are  within normal limits.      The osseous structures and surrounding soft tissues demonstrate no acute  abnormality.          Impression:      1. Small right apical pneumothorax. There is been improved expansion of  the right lung compared to prior exam 1:14 PM.        This report was finalized on 11/09/2021 14:39 by Dr. Sha Mosquera MD.    XR Chest PA & Lateral [[id number]] Collected: 11/09/21 1218     Updated: 11/09/21 1236    Narrative:       XR CHEST PA AND LATERAL- 11/9/2021 12:14 PM CST     HISTORY: Lung nodule, right chest tube placed to waterseal.;  R91.1-Solitary pulmonary nodule       COMPARISON: November 9, 2021.     FINDINGS:   Upright frontal and lateral radiographs of the chest were obtained.     Left lung is clear. There is a 30% right pneumothorax. The pneumothorax  has progressed compared to November 9, 2021 at 3:25 AM.. The  cardiomediastinal silhouette and pulmonary vascularity are within normal  limits. The osseous structures and surrounding soft tissues demonstrate  no acute abnormality.       Impression:      1. There now is a 30% right pneumothorax. The pneumothorax is increased  compared to prior exam of same date..     These results were called to Sarah the nurse at 2:35 PM        This report was finalized on 11/09/2021 12:33 by Dr. Sha Mosquera MD.        CXR: Right chest tube in stable position.  Noted subcutaneous emphysema right chest wall and right neck, stable.  Decreasing right apical space.    Physical Exam:  General: Alert, oriented. No apparent distress.   Cardiovascular: Regular rate and rhythm without murmur, rubs, or gallops.    Pulmonary: Clear to auscultation bilaterally without wheezing, rubs, or rales.  Chest: Thoracic incisions clean, dry, and intact. Chest tube to 20 cm dry suction. Forced air  leak. Fluid is serosanguineous.   Abdomen: Soft, non distended, and non tender.  Extremities: Warm, moves all extremities. No edema.   Neurologic:  Grossly intact with no focal deficits.            Impression:  Lung nodule  Former smoker        Plan:  Keep chest tube to -20 cm suction.  Must stay on suction with up ambulating  Lasix 20 mg IV x 1 dose today.  Routine post thoracic surgery care  Encourage pulmonary toilet and ambulation  DW patient and nursing        Brittni Bergman, GEREMIAS  11/10/21  07:57 CST

## 2021-11-10 NOTE — PLAN OF CARE
Goal Outcome Evaluation:  Plan of Care Reviewed With: patient        Progress: no change  Outcome Summary: Chest tube to -20cm continuous dry suction. Ambulates to bathroom and in halls with portable suction. Valium and Benadryl administered at bedtime per order. Sleeping up in chair. Percocet given x2 thus far in shift for pain. CT drsg to be changed this AM. Daughter at bedside. Will continue to monitor.

## 2021-11-10 NOTE — PLAN OF CARE
Goal Outcome Evaluation:  Plan of Care Reviewed With: patient           Outcome Summary: CT to continuous suction -20cm dry suction; ambulating independently; PRN pain meds given per order; up in chair all day; VSS; safety maintained

## 2021-11-11 ENCOUNTER — APPOINTMENT (OUTPATIENT)
Dept: GENERAL RADIOLOGY | Facility: HOSPITAL | Age: 76
End: 2021-11-11

## 2021-11-11 LAB
ANION GAP SERPL CALCULATED.3IONS-SCNC: 12 MMOL/L (ref 5–15)
BUN SERPL-MCNC: 17 MG/DL (ref 8–23)
BUN/CREAT SERPL: 22.1 (ref 7–25)
CALCIUM SPEC-SCNC: 9.3 MG/DL (ref 8.6–10.5)
CHLORIDE SERPL-SCNC: 98 MMOL/L (ref 98–107)
CO2 SERPL-SCNC: 29 MMOL/L (ref 22–29)
CREAT SERPL-MCNC: 0.77 MG/DL (ref 0.57–1)
DEPRECATED RDW RBC AUTO: 58.2 FL (ref 37–54)
ERYTHROCYTE [DISTWIDTH] IN BLOOD BY AUTOMATED COUNT: 17.2 % (ref 12.3–15.4)
GFR SERPL CREATININE-BSD FRML MDRD: 73 ML/MIN/1.73
GLUCOSE SERPL-MCNC: 113 MG/DL (ref 65–99)
HCT VFR BLD AUTO: 25.9 % (ref 34–46.6)
HGB BLD-MCNC: 8.8 G/DL (ref 12–15.9)
MCH RBC QN AUTO: 32.5 PG (ref 26.6–33)
MCHC RBC AUTO-ENTMCNC: 34 G/DL (ref 31.5–35.7)
MCV RBC AUTO: 95.6 FL (ref 79–97)
PLATELET # BLD AUTO: 210 10*3/MM3 (ref 140–450)
PMV BLD AUTO: 9.7 FL (ref 6–12)
POTASSIUM SERPL-SCNC: 4.1 MMOL/L (ref 3.5–5.2)
RBC # BLD AUTO: 2.71 10*6/MM3 (ref 3.77–5.28)
SODIUM SERPL-SCNC: 139 MMOL/L (ref 136–145)
WBC # BLD AUTO: 7.97 10*3/MM3 (ref 3.4–10.8)

## 2021-11-11 PROCEDURE — 80048 BASIC METABOLIC PNL TOTAL CA: CPT | Performed by: NURSE PRACTITIONER

## 2021-11-11 PROCEDURE — 25010000002 ENOXAPARIN PER 10 MG: Performed by: NURSE PRACTITIONER

## 2021-11-11 PROCEDURE — 63710000001 DIPHENHYDRAMINE PER 50 MG: Performed by: NURSE PRACTITIONER

## 2021-11-11 PROCEDURE — 94799 UNLISTED PULMONARY SVC/PX: CPT

## 2021-11-11 PROCEDURE — 71045 X-RAY EXAM CHEST 1 VIEW: CPT

## 2021-11-11 PROCEDURE — 85027 COMPLETE CBC AUTOMATED: CPT | Performed by: NURSE PRACTITIONER

## 2021-11-11 PROCEDURE — 99024 POSTOP FOLLOW-UP VISIT: CPT | Performed by: NURSE PRACTITIONER

## 2021-11-11 RX ADMIN — POLYETHYLENE GLYCOL 3350 17 G: 17 POWDER, FOR SOLUTION ORAL at 08:27

## 2021-11-11 RX ADMIN — OXYBUTYNIN CHLORIDE 10 MG: 5 TABLET, EXTENDED RELEASE ORAL at 08:29

## 2021-11-11 RX ADMIN — CARVEDILOL 12.5 MG: 6.25 TABLET, FILM COATED ORAL at 08:27

## 2021-11-11 RX ADMIN — OXYCODONE HYDROCHLORIDE AND ACETAMINOPHEN 1 TABLET: 5; 325 TABLET ORAL at 04:08

## 2021-11-11 RX ADMIN — METHOCARBAMOL 500 MG: 500 TABLET, FILM COATED ORAL at 06:43

## 2021-11-11 RX ADMIN — GUAIFENESIN 600 MG: 600 TABLET, EXTENDED RELEASE ORAL at 08:29

## 2021-11-11 RX ADMIN — OXYCODONE HYDROCHLORIDE AND ACETAMINOPHEN 1 TABLET: 5; 325 TABLET ORAL at 20:06

## 2021-11-11 RX ADMIN — PRENATAL VIT W/ FE FUMARATE-FA TAB 27-0.8 MG 1 TABLET: 27-0.8 TAB at 08:29

## 2021-11-11 RX ADMIN — METHOCARBAMOL 500 MG: 500 TABLET, FILM COATED ORAL at 13:41

## 2021-11-11 RX ADMIN — OXYCODONE HYDROCHLORIDE AND ACETAMINOPHEN 1 TABLET: 5; 325 TABLET ORAL at 07:18

## 2021-11-11 RX ADMIN — PANTOPRAZOLE SODIUM 40 MG: 40 TABLET, DELAYED RELEASE ORAL at 06:43

## 2021-11-11 RX ADMIN — FAMOTIDINE 40 MG: 20 TABLET, FILM COATED ORAL at 08:28

## 2021-11-11 RX ADMIN — DIPHENHYDRAMINE HYDROCHLORIDE 25 MG: 25 CAPSULE ORAL at 22:23

## 2021-11-11 RX ADMIN — SODIUM CHLORIDE SOLN NEBU 3% 4 ML: 3 NEBU SOLN at 07:56

## 2021-11-11 RX ADMIN — ENOXAPARIN SODIUM 30 MG: 30 INJECTION SUBCUTANEOUS at 08:27

## 2021-11-11 RX ADMIN — ROSUVASTATIN CALCIUM 40 MG: 20 TABLET, FILM COATED ORAL at 20:06

## 2021-11-11 RX ADMIN — OXYCODONE HYDROCHLORIDE AND ACETAMINOPHEN 1 TABLET: 5; 325 TABLET ORAL at 23:05

## 2021-11-11 RX ADMIN — METHOCARBAMOL 500 MG: 500 TABLET, FILM COATED ORAL at 22:23

## 2021-11-11 RX ADMIN — LEVOTHYROXINE SODIUM 50 MCG: 50 TABLET ORAL at 06:43

## 2021-11-11 RX ADMIN — ASPIRIN 81 MG: 81 TABLET, CHEWABLE ORAL at 08:27

## 2021-11-11 RX ADMIN — OXYCODONE HYDROCHLORIDE AND ACETAMINOPHEN 1 TABLET: 5; 325 TABLET ORAL at 10:30

## 2021-11-11 RX ADMIN — OXYCODONE HYDROCHLORIDE AND ACETAMINOPHEN 1 TABLET: 5; 325 TABLET ORAL at 00:23

## 2021-11-11 RX ADMIN — FAMOTIDINE 40 MG: 20 TABLET, FILM COATED ORAL at 20:06

## 2021-11-11 RX ADMIN — DOCUSATE SODIUM 100 MG: 100 CAPSULE, LIQUID FILLED ORAL at 08:27

## 2021-11-11 RX ADMIN — DIAZEPAM 2 MG: 2 TABLET ORAL at 22:23

## 2021-11-11 RX ADMIN — SODIUM CHLORIDE SOLN NEBU 3% 4 ML: 3 NEBU SOLN at 20:38

## 2021-11-11 RX ADMIN — OXYCODONE HYDROCHLORIDE AND ACETAMINOPHEN 1 TABLET: 5; 325 TABLET ORAL at 13:41

## 2021-11-11 RX ADMIN — OXYCODONE HYDROCHLORIDE AND ACETAMINOPHEN 1 TABLET: 5; 325 TABLET ORAL at 16:55

## 2021-11-11 NOTE — PLAN OF CARE
Goal Outcome Evaluation:  Plan of Care Reviewed With: patient           Outcome Summary: pt c/o pain today please see MAR for interventions; using IS; ambulating in halls frequently; -20cm dry suction continuous; air leak remains present; safety maintained

## 2021-11-11 NOTE — PROGRESS NOTES
"Patient name: Trinidad Zhu  Patient : 1945  VISIT # [id number]  MR #[id number]    Procedure:Procedure(s):  RIGHT THORACOSCOPY WITH DAVINCI ROBOT, RIGHT UPPER LOBE LOBECTOMY  Procedure Date:2021  POD:6 Days Post-Op    Subjective     Tolerating room air.  Sitting up in chair.  Hemoglobin today is 8.8. (-) BM.    750-1000 on incentive.  (+) BM.  Has been up in the hallway walking       Objective     Visit Vitals  /66 (BP Location: Right arm, Patient Position: Sitting)   Pulse 87   Temp 97.8 °F (36.6 °C) (Oral)   Resp 16   Ht 161 cm (63.39\")   Wt 50.8 kg (111 lb 15.9 oz)   SpO2 100%   Breastfeeding No   BMI 19.60 kg/m²       Intake/Output Summary (Last 24 hours) at 2021 0922  Last data filed at 2021 0900  Gross per 24 hour   Intake 240 ml   Output 34 ml   Net 206 ml     RCT: 54 ml/24 hours, serosanguineous, forced air leak noted    Lab:      IMAGES:       Imaging Results (Last 24 Hours)     Procedure Component Value Units Date/Time    XR Chest 1 View [[id number]] Collected: 21     Updated: 21    Narrative:      EXAMINATION:  XR CHEST 1 VW-  2021 4:44 AM CST     HISTORY: Lung cancer. Status post RUL lobectomy.     COMPARISON: 11/10/2021.     FINDINGS:  A small pneumothorax at the right lung apex remains stable. A  right chest tube remains in place. There is subcutaneous emphysema in  the right chest wall that appears stable. The left lung is clear. The  heart is normal in size. There has been prior cervical fusion.       Impression:      1. Small right apical pneumothorax is stable. A right chest tube remains  in place.  2. Stable subcutaneous emphysema.        This report was finalized on 2021 07:20 by Dr. Ramesh Ortiz MD.        CXR: Right chest tube in stable position.  Noted subcutaneous emphysema right chest wall and right neck, stable.  Decreasing right apical space.    Physical Exam:  General: Alert, oriented. No apparent distress. "   Cardiovascular: Regular rate and rhythm without murmur, rubs, or gallops.    Pulmonary: Clear to auscultation bilaterally without wheezing, rubs, or rales.  Chest: Thoracic incisions clean, dry, and intact. Chest tube to 20 cm dry suction. Forced air leak. Fluid is serosanguineous.   Abdomen: Soft, non distended, and non tender.  Extremities: Warm, moves all extremities. No edema.   Neurologic:  Grossly intact with no focal deficits.            Impression:  Lung nodule  Former smoker        Plan:  Keep chest tube to -20 cm suction.  Must stay on suction with up ambulating  Plan to decrease to -10 cm suction tomorrow if imaging remains stable.  Routine post thoracic surgery care  Encourage pulmonary toilet and ambulation  DW patient        Brittni Bergman, APRN  11/11/21  09:22 CST

## 2021-11-11 NOTE — PLAN OF CARE
Goal Outcome Evaluation:  Plan of Care Reviewed With: patient        Progress: no change  Outcome Summary: Patient c/o pain around the clock. She is unable to get settled and rest throughout the night despite taking valium, benadryl, and a pain pill at bed time. Ambulated in restrepo x 2 so far this shift with portable suction. CT is hooked up to -20 cm dry suction continuously. Patient has been performing incentive spirometer 10 x an hour while awake. Oxygen saturation 100 % on RA. Air leak still present. Safety maintained. No distress noted at this time.

## 2021-11-11 NOTE — CASE MANAGEMENT/SOCIAL WORK
Continued Stay Note   [facility]     Patient Name: Trinidad Zhu  MRN: [id number]  Today's Date: 11/11/2021    Admit Date: 11/5/2021     Discharge Plan     Row Name 11/11/21 1314       Plan    Plan Home    Patient/Family in Agreement with Plan yes    Plan Comments Pt currently has a chest tube but she has been ambulating independently. She plans to return home with her spouse at d/c.               Discharge Codes    No documentation.                     YOABNY Weiss

## 2021-11-12 ENCOUNTER — APPOINTMENT (OUTPATIENT)
Dept: GENERAL RADIOLOGY | Facility: HOSPITAL | Age: 76
End: 2021-11-12

## 2021-11-12 LAB
ANION GAP SERPL CALCULATED.3IONS-SCNC: 11 MMOL/L (ref 5–15)
BUN SERPL-MCNC: 18 MG/DL (ref 8–23)
BUN/CREAT SERPL: 25.4 (ref 7–25)
CALCIUM SPEC-SCNC: 9.3 MG/DL (ref 8.6–10.5)
CHLORIDE SERPL-SCNC: 101 MMOL/L (ref 98–107)
CO2 SERPL-SCNC: 28 MMOL/L (ref 22–29)
CREAT SERPL-MCNC: 0.71 MG/DL (ref 0.57–1)
DEPRECATED RDW RBC AUTO: 59.7 FL (ref 37–54)
ERYTHROCYTE [DISTWIDTH] IN BLOOD BY AUTOMATED COUNT: 17.4 % (ref 12.3–15.4)
GFR SERPL CREATININE-BSD FRML MDRD: 80 ML/MIN/1.73
GLUCOSE SERPL-MCNC: 95 MG/DL (ref 65–99)
HCT VFR BLD AUTO: 25 % (ref 34–46.6)
HGB BLD-MCNC: 8.2 G/DL (ref 12–15.9)
MCH RBC QN AUTO: 31.7 PG (ref 26.6–33)
MCHC RBC AUTO-ENTMCNC: 32.8 G/DL (ref 31.5–35.7)
MCV RBC AUTO: 96.5 FL (ref 79–97)
PLATELET # BLD AUTO: 219 10*3/MM3 (ref 140–450)
PMV BLD AUTO: 9.6 FL (ref 6–12)
POTASSIUM SERPL-SCNC: 4.2 MMOL/L (ref 3.5–5.2)
RBC # BLD AUTO: 2.59 10*6/MM3 (ref 3.77–5.28)
SODIUM SERPL-SCNC: 140 MMOL/L (ref 136–145)
WBC # BLD AUTO: 8.06 10*3/MM3 (ref 3.4–10.8)

## 2021-11-12 PROCEDURE — 63710000001 DIPHENHYDRAMINE PER 50 MG: Performed by: NURSE PRACTITIONER

## 2021-11-12 PROCEDURE — 80048 BASIC METABOLIC PNL TOTAL CA: CPT | Performed by: NURSE PRACTITIONER

## 2021-11-12 PROCEDURE — 94799 UNLISTED PULMONARY SVC/PX: CPT

## 2021-11-12 PROCEDURE — 25010000002 ENOXAPARIN PER 10 MG: Performed by: NURSE PRACTITIONER

## 2021-11-12 PROCEDURE — 71045 X-RAY EXAM CHEST 1 VIEW: CPT

## 2021-11-12 PROCEDURE — 85027 COMPLETE CBC AUTOMATED: CPT | Performed by: NURSE PRACTITIONER

## 2021-11-12 PROCEDURE — 25010000002 FUROSEMIDE PER 20 MG: Performed by: NURSE PRACTITIONER

## 2021-11-12 PROCEDURE — 71046 X-RAY EXAM CHEST 2 VIEWS: CPT

## 2021-11-12 PROCEDURE — 99024 POSTOP FOLLOW-UP VISIT: CPT | Performed by: NURSE PRACTITIONER

## 2021-11-12 RX ORDER — POTASSIUM CHLORIDE 750 MG/1
20 CAPSULE, EXTENDED RELEASE ORAL ONCE
Status: COMPLETED | OUTPATIENT
Start: 2021-11-12 | End: 2021-11-12

## 2021-11-12 RX ORDER — FUROSEMIDE 10 MG/ML
20 INJECTION INTRAMUSCULAR; INTRAVENOUS ONCE
Status: COMPLETED | OUTPATIENT
Start: 2021-11-12 | End: 2021-11-12

## 2021-11-12 RX ADMIN — LEVOTHYROXINE SODIUM 50 MCG: 50 TABLET ORAL at 06:06

## 2021-11-12 RX ADMIN — OXYCODONE HYDROCHLORIDE AND ACETAMINOPHEN 1 TABLET: 5; 325 TABLET ORAL at 02:16

## 2021-11-12 RX ADMIN — GUAIFENESIN 600 MG: 600 TABLET, EXTENDED RELEASE ORAL at 08:31

## 2021-11-12 RX ADMIN — CARVEDILOL 12.5 MG: 6.25 TABLET, FILM COATED ORAL at 18:19

## 2021-11-12 RX ADMIN — OXYCODONE HYDROCHLORIDE AND ACETAMINOPHEN 1 TABLET: 5; 325 TABLET ORAL at 18:19

## 2021-11-12 RX ADMIN — OXYBUTYNIN CHLORIDE 10 MG: 5 TABLET, EXTENDED RELEASE ORAL at 08:31

## 2021-11-12 RX ADMIN — FAMOTIDINE 40 MG: 20 TABLET, FILM COATED ORAL at 21:27

## 2021-11-12 RX ADMIN — PANTOPRAZOLE SODIUM 40 MG: 40 TABLET, DELAYED RELEASE ORAL at 06:06

## 2021-11-12 RX ADMIN — CARVEDILOL 12.5 MG: 6.25 TABLET, FILM COATED ORAL at 08:31

## 2021-11-12 RX ADMIN — METHOCARBAMOL 500 MG: 500 TABLET, FILM COATED ORAL at 06:06

## 2021-11-12 RX ADMIN — PRENATAL VIT W/ FE FUMARATE-FA TAB 27-0.8 MG 1 TABLET: 27-0.8 TAB at 08:31

## 2021-11-12 RX ADMIN — ENOXAPARIN SODIUM 30 MG: 30 INJECTION SUBCUTANEOUS at 08:31

## 2021-11-12 RX ADMIN — DIPHENHYDRAMINE HYDROCHLORIDE 25 MG: 25 CAPSULE ORAL at 22:26

## 2021-11-12 RX ADMIN — OXYCODONE HYDROCHLORIDE AND ACETAMINOPHEN 1 TABLET: 5; 325 TABLET ORAL at 06:06

## 2021-11-12 RX ADMIN — ROSUVASTATIN CALCIUM 40 MG: 20 TABLET, FILM COATED ORAL at 21:27

## 2021-11-12 RX ADMIN — FAMOTIDINE 40 MG: 20 TABLET, FILM COATED ORAL at 08:31

## 2021-11-12 RX ADMIN — ASPIRIN 81 MG: 81 TABLET, CHEWABLE ORAL at 08:31

## 2021-11-12 RX ADMIN — POTASSIUM CHLORIDE 20 MEQ: 10 CAPSULE, COATED, EXTENDED RELEASE ORAL at 11:29

## 2021-11-12 RX ADMIN — OXYCODONE HYDROCHLORIDE AND ACETAMINOPHEN 1 TABLET: 5; 325 TABLET ORAL at 21:27

## 2021-11-12 RX ADMIN — OXYCODONE HYDROCHLORIDE AND ACETAMINOPHEN 1 TABLET: 5; 325 TABLET ORAL at 15:12

## 2021-11-12 RX ADMIN — METHOCARBAMOL 500 MG: 500 TABLET, FILM COATED ORAL at 15:12

## 2021-11-12 RX ADMIN — METHOCARBAMOL 500 MG: 500 TABLET, FILM COATED ORAL at 21:27

## 2021-11-12 RX ADMIN — HYDROCHLOROTHIAZIDE 12.5 MG: 25 TABLET ORAL at 08:31

## 2021-11-12 RX ADMIN — OXYCODONE HYDROCHLORIDE AND ACETAMINOPHEN 1 TABLET: 5; 325 TABLET ORAL at 09:10

## 2021-11-12 RX ADMIN — OXYCODONE HYDROCHLORIDE AND ACETAMINOPHEN 1 TABLET: 5; 325 TABLET ORAL at 12:09

## 2021-11-12 RX ADMIN — FUROSEMIDE 20 MG: 10 INJECTION, SOLUTION INTRAMUSCULAR; INTRAVENOUS at 11:29

## 2021-11-12 RX ADMIN — SODIUM CHLORIDE SOLN NEBU 3% 4 ML: 3 NEBU SOLN at 19:25

## 2021-11-12 RX ADMIN — DIAZEPAM 2 MG: 2 TABLET ORAL at 22:26

## 2021-11-12 NOTE — CASE MANAGEMENT/SOCIAL WORK
Continued Stay Note   De[facility]ra     Patient Name: Trinidad Zhu  MRN: [id number]  Today's Date: 11/12/2021    Admit Date: 11/5/2021     Discharge Plan     Row Name 11/12/21 1228       Plan    Plan Home    Patient/Family in Agreement with Plan yes    Plan Comments Pt still has a chest tube to suction. She has been ambulating independently. Will follow in case d/c needs arise. Pt plans to go home with her spouse and has denied needs.               Discharge Codes    No documentation.                     YOBANY Weiss

## 2021-11-12 NOTE — PLAN OF CARE
Goal Outcome Evaluation:  Plan of Care Reviewed With: patient        Progress: improving  Outcome Summary: Patient request around the clock pain medications. Using IS. Ambulating in restrepo. Continuous suction at -20 cm. Air leak ceased around 0200 11/12/2021. Tachycardic at times. RA. No distress noted at this time.

## 2021-11-12 NOTE — PLAN OF CARE
Goal Outcome Evaluation:  Plan of Care Reviewed With: patient, spouse        Progress: improving  Outcome Summary: LOS nutrition assessment. Pt reports that her appetite is up and down. She is ordered a regular diet. She has consumed 63% of the last 4 meals. She was unaware of the always available menu that we offer despite having a menu in her room. Explained alternate food choices as needed. She declines nutrition supplementation, afraid that it will impede her current food intake. She does use Ensure at home. Will cont to follow and encourage intake.

## 2021-11-12 NOTE — PROGRESS NOTES
"Patient name: Trinidad Zhu  Patient : 1945  VISIT # [id number]  MR #[id number]    Procedure:Procedure(s):  RIGHT THORACOSCOPY WITH DAVINCI ROBOT, RIGHT UPPER LOBE LOBECTOMY  Procedure Date:2021  POD:7 Days Post-Op    Subjective     Tolerating room air.  Sitting up in chair.  Feeling well today.  Hemoglobin today is 8.2.  (+) BM.    750-1000 on incentive.  (+) BM.  Walking in the hallway without difficulty.         Objective     Visit Vitals  /51 (BP Location: Left arm, Patient Position: Sitting)   Pulse 82   Temp 98 °F (36.7 °C) (Oral)   Resp 16   Ht 161 cm (63.39\")   Wt 50.8 kg (111 lb 15.9 oz)   SpO2 98%   Breastfeeding No   BMI 19.60 kg/m²       Intake/Output Summary (Last 24 hours) at 2021 1003  Last data filed at 2021 0904  Gross per 24 hour   Intake 500 ml   Output 70 ml   Net 430 ml     RCT: 86 ml/24 hours, serosanguineous, no air leak    Lab:      IMAGES:       Imaging Results (Last 24 Hours)     Procedure Component Value Units Date/Time    XR Chest 1 View [[id number]] Collected: 21     Updated: 21    Narrative:      EXAMINATION:  XR CHEST 1 VW-  2021 4:09 AM CST     HISTORY: Status post right upper lobectomy. Right pneumothorax.     COMPARISON: 2021.     FINDINGS:  A pneumothorax at the right lung apex is stable. A right  chest tube remains in place. Subcutaneous emphysema is stable in the  right chest wall. Left lung remains clear. Heart size is normal.       Impression:      Stable right apical pneumothorax with right chest tube in  place. Subcutaneous emphysema.        This report was finalized on 2021 06:53 by Dr. Ramesh Ortiz MD.        CXR: Right chest tube in stable position.  Noted subcutaneous emphysema right chest wall and right neck, stable.  Decreasing right apical space.    Physical Exam:  General: Alert, oriented. No apparent distress.   Cardiovascular: Regular rate and rhythm without murmur, rubs, or gallops.  "   Pulmonary: Clear to auscultation bilaterally without wheezing, rubs, or rales.  Chest: Thoracic incisions clean, dry, and intact. Chest tube to 20 cm dry suction. No air leak. Fluid is serosanguineous.   Abdomen: Soft, non distended, and non tender.  Extremities: Warm, moves all extremities. No edema.   Neurologic:  Grossly intact with no focal deficits.            Impression:  Lung nodule  Former smoker        Plan:  Decrease suction to -10 cm suction.  Must stay on suction with up ambulating  Repeat chest xray today at 1200  Lasix 20 mg x 1 dose this morning  Routine post thoracic surgery care  Encourage pulmonary toilet and ambulation  DW patient and nursing        Brittni Bergman, APRGARY  11/12/21  10:03 CST

## 2021-11-13 ENCOUNTER — APPOINTMENT (OUTPATIENT)
Dept: GENERAL RADIOLOGY | Facility: HOSPITAL | Age: 76
End: 2021-11-13

## 2021-11-13 LAB
ANION GAP SERPL CALCULATED.3IONS-SCNC: 8 MMOL/L (ref 5–15)
BUN SERPL-MCNC: 16 MG/DL (ref 8–23)
BUN/CREAT SERPL: 18.6 (ref 7–25)
CALCIUM SPEC-SCNC: 9.1 MG/DL (ref 8.6–10.5)
CHLORIDE SERPL-SCNC: 101 MMOL/L (ref 98–107)
CO2 SERPL-SCNC: 29 MMOL/L (ref 22–29)
CREAT SERPL-MCNC: 0.86 MG/DL (ref 0.57–1)
DEPRECATED RDW RBC AUTO: 59.7 FL (ref 37–54)
ERYTHROCYTE [DISTWIDTH] IN BLOOD BY AUTOMATED COUNT: 17.2 % (ref 12.3–15.4)
GFR SERPL CREATININE-BSD FRML MDRD: 64 ML/MIN/1.73
GLUCOSE SERPL-MCNC: 108 MG/DL (ref 65–99)
HCT VFR BLD AUTO: 24.5 % (ref 34–46.6)
HGB BLD-MCNC: 8.1 G/DL (ref 12–15.9)
MCH RBC QN AUTO: 32.1 PG (ref 26.6–33)
MCHC RBC AUTO-ENTMCNC: 33.1 G/DL (ref 31.5–35.7)
MCV RBC AUTO: 97.2 FL (ref 79–97)
PLATELET # BLD AUTO: 228 10*3/MM3 (ref 140–450)
PMV BLD AUTO: 9.3 FL (ref 6–12)
POTASSIUM SERPL-SCNC: 3.9 MMOL/L (ref 3.5–5.2)
RBC # BLD AUTO: 2.52 10*6/MM3 (ref 3.77–5.28)
SODIUM SERPL-SCNC: 138 MMOL/L (ref 136–145)
WBC # BLD AUTO: 6.81 10*3/MM3 (ref 3.4–10.8)

## 2021-11-13 PROCEDURE — 80048 BASIC METABOLIC PNL TOTAL CA: CPT | Performed by: NURSE PRACTITIONER

## 2021-11-13 PROCEDURE — 63710000001 DIPHENHYDRAMINE PER 50 MG: Performed by: NURSE PRACTITIONER

## 2021-11-13 PROCEDURE — 99024 POSTOP FOLLOW-UP VISIT: CPT | Performed by: THORACIC SURGERY (CARDIOTHORACIC VASCULAR SURGERY)

## 2021-11-13 PROCEDURE — 25010000002 ENOXAPARIN PER 10 MG: Performed by: NURSE PRACTITIONER

## 2021-11-13 PROCEDURE — 94799 UNLISTED PULMONARY SVC/PX: CPT

## 2021-11-13 PROCEDURE — 71045 X-RAY EXAM CHEST 1 VIEW: CPT

## 2021-11-13 PROCEDURE — 85027 COMPLETE CBC AUTOMATED: CPT | Performed by: NURSE PRACTITIONER

## 2021-11-13 RX ADMIN — METHOCARBAMOL 500 MG: 500 TABLET, FILM COATED ORAL at 14:37

## 2021-11-13 RX ADMIN — ENOXAPARIN SODIUM 30 MG: 30 INJECTION SUBCUTANEOUS at 08:35

## 2021-11-13 RX ADMIN — SODIUM CHLORIDE SOLN NEBU 3% 4 ML: 3 NEBU SOLN at 09:58

## 2021-11-13 RX ADMIN — DIAZEPAM 2 MG: 2 TABLET ORAL at 22:10

## 2021-11-13 RX ADMIN — OXYCODONE HYDROCHLORIDE AND ACETAMINOPHEN 1 TABLET: 5; 325 TABLET ORAL at 07:41

## 2021-11-13 RX ADMIN — PANTOPRAZOLE SODIUM 40 MG: 40 TABLET, DELAYED RELEASE ORAL at 05:09

## 2021-11-13 RX ADMIN — FAMOTIDINE 40 MG: 20 TABLET, FILM COATED ORAL at 08:32

## 2021-11-13 RX ADMIN — CARVEDILOL 12.5 MG: 6.25 TABLET, FILM COATED ORAL at 08:32

## 2021-11-13 RX ADMIN — HYDROCHLOROTHIAZIDE 12.5 MG: 25 TABLET ORAL at 08:32

## 2021-11-13 RX ADMIN — OXYCODONE HYDROCHLORIDE AND ACETAMINOPHEN 1 TABLET: 5; 325 TABLET ORAL at 17:51

## 2021-11-13 RX ADMIN — ASPIRIN 81 MG: 81 TABLET, CHEWABLE ORAL at 08:32

## 2021-11-13 RX ADMIN — OXYCODONE HYDROCHLORIDE AND ACETAMINOPHEN 1 TABLET: 5; 325 TABLET ORAL at 11:30

## 2021-11-13 RX ADMIN — CARVEDILOL 12.5 MG: 6.25 TABLET, FILM COATED ORAL at 17:51

## 2021-11-13 RX ADMIN — GUAIFENESIN 600 MG: 600 TABLET, EXTENDED RELEASE ORAL at 08:32

## 2021-11-13 RX ADMIN — OXYCODONE HYDROCHLORIDE AND ACETAMINOPHEN 1 TABLET: 5; 325 TABLET ORAL at 14:37

## 2021-11-13 RX ADMIN — FAMOTIDINE 40 MG: 20 TABLET, FILM COATED ORAL at 20:59

## 2021-11-13 RX ADMIN — OXYBUTYNIN CHLORIDE 10 MG: 5 TABLET, EXTENDED RELEASE ORAL at 08:32

## 2021-11-13 RX ADMIN — METHOCARBAMOL 500 MG: 500 TABLET, FILM COATED ORAL at 05:09

## 2021-11-13 RX ADMIN — OXYCODONE HYDROCHLORIDE AND ACETAMINOPHEN 1 TABLET: 5; 325 TABLET ORAL at 20:59

## 2021-11-13 RX ADMIN — LEVOTHYROXINE SODIUM 50 MCG: 50 TABLET ORAL at 05:09

## 2021-11-13 RX ADMIN — ROSUVASTATIN CALCIUM 40 MG: 20 TABLET, FILM COATED ORAL at 20:59

## 2021-11-13 RX ADMIN — METHOCARBAMOL 500 MG: 500 TABLET, FILM COATED ORAL at 21:00

## 2021-11-13 RX ADMIN — OXYCODONE HYDROCHLORIDE AND ACETAMINOPHEN 1 TABLET: 5; 325 TABLET ORAL at 04:22

## 2021-11-13 RX ADMIN — PRENATAL VIT W/ FE FUMARATE-FA TAB 27-0.8 MG 1 TABLET: 27-0.8 TAB at 08:32

## 2021-11-13 RX ADMIN — DIPHENHYDRAMINE HYDROCHLORIDE 25 MG: 25 CAPSULE ORAL at 22:10

## 2021-11-13 RX ADMIN — OXYCODONE HYDROCHLORIDE AND ACETAMINOPHEN 1 TABLET: 5; 325 TABLET ORAL at 23:52

## 2021-11-13 NOTE — PLAN OF CARE
Problem: Adult Inpatient Plan of Care  Goal: Plan of Care Review  Outcome: Ongoing, Progressing  Flowsheets (Taken 11/13/2021 2504)  Progress: no change  Plan of Care Reviewed With: patient  Outcome Summary: Pt complains of pain, see MAR. No complaints of nausea. Drsg to R CT changed at 2130. CT to 10cm of dry continuous suction. Intermittent air leak present. Ambulating in the restrepo. Using IS. Safety maintained.

## 2021-11-13 NOTE — PROGRESS NOTES
"Patient name: Trinidad Zhu  Patient : 1945  VISIT # [id number]  MR #[id number]    Procedure:Procedure(s):  RIGHT THORACOSCOPY WITH DAVINCI ROBOT, RIGHT UPPER LOBE LOBECTOMY  Procedure Date:2021  POD:8 Days Post-Op    Subjective     Tolerating room air.  (+) BM.    750-1000 on incentive.  (+) BM.  Walking in the hallway without difficulty.  Airleak is reported.  This occurs intermittently depending on the position of the patient.  CT scan of the head was performed without order.         Objective     Visit Vitals  /57 (BP Location: Left arm, Patient Position: Lying)   Pulse 91   Temp 98.4 °F (36.9 °C) (Oral)   Resp 16   Ht 161 cm (63.39\")   Wt 50.8 kg (111 lb 15.9 oz)   SpO2 97%   Breastfeeding No   BMI 19.60 kg/m²       Intake/Output Summary (Last 24 hours) at 2021 1456  Last data filed at 2021 1424  Gross per 24 hour   Intake 240 ml   Output 90 ml   Net 150 ml     RCT: 90 ml/24 hours, serosanguineous, no air leak    Lab:      IMAGES:       Imaging Results (Last 24 Hours)     Procedure Component Value Units Date/Time    XR Chest 1 View [[id number]] Collected: 21     Updated: 21    Narrative:      EXAMINATION: XR CHEST 1 VW- 2021 7:34 AM CST     HISTORY: lung cancer, s/p RUL lobectomy.     REPORT: A frontal view of the chest was obtained.     COMPARISON: Chest x-ray 2021 1323 hours.     There are stable postoperative changes at the right apex, with a small  right apical pneumothorax, the right chest tube appears in good  position. A small amount chest wall emphysema is noted over the right.  Heart size is normal. The left lung is clear. Previous ACDF is noted.       Impression:      Stable small right apical pneumothorax and possible chronic  changes, the right chest tube remains in satisfactory position. No  significant interval change.  This report was finalized on 2021 07:35 by Dr. Andrew Ramirez MD.    XR Chest PA & Lateral [[id number]] " Collected: 11/12/21 1536     Updated: 11/12/21 1540    Narrative:       XR CHEST PA AND LATERAL- 11/12/2021 12:24 PM CST     HISTORY: lung nodule, chest tube suction decreased; R91.1-Solitary  pulmonary nodule       COMPARISON: November 12 and 4:11 AM.     FINDINGS:   Upright frontal and lateral radiographs of the chest were obtained.     Right chest tube is present. Right apical pneumothorax is present. This  is similar to prior exam November 12, 2021. Left lung is clear.. The  cardiomediastinal silhouette and pulmonary vascularity are within normal  limits. The osseous structures and surrounding soft tissues demonstrate  no acute abnormality.       Impression:      1. Right chest tube is present. Small right apical pneumothorax is  present. This is unchanged from prior exam of same date at 4:11 AM.        This report was finalized on 11/12/2021 15:37 by Dr. Sha Mosquera MD.        CXR: Right chest tube in stable position.  Increasing subcutaneous emphysema of right neck as well as right lateral chest wall.  There is a right apical space probably stable.  Yesterday's chest x-ray is a PA and lateral today's chest x-ray is a portable.     Physical Exam:  General: Alert, oriented. No apparent distress.   Cardiovascular: Regular rate and rhythm without murmur, rubs, or gallops.    Pulmonary: Clear to auscultation bilaterally without wheezing, rubs, or rales.  Chest: Thoracic incisions clean, dry, and intact. Chest tube to 10 cm dry suction. No air leak. Fluid is serosanguineous.  Good titling  Abdomen: Soft, non distended, and non tender.  Extremities: Warm, moves all extremities. No edema.   Neurologic:  Grossly intact with no focal deficits.            Impression:  Lung nodule  Former smoker  Bronchopleural fistula      Plan:  Keep suction to -10 cm suction.  Must stay on suction with up ambulating  Routine post thoracic surgery care  Encourage pulmonary toilet and ambulation  DW patient and nursing  Separately I  discussed with the patient and her  that a CT scan of the head was performed without in order by the cardiothoracic service.  We discussed this was performed in error.  Discussed that this was being reviewed internally to ensure that this would not occur again.  Discussed that there will be no cost financially to the patient.  Lastly discussed that a CT scan did not do straight any acute findings and specifically no metastatic disease.  They were understanding.  This was discussed with patient's nurse's, Dr. Stewart, Tiffanie Foster, and Janki the shifts supervisor.        Nate Caicedo MD  11/13/21  14:56 CST

## 2021-11-14 ENCOUNTER — APPOINTMENT (OUTPATIENT)
Dept: GENERAL RADIOLOGY | Facility: HOSPITAL | Age: 76
End: 2021-11-14

## 2021-11-14 ENCOUNTER — APPOINTMENT (OUTPATIENT)
Dept: OTHER | Facility: HOSPITAL | Age: 76
End: 2021-11-14

## 2021-11-14 LAB
ANION GAP SERPL CALCULATED.3IONS-SCNC: 8 MMOL/L (ref 5–15)
BUN SERPL-MCNC: 17 MG/DL (ref 8–23)
BUN/CREAT SERPL: 24.3 (ref 7–25)
CALCIUM SPEC-SCNC: 9.1 MG/DL (ref 8.6–10.5)
CHLORIDE SERPL-SCNC: 101 MMOL/L (ref 98–107)
CO2 SERPL-SCNC: 30 MMOL/L (ref 22–29)
CREAT SERPL-MCNC: 0.7 MG/DL (ref 0.57–1)
DEPRECATED RDW RBC AUTO: 60.1 FL (ref 37–54)
ERYTHROCYTE [DISTWIDTH] IN BLOOD BY AUTOMATED COUNT: 17.2 % (ref 12.3–15.4)
GFR SERPL CREATININE-BSD FRML MDRD: 82 ML/MIN/1.73
GLUCOSE SERPL-MCNC: 104 MG/DL (ref 65–99)
HCT VFR BLD AUTO: 24 % (ref 34–46.6)
HGB BLD-MCNC: 7.8 G/DL (ref 12–15.9)
MCH RBC QN AUTO: 31.5 PG (ref 26.6–33)
MCHC RBC AUTO-ENTMCNC: 32.5 G/DL (ref 31.5–35.7)
MCV RBC AUTO: 96.8 FL (ref 79–97)
PLATELET # BLD AUTO: 259 10*3/MM3 (ref 140–450)
PMV BLD AUTO: 9.6 FL (ref 6–12)
POTASSIUM SERPL-SCNC: 3.5 MMOL/L (ref 3.5–5.2)
RBC # BLD AUTO: 2.48 10*6/MM3 (ref 3.77–5.28)
SODIUM SERPL-SCNC: 139 MMOL/L (ref 136–145)
WBC # BLD AUTO: 6.07 10*3/MM3 (ref 3.4–10.8)

## 2021-11-14 PROCEDURE — 25010000002 ENOXAPARIN PER 10 MG: Performed by: NURSE PRACTITIONER

## 2021-11-14 PROCEDURE — 94799 UNLISTED PULMONARY SVC/PX: CPT

## 2021-11-14 PROCEDURE — 71045 X-RAY EXAM CHEST 1 VIEW: CPT

## 2021-11-14 PROCEDURE — 80048 BASIC METABOLIC PNL TOTAL CA: CPT | Performed by: NURSE PRACTITIONER

## 2021-11-14 PROCEDURE — 85027 COMPLETE CBC AUTOMATED: CPT | Performed by: NURSE PRACTITIONER

## 2021-11-14 PROCEDURE — 63710000001 DIPHENHYDRAMINE PER 50 MG: Performed by: NURSE PRACTITIONER

## 2021-11-14 PROCEDURE — 99024 POSTOP FOLLOW-UP VISIT: CPT | Performed by: THORACIC SURGERY (CARDIOTHORACIC VASCULAR SURGERY)

## 2021-11-14 RX ADMIN — FAMOTIDINE 40 MG: 20 TABLET, FILM COATED ORAL at 08:23

## 2021-11-14 RX ADMIN — ASPIRIN 81 MG: 81 TABLET, CHEWABLE ORAL at 08:23

## 2021-11-14 RX ADMIN — ENOXAPARIN SODIUM 30 MG: 30 INJECTION SUBCUTANEOUS at 08:23

## 2021-11-14 RX ADMIN — DIAZEPAM 2 MG: 2 TABLET ORAL at 22:16

## 2021-11-14 RX ADMIN — OXYBUTYNIN CHLORIDE 10 MG: 5 TABLET, EXTENDED RELEASE ORAL at 08:23

## 2021-11-14 RX ADMIN — METHOCARBAMOL 500 MG: 500 TABLET, FILM COATED ORAL at 05:39

## 2021-11-14 RX ADMIN — GUAIFENESIN 600 MG: 600 TABLET, EXTENDED RELEASE ORAL at 08:23

## 2021-11-14 RX ADMIN — SODIUM CHLORIDE SOLN NEBU 3% 4 ML: 3 NEBU SOLN at 19:29

## 2021-11-14 RX ADMIN — OXYCODONE HYDROCHLORIDE AND ACETAMINOPHEN 1 TABLET: 5; 325 TABLET ORAL at 21:10

## 2021-11-14 RX ADMIN — OXYCODONE HYDROCHLORIDE AND ACETAMINOPHEN 1 TABLET: 5; 325 TABLET ORAL at 15:09

## 2021-11-14 RX ADMIN — OXYCODONE HYDROCHLORIDE AND ACETAMINOPHEN 1 TABLET: 5; 325 TABLET ORAL at 12:04

## 2021-11-14 RX ADMIN — LEVOTHYROXINE SODIUM 50 MCG: 50 TABLET ORAL at 05:39

## 2021-11-14 RX ADMIN — ROSUVASTATIN CALCIUM 40 MG: 20 TABLET, FILM COATED ORAL at 21:10

## 2021-11-14 RX ADMIN — METHOCARBAMOL 500 MG: 500 TABLET, FILM COATED ORAL at 15:09

## 2021-11-14 RX ADMIN — METHOCARBAMOL 500 MG: 500 TABLET, FILM COATED ORAL at 21:10

## 2021-11-14 RX ADMIN — SODIUM CHLORIDE SOLN NEBU 3% 4 ML: 3 NEBU SOLN at 09:53

## 2021-11-14 RX ADMIN — DIPHENHYDRAMINE HYDROCHLORIDE 25 MG: 25 CAPSULE ORAL at 22:16

## 2021-11-14 RX ADMIN — OXYCODONE HYDROCHLORIDE AND ACETAMINOPHEN 1 TABLET: 5; 325 TABLET ORAL at 05:39

## 2021-11-14 RX ADMIN — OXYCODONE HYDROCHLORIDE AND ACETAMINOPHEN 1 TABLET: 5; 325 TABLET ORAL at 18:06

## 2021-11-14 RX ADMIN — PRENATAL VIT W/ FE FUMARATE-FA TAB 27-0.8 MG 1 TABLET: 27-0.8 TAB at 08:23

## 2021-11-14 RX ADMIN — CARVEDILOL 12.5 MG: 6.25 TABLET, FILM COATED ORAL at 08:24

## 2021-11-14 RX ADMIN — PANTOPRAZOLE SODIUM 40 MG: 40 TABLET, DELAYED RELEASE ORAL at 05:39

## 2021-11-14 RX ADMIN — FAMOTIDINE 40 MG: 20 TABLET, FILM COATED ORAL at 21:10

## 2021-11-14 RX ADMIN — HYDROCHLOROTHIAZIDE 12.5 MG: 25 TABLET ORAL at 08:23

## 2021-11-14 RX ADMIN — CARVEDILOL 12.5 MG: 6.25 TABLET, FILM COATED ORAL at 18:06

## 2021-11-14 RX ADMIN — OXYCODONE HYDROCHLORIDE AND ACETAMINOPHEN 1 TABLET: 5; 325 TABLET ORAL at 08:23

## 2021-11-14 NOTE — PLAN OF CARE
Goal Outcome Evaluation:  Plan of Care Reviewed With: patient           Outcome Summary: Pt awake and alert, sitting up in chair. Pt up ad pancho ambulating in hallway independently, continuous suction. CT -20 dry suction, small amt of dark bloody drainage. Dressing dry and intact. Intermittent air leak preset, improved from yesterday. No SOA reported. RA. VSS. Pt c/o severe continuous pain, prn pain medication given. Safety maintained.

## 2021-11-14 NOTE — PLAN OF CARE
Problem: Adult Inpatient Plan of Care  Goal: Plan of Care Review  Outcome: Ongoing, Progressing  Flowsheets (Taken 11/14/2021 0311)  Progress: no change  Plan of Care Reviewed With: patient  Outcome Summary: Pt complains of pain, see MAR. Ambulating in the restrepo. CT to 10cm dry continues suction. Safety maintained.

## 2021-11-14 NOTE — PROGRESS NOTES
"Patient name: Trinidad Zhu  Patient : 1945  VISIT # [id number]  MR #[id number]    Procedure:Procedure(s):  RIGHT THORACOSCOPY WITH DAVINCI ROBOT, RIGHT UPPER LOBE LOBECTOMY  Procedure Date:2021  POD:9 Days Post-Op    Subjective     On room air.  Eating well.  (+) BM.  Performing incentive spirometer without difficulty.  (+) BM.  Walking in the hallway without difficulty.  Airleak not seen.             Objective     Visit Vitals  BP 92/45 (BP Location: Left arm, Patient Position: Lying)   Pulse 74   Temp 98.5 °F (36.9 °C) (Oral)   Resp 16   Ht 161 cm (63.39\")   Wt 50.8 kg (111 lb 15.9 oz)   SpO2 96%   Breastfeeding No   BMI 19.60 kg/m²       Intake/Output Summary (Last 24 hours) at 2021 0903  Last data filed at 2021 0600  Gross per 24 hour   Intake 440 ml   Output 80 ml   Net 360 ml     RCT: 80 ml/24 hours, serosanguineous, no air leak    Lab:      IMAGES:       Imaging Results (Last 24 Hours)     Procedure Component Value Units Date/Time    XR Chest 1 View [[id number]] Collected: 21     Updated: 21    Narrative:      EXAMINATION: XR CHEST 1 VW- 2021 7:28 AM CST     HISTORY: lung cancer, s/p RUL lobectomy.     REPORT: A frontal view the chest was obtained.     COMPARISON: Chest x-ray 2021 0346 hours.     There is mild volume loss in the right, with postop changes in the right  chest tube appears in good position as before. There is no change in the  small right apical pneumothorax. This a small amount chest wall  emphysema is present on the right. The left lung remains clear. Heart  size is normal. There is previous ACDF.       Impression:      Stable 1 day appearance of the chest, including a small  right apical pneumothorax, chest tube on the right appears in good  position..  This report was finalized on 2021 07:29 by Dr. Andrew Ramirez MD.        CXR: Right chest tube in stable position.  Decreased subcutaneous emphysema of right neck and " stable right lateral chest wall subcutaneous emphysema J.  There is a right that is stable apical space.      Physical Exam:  General: Alert, oriented. No apparent distress.   Cardiovascular: Regular rate and rhythm without murmur, rubs, or gallops.    Pulmonary: Clear to auscultation bilaterally without wheezing, rubs, or rales.  Chest: Thoracic incisions clean, dry, and intact. Chest tube to 10 cm dry suction. No air leak. Fluid is serosanguineous.  Good tidaling  Abdomen: Soft, non distended, and non tender.  Extremities: Warm, moves all extremities. No edema.   Neurologic:  Grossly intact with no focal deficits.            Impression:  Lung nodule  Former smoker  Bronchopleural fistula      Plan:  CT to waterseal.    CXR at 1400  Routine post thoracic surgery care  Encourage pulmonary toilet and ambulation  DW patient and nursing plan of care.        Nate Caicedo MD  11/14/21  09:03 CST

## 2021-11-15 ENCOUNTER — APPOINTMENT (OUTPATIENT)
Dept: GENERAL RADIOLOGY | Facility: HOSPITAL | Age: 76
End: 2021-11-15

## 2021-11-15 LAB
ANION GAP SERPL CALCULATED.3IONS-SCNC: 11 MMOL/L (ref 5–15)
BUN SERPL-MCNC: 15 MG/DL (ref 8–23)
BUN/CREAT SERPL: 19.7 (ref 7–25)
CALCIUM SPEC-SCNC: 9.1 MG/DL (ref 8.6–10.5)
CHLORIDE SERPL-SCNC: 101 MMOL/L (ref 98–107)
CO2 SERPL-SCNC: 29 MMOL/L (ref 22–29)
CREAT SERPL-MCNC: 0.76 MG/DL (ref 0.57–1)
DEPRECATED RDW RBC AUTO: 59.8 FL (ref 37–54)
ERYTHROCYTE [DISTWIDTH] IN BLOOD BY AUTOMATED COUNT: 17.1 % (ref 12.3–15.4)
GFR SERPL CREATININE-BSD FRML MDRD: 74 ML/MIN/1.73
GLUCOSE SERPL-MCNC: 101 MG/DL (ref 65–99)
HCT VFR BLD AUTO: 25.9 % (ref 34–46.6)
HGB BLD-MCNC: 8.4 G/DL (ref 12–15.9)
MCH RBC QN AUTO: 31.6 PG (ref 26.6–33)
MCHC RBC AUTO-ENTMCNC: 32.4 G/DL (ref 31.5–35.7)
MCV RBC AUTO: 97.4 FL (ref 79–97)
PLATELET # BLD AUTO: 292 10*3/MM3 (ref 140–450)
PMV BLD AUTO: 9.2 FL (ref 6–12)
POTASSIUM SERPL-SCNC: 3.8 MMOL/L (ref 3.5–5.2)
RBC # BLD AUTO: 2.66 10*6/MM3 (ref 3.77–5.28)
SODIUM SERPL-SCNC: 141 MMOL/L (ref 136–145)
WBC # BLD AUTO: 6.82 10*3/MM3 (ref 3.4–10.8)

## 2021-11-15 PROCEDURE — 80048 BASIC METABOLIC PNL TOTAL CA: CPT | Performed by: NURSE PRACTITIONER

## 2021-11-15 PROCEDURE — 94799 UNLISTED PULMONARY SVC/PX: CPT

## 2021-11-15 PROCEDURE — 99024 POSTOP FOLLOW-UP VISIT: CPT | Performed by: NURSE PRACTITIONER

## 2021-11-15 PROCEDURE — 25010000002 ENOXAPARIN PER 10 MG: Performed by: NURSE PRACTITIONER

## 2021-11-15 PROCEDURE — 63710000001 DIPHENHYDRAMINE PER 50 MG: Performed by: NURSE PRACTITIONER

## 2021-11-15 PROCEDURE — 85027 COMPLETE CBC AUTOMATED: CPT | Performed by: NURSE PRACTITIONER

## 2021-11-15 PROCEDURE — 71045 X-RAY EXAM CHEST 1 VIEW: CPT

## 2021-11-15 RX ORDER — SODIUM CHLORIDE FOR INHALATION 3 %
4 VIAL, NEBULIZER (ML) INHALATION 2 TIMES DAILY PRN
Status: DISCONTINUED | OUTPATIENT
Start: 2021-11-15 | End: 2021-11-17 | Stop reason: HOSPADM

## 2021-11-15 RX ADMIN — ROSUVASTATIN CALCIUM 40 MG: 20 TABLET, FILM COATED ORAL at 21:17

## 2021-11-15 RX ADMIN — FAMOTIDINE 40 MG: 20 TABLET, FILM COATED ORAL at 10:12

## 2021-11-15 RX ADMIN — OXYCODONE HYDROCHLORIDE AND ACETAMINOPHEN 1 TABLET: 5; 325 TABLET ORAL at 18:25

## 2021-11-15 RX ADMIN — HYDROCHLOROTHIAZIDE 12.5 MG: 25 TABLET ORAL at 10:13

## 2021-11-15 RX ADMIN — LEVOTHYROXINE SODIUM 50 MCG: 50 TABLET ORAL at 06:34

## 2021-11-15 RX ADMIN — CARVEDILOL 12.5 MG: 6.25 TABLET, FILM COATED ORAL at 17:30

## 2021-11-15 RX ADMIN — OXYBUTYNIN CHLORIDE 10 MG: 5 TABLET, EXTENDED RELEASE ORAL at 10:12

## 2021-11-15 RX ADMIN — OXYCODONE HYDROCHLORIDE AND ACETAMINOPHEN 1 TABLET: 5; 325 TABLET ORAL at 13:43

## 2021-11-15 RX ADMIN — GUAIFENESIN 600 MG: 600 TABLET, EXTENDED RELEASE ORAL at 10:13

## 2021-11-15 RX ADMIN — OXYCODONE HYDROCHLORIDE AND ACETAMINOPHEN 1 TABLET: 5; 325 TABLET ORAL at 10:20

## 2021-11-15 RX ADMIN — OXYCODONE HYDROCHLORIDE AND ACETAMINOPHEN 1 TABLET: 5; 325 TABLET ORAL at 03:53

## 2021-11-15 RX ADMIN — FAMOTIDINE 40 MG: 20 TABLET, FILM COATED ORAL at 21:17

## 2021-11-15 RX ADMIN — SODIUM CHLORIDE SOLN NEBU 3% 4 ML: 3 NEBU SOLN at 07:26

## 2021-11-15 RX ADMIN — PANTOPRAZOLE SODIUM 40 MG: 40 TABLET, DELAYED RELEASE ORAL at 06:34

## 2021-11-15 RX ADMIN — ASPIRIN 81 MG: 81 TABLET, CHEWABLE ORAL at 10:13

## 2021-11-15 RX ADMIN — CARVEDILOL 12.5 MG: 6.25 TABLET, FILM COATED ORAL at 10:13

## 2021-11-15 RX ADMIN — METHOCARBAMOL 500 MG: 500 TABLET, FILM COATED ORAL at 21:17

## 2021-11-15 RX ADMIN — METHOCARBAMOL 500 MG: 500 TABLET, FILM COATED ORAL at 13:43

## 2021-11-15 RX ADMIN — ENOXAPARIN SODIUM 30 MG: 30 INJECTION SUBCUTANEOUS at 10:14

## 2021-11-15 RX ADMIN — DIAZEPAM 2 MG: 2 TABLET ORAL at 22:06

## 2021-11-15 RX ADMIN — METHOCARBAMOL 500 MG: 500 TABLET, FILM COATED ORAL at 06:34

## 2021-11-15 RX ADMIN — OXYCODONE HYDROCHLORIDE AND ACETAMINOPHEN 1 TABLET: 5; 325 TABLET ORAL at 21:17

## 2021-11-15 RX ADMIN — DIPHENHYDRAMINE HYDROCHLORIDE 25 MG: 25 CAPSULE ORAL at 22:06

## 2021-11-15 RX ADMIN — OXYCODONE HYDROCHLORIDE AND ACETAMINOPHEN 1 TABLET: 5; 325 TABLET ORAL at 07:17

## 2021-11-15 RX ADMIN — PRENATAL VIT W/ FE FUMARATE-FA TAB 27-0.8 MG 1 TABLET: 27-0.8 TAB at 10:14

## 2021-11-15 NOTE — PLAN OF CARE
Goal Outcome Evaluation:  Plan of Care Reviewed With: patient        Progress: improving  Outcome Summary: iid x2 patent. right chest tube to water seal. po pain meds given with effective relief. tolerates amb and sitting up in chair. voiding without difficutly. tolerates diet. cont to monitor.

## 2021-11-15 NOTE — PROGRESS NOTES
"Patient name: Trinidad Zhu  Patient : 1945  VISIT # [id number]  MR #[id number]    Procedure:Procedure(s):  RIGHT THORACOSCOPY WITH DAVINCI ROBOT, RIGHT UPPER LOBE LOBECTOMY  Procedure Date:2021  POD:10 Days Post-Op    Subjective     Tolerating room air.  Sitting up in chair.  Feeling well today.  Hemoglobin today is 8.4.  (+) BM.    000 on incentive.   Walking in the hallway without difficulty.  Chest tube was placed to waterseal yesterday.       Objective     Visit Vitals  /50 (BP Location: Left arm, Patient Position: Sitting)   Pulse 82   Temp 98.6 °F (37 °C) (Oral)   Resp 16   Ht 161 cm (63.39\")   Wt 50.8 kg (111 lb 15.9 oz)   SpO2 100%   Breastfeeding No   BMI 19.60 kg/m²       Intake/Output Summary (Last 24 hours) at 11/15/2021 1027  Last data filed at 11/15/2021 0630  Gross per 24 hour   Intake 480 ml   Output 60 ml   Net 420 ml     RCT: 60 ml/24 hours, serosanguineous, no air leak    Lab:      IMAGES:       Imaging Results (Last 24 Hours)     Procedure Component Value Units Date/Time    XR Chest 1 View [[id number]] Collected: 11/15/21 0730     Updated: 11/15/21 0735    Narrative:      EXAMINATION: XR CHEST 1 VW- 11/15/2021 7:30 AM CST     HISTORY: lung cancer, s/p RUL lobectomy.     REPORT: A frontal view the chest was obtained.     COMPARISON: Chest x-ray 2021 1615 hours.     The right chest tube remains in satisfactory position, there is no  change in the small right apical pneumothorax. There is increased  density of the lung at the right apex which is unchanged and may be  related to recent partial lung resection. The left lung remains clear.  Heart size is normal. A small amount of residual chest wall emphysema is  present on the right. Previous ACDF is noted.       Impression:      Stable small right apical pneumothorax and postoperative  changes at the right lung apex, satisfactory position of right  thoracostomy tube..  This report was finalized on 11/15/2021 07:32 by Dr." Andrew Ramirez MD.    XR Chest 1 View [[id number]] Collected: 11/14/21 1642     Updated: 11/14/21 1646    Narrative:      EXAMINATION: XR CHEST 1 VW- 11/14/2021 4:42 PM CST     HISTORY: lung cancer; R91.1-Solitary pulmonary nodule.     REPORT: A frontal view of the chest was obtained.     COMPARISON: Chest x-ray 11/14/2021 0334 hours.     The right chest tube appears in good position as before, the right  apical pneumothorax appears smaller. There are postop changes at the  right upper hemithorax with a small amount of residual chest wall  emphysema. Left lung remains clear. Heart size is normal. Previous ACDF  is noted.       Impression:      Satisfactory stable position of the right chest tube with  decrease in size of the small right apical pneumothorax.  This report was finalized on 11/14/2021 16:43 by Dr. Andrew Ramirez MD.        CXR: Right chest tube in stable position.  Decreasing right apical space    Physical Exam:  General: Alert, oriented. No apparent distress.   Cardiovascular: Regular rate and rhythm without murmur, rubs, or gallops.    Pulmonary: Clear to auscultation bilaterally without wheezing, rubs, or rales.  Chest: Thoracic incisions clean, dry, and intact. Chest tube to waterseal. No air leak. Fluid is serosanguineous.   Abdomen: Soft, non distended, and non tender.  Extremities: Warm, moves all extremities. No edema.   Neurologic:  Grossly intact with no focal deficits.                Impression:  Lung nodule  Former smoker        Plan:  Keep chest tube to waterseal  Clamp right chest tube at midnight and leave clamped for morning chest xray on 11/16/2021  Pathology reviewed with patient and her   Routine post thoracic surgery care  Encourage pulmonary toilet and ambulation  Anticipate DC home in 1-2 days  DW patient and nursing        GEREMIAS Downs  11/15/21  10:27 CST

## 2021-11-15 NOTE — PLAN OF CARE
Goal Outcome Evaluation:  Plan of Care Reviewed With: patient  Progress: improving      Pt medicated for c/o pain x1 thus far this shift. IID x2 in place. CT remains in place to [facility]. Up ad pancho. Voiding per BRP. Valium and Benadryl given prn earlier this shift. Daughter at bedside. VSS. Safety maintained.

## 2021-11-15 NOTE — CASE MANAGEMENT/SOCIAL WORK
Continued Stay Note  AD Gay     Patient Name: Trinidad Zhu  MRN: [id number]  Today's Date: 11/15/2021    Admit Date: 11/5/2021     Discharge Plan     Row Name 11/15/21 8515       Plan    Plan Home    Patient/Family in Agreement with Plan yes    Plan Comments Pt continues with a chest tube. She is up ad pancho and plans to go home at d/c.               Discharge Codes    No documentation.                     YOBANY Weiss

## 2021-11-16 ENCOUNTER — APPOINTMENT (OUTPATIENT)
Dept: GENERAL RADIOLOGY | Facility: HOSPITAL | Age: 76
End: 2021-11-16

## 2021-11-16 LAB
ANION GAP SERPL CALCULATED.3IONS-SCNC: 10 MMOL/L (ref 5–15)
BUN SERPL-MCNC: 14 MG/DL (ref 8–23)
BUN/CREAT SERPL: 18.4 (ref 7–25)
CALCIUM SPEC-SCNC: 8.4 MG/DL (ref 8.6–10.5)
CHLORIDE SERPL-SCNC: 101 MMOL/L (ref 98–107)
CO2 SERPL-SCNC: 30 MMOL/L (ref 22–29)
CREAT SERPL-MCNC: 0.76 MG/DL (ref 0.57–1)
DEPRECATED RDW RBC AUTO: 59.3 FL (ref 37–54)
ERYTHROCYTE [DISTWIDTH] IN BLOOD BY AUTOMATED COUNT: 17 % (ref 12.3–15.4)
GFR SERPL CREATININE-BSD FRML MDRD: 74 ML/MIN/1.73
GLUCOSE SERPL-MCNC: 96 MG/DL (ref 65–99)
HCT VFR BLD AUTO: 25 % (ref 34–46.6)
HGB BLD-MCNC: 8.1 G/DL (ref 12–15.9)
MCH RBC QN AUTO: 31.5 PG (ref 26.6–33)
MCHC RBC AUTO-ENTMCNC: 32.4 G/DL (ref 31.5–35.7)
MCV RBC AUTO: 97.3 FL (ref 79–97)
PLATELET # BLD AUTO: 301 10*3/MM3 (ref 140–450)
PMV BLD AUTO: 9.3 FL (ref 6–12)
POTASSIUM SERPL-SCNC: 3.3 MMOL/L (ref 3.5–5.2)
RBC # BLD AUTO: 2.57 10*6/MM3 (ref 3.77–5.28)
SODIUM SERPL-SCNC: 141 MMOL/L (ref 136–145)
WBC # BLD AUTO: 7.51 10*3/MM3 (ref 3.4–10.8)

## 2021-11-16 PROCEDURE — 71046 X-RAY EXAM CHEST 2 VIEWS: CPT

## 2021-11-16 PROCEDURE — 99024 POSTOP FOLLOW-UP VISIT: CPT | Performed by: NURSE PRACTITIONER

## 2021-11-16 PROCEDURE — 85027 COMPLETE CBC AUTOMATED: CPT | Performed by: NURSE PRACTITIONER

## 2021-11-16 PROCEDURE — 25010000002 ENOXAPARIN PER 10 MG: Performed by: NURSE PRACTITIONER

## 2021-11-16 PROCEDURE — 63710000001 DIPHENHYDRAMINE PER 50 MG: Performed by: NURSE PRACTITIONER

## 2021-11-16 PROCEDURE — 80048 BASIC METABOLIC PNL TOTAL CA: CPT | Performed by: NURSE PRACTITIONER

## 2021-11-16 PROCEDURE — 71045 X-RAY EXAM CHEST 1 VIEW: CPT

## 2021-11-16 RX ORDER — POTASSIUM CHLORIDE 750 MG/1
20 CAPSULE, EXTENDED RELEASE ORAL 2 TIMES DAILY WITH MEALS
Status: DISCONTINUED | OUTPATIENT
Start: 2021-11-16 | End: 2021-11-17 | Stop reason: HOSPADM

## 2021-11-16 RX ADMIN — LEVOTHYROXINE SODIUM 50 MCG: 50 TABLET ORAL at 06:11

## 2021-11-16 RX ADMIN — CARVEDILOL 12.5 MG: 6.25 TABLET, FILM COATED ORAL at 08:45

## 2021-11-16 RX ADMIN — ENOXAPARIN SODIUM 30 MG: 30 INJECTION SUBCUTANEOUS at 08:46

## 2021-11-16 RX ADMIN — GUAIFENESIN 600 MG: 600 TABLET, EXTENDED RELEASE ORAL at 08:44

## 2021-11-16 RX ADMIN — METHOCARBAMOL 500 MG: 500 TABLET, FILM COATED ORAL at 06:11

## 2021-11-16 RX ADMIN — ROSUVASTATIN CALCIUM 40 MG: 20 TABLET, FILM COATED ORAL at 20:07

## 2021-11-16 RX ADMIN — DIPHENHYDRAMINE HYDROCHLORIDE 25 MG: 25 CAPSULE ORAL at 21:56

## 2021-11-16 RX ADMIN — OXYCODONE HYDROCHLORIDE AND ACETAMINOPHEN 1 TABLET: 5; 325 TABLET ORAL at 09:19

## 2021-11-16 RX ADMIN — OXYCODONE HYDROCHLORIDE AND ACETAMINOPHEN 1 TABLET: 5; 325 TABLET ORAL at 06:11

## 2021-11-16 RX ADMIN — DOCUSATE SODIUM 100 MG: 100 CAPSULE, LIQUID FILLED ORAL at 08:45

## 2021-11-16 RX ADMIN — PRENATAL VIT W/ FE FUMARATE-FA TAB 27-0.8 MG 1 TABLET: 27-0.8 TAB at 08:45

## 2021-11-16 RX ADMIN — ACETAMINOPHEN 650 MG: 325 TABLET, FILM COATED ORAL at 06:14

## 2021-11-16 RX ADMIN — CARVEDILOL 12.5 MG: 6.25 TABLET, FILM COATED ORAL at 20:08

## 2021-11-16 RX ADMIN — POTASSIUM CHLORIDE 20 MEQ: 10 CAPSULE, COATED, EXTENDED RELEASE ORAL at 20:07

## 2021-11-16 RX ADMIN — METHOCARBAMOL 500 MG: 500 TABLET, FILM COATED ORAL at 15:07

## 2021-11-16 RX ADMIN — ASPIRIN 81 MG: 81 TABLET, CHEWABLE ORAL at 08:43

## 2021-11-16 RX ADMIN — OXYCODONE HYDROCHLORIDE AND ACETAMINOPHEN 1 TABLET: 5; 325 TABLET ORAL at 18:49

## 2021-11-16 RX ADMIN — OXYCODONE HYDROCHLORIDE AND ACETAMINOPHEN 1 TABLET: 5; 325 TABLET ORAL at 15:46

## 2021-11-16 RX ADMIN — METHOCARBAMOL 500 MG: 500 TABLET, FILM COATED ORAL at 21:54

## 2021-11-16 RX ADMIN — POTASSIUM CHLORIDE 20 MEQ: 10 CAPSULE, COATED, EXTENDED RELEASE ORAL at 08:45

## 2021-11-16 RX ADMIN — PANTOPRAZOLE SODIUM 40 MG: 40 TABLET, DELAYED RELEASE ORAL at 06:11

## 2021-11-16 RX ADMIN — FAMOTIDINE 40 MG: 20 TABLET, FILM COATED ORAL at 20:08

## 2021-11-16 RX ADMIN — OXYBUTYNIN CHLORIDE 10 MG: 5 TABLET, EXTENDED RELEASE ORAL at 08:45

## 2021-11-16 RX ADMIN — OXYCODONE HYDROCHLORIDE AND ACETAMINOPHEN 1 TABLET: 5; 325 TABLET ORAL at 21:54

## 2021-11-16 RX ADMIN — FAMOTIDINE 40 MG: 20 TABLET, FILM COATED ORAL at 08:44

## 2021-11-16 RX ADMIN — OXYCODONE HYDROCHLORIDE AND ACETAMINOPHEN 1 TABLET: 5; 325 TABLET ORAL at 12:27

## 2021-11-16 RX ADMIN — HYDROCHLOROTHIAZIDE 12.5 MG: 25 TABLET ORAL at 08:43

## 2021-11-16 NOTE — DISCHARGE SUMMARY
[facility] Cardiothoracic Surgery  DISCHARGE SUMMARY        Date of Admission: 11/5/2021  Date of Discharge:  11/17/2021  Primary Care Physician: Andrew Layne MD    Discharge Diagnoses:  Active Hospital Problems    Diagnosis    • **Lung nodules    • Postoperative anemia due to acute blood loss    • Lung nodule    • Mass of right lung    • Former smoker        Procedures Performed:   Diagnostic Bronchoscopy , Right Robot Assisted VATS Upper Lobectomy by Dr. Ignacio on 11/5/2021    HPI:  Ms. Trinidad Zhu is a 76 y.o. female who presented with right upper lobe lung nodule, this is been biopsy-proven to be non-small cell lung cancer.  She also has bronchiectasis that was evident on a navigational bronchoscopy for biopsy of her lung nodule.  She has undergone treatment for this with mucus clearance and antibiotics and is much improved.  Dr. Ignacio discussed with her the risk and benefits of right upper lobectomy via the robot.  Her lung reserve is adequate to tolerate a right upper lobectomy.  The patient was agreeable to proceed.    Hospital Course: On 11/5/2021, Ms. Zhu was taken to the operating room for diagnostic bronchoscopy and right robot assisted VATS upper lobectomy.  See separate op note by Dr. Ignacio detailing the operation.  Following surgery she was transferred to the PACU in stable condition.  After meeting PACU discharge criteria, she was transferred to  for ongoing recovery.  On post op day 1, she did receive 1 units PRBC for post operative anemia and concern for bleeding around the apical portion of the middle lobe.  Hemoglobin and symptoms improved.  The remainder of her hospital stay was significant for encouraging pulmonary toilet and ambulation, diuresis, and pain control.  She did have a persistent air leak that ultimately resolved on post op day 7.  The right chest tube was removed on post op day 11.  Follow up imaging revealed stability.  She meets  criteria for discharge home on post op day 12.  She is agreeable to go home with her .      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  Lymph nodes negative for metastatic carcinoma.  She will need outpatient follow up with oncology for surveillance.  She will be discharged home with Niferex daily x 30 days.    Condition on Discharge:  Neurologically intact and has good pain control.  She is eating well and has demonstrable good bowel function.  All thoracic incisions are healing nicely without evidence of thoracic hernia.  The heart is in normal sinus rhythm.         Discharge Disposition:  Home or Self Care [1]    Discharge Medications:     Discharge Medications      New Medications      Instructions Start Date   iron polysaccharides 150 MG capsule  Commonly known as: NIFEREX   150 mg, Oral, Daily         Changes to Medications      Instructions Start Date   traMADol 50 MG tablet  Commonly known as: ULTRAM  What changed: Another medication with the same name was added. Make sure you understand how and when to take each.   50 mg, Oral, Every 3 Hours PRN      traMADol 50 MG tablet  Commonly known as: ULTRAM  What changed: You were already taking a medication with the same name, and this prescription was added. Make sure you understand how and when to take each.   50 mg, Oral, Every 6 Hours PRN         Continue These Medications      Instructions Start Date   albuterol sulfate  (90 Base) MCG/ACT inhaler  Commonly known as: PROVENTIL HFA;VENTOLIN HFA;PROAIR HFA   2 puffs, Inhalation, Every 4 Hours PRN      alendronate 70 MG tablet  Commonly known as: FOSAMAX   70 mg, Oral, Every 7 Days, Saturdays      aspirin 81 MG chewable tablet   81 mg, Oral, Daily      benazepril 5 MG tablet  Commonly known as: LOTENSIN   5 mg, Oral, Daily      Breo Ellipta 200-25 MCG/INH inhaler  Generic drug: Fluticasone Furoate-Vilanterol   1  puff, Inhalation, Daily - RT      carvedilol 12.5 MG tablet  Commonly known as: COREG   12.5 mg, Oral, 2 Times Daily With Meals      cetirizine 10 MG tablet  Commonly known as: zyrTEC   10 mg, Oral, Nightly PRN      clopidogrel 75 MG tablet  Commonly known as: PLAVIX   TAKE 1 TABLET BY MOUTH EVERY DAY       diazePAM 5 MG tablet  Commonly known as: VALIUM   2.5 mg, Oral, Every 6 Hours PRN      Evolocumab solution auto-injector SureClick injection  Commonly known as: REPATHA   140 mg, Subcutaneous, Every 14 Days      famotidine 40 MG tablet  Commonly known as: PEPCID   40 mg, Oral, 2 Times Daily      guaiFENesin 600 MG 12 hr tablet  Commonly known as: MUCINEX   600 mg, Oral, Daily      hydroCHLOROthiazide 12.5 MG tablet  Commonly known as: HYDRODIURIL   12.5 mg, Oral, Daily      levothyroxine 50 MCG tablet  Commonly known as: SYNTHROID, LEVOTHROID   50 mcg, Oral, Daily      multivitamin with minerals tablet tablet   1 tablet, Oral, Daily      Myrbetriq 50 MG tablet sustained-release 24 hour 24 hr tablet  Generic drug: Mirabegron ER   50 mg, Oral, Daily      pregabalin 50 MG capsule  Commonly known as: LYRICA   50 mg, Oral, 2 Times Daily      rosuvastatin 40 MG tablet  Commonly known as: CRESTOR   40 mg, Oral, Nightly         Stop These Medications    triamcinolone 0.1 % cream  Commonly known as: KENALOG     Unable to find            Discharge Diet: Regular diet    Discharge Care Plan / Instructions:   Keep incisions clean and open to air.  May wash with soap and water.  Chest tube sites with dressings to keep on for 48 hours.  Ok to reapply dressing as needed after removal.      Activity at Discharge:   No heavy lifting greater than 5 pounds or a gallon of milk and refrain from driving until cleared.      Tobacco: The patient does not use tobacco products and therefore does not need tobacco cessation education.    BMI: Patient's Body mass index is 19.6 kg/m². indicating that she is within normal range (BMI 18.5-24.9).  No BMI management plan needed..      Follow-up Appointments: Trinidad Zhu  is requested to see Andrew Layne MD within 1-2 weeks from time of discharge, to follow-up with GEREMIAS Downs in 1 week with chest xray and labs before appointment.  She will be referred to oncology at this time.

## 2021-11-16 NOTE — PROGRESS NOTES
"Patient name: Trinidad Zhu  Patient : 1945  VISIT # [id number]  MR #[id number]    Procedure:Procedure(s):  RIGHT THORACOSCOPY WITH DAVINCI ROBOT, RIGHT UPPER LOBE LOBECTOMY  Procedure Date:2021  POD:11 Days Post-Op    Subjective     Tolerating room air.  Resting in bed with complaints of pain.  Pain medication has been administered.  Hemoglobin today is 8.1.  Potassium 3.3.  (+) BM.    1000 on incentive.   Walking in the hallway without difficulty.  Chest tube has been clamped since midnight.       Objective     Visit Vitals  /60 (BP Location: Left arm, Patient Position: Lying)   Pulse 83   Temp 98.5 °F (36.9 °C) (Oral)   Resp 16   Ht 161 cm (63.39\")   Wt 50.8 kg (111 lb 15.9 oz)   SpO2 100%   Breastfeeding No   BMI 19.60 kg/m²       Intake/Output Summary (Last 24 hours) at 2021 0832  Last data filed at 2021 0000  Gross per 24 hour   Intake 720 ml   Output 70 ml   Net 650 ml     RCT: 70 ml/24 hours, serosanguineous, no air leak    Lab:      IMAGES:       Imaging Results (Last 24 Hours)     Procedure Component Value Units Date/Time    XR Chest 1 View [[id number]] Collected: 21     Updated: 21    Narrative:      EXAMINATION: XR CHEST 1 VW- 2021 7:29 AM CST     HISTORY: lung cancer, s/p RUL lobectomy.     REPORT: A frontal view of the chest was obtained.     COMPARISON: Chest x-rays 11/15/2021 0359 hours.     The left lung is clear normally expanded as before. There is a small  right apical pneumothorax which is unchanged, with associated postop  changes of partial lung resection, including increased density of the  right upper lung. There is a right chest tube, good position as before.  Heart size is normal. No pleural effusion is identified. Previous ACDF  is noted. The upper abdomen appears unremarkable.       Impression:      Stable 1 day appearance of the chest, including the right  chest tube and small right apical pneumothorax.  This report was " finalized on 11/16/2021 07:30 by Dr. Andrew Ramirez MD.        CXR: Right chest tube in stable position.  Stable right apical space    Physical Exam:  General: Alert, oriented. No apparent distress.   Cardiovascular: Regular rate and rhythm without murmur, rubs, or gallops.    Pulmonary: Clear to auscultation bilaterally without wheezing, rubs, or rales.  Chest: Thoracic incisions clean, dry, and intact. Chest tube clamped. No air leak when unclamped. Fluid is serosanguineous.   Abdomen: Soft, non distended, and non tender.  Extremities: Warm, moves all extremities. No edema.   Neurologic:  Grossly intact with no focal deficits.                Impression:  Lung nodule  Former smoker        Plan:  Keep chest tube unclamped with no noted air leak.  Right chest tube removed without remark.  PA and lateral chest xray today at 1200  Pathology reviewed with patient and her  yesterday.  She will need outpatient referral to oncology for surveillance.    Routine post thoracic surgery care  Encourage pulmonary toilet and ambulation  Anticipate DC home tomorrow if imaging remains stable  DW patient and nursing        Brittni Bergman, APRN  11/16/21  08:32 CST

## 2021-11-16 NOTE — PLAN OF CARE
Goal Outcome Evaluation:  Plan of Care Reviewed With: patient        Progress: improving  Outcome Summary: Patient c/o pain x 1 so far this shift. CT clamped at 0000. IID'd. Ambulates multiple times a shift. Up with assistance stand-by. No distress noted at this time. Safety maintained.

## 2021-11-16 NOTE — PLAN OF CARE
Goal Outcome Evaluation:  Plan of Care Reviewed With: patient, other (see comments)        Progress: no change  Outcome Summary: Nutrition follow-up. Pt is on a regular diet. Appetite is about the same as previous review, 55% of 5 meals. She has a menu in her room and is aware of alternate food selections as needed. Cont to encourage intake.

## 2021-11-17 ENCOUNTER — READMISSION MANAGEMENT (OUTPATIENT)
Dept: CALL CENTER | Facility: HOSPITAL | Age: 76
End: 2021-11-17

## 2021-11-17 ENCOUNTER — APPOINTMENT (OUTPATIENT)
Dept: GENERAL RADIOLOGY | Facility: HOSPITAL | Age: 76
End: 2021-11-17

## 2021-11-17 VITALS
BODY MASS INDEX: 19.84 KG/M2 | TEMPERATURE: 98.2 F | DIASTOLIC BLOOD PRESSURE: 53 MMHG | HEART RATE: 72 BPM | SYSTOLIC BLOOD PRESSURE: 106 MMHG | RESPIRATION RATE: 16 BRPM | HEIGHT: 63 IN | OXYGEN SATURATION: 100 % | WEIGHT: 111.99 LBS

## 2021-11-17 LAB
ANION GAP SERPL CALCULATED.3IONS-SCNC: 9 MMOL/L (ref 5–15)
BUN SERPL-MCNC: 13 MG/DL (ref 8–23)
BUN/CREAT SERPL: 17.6 (ref 7–25)
CALCIUM SPEC-SCNC: 8.4 MG/DL (ref 8.6–10.5)
CHLORIDE SERPL-SCNC: 103 MMOL/L (ref 98–107)
CO2 SERPL-SCNC: 28 MMOL/L (ref 22–29)
CREAT SERPL-MCNC: 0.74 MG/DL (ref 0.57–1)
DEPRECATED RDW RBC AUTO: 59.7 FL (ref 37–54)
ERYTHROCYTE [DISTWIDTH] IN BLOOD BY AUTOMATED COUNT: 17.1 % (ref 12.3–15.4)
GFR SERPL CREATININE-BSD FRML MDRD: 77 ML/MIN/1.73
GLUCOSE SERPL-MCNC: 97 MG/DL (ref 65–99)
HCT VFR BLD AUTO: 24.4 % (ref 34–46.6)
HGB BLD-MCNC: 7.9 G/DL (ref 12–15.9)
MCH RBC QN AUTO: 31.9 PG (ref 26.6–33)
MCHC RBC AUTO-ENTMCNC: 32.4 G/DL (ref 31.5–35.7)
MCV RBC AUTO: 98.4 FL (ref 79–97)
PLATELET # BLD AUTO: 310 10*3/MM3 (ref 140–450)
PMV BLD AUTO: 9.4 FL (ref 6–12)
POTASSIUM SERPL-SCNC: 3.7 MMOL/L (ref 3.5–5.2)
RBC # BLD AUTO: 2.48 10*6/MM3 (ref 3.77–5.28)
SODIUM SERPL-SCNC: 140 MMOL/L (ref 136–145)
WBC # BLD AUTO: 6.6 10*3/MM3 (ref 3.4–10.8)

## 2021-11-17 PROCEDURE — 80048 BASIC METABOLIC PNL TOTAL CA: CPT | Performed by: NURSE PRACTITIONER

## 2021-11-17 PROCEDURE — 85027 COMPLETE CBC AUTOMATED: CPT | Performed by: NURSE PRACTITIONER

## 2021-11-17 PROCEDURE — 99024 POSTOP FOLLOW-UP VISIT: CPT | Performed by: NURSE PRACTITIONER

## 2021-11-17 PROCEDURE — 25010000002 ENOXAPARIN PER 10 MG: Performed by: NURSE PRACTITIONER

## 2021-11-17 PROCEDURE — 71046 X-RAY EXAM CHEST 2 VIEWS: CPT

## 2021-11-17 RX ORDER — IRON POLYSACCHARIDE COMPLEX 150 MG
150 CAPSULE ORAL DAILY
Qty: 30 CAPSULE | Refills: 0 | Status: SHIPPED | OUTPATIENT
Start: 2021-11-17 | End: 2022-05-31

## 2021-11-17 RX ORDER — TRAMADOL HYDROCHLORIDE 50 MG/1
50 TABLET ORAL EVERY 6 HOURS PRN
Qty: 25 TABLET | Refills: 0 | Status: SHIPPED | OUTPATIENT
Start: 2021-11-17 | End: 2022-01-04 | Stop reason: ALTCHOICE

## 2021-11-17 RX ADMIN — OXYCODONE HYDROCHLORIDE AND ACETAMINOPHEN 1 TABLET: 5; 325 TABLET ORAL at 11:06

## 2021-11-17 RX ADMIN — DOCUSATE SODIUM 100 MG: 100 CAPSULE, LIQUID FILLED ORAL at 08:00

## 2021-11-17 RX ADMIN — PANTOPRAZOLE SODIUM 40 MG: 40 TABLET, DELAYED RELEASE ORAL at 05:03

## 2021-11-17 RX ADMIN — CARVEDILOL 12.5 MG: 6.25 TABLET, FILM COATED ORAL at 07:59

## 2021-11-17 RX ADMIN — ENOXAPARIN SODIUM 30 MG: 30 INJECTION SUBCUTANEOUS at 08:00

## 2021-11-17 RX ADMIN — ASPIRIN 81 MG: 81 TABLET, CHEWABLE ORAL at 08:00

## 2021-11-17 RX ADMIN — OXYCODONE HYDROCHLORIDE AND ACETAMINOPHEN 1 TABLET: 5; 325 TABLET ORAL at 05:03

## 2021-11-17 RX ADMIN — LEVOTHYROXINE SODIUM 50 MCG: 50 TABLET ORAL at 05:03

## 2021-11-17 RX ADMIN — PRENATAL VIT W/ FE FUMARATE-FA TAB 27-0.8 MG 1 TABLET: 27-0.8 TAB at 08:00

## 2021-11-17 RX ADMIN — OXYBUTYNIN CHLORIDE 10 MG: 5 TABLET, EXTENDED RELEASE ORAL at 08:00

## 2021-11-17 RX ADMIN — OXYCODONE HYDROCHLORIDE AND ACETAMINOPHEN 1 TABLET: 5; 325 TABLET ORAL at 01:50

## 2021-11-17 RX ADMIN — HYDROCHLOROTHIAZIDE 12.5 MG: 25 TABLET ORAL at 08:00

## 2021-11-17 RX ADMIN — FAMOTIDINE 40 MG: 20 TABLET, FILM COATED ORAL at 08:00

## 2021-11-17 RX ADMIN — GUAIFENESIN 600 MG: 600 TABLET, EXTENDED RELEASE ORAL at 08:00

## 2021-11-17 RX ADMIN — POTASSIUM CHLORIDE 20 MEQ: 10 CAPSULE, COATED, EXTENDED RELEASE ORAL at 07:59

## 2021-11-17 RX ADMIN — METHOCARBAMOL 500 MG: 500 TABLET, FILM COATED ORAL at 05:03

## 2021-11-17 NOTE — PROGRESS NOTES
"Patient name: Trinidad Zhu  Patient : 1945  VISIT # [id number]  MR #[id number]    Procedure:Procedure(s):  RIGHT THORACOSCOPY WITH DAVINCI ROBOT, RIGHT UPPER LOBE LOBECTOMY  Procedure Date:2021  POD:12 Days Post-Op    Subjective     Tolerating room air.  Resting in chair.  Pain much improved after chest tube removal.   Hemoglobin today is 7.9.   (+) BM.    1000 on incentive.   Walking in the hallway without difficulty.  Eager for discharge home today.       Objective     Visit Vitals  /53 (BP Location: Left arm, Patient Position: Sitting)   Pulse 72   Temp 98.2 °F (36.8 °C) (Oral)   Resp 16   Ht 161 cm (63.39\")   Wt 50.8 kg (111 lb 15.9 oz)   SpO2 100%   Breastfeeding No   BMI 19.60 kg/m²       Intake/Output Summary (Last 24 hours) at 2021 0829  Last data filed at 2021 1842  Gross per 24 hour   Intake 1200 ml   Output --   Net 1200 ml       Lab:      IMAGES:       Imaging Results (Last 24 Hours)     Procedure Component Value Units Date/Time    XR Chest PA & Lateral [[id number]] Collected: 21     Updated: 21    Narrative:      EXAMINATION: XR CHEST PA AND LATERAL- 2021 7:33 AM CST     HISTORY: s/p right upper lobectomy; R91.1-Solitary pulmonary nodule.     REPORT: Frontal and lateral views of the chest were obtained.     COMPARISON: Chest x-rays 2021 1253 hours.     The small right apical pneumothorax is unchanged. Increased density of  the right apical lung is stable and probably related to recent partial  lung resection. Left lung is clear and normally expanded. Heart size is  normal. There is mild ectasia of the thoracic aorta. Previous ACDF is  noted. The upper abdomen is unremarkable.       Impression:      Stable chest x-ray with a small right apical pneumothorax  and postop changes at the right apical lung.  This report was finalized on 2021 07:35 by Dr. Andrew Ramirez MD.    XR Chest PA & Lateral [[id number]] Collected: 21 1654 "     Updated: 11/16/21 1353    Narrative:      EXAM: XR CHEST PA AND LATERAL-     INDICATION: post right chest tube removal; R91.1-Solitary pulmonary  nodule     COMPARISON: 11/16/2021     FINDINGS:     Right-sided chest tube has been removed. No change in small RIGHT apical  pneumothorax. Small amount of residual RIGHT chest wall subcutaneous  emphysema is present. Cardiac silhouette is normal in size. No pleural  effusion. No left-sided pneumothorax. Unchanged RIGHT apical parenchymal  opacity. No new focal airspace opacity. Partially imaged cervical spine  fusion hardware.       Impression:         No change in small RIGHT apical pneumothorax following chest tube  removal.  This report was finalized on 11/16/2021 16:55 by Dr. Patrick Hylton MD.        CXR: Stable right apical space with noted post operative changes.      Physical Exam:  General: Alert, oriented. No apparent distress.   Cardiovascular: Regular rate and rhythm without murmur, rubs, or gallops.    Pulmonary: Clear to auscultation bilaterally without wheezing, rubs, or rales.  Chest: Thoracic incisions clean, dry, and intact.   Abdomen: Soft, non distended, and non tender.  Extremities: Warm, moves all extremities. No edema.   Neurologic:  Grossly intact with no focal deficits.                Impression:  Lung nodule  Former smoker  Post operative anemia due to acute blood loss      Plan:  She is ready for DC home today  She will be discharged home with Niferex daily due to anemia  Follow up with me in 1 week with repeat labs and chest xray before appointment  Patient and her  are agreeable to this plan.         Brittni Bergman, APRN  11/17/21  08:29 CST

## 2021-11-17 NOTE — PLAN OF CARE
Goal Outcome Evaluation:  Plan of Care Reviewed With: patient        Progress: no change  Outcome Summary: Drsg to right chest, c,d,i.  Up ad pancho.  Voiding.  Medicated with percocet for c/o pain.  Benadryl gave for sleep.

## 2021-11-17 NOTE — DISCHARGE INSTR - ACTIVITY
Keep chest tube dressing on for 48hrs. Then may redress as needed.  No lifting more than 5 pounds or a gallon of milk until cleared.   No driving until cleared.

## 2021-11-18 ENCOUNTER — APPOINTMENT (OUTPATIENT)
Dept: ULTRASOUND IMAGING | Facility: HOSPITAL | Age: 76
End: 2021-11-18

## 2021-11-18 NOTE — OUTREACH NOTE
Prep Survey      Responses   [facility] facility patient discharged from? [facility]   Is LACE score < 7 ? No   Emergency Room discharge w/ pulse ox? No   Eligibility Readm Mgmt   Discharge diagnosis Lung nodules   Does the patient have one of the following disease processes/diagnoses(primary or secondary)? Cardiothoracic surgery   Does the patient have Home health ordered? No   Is there a DME ordered? No   Prep survey completed? Yes          Glenny Law RN

## 2021-11-19 ENCOUNTER — TELEPHONE (OUTPATIENT)
Dept: CARDIAC SURGERY | Facility: CLINIC | Age: 76
End: 2021-11-19

## 2021-11-19 NOTE — PROGRESS NOTES
"Subjective   Chief Complaint   Patient presents with   • Post-op Follow-up     Pt had Thoracoscopy on 11/5       Patient ID: Trinidad Zhu is a 76 y.o. female who is here for follow-up having had Diagnostic Bronchoscopy , Right Robot Assisted VATS Upper Lobectomy by Dr. Ignacio on 11/5/2021    History of Present Illness  Post operative recovery was uneventful without any major complications. Sleep habits are good.  Pain control has been good. No fevers/sweats/chills. No drainage from incisions. She does not smoke.  Ambulating at home without difficulty.  She does complain of some right shoulder pain but otherwise doing well.  She did call the office last week reporting allergic reaction to Niferex.  She discontinued the medication and symptoms resolved with benadryl.  She has had no further issues since discontinuing the medication.  She is here today for follow up with labs and chest xray.        The following portions of the patient's history were reviewed and updated as appropriate: allergies, current medications, past family history, past medical history, past social history, past surgical history and problem list.    Review of Systems   Constitutional: Negative for activity change, chills, diaphoresis, fatigue and fever.   HENT: Negative for trouble swallowing and voice change.    Respiratory: Negative for chest tightness and shortness of breath.    Cardiovascular: Negative for chest pain, palpitations and leg swelling.   Gastrointestinal: Negative for abdominal pain, diarrhea, nausea and vomiting.   Musculoskeletal: Negative for arthralgias and myalgias.   Skin: Negative for color change, pallor, rash and wound.   Neurological: Negative for dizziness, syncope and headaches.   Psychiatric/Behavioral: Negative for agitation, confusion and sleep disturbance.       Objective   Visit Vitals  BP 96/58 (BP Location: Right arm, Patient Position: Sitting, Cuff Size: Adult)   Pulse 79   Ht 161.8 cm (63.69\")   Wt 51.5 " kg (113 lb 9.6 oz)   SpO2 98%   BMI 19.69 kg/m²       Physical Exam  Vitals reviewed.   Constitutional:       Appearance: Normal appearance.   HENT:      Head: Normocephalic.   Eyes:      Pupils: Pupils are equal, round, and reactive to light.   Cardiovascular:      Rate and Rhythm: Normal rate and regular rhythm.      Heart sounds: Normal heart sounds. No murmur heard.      Pulmonary:      Breath sounds: Normal breath sounds. No wheezing or rales.   Abdominal:      General: There is no distension.      Palpations: Abdomen is soft.      Tenderness: There is no abdominal tenderness.   Musculoskeletal:         General: No swelling or tenderness.   Skin:     General: Skin is warm and dry.      Comments: Thoracic incisions are C/D/I and healing nicely   Neurological:      General: No focal deficit present.      Mental Status: She is alert and oriented to person, place, and time.   Psychiatric:         Mood and Affect: Mood normal.         Behavior: Behavior normal.         Thought Content: Thought content normal.         Judgment: Judgment normal.             Assessment/Plan               Independent Review of Radiographic Studies:    CXR: Noted hydropneumothorax on the right, small pleural effusion on the right.      Diagnoses and all orders for this visit:    1. Hyperlipidemia, unspecified hyperlipidemia type (Primary)    2. Primary hypertension    3. Lung nodules  -     Ambulatory Referral to Oncology  -     XR Chest 2 View; Future    4. Stage 2 moderate COPD by GOLD classification (HCC)    5. Bronchiectasis without complication (HCC)    6. Former smoker           Overall, Trinidad Zhu is doing well. Hemoglobin today is stable at 8.1, sodium is 130, and potassium is 3.4.  I have advised her to watch her water intake and switch to electrolyte rich fluids suck at [facility].  Increase potassium intake.  She has been given an iron supplement in the past and has scheduled follow up with them in a couple weeks.  I  have recommended repeat labs at that time. Referral has been placed to Oncology.   Provided support and encouragement. All questions have been answered to the best of my ability.  Patient has follow up with Dr. Ignacio in a few weeks.  Patient has been instructed to contact our office with any questions or concerns should they arise prior to the next office visit.     Patient's Body mass index is 19.69 kg/m². indicating that she is within normal range (BMI 18.5-24.9). No BMI management plan needed..       Trinidad Zhu is a non-smoker and therefore does not need tobacco cessation education/counseling.        Advance Care Planning   ACP discussion was held with the patient during this visit. Patient has an advance directive (not in EMR), copy requested.    Refer to oncology  Keep follow up with PCP to further monitor labs  Follow up with Dr. Ignacio in 4 weeks with chest xray prior to appointment  Call the office should any issues arise prior to next scheduled follow up.  The patient and her  verbalized understanding and are agreeable to this plan

## 2021-11-19 NOTE — TELEPHONE ENCOUNTER
Patient states she has been having tongue and lip swelling since yesterday.  She has stopped taking the Niferex has this is the only new medication she has been given.  She has not taken Benadryl.  She denies rash or shortness of breath.  I have advised her to to take Benadryl and to be seen in either ER or urgent care asap if no improvement or symptoms worsen.  She verbalized understanding and is agreeable.

## 2021-11-19 NOTE — TELEPHONE ENCOUNTER
Patient called back to report near resolution of swelling after taking benadryl.  I have advised her to be evaluated if symptoms return.  She verbalized understanding and is agreeable.

## 2021-11-22 ENCOUNTER — HOSPITAL ENCOUNTER (OUTPATIENT)
Dept: GENERAL RADIOLOGY | Facility: HOSPITAL | Age: 76
Discharge: HOME | End: 2021-11-22

## 2021-11-22 ENCOUNTER — LAB (OUTPATIENT)
Dept: LAB | Facility: HOSPITAL | Age: 76
End: 2021-11-22

## 2021-11-22 ENCOUNTER — READMISSION MANAGEMENT (OUTPATIENT)
Dept: CALL CENTER | Facility: HOSPITAL | Age: 76
End: 2021-11-22

## 2021-11-22 ENCOUNTER — OFFICE VISIT (OUTPATIENT)
Dept: CARDIAC SURGERY | Facility: CLINIC | Age: 76
End: 2021-11-22

## 2021-11-22 VITALS
OXYGEN SATURATION: 98 % | DIASTOLIC BLOOD PRESSURE: 58 MMHG | BODY MASS INDEX: 19.39 KG/M2 | SYSTOLIC BLOOD PRESSURE: 96 MMHG | HEART RATE: 79 BPM | HEIGHT: 64 IN | WEIGHT: 113.6 LBS

## 2021-11-22 DIAGNOSIS — I10 PRIMARY HYPERTENSION: ICD-10-CM

## 2021-11-22 DIAGNOSIS — J47.9 BRONCHIECTASIS WITHOUT COMPLICATION (HCC): Chronic | ICD-10-CM

## 2021-11-22 DIAGNOSIS — Z87.891 FORMER SMOKER: ICD-10-CM

## 2021-11-22 DIAGNOSIS — R91.1 LUNG NODULE: ICD-10-CM

## 2021-11-22 DIAGNOSIS — R91.8 LUNG NODULES: Chronic | ICD-10-CM

## 2021-11-22 DIAGNOSIS — J44.9 STAGE 2 MODERATE COPD BY GOLD CLASSIFICATION (HCC): Chronic | ICD-10-CM

## 2021-11-22 DIAGNOSIS — E78.5 HYPERLIPIDEMIA, UNSPECIFIED HYPERLIPIDEMIA TYPE: Primary | ICD-10-CM

## 2021-11-22 LAB
ANION GAP SERPL CALCULATED.3IONS-SCNC: 11 MMOL/L (ref 5–15)
BASOPHILS # BLD AUTO: 0.03 10*3/MM3 (ref 0–0.2)
BASOPHILS NFR BLD AUTO: 0.4 % (ref 0–1.5)
BUN SERPL-MCNC: 19 MG/DL (ref 8–23)
BUN/CREAT SERPL: 19.6 (ref 7–25)
CALCIUM SPEC-SCNC: 8.7 MG/DL (ref 8.6–10.5)
CHLORIDE SERPL-SCNC: 93 MMOL/L (ref 98–107)
CO2 SERPL-SCNC: 26 MMOL/L (ref 22–29)
CREAT SERPL-MCNC: 0.97 MG/DL (ref 0.57–1)
CYTO UR: NORMAL
DEPRECATED RDW RBC AUTO: 57.8 FL (ref 37–54)
EOSINOPHIL # BLD AUTO: 0.15 10*3/MM3 (ref 0–0.4)
EOSINOPHIL NFR BLD AUTO: 1.8 % (ref 0.3–6.2)
ERYTHROCYTE [DISTWIDTH] IN BLOOD BY AUTOMATED COUNT: 16 % (ref 12.3–15.4)
GFR SERPL CREATININE-BSD FRML MDRD: 56 ML/MIN/1.73
GLUCOSE SERPL-MCNC: 112 MG/DL (ref 65–99)
HCT VFR BLD AUTO: 25.1 % (ref 34–46.6)
HGB BLD-MCNC: 8.1 G/DL (ref 12–15.9)
IMM GRANULOCYTES # BLD AUTO: 0.03 10*3/MM3 (ref 0–0.05)
IMM GRANULOCYTES NFR BLD AUTO: 0.4 % (ref 0–0.5)
LAB AP CASE REPORT: NORMAL
LAB AP SPECIAL STAINS: NORMAL
LAB AP SYNOPTIC CHECKLIST: NORMAL
LYMPHOCYTES # BLD AUTO: 1.21 10*3/MM3 (ref 0.7–3.1)
LYMPHOCYTES NFR BLD AUTO: 14.7 % (ref 19.6–45.3)
Lab: NORMAL
MCH RBC QN AUTO: 31.6 PG (ref 26.6–33)
MCHC RBC AUTO-ENTMCNC: 32.3 G/DL (ref 31.5–35.7)
MCV RBC AUTO: 98 FL (ref 79–97)
MONOCYTES # BLD AUTO: 1.23 10*3/MM3 (ref 0.1–0.9)
MONOCYTES NFR BLD AUTO: 14.9 % (ref 5–12)
NEUTROPHILS NFR BLD AUTO: 5.58 10*3/MM3 (ref 1.7–7)
NEUTROPHILS NFR BLD AUTO: 67.8 % (ref 42.7–76)
NRBC BLD AUTO-RTO: 0 /100 WBC (ref 0–0.2)
PATH REPORT.FINAL DX SPEC: NORMAL
PATH REPORT.GROSS SPEC: NORMAL
PLATELET # BLD AUTO: 350 10*3/MM3 (ref 140–450)
PMV BLD AUTO: 9.5 FL (ref 6–12)
POTASSIUM SERPL-SCNC: 3.4 MMOL/L (ref 3.5–5.2)
RBC # BLD AUTO: 2.56 10*6/MM3 (ref 3.77–5.28)
SODIUM SERPL-SCNC: 130 MMOL/L (ref 136–145)
WBC NRBC COR # BLD: 8.23 10*3/MM3 (ref 3.4–10.8)

## 2021-11-22 PROCEDURE — 36415 COLL VENOUS BLD VENIPUNCTURE: CPT

## 2021-11-22 PROCEDURE — 99024 POSTOP FOLLOW-UP VISIT: CPT | Performed by: NURSE PRACTITIONER

## 2021-11-22 PROCEDURE — 80048 BASIC METABOLIC PNL TOTAL CA: CPT

## 2021-11-22 PROCEDURE — 85025 COMPLETE CBC W/AUTO DIFF WBC: CPT

## 2021-11-22 PROCEDURE — 71046 X-RAY EXAM CHEST 2 VIEWS: CPT

## 2021-11-22 NOTE — OUTREACH NOTE
CT Surgery Week 1 Survey      Responses   [facility] patient discharged from? [facility]   Does the patient have one of the following disease processes/diagnoses(primary or secondary)? Cardiothoracic surgery   Week 1 attempt successful? Yes   Call start time 0927   Call end time 0934   Discharge diagnosis Lung nodules   Meds reviewed with patient/caregiver? Yes   Is the patient having any side effects they believe may be caused by any medication additions or changes? Yes   Side effects comments  Lip and tongue swelling from iron   Does the patient have all medications related to this admission filled (includes all antibiotics, pain medications, cardiac medications, etc.) Yes   Is the patient taking all medications as directed (includes completed medication regime)? No   What is preventing the patient from taking all medications as directed? Other  [Pt states she can't take iron. She called Brittni and informed Brittni. ]   Nursing Interventions Nurse provided patient education  [CT surgery is aware]   Does the patient have a primary care provider?  Yes   Does the patient have an appointment scheduled with their C/T surgeon? Yes  [11/22/21]   Comments regarding PCP 12/3/21   Has the patient kept scheduled appointments due by today? N/A   Psychosocial issues? No   Did the patient receive a copy of their discharge instructions? Yes   Nursing interventions Reviewed instructions with patient   What is the patient's perception of their health status since discharge? Improving  [Pt reports right shoulder pain]   Nursing interventions Nurse provided patient education   Is the patient/caregiver able to teach back normal signs of recovery? Constipation   Nursing interventions Reassured on normal signs of recovery   Is the patient /caregiver able to teach back basic post-op care? Lifting as instructed by MD in discharge instructions,  Drive as instructed by MD in discharge instructions   Is the patient/caregiver able to teach  back signs and symptoms of incisional infection? Fever,  Increased drainage or bleeding   Is the patient/caregiver able to teach back steps to recovery at home? Set small, achievable goals for return to baseline health,  Rest and rebuild strength, gradually increase activity,  Eat a well-balance diet   If the patient is a current smoker, are they able to teach back resources for cessation? Not a smoker   Is the patient/caregiver able to teach back the hierarchy of who to call/visit for symptoms/problems? PCP, Specialist, Home health nurse, Urgent Care, ED, 911 Yes   Week 1 call completed? Yes            Kirsten Magdaleno RN

## 2021-11-24 NOTE — PROGRESS NOTES
[facility] HEMATOLOGY & ONCOLOGY  [address]  [place] [place] [address] [phone]    Patient Name: Trinidad Zhu  Encounter Date: 11/30/2021  YOB: 1945  Patient Number: [id number]    Initial Note    REASON FOR CONSULTATION: Trinidad Zhu is a pleasant 76 y.o.  female referred by GEREMIAS Downs for diagnostic and management recommendations for stage 1A3 adenocarcinoma of the lung.  She is seen with spouse, Yair. History is obtained from patient. History is considered to be accurate.    HISTORY OF PRESENT ILLNESS:patient found to have lung nodule.     CT chest 07/20/2020. No change in 1.5 cm RIGHT upper lobe pulmonary nodule. However, there has been very slight increase in size compared to multiple prior  studies dating back to 2016. Recommend continued imaging surveillance.    Increased size of clustered nodules in the inferior RIGHT upper  lobe and new cluster of tree-in-bud nodularity in the RIGHT lower lobe. Differential includes an indolent infectious/inflammatory process such as atypical mycobacterial infection (NAE). Recommend continued follow-up    CT chest 09/03/2021. Spiculated nodule in the right upper lobe have slightly increased in size. Recommend PET/CT.    PET 09/10/2021. FDG uptake within the spiculated 2 cm right upper lobe nodule with SUV of 2.85 concerning for malignancy. Mild FDG uptake within the reticulonodular changes of the more inferior right upper lobe and right middle lobe with SUV of 1.1 favoring an inflammatory/infectious process. No evidence of distant metastasis.    CT chest 10/07/2021.  Spiculated pleural-based right upper lobe nodule that appears stable compared to 9/3/2021 examination.  Right lung nodularity with peribronchial thickening and bronchiectasis, stable.  Developing mild reticular nodular opacities posteriorly in the left lower lobe and minimally in the right lower  lobe. Acute inflammatory change suspected. Correlate with patient presentation.    She had undergone right upper lobe lobectomy 11/05/2021.    CT chest 11/09/2021. Postoperative change of VATS RIGHT upper lobectomy. There is fluid/soft tissue with intermixed gas in the RIGHT lung apex extending into the RIGHT hilum. A small RIGHT pneumothorax is present with right-sided chest tube in good position.    Pathology report 11/12/2021. Lung, right upper lobectomy (frozen section control):  A.  Moderately differentiated acinar predominant nonmucinous adenocarcinoma with a peripheral lepidic component (2.5 cm).    B.  The bronchial, vascular and parenchymal surgical margins are negative for malignancy.  C.  The tumor extends up to, but does not invade, the visceral pleura.  D.  Pulmonary caseating granulomata.  E.  Emphysematous changes.  F.  Peribronchial lymph nodes (2), with noncaseating granulomata, negative for metastatic carcinoma.  AJCC stage:pT1c, pN0.    She had lost 5 pounds since her surgery.    No previous history of cancer.  2 maternal aunts with breast cancer in her 60 and female tract cancer in her 40s. Paternal uncle with throat cancer.     With the above background, she is seen.           LABS    Lab Results - Last 18 Months   Lab Units 11/22/21  1153 11/17/21  0546 11/16/21  0449 11/15/21  0407 11/14/21  0507 11/13/21  0452 11/07/21  0922 11/06/21  1915 11/05/21  1125 10/29/21  1359 08/03/21  0856 08/03/21  0856 03/23/21  1457 03/23/21  1457 11/02/20  1433 11/02/20  1433   HEMOGLOBIN g/dL 8.1* 7.9* 8.1* 8.4* 7.8* 8.1*   < > 9.3*   < > 11.5*   < > 12.3   < > 11.4*   < > 12.0   HEMATOCRIT % 25.1* 24.4* 25.0* 25.9* 24.0* 24.5*   < > 27.4*   < > 34.4   < > 38.2   < > 34.5   < > 34.8   MCV fL 98.0* 98.4* 97.3* 97.4* 96.8 97.2*   < > 90.4   < > 96.6   < > 92.5   < > 89.8   < > 91.8   WBC 10*3/mm3 8.23 6.60 7.51 6.82 6.07 6.81   < > 9.35   < > 7.36   < > 9.72   < > 6.73   < > 6.83   RDW % 16.0* 17.1* 17.0* 17.1*  17.2* 17.2*   < > 17.2*   < > 14.7   < > 14.5   < > 13.2   < > 13.0   MPV fL 9.5 9.4 9.3 9.2 9.6 9.3   < > 9.2   < > 9.6   < > 9.9   < > 10.4   < > 9.6   PLATELETS 10*3/mm3 350 310 301 292 259 228   < > 147   < > 162   < > 244   < > 236   < > 225   IMM GRAN % % 0.4  --   --   --   --   --   --  0.5  --  0.1  --  0.4  --  0.3  --  0.3   NEUTROS ABS 10*3/mm3 5.58  --   --   --   --   --   --  5.89  --  4.01  --  6.71  --  3.45  --  3.70   LYMPHS ABS 10*3/mm3 1.21  --   --   --   --   --   --  2.04  --  2.09  --  1.97  --  2.08  --  2.12   MONOS ABS 10*3/mm3 1.23*  --   --   --   --   --   --  1.35*  --  1.05*  --  0.89  --  0.81  --  0.76   EOS ABS 10*3/mm3 0.15  --   --   --   --   --   --  0.00  --  0.17  --  0.06  --  0.33  --  0.19   BASOS ABS 10*3/mm3 0.03  --   --   --   --   --   --  0.02  --  0.03  --  0.05  --  0.04  --  0.04   IMMATURE GRANS (ABS) 10*3/mm3 0.03  --   --   --   --   --   --  0.05  --  0.01  --  0.04  --  0.02  --  0.02   NRBC /100 WBC 0.0  --   --   --   --   --   --  0.0  --  0.0  --  0.0  --  0.0  --  0.0    < > = values in this interval not displayed.       Lab Results - Last 18 Months   Lab Units 11/22/21  1153 11/17/21  0546 11/16/21  0449 11/15/21  0407 11/14/21  0507 11/13/21  0452 11/05/21  1125 10/29/21  1359   GLUCOSE mg/dL 112* 97 96 101* 104* 108*   < > 87   SODIUM mmol/L 130* 140 141 141 139 138   < > 136   POTASSIUM mmol/L 3.4* 3.7 3.3* 3.8 3.5 3.9   < > 3.8   CO2 mmol/L 26.0 28.0 30.0* 29.0 30.0* 29.0   < > 32.0*   CHLORIDE mmol/L 93* 103 101 101 101 101   < > 95*   ANION GAP mmol/L 11.0 9.0 10.0 11.0 8.0 8.0   < > 9.0   CREATININE mg/dL 0.97 0.74 0.76 0.76 0.70 0.86   < > 0.67   BUN mg/dL 19 13 14 15 17 16   < > 11   BUN / CREAT RATIO  19.6 17.6 18.4 19.7 24.3 18.6   < > 16.4   CALCIUM mg/dL 8.7 8.4* 8.4* 9.1 9.1 9.1   < > 9.3   EGFR IF NONAFRICN AM mL/min/1.73 56* 77 74 74 82 64   < > 86   ALK PHOS U/L  --   --   --   --   --   --   --  55   TOTAL PROTEIN g/dL  --   --   --    --   --   --   --  6.2   ALT (SGPT) U/L  --   --   --   --   --   --   --  22   AST (SGOT) U/L  --   --   --   --   --   --   --  42*   BILIRUBIN mg/dL  --   --   --   --   --   --   --  0.5   ALBUMIN g/dL  --   --   --   --   --   --   --  4.50   GLOBULIN gm/dL  --   --   --   --   --   --   --  1.7    < > = values in this interval not displayed.       No results for input(s): MSPIKE, KAPPALAMB, IGLFLC, URICACID, FREEKAPPAL, CEA, LDH, REFLABREPO in the last 94749 hours.    No results for input(s): IRON, TIBC, LABIRON, FERRITIN, Q7CRNUI, TSH, FOLATE in the last 41936 hours.    Invalid input(s): VITB12      PAST MEDICAL HISTORY:  ALLERGIES:  Allergies   Allergen Reactions   • Baclofen Other (See Comments)     MUSCULOSKELETAL THERAPY AGENTS knocked her out for a day    Other reaction(s): Unknown   • Gabapentin Confusion     Other reaction(s): over sedation   • Sulfa Antibiotics Rash     Mouth blisters   • Sulfacetamide Sodium Rash   • Niferex [Iron Polysaccharide] Swelling   • Amitriptyline Hcl Confusion     Other reaction(s): ambulation difficulties/confusion   • Oxycodone-Acetaminophen GI Intolerance     Other reaction(s): vomiting     CURRENT MEDICATIONS:  Outpatient Encounter Medications as of 11/30/2021   Medication Sig Dispense Refill   • albuterol sulfate  (90 Base) MCG/ACT inhaler Inhale 2 puffs Every 4 (Four) Hours As Needed for Wheezing.     • alendronate (FOSAMAX) 70 MG tablet Take 70 mg by mouth Every 7 (Seven) Days. Saturdays  3   • aspirin 81 MG chewable tablet Chew 81 mg Daily.     • benazepril (LOTENSIN) 5 MG tablet Take 5 mg by mouth Daily.     • carvedilol (COREG) 12.5 MG tablet Take 12.5 mg by mouth 2 (Two) Times a Day With Meals.     • cetirizine (ZyrTEC) 10 MG tablet Take 10 mg by mouth At Night As Needed for Allergies.     • clopidogrel (PLAVIX) 75 MG tablet TAKE 1 TABLET BY MOUTH EVERY DAY  30 tablet 4   • diazePAM (VALIUM) 5 MG tablet Take 2.5 mg by mouth Every 6 (Six) Hours As Needed.   0   • Evolocumab (REPATHA) solution auto-injector SureClick injection Inject 140 mg under the skin into the appropriate area as directed Every 14 (Fourteen) Days.     • famotidine (PEPCID) 40 MG tablet Take 40 mg by mouth 2 (Two) Times a Day.  3   • Fluticasone Furoate-Vilanterol (Breo Ellipta) 200-25 MCG/INH inhaler Inhale 1 puff Daily.     • guaiFENesin (MUCINEX) 600 MG 12 hr tablet Take 600 mg by mouth Daily.     • hydroCHLOROthiazide (HYDRODIURIL) 12.5 MG tablet Take 12.5 mg by mouth Daily.     • iron polysaccharides (NIFEREX) 150 MG capsule Take 1 capsule by mouth Daily. 30 capsule 0   • levothyroxine (SYNTHROID, LEVOTHROID) 50 MCG tablet Take 50 mcg by mouth Daily.  3   • Mirabegron ER (Myrbetriq) 50 MG tablet sustained-release 24 hour 24 hr tablet Take 50 mg by mouth Daily.     • multivitamin with minerals (MULTIVITAMIN ADULT PO) Take 1 tablet by mouth Daily.     • pregabalin (LYRICA) 50 MG capsule Take 50 mg by mouth 2 (Two) Times a Day.     • rosuvastatin (CRESTOR) 40 MG tablet Take 40 mg by mouth Every Night.     • traMADol (ULTRAM) 50 MG tablet Take 50 mg by mouth Every 3 (Three) Hours As Needed for Moderate Pain  or Severe Pain .  0   • traMADol (ULTRAM) 50 MG tablet Take 1 tablet by mouth Every 6 (Six) Hours As Needed for Moderate Pain . 25 tablet 0     No facility-administered encounter medications on file as of 11/30/2021.     ADULT ILLNESSES:  Patient Active Problem List   Diagnosis Code   • Spinal stenosis, cervical region M48.02   • Lung nodules R91.8   • Bronchiectasis without complication ([facility]) J47.9   • Underweight R63.6   • Personal history of nicotine dependence Z87.891   • Restrictive lung disease J98.4   • Pulmonary emphysema ([facility]) J43.9   • PAD (peripheral artery disease) ([facility]) I73.9   • Preop testing Z01.818   • Gastroesophageal reflux disease K21.9   • Allergic rhinitis J30.9   • ORTIZ (dyspnea on exertion) R06.00   • CAD (coronary artery disease) I25.10   • Hyperlipidemia E78.5   •  Neuropathy G62.9   • Hypertension I10   • Stage 2 moderate COPD by GOLD classification ([facility]) J44.9   • Former smoker Z87.891   • Lung nodule R91.1   • Mass of right lung R91.8   • Postoperative anemia due to acute blood loss D62     SURGERIES:  Past Surgical History:   Procedure Laterality Date   • ANTERIOR CERVICAL DISCECTOMY W/ FUSION N/A 5/22/2017    Procedure: CERVICAL DISCECTOMY ANTERIOR FUSION WITH INSTRUMENTATION;  Surgeon: NADINE Sarabia MD;  Location: [facility] OR;  Service:    • AORTAGRAM Left 11/9/2020    Procedure: left lower extremtiy angiogram, hawk athrectomy, balloon angioplasty, stent placement, mynx closure;  Surgeon: Sukhdev Condon DO;  Location:  [facility];  Service: Vascular;  Laterality: Left;   • AORTAGRAM Left 3/26/2021    Procedure: LEFT LOWER EXTREMITY ANGIOGRAM, BALLOON ANGIOPLASTY, STENT PLACEMENT, MYNX CLOSURE;  Surgeon: Sukhdev Condon DO;  Location:  [facility];  Service: Vascular;  Laterality: Left;   • AORTAGRAM Left 8/6/2021    Procedure: LEFT LOWER EXTREMITY ANGIOGRAM, HAWK ATHERECTOMY, BALLOON ANGIOPLASTY, MYNX CLOSURE;  Surgeon: Sukhdev Condon DO;  Location:  [facility];  Service: Vascular;  Laterality: Left;   • BLADDER SUSPENSION      also had pelvic reconstructive surgery   • BREAST EXCISIONAL BIOPSY Right 1985   • CATARACT EXTRACTION, BILATERAL     • CERVICAL CORPECTOMY N/A 5/22/2017    Procedure:  ANTERIOR CERVICAL DISCECTOMY FUSION C-6 to T1,  ANTERIOR FUSION WITH INSTRUMENTATION C-5 T-1;  Surgeon: NADINE Sarabia MD;  Location: [facility] OR;  Service:    • COLONOSCOPY  08/19/2015    TIC BX RT COLON   • COLONOSCOPY  12/04/2013    RT COLON BX RECALL 5YR   • COLONOSCOPY N/A 11/29/2016    Procedure: LOWER LIMITED COLONOSCOPY WITH ANESTHESIA;  Surgeon: Macro Antonio Vallejo MD;  Location: [facility] ENDOSCOPY;  Service:    • ENDOSCOPY  12/03/2015    HEALED ULCER SLANT BX   • HYSTERECTOMY     • LOBECTOMY Right 11/5/2021    Procedure: RIGHT  THORACOSCOPY WITH DAVINCI ROBOT, RIGHT UPPER LOBE LOBECTOMY;  Surgeon: Jarad Ignacio MD;  Location: Health system;  Service: Robotics - [employer];  Laterality: Right;   • OTHER SURGICAL HISTORY      KNEE CARTILAGE REMOVED   • OTHER SURGICAL HISTORY      BENIGN FIBROID TUMOR OF BREAST 1985 REMOVED   • TONSILLECTOMY       HEALTH MAINTENANCE ITEMS:  Health Maintenance Due   Topic Date Due   • TDAP/TD VACCINES (1 - Tdap) Never done   • ZOSTER VACCINE (1 of 2) Never done   • COVID-19 Vaccine (3 - Booster for Moderna series) 08/17/2021       <no information>  Last Completed Colonoscopy          COLORECTAL CANCER SCREENING (COLONOSCOPY - Every 10 Years) Next due on 11/29/2026 11/29/2016  Surgical Procedure: COLONOSCOPY    11/29/2016  COLONOSCOPY              Immunization History   Administered Date(s) Administered   • COVID-19 (MODERNA) 1st, 2nd, 3rd Dose Only 01/19/2021, 02/17/2021   • Flu Vaccine Quad PF >18YRS 10/01/2018   • Flu Vaccine Split Quad 11/01/2020   • Fluzone High Dose =>65 Years ([employer] ONLY) 10/10/2019   • INFLUENZA SPLIT TRI 10/01/2019   • Pneumococcal Conjugate 13-Valent (PCV13) 08/04/2016   • Pneumococcal Polysaccharide (PPSV23) 01/02/2015, 10/01/2019     Last Completed Mammogram          MAMMOGRAM (Every 2 Years) Next due on 6/17/2023 06/17/2021  Mammo Screening Digital Tomosynthesis Bilateral With CAD    05/08/2020  Mammo Screening Digital Tomosynthesis Bilateral With CAD    04/25/2019  Mammo Screening Digital Tomosynthesis Bilateral With CAD    04/18/2018  Mammo screening digital tomosynthesis bilateral w CAD    03/27/2017  Mammo Screening Digital Tomosynthesis Bilateral With CAD    Only the first 5 history entries have been loaded, but more history exists.                  FAMILY HISTORY:  Family History   Problem Relation Age of Onset   • Breast cancer Maternal Aunt    • Heart disease Mother    • Heart disease Father    • GI problems Neg Hx         MALIGNANCIES   • Colon cancer Neg Hx    •  "Colon polyps Neg Hx      SOCIAL HISTORY:  Social History     Socioeconomic History   • Marital status:    Tobacco Use   • Smoking status: Former Smoker     Packs/day: 1.50     Years: 20.00     Pack years: 30.00     Types: Cigarettes     Quit date:      Years since quittin.9   • Smokeless tobacco: Never Used   Vaping Use   • Vaping Use: Never used   Substance and Sexual Activity   • Alcohol use: Yes     Comment: occasionally   • Drug use: No   • Sexual activity: Not Currently       REVIEW OF SYSTEMS:  Review of Systems   Constitutional: Positive for fatigue. Negative for chills and fever.        \"I am just tired.\"   HENT: Negative for congestion, hearing loss and trouble swallowing.    Eyes: Negative for redness and visual disturbance.   Respiratory: Negative for cough, shortness of breath and wheezing.    Cardiovascular: Negative for chest pain and leg swelling.   Gastrointestinal: Negative for abdominal pain, constipation, diarrhea, nausea and vomiting.   Endocrine: Negative for cold intolerance and heat intolerance.   Genitourinary: Negative for difficulty urinating, dysuria and flank pain.   Musculoskeletal: Negative for gait problem and neck stiffness.   Skin: Positive for pallor.   Allergic/Immunologic: Negative for food allergies.   Neurological: Negative for speech difficulty, weakness and confusion.   Hematological: Negative for adenopathy. Does not bruise/bleed easily.   Psychiatric/Behavioral: Negative for agitation, hallucinations and depressed mood.       /66   Pulse 62   Temp 97.4 °F (36.3 °C)   Resp 18   Ht 161.8 cm (63.69\")   Wt 50.4 kg (111 lb 1.6 oz)   SpO2 93%   Breastfeeding No   BMI 19.26 kg/m²  Body surface area is 1.52 meters squared.  Pain Score    21 0920   PainSc:   2       Physical Exam:  Physical Exam  Vitals reviewed.   Constitutional:       General: She is not in acute distress.  HENT:      Head: Normocephalic and atraumatic.   Eyes:      General: No " "scleral icterus.  Cardiovascular:      Rate and Rhythm: Normal rate and regular rhythm.   Pulmonary:      Effort: No respiratory distress.      Breath sounds: No wheezing or rales.   Abdominal:      General: Abdomen is flat. Bowel sounds are normal.      Palpations: Abdomen is soft.      Tenderness: There is no abdominal tenderness.   Musculoskeletal:         General: No swelling.      Cervical back: Neck supple.   Skin:     General: Skin is warm and dry.      Coloration: Skin is pale.   Neurological:      Mental Status: She is alert and oriented to person, place, and time.   Psychiatric:         Mood and Affect: Mood normal.         Behavior: Behavior normal.         Thought Content: Thought content normal.         Judgment: Judgment normal.         Trinidad Zhu reports a pain score of 2. \"Where the incision.\" Given her pain assessment as noted, treatment options were discussed and the following options were decided upon as a follow-up plan to address the patient's pain: continuation of current treatment plan for pain.        ASSESSMENT:  1.  Adenocarcinoma of the lung,right upper lobe.Tumor size 2 cm. PDL1 15%, Negative ALK, ROS1, and EGFR. BRAF G644V positive.  AJCC stage:1A3(pTic, pN0, cM0, G2)  Treatment status:Post right upper lobe lobectomy.  2.  Performance status of 1.    3.  30 pack years smoking history, etiology of her lung cancer. She had quit.   4.  Pain from recent surgery. Per thoracic surgery.       PLAN:  1.   Re: The reason for the referral.  2.   Re: NCCN guidelines for resected stage IA3 non-small cell lung carcinoma.  No adjuvant therapy required.  3.   Re: Surveillance per NCCN guidelines.  She will be seen every 6 months for the first 3 years then annually with imaging.  4.  Continue care per primary care physician at the specialist.  5.  Plan of care discussed with patient and spouse.  Understanding expressed.  Patient agreed to proceed.  6.  Blood for CBC with " differential and CMP today.  7.  Order brain MRI for staging lung cancer.   8.  Return to office in 6 months with preoffice CBC with differential, CMP, CT of the chest, abdomen and pelvis.  9.  Advance Care Planning   ACP discussion was declined by the patient. Patient does not have an advance directive, information provided.  Further recommendations pending.    Thank you for the referral.      I have reviewed the assessment and plan and verified the accuracy of it. No changes to assessment and plan since the information was documented. Cyrus Palacios MD 11/30/21       I spent 62 total minutes, face-to-face, caring for Trinidad brizuela.  Greater than 50% of this time involved counseling and/or coordination of care as documented within this note regarding the patient's illness(es), pros and cons of various treatment options, instructions and/or risk reduction.      MD Kimberley Hartley MD Kyle Turnbo, MD

## 2021-11-30 ENCOUNTER — CONSULT (OUTPATIENT)
Dept: ONCOLOGY | Facility: CLINIC | Age: 76
End: 2021-11-30

## 2021-11-30 ENCOUNTER — LAB (OUTPATIENT)
Dept: LAB | Facility: HOSPITAL | Age: 76
End: 2021-11-30

## 2021-11-30 VITALS
WEIGHT: 111.1 LBS | HEIGHT: 64 IN | BODY MASS INDEX: 18.97 KG/M2 | TEMPERATURE: 97.4 F | SYSTOLIC BLOOD PRESSURE: 110 MMHG | RESPIRATION RATE: 18 BRPM | OXYGEN SATURATION: 93 % | DIASTOLIC BLOOD PRESSURE: 66 MMHG | HEART RATE: 62 BPM

## 2021-11-30 DIAGNOSIS — C34.11 PRIMARY CANCER OF RIGHT UPPER LOBE OF LUNG (HCC): Primary | ICD-10-CM

## 2021-11-30 PROCEDURE — 99205 OFFICE O/P NEW HI 60 MIN: CPT | Performed by: INTERNAL MEDICINE

## 2021-12-01 ENCOUNTER — READMISSION MANAGEMENT (OUTPATIENT)
Dept: CALL CENTER | Facility: HOSPITAL | Age: 76
End: 2021-12-01

## 2021-12-01 NOTE — OUTREACH NOTE
CT Surgery Week 2 Survey      Responses   [facility] patient discharged from? [facility]   Does the patient have one of the following disease processes/diagnoses(primary or secondary)? Cardiothoracic surgery   Week 2 attempt successful? No   Unsuccessful attempts Attempt 1          Ayanna Zamora RN

## 2021-12-06 ENCOUNTER — READMISSION MANAGEMENT (OUTPATIENT)
Dept: CALL CENTER | Facility: HOSPITAL | Age: 76
End: 2021-12-06

## 2021-12-06 NOTE — OUTREACH NOTE
CT Surgery Week 2 Survey      Responses   [facility] patient discharged from? [facility]   Does the patient have one of the following disease processes/diagnoses(primary or secondary)? Cardiothoracic surgery   Week 2 attempt successful? No   Unsuccessful attempts Attempt 2          Cecy Haque RN

## 2021-12-08 ENCOUNTER — TRANSCRIBE ORDERS (OUTPATIENT)
Dept: ADMINISTRATIVE | Facility: HOSPITAL | Age: 76
End: 2021-12-08

## 2021-12-08 ENCOUNTER — HOSPITAL ENCOUNTER (OUTPATIENT)
Dept: GENERAL RADIOLOGY | Facility: HOSPITAL | Age: 76
Discharge: HOME | End: 2021-12-08

## 2021-12-08 ENCOUNTER — LAB (OUTPATIENT)
Dept: LAB | Facility: HOSPITAL | Age: 76
End: 2021-12-08

## 2021-12-08 DIAGNOSIS — R06.00 DYSPNEA, UNSPECIFIED TYPE: ICD-10-CM

## 2021-12-08 DIAGNOSIS — D64.9 ANEMIA, UNSPECIFIED TYPE: Primary | ICD-10-CM

## 2021-12-08 DIAGNOSIS — A31.0 PULMONARY DISEASE DUE TO MYCOBACTERIA (HCC): ICD-10-CM

## 2021-12-08 DIAGNOSIS — C34.11 PRIMARY CANCER OF RIGHT UPPER LOBE OF LUNG (HCC): ICD-10-CM

## 2021-12-08 DIAGNOSIS — R06.00 DYSPNEA, UNSPECIFIED TYPE: Primary | ICD-10-CM

## 2021-12-08 LAB
ALBUMIN SERPL-MCNC: 4 G/DL (ref 3.5–5.2)
ALBUMIN/GLOB SERPL: 1.5 G/DL
ALP SERPL-CCNC: 75 U/L (ref 39–117)
ALT SERPL W P-5'-P-CCNC: 13 U/L (ref 1–33)
ANION GAP SERPL CALCULATED.3IONS-SCNC: 12 MMOL/L (ref 5–15)
AST SERPL-CCNC: 27 U/L (ref 1–32)
BASOPHILS # BLD AUTO: 0.03 10*3/MM3 (ref 0–0.2)
BASOPHILS NFR BLD AUTO: 0.3 % (ref 0–1.5)
BILIRUB SERPL-MCNC: 0.4 MG/DL (ref 0–1.2)
BUN SERPL-MCNC: 12 MG/DL (ref 8–23)
BUN/CREAT SERPL: 15.6 (ref 7–25)
CALCIUM SPEC-SCNC: 9.4 MG/DL (ref 8.6–10.5)
CHLORIDE SERPL-SCNC: 99 MMOL/L (ref 98–107)
CO2 SERPL-SCNC: 29 MMOL/L (ref 22–29)
CREAT SERPL-MCNC: 0.77 MG/DL (ref 0.57–1)
DEPRECATED RDW RBC AUTO: 53.8 FL (ref 37–54)
EOSINOPHIL # BLD AUTO: 0.07 10*3/MM3 (ref 0–0.4)
EOSINOPHIL NFR BLD AUTO: 0.8 % (ref 0.3–6.2)
ERYTHROCYTE [DISTWIDTH] IN BLOOD BY AUTOMATED COUNT: 14.9 % (ref 12.3–15.4)
GFR SERPL CREATININE-BSD FRML MDRD: 73 ML/MIN/1.73
GLOBULIN UR ELPH-MCNC: 2.7 GM/DL
GLUCOSE SERPL-MCNC: 117 MG/DL (ref 65–99)
HCT VFR BLD AUTO: 30.1 % (ref 34–46.6)
HGB BLD-MCNC: 9.5 G/DL (ref 12–15.9)
IMM GRANULOCYTES # BLD AUTO: 0.05 10*3/MM3 (ref 0–0.05)
IMM GRANULOCYTES NFR BLD AUTO: 0.6 % (ref 0–0.5)
LYMPHOCYTES # BLD AUTO: 1.38 10*3/MM3 (ref 0.7–3.1)
LYMPHOCYTES NFR BLD AUTO: 15.2 % (ref 19.6–45.3)
MCH RBC QN AUTO: 30.8 PG (ref 26.6–33)
MCHC RBC AUTO-ENTMCNC: 31.6 G/DL (ref 31.5–35.7)
MCV RBC AUTO: 97.7 FL (ref 79–97)
MONOCYTES # BLD AUTO: 0.71 10*3/MM3 (ref 0.1–0.9)
MONOCYTES NFR BLD AUTO: 7.8 % (ref 5–12)
NEUTROPHILS NFR BLD AUTO: 6.83 10*3/MM3 (ref 1.7–7)
NEUTROPHILS NFR BLD AUTO: 75.3 % (ref 42.7–76)
NRBC BLD AUTO-RTO: 0 /100 WBC (ref 0–0.2)
PLATELET # BLD AUTO: 351 10*3/MM3 (ref 140–450)
PMV BLD AUTO: 9.6 FL (ref 6–12)
POTASSIUM SERPL-SCNC: 4 MMOL/L (ref 3.5–5.2)
PROT SERPL-MCNC: 6.7 G/DL (ref 6–8.5)
RBC # BLD AUTO: 3.08 10*6/MM3 (ref 3.77–5.28)
SODIUM SERPL-SCNC: 140 MMOL/L (ref 136–145)
WBC NRBC COR # BLD: 9.07 10*3/MM3 (ref 3.4–10.8)

## 2021-12-08 PROCEDURE — 71046 X-RAY EXAM CHEST 2 VIEWS: CPT

## 2021-12-08 PROCEDURE — 80053 COMPREHEN METABOLIC PANEL: CPT

## 2021-12-08 PROCEDURE — 85025 COMPLETE CBC W/AUTO DIFF WBC: CPT

## 2021-12-09 ENCOUNTER — APPOINTMENT (OUTPATIENT)
Dept: GENERAL RADIOLOGY | Facility: HOSPITAL | Age: 76
End: 2021-12-09

## 2021-12-09 ENCOUNTER — APPOINTMENT (OUTPATIENT)
Dept: CT IMAGING | Facility: HOSPITAL | Age: 76
End: 2021-12-09

## 2021-12-09 ENCOUNTER — HOSPITAL ENCOUNTER (OUTPATIENT)
Facility: HOSPITAL | Age: 76
Discharge: HOME | End: 2021-12-10
Attending: FAMILY MEDICINE | Admitting: FAMILY MEDICINE

## 2021-12-09 PROBLEM — R55 SYNCOPE AND COLLAPSE: Status: ACTIVE | Noted: 2021-12-09

## 2021-12-09 LAB
ALBUMIN SERPL-MCNC: 3.9 G/DL (ref 3.5–5.2)
ALBUMIN/GLOB SERPL: 1.1 G/DL
ALP SERPL-CCNC: 71 U/L (ref 39–117)
ALT SERPL W P-5'-P-CCNC: 13 U/L (ref 1–33)
ANION GAP SERPL CALCULATED.3IONS-SCNC: 11 MMOL/L (ref 5–15)
AST SERPL-CCNC: 26 U/L (ref 1–32)
BASOPHILS # BLD AUTO: 0.03 10*3/MM3 (ref 0–0.2)
BASOPHILS NFR BLD AUTO: 0.3 % (ref 0–1.5)
BILIRUB SERPL-MCNC: 0.4 MG/DL (ref 0–1.2)
BUN SERPL-MCNC: 13 MG/DL (ref 8–23)
BUN/CREAT SERPL: 18.6 (ref 7–25)
CALCIUM SPEC-SCNC: 9.5 MG/DL (ref 8.6–10.5)
CHLORIDE SERPL-SCNC: 99 MMOL/L (ref 98–107)
CK SERPL-CCNC: 66 U/L (ref 20–180)
CO2 SERPL-SCNC: 28 MMOL/L (ref 22–29)
CREAT SERPL-MCNC: 0.7 MG/DL (ref 0.57–1)
D-LACTATE SERPL-SCNC: 1.7 MMOL/L (ref 0.5–2)
DEPRECATED RDW RBC AUTO: 53.9 FL (ref 37–54)
EOSINOPHIL # BLD AUTO: 0.03 10*3/MM3 (ref 0–0.4)
EOSINOPHIL NFR BLD AUTO: 0.3 % (ref 0.3–6.2)
ERYTHROCYTE [DISTWIDTH] IN BLOOD BY AUTOMATED COUNT: 15 % (ref 12.3–15.4)
GFR SERPL CREATININE-BSD FRML MDRD: 82 ML/MIN/1.73
GLOBULIN UR ELPH-MCNC: 3.7 GM/DL
GLUCOSE SERPL-MCNC: 116 MG/DL (ref 65–99)
HCT VFR BLD AUTO: 31.3 % (ref 34–46.6)
HGB BLD-MCNC: 9.6 G/DL (ref 12–15.9)
IMM GRANULOCYTES # BLD AUTO: 0.04 10*3/MM3 (ref 0–0.05)
IMM GRANULOCYTES NFR BLD AUTO: 0.4 % (ref 0–0.5)
L PNEUMO1 AG UR QL IA: NEGATIVE
LYMPHOCYTES # BLD AUTO: 1.45 10*3/MM3 (ref 0.7–3.1)
LYMPHOCYTES NFR BLD AUTO: 13.1 % (ref 19.6–45.3)
MCH RBC QN AUTO: 30.1 PG (ref 26.6–33)
MCHC RBC AUTO-ENTMCNC: 30.7 G/DL (ref 31.5–35.7)
MCV RBC AUTO: 98.1 FL (ref 79–97)
MONOCYTES # BLD AUTO: 0.67 10*3/MM3 (ref 0.1–0.9)
MONOCYTES NFR BLD AUTO: 6 % (ref 5–12)
NEUTROPHILS NFR BLD AUTO: 79.9 % (ref 42.7–76)
NEUTROPHILS NFR BLD AUTO: 8.89 10*3/MM3 (ref 1.7–7)
NRBC BLD AUTO-RTO: 0 /100 WBC (ref 0–0.2)
NT-PROBNP SERPL-MCNC: 682 PG/ML (ref 0–1800)
PLATELET # BLD AUTO: 396 10*3/MM3 (ref 140–450)
PMV BLD AUTO: 9.2 FL (ref 6–12)
POTASSIUM SERPL-SCNC: 3.6 MMOL/L (ref 3.5–5.2)
PROCALCITONIN SERPL-MCNC: <0.02 NG/ML (ref 0–0.25)
PROT SERPL-MCNC: 7.6 G/DL (ref 6–8.5)
RBC # BLD AUTO: 3.19 10*6/MM3 (ref 3.77–5.28)
S PNEUM AG SPEC QL LA: NEGATIVE
SARS-COV-2 RNA PNL SPEC NAA+PROBE: NOT DETECTED
SODIUM SERPL-SCNC: 138 MMOL/L (ref 136–145)
TROPONIN T SERPL-MCNC: <0.01 NG/ML (ref 0–0.03)
WBC NRBC COR # BLD: 11.11 10*3/MM3 (ref 3.4–10.8)

## 2021-12-09 PROCEDURE — 25010000002 METHYLPREDNISOLONE PER 40 MG: Performed by: INTERNAL MEDICINE

## 2021-12-09 PROCEDURE — 99214 OFFICE O/P EST MOD 30 MIN: CPT | Performed by: INTERNAL MEDICINE

## 2021-12-09 PROCEDURE — 71275 CT ANGIOGRAPHY CHEST: CPT

## 2021-12-09 PROCEDURE — 96374 THER/PROPH/DIAG INJ IV PUSH: CPT

## 2021-12-09 PROCEDURE — 87899 AGENT NOS ASSAY W/OPTIC: CPT | Performed by: INTERNAL MEDICINE

## 2021-12-09 PROCEDURE — 84145 PROCALCITONIN (PCT): CPT | Performed by: FAMILY MEDICINE

## 2021-12-09 PROCEDURE — 87635 SARS-COV-2 COVID-19 AMP PRB: CPT | Performed by: FAMILY MEDICINE

## 2021-12-09 PROCEDURE — 94799 UNLISTED PULMONARY SVC/PX: CPT

## 2021-12-09 PROCEDURE — 94640 AIRWAY INHALATION TREATMENT: CPT

## 2021-12-09 PROCEDURE — 84484 ASSAY OF TROPONIN QUANT: CPT | Performed by: FAMILY MEDICINE

## 2021-12-09 PROCEDURE — 82550 ASSAY OF CK (CPK): CPT | Performed by: FAMILY MEDICINE

## 2021-12-09 PROCEDURE — 80053 COMPREHEN METABOLIC PANEL: CPT | Performed by: FAMILY MEDICINE

## 2021-12-09 PROCEDURE — 85025 COMPLETE CBC W/AUTO DIFF WBC: CPT | Performed by: FAMILY MEDICINE

## 2021-12-09 PROCEDURE — 83605 ASSAY OF LACTIC ACID: CPT | Performed by: FAMILY MEDICINE

## 2021-12-09 PROCEDURE — 83880 ASSAY OF NATRIURETIC PEPTIDE: CPT | Performed by: FAMILY MEDICINE

## 2021-12-09 PROCEDURE — 25010000002 AZITHROMYCIN PER 500 MG: Performed by: INTERNAL MEDICINE

## 2021-12-09 PROCEDURE — 99213 OFFICE O/P EST LOW 20 MIN: CPT | Performed by: NURSE PRACTITIONER

## 2021-12-09 PROCEDURE — 71046 X-RAY EXAM CHEST 2 VIEWS: CPT

## 2021-12-09 PROCEDURE — C9803 HOPD COVID-19 SPEC COLLECT: HCPCS

## 2021-12-09 PROCEDURE — 0 IOPAMIDOL PER 1 ML: Performed by: FAMILY MEDICINE

## 2021-12-09 RX ORDER — BUDESONIDE AND FORMOTEROL FUMARATE DIHYDRATE 160; 4.5 UG/1; UG/1
2 AEROSOL RESPIRATORY (INHALATION)
Status: DISCONTINUED | OUTPATIENT
Start: 2021-12-09 | End: 2021-12-10 | Stop reason: HOSPADM

## 2021-12-09 RX ORDER — FLUTICASONE PROPIONATE 50 MCG
2 SPRAY, SUSPENSION (ML) NASAL DAILY
Status: DISCONTINUED | OUTPATIENT
Start: 2021-12-10 | End: 2021-12-10 | Stop reason: HOSPADM

## 2021-12-09 RX ORDER — SODIUM CHLORIDE 0.9 % (FLUSH) 0.9 %
10 SYRINGE (ML) INJECTION AS NEEDED
Status: DISCONTINUED | OUTPATIENT
Start: 2021-12-09 | End: 2021-12-10 | Stop reason: HOSPADM

## 2021-12-09 RX ORDER — CETIRIZINE HYDROCHLORIDE 10 MG/1
10 TABLET ORAL DAILY
Status: DISCONTINUED | OUTPATIENT
Start: 2021-12-10 | End: 2021-12-10 | Stop reason: HOSPADM

## 2021-12-09 RX ORDER — SODIUM CHLORIDE 0.9 % (FLUSH) 0.9 %
10 SYRINGE (ML) INJECTION EVERY 12 HOURS SCHEDULED
Status: DISCONTINUED | OUTPATIENT
Start: 2021-12-09 | End: 2021-12-10 | Stop reason: HOSPADM

## 2021-12-09 RX ORDER — IPRATROPIUM BROMIDE AND ALBUTEROL SULFATE 2.5; .5 MG/3ML; MG/3ML
3 SOLUTION RESPIRATORY (INHALATION)
Status: DISCONTINUED | OUTPATIENT
Start: 2021-12-09 | End: 2021-12-10 | Stop reason: HOSPADM

## 2021-12-09 RX ORDER — METHYLPREDNISOLONE SODIUM SUCCINATE 40 MG/ML
40 INJECTION, POWDER, LYOPHILIZED, FOR SOLUTION INTRAMUSCULAR; INTRAVENOUS EVERY 8 HOURS
Status: DISCONTINUED | OUTPATIENT
Start: 2021-12-09 | End: 2021-12-10

## 2021-12-09 RX ADMIN — METHYLPREDNISOLONE SODIUM SUCCINATE 40 MG: 40 INJECTION, POWDER, FOR SOLUTION INTRAMUSCULAR; INTRAVENOUS at 21:52

## 2021-12-09 RX ADMIN — AZITHROMYCIN MONOHYDRATE 500 MG: 500 INJECTION, POWDER, LYOPHILIZED, FOR SOLUTION INTRAVENOUS at 21:53

## 2021-12-09 RX ADMIN — IPRATROPIUM BROMIDE AND ALBUTEROL SULFATE 3 ML: 2.5; .5 SOLUTION RESPIRATORY (INHALATION) at 21:11

## 2021-12-09 RX ADMIN — IOPAMIDOL 70 ML: 755 INJECTION, SOLUTION INTRAVENOUS at 14:18

## 2021-12-09 RX ADMIN — SODIUM CHLORIDE, PRESERVATIVE FREE 10 ML: 5 INJECTION INTRAVENOUS at 21:53

## 2021-12-09 NOTE — CONSULTS
Referring Provider: Dr. Layne  Reason for Consultation: Shortness of breath    Patient Care Team:  Andrew Layne MD as PCP - General  Andrew Layne MD as PCP - Family Medicine  Meghan, GEREMIAS Chairez as Nurse Practitioner (Pulmonary Disease)  Ravi Hylton MD as Cardiologist (Cardiology)  Silvia Herrera APRN as Referring Physician (Vascular Surgery)    Chief complaint Shortness of breath    Subjective .     History of present illness:  Ms. Zhu is a 76 year old female who is well known to the cardiothoracic surgery service having recently undergone right upper lobectomy on 11/5/2021.  Her post operative recovery was complicated by bleeding around the apical portion of the middle lobe that ultimately resolved without intervention.  She did require 1 units of PRBC.  She was discharged home on post op day 11 after allowing time for air leak to resolve.  The remainder of her recovery has been uneventful to this point.  She presented to her PCP today with complaints of intermittent sudden onset shortness of breath that she reports is always accompanied by a headache for the past 4 days.  This typically occurs with ambulation but she states this may have happened once when sitting.  She tells me that the only thing that resolves her symptoms is using her incentive spirometer.  She was seen yesterday by Dr. Alexander and she reports that she had a similar episode in the [facility] however she had a syncopal episode at that time.  Given these events, Dr. Layne has admitted her for further workup.  Cardiothoracic surgery has been consulted. Pulmonology has also been consulted.  She denies chest pain or back pain.  No pain at surgical sites.  She is currently lying in bed tolerating room air with O2 sat 100%.  Her  is at her bedside.  She reports that prior to Sunday when she had the first episode, she was recovering without remark but does feel that she has not recovered from surgery as  quickly as she had hoped.      History  There are no questions and answers to display.         Past Medical History:   Diagnosis Date   • Antral ulcer    • Bladder cystocele    • C. difficile diarrhea    • CAD (coronary artery disease)    • Colon cancer screening     PREOPERATIVE    • Colon polyp    • Diarrhea    • Difficulty swallowing    • Disease of thyroid gland    • Elevated cholesterol    • Gastritis    • Generalized OA    • GERD (gastroesophageal reflux disease)    • History of bladder surgery 01/07/2020   • Hyperlipidemia    • Hypertension    • Liver cyst    • Lung nodule    • Microscopic colitis    • Multiple gastric ulcers     last 5 years ago   • Neck pain    • Neuropathy    • Normal body mass index    • NSAID induced gastritis    • Ulcer of pyloric antrum     UNSPECIFIED ULCER CHRONICITY   • UTI (urinary tract infection)    • Weight loss    ,   Past Surgical History:   Procedure Laterality Date   • ANTERIOR CERVICAL DISCECTOMY W/ FUSION N/A 5/22/2017    Procedure: CERVICAL DISCECTOMY ANTERIOR FUSION WITH INSTRUMENTATION;  Surgeon: NADINE Sarabia MD;  Location: [facility] OR;  Service:    • AORTAGRAM Left 11/9/2020    Procedure: left lower extremtiy angiogram, hawk athrectomy, balloon angioplasty, stent placement, mynx closure;  Surgeon: Sukhdev Condon DO;  Location:  [facility];  Service: Vascular;  Laterality: Left;   • AORTAGRAM Left 3/26/2021    Procedure: LEFT LOWER EXTREMITY ANGIOGRAM, BALLOON ANGIOPLASTY, STENT PLACEMENT, MYNX CLOSURE;  Surgeon: Sukhdev Condon DO;  Location:  [facility];  Service: Vascular;  Laterality: Left;   • AORTAGRAM Left 8/6/2021    Procedure: LEFT LOWER EXTREMITY ANGIOGRAM, HAWK ATHERECTOMY, BALLOON ANGIOPLASTY, MYNX CLOSURE;  Surgeon: Sukhdev Condon DO;  Location:  [facility];  Service: Vascular;  Laterality: Left;   • BLADDER SUSPENSION      also had pelvic reconstructive surgery   • BREAST EXCISIONAL BIOPSY Right 1985   • CATARACT  EXTRACTION, BILATERAL     • CERVICAL CORPECTOMY N/A 2017    Procedure:  ANTERIOR CERVICAL DISCECTOMY FUSION C-6 to T1,  ANTERIOR FUSION WITH INSTRUMENTATION C-5 T-1;  Surgeon: NADINE Sarabia MD;  Location:  [facility] OR;  Service:    • COLONOSCOPY  2015    TIC BX RT COLON   • COLONOSCOPY  2013    RT COLON BX RECALL 5YR   • COLONOSCOPY N/A 2016    Procedure: LOWER LIMITED COLONOSCOPY WITH ANESTHESIA;  Surgeon: Marco Antonio Vallejo MD;  Location:  [facility];  Service:    • ENDOSCOPY  2015    HEALED ULCER SLANT BX   • HYSTERECTOMY     • LOBECTOMY Right 2021    Procedure: RIGHT THORACOSCOPY WITH DAVINCI ROBOT, RIGHT UPPER LOBE LOBECTOMY;  Surgeon: Jarad Ignacio MD;  Location: [facility] OR;  Service: Robotics - DaVinci;  Laterality: Right;   • OTHER SURGICAL HISTORY      KNEE CARTILAGE REMOVED   • OTHER SURGICAL HISTORY      BENIGN FIBROID TUMOR OF BREAST 1985 REMOVED   • TONSILLECTOMY     ,   Family History   Problem Relation Age of Onset   • Breast cancer Maternal Aunt    • Heart disease Mother    • Heart disease Father    • GI problems Neg Hx         MALIGNANCIES   • Colon cancer Neg Hx    • Colon polyps Neg Hx    ,   Social History     Tobacco Use   • Smoking status: Former Smoker     Packs/day: 1.50     Years: 20.00     Pack years: 30.00     Types: Cigarettes     Quit date:      Years since quittin.9   • Smokeless tobacco: Never Used   Vaping Use   • Vaping Use: Never used   Substance Use Topics   • Alcohol use: Yes     Comment: occasionally   • Drug use: No   ,   Medications Prior to Admission   Medication Sig Dispense Refill Last Dose   • albuterol sulfate  (90 Base) MCG/ACT inhaler Inhale 2 puffs Every 4 (Four) Hours As Needed for Wheezing.      • alendronate (FOSAMAX) 70 MG tablet Take 70 mg by mouth Every 7 (Seven) Days.   3    • aspirin 81 MG chewable tablet Chew 81 mg Daily.      • benazepril (LOTENSIN) 5 MG tablet Take 5 mg by mouth Daily.      •  carvedilol (COREG) 12.5 MG tablet Take 12.5 mg by mouth 2 (Two) Times a Day With Meals.      • cetirizine (ZyrTEC) 10 MG tablet Take 10 mg by mouth At Night As Needed for Allergies.      • clopidogrel (PLAVIX) 75 MG tablet TAKE 1 TABLET BY MOUTH EVERY DAY  30 tablet 4    • diazePAM (VALIUM) 5 MG tablet Take 2.5 mg by mouth Every 6 (Six) Hours As Needed.  0    • Evolocumab (REPATHA) solution auto-injector SureClick injection Inject 140 mg under the skin into the appropriate area as directed Every 14 (Fourteen) Days.      • famotidine (PEPCID) 40 MG tablet Take 40 mg by mouth 2 (Two) Times a Day.  3    • Fluticasone Furoate-Vilanterol (Breo Ellipta) 200-25 MCG/INH inhaler Inhale 1 puff Daily.      • guaiFENesin (MUCINEX) 600 MG 12 hr tablet Take 600 mg by mouth Daily.      • hydroCHLOROthiazide (HYDRODIURIL) 12.5 MG tablet Take 12.5 mg by mouth Daily.      • iron polysaccharides (NIFEREX) 150 MG capsule Take 1 capsule by mouth Daily. 30 capsule 0    • levothyroxine (SYNTHROID, LEVOTHROID) 50 MCG tablet Take 50 mcg by mouth Daily.  3    • Mirabegron ER (Myrbetriq) 50 MG tablet sustained-release 24 hour 24 hr tablet Take 50 mg by mouth Daily.      • multivitamin with minerals (MULTIVITAMIN ADULT PO) Take 1 tablet by mouth Daily.      • pregabalin (LYRICA) 50 MG capsule Take 50 mg by mouth 2 (Two) Times a Day.      • rosuvastatin (CRESTOR) 40 MG tablet Take 40 mg by mouth Every Night.      • traMADol (ULTRAM) 50 MG tablet Take 50 mg by mouth Every 3 (Three) Hours As Needed for Moderate Pain  or Severe Pain .  0    • traMADol (ULTRAM) 50 MG tablet Take 1 tablet by mouth Every 6 (Six) Hours As Needed for Moderate Pain . 25 tablet 0    , Allergies: Baclofen, Gabapentin, Sulfa antibiotics, Sulfacetamide sodium, Niferex [iron polysaccharide], Amitriptyline hcl, and Oxycodone-acetaminophen    Review of Systems  Review of Systems   Constitutional: Positive for fatigue. Negative for chills, diaphoresis and fever.   HENT:  Negative for trouble swallowing and voice change.    Eyes: Negative for visual disturbance.   Respiratory: Positive for shortness of breath (Intermittent). Negative for chest tightness.    Cardiovascular: Negative for chest pain, palpitations and leg swelling.   Gastrointestinal: Negative for abdominal pain, diarrhea, nausea and vomiting.   Genitourinary: Negative for difficulty urinating, dysuria and hematuria.   Musculoskeletal: Negative for arthralgias and myalgias.   Skin: Negative for color change, pallor, rash and wound.   Neurological: Positive for syncope and headaches. Negative for dizziness.   Hematological: Does not bruise/bleed easily.   Psychiatric/Behavioral: Negative for agitation, confusion and sleep disturbance.        Objective     Vital Signs   Visit Vitals  /73 (BP Location: Left arm, Patient Position: Lying)   Pulse 78   Temp 98.1 °F (36.7 °C) (Oral)   Resp 16   SpO2 100%       Physical Exam  Vitals reviewed.   Constitutional:       Appearance: Normal appearance.   HENT:      Head: Normocephalic.   Eyes:      Pupils: Pupils are equal, round, and reactive to light.   Cardiovascular:      Rate and Rhythm: Normal rate and regular rhythm.      Heart sounds: Normal heart sounds. No murmur heard.      Pulmonary:      Breath sounds: Normal breath sounds. No wheezing or rales.   Abdominal:      General: There is no distension.      Palpations: Abdomen is soft.      Tenderness: There is no abdominal tenderness.   Musculoskeletal:         General: No swelling or tenderness.   Skin:     General: Skin is warm and dry.      Comments: Thoracic incisions are C/D/I and healing nicely.   Neurological:      General: No focal deficit present.      Mental Status: She is alert and oriented to person, place, and time.   Psychiatric:         Mood and Affect: Mood normal.         Behavior: Behavior normal.         Thought Content: Thought content normal.         Judgment: Judgment normal.           LAB:           IMAGES:       Imaging Results (Last 24 Hours)     Procedure Component Value Units Date/Time    CT Angiogram Chest With & Without Contrast [[id number]] Resulted: 12/09/21 1405     Updated: 12/09/21 1418    XR Chest PA & Lateral [[id number]] Resulted: 12/09/21 1345     Updated: 12/09/21 1346                       Assessment/Plan      Former smoker    Syncope and collapse    Shortness of breath    Former smoker    S/P right upper lobectomy      CTA chest and PA and lateral chest xray have been ordered to be done today.  Will await these results.  Chest xray from yesterday was reviewed revealing stable right hydropneumothorax and similar right pleural effusion in comparison to imaging done on 11/22/2021.  Labs reviewed and are stable.  Daily chest xray while admitted.  Recommendations are forthcoming pending review of testing.    We will continue to follow    GEREMIAS Downs  12/09/21  14:32 CST

## 2021-12-09 NOTE — H&P
Brief Admit note from office:  76 year old weeks out from right upper lobectomy on 11/5.  Course complicated by anemia, and hemothorax.  She presented with complaints of post-exertional sob and two separate syncopal events post exertion.  She was evaluated by ID yesterday.  CXR showed hydropneumothorax on the right.  She reports the only thing that helps is Incentive spirometry but only improves sob briefly.     Admitted obs to get CTS and pulm eval and to rule out more serious underlying pathology.   See orders.   Dr Layne

## 2021-12-10 ENCOUNTER — APPOINTMENT (OUTPATIENT)
Dept: GENERAL RADIOLOGY | Facility: HOSPITAL | Age: 76
End: 2021-12-10

## 2021-12-10 ENCOUNTER — READMISSION MANAGEMENT (OUTPATIENT)
Dept: CALL CENTER | Facility: HOSPITAL | Age: 76
End: 2021-12-10

## 2021-12-10 VITALS
SYSTOLIC BLOOD PRESSURE: 131 MMHG | TEMPERATURE: 98.2 F | DIASTOLIC BLOOD PRESSURE: 58 MMHG | HEIGHT: 63 IN | RESPIRATION RATE: 16 BRPM | BODY MASS INDEX: 18.43 KG/M2 | OXYGEN SATURATION: 100 % | WEIGHT: 104 LBS | HEART RATE: 100 BPM

## 2021-12-10 LAB
ANION GAP SERPL CALCULATED.3IONS-SCNC: 11 MMOL/L (ref 5–15)
BASOPHILS # BLD AUTO: 0.01 10*3/MM3 (ref 0–0.2)
BASOPHILS NFR BLD AUTO: 0.2 % (ref 0–1.5)
BUN SERPL-MCNC: 14 MG/DL (ref 8–23)
BUN/CREAT SERPL: 24.1 (ref 7–25)
CALCIUM SPEC-SCNC: 9.2 MG/DL (ref 8.6–10.5)
CHLORIDE SERPL-SCNC: 101 MMOL/L (ref 98–107)
CO2 SERPL-SCNC: 28 MMOL/L (ref 22–29)
CREAT SERPL-MCNC: 0.58 MG/DL (ref 0.57–1)
DEPRECATED RDW RBC AUTO: 53.8 FL (ref 37–54)
EOSINOPHIL # BLD AUTO: 0 10*3/MM3 (ref 0–0.4)
EOSINOPHIL NFR BLD AUTO: 0 % (ref 0.3–6.2)
ERYTHROCYTE [DISTWIDTH] IN BLOOD BY AUTOMATED COUNT: 14.9 % (ref 12.3–15.4)
GFR SERPL CREATININE-BSD FRML MDRD: 101 ML/MIN/1.73
GLUCOSE SERPL-MCNC: 142 MG/DL (ref 65–99)
HCT VFR BLD AUTO: 30.1 % (ref 34–46.6)
HGB BLD-MCNC: 9.5 G/DL (ref 12–15.9)
IMM GRANULOCYTES # BLD AUTO: 0.02 10*3/MM3 (ref 0–0.05)
IMM GRANULOCYTES NFR BLD AUTO: 0.3 % (ref 0–0.5)
LYMPHOCYTES # BLD AUTO: 1.15 10*3/MM3 (ref 0.7–3.1)
LYMPHOCYTES NFR BLD AUTO: 17.4 % (ref 19.6–45.3)
MCH RBC QN AUTO: 30.6 PG (ref 26.6–33)
MCHC RBC AUTO-ENTMCNC: 31.6 G/DL (ref 31.5–35.7)
MCV RBC AUTO: 97.1 FL (ref 79–97)
MONOCYTES # BLD AUTO: 0.16 10*3/MM3 (ref 0.1–0.9)
MONOCYTES NFR BLD AUTO: 2.4 % (ref 5–12)
NEUTROPHILS NFR BLD AUTO: 5.27 10*3/MM3 (ref 1.7–7)
NEUTROPHILS NFR BLD AUTO: 79.7 % (ref 42.7–76)
NRBC BLD AUTO-RTO: 0 /100 WBC (ref 0–0.2)
PLATELET # BLD AUTO: 389 10*3/MM3 (ref 140–450)
PMV BLD AUTO: 9.4 FL (ref 6–12)
POTASSIUM SERPL-SCNC: 4 MMOL/L (ref 3.5–5.2)
RBC # BLD AUTO: 3.1 10*6/MM3 (ref 3.77–5.28)
SODIUM SERPL-SCNC: 140 MMOL/L (ref 136–145)
WBC NRBC COR # BLD: 6.61 10*3/MM3 (ref 3.4–10.8)

## 2021-12-10 PROCEDURE — 63710000001 CETIRIZINE 10 MG TABLET: Performed by: INTERNAL MEDICINE

## 2021-12-10 PROCEDURE — 99213 OFFICE O/P EST LOW 20 MIN: CPT | Performed by: INTERNAL MEDICINE

## 2021-12-10 PROCEDURE — A9270 NON-COVERED ITEM OR SERVICE: HCPCS | Performed by: INTERNAL MEDICINE

## 2021-12-10 PROCEDURE — 80048 BASIC METABOLIC PNL TOTAL CA: CPT | Performed by: INTERNAL MEDICINE

## 2021-12-10 PROCEDURE — 94618 PULMONARY STRESS TESTING: CPT

## 2021-12-10 PROCEDURE — 94799 UNLISTED PULMONARY SVC/PX: CPT

## 2021-12-10 PROCEDURE — 63710000001 IBUPROFEN 600 MG TABLET: Performed by: FAMILY MEDICINE

## 2021-12-10 PROCEDURE — 25010000002 METHYLPREDNISOLONE PER 40 MG: Performed by: INTERNAL MEDICINE

## 2021-12-10 PROCEDURE — 96376 TX/PRO/DX INJ SAME DRUG ADON: CPT

## 2021-12-10 PROCEDURE — 85025 COMPLETE CBC W/AUTO DIFF WBC: CPT | Performed by: INTERNAL MEDICINE

## 2021-12-10 PROCEDURE — 71045 X-RAY EXAM CHEST 1 VIEW: CPT

## 2021-12-10 PROCEDURE — A9270 NON-COVERED ITEM OR SERVICE: HCPCS | Performed by: FAMILY MEDICINE

## 2021-12-10 PROCEDURE — 99212 OFFICE O/P EST SF 10 MIN: CPT | Performed by: NURSE PRACTITIONER

## 2021-12-10 PROCEDURE — 63710000001 FLUTICASONE 50 MCG/ACT SUSPENSION 16 G BOTTLE: Performed by: INTERNAL MEDICINE

## 2021-12-10 RX ORDER — PREDNISONE 10 MG/1
TABLET ORAL
Qty: 14 TABLET | Refills: 0 | Status: SHIPPED | OUTPATIENT
Start: 2021-12-10 | End: 2021-12-22

## 2021-12-10 RX ORDER — AZITHROMYCIN 500 MG/1
500 TABLET, FILM COATED ORAL DAILY
Qty: 2 TABLET | Refills: 0 | Status: SHIPPED | OUTPATIENT
Start: 2021-12-10 | End: 2021-12-12

## 2021-12-10 RX ORDER — IBUPROFEN 600 MG/1
600 TABLET ORAL EVERY 4 HOURS PRN
Status: DISCONTINUED | OUTPATIENT
Start: 2021-12-10 | End: 2021-12-10 | Stop reason: HOSPADM

## 2021-12-10 RX ORDER — METHYLPREDNISOLONE SODIUM SUCCINATE 40 MG/ML
40 INJECTION, POWDER, LYOPHILIZED, FOR SOLUTION INTRAMUSCULAR; INTRAVENOUS DAILY
Status: DISCONTINUED | OUTPATIENT
Start: 2021-12-11 | End: 2021-12-10 | Stop reason: HOSPADM

## 2021-12-10 RX ADMIN — IPRATROPIUM BROMIDE AND ALBUTEROL SULFATE 3 ML: 2.5; .5 SOLUTION RESPIRATORY (INHALATION) at 10:30

## 2021-12-10 RX ADMIN — IBUPROFEN 600 MG: 600 TABLET ORAL at 11:14

## 2021-12-10 RX ADMIN — FLUTICASONE PROPIONATE 2 SPRAY: 50 SPRAY, METERED NASAL at 09:19

## 2021-12-10 RX ADMIN — IPRATROPIUM BROMIDE AND ALBUTEROL SULFATE 3 ML: 2.5; .5 SOLUTION RESPIRATORY (INHALATION) at 06:22

## 2021-12-10 RX ADMIN — SODIUM CHLORIDE, PRESERVATIVE FREE 10 ML: 5 INJECTION INTRAVENOUS at 09:19

## 2021-12-10 RX ADMIN — CETIRIZINE HYDROCHLORIDE 10 MG: 10 TABLET ORAL at 09:19

## 2021-12-10 RX ADMIN — IBUPROFEN 600 MG: 600 TABLET ORAL at 15:22

## 2021-12-10 RX ADMIN — METHYLPREDNISOLONE SODIUM SUCCINATE 40 MG: 40 INJECTION, POWDER, FOR SOLUTION INTRAMUSCULAR; INTRAVENOUS at 07:11

## 2021-12-10 NOTE — H&P
History and Physical    Patient:  Trinidad Zhu  MRN: [id number]    CHIEF COMPLAINT:  Shortness of breath    History Obtained From: the patient   PCP: Andrew Layne MD    HISTORY OF PRESENT ILLNESS:   The patient is a 76 y.o. female who presents with History of hypertension, hyperlipidemia, coronary artery disease, GERD.  Recent diagnosis with small cell lung cancer with a right upper lobe lobectomy on 11/5/2021.  She presented to the office on 12/9/2021.  She is complaining of shortness of breath, but postexertional syncope x2.  She was seen by infectious disease on 12/8/2021 where she had a chest x-ray and labs performed.  On the chest x-ray, there was concern for hydropneumothorax on the right.  She would get short-term relief when using the incentive spirometer.  She was directly admitted from the office to be observed and to have cardiothoracic surgery see her as well as pulmonology.  In further discussion with her, she was not restarted on inhalers after her lobectomy.    REVIEW OF SYSTEMS:    Review of Systems   Constitutional: Positive for activity change and fatigue. Negative for appetite change and fever.   HENT: Negative for sore throat.    Respiratory: Positive for shortness of breath.    Cardiovascular: Negative for leg swelling.   Gastrointestinal: Negative for abdominal pain, nausea and vomiting.   Skin: Positive for pallor.   Neurological: Positive for dizziness, syncope and weakness.          Past Medical History:  Past Medical History:   Diagnosis Date   • Antral ulcer    • Bladder cystocele    • C. difficile diarrhea    • CAD (coronary artery disease)    • Colon cancer screening     PREOPERATIVE    • Colon polyp    • Diarrhea    • Difficulty swallowing    • Disease of thyroid gland    • Elevated cholesterol    • Gastritis    • Generalized OA    • GERD (gastroesophageal reflux disease)    • History of bladder surgery 01/07/2020   • Hyperlipidemia    • Hypertension    • Liver cyst    •  Lung nodule    • Microscopic colitis    • Multiple gastric ulcers     last 5 years ago   • Neck pain    • Neuropathy    • Normal body mass index    • NSAID induced gastritis    • Ulcer of pyloric antrum     UNSPECIFIED ULCER CHRONICITY   • UTI (urinary tract infection)    • Weight loss        Past Surgical History:  Past Surgical History:   Procedure Laterality Date   • ANTERIOR CERVICAL DISCECTOMY W/ FUSION N/A 5/22/2017    Procedure: CERVICAL DISCECTOMY ANTERIOR FUSION WITH INSTRUMENTATION;  Surgeon: NADINE Sarabia MD;  Location: [facility] OR;  Service:    • AORTAGRAM Left 11/9/2020    Procedure: left lower extremtiy angiogram, hawk athrectomy, balloon angioplasty, stent placement, mynx closure;  Surgeon: Sukhdev Condon DO;  Location:  [facility];  Service: Vascular;  Laterality: Left;   • AORTAGRAM Left 3/26/2021    Procedure: LEFT LOWER EXTREMITY ANGIOGRAM, BALLOON ANGIOPLASTY, STENT PLACEMENT, MYNX CLOSURE;  Surgeon: Sukhdev Condon DO;  Location:  [facility];  Service: Vascular;  Laterality: Left;   • AORTAGRAM Left 8/6/2021    Procedure: LEFT LOWER EXTREMITY ANGIOGRAM, HAWK ATHERECTOMY, BALLOON ANGIOPLASTY, MYNX CLOSURE;  Surgeon: Sukhdev Condon DO;  Location:  [facility];  Service: Vascular;  Laterality: Left;   • BLADDER SUSPENSION      also had pelvic reconstructive surgery   • BREAST EXCISIONAL BIOPSY Right 1985   • CATARACT EXTRACTION, BILATERAL     • CERVICAL CORPECTOMY N/A 5/22/2017    Procedure:  ANTERIOR CERVICAL DISCECTOMY FUSION C-6 to T1,  ANTERIOR FUSION WITH INSTRUMENTATION C-5 T-1;  Surgeon: NADINE Sarabia MD;  Location: [facility] OR;  Service:    • COLONOSCOPY  08/19/2015    TIC BX RT COLON   • COLONOSCOPY  12/04/2013    RT COLON BX RECALL 5YR   • COLONOSCOPY N/A 11/29/2016    Procedure: LOWER LIMITED COLONOSCOPY WITH ANESTHESIA;  Surgeon: Marco Antonio Vallejo MD;  Location: [facility] ENDOSCOPY;  Service:    • ENDOSCOPY  12/03/2015    HEALED ULCER SLANT BX   •  HYSTERECTOMY     • LOBECTOMY Right 11/5/2021    Procedure: RIGHT THORACOSCOPY WITH [employer] ROBOT, RIGHT UPPER LOBE LOBECTOMY;  Surgeon: Mateus, Jarad VEGA MD;  Location: [facility];  Service: Robotics [employer];  Laterality: Right;   • OTHER SURGICAL HISTORY      KNEE CARTILAGE REMOVED   • OTHER SURGICAL HISTORY      BENIGN FIBROID TUMOR OF BREAST 1985 REMOVED   • TONSILLECTOMY         Medications Prior to Admission:    Medications Prior to Admission   Medication Sig Dispense Refill Last Dose   • albuterol sulfate  (90 Base) MCG/ACT inhaler Inhale 2 puffs Every 4 (Four) Hours As Needed for Wheezing.      • alendronate (FOSAMAX) 70 MG tablet Take 70 mg by mouth Every 7 (Seven) Days. Saturdays  3    • aspirin 81 MG chewable tablet Chew 81 mg Daily.      • benazepril (LOTENSIN) 5 MG tablet Take 5 mg by mouth Daily.      • carvedilol (COREG) 12.5 MG tablet Take 12.5 mg by mouth 2 (Two) Times a Day With Meals.      • cetirizine (ZyrTEC) 10 MG tablet Take 10 mg by mouth At Night As Needed for Allergies.      • clopidogrel (PLAVIX) 75 MG tablet TAKE 1 TABLET BY MOUTH EVERY DAY  30 tablet 4    • diazePAM (VALIUM) 5 MG tablet Take 2.5 mg by mouth Every 6 (Six) Hours As Needed.  0    • Evolocumab (REPATHA) solution auto-injector SureClick injection Inject 140 mg under the skin into the appropriate area as directed Every 14 (Fourteen) Days.      • famotidine (PEPCID) 40 MG tablet Take 40 mg by mouth 2 (Two) Times a Day.  3    • guaiFENesin (MUCINEX) 600 MG 12 hr tablet Take 600 mg by mouth Daily.      • hydroCHLOROthiazide (HYDRODIURIL) 12.5 MG tablet Take 12.5 mg by mouth Daily.      • iron polysaccharides (NIFEREX) 150 MG capsule Take 1 capsule by mouth Daily. 30 capsule 0    • levothyroxine (SYNTHROID, LEVOTHROID) 50 MCG tablet Take 50 mcg by mouth Daily.  3    • Mirabegron ER (Myrbetriq) 50 MG tablet sustained-release 24 hour 24 hr tablet Take 50 mg by mouth Daily.      • multivitamin with minerals (MULTIVITAMIN ADULT  "PO) Take 1 tablet by mouth Daily.      • pregabalin (LYRICA) 50 MG capsule Take 50 mg by mouth 2 (Two) Times a Day.      • rosuvastatin (CRESTOR) 40 MG tablet Take 40 mg by mouth Every Night.      • traMADol (ULTRAM) 50 MG tablet Take 50 mg by mouth Every 3 (Three) Hours As Needed for Moderate Pain  or Severe Pain .  0    • traMADol (ULTRAM) 50 MG tablet Take 1 tablet by mouth Every 6 (Six) Hours As Needed for Moderate Pain . 25 tablet 0        Allergies:  Baclofen, Gabapentin, Sulfa antibiotics, Sulfacetamide sodium, Niferex [iron polysaccharide], Amitriptyline hcl, and Oxycodone-acetaminophen    Social History:   Social History     Socioeconomic History   • Marital status:    Tobacco Use   • Smoking status: Former Smoker     Packs/day: 1.50     Years: 20.00     Pack years: 30.00     Types: Cigarettes     Quit date:      Years since quittin.9   • Smokeless tobacco: Never Used   Vaping Use   • Vaping Use: Never used   Substance and Sexual Activity   • Alcohol use: Yes     Comment: occasionally   • Drug use: No   • Sexual activity: Not Currently       Family History:   Family History   Problem Relation Age of Onset   • Breast cancer Maternal Aunt    • Heart disease Mother    • Heart disease Father    • GI problems Neg Hx         MALIGNANCIES   • Colon cancer Neg Hx    • Colon polyps Neg Hx            Physical Exam:    Vitals: /58 (BP Location: Right arm, Patient Position: Lying)   Pulse 98   Temp 98.2 °F (36.8 °C) (Oral)   Resp 16   Ht 160 cm (63\")   Wt 47.2 kg (104 lb)   SpO2 95%   BMI 18.42 kg/m²   Physical Exam  Vitals reviewed.   Constitutional:       Appearance: Normal appearance. She is not ill-appearing.   HENT:      Head: Normocephalic and atraumatic.   Eyes:      General:         Right eye: No discharge.         Left eye: No discharge.      Extraocular Movements: Extraocular movements intact.      Conjunctiva/sclera: Conjunctivae normal.   Cardiovascular:      Rate and Rhythm: " Normal rate and regular rhythm.      Pulses: Normal pulses.      Heart sounds: No murmur heard.      Pulmonary:      Effort: Pulmonary effort is normal. No respiratory distress.      Comments: Slightly decreased breath sounds on the right compared to left, but breath sounds are still audible throughout.  Abdominal:      General: Abdomen is flat. Bowel sounds are normal. There is no distension.   Musculoskeletal:      Right lower leg: No edema.      Left lower leg: No edema.   Skin:     Capillary Refill: Capillary refill takes less than 2 seconds.   Neurological:      General: No focal deficit present.      Mental Status: She is alert and oriented to person, place, and time.   Psychiatric:         Mood and Affect: Mood normal.         Behavior: Behavior normal.           Lab Results (last 24 hours)     Procedure Component Value Units Date/Time    Basic Metabolic Panel [[id number]]  (Abnormal) Collected: 12/10/21 0605    Specimen: Blood Updated: 12/10/21 0701     Glucose 142 mg/dL      BUN 14 mg/dL      Creatinine 0.58 mg/dL      Sodium 140 mmol/L      Potassium 4.0 mmol/L      Chloride 101 mmol/L      CO2 28.0 mmol/L      Calcium 9.2 mg/dL      eGFR Non African Amer 101 mL/min/1.73      BUN/Creatinine Ratio 24.1     Anion Gap 11.0 mmol/L     Narrative:      GFR Normal >60  Chronic Kidney Disease <60  Kidney Failure <15      CBC & Differential [[id number]]  (Abnormal) Collected: 12/10/21 0605    Specimen: Blood Updated: 12/10/21 0644    Narrative:      The following orders were created for panel order CBC & Differential.  Procedure                               Abnormality         Status                     ---------                               -----------         ------                     CBC Auto Differential[[id number]]        Abnormal            Final result                 Please view results for these tests on the individual orders.    CBC Auto Differential [[id number]]  (Abnormal) Collected: 12/10/21 0605     Specimen: Blood Updated: 12/10/21 0644     WBC 6.61 10*3/mm3      RBC 3.10 10*6/mm3      Hemoglobin 9.5 g/dL      Hematocrit 30.1 %      MCV 97.1 fL      MCH 30.6 pg      MCHC 31.6 g/dL      RDW 14.9 %      RDW-SD 53.8 fl      MPV 9.4 fL      Platelets 389 10*3/mm3      Neutrophil % 79.7 %      Lymphocyte % 17.4 %      Monocyte % 2.4 %      Eosinophil % 0.0 %      Basophil % 0.2 %      Immature Grans % 0.3 %      Neutrophils, Absolute 5.27 10*3/mm3      Lymphocytes, Absolute 1.15 10*3/mm3      Monocytes, Absolute 0.16 10*3/mm3      Eosinophils, Absolute 0.00 10*3/mm3      Basophils, Absolute 0.01 10*3/mm3      Immature Grans, Absolute 0.02 10*3/mm3      nRBC 0.0 /100 WBC     S. Pneumo Ag Urine or CSF - Urine, Urine, Clean Catch [[id number]]  (Normal) Collected: 12/09/21 2103    Specimen: Urine, Clean Catch Updated: 12/09/21 2152     Strep Pneumo Ag Negative    Legionella Antigen, Urine - Urine, Urine, Clean Catch [[id number]]  (Normal) Collected: 12/09/21 2103    Specimen: Urine, Clean Catch Updated: 12/09/21 2152     LEGIONELLA ANTIGEN, URINE Negative    COVID PRE-OP / PRE-PROCEDURE SCREENING ORDER (NO ISOLATION) - Swab, Nasal Cavity [[id number]]  (Normal) Collected: 12/09/21 1220    Specimen: Swab from Nasal Cavity Updated: 12/09/21 1313    Narrative:      The following orders were created for panel order COVID PRE-OP / PRE-PROCEDURE SCREENING ORDER (NO ISOLATION) - Swab, Nasal Cavity.  Procedure                               Abnormality         Status                     ---------                               -----------         ------                     COVID-19,[facility] [employer] IN-EBONI...[[id number]]  Normal              Final result                 Please view results for these tests on the individual orders.    COVID-19,[facility] [employer] IN-HOUSE,Nasal Swab No Transport Media 3-4 HR TAT - Swab, Nasal Cavity [[id number]]  (Normal) Collected: 12/09/21 1220    Specimen: Swab from Nasal Cavity Updated: 12/09/21 1313     COVID19 Not  Detected    Narrative:      Fact sheet for providers: https://www.fda.gov/media/999049/download     Fact sheet for patients: https://www.[employer].gov/media/107322/download    Test performed by PCR.    Consider negative results in combination with clinical observations, patient history, and epidemiological information.           -----------------------------------------------------------------  EKG:   Radiology:     CT Angiogram Chest With & Without Contrast    Result Date: 12/9/2021  CT chest angiogram dated 12/9/2021 2:04 PM CST  HISTORY: Chest pain  COMPARISON: 11/9/2021.  DLP: 286 mGy cm. Automated exposure control was utilized to diminish patient radiation dose.  TECHNIQUE: Helical tomographic images of the chest were obtained after the administration of intravenous contrast following angiogram protocol. Additionally, 3D MIP reconstructions in the coronal and sagittal planes were provided.    FINDINGS:  The imaged portion of the base of the neck appears unremarkable.  There is adequate enhancement of the pulmonary arteries to evaluate for central and segmental pulmonary emboli. There are no filling defects within the main, lobar, segmental or visualized subsegmental pulmonary arteries. The patient is undergone right upper lobe resection. There is no evidence of extravasation..  A small right apical pneumothorax is present. There is again fluid/soft tissue density with gas in the right apex extending along the fissure towards the hilum. This is similar in size and appearance to the previous study. Surgical clips as well as staple line are noted in the region of the right hilum. Mild patchy infiltrative disease is noted within the right lower lobe either representing focal pneumonitis or patchy atelectasis. The left lung is fully expanded and clear. A right-sided effusion is present which is subpulmonic in location.. The trachea and bronchial tree are patent.  There is mild cardiomegaly. Coronary calcifications are  present.. There is no pericardial effusion.  A pretracheal node measures 8 mm in short axis. No enlarged mediastinal or axillary adenopathy is demonstrated..  The osseous structures of the thorax and surrounding soft tissues demonstrate no acute process.  The adrenals are unremarkable. There are cortical cysts of the left kidney..      1. The patient's undergone VATS procedure with right upper lobe resection. A right-sided hydropneumothorax is persistent with an apical component. There is a focus of mixed fluid and soft tissue density containing some air along the apex extending into the fissure towards the right hilum similar in size and appearance to the previous study. I suspect this is not allowing complete expansion of the lung. The pneumothorax is stable to slightly improved from the previous study. Previously noted subcutaneous air has resolved. 2. No evidence of pulmonary thromboembolic disease. No evidence of extravasation of contrast. 3. Small right-sided effusion which is subpulmonic in location. There is mild patchy disease within the right lower lobe suggesting pneumonitis. Left lung is fully expanded and clear..   This report was finalized on 12/09/2021 16:08 by Dr. Rigoberto Milian MD.    XR Chest 1 View    Result Date: 12/10/2021  EXAM: XR CHEST 1 VW- 12/10/2021 8:45 AM CST  HISTORY: Shortness of breath   COMPARISON: CT scan December 9, 2021.  TECHNIQUE: Frontal radiograph of the chest  FINDINGS: Left lung is clear. Opacification of the right upper chest is noted. This is fluid in a right upper lobe cavity.. Blunting of the right lateral costophrenic angle is noted. Sutures are present in the right hilum. . The cardiomediastinal silhouette and pulmonary vascularity are within normal limits.  The osseous structures and surrounding soft tissues demonstrate no acute abnormality.       1. Fluid in the right upper lobe cavity is again noted. 2. Small right lateral pleural complex. This pleural fluid or  thickening in the right lateral costophrenic angle.   This report was finalized on 12/10/2021 08:48 by Dr. Sha Mosquera MD.    XR Chest PA & Lateral    Result Date: 12/9/2021  EXAMINATION: Chest 2 views 12/9/2021  HISTORY: Dyspnea on exertion syncope  FINDINGS: Today's exam is compared to previous study one day earlier. A right-sided hydropneumothorax is again demonstrated within air-fluid level within the right upper lobe. A small right-sided effusion is present. Lungs are otherwise clear. The thoracic aorta is ectatic.      . Stable 1 day appearance of the chest. This report was finalized on 12/09/2021 15:03 by Dr. Rigoberto Milian MD.      Assessment and Plan   1.       Syncope and collapse    ORTIZ (dyspnea on exertion)    Hypertension    Stage 2 moderate COPD by GOLD classification (HCC)    Former smoker    Dyspnea on exertion/syncope: CTA chest shows previous VATS procedure with right upper lobe resection, right-sided hydropneumothorax persistent.  It is noted that it seems to be slightly improved.  CT surgery consulted, but they do not recommend any procedure at this time.  Pulmonology also consulted as she is very well-known to them as well.  -Expect short stay, likely discharge later today.    Hypertension, hyperlipidemia, hypothyroidism, GERD: Stable.    CODE STATUS: Full    Disposition: Observation, likely discharge later today    Jung Wilson MD 12/10/2021 13:05 CST

## 2021-12-10 NOTE — OUTREACH NOTE
CT Surgery Week 3 Survey      Responses   [facility] patient discharged from? [place]   Does the patient have one of the following disease processes/diagnoses(primary or secondary)? Cardiothoracic surgery   Week 3 attempt successful? No   Revoke Readmitted          Edilma Pinto RN

## 2021-12-10 NOTE — PROGRESS NOTES
Exercise Oximetry    Patient Name:Trinidad Zhu   MRN: [id number]   Date: 12/10/21             ROOM AIR BASELINE   SpO2% 100   Heart Rate 112   Blood Pressure      EXERCISE ON ROOM AIR SpO2% EXERCISE ON O2 @  LPM SpO2%   1 MINUTE  1 MINUTE    2 MINUTES  2 MINUTES    3 MINUTES  3 MINUTES    4 MINUTES  4 MINUTES    5 MINUTES  5 MINUTES    6 MINUTES  6 MINUTES               Distance Walked  Ambulated x 6 minutes in room Distance Walked   Dyspnea (Joseph Scale)   Dyspnea (Joseph Scale)   Fatigue (Joseph Scale)   Fatigue (Joseph Scale)   SpO2% Post Exercise  96 SpO2% Post Exercise   HR Post Exercise  114 HR Post Exercise   Time to Recovery   Time to Recovery     Comments:NO RECOMMENDATIONS AT THIS TIME

## 2021-12-10 NOTE — CONSULTS
PULMONARY & CRITICAL CARE CONSULT - [facility]    21, 18:50 CST  Patient Care Team:  Andrew Layne MD as PCP - General  Andrew Layne MD as PCP - Family Medicine  Meghan, GEREMIAS Chairez as Nurse Practitioner (Pulmonary Disease)  Ravi Hylton MD as Cardiologist (Cardiology)  Silvia Herrera APRN as Referring Physician (Vascular Surgery)  Name: Trinidad Zhu  : 1945  MRN: [id number]  Contact Serial Number [id number]    Chief complaint: Shortness of breath.  HPI:  We have been consulted by Andrew Layne MD to see this 76 y.o. female born on 1945.  Patient is well-known to pulmonary clinic and was seen and followed by GEREMIAS Mathis in the pulmonary clinic.    She is an elderly  female with history of hypertension, hyperlipidemia, coronary artery disease.  She has history of recent diagnosed non-small cell lung cancer and had a right upper lobectomy done by Dr. Ignacio on 2021.  The right upper lobe lung nodule noted about 5 or 6 years ago which was closely followed in pulmonary clinic but send imaging study shows it was enlarging and subsequent PET scans shows SUV of 2.8.  Eventually she had a bronchoscopy biopsy done by a navigational bronchoscopy.  It was done in [place] by Dr. Kimberley Rodas.  The biopsy is consistent with a non-small cell lung cancer with stage I adenocarcinoma.  She was referred to Dr. Ignacio for surgical resection.  Patient was started on Breo Ellipta and albuterol nebulizer and inhaler during hospitalizations but after discharge she did not use any medications.  Apparently she was doing well without any bronchodilator in the last few years during her follow-up in the pulmonary clinic.    She has known history of chronic obstructive pulmonary disease and she is a former smoker and quit smoking 27 years ago and prior to that she smoked for 25 years with 1-1 and half pack per day.  She has a  pulmonary function test done recently in November 2021 which showed she had moderate obstructive airway dysfunction with no significant bronchodilator response and lung volumes are showing some mild restriction with decreased TLC.  She also has history of bronchiectasis and also had frequent upper respiratory infection requiring treatment with antibiotics and steroids.  During her navigational bronchoscopy and biopsy she was noted to have MAC growing in cultures and she was referred to ID clinic and was seen by Dr.Lebuhn miller who felt patient will not need any treatment for the MAC and she was discharged from the ID clinic.  Patient reported yesterday during the clinic visit she felt lightheaded and dizzy and was little out of breath and actually had a syncopal attack but she recovered and then went home.  She went for a routine follow-up with Dr. Sanchez and was sent to the hospital for direct admit due to worsening shortness of breath    After her recent VATS with right upper lobe resection patient did well was sent to the hospital for worsening shortness of breath.  Patient reported she is unable to cough up any secretions or any sputum.  During her visit in [place] she was advised to use Aerobika flutter valve and some chest physiotherapy was also advised.  She has a flutter valve at home which she is using on and off.    At the time of my visit patient was comfortable and was on room air.  She was started on bronchodilator treatment with DuoNeb and was placed on Symbicort.  She was started on Breo Ellipta during the hospitalization but she did not continue after discharge.  She is afebrile and is tested negative for Covid.  She received 2 doses of Covid vaccination earlier this year and also had the booster dose.  Her imaging study done today shows she had a right-sided apical hydropneumothorax following her recent VATS procedure.  She had a right-sided small pleural effusion noted and mild patchy infiltrate  noted in the right lower lobe with possible pneumonitis.  No other acute changes was noted.    Past Medical History:   has a past medical history of Antral ulcer, Bladder cystocele, C. difficile diarrhea, CAD (coronary artery disease), Colon cancer screening, Colon polyp, Diarrhea, Difficulty swallowing, Disease of thyroid gland, Elevated cholesterol, Gastritis, Generalized OA, GERD (gastroesophageal reflux disease), History of bladder surgery (01/07/2020), Hyperlipidemia, Hypertension, Liver cyst, Lung nodule, Microscopic colitis, Multiple gastric ulcers, Neck pain, Neuropathy, Normal body mass index, NSAID induced gastritis, Ulcer of pyloric antrum, UTI (urinary tract infection), and Weight loss.   has a past surgical history that includes Tonsillectomy; Hysterectomy; Other surgical history; Other surgical history; Bladder suspension; Esophagogastroduodenoscopy (12/03/2015); Colonoscopy (08/19/2015); Colonoscopy (12/04/2013); Colonoscopy (N/A, 11/29/2016); Cervical Curettage (N/A, 5/22/2017); Anterior cervical discectomy w/ fusion (N/A, 5/22/2017); Breast excisional biopsy (Right, 1985); Aortagram (Left, 11/9/2020); Aortagram (Left, 3/26/2021); Aortagram (Left, 8/6/2021); Cataract extraction, bilateral; and lobectomy (Right, 11/5/2021).  Allergies   Allergen Reactions   • Baclofen Other (See Comments)     MUSCULOSKELETAL THERAPY AGENTS knocked her out for a day    Other reaction(s): Unknown   • Gabapentin Confusion     Other reaction(s): over sedation   • Sulfa Antibiotics Rash     Mouth blisters   • Sulfacetamide Sodium Rash   • Niferex [Iron Polysaccharide] Swelling   • Amitriptyline Hcl Confusion     Other reaction(s): ambulation difficulties/confusion   • Oxycodone-Acetaminophen GI Intolerance     Other reaction(s): vomiting     Medications:  sodium chloride, 10 mL, Intravenous, Q12H         Family History:  Family History   Problem Relation Age of Onset   • Breast cancer Maternal Aunt    • Heart disease  Mother    • Heart disease Father    • GI problems Neg Hx         MALIGNANCIES   • Colon cancer Neg Hx    • Colon polyps Neg Hx      Social History:   reports that she quit smoking about 25 years ago. Her smoking use included cigarettes. She has a 30.00 pack-year smoking history. She has never used smokeless tobacco. She reports current alcohol use. She reports that she does not use drugs.  Review of Systems:  Review of Systems   HENT: Positive for congestion and postnasal drip.    Eyes: Negative.    Respiratory: Positive for cough, chest tightness and shortness of breath.    Cardiovascular: Negative.    Endocrine: Negative.    Genitourinary: Negative.    Musculoskeletal: Negative.    Skin: Negative.    Allergic/Immunologic: Positive for environmental allergies.   Neurological: Positive for weakness.   Hematological: Negative.    Psychiatric/Behavioral: Negative.       Physical Exam:  Temp:  [98.1 °F (36.7 °C)-98.3 °F (36.8 °C)] 98.3 °F (36.8 °C)  Heart Rate:  [78-87] 87  Resp:  [16] 16  BP: (112-128)/(64-73) 112/64No intake or output data in the 24 hours ending 12/09/21 1850There were no vitals filed for this visit.  SpO2 Percentage    12/09/21 1211 12/09/21 1538   SpO2: 100% 93%     Physical Exam  Vitals reviewed.   Constitutional:       Comments: Elderly  female sitting comfortably   HENT:      Head: Normocephalic and atraumatic.      Right Ear: Tympanic membrane normal. There is no impacted cerumen.      Left Ear: Tympanic membrane normal. There is no impacted cerumen.      Nose: Nose normal. No congestion or rhinorrhea.      Mouth/Throat:      Mouth: Mucous membranes are moist.      Pharynx: Oropharynx is clear. No oropharyngeal exudate or posterior oropharyngeal erythema.   Eyes:      General: No scleral icterus.        Right eye: No discharge.         Left eye: No discharge.      Conjunctiva/sclera: Conjunctivae normal.      Pupils: Pupils are equal, round, and reactive to light.   Cardiovascular:       Rate and Rhythm: Normal rate and regular rhythm.      Pulses: Normal pulses.      Heart sounds: Normal heart sounds. No murmur heard.  No gallop.    Pulmonary:      Effort: Pulmonary effort is normal. No respiratory distress.      Breath sounds: No stridor. Wheezing and rales present.      Comments: Decreased breath sound bilaterally  Abdominal:      General: Abdomen is flat. Bowel sounds are normal. There is no distension.      Palpations: Abdomen is soft. There is no mass.      Tenderness: There is no abdominal tenderness. There is no guarding.   Genitourinary:     Comments: Not examined.  Musculoskeletal:         General: No swelling, tenderness or deformity.      Cervical back: Normal range of motion. No rigidity or tenderness.      Right lower leg: No edema.      Left lower leg: No edema.   Lymphadenopathy:      Cervical: No cervical adenopathy.   Skin:     General: Skin is warm and dry.      Capillary Refill: Capillary refill takes less than 2 seconds.      Coloration: Skin is not jaundiced or pale.      Findings: No bruising.   Neurological:      General: No focal deficit present.      Mental Status: She is alert and oriented to person, place, and time. Mental status is at baseline.      Cranial Nerves: No cranial nerve deficit.      Sensory: No sensory deficit.      Motor: Weakness present.   Psychiatric:         Mood and Affect: Mood normal.         Behavior: Behavior normal.       Results from last 7 days   Lab Units 12/09/21  1208 12/08/21  1201   WBC 10*3/mm3 11.11* 9.07   HEMOGLOBIN g/dL 9.6* 9.5*   PLATELETS 10*3/mm3 396 351     Results from last 7 days   Lab Units 12/09/21  1208 12/08/21  1201   SODIUM mmol/L 138 140   POTASSIUM mmol/L 3.6 4.0   CO2 mmol/L 28.0 29.0   BUN mg/dL 13 12   CREATININE mg/dL 0.70 0.77   GLUCOSE mg/dL 116* 117*         Lab Results   Component Value Date    PROBNP 682.0 12/09/2021     No results found for: BLOODCX, URINECX, WOUNDCX, MRSACX, RESPCX, STOOLCX  Recent radiology:    Imaging Results (Last 72 Hours)     Procedure Component Value Units Date/Time    CT Angiogram Chest With & Without Contrast [[id number]] Collected: 12/09/21 1557     Updated: 12/09/21 1611    Narrative:      CT chest angiogram dated 12/9/2021 2:04 PM CST      HISTORY: Chest pain     COMPARISON: 11/9/2021.      DLP: 286 mGy cm. Automated exposure control was utilized to diminish  patient radiation dose.     TECHNIQUE: Helical tomographic images of the chest were obtained after  the administration of intravenous contrast following angiogram protocol.  Additionally, 3D MIP reconstructions in the coronal and sagittal planes  were provided.        FINDINGS:    The imaged portion of the base of the neck appears unremarkable.      There is adequate enhancement of the pulmonary arteries to evaluate for  central and segmental pulmonary emboli. There are no filling defects  within the main, lobar, segmental or visualized subsegmental pulmonary  arteries. The patient is undergone right upper lobe resection. There is  no evidence of extravasation..      A small right apical pneumothorax is present. There is again fluid/soft  tissue density with gas in the right apex extending along the fissure  towards the hilum. This is similar in size and appearance to the  previous study. Surgical clips as well as staple line are noted in the  region of the right hilum. Mild patchy infiltrative disease is noted  within the right lower lobe either representing focal pneumonitis or  patchy atelectasis. The left lung is fully expanded and clear. A  right-sided effusion is present which is subpulmonic in location.. The  trachea and bronchial tree are patent.      There is mild cardiomegaly. Coronary calcifications are present.. There  is no pericardial effusion.      A pretracheal node measures 8 mm in short axis. No enlarged mediastinal  or axillary adenopathy is demonstrated..      The osseous structures of the thorax and surrounding soft  tissues  demonstrate no acute process.      The adrenals are unremarkable. There are cortical cysts of the left  kidney..        Impression:      1. The patient's undergone VATS procedure with right upper lobe  resection. A right-sided hydropneumothorax is persistent with an apical  component. There is a focus of mixed fluid and soft tissue density  containing some air along the apex extending into the fissure towards  the right hilum similar in size and appearance to the previous study. I  suspect this is not allowing complete expansion of the lung. The  pneumothorax is stable to slightly improved from the previous study.  Previously noted subcutaneous air has resolved.  2. No evidence of pulmonary thromboembolic disease. No evidence of  extravasation of contrast.  3. Small right-sided effusion which is subpulmonic in location. There is  mild patchy disease within the right lower lobe suggesting pneumonitis.  Left lung is fully expanded and clear..        This report was finalized on 12/09/2021 16:08 by Dr. Rigoberto Milian MD.    XR Chest PA & Lateral [[id number]] Collected: 12/09/21 1502     Updated: 12/09/21 1506    Narrative:      EXAMINATION: Chest 2 views 12/9/2021     HISTORY: Dyspnea on exertion syncope     FINDINGS: Today's exam is compared to previous study one day earlier. A  right-sided hydropneumothorax is again demonstrated within air-fluid  level within the right upper lobe. A small right-sided effusion is  present. Lungs are otherwise clear. The thoracic aorta is ectatic.       Impression:      . Stable 1 day appearance of the chest.  This report was finalized on 12/09/2021 15:03 by Dr. Rigoberto Milian MD.        My radiograph interpretation/independent review of imaging: Reviewed and agree with current interpretation    Other test results (not lab or imaging): Results for orders placed in visit on 04/28/21    Adult Stress Echo W/ Cont or Stress Agent if Necessary Per Protocol    Interpretation  Summary  · Left ventricular ejection fraction appears to be 61 - 65%. Left ventricular systolic function is normal.    LOW RISK FOR ISCHEMIA  Reviewed.  Independent review of ekg: Done  Problem List as identified by Epic (may contain historical, inactive problems)  Patient Active Problem List   Diagnosis   • Spinal stenosis, cervical region   • Lung nodules   • Bronchiectasis without complication ([facility])   • Underweight   • Personal history of nicotine dependence   • Restrictive lung disease   • Pulmonary emphysema ([facility])   • PAD (peripheral artery disease) ([facility])   • Preop testing   • Gastroesophageal reflux disease   • Allergic rhinitis   • ORTIZ (dyspnea on exertion)   • CAD (coronary artery disease)   • Hyperlipidemia   • Neuropathy   • Hypertension   • Stage 2 moderate COPD by GOLD classification ([facility])   • Former smoker   • Lung nodule   • Mass of right lung   • Postoperative anemia due to acute blood loss   • Syncope and collapse     Pulmonary Assessment:  New problem (to me), with additional workup planned: Right sided stage II adenocarcinoma s/p videostroboscopy and right upper lobectomy.  Acute exacerbation of chronic obstructive pulmonary disease and underlying bronchiectasis.  New problem (to me), no additional workup planned: COPD, former smoker, hypertension, osteoarthritis, allergic rhinitis, gastric reflux disease, peripheral vascular disease, neuropathy, syncope and collapse.  Anemia  Other problems either stable, failing to improve or worsenin. Acute exacerbation of chronic obstructive pulmonary disease  2. Bronchiectasis with positive respiratory culture for MAC  3. Right upper lobe stage II adenocarcinoma s/p VATS and right upper lobectomy  4. Former smoker  5. Hypertension  6. Hyperlipidemia  7. Allergic rhinitis  8. Restrictive lung disease  9. Neuropathy  10. History of anemia  11. Chronic pain with spinal stenosis  12. History of syncope and collapse.    Recommend/plan:   · Her current  presentation with increasing shortness of breath is most likely from acute COPD exacerbation.  · Her lung function test showed moderate obstructive airway dysfunction with restrictive dysfunction.  · Did not need any inhalers in the past but after the recent lung surgery I believe her lung function is further compromised and she will need long-term inhalers.  · She was started on Breo Ellipta during the work-up recently but she did not continue it and I started the patient on Symbicort and DuoNeb with albuterol rescue inhaler.  · She did not use any home oxygen and currently oxygenating well in the room air but will need home oxygen evaluation prior to the discharge.  Will use oxygen if needed and titrate to keep oxygen more than 92%.  · For her nasal allergies she uses Zyrtec at home but does not use Flonase but I will start her on Zyrtec and Flonase during this hospitalization.  · COPD exacerbation she will be getting Solu-Medrol and will be discharged on steroid taper.  · Right lower lobe infiltrate or pneumonitis and she is afebrile.  I start the patient on azithromycin.  · Her white cell count is marginally.  I ordered a urine for Legionella and pneumococcal antigen.  · She is unable to bring up any sputum but she had a history of bronchiectasis.  I started the patient on incentive spirometry and flutter valve.  This will help improve pulmonary compliance and clearance.  · She had a right upper lobectomy done for adenocarcinoma of the lung and Dr. Romo is already following the patient.  She has a small hydropneumothorax on the right upper lobe post surgery and she did not need any further intervention at this moment.  · Patient did not have any oncology appointment but will need 1 after discharge and to my knowledge she was also referred to Dr. Bladimir Noe radiation oncologist.  · Continue physical activity as tolerated.  Pain and anxiety control.  Reconsult home medications.  · Will need continued follow-up  with GEREMIAS Mathis in the pulmonary clinic after discharge and will also need follow-up with cardiothoracic surgery and possibly oncology with radiation oncology  · Repeat labs and imaging studies as needed.  Patient will need home oxygen evaluation prior to the discharge.  · CODE STATUS: Full.  Overall prognosis: Good.  · Pulmonary team will continue following her and make further recommendations.    Thank you for this consult.  We will follow along.    Electronically signed by     Katia King MD,   Pulmonologist/intensivist   12/09/21, 6:50 PM CST.

## 2021-12-10 NOTE — PLAN OF CARE
Goal Outcome Evaluation:  Plan of Care Reviewed With: patient  Progress: no change      Pt with no c/o pain thus far this shift. IV abx infused per order, otherwise IID in place. IV steroids given. Up with SBA,  at bedside assisting pt. Voiding per BRP. Cont with c/o SOA with activity. Sats maintained in mid to high 90's on rm air. Resting well between care. VSS. Safety maintained.

## 2021-12-10 NOTE — PLAN OF CARE
Goal Outcome Evaluation:  Plan of Care Reviewed With: patient           Outcome Summary: pt denies pain; c/o ORTIZ; VSS; IID

## 2021-12-10 NOTE — PROGRESS NOTES
PULMONARY AND CRITICAL CARE PROGRESS NOTE - [facility]    Patient: Trinidad Zhu  1945   MR# [id number]   Acct# [id number]  12/10/21   09:05 CST  Referring Provider: Andrew Layne MD    Chief Complaint: Shortness of breath  Interval history: Patient is seen awake and alert sitting up on the side of the bed.  Oxygen saturation stable on room air.  Respiratory rate 16.  She tells me they are talking about sending her home today.  She reports she is only been out of bed to the bathroom.  She has been on Symbicort and duo nebs while hospitalized.  She tells me no one told her to resume her Breo after she left the hospital the last time and therefore she has not been taking it.  She reports she feels better than she did yesterday at home but she does not feel like she is back to her baseline yet.  She denies cough.  She remains afebrile.  No new issues reported overnight.    She has a history of recently diagnosed non-small cell lung cancer and a recent right upper lobectomy done by Dr. Ignacio on 11-5-21.  She has a known history of COPD and is a former smoker.  She quit smoking approximately 27 years ago.  During her navigational bronchoscopy she was noted to have microcytic cultures and respiratory referred to ID who felt the patient did not need any treatment for MAC at that time.  Meds:  azithromycin, 500 mg, Intravenous, Q24H  budesonide-formoterol, 2 puff, Inhalation, BID - RT  cetirizine, 10 mg, Oral, Daily  fluticasone, 2 spray, Each Nare, Daily  ipratropium-albuterol, 3 mL, Nebulization, 4x Daily - RT  methylPREDNISolone sodium succinate, 40 mg, Intravenous, Q8H  sodium chloride, 10 mL, Intravenous, Q12H         Review of Systems:   Review of Systems   Constitutional: Negative for fever.   Respiratory: Positive for shortness of breath. Negative for cough.    Cardiovascular: Negative for chest pain.   Gastrointestinal: Negative for diarrhea and nausea.   Genitourinary: Negative for  difficulty urinating.   Neurological: Positive for weakness.     Physical Exam:  SpO2 Percentage    12/10/21 0622 12/10/21 0628 12/10/21 0734   SpO2: 97% 100% 96%     Temp:  [97.7 °F (36.5 °C)-98.4 °F (36.9 °C)] 97.7 °F (36.5 °C)  Heart Rate:  [] 97  Resp:  [16-18] 16  BP: (112-145)/(60-73) 145/73    Intake/Output Summary (Last 24 hours) at 12/10/2021 0905  Last data filed at 12/10/2021 0513  Gross per 24 hour   Intake --   Output 400 ml   Net -400 ml     Physical Exam  Constitutional:       General: She is not in acute distress.     Interventions: Face mask in place.   HENT:      Head: Normocephalic.      Nose: Nose normal.      Mouth/Throat:      Mouth: Mucous membranes are moist.   Eyes:      General: No scleral icterus.  Cardiovascular:      Rate and Rhythm: Normal rate.   Pulmonary:      Effort: No respiratory distress.   Abdominal:      General: There is no distension.   Musculoskeletal:      Right lower leg: No edema.      Left lower leg: No edema.   Neurological:      Mental Status: She is alert and oriented to person, place, and time.   Psychiatric:         Mood and Affect: Mood normal.         Behavior: Behavior is cooperative.       Laboratory Data:  Results from last 7 days   Lab Units 12/10/21  0605 12/09/21  1208 12/08/21  1201   WBC 10*3/mm3 6.61 11.11* 9.07   HEMOGLOBIN g/dL 9.5* 9.6* 9.5*   PLATELETS 10*3/mm3 389 396 351     Results from last 7 days   Lab Units 12/10/21  0605 12/09/21  1208 12/08/21  1201   SODIUM mmol/L 140 138 140   POTASSIUM mmol/L 4.0 3.6 4.0   BUN mg/dL 14 13 12   CREATININE mg/dL 0.58 0.70 0.77         No results found for: BLOODCX, URINECX, WOUNDCX, MRSACX, RESPCX, STOOLCX  Recent films:  CT Angiogram Chest With & Without Contrast    Result Date: 12/9/2021  1. The patient's undergone VATS procedure with right upper lobe resection. A right-sided hydropneumothorax is persistent with an apical component. There is a focus of mixed fluid and soft tissue density containing  some air along the apex extending into the fissure towards the right hilum similar in size and appearance to the previous study. I suspect this is not allowing complete expansion of the lung. The pneumothorax is stable to slightly improved from the previous study. Previously noted subcutaneous air has resolved. 2. No evidence of pulmonary thromboembolic disease. No evidence of extravasation of contrast. 3. Small right-sided effusion which is subpulmonic in location. There is mild patchy disease within the right lower lobe suggesting pneumonitis. Left lung is fully expanded and clear..   This report was finalized on 12/09/2021 16:08 by Dr. Rigoberto Milian MD.    XR Chest 1 View    Result Date: 12/10/2021  1. Fluid in the right upper lobe cavity is again noted. 2. Small right lateral pleural complex. This pleural fluid or thickening in the right lateral costophrenic angle.   This report was finalized on 12/10/2021 08:48 by Dr. Sha Mosquera MD.    XR Chest PA & Lateral    Result Date: 12/9/2021  . Stable 1 day appearance of the chest. This report was finalized on 12/09/2021 15:03 by Dr. Rigoberto Milian MD.    XR Chest PA & Lateral    Result Date: 12/8/2021  Impression:  No interval changes.  This report was finalized on 12/08/2021 13:16 by Dr. Mirna Segura MD.    Films reviewed personally by me.  My interpretation: Right upper lobe opacification, unchanged 12-10-21  Pulmonary Assessment:  1. Gold stage II COPD, FEV1 75%  2. Right-sided adenocarcinoma s/p right upper lobectomy  3. Bronchiectasis  4. Neuropathy  5. Anemia, stable  6. Right hydropneumothorax, stable CT surgery following    Recommend:   · She remains on room air  · Continue Symbicort and DuoNeb while hospitalized, discussed with patient the need to resume Breo at home daily and utilize albuterol HFA as needed.  Okay to send home with Symbicort until able to get Breo from pharmacy, educated on discontinuation of Symbicort when resuming  Breo.  · Currently on Solu-Medrol 40 mg every 8, will taper to 40 mg daily and recommend prednisone taper if being discharged home  · Antibiotic: Azithromycin D #2  · Ambulate in restrepo 3 times daily  · Continue incentive spirometer and Aerobika flutter  · Walking oximetry prior to discharge  · Will need oncology/radiation oncology follow-up after discharge  · Short term follow up with GEREMIAS Rose after discharge   · Further recommendations per Dr. King    Electronically signed by GEREMIAS Gruber, 12/10/21, 09:05 CST     ATTESTATION OF CLINICAL NOTE:  I have reviewed the notes, assessments, and/or procedures performed by GEREMIAS Gruber, I concur with her/his documentation of Trinidad Zhu.  Patient was seen in the follow-up visit in pulmonary rounds in medical floor today.    The patient was seen as inpatient pulmonary consult last night.  She has recently diagnosed stage I adenocarcinoma of the lung and had a right upper lobectomy done with a residual hydropneumothorax.  She also had anemia and some bleeding during surgery needed blood transfusion.  She had navigational bronchoscopy done prior to the surgery and was on board treatment which was stopped and the patient recently had worsening shortness of breath at this time she is getting treated for acute COPD intervention with history of underlying bronchiectasis.  She also history of MAC infection which is seen by ID but no treatment is needed.  She is feeling better today and is currently on IV Solu-Medrol and started on Symbicort and DuoNeb.  She has no fever and she is on room air.  She has minimal expectoration.  I talked to Dr. Ignacio and the primary care team patient is getting discharged today and will be followed in the pulmonary clinic as an outpatient.    On physical examination patient is elderly  female sitting up in the bed in no distress.  HEENT: Traumatic normocephalic.  Neck: Supple no mass no jugular venous  distention.  Heart: Sounds normal rate and rhythm regular no murmur.  Lungs: Bibasilar crackles decreased breath sound at the bases.  Abdomen: Soft nondistended nontender.  Extremities: No edema normal pulses normal color.  Neurologic: Grossly intact.  Skin: No breakdown.    Patient showed some improvement from last night.  She will be discharged home on Symbicort and albuterol HFA and I recommend her to complete the course of azithromycin and a oral steroid taper.  She is scheduled to follow-up with GEREMIAS Mathis in the pulmonary clinic with her regular pulmonary provider for the patient.  She will also need oncology follow-up for newly diagnosed lung cancer and follow-up with Dr. Ignacio for right upper lobectomy for further follow-up of the hydropneumothorax.  Patient is clinically stable she is advised to use the incentive spirometry and flutter valve frequently to improve pulmonary clearance and compliance she has underlying bronchiectasis in addition to COPD and she is a former smoker.  Care plan discussed with the patient and discharge planning reviewed.  Patient is good to be discharged home today.  We appreciate the pulmonary consult like to thank the primary team for the referral.    I have seen and examined patient personally, performing a face-to-face diagnostic evaluation with plan of care reviewed and developed with APRN and nursing staff. I have addended and/or modified the above history of present illness, physical examination, and assessment and plan to reflect my findings and impressions. Essential elements of the care plan were discussed with APRN above.  Agree with findings and assessment/plan as documented above.    Katia King MD  Pulmonologist/Intensivist  12/10/2021 16:15 CST

## 2021-12-10 NOTE — PROGRESS NOTES
"Patient name: Trinidad Zhu  Patient : 1945  VISIT # [id number]  MR #[id number]    Procedure:  Procedure Date:  POD:* No surgery found *    Subjective   Chief complaint: Shortness of breath    Patient resting in bed tolerating room air with O2 sat 100%.  She denies any further episodes of dyspnea since admission.  Pulmonology has evaluated that patient and Dr. Ignacio has discussed with Dr. Layne and Dr. King.  The plan is for patient to be discharged home with inhalers per pulmonology.         Objective     Visit Vitals  /73 (BP Location: Right arm, Patient Position: Lying)   Pulse 97   Temp 97.7 °F (36.5 °C) (Axillary)   Resp 16   Ht 160 cm (63\")   Wt 47.2 kg (104 lb)   SpO2 96%   BMI 18.42 kg/m²       Intake/Output Summary (Last 24 hours) at 12/10/2021 0949  Last data filed at 12/10/2021 0513  Gross per 24 hour   Intake --   Output 400 ml   Net -400 ml       Lab:     CBC:  Results from last 7 days   Lab Units 12/10/21  0605 21  1208 21  1201   WBC 10*3/mm3 6.61 11.11* 9.07   HEMATOCRIT % 30.1* 31.3* 30.1*   PLATELETS 10*3/mm3 389 396 351          BMP:  Results from last 7 days   Lab Units 12/10/21  0605 21  1208 21  1201   SODIUM mmol/L 140 138 140   POTASSIUM mmol/L 4.0 3.6 4.0   CHLORIDE mmol/L 101 99 99   CO2 mmol/L 28.0 28.0 29.0   GLUCOSE mg/dL 142* 116* 117*   BUN mg/dL 14 13 12   CREATININE mg/dL 0.58 0.70 0.77          COAG:      Invalid input(s): PT    IMAGES:       Imaging Results (Last 24 Hours)     Procedure Component Value Units Date/Time    XR Chest 1 View [[id number]] Collected: 12/10/21 0845     Updated: 12/10/21 0851    Narrative:      EXAM: XR CHEST 1 VW- 12/10/2021 8:45 AM CST     HISTORY: Shortness of breath       COMPARISON: CT scan 2021.     TECHNIQUE: Frontal radiograph of the chest     FINDINGS:   Left lung is clear. Opacification of the right upper chest is noted.  This is fluid in a right upper lobe cavity.. Blunting of the " right  lateral costophrenic angle is noted. Sutures are present in the right  hilum. . The cardiomediastinal silhouette and pulmonary vascularity are  within normal limits.      The osseous structures and surrounding soft tissues demonstrate no acute  abnormality.          Impression:      1. Fluid in the right upper lobe cavity is again noted.  2. Small right lateral pleural complex. This pleural fluid or thickening  in the right lateral costophrenic angle.        This report was finalized on 12/10/2021 08:48 by Dr. Sha Mosquera MD.    CT Angiogram Chest With & Without Contrast [[id number]] Collected: 12/09/21 1557     Updated: 12/09/21 1611    Narrative:      CT chest angiogram dated 12/9/2021 2:04 PM CST      HISTORY: Chest pain     COMPARISON: 11/9/2021.      DLP: 286 mGy cm. Automated exposure control was utilized to diminish  patient radiation dose.     TECHNIQUE: Helical tomographic images of the chest were obtained after  the administration of intravenous contrast following angiogram protocol.  Additionally, 3D MIP reconstructions in the coronal and sagittal planes  were provided.        FINDINGS:    The imaged portion of the base of the neck appears unremarkable.      There is adequate enhancement of the pulmonary arteries to evaluate for  central and segmental pulmonary emboli. There are no filling defects  within the main, lobar, segmental or visualized subsegmental pulmonary  arteries. The patient is undergone right upper lobe resection. There is  no evidence of extravasation..      A small right apical pneumothorax is present. There is again fluid/soft  tissue density with gas in the right apex extending along the fissure  towards the hilum. This is similar in size and appearance to the  previous study. Surgical clips as well as staple line are noted in the  region of the right hilum. Mild patchy infiltrative disease is noted  within the right lower lobe either representing focal pneumonitis or  patchy  atelectasis. The left lung is fully expanded and clear. A  right-sided effusion is present which is subpulmonic in location.. The  trachea and bronchial tree are patent.      There is mild cardiomegaly. Coronary calcifications are present.. There  is no pericardial effusion.      A pretracheal node measures 8 mm in short axis. No enlarged mediastinal  or axillary adenopathy is demonstrated..      The osseous structures of the thorax and surrounding soft tissues  demonstrate no acute process.      The adrenals are unremarkable. There are cortical cysts of the left  kidney..        Impression:      1. The patient's undergone VATS procedure with right upper lobe  resection. A right-sided hydropneumothorax is persistent with an apical  component. There is a focus of mixed fluid and soft tissue density  containing some air along the apex extending into the fissure towards  the right hilum similar in size and appearance to the previous study. I  suspect this is not allowing complete expansion of the lung. The  pneumothorax is stable to slightly improved from the previous study.  Previously noted subcutaneous air has resolved.  2. No evidence of pulmonary thromboembolic disease. No evidence of  extravasation of contrast.  3. Small right-sided effusion which is subpulmonic in location. There is  mild patchy disease within the right lower lobe suggesting pneumonitis.  Left lung is fully expanded and clear..        This report was finalized on 12/09/2021 16:08 by Dr. Rigoberto Milian MD.    XR Chest PA & Lateral [[id number]] Collected: 12/09/21 1502     Updated: 12/09/21 1506    Narrative:      EXAMINATION: Chest 2 views 12/9/2021     HISTORY: Dyspnea on exertion syncope     FINDINGS: Today's exam is compared to previous study one day earlier. A  right-sided hydropneumothorax is again demonstrated within air-fluid  level within the right upper lobe. A small right-sided effusion is  present. Lungs are otherwise clear. The  thoracic aorta is ectatic.       Impression:      . Stable 1 day appearance of the chest.  This report was finalized on 12/09/2021 15:03 by Dr. Rigoberto Milian MD.            Physical Exam:  General: Alert, oriented. No apparent distress.   Cardiovascular: Regular rate and rhythm without murmur, rubs, or gallops.    Pulmonary: Clear to auscultation bilaterally without wheezing, rubs, or rales.  Chest: Thoracic incisions are C/D/I and healing nicely  Abdomen: Soft, nondistended, and nontender.  Extremities: Warm, moves all extremities. No edema.   Neurologic:  Grossly intact with no focal deficits.            Impression:  Former smoker  Syncope and collapse  Shortness of breath  Former smoker  S/P right upper lobectomy          Plan:  Continue routine post thoracic surgery care  Keep follow up with Dr. Ignacio in a couple weeks.  Chest xray prior to appointment  OK for DC home today from CT surgery standpoint when ok with others      GEREMIAS Downs  12/10/21  09:49 CST

## 2021-12-13 NOTE — PROGRESS NOTES
" GEREMIAS Mathis  [facility]   Respiratory Disease Clinic  [address] [place] [address]  Phone: [phone]  Fax: [phone]       Chief Complaint  stage 2 moderate COPD    Subjective    History of Present Illness    Trinidad Zhu presents to [facility] PULMONARY & CRITICAL CARE MEDICINE   History of Present Illness   The patient presents today for shortness of breath.  She is status post Diagnostic Bronchoscopy , Right Robot Assisted VATS Upper Lobectomy by Dr. Ignacio on 11/5/2021. 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  She was hospitalized at [facility] December 9, 2021 through December 10, 2021 for complaints of shortness of breath following lobectomy.  CTA from 12/9/2021 showed no pulmonary embolus.  There was a noted right sided hydropneumothorax persistent with an apical component.  Focus of mixed fluid and soft tissue density containing some air along the apex extending into the fissure towards the right hilum.  Radiology suspects that this is not allowing complete expansion of the lung.  The pneumothorax was noted to be stable to slightly improved from a previous study.  Subcutaneous air had resolved.  The patient states that she remains extremely short of breath with any exertion.  She is accompanied by her .  The patient reports that she did not utilize her Breo or albuterol HFA immediately following surgery as she did not know that she was supposed to use them despite myself continuing them from her September office visit.  Very long conversation with the patient and her  today regarding shortness of breath and how to use her inhalers.  She denies fevers, chills, or cough with purulent sputum.  No other aggravating or alleviating factors.     Objective   Vital Signs:   /78   Pulse 98   Ht 160 cm (63\")   Wt 46.7 kg (103 lb) "   SpO2 98% Comment: RA  BMI 18.25 kg/m²     Physical Exam  Vitals reviewed.   Constitutional:       Appearance: She is well-developed.      Interventions: Face mask in place.      Comments: Low moving and fatigued   HENT:      Head: Normocephalic and atraumatic.   Eyes:      General: No scleral icterus.     Conjunctiva/sclera: Conjunctivae normal.      Pupils: Pupils are equal, round, and reactive to light.   Cardiovascular:      Rate and Rhythm: Normal rate.   Pulmonary:      Breath sounds: Examination of the right-upper field reveals decreased breath sounds. Decreased breath sounds present. No wheezing or rales.   Musculoskeletal:         General: Normal range of motion.      Cervical back: Normal range of motion and neck supple.   Skin:     General: Skin is warm and dry.   Neurological:      Mental Status: She is alert and oriented to person, place, and time.   Psychiatric:         Behavior: Behavior normal.         Thought Content: Thought content normal.         Judgment: Judgment normal.        Result Review :  The following data was reviewed by: GEREMIAS Mathis on 12/14/2021:  CT Angiogram Chest With & Without Contrast (12/09/2021 14:17)   XR Chest 1 View (12/10/2021 08:46)    PFT Values        Some values may be hidden. Unless noted otherwise, only the newest values recorded on each date are displayed.         Old Values PFT Results 9/10/21   No data to display.      Pre Drug PFT Results 9/10/21   FVC 80   FEV1 64   FEF 25-75% 28   FEV1/FVC 61.74      Post Drug PFT Results 9/10/21   No data to display.      Other Tests PFT Results 9/10/21   No data to display.             Results for orders placed during the hospital encounter of 11/01/21    Full Pulmonary Function Test With Bronchodilator & ABG    Narrative  [facility] - Pulmonary Function Test    [address] [place] [address]  [place]  [address]  [phone]    Patient : Trinidad Zhu  MRN : [id number]  CSN : [id number]  Pulmonologist :  Dion Cortés MD  Date : 11/1/2021    ______________________________________________________________________    Interpretation :  1.  Spirometry is consistent with a moderate obstructive ventilatory defect.  2.  There is no significant change in spirometry postbronchodilator.  3.  Lung volumes reveal an elevated expiratory reserve volume.  There is also a mild decrease in inspiratory capacity.  4.  There is a moderate diffusion impairment which when corrected for alveolar volume is a mild diffusion impairment.  5.  Arterial blood gases are within normal limits on room air.  6.  When current studies are compared to studies done at the respiratory disease clinic on September 10 of 2021, there has been slight improvement in both the patient's FVC and FEV1 on current pre and postbronchodilator studies compared to previous baseline spirometry.      Dion Cortés MD      Results for orders placed in visit on 09/10/21    Pulmonary Function Test    Narrative  Pulmonary Function Test  Performed by: Samantha Cantor, RRT  Authorized by: Brittni Christianson APRN    Pre Drug % Predicted  FVC: 80%  FEV1: 64%  FEF 25-75%: 28%  FEV1/FVC: 61.74%    Interpretation  Spirometry  Spirometry shows moderate obstruction. There is reduced midflow suggesting small airway/airflow obstruction.  Review of FVL curve  Patient's effort is normal.      Results for orders placed in visit on 07/30/19    Pulmonary Function Test           Assessment and Plan  Diagnoses and all orders for this visit:    1. Shortness of breath (Primary)  Shortness of breath is likely multifactorial:  Acute causes - right-sided VATS upper lobectomy on November 5, 2021, hydropneumothorax post surgery, anemia post surgery with hemoglobin 9.5 on December 10, 2021.  O2 sat noted to be 98% while walking in hallway in office.   Chronic causes - bronchiectasis, moderate COPD, and emphysema.  Complete steroid taper.     2. Bronchiectasis without complication  (HCC)  Comments:  Continue Mucinex    3. Pulmonary emphysema, unspecified emphysema type (HCC)  Comments:  Hold Breo.  Trial Stiolto.  Inhaler demonstration provided.  Continue albuterol HFA as needed.  Declines nebulizer.     4. Allergic rhinitis, unspecified seasonality, unspecified trigger  Comments:  Continue Zyrtec    5. Stage 2 moderate COPD by GOLD classification (HCC)  Comments:  Hold Breo.  Trial Stiolto.  Inhaler demonstration provided.  Continue albuterol HFA as needed.  Declines nebulizer.   Orders:  -     tiotropium bromide-olodaterol (Stiolto Respimat) 2.5-2.5 MCG/ACT aerosol solution inhaler; Inhale 2 puffs Daily for 30 days.  Dispense: 2 each; Refill: 0    6. Personal history of nicotine dependence  Comments:  Former smoker    7. Gastroesophageal reflux disease, unspecified whether esophagitis present  Comments:  Continue Pepcid    8. Hydropneumothorax  Comments:  Obtain follow-up chest x-ray  Orders:  -     XR Chest 2 View; Future  -     XR Chest 2 View; Future    9. Anemia, unspecified type  Comments:  Continue iron supplementation    10. Status post surgery  Comments:  Keep follow-up with Dr. Ignacio.    11. Adenocarcinoma (HCC)  Comments:  Continue follow-up with Dr. Palacios    Health maintenance:   Influenza vaccine: yes  Pneumovax 23: yes  Prevnar 13: yes  Covid 19: yes     Follow Up  Brittni Christianson, GEREMIAS  12/14/2021  16:04 CST  Return in about 4 weeks (around 1/11/2022).  Patient was given instructions and counseling regarding her condition or for health maintenance advice. Please see specific information pulled into the AVS if appropriate.   Please note that portions of this note were completed with a voice recognition program.    Emphysema is not a form of copd, but rather a distinct pathologic process that leads to the clinical entity of copd, with specific evaluation and treatment concerns.  Unfortunately some policy generators in the coding and billing realm do not view it that way,  "inconsistent with their aim for providers to designate diagnoses as specifically as possible.  I am including both diagnoses in my visit diagnoses, as my decision making is affected by the presence of both entities.  I will leave to the payer to decide whether the \"excludes 1\" notion applies.  That is irrelevant to my primary concern in this encounter, which is taking good and appropriate care of the patient, documentation of which being the primary purpose of the aptly-named medical record.    "

## 2021-12-14 ENCOUNTER — HOSPITAL ENCOUNTER (OUTPATIENT)
Dept: GENERAL RADIOLOGY | Facility: HOSPITAL | Age: 76
Discharge: HOME | End: 2021-12-14

## 2021-12-14 ENCOUNTER — OFFICE VISIT (OUTPATIENT)
Dept: PULMONOLOGY | Facility: CLINIC | Age: 76
End: 2021-12-14

## 2021-12-14 VITALS
SYSTOLIC BLOOD PRESSURE: 132 MMHG | WEIGHT: 103 LBS | OXYGEN SATURATION: 98 % | HEART RATE: 98 BPM | BODY MASS INDEX: 18.25 KG/M2 | HEIGHT: 63 IN | DIASTOLIC BLOOD PRESSURE: 78 MMHG

## 2021-12-14 DIAGNOSIS — J94.8 HYDROPNEUMOTHORAX: ICD-10-CM

## 2021-12-14 DIAGNOSIS — R06.02 SHORTNESS OF BREATH: Primary | ICD-10-CM

## 2021-12-14 DIAGNOSIS — J44.9 STAGE 2 MODERATE COPD BY GOLD CLASSIFICATION (HCC): Chronic | ICD-10-CM

## 2021-12-14 DIAGNOSIS — C80.1 ADENOCARCINOMA (HCC): ICD-10-CM

## 2021-12-14 DIAGNOSIS — J30.9 ALLERGIC RHINITIS, UNSPECIFIED SEASONALITY, UNSPECIFIED TRIGGER: Chronic | ICD-10-CM

## 2021-12-14 DIAGNOSIS — J43.9 PULMONARY EMPHYSEMA, UNSPECIFIED EMPHYSEMA TYPE (HCC): Chronic | ICD-10-CM

## 2021-12-14 DIAGNOSIS — D64.9 ANEMIA, UNSPECIFIED TYPE: ICD-10-CM

## 2021-12-14 DIAGNOSIS — K21.9 GASTROESOPHAGEAL REFLUX DISEASE, UNSPECIFIED WHETHER ESOPHAGITIS PRESENT: Chronic | ICD-10-CM

## 2021-12-14 DIAGNOSIS — Z87.891 PERSONAL HISTORY OF NICOTINE DEPENDENCE: Chronic | ICD-10-CM

## 2021-12-14 DIAGNOSIS — J47.9 BRONCHIECTASIS WITHOUT COMPLICATION (HCC): Chronic | ICD-10-CM

## 2021-12-14 DIAGNOSIS — Z98.890 STATUS POST SURGERY: ICD-10-CM

## 2021-12-14 PROCEDURE — 71046 X-RAY EXAM CHEST 2 VIEWS: CPT

## 2021-12-14 PROCEDURE — 99214 OFFICE O/P EST MOD 30 MIN: CPT | Performed by: NURSE PRACTITIONER

## 2021-12-14 RX ORDER — TIOTROPIUM BROMIDE AND OLODATEROL 3.124; 2.736 UG/1; UG/1
2 SPRAY, METERED RESPIRATORY (INHALATION)
Qty: 2 EACH | Refills: 0 | COMMUNITY
Start: 2021-12-14 | End: 2022-01-04 | Stop reason: ALTCHOICE

## 2021-12-14 NOTE — PATIENT INSTRUCTIONS
Allergic Rhinitis, Adult    Allergic rhinitis is an allergic reaction that affects the mucous membrane inside the nose. The mucous membrane is the tissue that produces mucus.  There are two types of allergic rhinitis:  · Seasonal. This type is also called hay fever and happens only during certain seasons.  · Perennial. This type can happen at any time of the year.  Allergic rhinitis cannot be spread from person to person. This condition can be mild, moderate, or severe. It can develop at any age and may be outgrown.  What are the causes?  This condition is caused by allergens. These are things that can cause an allergic reaction. Allergens may differ for seasonal allergic rhinitis and perennial allergic rhinitis.  · Seasonal allergic rhinitis is triggered by pollen. Pollen can come from grasses, trees, and weeds.  · Perennial allergic rhinitis may be triggered by:  ? Dust mites.  ? Proteins in a pet's urine, saliva, or dander. Dander is dead skin cells from a pet.  ? Smoke, mold, or car fumes.  What increases the risk?  You are more likely to develop this condition if you have a family history of allergies or other conditions related to allergies, including:  · Allergic conjunctivitis. This is inflammation of parts of the eyes and eyelids.  · Asthma. This condition affects the lungs and makes it hard to breathe.  · Atopic dermatitis or eczema. This is long term (chronic) inflammation of the skin.  · Food allergies.  What are the signs or symptoms?  Symptoms of this condition include:  · Sneezing or coughing.  · A stuffy nose (nasal congestion), itchy nose, or nasal discharge.  · Itchy eyes and tearing of the eyes.  · A feeling of mucus dripping down the back of your throat (postnasal drip).  · Trouble sleeping.  · Tiredness or fatigue.  · Headache.  · Sore throat.  How is this diagnosed?  This condition may be diagnosed with your symptoms, medical history, and physical exam. Your health care provider may check for  related conditions, such as:  · Asthma.  · Pink eye. This is eye inflammation caused by infection (conjunctivitis).  · Ear infection.  · Upper respiratory infection. This is an infection in the nose, throat, or upper airways.  You may also have tests to find out which allergens trigger your symptoms. These may include skin tests or blood tests.  How is this treated?  There is no cure for this condition, but treatment can help control symptoms. Treatment may include:  · Taking medicines that block allergy symptoms, such as corticosteroids and antihistamines. Medicine may be given as a shot, nasal spray, or pill.  · Avoiding any allergens.  · Being exposed again and again to tiny amounts of allergens to help you build a defense against allergens (immunotherapy). This is done if other treatments have not helped. It may include:  ? Allergy shots. These are injected medicines that have small amounts of allergen in them.  ? Sublingual immunotherapy. This involves taking small doses of a medicine with allergen in it under your tongue.  If these treatments do not work, your health care provider may prescribe newer, stronger medicines.  Follow these instructions at home:  Avoiding allergens  Find out what you are allergic to and avoid those allergens. These are some things you can do to help avoid allergens:  · If you have perennial allergies:  ? Replace carpet with wood, tile, or vinyl queenie. Carpet can trap dander and dust.  ? Do not smoke. Do not allow smoking in your home.  ? Change your heating and air conditioning filters at least once a month.  · If you have seasonal allergies, take these steps during allergy season:  ? Keep windows closed as much as possible.  ? Plan outdoor activities when pollen counts are lowest. Check pollen counts before you plan outdoor activities.  ? When coming indoors, change clothing and shower before sitting on furniture or bedding.  · If you have a pet in the house that produces  allergens:  ? Keep the pet out of the bedroom.  ? Vacuum, sweep, and dust regularly.  General instructions  · Take over-the-counter and prescription medicines only as told by your health care provider.  · Drink enough fluid to keep your urine pale yellow.  · Keep all follow-up visits as told by your health care provider. This is important.  Where to find more information  · American Academy of Allergy, Asthma & Immunology: www.aaaai.org  Contact a health care provider if:  · You have a fever.  · You develop a cough that does not go away.  · You make whistling sounds when you breathe (wheeze).  · Your symptoms slow you down or stop you from doing your normal activities each day.  Get help right away if:  · You have shortness of breath.  This symptom may represent a serious problem that is an emergency. Do not wait to see if the symptom will go away. Get medical help right away. Call your local emergency services (911 in the [place] Do not drive yourself to the hospital.  Summary  · Allergic rhinitis may be managed by taking medicines as directed and avoiding allergens.  · If you have seasonal allergies, keep windows closed as much as possible during allergy season.  · Contact your health care provider if you develop a fever or a cough that does not go away.  This information is not intended to replace advice given to you by your health care provider. Make sure you discuss any questions you have with your health care provider.  Document Revised: 02/05/2021 Document Reviewed: 12/15/2020  [employer]vier Patient Education © 2021 [employer]      Bronchiectasis    Bronchiectasis is a condition in which the airways in the lungs (bronchi) are damaged and widened. The condition makes it hard for the lungs to get rid of mucus, and it causes mucus to gather in the bronchi. This condition often leads to lung infections, which can make the condition worse.  What are the causes?  You can be born with this condition or you can develop  it later in life. Common causes of this condition include:  · Cystic fibrosis.  · Repeated lung infections, such as pneumonia or tuberculosis.  · An object or other blockage in the lungs.  · Breathing in fluid, food, or other objects (aspiration).  · A problem with the immune system and lung structure that is present at birth (congenital).  Sometimes the cause is not known.  What are the signs or symptoms?  Common symptoms of this condition include:  · A daily cough that brings up mucus and lasts for more than 3 weeks.  · Lung infections that happen often.  · Shortness of breath and wheezing.  · Weakness and fatigue.  How is this diagnosed?  This condition is diagnosed with tests, such as:  · Chest X-rays or CT scans. These are done to check for changes in the lungs.  · Breathing tests. These are done to check how well your lungs are working.  · A test of a sample of your saliva (sputum culture). This test is done to check for infection.  · Blood tests and other tests. These are done to check for related diseases or causes.  How is this treated?  Treatment for this condition depends on the severity of the illness and its cause. Treatment may include:  · Medicines that loosen mucus so it can be coughed up (expectorants).  · Medicines that relax the muscles of the bronchi (bronchodilators).  · Antibiotic medicines to prevent or treat infection.  · Physical therapy to help clear mucus from the lungs. Techniques may include:  ? Postural drainage. This is when you sit or lie in certain positions so that mucus can drain by gravity.  ? Chest percussion. This involves tapping the chest or back with a cupped hand.  ? Chest vibration. For this therapy, a hand or special equipment vibrates your chest and back.  · Surgery to remove the affected part of the lung. This may be done in severe cases.  Follow these instructions at home:  Medicines  · Take over-the-counter and prescription medicines only as told by your health care  provider.  · If you were prescribed an antibiotic medicine, take it as told by your health care provider. Do not stop taking the antibiotic even if you start to feel better.  · Avoid taking sedatives and antihistamines unless your health care provider tells you to take them. These medicines tend to thicken the mucus in the lungs.  Managing symptoms  · Perform breathing exercises or techniques to clear your lungs as told by your health care provider.  · Consider using a cold steam vaporizer or humidifier in your room or home to help loosen secretions.  · If you have a cough that gets worse at night, try sleeping in a semi-upright position.  General instructions  · Get plenty of rest.  · Drink enough fluid to keep your urine clear or pale yellow.  · Stay inside when pollution and ozone levels are high.  · Stay up to date with vaccinations and immunizations.  · Avoid cigarette smoke and other lung irritants.  · Do not use any products that contain nicotine or tobacco, such as cigarettes and e-cigarettes. If you need help quitting, ask your health care provider.  · Keep all follow-up visits as told by your health care provider. This is important.  Contact a health care provider if:  · You cough up more sputum than before and the sputum is yellow or green in color.  · You have a fever.  · You cannot control your cough and are losing sleep.  Get help right away if:  · You cough up blood.  · You have chest pain.  · You have increasing shortness of breath.  · You have pain that gets worse or is not controlled with medicines.  · You have a fever and your symptoms suddenly get worse.  Summary  · Bronchiectasis is a condition in which the airways in the lungs (bronchi) are damaged and widened. The condition makes it hard for the lungs to get rid of mucus, and it causes mucus to gather in the bronchi.  · Treatment usually includes therapy to help clear mucus from the lungs.  · Stay up to date with vaccinations and  immunizations.  This information is not intended to replace advice given to you by your health care provider. Make sure you discuss any questions you have with your health care provider.  Document Revised: 11/30/2018 Document Reviewed: 01/22/2018  [employer] Patient Education © 2021 [employer]      Chronic Obstructive Pulmonary Disease  Chronic obstructive pulmonary disease (COPD) is a long-term (chronic) lung problem. When you have COPD, it is hard for air to get in and out of your lungs. Usually the condition gets worse over time, and your lungs will never return to normal. There are things you can do to keep yourself as healthy as possible.  · Your doctor may treat your condition with:  ? Medicines.  ? Oxygen.  ? Lung surgery.  · Your doctor may also recommend:  ? Rehabilitation. This includes steps to make your body work better. It may involve a team of specialists.  ? Quitting smoking, if you smoke.  ? Exercise and changes to your diet.  ? Comfort measures (palliative care).  Follow these instructions at home:  Medicines  · Take over-the-counter and prescription medicines only as told by your doctor.  · Talk to your doctor before taking any cough or allergy medicines. You may need to avoid medicines that cause your lungs to be dry.  Lifestyle  · If you smoke, stop. Smoking makes the problem worse. If you need help quitting, ask your doctor.  · Avoid being around things that make your breathing worse. This may include smoke, chemicals, and fumes.  · Stay active, but remember to rest as well.  · Learn and use tips on how to relax.  · Make sure you get enough sleep. Most adults need at least 7 hours of sleep every night.  · Eat healthy foods. Eat smaller meals more often. Rest before meals.  Controlled breathing  Learn and use tips on how to control your breathing as told by your doctor. Try:  · Breathing in (inhaling) through your nose for 1 second. Then, pucker your lips and breath out (exhale) through your  lips for 2 seconds.  · Putting one hand on your belly (abdomen). Breathe in slowly through your nose for 1 second. Your hand on your belly should move out. Pucker your lips and breathe out slowly through your lips. Your hand on your belly should move in as you breathe out.    Controlled coughing  Learn and use controlled coughing to clear mucus from your lungs. Follow these steps:  1. Lean your head a little forward.  2. Breathe in deeply.  3. Try to hold your breath for 3 seconds.  4. Keep your mouth slightly open while coughing 2 times.  5. Spit any mucus out into a tissue.  6. Rest and do the steps again 1 or 2 times as needed.  General instructions  · Make sure you get all the shots (vaccines) that your doctor recommends. Ask your doctor about a flu shot and a pneumonia shot.  · Use oxygen therapy and pulmonary rehabilitation if told by your doctor. If you need home oxygen therapy, ask your doctor if you should buy a tool to measure your oxygen level (oximeter).  · Make a COPD action plan with your doctor. This helps you to know what to do if you feel worse than usual.  · Manage any other conditions you have as told by your doctor.  · Avoid going outside when it is very hot, cold, or humid.  · Avoid people who have a sickness you can catch (contagious).  · Keep all follow-up visits as told by your doctor. This is important.  Contact a doctor if:  · You cough up more mucus than usual.  · There is a change in the color or thickness of the mucus.  · It is harder to breathe than usual.  · Your breathing is faster than usual.  · You have trouble sleeping.  · You need to use your medicines more often than usual.  · You have trouble doing your normal activities such as getting dressed or walking around the house.  Get help right away if:  · You have shortness of breath while resting.  · You have shortness of breath that stops you from:  ? Being able to talk.  ? Doing normal activities.  · Your chest hurts for longer  than 5 minutes.  · Your skin color is more blue than usual.  · Your pulse oximeter shows that you have low oxygen for longer than 5 minutes.  · You have a fever.  · You feel too tired to breathe normally.  Summary  · Chronic obstructive pulmonary disease (COPD) is a long-term lung problem.  · The way your lungs work will never return to normal. Usually the condition gets worse over time. There are things you can do to keep yourself as healthy as possible.  · Take over-the-counter and prescription medicines only as told by your doctor.  · If you smoke, stop. Smoking makes the problem worse.  This information is not intended to replace advice given to you by your health care provider. Make sure you discuss any questions you have with your health care provider.  Document Revised: 11/30/2018 Document Reviewed: 01/22/2018  [employer] Patient Education © 2021 [employer]      Gastroesophageal Reflux Disease, Adult    Gastroesophageal reflux (CINTHIA) happens when acid from the stomach flows up into the tube that connects the mouth and the stomach (esophagus). Normally, food travels down the esophagus and stays in the stomach to be digested. With CINTHIA, food and stomach acid sometimes move back up into the esophagus.  You may have a disease called gastroesophageal reflux disease (GERD) if the reflux:  · Happens often.  · Causes frequent or very bad symptoms.  · Causes problems such as damage to the esophagus.  When this happens, the esophagus becomes sore and swollen. Over time, GERD can make small holes (ulcers) in the lining of the esophagus.  What are the causes?  This condition is caused by a problem with the muscle between the esophagus and the stomach. When this muscle is weak or not normal, it does not close properly to keep food and acid from coming back up from the stomach.  The muscle can be weak because of:  · Tobacco use.  · Pregnancy.  · Having a certain type of hernia (hiatal hernia).  · Alcohol use.  · Certain  foods and drinks, such as coffee, chocolate, onions, and peppermint.  What increases the risk?  · Being overweight.  · Having a disease that affects your connective tissue.  · Taking NSAIDs, such a ibuprofen.  What are the signs or symptoms?  · Heartburn.  · Difficult or painful swallowing.  · The feeling of having a lump in the throat.  · A bitter taste in the mouth.  · Bad breath.  · Having a lot of saliva.  · Having an upset or bloated stomach.  · Burping.  · Chest pain. Different conditions can cause chest pain. Make sure you see your doctor if you have chest pain.  · Shortness of breath or wheezing.  · A long-term cough or a cough at night.  · Wearing away of the surface of teeth (tooth enamel).  · Weight loss.  How is this treated?  · Making changes to your diet.  · Taking medicine.  · Having surgery.  Treatment will depend on how bad your symptoms are.  Follow these instructions at home:  Eating and drinking    · Follow a diet as told by your doctor. You may need to avoid foods and drinks such as:  ? Coffee and tea, with or without caffeine.  ? Drinks that contain alcohol.  ? Energy drinks and sports drinks.  ? Bubbly (carbonated) drinks or sodas.  ? Chocolate and cocoa.  ? Peppermint and mint flavorings.  ? Garlic and onions.  ? Horseradish.  ? Spicy and acidic foods. These include peppers, chili powder, watt powder, vinegar, hot sauces, and BBQ sauce.  ? Citrus fruit juices and citrus fruits, such as oranges, lizett, and limes.  ? Tomato-based foods. These include red sauce, chili, salsa, and pizza with red sauce.  ? Fried and fatty foods. These include donuts, french fries, potato chips, and high-fat dressings.  ? High-fat meats. These include hot dogs, rib eye steak, sausage, ham, and [place].  ? High-fat dairy items, such as whole milk, butter, and cream cheese.  · Eat small meals often. Avoid eating large meals.  · Avoid drinking large amounts of liquid with your meals.  · Avoid eating meals during the  2-3 hours before bedtime.  · Avoid lying down right after you eat.  · Do not exercise right after you eat.    Lifestyle    · Do not smoke or use any products that contain nicotine or tobacco. If you need help quitting, ask your doctor.  · Try to lower your stress. If you need help doing this, ask your doctor.  · If you are overweight, lose an amount of weight that is healthy for you. Ask your doctor about a safe weight loss goal.    General instructions  · Pay attention to any changes in your symptoms.  · Take over-the-counter and prescription medicines only as told by your doctor.  · Do not take aspirin, ibuprofen, or other NSAIDs unless your doctor says it is okay.  · Wear loose clothes. Do not wear anything tight around your waist.  · Raise (elevate) the head of your bed about 6 inches (15 cm). You may need to use a wedge to do this.  · Avoid bending over if this makes your symptoms worse.  · Keep all follow-up visits.  Contact a doctor if:  · You have new symptoms.  · You lose weight and you do not know why.  · You have trouble swallowing or it hurts to swallow.  · You have wheezing or a cough that keeps happening.  · You have a hoarse voice.  · Your symptoms do not get better with treatment.  Get help right away if:  · You have sudden pain in your arms, neck, jaw, teeth, or back.  · You suddenly feel sweaty, dizzy, or light-headed.  · You have chest pain or shortness of breath.  · You vomit and the vomit is green, yellow, or black, or it looks like blood or coffee grounds.  · You faint.  · Your poop (stool) is red, bloody, or black.  · You cannot swallow, drink, or eat.  These symptoms may represent a serious problem that is an emergency. Do not wait to see if the symptoms will go away. Get medical help right away. Call your local emergency services (911 in the [place] Do not drive yourself to the hospital.  Summary  · If a person has gastroesophageal reflux disease (GERD), food and stomach acid move back up  into the esophagus and cause symptoms or problems such as damage to the esophagus.  · Treatment will depend on how bad your symptoms are.  · Follow a diet as told by your doctor.  · Take all medicines only as told by your doctor.  This information is not intended to replace advice given to you by your health care provider. Make sure you discuss any questions you have with your health care provider.  Document Revised: 06/28/2021 Document Reviewed: 06/28/2021  [employer] Patient Education © 2021 [employer]

## 2021-12-15 ENCOUNTER — TELEPHONE (OUTPATIENT)
Dept: PULMONOLOGY | Facility: CLINIC | Age: 76
End: 2021-12-15

## 2021-12-15 NOTE — TELEPHONE ENCOUNTER
Patient called asking if she could use her albuterol inhaler when she has chest tightness along with when she has shortness of breath?

## 2021-12-15 NOTE — TELEPHONE ENCOUNTER
Patient notified of the message from Dr Cortés    Sent message to Dr Cortés since Brittni is out of the office today

## 2021-12-16 ENCOUNTER — TELEPHONE (OUTPATIENT)
Dept: ONCOLOGY | Facility: CLINIC | Age: 76
End: 2021-12-16

## 2021-12-16 ENCOUNTER — TELEPHONE (OUTPATIENT)
Dept: PULMONOLOGY | Facility: CLINIC | Age: 76
End: 2021-12-16

## 2021-12-16 NOTE — TELEPHONE ENCOUNTER
Caller: Trinidad Zhu    Relationship: Self    Best call back number: [phone]    What is the best time to reach you: ANYTIME - LEAVE VM IF NO ANSWER    Who are you requesting to speak with (clinical staff, provider,  specific staff member): NURSE    What was the call regarding: PATIENT WAS AT HOSPITAL EARLIER THIS WEEK FOR A CHEST X-RAY AND WAS INFORMED THAT DR. HENSLEY HAD PLACED AN ORDER FOR A LAB.     LAST OFFICE NOTE INDICATED THAT DR. HENSLEY WANTED A LAB PRIOR TO PATIENTS FU IN MAY, BUT A LAB ORDER HAD BEEN PLACED ON 21.  PATIENT WOULD LIKE CLARIFICATION ON [place]ER OR NOT THIS IS ACTUALLY NEEDED RIGHT NOW.  THE HOSPITAL INFORMED PATIENT THAT THE LAB ORDER WOULD  TODAY. SHE CAN GO ON Monday IF NEEDED.     Do you require a callback: YES

## 2021-12-16 NOTE — TELEPHONE ENCOUNTER
Per Dr. Palacios's last office note patient needed labs prior to her appointment in 6 months. Called patient to let her know, she verbalized understanding.    rp

## 2021-12-17 NOTE — TELEPHONE ENCOUNTER
There is nothing else I recommend that can be added.  It is going to take time.      Offered CTA to r/o PE (just completed one on 12/9/21 and negative), but she would like to wait until she sees Dr. Ignacio on Monday.  She states she is feeling better at night, which is an improvement.

## 2021-12-17 NOTE — TELEPHONE ENCOUNTER
Patient called  She said she is better at night with no breathing problem.   She said during the day is when she has problems with shortness of breath. She is using the inhalers and standing up to make sure she can get her breath before moving around. She wants to know if there is anything else that you recommend?

## 2021-12-20 ENCOUNTER — OFFICE VISIT (OUTPATIENT)
Dept: CARDIAC SURGERY | Facility: CLINIC | Age: 76
End: 2021-12-20

## 2021-12-20 ENCOUNTER — HOSPITAL ENCOUNTER (OUTPATIENT)
Dept: GENERAL RADIOLOGY | Facility: HOSPITAL | Age: 76
Discharge: HOME | End: 2021-12-20
Admitting: NURSE PRACTITIONER

## 2021-12-20 ENCOUNTER — TELEPHONE (OUTPATIENT)
Dept: PULMONOLOGY | Facility: CLINIC | Age: 76
End: 2021-12-20

## 2021-12-20 VITALS
SYSTOLIC BLOOD PRESSURE: 112 MMHG | DIASTOLIC BLOOD PRESSURE: 71 MMHG | HEIGHT: 63 IN | HEART RATE: 106 BPM | BODY MASS INDEX: 17.98 KG/M2 | WEIGHT: 101.5 LBS | OXYGEN SATURATION: 96 %

## 2021-12-20 DIAGNOSIS — C80.1 ADENOCARCINOMA (HCC): Primary | ICD-10-CM

## 2021-12-20 DIAGNOSIS — R06.02 SHORTNESS OF BREATH: ICD-10-CM

## 2021-12-20 DIAGNOSIS — R06.02 SHORTNESS OF BREATH: Primary | ICD-10-CM

## 2021-12-20 PROCEDURE — 71046 X-RAY EXAM CHEST 2 VIEWS: CPT

## 2021-12-20 PROCEDURE — 99024 POSTOP FOLLOW-UP VISIT: CPT | Performed by: SURGERY

## 2021-12-20 NOTE — TELEPHONE ENCOUNTER
Please let patient know that we have sample of breztri for her to try vs breo or stiolto.  This is triple therapy.  Ask her  to come get it please and show him how to use it for her.

## 2021-12-20 NOTE — TELEPHONE ENCOUNTER
Is she out of the stiolto?  I strongly recommend keeping appt with Dr. Ignacio.  If she is unable to physically go then I recommend ER for evaluation.

## 2021-12-20 NOTE — TELEPHONE ENCOUNTER
Patient notified of the message. I told to ask Dr Wards office to send us the office notes today in case you aren't able to see them.

## 2021-12-20 NOTE — TELEPHONE ENCOUNTER
She said she didn't feel like the stiolto was helping her so she went back to the [facility]. She cant tell that it helps her either.  She is still wanting to know if you think her having shortness of breath could be related to the decrease of the prednisone tablets?

## 2021-12-20 NOTE — TELEPHONE ENCOUNTER
Patient called telling me that she was back on the Breo. She said she is short of breath and already used her albuterol inhaler twice this morning. She is on the down hill slide of the prednisone. She is taking 1/2 tablet for 3 more days. She said when deceasing the prednisone is when she started having more problems with her breathing and wondering if that could be part of it?  She is supposed to go see Dr Ignacio this afternoon if she can get ready for it.

## 2021-12-20 NOTE — TELEPHONE ENCOUNTER
It is possible, but I would like for her to be seen by Dr. Ignacio today before changing that.  Goal would be to taper off steroids as soon as possible.  I recommend she stay on stiolto vs the breo.

## 2021-12-20 NOTE — PROGRESS NOTES
"     [facility] Cardiothoracic Surgery  PROGRESS NOTE   CC: Follow-up after thoracic surgery     Subjective:  Ms. Zhu is a 76-year-old female who presents in follow-up after robotic right upper lobectomy on November 5.  Since surgery she had a hospital readmission for what I suspect is bronchospasm with difficulty catching her breath.  She was placed on inhaler therapy per pulmonology and initially improved but over the last day has been worse.  She states it only occurs when she gets up and moves around but she feels like she all of a sudden cannot catch her breath uses an albuterol inhaler with immediate improvement or her incentive spirometry with improvement.  She has had no problems with wound healing.      ROS: No fevers or chills, breathing is okay per above, pain is tolerable     Objective:        /71 (BP Location: Left arm, Patient Position: Sitting, Cuff Size: Adult)   Pulse 106   Ht 160 cm (63\")   Wt 46 kg (101 lb 8 oz)   SpO2 96%   BMI 17.98 kg/m²     PE:  GENERAL: NAD, resting comfortably, normal color  CARDIOVASCULAR: regular, regular rate, sinus  PULMONARY: Normal bilateral breath sounds, no labored breathing, well-healed robotic incisions  ABDOMEN: soft, nt/nd  EXTREMITIES: mild peripheral edema, normal pulses, normal ROM        CXR: Continued improvement of right lung expansion, residual apical space is decreasing        Assessment/Plan         Ms. Zhu is a 76-year-old female who presents in follow-up after a robotic right upper lobectomy with lymph node sampling.  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  She is recovered well from her operation.  Today we discussed her pathology and the need for continued surveillance which she will do with the oncology group.     She had newly diagnosed bronchiectasis prior to surgery and underwent optimization medically, her symptoms " postoperatively developed after hospital discharge and to me seem to have a reactive airway component to the description of her symptoms.  I discussed her case today with Dr. Jeancarlos Roger, he plans on starting her on triple therapy for bronchospasm/reactive airway disease.  I will also discuss it with her PCP Dr. Layne about possibly starting some antianxiolytics.  From a surgical standpoint I am happy with the way her chest x-ray looks and how the resolving hematoma appears as well as the resolving residual space on her right hemithorax.  She has no problems with wound healing. She has no activity restrictions at this time.  She can follow-up with our office on a as needed basis.  She will follow-up with Dr. Palacios's office for continued postresection surveillance.     Thank you for trusting me with the care of Ms. Zhu.  Please not hesitate to call with any questions or concerns.     Jarad Ignacio M.D.  Cardiothoracic Surgeon

## 2021-12-21 NOTE — TELEPHONE ENCOUNTER
Patient is going to send paperwork to DR Rodas in [place].  We made a copy of it just in case Brittni needs to fill it out eventually.

## 2021-12-21 NOTE — TELEPHONE ENCOUNTER
Start Breztri tomorrow.      Does she know what the paperwork is for other than insurance? Usually the PCP fills out insurance paperwork.

## 2021-12-21 NOTE — TELEPHONE ENCOUNTER
Patient notified to start the inhaler tomorrow.  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 She will let me know what they say.

## 2021-12-21 NOTE — TELEPHONE ENCOUNTER
Patient notified of the sample being available. She said she took the stiolto already this morning. Will she take the Breztri tonight or wait till tomorrow?  She said that Dr Ignacio thought maybe the shortness of breath could be related to anxiety.   She said that the paper was given to her to give to you because you was the one that found the cancer and referred her to Dr Ignacio.

## 2021-12-27 ENCOUNTER — TELEPHONE (OUTPATIENT)
Dept: PULMONOLOGY | Facility: CLINIC | Age: 76
End: 2021-12-27

## 2021-12-27 DIAGNOSIS — J47.9 BRONCHIECTASIS WITHOUT COMPLICATION (HCC): Primary | ICD-10-CM

## 2021-12-27 DIAGNOSIS — F41.9 ANXIETY DISORDER, UNSPECIFIED TYPE: ICD-10-CM

## 2021-12-27 RX ORDER — HYDROXYZINE PAMOATE 25 MG/1
25 CAPSULE ORAL 3 TIMES DAILY PRN
Qty: 45 CAPSULE | Refills: 2 | Status: ON HOLD | OUTPATIENT
Start: 2021-12-27 | End: 2022-06-08

## 2021-12-27 RX ORDER — TIOTROPIUM BROMIDE AND OLODATEROL 3.124; 2.736 UG/1; UG/1
2 SPRAY, METERED RESPIRATORY (INHALATION) DAILY
Qty: 1 EACH | Refills: 11 | Status: SHIPPED | OUTPATIENT
Start: 2021-12-27 | End: 2022-01-26

## 2021-12-27 NOTE — TELEPHONE ENCOUNTER
Recommend that she keep her original follow-up appointment for January 4.    What was her heart rate?  And what are her other questions?

## 2021-12-27 NOTE — TELEPHONE ENCOUNTER
Patient called stating that since she started taking the Breztri that she has noticed that her pulse rate was getting higher. She was wondering if it had anything to do with taking the Breztri and would it get better over time? When she called she said she hasn't had to use her Albuterol.

## 2021-12-27 NOTE — TELEPHONE ENCOUNTER
I called patient she said her heart rate is running around 100. Medicine isnt doing any good for her. She only has one more day of the medicine left before she runs out. She is using her albuterol inhaler more.  I told her to keep her appointment for Jan 4 and she continues to tell me that she wants to see you sooner.

## 2021-12-27 NOTE — TELEPHONE ENCOUNTER
I called and spoke to the patient.  She is going to continue on Stiolto at this time because she feels no different on Breztri.  If the Stiolto is not helpful then she will resume the Breo because she already has it on hand.  We will send in Vistaril to try and aid with anxiety until she can get in to see Dr. Layne.  The patient states that her O2 sats are staying well above 88% per her home O2 monitor.  I advised her that if it got drops below 88 that she needs to go immediately to the emergency department.  She states that she understands.  She will keep upcoming appointment on January 4.

## 2021-12-27 NOTE — TELEPHONE ENCOUNTER
I called patient to tell her that she couldn't be seen today. She said she needs to see you ASAP. She said it wasn't just the heart rate problem it is also the inhaler questions and some other things that she wants to talk to you about. Tell me what to tell her.

## 2022-01-03 ENCOUNTER — TELEPHONE (OUTPATIENT)
Dept: UROLOGY | Facility: CLINIC | Age: 77
End: 2022-01-03

## 2022-01-03 NOTE — PROGRESS NOTES
" GEREMIAS Mathis  [facility]   Respiratory Disease Clinic  [address] [place] [address]  Phone: [phone]  Fax: [phone]       Chief Complaint  Shortness of Breath    Subjective    History of Present Illness    Trinidad Zhu presents to [facility] PULMONARY & CRITICAL CARE MEDICINE   History of Present Illness   F/U shortness of breath. Shortness of breath is likely multifactorial 2' right-sided VATS upper lobectomy on November 5, 2021, hydropneumothorax post surgery per CTA 12/9/21, anemia post surgery with hemoglobin 9.5 on December 10, 2021.  The patient also has known bronchiectasis, moderate COPD, and emphysema.  Course has been complicated by anxiety for which she is suppose to be seeing her PCP on Thursday of this week.  She was prescribed vistaril on 12/27/21 TE.  She was to remain on Stiloto and then Breo if she cannot tell a difference in the inhaler regimens.  She reported no further benefit from Breztri.  Today is c/o of possible bladder side effects from Stiolto. I recommend that she resume the Breo she has on hand and DC stiolto.  She reports the vistaril was very drying.  She has not started PT because she wants to go to a pulmonary rehab in [place].  I will try to send the order.  I did explain to the patient that she would not meet pulmonary rehab requirements locally as her last PFTs would not qualify.  The patient continues to c/o \"air being cut off\".  Rest/Ex sat obtained again today and O2 stayed 95% with exertion.  She denies fever, chills, or cough.  No other aggravating or alleviating factors.      Objective   Vital Signs:   /82   Pulse 82   Ht 160 cm (63\")   Wt 45.4 kg (100 lb)   SpO2 99% Comment: RA  BMI 17.71 kg/m²     Physical Exam  Vitals reviewed.   Constitutional:       General: She is not in acute distress.     Appearance: She is well-developed.      Interventions: Face mask in place.   HENT:      Head: " Normocephalic and atraumatic.   Eyes:      General: No scleral icterus.     Conjunctiva/sclera: Conjunctivae normal.      Pupils: Pupils are equal, round, and reactive to light.   Cardiovascular:      Rate and Rhythm: Normal rate.   Pulmonary:      Effort: Pulmonary effort is normal. No respiratory distress.      Breath sounds: Examination of the right-upper field reveals decreased breath sounds. Decreased breath sounds present. No wheezing or rales.   Musculoskeletal:         General: Normal range of motion.      Cervical back: Normal range of motion and neck supple.   Skin:     General: Skin is warm and dry.   Neurological:      Mental Status: She is alert and oriented to person, place, and time.   Psychiatric:         Behavior: Behavior normal.         Thought Content: Thought content normal.         Judgment: Judgment normal.        Result Review :  The following data was reviewed by: GEREMIAS Mathis on 01/04/2022:  XR Chest 2 View (12/20/2021 14:00)  XR Chest 2 View (12/14/2021 13:29)  XR Chest 1 View (12/10/2021 08:46)  CT Angiogram Chest With & Without Contrast (12/09/2021 14:17)  Rest/Exercise Pulse Ox Values        Some values may be hidden. Unless noted otherwise, only the newest values recorded on each date are displayed.         Rest/Exercise Pulse Ox Results 1/4/22   Rest room air SAT % 99   Exercise room air SAT % 95           PFT Values        Some values may be hidden. Unless noted otherwise, only the newest values recorded on each date are displayed.         Old Values PFT Results 9/10/21   No data to display.      Pre Drug PFT Results 9/10/21   FVC 80   FEV1 64   FEF 25-75% 28   FEV1/FVC 61.74      Post Drug PFT Results 9/10/21   No data to display.      Other Tests PFT Results 9/10/21   No data to display.             Results for orders placed during the hospital encounter of 11/01/21    Full Pulmonary Function Test With Bronchodilator & ABG    Narrative  [facility] -  Pulmonary Function Test    [facility] [place] [address] [place]  [place]  [address]  [phone]    Patient : Trinidad Zhu  MRN : [id number]  CSN : [id number]  Pulmonologist : Dion Cortés MD  Date : 11/1/2021    ______________________________________________________________________    Interpretation :  1.  Spirometry is consistent with a moderate obstructive ventilatory defect.  2.  There is no significant change in spirometry postbronchodilator.  3.  Lung volumes reveal an elevated expiratory reserve volume.  There is also a mild decrease in inspiratory capacity.  4.  There is a moderate diffusion impairment which when corrected for alveolar volume is a mild diffusion impairment.  5.  Arterial blood gases are within normal limits on room air.  6.  When current studies are compared to studies done at the respiratory disease clinic on September 10 of 2021, there has been slight improvement in both the patient's FVC and FEV1 on current pre and postbronchodilator studies compared to previous baseline spirometry.      Dion Cortés MD      Results for orders placed in visit on 09/10/21    Pulmonary Function Test    Narrative  Pulmonary Function Test  Performed by: Samantha Cantor, RRT  Authorized by: Brittni Christianson APRN    Pre Drug % Predicted  FVC: 80%  FEV1: 64%  FEF 25-75%: 28%  FEV1/FVC: 61.74%    Interpretation  Spirometry  Spirometry shows moderate obstruction. There is reduced midflow suggesting small airway/airflow obstruction.  Review of FVL curve  Patient's effort is normal.      Results for orders placed in visit on 07/30/19    Pulmonary Function Test           Assessment and Plan  Diagnoses and all orders for this visit:    1. Shortness of breath (Primary)  Shortness of breath is likely multifactorial 2' right-sided VATS upper lobectomy on November 5, 2021, hydropneumothorax post surgery, anemia post surgery with hemoglobin 9.5 on December 10, bronchiectasis, moderate COPD, and  emphysema.  Rest and exercise sats were obtained.  The patient does not qualify for portable oxygen.     2. Bronchiectasis without complication (HCC)  Comments:  Continue Mucinex as needed      3. Pulmonary emphysema, unspecified emphysema type (HCC)  Comments:  Stop stiolto.  Resume Breo.  Continue albuterol HFA as needed     4. Allergic rhinitis, unspecified seasonality, unspecified trigger  Comments:  Continue Zyrtec     5. Stage 2 moderate COPD by GOLD classification (HCC)  Comments:  Stop stiolto.  Resume Breo.  Continue albuterol HFA as needed.  Patient request referral to pulmonary rehab in [place].  Will place order.     6. Personal history of nicotine dependence  Comments:  Former smoker     7. Gastroesophageal reflux disease, unspecified whether esophagitis present  Comments:  Continue Pepcid     8. Hydropneumothorax  Comments:  Obtain f/u CT chest wo contrast     9. Anemia, unspecified type  Comments:  Continue iron supplementation    10. Adenocarcinoma (HCC)  Comments:  Continue follow-up with Dr. Palacios    11. Anxiety  Keep upcoming appt with PCP    Health maintenance:   Influenza vaccine:Yes  Pneumovax 23: Yes  Prevnar 13:Yes  Covid 19:Yes    Follow Up  Brittni Christianson, APRN  1/4/2022  16:01 CST  Return in about 4 weeks (around 2/1/2022) for with Dr. Cortés .  Patient was given instructions and counseling regarding her condition or for health maintenance advice. Please see specific information pulled into the AVS if appropriate.   Please note that portions of this note were completed with a voice recognition program.

## 2022-01-03 NOTE — TELEPHONE ENCOUNTER
Called pt to let them know that she can  samples of myrbetriq whenever she is available tomorrow.

## 2022-01-04 ENCOUNTER — OFFICE VISIT (OUTPATIENT)
Dept: PULMONOLOGY | Facility: CLINIC | Age: 77
End: 2022-01-04

## 2022-01-04 VITALS
OXYGEN SATURATION: 99 % | BODY MASS INDEX: 17.72 KG/M2 | DIASTOLIC BLOOD PRESSURE: 82 MMHG | SYSTOLIC BLOOD PRESSURE: 124 MMHG | HEART RATE: 82 BPM | WEIGHT: 100 LBS | HEIGHT: 63 IN

## 2022-01-04 DIAGNOSIS — J47.9 BRONCHIECTASIS WITHOUT COMPLICATION: Chronic | ICD-10-CM

## 2022-01-04 DIAGNOSIS — J30.9 ALLERGIC RHINITIS, UNSPECIFIED SEASONALITY, UNSPECIFIED TRIGGER: Chronic | ICD-10-CM

## 2022-01-04 DIAGNOSIS — C80.1 ADENOCARCINOMA: Chronic | ICD-10-CM

## 2022-01-04 DIAGNOSIS — K21.9 GASTROESOPHAGEAL REFLUX DISEASE, UNSPECIFIED WHETHER ESOPHAGITIS PRESENT: Chronic | ICD-10-CM

## 2022-01-04 DIAGNOSIS — Z87.891 PERSONAL HISTORY OF NICOTINE DEPENDENCE: Chronic | ICD-10-CM

## 2022-01-04 DIAGNOSIS — R06.02 SHORTNESS OF BREATH: Primary | ICD-10-CM

## 2022-01-04 DIAGNOSIS — J44.9 STAGE 2 MODERATE COPD BY GOLD CLASSIFICATION: Chronic | ICD-10-CM

## 2022-01-04 DIAGNOSIS — D64.9 ANEMIA, UNSPECIFIED TYPE: ICD-10-CM

## 2022-01-04 DIAGNOSIS — J43.9 PULMONARY EMPHYSEMA, UNSPECIFIED EMPHYSEMA TYPE: Chronic | ICD-10-CM

## 2022-01-04 DIAGNOSIS — F41.9 ANXIETY: ICD-10-CM

## 2022-01-04 DIAGNOSIS — J94.8 HYDROPNEUMOTHORAX: ICD-10-CM

## 2022-01-04 PROCEDURE — 99214 OFFICE O/P EST MOD 30 MIN: CPT | Performed by: NURSE PRACTITIONER

## 2022-01-04 RX ORDER — MONTELUKAST SODIUM 4 MG/1
1 TABLET, CHEWABLE ORAL 2 TIMES DAILY
COMMUNITY
End: 2022-06-06

## 2022-01-04 NOTE — PROCEDURES
Walking Oximetry  Performed by: Brittni Christianson APRN  Authorized by: Brittni Christianson APRN     Rest room air SAT %:  99  Exercise room air SAT %:  95

## 2022-01-04 NOTE — PATIENT INSTRUCTIONS
Bronchiectasis    Bronchiectasis is a condition in which the airways in the lungs (bronchi) are damaged and widened. The condition makes it hard for the lungs to get rid of mucus, and it causes mucus to gather in the bronchi. This condition often leads to lung infections, which can make the condition worse.  What are the causes?  You can be born with this condition or you can develop it later in life. Common causes of this condition include:  · Cystic fibrosis.  · Repeated lung infections, such as pneumonia or tuberculosis.  · An object or other blockage in the lungs.  · Breathing in fluid, food, or other objects (aspiration).  · A problem with the immune system and lung structure that is present at birth (congenital).  Sometimes the cause is not known.  What are the signs or symptoms?  Common symptoms of this condition include:  · A daily cough that brings up mucus and lasts for more than 3 weeks.  · Lung infections that happen often.  · Shortness of breath and wheezing.  · Weakness and fatigue.  How is this diagnosed?  This condition is diagnosed with tests, such as:  · Chest X-rays or CT scans. These are done to check for changes in the lungs.  · Breathing tests. These are done to check how well your lungs are working.  · A test of a sample of your saliva (sputum culture). This test is done to check for infection.  · Blood tests and other tests. These are done to check for related diseases or causes.  How is this treated?  Treatment for this condition depends on the severity of the illness and its cause. Treatment may include:  · Medicines that loosen mucus so it can be coughed up (expectorants).  · Medicines that relax the muscles of the bronchi (bronchodilators).  · Antibiotic medicines to prevent or treat infection.  · Physical therapy to help clear mucus from the lungs. Techniques may include:  ? Postural drainage. This is when you sit or lie in certain positions so that mucus can drain by gravity.  ? Chest  percussion. This involves tapping the chest or back with a cupped hand.  ? Chest vibration. For this therapy, a hand or special equipment vibrates your chest and back.  · Surgery to remove the affected part of the lung. This may be done in severe cases.  Follow these instructions at home:  Medicines  · Take over-the-counter and prescription medicines only as told by your health care provider.  · If you were prescribed an antibiotic medicine, take it as told by your health care provider. Do not stop taking the antibiotic even if you start to feel better.  · Avoid taking sedatives and antihistamines unless your health care provider tells you to take them. These medicines tend to thicken the mucus in the lungs.  Managing symptoms  · Perform breathing exercises or techniques to clear your lungs as told by your health care provider.  · Consider using a cold steam vaporizer or humidifier in your room or home to help loosen secretions.  · If you have a cough that gets worse at night, try sleeping in a semi-upright position.  General instructions  · Get plenty of rest.  · Drink enough fluid to keep your urine clear or pale yellow.  · Stay inside when pollution and ozone levels are high.  · Stay up to date with vaccinations and immunizations.  · Avoid cigarette smoke and other lung irritants.  · Do not use any products that contain nicotine or tobacco, such as cigarettes and e-cigarettes. If you need help quitting, ask your health care provider.  · Keep all follow-up visits as told by your health care provider. This is important.  Contact a health care provider if:  · You cough up more sputum than before and the sputum is yellow or green in color.  · You have a fever.  · You cannot control your cough and are losing sleep.  Get help right away if:  · You cough up blood.  · You have chest pain.  · You have increasing shortness of breath.  · You have pain that gets worse or is not controlled with medicines.  · You have a fever  and your symptoms suddenly get worse.  Summary  · Bronchiectasis is a condition in which the airways in the lungs (bronchi) are damaged and widened. The condition makes it hard for the lungs to get rid of mucus, and it causes mucus to gather in the bronchi.  · Treatment usually includes therapy to help clear mucus from the lungs.  · Stay up to date with vaccinations and immunizations.  This information is not intended to replace advice given to you by your health care provider. Make sure you discuss any questions you have with your health care provider.  Document Revised: 11/30/2018 Document Reviewed: 01/22/2018  [employer] Patient Education © 2021 [employer]      Chronic Obstructive Pulmonary Disease  Chronic obstructive pulmonary disease (COPD) is a long-term (chronic) lung problem. When you have COPD, it is hard for air to get in and out of your lungs. Usually the condition gets worse over time, and your lungs will never return to normal. There are things you can do to keep yourself as healthy as possible.  · Your doctor may treat your condition with:  ? Medicines.  ? Oxygen.  ? Lung surgery.  · Your doctor may also recommend:  ? Rehabilitation. This includes steps to make your body work better. It may involve a team of specialists.  ? Quitting smoking, if you smoke.  ? Exercise and changes to your diet.  ? Comfort measures (palliative care).  Follow these instructions at home:  Medicines  · Take over-the-counter and prescription medicines only as told by your doctor.  · Talk to your doctor before taking any cough or allergy medicines. You may need to avoid medicines that cause your lungs to be dry.  Lifestyle  · If you smoke, stop. Smoking makes the problem worse. If you need help quitting, ask your doctor.  · Avoid being around things that make your breathing worse. This may include smoke, chemicals, and fumes.  · Stay active, but remember to rest as well.  · Learn and use tips on how to relax.  · Make sure  you get enough sleep. Most adults need at least 7 hours of sleep every night.  · Eat healthy foods. Eat smaller meals more often. Rest before meals.  Controlled breathing  Learn and use tips on how to control your breathing as told by your doctor. Try:  · Breathing in (inhaling) through your nose for 1 second. Then, pucker your lips and breath out (exhale) through your lips for 2 seconds.  · Putting one hand on your belly (abdomen). Breathe in slowly through your nose for 1 second. Your hand on your belly should move out. Pucker your lips and breathe out slowly through your lips. Your hand on your belly should move in as you breathe out.    Controlled coughing  Learn and use controlled coughing to clear mucus from your lungs. Follow these steps:  1. Lean your head a little forward.  2. Breathe in deeply.  3. Try to hold your breath for 3 seconds.  4. Keep your mouth slightly open while coughing 2 times.  5. Spit any mucus out into a tissue.  6. Rest and do the steps again 1 or 2 times as needed.  General instructions  · Make sure you get all the shots (vaccines) that your doctor recommends. Ask your doctor about a flu shot and a pneumonia shot.  · Use oxygen therapy and pulmonary rehabilitation if told by your doctor. If you need home oxygen therapy, ask your doctor if you should buy a tool to measure your oxygen level (oximeter).  · Make a COPD action plan with your doctor. This helps you to know what to do if you feel worse than usual.  · Manage any other conditions you have as told by your doctor.  · Avoid going outside when it is very hot, cold, or humid.  · Avoid people who have a sickness you can catch (contagious).  · Keep all follow-up visits as told by your doctor. This is important.  Contact a doctor if:  · You cough up more mucus than usual.  · There is a change in the color or thickness of the mucus.  · It is harder to breathe than usual.  · Your breathing is faster than usual.  · You have trouble  sleeping.  · You need to use your medicines more often than usual.  · You have trouble doing your normal activities such as getting dressed or walking around the house.  Get help right away if:  · You have shortness of breath while resting.  · You have shortness of breath that stops you from:  ? Being able to talk.  ? Doing normal activities.  · Your chest hurts for longer than 5 minutes.  · Your skin color is more blue than usual.  · Your pulse oximeter shows that you have low oxygen for longer than 5 minutes.  · You have a fever.  · You feel too tired to breathe normally.  Summary  · Chronic obstructive pulmonary disease (COPD) is a long-term lung problem.  · The way your lungs work will never return to normal. Usually the condition gets worse over time. There are things you can do to keep yourself as healthy as possible.  · Take over-the-counter and prescription medicines only as told by your doctor.  · If you smoke, stop. Smoking makes the problem worse.  This information is not intended to replace advice given to you by your health care provider. Make sure you discuss any questions you have with your health care provider.  Document Revised: 11/30/2018 Document Reviewed: 01/22/2018  Elsevier Patient Education © 2021 [employer]

## 2022-01-05 DIAGNOSIS — J94.8 HYDROPNEUMOTHORAX: ICD-10-CM

## 2022-01-05 DIAGNOSIS — R06.02 SHORTNESS OF BREATH: Primary | ICD-10-CM

## 2022-01-06 ENCOUNTER — TELEPHONE (OUTPATIENT)
Dept: PULMONOLOGY | Facility: CLINIC | Age: 77
End: 2022-01-06

## 2022-01-06 NOTE — TELEPHONE ENCOUNTER
[facility] called asking for more information for pulmonary rehab. She is needing the HMP, lastest PFT information and an order for pulmonary rehab. I dont know really where to begin.

## 2022-01-11 ENCOUNTER — TELEPHONE (OUTPATIENT)
Dept: CARDIAC SURGERY | Facility: CLINIC | Age: 77
End: 2022-01-11

## 2022-01-11 NOTE — TELEPHONE ENCOUNTER
"Pt's  is calling asking about getting a pathology report from pt's surgery performed by Dr. Ignacio \"about 2 months ago.\" Pt's  states that he and his wife have a cancer policy that is \"about to .\" I confirmed with Clary PENNINGTON MA, that pt will need to retrieve that from Medical Records. Transferred pt's  to Medical Records.  "

## 2022-01-13 ENCOUNTER — TELEPHONE (OUTPATIENT)
Dept: PULMONOLOGY | Facility: CLINIC | Age: 77
End: 2022-01-13

## 2022-01-13 NOTE — TELEPHONE ENCOUNTER
[facility] has called asking why you told the patient she didn't qualify for pulmonary rehab?

## 2022-01-13 NOTE — TELEPHONE ENCOUNTER
Per Brittni Christianson, According to [facility] requirements patient does not qualify for pulmonary rehab in [place].  If she qualifies by [facility] it is fine to schedule patient or patient can continue with the core physical therapy by her PCP as already indicated.      Isabel informed and understands but did request that scan of PFT with all values be faxed to [phone] to see what she can do on her end--done/rlt

## 2022-01-13 NOTE — TELEPHONE ENCOUNTER
I did not tell her she would not qualify.  I told her I did not know if she would qualify or not.  She would not qualify for our pul rehab.  MO maybe different.

## 2022-01-31 ENCOUNTER — APPOINTMENT (OUTPATIENT)
Dept: CT IMAGING | Facility: HOSPITAL | Age: 77
End: 2022-01-31

## 2022-01-31 ENCOUNTER — HOSPITAL ENCOUNTER (OUTPATIENT)
Dept: CT IMAGING | Facility: HOSPITAL | Age: 77
Discharge: HOME | End: 2022-01-31
Admitting: NURSE PRACTITIONER

## 2022-01-31 DIAGNOSIS — R06.02 SHORTNESS OF BREATH: ICD-10-CM

## 2022-01-31 DIAGNOSIS — J94.8 HYDROPNEUMOTHORAX: ICD-10-CM

## 2022-01-31 PROCEDURE — 25010000002 IOPAMIDOL 61 % SOLUTION: Performed by: NURSE PRACTITIONER

## 2022-01-31 PROCEDURE — 71260 CT THORAX DX C+: CPT

## 2022-01-31 PROCEDURE — 82565 ASSAY OF CREATININE: CPT

## 2022-01-31 RX ADMIN — IOPAMIDOL 100 ML: 612 INJECTION, SOLUTION INTRAVENOUS at 12:26

## 2022-02-01 LAB — CREAT BLDA-MCNC: 0.8 MG/DL (ref 0.6–1.3)

## 2022-02-02 ENCOUNTER — OFFICE VISIT (OUTPATIENT)
Dept: PULMONOLOGY | Facility: CLINIC | Age: 77
End: 2022-02-02

## 2022-02-02 VITALS
BODY MASS INDEX: 18.07 KG/M2 | HEIGHT: 63 IN | SYSTOLIC BLOOD PRESSURE: 112 MMHG | WEIGHT: 102 LBS | OXYGEN SATURATION: 99 % | DIASTOLIC BLOOD PRESSURE: 78 MMHG | HEART RATE: 83 BPM

## 2022-02-02 DIAGNOSIS — J47.9 BRONCHIECTASIS WITHOUT COMPLICATION: Chronic | ICD-10-CM

## 2022-02-02 DIAGNOSIS — R63.6 UNDERWEIGHT: ICD-10-CM

## 2022-02-02 DIAGNOSIS — Z85.118 PERSONAL HISTORY OF MALIGNANT NEOPLASM OF BRONCHUS AND LUNG: ICD-10-CM

## 2022-02-02 DIAGNOSIS — J94.8 HYDROPNEUMOTHORAX: ICD-10-CM

## 2022-02-02 DIAGNOSIS — R91.1 LUNG NODULE: ICD-10-CM

## 2022-02-02 DIAGNOSIS — J44.9 STAGE 2 MODERATE COPD BY GOLD CLASSIFICATION: Primary | ICD-10-CM

## 2022-02-02 DIAGNOSIS — Z87.891 PERSONAL HISTORY OF NICOTINE DEPENDENCE: ICD-10-CM

## 2022-02-02 PROCEDURE — 99214 OFFICE O/P EST MOD 30 MIN: CPT | Performed by: INTERNAL MEDICINE

## 2022-02-02 RX ORDER — BUDESONIDE, GLYCOPYRROLATE, AND FORMOTEROL FUMARATE 160; 9; 4.8 UG/1; UG/1; UG/1
2 AEROSOL, METERED RESPIRATORY (INHALATION) 2 TIMES DAILY
Qty: 2 EACH | Refills: 0 | COMMUNITY
Start: 2022-02-02 | End: 2022-03-03 | Stop reason: SDUPTHER

## 2022-02-02 RX ORDER — AMOXICILLIN AND CLAVULANATE POTASSIUM 875; 125 MG/1; MG/1
1 TABLET, FILM COATED ORAL 2 TIMES DAILY
COMMUNITY
End: 2022-05-19

## 2022-02-02 RX ORDER — ONDANSETRON 4 MG/1
4 TABLET, FILM COATED ORAL EVERY 8 HOURS PRN
COMMUNITY
End: 2022-11-16

## 2022-02-02 RX ORDER — BUDESONIDE, GLYCOPYRROLATE, AND FORMOTEROL FUMARATE 160; 9; 4.8 UG/1; UG/1; UG/1
2 AEROSOL, METERED RESPIRATORY (INHALATION) 2 TIMES DAILY
COMMUNITY
End: 2022-03-02 | Stop reason: SDUPTHER

## 2022-02-02 NOTE — ASSESSMENT & PLAN NOTE
This is a sequela of her recent right upper lobectomy and does appear to be improving on her most recent chest CT.

## 2022-02-02 NOTE — ASSESSMENT & PLAN NOTE
Her BMI is slightly low and she will follow up with her primary care physician regarding her low BMI.

## 2022-02-02 NOTE — PROGRESS NOTES
Chief Complaint  Shortness of Breath    Subjective    History of Present Illness {CC  Problem List  Visit Diagnosis   Encounters  Notes  Medications  Labs  Result Review Imaging  Media     Trinidad Zhu presents to [facility] for shortness of breath, COPD, and recent right upper lobectomy for lung cancer.    History of Present Illness   The patient is accompanied by her  today.  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  Her hospital course was complicated by prolonged air leak.  She eventually was discharged with her chest tube removed but still had a significant hydropneumothorax.  She had some issues with chest pain and shortness of breath for which she underwent a CT angiogram on December 9 which showed no evidence of pulmonary emboli with a residual hydropneumothorax that appeared to be improving slightly with some areas of groundglass change in the small effusion in a subpulmonic location.  She had a follow-up CT done Monday which showed continued improvement in her hydropneumothorax, significant improvement in her groundglass changes, but a 6 mm nodule located in the right lower lobe medially.  I did advise the patient and her  I think this likely is residual from her recent surgery but will get a short-term follow-up CT in 3 months.  Scans of also shown some bronchiectatic changes but on her current scan these appear to be fairly minimal and again I went over the scans with the patient and her  in detail today.  Also showed her on a model the actual size of a 6 mm nodule.  She is going to undergo some outpatient is physical therapy arranged by Dr. Layne her primary care physician.  I told her I would certainly encourage this but I also provided her information on the [facility] pulmonary rehab program.  I told the outpatient pulmonary rehab program at [facility] is  currently not operating due to the COVID-19 conditions but that could certainly be considered in the future.  I encouraged her to continue to use her Volumex device and to do deep breathing exercises and undergo the aforementioned physical therapy.  Her  in particular questions about any potential residual air leak and I told him that the decrease in the size of her hydropneumothorax would indicate that there currently is not an air leak.  She has had her COVID-19 vaccines in the form of the Moderna vaccine including the booster and is also had her flu shot.  She is transitioning from BREO Ellipta to the Breztri Aerosphere and does have a spacer device to use with the Aerosphere.  Some additional samples were provided.  I did tell that Breztri Aerosphere had an additional bronchodilator compared to the BREO Ellipta and had some potential benefits in terms of decreasing mortality in patients with COPD which have not been shown with other agents including the  BREO Ellipta.  Prior to Admission medications    Medication Sig Start Date End Date Taking? Authorizing Provider   albuterol sulfate  (90 Base) MCG/ACT inhaler Inhale 2 puffs Every 4 (Four) Hours As Needed for Wheezing.   Yes ProviderMary Lou MD   alendronate (FOSAMAX) 70 MG tablet Take 70 mg by mouth Every 7 (Seven) Days. Saturdays 5/21/19  Yes Mary Lou Rodas MD   amoxicillin-clavulanate (AUGMENTIN) 875-125 MG per tablet Take 1 tablet by mouth 2 (Two) Times a Day.   Yes Mary Lou Rodas MD   aspirin 81 MG chewable tablet Chew 81 mg Daily.   Yes Mary Lou Rodas MD   benazepril (LOTENSIN) 5 MG tablet Take 5 mg by mouth Daily. 6/19/19  Yes Mary Lou Rodas MD   Budeson-Glycopyrrol-Formoterol (Breztri Aerosphere) 160-9-4.8 MCG/ACT aerosol inhaler Inhale 2 puffs 2 (Two) Times a Day.   Yes Mary Lou Rodas MD   carvedilol (COREG) 12.5 MG tablet Take 12.5 mg by mouth 2 (Two) Times a Day With Meals.   Yes Clark  MD Mary Lou   cetirizine (ZyrTEC) 10 MG tablet Take 10 mg by mouth At Night As Needed for Allergies.   Yes Mary Lou Rodas MD   clopidogrel (PLAVIX) 75 MG tablet TAKE 1 TABLET BY MOUTH EVERY DAY  6/14/21  Yes Silvia Herrera APRN   colestipol (COLESTID) 1 g tablet Take 1 g by mouth 2 (Two) Times a Day.   Yes Mary Lou Rodas MD   famotidine (PEPCID) 40 MG tablet Take 40 mg by mouth 2 (Two) Times a Day. 4/15/19  Yes Mary Lou Rodas MD   hydroCHLOROthiazide (HYDRODIURIL) 12.5 MG tablet Take 12.5 mg by mouth Daily.   Yes Mary Lou Rodas MD   iron polysaccharides (NIFEREX) 150 MG capsule Take 1 capsule by mouth Daily. 11/17/21  Yes Brittni Bergman APRN   levothyroxine (SYNTHROID, LEVOTHROID) 50 MCG tablet Take 50 mcg by mouth Daily. 5/8/19  Yes Mary Lou Rodas MD   Mirabegron ER (Myrbetriq) 50 MG tablet sustained-release 24 hour 24 hr tablet Take 50 mg by mouth Daily.   Yes ProviderMary Lou MD   multivitamin with minerals (MULTIVITAMIN ADULT PO) Take 1 tablet by mouth Daily.   Yes Mary Lou Rodas MD   ondansetron (ZOFRAN) 4 MG tablet Take 4 mg by mouth Every 8 (Eight) Hours As Needed for Nausea or Vomiting.   Yes ProviderMary Lou MD   rosuvastatin (CRESTOR) 40 MG tablet Take 40 mg by mouth Every Night. 9/2/21  Yes Mary Lou Rodas MD   traMADol (ULTRAM) 50 MG tablet Take 50 mg by mouth Every 3 (Three) Hours As Needed for Moderate Pain  or Severe Pain . 5/28/19  Yes ProviderMary Lou MD   Budeson-Glycopyrrol-Formoterol (Breztri Aerosphere) 160-9-4.8 MCG/ACT aerosol inhaler Inhale 2 puffs 2 (Two) Times a Day. Rinse and spit after using an use with a spacer device. 2/2/22   Dion Cortés MD   hydrOXYzine pamoate (Vistaril) 25 MG capsule Take 1 capsule by mouth 3 (Three) Times a Day As Needed for Anxiety for up to 30 days. 12/27/21 1/26/22  Brittni Christianson APRN       Social History     Socioeconomic History   • Marital status:    Tobacco  "Use   • Smoking status: Former Smoker     Packs/day: 1.50     Years: 20.00     Pack years: 30.00     Types: Cigarettes     Quit date:      Years since quittin.1   • Smokeless tobacco: Never Used   Vaping Use   • Vaping Use: Never used   Substance and Sexual Activity   • Alcohol use: Yes     Comment: occasionally   • Drug use: No   • Sexual activity: Not Currently       Objective   Vital Signs:   /78   Pulse 83   Ht 160 cm (63\")   Wt 46.3 kg (102 lb)   SpO2 99% Comment: RA  BMI 18.07 kg/m²     Physical Exam  Vitals and nursing note reviewed.   Constitutional:       Comments: She is a thin white female who appears in no acute distress.   HENT:      Head: Normocephalic.      Comments: She is wearing a mask.  Eyes:      Extraocular Movements: Extraocular movements intact.      Pupils: Pupils are equal, round, and reactive to light.   Cardiovascular:      Rate and Rhythm: Normal rate and regular rhythm.   Pulmonary:      Effort: Pulmonary effort is normal.      Comments: Breath sounds are slightly diminished in the right upper lung field posteriorly but no adventitious sounds are heard.  Musculoskeletal:         General: Normal range of motion.   Skin:     General: Skin is warm and dry.   Neurological:      General: No focal deficit present.      Mental Status: She is alert and oriented to person, place, and time.   Psychiatric:         Mood and Affect: Mood normal.         Behavior: Behavior normal.        Result Review :{ Labs  Result Review  Imaging  Med Tab  Media :    PFT Values        Some values may be hidden. Unless noted otherwise, only the newest values recorded on each date are displayed.         Old Values PFT Results 9/10/21   No data to display.      Pre Drug PFT Results 9/10/21   FVC 80   FEV1 64   FEF 25-75% 28   FEV1/FVC 61.74      Post Drug PFT Results 9/10/21   No data to display.      Other Tests PFT Results 9/10/21   No data to display.               Results for orders placed " during the hospital encounter of 11/01/21    Full Pulmonary Function Test With Bronchodilator & ABG    Narrative  [facility] - Pulmonary Function Test    [address]  [place]  [place]  [address]  [phone]    Patient : Trinidad Zhu  MRN : [id number]  CSN : [id number]  Pulmonologist : Dion Cortés MD  Date : 11/1/2021    ______________________________________________________________________    Interpretation :  1.  Spirometry is consistent with a moderate obstructive ventilatory defect.  2.  There is no significant change in spirometry postbronchodilator.  3.  Lung volumes reveal an elevated expiratory reserve volume.  There is also a mild decrease in inspiratory capacity.  4.  There is a moderate diffusion impairment which when corrected for alveolar volume is a mild diffusion impairment.  5.  Arterial blood gases are within normal limits on room air.  6.  When current studies are compared to studies done at the respiratory disease clinic on September 10 of 2021, there has been slight improvement in both the patient's FVC and FEV1 on current pre and postbronchodilator studies compared to previous baseline spirometry.      Dion Cortés MD      Results for orders placed in visit on 09/10/21    Pulmonary Function Test    Narrative  Pulmonary Function Test  Performed by: Samantha Cantor, RRT  Authorized by: Brittni Christianson APRN    Pre Drug % Predicted  FVC: 80%  FEV1: 64%  FEF 25-75%: 28%  FEV1/FVC: 61.74%    Interpretation  Spirometry  Spirometry shows moderate obstruction. There is reduced midflow suggesting small airway/airflow obstruction.  Review of FVL curve  Patient's effort is normal.      Results for orders placed in visit on 07/30/19    Pulmonary Function Test    Rest/Exercise Pulse Ox Values        Some values may be hidden. Unless noted otherwise, only the newest values recorded on each date are displayed.         Rest/Exercise Pulse Ox Results 1/4/22   Rest room  air SAT % 99   Exercise room air SAT % 95                      My interpretation of imaging:    CT Chest With Contrast Diagnostic (01/31/2022 12:25)        Assessment and Plan {CC Problem List  Visit Diagnosis  ROS  Review (Popup)  [facility] Maintenance  Quality  BestPractice  Medications  SmartSets  SnapShot Encounters  Media      Diagnoses and all orders for this visit:    1. Stage 2 moderate COPD by GOLD classification ([facility]) (Primary)  Assessment & Plan:  Given she will utilize the Breztri Aerosphere inhaler and her as needed albuterol HFA inhaler which she has not had to use regularly recently.  I also encouraged her to participate in the rehab program ordered by Dr. Layne and I provided her information on the [facility] pulmonary rehab program as well.        Orders:  -     Budeson-Glycopyrrol-Formoterol (Breztri Aerosphere) 160-9-4.8 MCG/ACT aerosol inhaler; Inhale 2 puffs 2 (Two) Times a Day. Rinse and spit after using an use with a spacer device.  Dispense: 2 each; Refill: 0    2. Lung nodule  Assessment & Plan:  A new 6 mm nodule was noted on a recent chest CT.  I think this likely is residual from her previous lung surgery but I will get a follow-up scan in about 3 months.    Orders:  -     CT Chest Without Contrast Diagnostic; Future    3. Bronchiectasis without complication ([facility])  Assessment & Plan:  Her most recent CT scan appeared to show just some very minimal bronchiectatic changes in the right lower lobe.      4. Hydropneumothorax  Assessment & Plan:  This is a sequela of her recent right upper lobectomy and does appear to be improving on her most recent chest CT.      5. Personal history of malignant neoplasm of bronchus and lung  Assessment & Plan:  She is status post right upper lobectomy for adenocarcinoma of the lung.      6. Personal history of nicotine dependence  Assessment & Plan:  She quit smoking in 1996.    Orders:  -     CT Chest Without Contrast Diagnostic; Future    7.  Underweight  Assessment & Plan:  Her BMI is slightly low and she will follow up with her primary care physician regarding her low BMI.        Patient's Body mass index is 18.07 kg/m². indicating that she is underweight (BMI < 18.5). Recommendations include: referral to primary care.          Dion Cortés MD  2/2/2022  15:59 CST    Follow Up {Instructions Charge Capture  Follow-up Communications   Return in about 3 months (around 5/2/2022) for To see me specifically.    Patient was given instructions and counseling regarding her condition or for health maintenance advice. Please see specific information pulled into the AVS if appropriate.

## 2022-02-02 NOTE — ASSESSMENT & PLAN NOTE
A new 6 mm nodule was noted on a recent chest CT.  I think this likely is residual from her previous lung surgery but I will get a follow-up scan in about 3 months.

## 2022-02-02 NOTE — ASSESSMENT & PLAN NOTE
Given she will utilize the Breztri Aerosphere inhaler and her as needed albuterol HFA inhaler which she has not had to use regularly recently.  I also encouraged her to participate in the rehab program ordered by Dr. Layne and I provided her information on the [facility] pulmonary rehab program as well.

## 2022-02-02 NOTE — ASSESSMENT & PLAN NOTE
Her most recent CT scan appeared to show just some very minimal bronchiectatic changes in the right lower lobe.

## 2022-02-02 NOTE — PATIENT INSTRUCTIONS
The patient does state she is doing clinically.  I did go over her recent chest CT with both the patient and her .  I think the small 6 mm nodule is likely related to some post inflammatory changes but I will obtain a follow-up CT in 3 months.  Her hydropneumothorax does appear to be decreasing in size and some groundglass changes that were noted previously in the right lower lobe have improved.  I did encourage her to continue to do deep breathing exercises and to continue rehab.     Breathe Better • Increase Stamina and Strength • Manage Anxiety       Sign up for a 7 day free trial at: [email or web address]     Find us online by Googling: “copd lift” or going to: [email or web address]   There are no commitments with the Lift free trial. If you have any questions or need help, call us at [phone]   How Does Lift Help?       Lift classes are designed to improve stamina, strength, energy, balance, breathing, anxiety, mental wellness and more!     What is Lift?    A fitness and wellness program for people with COPD and other respiratory conditions taught by experienced instructors.   Hundreds of   Video Classes       Follow along with the Lift instructors in a variety of   classes. Chair yoga, dance, strength, and more!   Guided Daily Programs      Daily, recommended set of classes designed to help   you improve your quality of life and independence.   Answers to your Questions      Connect with instructors   (respiratory therapists, physical therapists, nurses) for reliable information.    Try Lift for Free Today!

## 2022-02-24 ENCOUNTER — APPOINTMENT (OUTPATIENT)
Dept: ULTRASOUND IMAGING | Facility: HOSPITAL | Age: 77
End: 2022-02-24

## 2022-02-24 ENCOUNTER — TELEPHONE (OUTPATIENT)
Dept: VASCULAR SURGERY | Facility: CLINIC | Age: 77
End: 2022-02-24

## 2022-02-24 NOTE — TELEPHONE ENCOUNTER
Returned patients call, she came for testing that had been cancelled. I apologized she was not made aware that her follow up would be in 6 months . Pt expressed understanding and asked me if I could get her future testing appointments moved to a later time.

## 2022-03-02 ENCOUNTER — TELEPHONE (OUTPATIENT)
Dept: PULMONOLOGY | Facility: CLINIC | Age: 77
End: 2022-03-02

## 2022-03-02 RX ORDER — BUDESONIDE, GLYCOPYRROLATE, AND FORMOTEROL FUMARATE 160; 9; 4.8 UG/1; UG/1; UG/1
2 AEROSOL, METERED RESPIRATORY (INHALATION) 2 TIMES DAILY
Qty: 1 EACH | Refills: 0 | COMMUNITY
Start: 2022-03-02 | End: 2022-03-14 | Stop reason: SDUPTHER

## 2022-03-02 NOTE — TELEPHONE ENCOUNTER
Patient called stating that she needed some samples of Breztri to keep her from going in the donut hole. I have a sample but also should I give her paperwork for [place] to try to get her assistance?

## 2022-03-03 DIAGNOSIS — J44.9 STAGE 2 MODERATE COPD BY GOLD CLASSIFICATION: ICD-10-CM

## 2022-03-03 RX ORDER — BUDESONIDE, GLYCOPYRROLATE, AND FORMOTEROL FUMARATE 160; 9; 4.8 UG/1; UG/1; UG/1
2 AEROSOL, METERED RESPIRATORY (INHALATION) 2 TIMES DAILY
Qty: 3 EACH | Refills: 3 | Status: SHIPPED | OUTPATIENT
Start: 2022-03-03 | End: 2022-03-14 | Stop reason: SDUPTHER

## 2022-03-07 ENCOUNTER — OFFICE VISIT (OUTPATIENT)
Dept: GASTROENTEROLOGY | Facility: CLINIC | Age: 77
End: 2022-03-07

## 2022-03-07 VITALS
DIASTOLIC BLOOD PRESSURE: 70 MMHG | HEART RATE: 60 BPM | SYSTOLIC BLOOD PRESSURE: 150 MMHG | HEIGHT: 63 IN | OXYGEN SATURATION: 99 % | TEMPERATURE: 96.2 F | BODY MASS INDEX: 18.82 KG/M2 | WEIGHT: 106.2 LBS

## 2022-03-07 DIAGNOSIS — Z79.02 PLATELET INHIBITION DUE TO PLAVIX: ICD-10-CM

## 2022-03-07 DIAGNOSIS — I10 HTN (HYPERTENSION), BENIGN: ICD-10-CM

## 2022-03-07 DIAGNOSIS — Z12.11 ENCOUNTER FOR SCREENING FOR MALIGNANT NEOPLASM OF COLON: Primary | ICD-10-CM

## 2022-03-07 PROCEDURE — 99214 OFFICE O/P EST MOD 30 MIN: CPT | Performed by: CLINICAL NURSE SPECIALIST

## 2022-03-07 NOTE — PROGRESS NOTES
Trinidad Zhu  1945      3/7/2022  Chief Complaint   Patient presents with   • Colonoscopy     Subjective   HPI  Trinidad Zhu is a 76 y.o. female who presents as a referral for preventative maintenance. She has no complaints of nausea or vomiting. No change in bowels. No wt loss. No BRBPR. No melena. There is NO family hx for colon cancer. No abdominal pain.  Past Medical History:   Diagnosis Date   • Antral ulcer    • Bladder cystocele    • C. difficile diarrhea    • CAD (coronary artery disease)    • Colon polyp    • Diarrhea    • Difficulty swallowing    • Disease of thyroid gland    • Diverticulosis    • Elevated cholesterol    • Gastritis    • Generalized OA    • GERD (gastroesophageal reflux disease)    • History of bladder surgery 01/07/2020   • Hyperlipidemia    • Hypertension    • Liver cyst    • Lung nodule    • Microscopic colitis    • Multiple gastric ulcers     last 5 years ago   • Neck pain    • Neuropathy    • Normal body mass index    • NSAID induced gastritis    • Ulcer of pyloric antrum     UNSPECIFIED ULCER CHRONICITY   • UTI (urinary tract infection)    • Weight loss      Past Surgical History:   Procedure Laterality Date   • ANTERIOR CERVICAL DISCECTOMY W/ FUSION N/A 5/22/2017    Procedure: CERVICAL DISCECTOMY ANTERIOR FUSION WITH INSTRUMENTATION;  Surgeon: NADINE Sarabia MD;  Location:  [facility];  Service:    • AORTAGRAM Left 11/9/2020    Procedure: left lower extremtiy angiogram, hawk athrectomy, balloon angioplasty, stent placement, mynx closure;  Surgeon: Sukhdev Condon DO;  Location:  [facility];  Service: Vascular;  Laterality: Left;   • AORTAGRAM Left 3/26/2021    Procedure: LEFT LOWER EXTREMITY ANGIOGRAM, BALLOON ANGIOPLASTY, STENT PLACEMENT, MYNX CLOSURE;  Surgeon: Sukhdev Condon DO;  Location:  [facility];  Service: Vascular;  Laterality: Left;   • AORTAGRAM Left 8/6/2021    Procedure: LEFT LOWER EXTREMITY ANGIOGRAM, HAWK ATHERECTOMY,  BALLOON ANGIOPLASTY, MYNX CLOSURE;  Surgeon: Sukhdev Condon DO;  Location:  [facility];  Service: Vascular;  Laterality: Left;   • BLADDER SUSPENSION      also had pelvic reconstructive surgery   • BREAST EXCISIONAL BIOPSY Right 1985   • CATARACT EXTRACTION, BILATERAL     • CERVICAL CORPECTOMY N/A 5/22/2017    Procedure:  ANTERIOR CERVICAL DISCECTOMY FUSION C-6 to T1,  ANTERIOR FUSION WITH INSTRUMENTATION C-5 T-1;  Surgeon: NADINE Sarabia MD;  Location:  [facility];  Service:    • COLONOSCOPY  08/19/2015    TIC BX RT COLON   • COLONOSCOPY  12/04/2013    RT COLON BX RECALL 5YR   • COLONOSCOPY N/A 11/29/2016    The entire examined colon is normal; No specimens collected   • ENDOSCOPY  12/03/2015    HEALED ULCER SLANT BX   • HYSTERECTOMY     • LOBECTOMY Right 11/5/2021    Procedure: RIGHT THORACOSCOPY WITH [employer] ROBOT, RIGHT UPPER LOBE LOBECTOMY;  Surgeon: Jarad Ignacio MD;  Location:  [facility];  Service: Robotics - [employer];  Laterality: Right;   • LUNG SURGERY     • OTHER SURGICAL HISTORY      KNEE CARTILAGE REMOVED   • OTHER SURGICAL HISTORY      BENIGN FIBROID TUMOR OF BREAST 1985 REMOVED   • TONSILLECTOMY       Outpatient Medications Marked as Taking for the 3/7/22 encounter (Office Visit) with Melanie Haque APRN   Medication Sig Dispense Refill   • albuterol sulfate  (90 Base) MCG/ACT inhaler Inhale 2 puffs Every 4 (Four) Hours As Needed for Wheezing.     • alendronate (FOSAMAX) 70 MG tablet Take 70 mg by mouth Every 7 (Seven) Days. Saturdays  3   • aspirin 81 MG chewable tablet Chew 81 mg Daily.     • benazepril (LOTENSIN) 5 MG tablet Take 5 mg by mouth Daily.     • Budeson-Glycopyrrol-Formoterol (Breztri Aerosphere) 160-9-4.8 MCG/ACT aerosol inhaler Inhale 2 puffs 2 (Two) Times a Day. 1 each 0   • carvedilol (COREG) 12.5 MG tablet Take 12.5 mg by mouth 2 (Two) Times a Day With Meals.     • cetirizine (ZyrTEC) 10 MG tablet Take 10 mg by mouth At Night As Needed for Allergies.      • clopidogrel (PLAVIX) 75 MG tablet TAKE 1 TABLET BY MOUTH EVERY DAY  30 tablet 4   • famotidine (PEPCID) 40 MG tablet Take 40 mg by mouth 2 (Two) Times a Day.  3   • hydroCHLOROthiazide (HYDRODIURIL) 12.5 MG tablet Take 12.5 mg by mouth Daily.     • iron polysaccharides (NIFEREX) 150 MG capsule Take 1 capsule by mouth Daily. 30 capsule 0   • levothyroxine (SYNTHROID, LEVOTHROID) 50 MCG tablet Take 50 mcg by mouth Daily.  3   • Mirabegron ER (Myrbetriq) 50 MG tablet sustained-release 24 hour 24 hr tablet Take 50 mg by mouth Daily.     • multivitamin with minerals (MULTIVITAMIN ADULT PO) Take 1 tablet by mouth Daily.     • rosuvastatin (CRESTOR) 40 MG tablet Take 40 mg by mouth Every Night.     • traMADol (ULTRAM) 50 MG tablet Take 50 mg by mouth Every 3 (Three) Hours As Needed for Moderate Pain  or Severe Pain .  0     Allergies   Allergen Reactions   • Baclofen Other (See Comments)     MUSCULOSKELETAL THERAPY AGENTS knocked her out for a day    Other reaction(s): Unknown   • Gabapentin Confusion     Other reaction(s): over sedation   • Sulfa Antibiotics Rash     Mouth blisters   • Sulfacetamide Sodium Rash   • Amitriptyline Hcl Confusion     Other reaction(s): ambulation difficulties/confusion   • Niferex [Iron Polysaccharide] Swelling   • Oxycodone-Acetaminophen GI Intolerance     Other reaction(s): vomiting     Social History     Socioeconomic History   • Marital status:    Tobacco Use   • Smoking status: Former Smoker     Packs/day: 1.50     Years: 20.00     Pack years: 30.00     Types: Cigarettes     Quit date:      Years since quittin.1   • Smokeless tobacco: Never Used   Vaping Use   • Vaping Use: Never used   Substance and Sexual Activity   • Alcohol use: Yes     Comment: occasionally   • Drug use: No   • Sexual activity: Not Currently     Family History   Problem Relation Age of Onset   • Breast cancer Maternal Aunt    • Heart disease Mother    • Heart disease Father    • GI problems Neg  "Hx         MALIGNANCIES   • Colon cancer Neg Hx    • Colon polyps Neg Hx      Health Maintenance   Topic Date Due   • TDAP/TD VACCINES (1 - Tdap) Never done   • ZOSTER VACCINE (1 of 2) Never done   • ANNUAL WELLNESS VISIT  04/01/2022   • LIPID PANEL  08/13/2022   • MAMMOGRAM  06/17/2023   • DXA SCAN  06/17/2023   • COLORECTAL CANCER SCREENING  11/29/2026   • HEPATITIS C SCREENING  Completed   • COVID-19 Vaccine  Completed   • INFLUENZA VACCINE  Completed   • Pneumococcal Vaccine 65+  Completed   • LUNG CANCER SCREENING  Discontinued       REVIEW OF SYSTEMS  General: well appearing, no fever chills or sweats, no unexplained wt loss  HEENT: no acute visual or hearing disturbances  Cardiovascular: No chest pain or palpitations  Pulmonary: No shortness of breath, coughing, wheezing or hemoptysis  : No burning, urgency, hematuria, or dysuria  Musculoskeletal: No joint pain or stiffness  Peripheral: no edema  Skin: No lesions or rashes  Neuro: No dizziness, headaches, stroke, syncope  Endocrine: No hot or cold intolerances  Hematological: No blood dyscrasias    Objective   Vitals:    03/07/22 1309   BP: 150/70   Pulse: 60   Temp: 96.2 °F (35.7 °C)   SpO2: 99%   Weight: 48.2 kg (106 lb 3.2 oz)   Height: 160 cm (63\")     Body mass index is 18.81 kg/m².  Patient's Body mass index is 18.81 kg/m². indicating that she is within normal range (BMI 18.5-24.9). No BMI management plan needed..      PHYSICAL EXAM  General: age appropriate well nourished well appearing, no acute distress  Head: normocephalic and atraumatic  Global assessment-supple  Neck-No JVD noted, no lymphadenopathy  Pulmonary-clear to auscultation bilaterally, normal respiratory effort  Cardiovascular-normal rate and rhythm, normal heart sounds, S1 and S2 noted  Abdomen-soft, non tender, non distended, normal bowel sounds all 4 quadrants, no hepatosplenomegaly noted  Extremities-No clubbing cyanosis or edema  Neuro-Non focal, converses appropriately, awake, " alert, oriented    Assessment/Plan     Diagnoses and all orders for this visit:    1. Encounter for screening for malignant neoplasm of colon (Primary)  -     Case Request; Standing  -     Follow Anesthesia Guidelines / Standing Orders; Future  -     Obtain Informed Consent; Future  -     Case Request  -     polyethylene glycol (GoLYTELY) 236 g solution; Take as directed by office instructions.  Dispense: 4000 mL; Refill: 0    2. Platelet inhibition due to Plavix  Comments:  To hold per Dr Layne she takes due to a blood clot    3. HTN (hypertension), benign  Comments:  cont BP medication the day of procedure        COLONOSCOPY WITH ANESTHESIA (N/A)  Body mass index is 18.81 kg/m².    Patient instructions on prep prior to procedure provided to the patient.    All risks, benefits, alternatives, and indications of colonoscopy procedure have been discussed with the patient. Risks to include perforation of the colon requiring possible surgery or colostomy, risk of bleeding from biopsies or removal of colon tissue, possibility of missing a colon polyp or cancer, or adverse drug reaction.  Benefits to include the diagnosis and management of disease of the colon and rectum. Alternatives to include barium enema, radiographic evaluation, lab testing or no intervention. Pt verbalizes understanding and agrees.     Melanie Haque, APRN  3/7/2022  13:16 CST      IF YOU SMOKE OR USE TOBACCO PLEASE READ THE FOLLOWIN minutes reading provided    Why is smoking bad for me?  Smoking increases the risk of heart disease, lung disease, vascular disease, stroke, and cancer.     If you smoke, STOP!    If you would like more information on quitting smoking, please visit the [employer] website: [email or web address]   This link will provide additional resources including the QUIT line and the Beat the Pack support groups.     For more information:    Quit Now  [place]  1-800-QUIT-NOW  https://[place].quitlogix.org/en-[place]/

## 2022-03-14 RX ORDER — BUDESONIDE, GLYCOPYRROLATE, AND FORMOTEROL FUMARATE 160; 9; 4.8 UG/1; UG/1; UG/1
2 AEROSOL, METERED RESPIRATORY (INHALATION) 2 TIMES DAILY
Qty: 1 EACH | Refills: 0 | COMMUNITY
Start: 2022-03-14 | End: 2022-04-07 | Stop reason: SDUPTHER

## 2022-03-14 NOTE — TELEPHONE ENCOUNTER
Patient called asking about the status of the [place].   I called [place] [employer] [place] to check on it.   Kavitha is running it through while she is on the phone with me.  She told me that they need income documentation regarding this.    Patient called and telling her that we need a copy of her income documentation. I told her that I have a sample of the breztri when she brings that paperwork.

## 2022-03-23 ENCOUNTER — TELEPHONE (OUTPATIENT)
Dept: UROLOGY | Facility: CLINIC | Age: 77
End: 2022-03-23

## 2022-03-24 NOTE — TELEPHONE ENCOUNTER
Patient brought her income taxes in for the proof of income for 2021 so we could send it to [place] assistance program.  Faxed to [place] [employer] [place] to see if she is approved.

## 2022-04-07 RX ORDER — BUDESONIDE, GLYCOPYRROLATE, AND FORMOTEROL FUMARATE 160; 9; 4.8 UG/1; UG/1; UG/1
2 AEROSOL, METERED RESPIRATORY (INHALATION) 2 TIMES DAILY
Qty: 2 EACH | Refills: 0 | COMMUNITY
Start: 2022-04-07 | End: 2022-05-09

## 2022-04-19 ENCOUNTER — TRANSCRIBE ORDERS (OUTPATIENT)
Dept: ADMINISTRATIVE | Facility: HOSPITAL | Age: 77
End: 2022-04-19

## 2022-04-19 ENCOUNTER — HOSPITAL ENCOUNTER (OUTPATIENT)
Dept: GENERAL RADIOLOGY | Facility: HOSPITAL | Age: 77
Discharge: HOME | End: 2022-04-19
Admitting: PHYSICIAN ASSISTANT

## 2022-04-19 DIAGNOSIS — N39.0 URINARY TRACT INFECTION WITHOUT HEMATURIA, SITE UNSPECIFIED: Primary | ICD-10-CM

## 2022-04-19 DIAGNOSIS — N39.0 URINARY TRACT INFECTION WITHOUT HEMATURIA, SITE UNSPECIFIED: ICD-10-CM

## 2022-04-19 PROCEDURE — 74018 RADEX ABDOMEN 1 VIEW: CPT

## 2022-05-03 ENCOUNTER — OFFICE VISIT (OUTPATIENT)
Dept: PULMONOLOGY | Facility: CLINIC | Age: 77
End: 2022-05-03

## 2022-05-03 ENCOUNTER — HOSPITAL ENCOUNTER (OUTPATIENT)
Dept: CT IMAGING | Facility: HOSPITAL | Age: 77
Discharge: HOME | End: 2022-05-03
Admitting: INTERNAL MEDICINE

## 2022-05-03 VITALS
BODY MASS INDEX: 18.96 KG/M2 | OXYGEN SATURATION: 98 % | DIASTOLIC BLOOD PRESSURE: 76 MMHG | SYSTOLIC BLOOD PRESSURE: 124 MMHG | HEIGHT: 63 IN | HEART RATE: 72 BPM | WEIGHT: 107 LBS

## 2022-05-03 DIAGNOSIS — Z87.891 PERSONAL HISTORY OF NICOTINE DEPENDENCE: ICD-10-CM

## 2022-05-03 DIAGNOSIS — R91.1 LUNG NODULE: ICD-10-CM

## 2022-05-03 DIAGNOSIS — R91.1 LUNG NODULE: Primary | ICD-10-CM

## 2022-05-03 DIAGNOSIS — J44.9 STAGE 2 MODERATE COPD BY GOLD CLASSIFICATION: Chronic | ICD-10-CM

## 2022-05-03 DIAGNOSIS — J47.9 BRONCHIECTASIS WITHOUT COMPLICATION: Chronic | ICD-10-CM

## 2022-05-03 DIAGNOSIS — Z87.891 PERSONAL HISTORY OF NICOTINE DEPENDENCE: Chronic | ICD-10-CM

## 2022-05-03 DIAGNOSIS — Z85.118 PERSONAL HISTORY OF MALIGNANT NEOPLASM OF BRONCHUS AND LUNG: ICD-10-CM

## 2022-05-03 PROCEDURE — 71250 CT THORAX DX C-: CPT

## 2022-05-03 PROCEDURE — 99214 OFFICE O/P EST MOD 30 MIN: CPT | Performed by: INTERNAL MEDICINE

## 2022-05-03 NOTE — ASSESSMENT & PLAN NOTE
She appears to have some focal areas of bronchiectasis on the right.  She is having no significant symptoms associated with this.

## 2022-05-03 NOTE — PROGRESS NOTES
Chief Complaint  Stage 2 moderate COPD, Lung Nodule, and Personal history of lung cancer    Subjective    History of Present Illness     Trinidad Zhu presents to [facility] for a lung nodule, COPD, and a history of lung cancer for which she is status post lung resection.    History of Present Illness   The patient is accompanied by her  today.  She does states she is having some potential side effects of her Breztri Aerosphere including some issues with her bladder, her eyes, and also headache.  It is possible the anticholinergic component of the Breztri Aerosphere would be causing some of the symptoms particularly her eye and  issues and I advised her just to stop the Breztri and use her rescue inhaler as needed.  If her symptoms resolved she wondered if she could try the inhaler again at a lower dose and I told her that was fine but if she had recurrent symptoms I would just stop it permanently and if she needed some other form of maintenance therapy we could utilize a nonanticholinergic-containing preparation.  Her chest CT was reviewed with the patient and her .  I did not have the official report when she was in the office but to my review her previously noted lung nodule appeared to have essentially resolved and the previously noted pleural air in the right upper pleural space had been replaced by what appears to be some pleural fluid and also I think some improved expansion of the residual right lung.  The official report subsidy did become available and again the nodule was felt to have resolved and her right upper lobe postop changes had improved and I did call her and notify her of this by phone.  She does have some mild bronchiectatic changes noted on her scan but she does not have any issues with difficulty in clearing secretions.  Again I told her just to use her rescue inhaler as needed for now and we will see her in follow-up  in several weeks with pulmonary functions.  She does have moderate COPD by prior pulmonary functions.  She has had her COVID-19 vaccine including a booster in the form of the Moderna vaccine and will be getting her second booster soon.  She has also had the flu shot, the Prevnar 13, and the Pneumovax.  Prior to Admission medications    Medication Sig Start Date End Date Taking? Authorizing Provider   albuterol sulfate  (90 Base) MCG/ACT inhaler Inhale 2 puffs Every 4 (Four) Hours As Needed for Wheezing.   Yes Mary Lou Rodas MD   alendronate (FOSAMAX) 70 MG tablet Take 70 mg by mouth Every 7 (Seven) Days. Saturdays 5/21/19  Yes Mary Lou Rodas MD   amoxicillin-clavulanate (AUGMENTIN) 875-125 MG per tablet Take 1 tablet by mouth 2 (Two) Times a Day.   Yes Mary Lou Rodas MD   aspirin 81 MG chewable tablet Chew 81 mg Daily.   Yes Mary Lou Rodas MD   benazepril (LOTENSIN) 5 MG tablet Take 5 mg by mouth Daily. 6/19/19  Yes Mary Lou Rodas MD   Budeson-Glycopyrrol-Formoterol (Breztri Aerosphere) 160-9-4.8 MCG/ACT aerosol inhaler Inhale 2 puffs 2 (Two) Times a Day. 4/7/22  Yes Dion Cortés MD   carvedilol (COREG) 12.5 MG tablet Take 12.5 mg by mouth 2 (Two) Times a Day With Meals.   Yes Mary Lou Rodas MD   cetirizine (ZyrTEC) 10 MG tablet Take 10 mg by mouth At Night As Needed for Allergies.   Yes Mary Lou Rodas MD   clopidogrel (PLAVIX) 75 MG tablet TAKE 1 TABLET BY MOUTH EVERY DAY  6/14/21  Yes Silvia Herrera APRN   colestipol (COLESTID) 1 g tablet Take 1 g by mouth 2 (Two) Times a Day.   Yes Mary Lou Rodas MD   famotidine (PEPCID) 40 MG tablet Take 40 mg by mouth 2 (Two) Times a Day. 4/15/19  Yes Mary Lou Rodas MD   hydroCHLOROthiazide (HYDRODIURIL) 12.5 MG tablet Take 12.5 mg by mouth Daily.   Yes Mary Lou Rodas MD   iron polysaccharides (NIFEREX) 150 MG capsule Take 1 capsule by mouth Daily. 11/17/21  Yes Brittni Bergman APRN  "  levothyroxine (SYNTHROID, LEVOTHROID) 50 MCG tablet Take 50 mcg by mouth Daily. 19  Yes Mary Lou Rodas MD   multivitamin with minerals tablet tablet Take 1 tablet by mouth Daily.   Yes Mary Lou Rodas MD   ondansetron (ZOFRAN) 4 MG tablet Take 4 mg by mouth Every 8 (Eight) Hours As Needed for Nausea or Vomiting.   Yes Mary Lou Rodas MD   polyethylene glycol (GoLYTELY) 236 g solution Take as directed by office instructions. 3/7/22  Yes Melanie Haque APRN   rosuvastatin (CRESTOR) 40 MG tablet Take 40 mg by mouth Every Night. 21  Yes Mary Lou Rodas MD   traMADol (ULTRAM) 50 MG tablet Take 50 mg by mouth Every 3 (Three) Hours As Needed for Moderate Pain  or Severe Pain . 19  Yes Mary Lou Rodas MD   hydrOXYzine pamoate (Vistaril) 25 MG capsule Take 1 capsule by mouth 3 (Three) Times a Day As Needed for Anxiety for up to 30 days. 21  MeghanBrittni APRN   Mirabegron ER (MYRBETRIQ) 50 MG tablet sustained-release 24 hour 24 hr tablet Take 50 mg by mouth Daily.  5/3/22  Mary Lou Rodas MD       Social History     Socioeconomic History   • Marital status:    Tobacco Use   • Smoking status: Former Smoker     Packs/day: 1.50     Years: 20.00     Pack years: 30.00     Types: Cigarettes     Quit date:      Years since quittin.3   • Smokeless tobacco: Never Used   Vaping Use   • Vaping Use: Never used   Substance and Sexual Activity   • Alcohol use: Yes     Comment: occasionally   • Drug use: No   • Sexual activity: Not Currently       Objective   Vital Signs:   /76   Pulse 72   Ht 160 cm (63\")   Wt 48.5 kg (107 lb)   SpO2 98% Comment: RA  BMI 18.95 kg/m²     Physical Exam  Vitals and nursing note reviewed.   Constitutional:       Appearance: Normal appearance.   HENT:      Head: Normocephalic.      Comments: She is wearing a mask.  Eyes:      Extraocular Movements: Extraocular movements intact.      Pupils: Pupils are " equal, round, and reactive to light.   Cardiovascular:      Rate and Rhythm: Normal rate and regular rhythm.   Pulmonary:      Effort: Pulmonary effort is normal.      Breath sounds: Normal breath sounds.   Musculoskeletal:         General: Normal range of motion.   Skin:     General: Skin is warm and dry.   Neurological:      General: No focal deficit present.      Mental Status: She is alert and oriented to person, place, and time.   Psychiatric:         Mood and Affect: Mood normal.         Behavior: Behavior normal.        Result Review :    PFT Values        Some values may be hidden. Unless noted otherwise, only the newest values recorded on each date are displayed.         Old Values PFT Results 9/10/21   No data to display.      Pre Drug PFT Results 9/10/21   FVC 80   FEV1 64   FEF 25-75% 28   FEV1/FVC 61.74      Post Drug PFT Results 9/10/21   No data to display.      Other Tests PFT Results 9/10/21   No data to display.               Results for orders placed during the hospital encounter of 11/01/21    Full Pulmonary Function Test With Bronchodilator & ABG    Narrative  [facility] - Pulmonary Function Test    [address] [place] [address]  [id number]  [phone]    Patient : Trinidad Zhu  MRN : [id number]  CSN : [id number]  Pulmonologist : Dion Cortés MD  Date : 11/1/2021    ______________________________________________________________________    Interpretation :  1.  Spirometry is consistent with a moderate obstructive ventilatory defect.  2.  There is no significant change in spirometry postbronchodilator.  3.  Lung volumes reveal an elevated expiratory reserve volume.  There is also a mild decrease in inspiratory capacity.  4.  There is a moderate diffusion impairment which when corrected for alveolar volume is a mild diffusion impairment.  5.  Arterial blood gases are within normal limits on room air.  6.  When current studies are compared to studies done at the respiratory  disease clinic on September 10 of 2021, there has been slight improvement in both the patient's FVC and FEV1 on current pre and postbronchodilator studies compared to previous baseline spirometry.      Dion Cortés MD      Results for orders placed in visit on 09/10/21    Pulmonary Function Test    Narrative  Pulmonary Function Test  Performed by: Samantha Cantor, RRT  Authorized by: Brittni Christianson APRN    Pre Drug % Predicted  FVC: 80%  FEV1: 64%  FEF 25-75%: 28%  FEV1/FVC: 61.74%    Interpretation  Spirometry  Spirometry shows moderate obstruction. There is reduced midflow suggesting small airway/airflow obstruction.  Review of FVL curve  Patient's effort is normal.      Results for orders placed in visit on 07/30/19    Pulmonary Function Test    Rest/Exercise Pulse Ox Values        Some values may be hidden. Unless noted otherwise, only the newest values recorded on each date are displayed.         Rest/Exercise Pulse Ox Results 1/4/22   Rest room air SAT % 99   Exercise room air SAT % 95                      My interpretation of imaging:    CT Chest Without Contrast Diagnostic (05/03/2022 11:54)          Assessment and Plan     Diagnoses and all orders for this visit:    1. Lung nodule (Primary)  Assessment & Plan:  This nodule appears to have resolved on her chest CT done earlier today.      2. Stage 2 moderate COPD by GOLD classification ([facility])  Assessment & Plan:  She will discontinue her Breztri Aerosphere for now and will use her albuterol HFA as needed.  We will get pulmonary function studies on her return visit in 6 weeks.        Orders:  -     Full Pulmonary Function Test Without Bronchodilator; Future    3. Bronchiectasis without complication (HCC)  Assessment & Plan:  She appears to have some focal areas of bronchiectasis on the right.  She is having no significant symptoms associated with this.      4. Personal history of malignant neoplasm of bronchus and lung  Assessment &  Plan:  Her chest CT done earlier today showed improvement in her postop changes on the right with no evidence of any residual pleural air and what appeared to be improved expansion of the residual right lung with some right apical fluid or scar tissue present.  There is no evidence of any recurrent disease from the standpoint of her lung cancer.      5. Personal history of nicotine dependence  Assessment & Plan:  She has not smoked since 1996.        BMI is within normal parameters. No follow-up required.        Dion Cortés MD  5/3/2022  18:07 CDT    Follow Up   Return in about 6 weeks (around 6/14/2022) for Complete PFT, To see me specifically.    Patient was given instructions and counseling regarding her condition or for health maintenance advice. Please see specific information pulled into the AVS if appropriate.

## 2022-05-03 NOTE — ASSESSMENT & PLAN NOTE
Her chest CT done earlier today showed improvement in her postop changes on the right with no evidence of any residual pleural air and what appeared to be improved expansion of the residual right lung with some right apical fluid or scar tissue present.  There is no evidence of any recurrent disease from the standpoint of her lung cancer.

## 2022-05-03 NOTE — ASSESSMENT & PLAN NOTE
She will discontinue her Breztri Aerosphere for now and will use her albuterol HFA as needed.  We will get pulmonary function studies on her return visit in 6 weeks.

## 2022-05-03 NOTE — PATIENT INSTRUCTIONS
The patient's chest CT to my review did show improved expansion of her residual right lung.  The previously noted right lung nodule did not appear to be increased in size to my review but the official report is pending.  Assuming that is the case by the official report we will just plan on repeat CT in 6 months.  She is having some issues with headache and eye and bladder issues which may relate to her Breztri Aerosphere.  She will stop this for now and just use her rescue inhaler as needed and I will see her back in 6 weeks with pulmonary functions.  If her symptoms resolve in terms of her headache and eye and bladder issues she could try resuming the Breztri at a lower dose but if these side effects recur then she should stop it totally.  We can always consider a maintenance inhaler without an anticholinergic component in the future if she did need maintenance therapy in the future.

## 2022-05-09 ENCOUNTER — OFFICE VISIT (OUTPATIENT)
Dept: CARDIOLOGY | Facility: CLINIC | Age: 77
End: 2022-05-09

## 2022-05-09 VITALS
HEIGHT: 63 IN | DIASTOLIC BLOOD PRESSURE: 60 MMHG | WEIGHT: 108 LBS | HEART RATE: 72 BPM | SYSTOLIC BLOOD PRESSURE: 101 MMHG | BODY MASS INDEX: 19.14 KG/M2

## 2022-05-09 DIAGNOSIS — I10 PRIMARY HYPERTENSION: ICD-10-CM

## 2022-05-09 DIAGNOSIS — I25.119 CORONARY ARTERY DISEASE INVOLVING NATIVE CORONARY ARTERY OF NATIVE HEART WITH ANGINA PECTORIS: Primary | ICD-10-CM

## 2022-05-09 DIAGNOSIS — E78.5 HYPERLIPIDEMIA, UNSPECIFIED HYPERLIPIDEMIA TYPE: ICD-10-CM

## 2022-05-09 DIAGNOSIS — I73.9 PAD (PERIPHERAL ARTERY DISEASE): ICD-10-CM

## 2022-05-09 PROCEDURE — 99213 OFFICE O/P EST LOW 20 MIN: CPT | Performed by: INTERNAL MEDICINE

## 2022-05-09 PROCEDURE — 93000 ELECTROCARDIOGRAM COMPLETE: CPT | Performed by: INTERNAL MEDICINE

## 2022-05-09 NOTE — PROGRESS NOTES
"Subjective    Trinidad Zhu is a 77 y.o. female. FU of ihd and risks    History of Present Illness     IHD:  A remote cath showed mild single vessel RCA dz and she has been on a statin since. LOV was a referral for soa and a DSE was 'low risk' for ischemia. Since she has been found to have worsening COPD after a VATS of the RUL for an adenoCA nodule and her soa got worse but for the past month has been getting better. ORTIZ is her rate limiter still. meds are stable and EKG today is nsc.    HTN:  meds are stable and home readings are \"120's/70's\". She will check more frequently since today's reading is much lower. She relates more fatigue recently as well.    HLD:  She is on a high-potent statin and lipids are checked by her pcp and are \"good\" - she will call and let us know the actual numbers.    [facility]:  [facility] is [facility] by [facility] as well. She had  of the left post-tib with PTA by Dr Condon 8/21 and is not having claudication.        The following portions of the patient's history were reviewed and updated as appropriate: allergies, current medications, past family history, past medical history, past social history, past surgical history and problem list.    Patient Active Problem List   Diagnosis   • Spinal stenosis, cervical region   • Lung nodules   • Bronchiectasis without complication (HCC)   • Underweight   • Personal history of nicotine dependence   • Restrictive lung disease   • Pulmonary emphysema (HCC)   • PAD (peripheral artery disease) ([facility])   • Preop testing   • Gastroesophageal reflux disease   • Allergic rhinitis   • ORTIZ (dyspnea on exertion)   • CAD (coronary artery disease)   • Hyperlipidemia   • Neuropathy   • Hypertension   • Stage 2 moderate COPD by GOLD classification ([facility])   • Former smoker   • Lung nodule   • Mass of right lung   • Postoperative anemia due to acute blood loss   • Syncope and collapse   • Adenocarcinoma (HCC)   • Personal history of malignant neoplasm of bronchus and " lung   • Hydropneumothorax       Allergies   Allergen Reactions   • Baclofen Other (See Comments)     MUSCULOSKELETAL THERAPY AGENTS knocked her out for a day    Other reaction(s): Unknown   • Gabapentin Confusion     Other reaction(s): over sedation   • Sulfa Antibiotics Rash     Mouth blisters   • Sulfacetamide Sodium Rash   • Amitriptyline Hcl Confusion     Other reaction(s): ambulation difficulties/confusion   • Niferex [Iron Polysaccharide] Swelling   • Oxycodone-Acetaminophen GI Intolerance     Other reaction(s): vomiting       Family History   Problem Relation Age of Onset   • Breast cancer Maternal Aunt    • Heart disease Mother    • Heart disease Father    • GI problems Neg Hx         MALIGNANCIES   • Colon cancer Neg Hx    • Colon polyps Neg Hx        Social History     Socioeconomic History   • Marital status:    Tobacco Use   • Smoking status: Former Smoker     Packs/day: 1.50     Years: 20.00     Pack years: 30.00     Types: Cigarettes     Quit date:      Years since quittin.3   • Smokeless tobacco: Never Used   Vaping Use   • Vaping Use: Never used   Substance and Sexual Activity   • Alcohol use: Yes     Comment: occasionally   • Drug use: No   • Sexual activity: Not Currently         Current Outpatient Medications:   •  albuterol sulfate  (90 Base) MCG/ACT inhaler, Inhale 2 puffs Every 4 (Four) Hours As Needed for Wheezing., Disp: , Rfl:   •  alendronate (FOSAMAX) 70 MG tablet, Take 70 mg by mouth Every 7 (Seven) Days. , Disp: , Rfl: 3  •  amoxicillin-clavulanate (AUGMENTIN) 875-125 MG per tablet, Take 1 tablet by mouth 2 (Two) Times a Day., Disp: , Rfl:   •  aspirin 81 MG chewable tablet, Chew 81 mg Daily., Disp: , Rfl:   •  benazepril (LOTENSIN) 5 MG tablet, Take 5 mg by mouth Daily., Disp: , Rfl:   •  carvedilol (COREG) 12.5 MG tablet, Take 12.5 mg by mouth 2 (Two) Times a Day With Meals., Disp: , Rfl:   •  cetirizine (ZyrTEC) 10 MG tablet, Take 10 mg by mouth At  Night As Needed for Allergies., Disp: , Rfl:   •  clopidogrel (PLAVIX) 75 MG tablet, TAKE 1 TABLET BY MOUTH EVERY DAY , Disp: 30 tablet, Rfl: 4  •  colestipol (COLESTID) 1 g tablet, Take 1 g by mouth 2 (Two) Times a Day., Disp: , Rfl:   •  famotidine (PEPCID) 40 MG tablet, Take 40 mg by mouth 2 (Two) Times a Day., Disp: , Rfl: 3  •  hydroCHLOROthiazide (HYDRODIURIL) 12.5 MG tablet, Take 12.5 mg by mouth Daily., Disp: , Rfl:   •  iron polysaccharides (NIFEREX) 150 MG capsule, Take 1 capsule by mouth Daily., Disp: 30 capsule, Rfl: 0  •  levothyroxine (SYNTHROID, LEVOTHROID) 50 MCG tablet, Take 50 mcg by mouth Daily., Disp: , Rfl: 3  •  multivitamin with minerals tablet tablet, Take 1 tablet by mouth Daily., Disp: , Rfl:   •  ondansetron (ZOFRAN) 4 MG tablet, Take 4 mg by mouth Every 8 (Eight) Hours As Needed for Nausea or Vomiting., Disp: , Rfl:   •  polyethylene glycol (GoLYTELY) 236 g solution, Take as directed by office instructions., Disp: 4000 mL, Rfl: 0  •  rosuvastatin (CRESTOR) 40 MG tablet, Take 40 mg by mouth Every Night., Disp: , Rfl:   •  traMADol (ULTRAM) 50 MG tablet, Take 50 mg by mouth Every 3 (Three) Hours As Needed for Moderate Pain  or Severe Pain ., Disp: , Rfl: 0  •  hydrOXYzine pamoate (Vistaril) 25 MG capsule, Take 1 capsule by mouth 3 (Three) Times a Day As Needed for Anxiety for up to 30 days., Disp: 45 capsule, Rfl: 2    Past Surgical History:   Procedure Laterality Date   • ANTERIOR CERVICAL DISCECTOMY W/ FUSION N/A 5/22/2017    Procedure: CERVICAL DISCECTOMY ANTERIOR FUSION WITH INSTRUMENTATION;  Surgeon: NADINE Sarabia MD;  Location:  [facility] OR;  Service:    • AORTAGRAM Left 11/9/2020    Procedure: left lower extremtiy angiogram, hawk athrectomy, balloon angioplasty, stent placement, mynx closure;  Surgeon: Sukhdev Condon DO;  Location:  [facility] OR 12;  Service: Vascular;  Laterality: Left;   • AORTAGRAM Left 3/26/2021    Procedure: LEFT LOWER EXTREMITY ANGIOGRAM, BALLOON  "ANGIOPLASTY, STENT PLACEMENT, MYNX CLOSURE;  Surgeon: Sukhdev Condon DO;  Location:  [facility] 12;  Service: Vascular;  Laterality: Left;   • AORTAGRAM Left 8/6/2021    Procedure: LEFT LOWER EXTREMITY ANGIOGRAM, HAWK ATHERECTOMY, BALLOON ANGIOPLASTY, MYNX CLOSURE;  Surgeon: Sukhdev Condon DO;  Location:  [facility] OR 12;  Service: Vascular;  Laterality: Left;   • BLADDER SUSPENSION      also had pelvic reconstructive surgery   • BREAST EXCISIONAL BIOPSY Right 1985   • CATARACT EXTRACTION, BILATERAL     • CERVICAL CORPECTOMY N/A 5/22/2017    Procedure:  ANTERIOR CERVICAL DISCECTOMY FUSION C-6 to T1,  ANTERIOR FUSION WITH INSTRUMENTATION C-5 T-1;  Surgeon: NADINE Sarabia MD;  Location:  [facility] OR;  Service:    • COLONOSCOPY  08/19/2015    TIC BX RT COLON   • COLONOSCOPY  12/04/2013    RT COLON BX RECALL 5YR   • COLONOSCOPY N/A 11/29/2016    The entire examined colon is normal; No specimens collected   • ENDOSCOPY  12/03/2015    HEALED ULCER SLANT BX   • HYSTERECTOMY     • LOBECTOMY Right 11/5/2021    Procedure: RIGHT THORACOSCOPY WITH DAVINCI ROBOT, RIGHT UPPER LOBE LOBECTOMY;  Surgeon: Jarad Ignacio MD;  Location: [facility] OR;  Service: Robotics - DaVinci;  Laterality: Right;   • LUNG SURGERY     • OTHER SURGICAL HISTORY      KNEE CARTILAGE REMOVED   • OTHER SURGICAL HISTORY      BENIGN FIBROID TUMOR OF BREAST 1985 REMOVED   • TONSILLECTOMY         Review of Systems   Constitutional: Positive for fatigue. Negative for activity change and unexpected weight change.   Respiratory: Positive for shortness of breath.    Cardiovascular: Negative for chest pain, palpitations and leg swelling.   Gastrointestinal: Negative for abdominal pain.   Genitourinary: Negative for difficulty urinating.       /60   Pulse 72   Ht 160 cm (63\")   Wt 49 kg (108 lb)   BMI 19.13 kg/m²   Procedures    Objective   Physical Exam  Constitutional:       Appearance: Normal appearance.      Comments: Very thin "   Cardiovascular:      Rate and Rhythm: Normal rate and regular rhythm.      Pulses: Decreased pulses.      Heart sounds: Normal heart sounds. No murmur heard.    No friction rub. No gallop.   Pulmonary:      Breath sounds: Normal breath sounds. No wheezing.   Abdominal:      General: Bowel sounds are normal.   Musculoskeletal:      Right lower leg: No edema.      Left lower leg: No edema.   Skin:     General: Skin is warm.   Neurological:      General: No focal deficit present.      Mental Status: She is oriented to person, place, and time.   Psychiatric:         Mood and Affect: Mood normal.         Behavior: Behavior normal.         Assessment/Plan   Diagnoses and all orders for this visit:    1. Coronary artery disease involving native coronary artery of native heart with angina pectoris (HCC) (Primary)  Comments:  no angina    2. Hyperlipidemia, unspecified hyperlipidemia type  Comments:  uncertain control - on potent statin    3. Primary hypertension  Comments:  controlled    4. PAD (peripheral artery disease) (HCC)  Comments:  stable  - fu with [facility]                 Return in about 1 year (around 5/9/2023) for Next scheduled follow up.  No orders of the defined types were placed in this encounter.

## 2022-05-10 ENCOUNTER — TELEPHONE (OUTPATIENT)
Dept: GASTROENTEROLOGY | Facility: CLINIC | Age: 77
End: 2022-05-10

## 2022-05-10 ENCOUNTER — APPOINTMENT (OUTPATIENT)
Dept: ULTRASOUND IMAGING | Facility: HOSPITAL | Age: 77
End: 2022-05-10

## 2022-05-10 NOTE — TELEPHONE ENCOUNTER
----- Message from GEREMIAS Head sent at 5/5/2022  4:37 PM CDT -----  She will need to continue on aspirin during that time, but may hold Plavix.  Resume as soon as possible  ----- Message -----  From: Brayan Smith MA  Sent: 5/5/2022  12:13 PM CDT  To: GEREMIAS Head                                                                                                                                          [address], [address]  [place] [address]  Phone: [phone]  Fax: [phone]            March 14, 2022               Dear Dr. Condon,      Trinidad Zhu 1945 is being considered for colonoscopy for screening. Trinidad Zhu is tentatively scheduled for June 2.  In order to do this procedure, we will need the patient to be off of Plavix (Clopidogrel) for 5 days.       If this patient will need Lovenox therapy while off regularly prescribed anticoagulation therapy we ask you please arrange this with the patient.     Lovenox Requirement  Yes/No     Other:           Please specify patient’s risk of cardiac complications during perioperative period by _____% or  LOW  / MEDIUM  /  HIGH RISK.     Approved By:     Date:                 Thank you,            Marco Antonio Vallejo M.D.

## 2022-05-12 ENCOUNTER — TELEPHONE (OUTPATIENT)
Dept: PULMONOLOGY | Facility: CLINIC | Age: 77
End: 2022-05-12

## 2022-05-12 NOTE — TELEPHONE ENCOUNTER
Patient called asking her CT results to be sent to Dr Layne. I sent the CT results and the last office note to Dr Layne.    Patient notified of message from provider and voiced understanding.    Patient said that she was going to try the Breztri again to see if she receives the headache again and let us know how it goes.

## 2022-05-13 ENCOUNTER — TELEPHONE (OUTPATIENT)
Dept: CARDIOLOGY | Facility: CLINIC | Age: 77
End: 2022-05-13

## 2022-05-13 NOTE — TELEPHONE ENCOUNTER
Just an FYI..    Patient left message with recent cholestenol test reading from January that you requested:    Total: 217  Trigly: 121  HDL: 77  LDL: 21

## 2022-05-16 ENCOUNTER — TRANSCRIBE ORDERS (OUTPATIENT)
Dept: ADMINISTRATIVE | Facility: HOSPITAL | Age: 77
End: 2022-05-16

## 2022-05-16 DIAGNOSIS — Z12.31 ENCOUNTER FOR SCREENING MAMMOGRAM FOR MALIGNANT NEOPLASM OF BREAST: Primary | ICD-10-CM

## 2022-05-17 ENCOUNTER — TELEPHONE (OUTPATIENT)
Dept: VASCULAR SURGERY | Facility: CLINIC | Age: 77
End: 2022-05-17

## 2022-05-17 NOTE — TELEPHONE ENCOUNTER
Pt confirmed appt for Thursday 05/19/22 with [facility] with US's starting at 11:00 and f/u with Dr Condon at 2:00.

## 2022-05-18 ENCOUNTER — TELEPHONE (OUTPATIENT)
Dept: VASCULAR SURGERY | Facility: CLINIC | Age: 77
End: 2022-05-18

## 2022-05-18 NOTE — TELEPHONE ENCOUNTER
Spoke with Mrs Zhu reminding her of her appointment for Thursday, May 19th, 2022. Reminded Mrs Zhu to arrive at the [facility] at 1030 am for 11 o'clock testing and follow up afterwards at 2 pm with Dr Condon. Mrs Zhu confirmed she would be here.

## 2022-05-19 ENCOUNTER — OFFICE VISIT (OUTPATIENT)
Dept: VASCULAR SURGERY | Facility: CLINIC | Age: 77
End: 2022-05-19

## 2022-05-19 ENCOUNTER — HOSPITAL ENCOUNTER (OUTPATIENT)
Dept: ULTRASOUND IMAGING | Facility: HOSPITAL | Age: 77
Discharge: HOME | End: 2022-05-19

## 2022-05-19 VITALS
HEART RATE: 81 BPM | SYSTOLIC BLOOD PRESSURE: 122 MMHG | OXYGEN SATURATION: 98 % | HEIGHT: 63 IN | BODY MASS INDEX: 19.14 KG/M2 | DIASTOLIC BLOOD PRESSURE: 68 MMHG | WEIGHT: 108 LBS

## 2022-05-19 DIAGNOSIS — I73.9 PAD (PERIPHERAL ARTERY DISEASE): ICD-10-CM

## 2022-05-19 DIAGNOSIS — I10 PRIMARY HYPERTENSION: ICD-10-CM

## 2022-05-19 DIAGNOSIS — E78.5 HYPERLIPIDEMIA, UNSPECIFIED HYPERLIPIDEMIA TYPE: ICD-10-CM

## 2022-05-19 DIAGNOSIS — Z01.818 PREOP TESTING: ICD-10-CM

## 2022-05-19 DIAGNOSIS — Z79.02 ENCOUNTER FOR MONITORING ANTIPLATELET THERAPY: ICD-10-CM

## 2022-05-19 DIAGNOSIS — I73.9 PAD (PERIPHERAL ARTERY DISEASE): Primary | ICD-10-CM

## 2022-05-19 DIAGNOSIS — I65.23 BILATERAL CAROTID ARTERY STENOSIS: ICD-10-CM

## 2022-05-19 DIAGNOSIS — Z51.81 ENCOUNTER FOR MONITORING ANTIPLATELET THERAPY: ICD-10-CM

## 2022-05-19 PROCEDURE — 93880 EXTRACRANIAL BILAT STUDY: CPT

## 2022-05-19 PROCEDURE — 99214 OFFICE O/P EST MOD 30 MIN: CPT | Performed by: SURGERY

## 2022-05-19 PROCEDURE — 93923 UPR/LXTR ART STDY 3+ LVLS: CPT | Performed by: SURGERY

## 2022-05-19 PROCEDURE — 93923 UPR/LXTR ART STDY 3+ LVLS: CPT

## 2022-05-19 PROCEDURE — 93880 EXTRACRANIAL BILAT STUDY: CPT | Performed by: SURGERY

## 2022-05-19 NOTE — PROGRESS NOTES
"5/19/2022       Andrew Layne MD  [address] [facility] [place] [address]    Trinidad Zhu  1945    Chief Complaint   Patient presents with   • Follow-up     6 Month Follow Up For Bilateral Carotid Artery Stenosis and Peripheral Artery Disease. Test [id number] US pad carotid bilateral and US pad ankle / brach ind ext comp. Patient denies any stroke like symptoms .   • former smoker     Patient is a Former SMoker - quit 1996     • Med Management     Verified medications from list patient brought in        Dear Andrew Layne MD       HPI  I had the pleasure of seeing your patient Trinidad Zhu in the office today.   As you recall, Trinidad Zhu is a 77 y.o.  female who we are following for lower extremity PAD.  She did undergo a left lower extremity angiogram on 11/9/2020, 3/26/2022, and last 08/06/2022.  She does report numbness in her toes and foot.  She has done well with regards to the left leg.  She is having complaints of claudication to her right lower extremity.  She is maintained on aspirin, Plavix, and Crestor.  She did have noninvasive testing performed today, which I did review in office.    Review of Systems   Constitutional: Negative.    HENT: Negative.    Eyes: Negative.    Respiratory: Negative.    Cardiovascular: Negative.    Gastrointestinal: Negative.    Endocrine: Negative.    Genitourinary: Negative.    Musculoskeletal: Negative.    Skin: Negative.    Allergic/Immunologic: Negative.    Neurological: Positive for numbness.   Hematological: Negative.    Psychiatric/Behavioral: Negative.         /68 (BP Location: Left arm, Patient Position: Sitting, Cuff Size: Adult)   Pulse 81   Ht 160.7 cm (63.25\")   Wt 49 kg (108 lb)   SpO2 98%   BMI 18.98 kg/m²   Physical Exam  Vitals and nursing note reviewed.   Constitutional:       Appearance: Normal appearance. She is well-developed.   HENT:      Head: Normocephalic and atraumatic.   Eyes:      General: No scleral icterus.    "  Pupils: Pupils are equal, round, and reactive to light.   Neck:      Thyroid: No thyromegaly.      Vascular: No carotid bruit or JVD.   Cardiovascular:      Rate and Rhythm: Normal rate and regular rhythm.      Pulses:           Carotid pulses are 2+ on the right side and 2+ on the left side.       Femoral pulses are 2+ on the right side and 2+ on the left side.       Popliteal pulses are 2+ on the right side and 2+ on the left side.        Dorsalis pedis pulses are detected w/ Doppler on the right side and detected w/ Doppler on the left side.        Posterior tibial pulses are detected w/ Doppler on the right side and detected w/ Doppler on the left side.      Heart sounds: Normal heart sounds.   Pulmonary:      Effort: Pulmonary effort is normal.      Breath sounds: Normal breath sounds.   Abdominal:      General: Bowel sounds are normal. There is no distension or abdominal bruit.      Palpations: Abdomen is soft. There is no mass.      Tenderness: There is no abdominal tenderness.   Musculoskeletal:         General: Normal range of motion.      Cervical back: Neck supple.   Lymphadenopathy:      Cervical: No cervical adenopathy.   Skin:     General: Skin is warm and dry.   Neurological:      General: No focal deficit present.      Mental Status: She is alert and oriented to person, place, and time.      Cranial Nerves: No cranial nerve deficit.      Sensory: No sensory deficit.   Psychiatric:         Mood and Affect: Mood normal.         Behavior: Behavior normal.         Thought Content: Thought content normal.         Judgment: Judgment normal.          Diagnostic data:  Noninvasive testing including OTTO show right OTTO of 0.60 and left OTTO 1.10.  Carotid duplex shows less than 50% carotid stenosis bilaterally with bilateral antegrade vertebral flow.  There is heavy plaque noted at the right bifurcation.     Patient Active Problem List   Diagnosis   • Spinal stenosis, cervical region   • Lung nodules   •  Bronchiectasis without complication (Formerly [facility])   • Underweight   • Personal history of nicotine dependence   • Restrictive lung disease   • Pulmonary emphysema (HCC)   • PAD (peripheral artery disease) (Former[facility])   • Preop testing   • Gastroesophageal reflux disease   • Allergic rhinitis   • ORTIZ (dyspnea on exertion)   • CAD (coronary artery disease)   • Hyperlipidemia   • Neuropathy   • Hypertension   • Stage 2 moderate COPD by GOLD classification (Formerly [facility])   • Former smoker   • Lung nodule   • Mass of right lung   • Postoperative anemia due to acute blood loss   • Syncope and collapse   • Adenocarcinoma (HCC)   • Personal history of malignant neoplasm of bronchus and lung   • Hydropneumothorax         ICD-10-CM ICD-9-CM   1. PAD (peripheral artery disease) (Former[facility])  I73.9 443.9   2. Preop testing  Z01.818 V72.84   3. Encounter for monitoring antiplatelet therapy  Z51.81 V58.83    Z79.02 V58.63   4. Hyperlipidemia, unspecified hyperlipidemia type  E78.5 272.4   5. Primary hypertension  I10 401.9         Plan: After thoroughly evaluating Trinidad Zhu, I believe the best course of action is to proceed with a right lower extremity angiogram.  Risks of angiogram were discussed.  These include, but are not limited to, bleeding, infection, vessel damage, nerve damage, embolus, and loss of limb.  The patient understands these risks and wishes to proceed with procedure.  She is having short distance claudication.  I did review her testing which does show a significant drop since previous testing now with moderate arterial insufficiency to the right lower extremity.  Her left leg looks to be in pretty good shape and she is having no symptoms.  I did also review her carotid duplex and well less than 50% stenosis bilaterally there is heavy plaque noted the right bifurcation and needs further evaluation with a CTA of the neck.  We will plan for CTA of the neck with her 2-week postop appointment.  She is to continue on her current  medications.  I did discuss vascular risk factors as they pertain to the progression of vascular disease including controlling her hypertension and hyperlipidemia.  These risk factors are currently stable.  The patient can continue taking their current medication regimen as previously planned.  This was all discussed in full with complete understanding.    Thank you for allowing me to participate in the care of your patient.  Please do not hesitate with any questions or concerns.  I will keep you aware of any further encounters with Trinidad RONNIE Zhu.        Sincerely yours,         Sukhdev Condon, DO    Scribed for Dr. Sukhdev Condon by Silvia ARCHULETA

## 2022-05-20 DIAGNOSIS — I65.23 BILATERAL CAROTID ARTERY STENOSIS: Primary | ICD-10-CM

## 2022-05-25 ENCOUNTER — LAB (OUTPATIENT)
Dept: LAB | Facility: HOSPITAL | Age: 77
End: 2022-05-25

## 2022-05-25 ENCOUNTER — TELEPHONE (OUTPATIENT)
Dept: VASCULAR SURGERY | Facility: CLINIC | Age: 77
End: 2022-05-25

## 2022-05-25 DIAGNOSIS — D64.9 ANEMIA, UNSPECIFIED TYPE: ICD-10-CM

## 2022-05-25 LAB
FERRITIN SERPL-MCNC: 69.15 NG/ML (ref 13–150)
FOLATE SERPL-MCNC: >20 NG/ML (ref 4.78–24.2)
IRON 24H UR-MRATE: 72 MCG/DL (ref 37–145)
IRON SATN MFR SERPL: 18 % (ref 20–50)
RETICS # AUTO: 0.06 10*6/MM3 (ref 0.02–0.13)
RETICS/RBC NFR AUTO: 1.82 % (ref 0.7–1.9)
TIBC SERPL-MCNC: 405 MCG/DL (ref 298–536)
TRANSFERRIN SERPL-MCNC: 272 MG/DL (ref 200–360)
VIT B12 BLD-MCNC: 980 PG/ML (ref 211–946)

## 2022-05-25 PROCEDURE — 36415 COLL VENOUS BLD VENIPUNCTURE: CPT

## 2022-05-25 PROCEDURE — 82607 VITAMIN B-12: CPT

## 2022-05-25 PROCEDURE — 83540 ASSAY OF IRON: CPT

## 2022-05-25 PROCEDURE — 82746 ASSAY OF FOLIC ACID SERUM: CPT

## 2022-05-25 PROCEDURE — 82728 ASSAY OF FERRITIN: CPT

## 2022-05-25 PROCEDURE — 85045 AUTOMATED RETICULOCYTE COUNT: CPT

## 2022-05-25 PROCEDURE — 84466 ASSAY OF TRANSFERRIN: CPT

## 2022-05-25 NOTE — TELEPHONE ENCOUNTER
SPOKE WITH PATIENT REGARDING SURGERY. PT WAS INFORMED OF PRE WORK ON 06/06 AT 0915. PT WAS ALSO INFORMED OF SURGERY ON 06/08 AT 0500. PT WAS INFORMED OF COVID TEST AND VISITATION. PT STATED UNDERSTANDING OF INSTRUCTIONS AND ALL INFORMATION. PT WAS INFORMED OF CT SCAN AND POST OP APPT ON 6/23. CT 0830 FOR 0900 MAIN REG. POST OP 0930 IN OFFICE WITH DR LOFTON.

## 2022-05-27 ENCOUNTER — APPOINTMENT (OUTPATIENT)
Dept: CT IMAGING | Facility: HOSPITAL | Age: 77
End: 2022-05-27

## 2022-05-27 ENCOUNTER — HOSPITAL ENCOUNTER (OUTPATIENT)
Dept: CT IMAGING | Facility: HOSPITAL | Age: 77
Discharge: HOME | End: 2022-05-27
Admitting: INTERNAL MEDICINE

## 2022-05-27 DIAGNOSIS — C34.11 PRIMARY CANCER OF RIGHT UPPER LOBE OF LUNG: ICD-10-CM

## 2022-05-27 LAB — CREAT BLDA-MCNC: 0.8 MG/DL (ref 0.6–1.3)

## 2022-05-27 PROCEDURE — 74177 CT ABD & PELVIS W/CONTRAST: CPT

## 2022-05-27 PROCEDURE — 25010000002 IOPAMIDOL 61 % SOLUTION: Performed by: INTERNAL MEDICINE

## 2022-05-27 PROCEDURE — 71260 CT THORAX DX C+: CPT

## 2022-05-27 PROCEDURE — 82565 ASSAY OF CREATININE: CPT

## 2022-05-27 RX ADMIN — IOPAMIDOL 100 ML: 612 INJECTION, SOLUTION INTRAVENOUS at 10:21

## 2022-05-31 ENCOUNTER — OFFICE VISIT (OUTPATIENT)
Dept: ONCOLOGY | Facility: CLINIC | Age: 77
End: 2022-05-31

## 2022-05-31 VITALS
HEART RATE: 64 BPM | HEIGHT: 63 IN | WEIGHT: 106.5 LBS | TEMPERATURE: 98.6 F | DIASTOLIC BLOOD PRESSURE: 58 MMHG | SYSTOLIC BLOOD PRESSURE: 102 MMHG | OXYGEN SATURATION: 96 % | BODY MASS INDEX: 18.87 KG/M2 | RESPIRATION RATE: 16 BRPM

## 2022-05-31 DIAGNOSIS — R41.3 MEMORY CHANGE: ICD-10-CM

## 2022-05-31 DIAGNOSIS — C34.11 PRIMARY CANCER OF RIGHT UPPER LOBE OF LUNG: Primary | ICD-10-CM

## 2022-05-31 DIAGNOSIS — R51.9 FREQUENT HEADACHES: ICD-10-CM

## 2022-05-31 PROCEDURE — 99214 OFFICE O/P EST MOD 30 MIN: CPT | Performed by: NURSE PRACTITIONER

## 2022-06-01 ENCOUNTER — TELEPHONE (OUTPATIENT)
Dept: ONCOLOGY | Facility: CLINIC | Age: 77
End: 2022-06-01

## 2022-06-01 NOTE — TELEPHONE ENCOUNTER
Caller: ERIN    Relationship to patient:     Best call back number: [phone]    Chief complaint: R/S    Type of visit: LAB AND FOLLOW UP    Requested date: SAME DATE OR ANY DATE TO SEE DR HENSLEY     If rescheduling, when is the original appointment: 12-5    Additional notes:ERIN DOES NOT WANT TO SEE  ÁNGEL TAWANA SHE WANTS TO SEE DR HENSLEY    PLEASE ADVISE

## 2022-06-02 ENCOUNTER — ANESTHESIA (OUTPATIENT)
Dept: GASTROENTEROLOGY | Facility: HOSPITAL | Age: 77
End: 2022-06-02

## 2022-06-02 ENCOUNTER — HOSPITAL ENCOUNTER (OUTPATIENT)
Facility: HOSPITAL | Age: 77
Setting detail: HOSPITAL OUTPATIENT SURGERY
Discharge: HOME | End: 2022-06-02
Attending: INTERNAL MEDICINE | Admitting: INTERNAL MEDICINE

## 2022-06-02 ENCOUNTER — ANESTHESIA EVENT (OUTPATIENT)
Dept: GASTROENTEROLOGY | Facility: HOSPITAL | Age: 77
End: 2022-06-02

## 2022-06-02 VITALS
HEART RATE: 84 BPM | WEIGHT: 103 LBS | DIASTOLIC BLOOD PRESSURE: 70 MMHG | TEMPERATURE: 98.2 F | SYSTOLIC BLOOD PRESSURE: 111 MMHG | HEIGHT: 63 IN | OXYGEN SATURATION: 98 % | RESPIRATION RATE: 20 BRPM | BODY MASS INDEX: 18.25 KG/M2

## 2022-06-02 DIAGNOSIS — Z12.11 ENCOUNTER FOR SCREENING FOR MALIGNANT NEOPLASM OF COLON: ICD-10-CM

## 2022-06-02 PROCEDURE — G0121 COLON CA SCRN NOT HI RSK IND: HCPCS | Performed by: INTERNAL MEDICINE

## 2022-06-02 PROCEDURE — 25010000002 PROPOFOL 10 MG/ML EMULSION: Performed by: NURSE ANESTHETIST, CERTIFIED REGISTERED

## 2022-06-02 RX ORDER — SODIUM CHLORIDE 0.9 % (FLUSH) 0.9 %
10 SYRINGE (ML) INJECTION AS NEEDED
Status: DISCONTINUED | OUTPATIENT
Start: 2022-06-02 | End: 2022-06-02 | Stop reason: HOSPADM

## 2022-06-02 RX ORDER — SODIUM CHLORIDE 0.9 % (FLUSH) 0.9 %
10 SYRINGE (ML) INJECTION EVERY 12 HOURS SCHEDULED
Status: CANCELLED | OUTPATIENT
Start: 2022-06-02

## 2022-06-02 RX ORDER — LIDOCAINE HYDROCHLORIDE 20 MG/ML
INJECTION, SOLUTION EPIDURAL; INFILTRATION; INTRACAUDAL; PERINEURAL AS NEEDED
Status: DISCONTINUED | OUTPATIENT
Start: 2022-06-02 | End: 2022-06-02 | Stop reason: SURG

## 2022-06-02 RX ORDER — SODIUM CHLORIDE 9 MG/ML
100 INJECTION, SOLUTION INTRAVENOUS CONTINUOUS
Status: CANCELLED | OUTPATIENT
Start: 2022-06-02

## 2022-06-02 RX ORDER — PROPOFOL 10 MG/ML
VIAL (ML) INTRAVENOUS AS NEEDED
Status: DISCONTINUED | OUTPATIENT
Start: 2022-06-02 | End: 2022-06-02 | Stop reason: SURG

## 2022-06-02 RX ORDER — SODIUM CHLORIDE 9 MG/ML
500 INJECTION, SOLUTION INTRAVENOUS CONTINUOUS PRN
Status: DISCONTINUED | OUTPATIENT
Start: 2022-06-02 | End: 2022-06-02 | Stop reason: HOSPADM

## 2022-06-02 RX ORDER — SODIUM CHLORIDE 0.9 % (FLUSH) 0.9 %
10 SYRINGE (ML) INJECTION AS NEEDED
Status: CANCELLED | OUTPATIENT
Start: 2022-06-02

## 2022-06-02 RX ADMIN — SODIUM CHLORIDE 500 ML: 9 INJECTION, SOLUTION INTRAVENOUS at 11:34

## 2022-06-02 RX ADMIN — PROPOFOL 200 MG: 10 INJECTION, EMULSION INTRAVENOUS at 13:12

## 2022-06-02 RX ADMIN — PROPOFOL 200 MG: 10 INJECTION, EMULSION INTRAVENOUS at 13:04

## 2022-06-02 RX ADMIN — PROPOFOL 200 MG: 10 INJECTION, EMULSION INTRAVENOUS at 13:00

## 2022-06-02 RX ADMIN — LIDOCAINE HYDROCHLORIDE 50 MG: 20 INJECTION, SOLUTION EPIDURAL; INFILTRATION; INTRACAUDAL; PERINEURAL at 13:00

## 2022-06-02 NOTE — H&P
[facility] Gastroenterology  Pre Procedure History & Physical    Chief Complaint:   Screening    Subjective     HPI:   Here for screening colonoscopy    Past Medical History:   Past Medical History:   Diagnosis Date   • Antral ulcer    • Bladder cystocele    • C. difficile diarrhea    • CAD (coronary artery disease)    • Cancer (HCC)     lung 2021   • Colon polyp    • Diarrhea    • Difficulty swallowing    • Disease of thyroid gland    • Diverticulosis    • Elevated cholesterol    • Gastritis    • Generalized OA    • GERD (gastroesophageal reflux disease)    • History of bladder surgery 01/07/2020   • History of transfusion     after lung surgery 2021   • Hyperlipidemia    • Hypertension    • Liver cyst    • Lung nodule    • Microscopic colitis    • Multiple gastric ulcers     last 5 years ago   • Neck pain    • Neuropathy    • Normal body mass index    • NSAID induced gastritis    • Ulcer of pyloric antrum     UNSPECIFIED ULCER CHRONICITY   • UTI (urinary tract infection)    • Weight loss        Past Surgical History:  Past Surgical History:   Procedure Laterality Date   • ANTERIOR CERVICAL DISCECTOMY W/ FUSION N/A 5/22/2017    Procedure: CERVICAL DISCECTOMY ANTERIOR FUSION WITH INSTRUMENTATION;  Surgeon: NADINE Sarabia MD;  Location:  [facility] OR;  Service:    • AORTAGRAM Left 11/9/2020    Procedure: left lower extremtiy angiogram, hawk athrectomy, balloon angioplasty, stent placement, mynx closure;  Surgeon: Sukhdev Condon DO;  Location:  [facility] HYBRID OR 12;  Service: Vascular;  Laterality: Left;   • AORTAGRAM Left 3/26/2021    Procedure: LEFT LOWER EXTREMITY ANGIOGRAM, BALLOON ANGIOPLASTY, STENT PLACEMENT, MYNX CLOSURE;  Surgeon: Sukhdev Condon DO;  Location:  [facility] HYBRID OR 12;  Service: Vascular;  Laterality: Left;   • AORTAGRAM Left 8/6/2021    Procedure: LEFT LOWER EXTREMITY ANGIOGRAM, HAWK ATHERECTOMY, BALLOON ANGIOPLASTY, MYNX CLOSURE;  Surgeon: Sukhdev Condon DO;  Location:  [facility]  HYBRID OR 12;  Service: Vascular;  Laterality: Left;   • BLADDER SUSPENSION      also had pelvic reconstructive surgery   • BREAST EXCISIONAL BIOPSY Right 1985   • CATARACT EXTRACTION, BILATERAL     • CERVICAL CORPECTOMY N/A 5/22/2017    Procedure:  ANTERIOR CERVICAL DISCECTOMY FUSION C-6 to T1,  ANTERIOR FUSION WITH INSTRUMENTATION C-5 T-1;  Surgeon: NADINE Sarabia MD;  Location:  [facility] OR;  Service:    • COLONOSCOPY  08/19/2015    TIC BX RT COLON   • COLONOSCOPY  12/04/2013    RT COLON BX RECALL 5YR   • COLONOSCOPY N/A 11/29/2016    The entire examined colon is normal; No specimens collected   • ENDOSCOPY  12/03/2015    HEALED ULCER SLANT BX   • HYSTERECTOMY     • LOBECTOMY Right 11/5/2021    Procedure: RIGHT THORACOSCOPY WITH StreynerI ROBOT, RIGHT UPPER LOBE LOBECTOMY;  Surgeon: Jarad Ignacio MD;  Location:  [facility] OR;  Service: Robotics - DaVinci;  Laterality: Right;   • LUNG SURGERY     • OTHER SURGICAL HISTORY      KNEE CARTILAGE REMOVED   • OTHER SURGICAL HISTORY      BENIGN FIBROID TUMOR OF BREAST 1985 REMOVED   • TONSILLECTOMY         Family History:  Family History   Problem Relation Age of Onset   • Breast cancer Maternal Aunt    • Heart disease Mother    • Heart disease Father    • GI problems Neg Hx         MALIGNANCIES   • Colon cancer Neg Hx    • Colon polyps Neg Hx        Social History:   reports that she quit smoking about 26 years ago. Her smoking use included cigarettes. She has a 30.00 pack-year smoking history. She has never used smokeless tobacco. She reports current alcohol use. She reports that she does not use drugs.    Medications:   Prior to Admission medications    Medication Sig Start Date End Date Taking? Authorizing Provider   albuterol sulfate  (90 Base) MCG/ACT inhaler Inhale 2 puffs Every 4 (Four) Hours As Needed for Wheezing.   Yes Provider, MD Mary Lou   aspirin 81 MG chewable tablet Chew 81 mg Daily.   Yes Provider, MD Mary Lou   benazepril (LOTENSIN) 5 MG  tablet Take 5 mg by mouth Daily. 6/19/19  Yes Mary Lou Rodas MD   Budeson-Glycopyrrol-Formoterol (BREZTRI) 160-9-4.8 MCG/ACT aerosol inhaler Inhale 2 puffs 2 (Two) Times a Day.   Yes Mary Lou Rodas MD   carvedilol (COREG) 12.5 MG tablet Take 12.5 mg by mouth 2 (Two) Times a Day With Meals.   Yes Mary Lou Rodas MD   cetirizine (ZyrTEC) 10 MG tablet Take 10 mg by mouth At Night As Needed for Allergies.   Yes Mary Lou Rodas MD   famotidine (PEPCID) 40 MG tablet Take 40 mg by mouth 2 (Two) Times a Day. 4/15/19  Yes Mary Lou Rodas MD   hydroCHLOROthiazide (HYDRODIURIL) 12.5 MG tablet Take 12.5 mg by mouth Daily.   Yes Mary Lou Rodas MD   levothyroxine (SYNTHROID, LEVOTHROID) 50 MCG tablet Take 50 mcg by mouth Daily. 5/8/19  Yes Mary Lou Rodas MD   multivitamin with minerals tablet tablet Take 1 tablet by mouth Daily.   Yes Mary Lou Rodas MD   polyethylene glycol (GoLYTELY) 236 g solution Take as directed by office instructions. 3/7/22  Yes Melanie Haque APRN   rosuvastatin (CRESTOR) 40 MG tablet Take 40 mg by mouth Every Night. 9/2/21  Yes Mary Lou Rodas MD   traMADol (ULTRAM) 50 MG tablet Take 50 mg by mouth Every 3 (Three) Hours As Needed for Moderate Pain  or Severe Pain . 5/28/19  Yes Mary Lou Rodas MD   alendronate (FOSAMAX) 70 MG tablet Take 70 mg by mouth Every 7 (Seven) Days. Saturdays 5/21/19   Mary Lou Rodas MD   clopidogrel (PLAVIX) 75 MG tablet TAKE 1 TABLET BY MOUTH EVERY DAY  6/14/21   Silvia Herrera APRN   colestipol (COLESTID) 1 g tablet Take 1 g by mouth 2 (Two) Times a Day.    Mary Lou Rodas MD   hydrOXYzine pamoate (Vistaril) 25 MG capsule Take 1 capsule by mouth 3 (Three) Times a Day As Needed for Anxiety for up to 30 days. 12/27/21 1/26/22  Brittni Christianson APRN   ondansetron (ZOFRAN) 4 MG tablet Take 4 mg by mouth Every 8 (Eight) Hours As Needed for Nausea or Vomiting.    Provider, MD Mary Lou  "      Allergies:  Baclofen, Gabapentin, Sulfa antibiotics, Sulfacetamide sodium, Amitriptyline hcl, Niferex [iron polysaccharide], and Oxycodone-acetaminophen    Objective     Blood pressure 142/71, pulse 86, temperature 98.2 °F (36.8 °C), temperature source Temporal, resp. rate 18, height 160 cm (63\"), weight 46.7 kg (103 lb), SpO2 99 %, not currently breastfeeding.    Physical Exam   Constitutional: Pt is oriented to person, place, and in no distress.   HENT: Mouth/Throat: Oropharynx is clear.   Cardiovascular: Normal rate, regular rhythm.    Pulmonary/Chest: Effort normal. No respiratory distress. No  wheezes.   Abdominal: Soft. Non-distended.  Skin: Skin is warm and dry.   Psychiatric: Mood, memory, affect and judgment appear normal.     Assessment & Plan     Diagnosis:  Screening colonoscopy    Anticipated Surgical Procedure:    Proceed with colonoscopy as scheduled    The following major R/B/A were discussed with the patient, however the list is not all inclusive . Risk:  Bleeding (immediate and delayed), perforation (rupture or tear), reaction to medication, missed lesion/cancer, pain during the procedure, infection, need for surgery, need for ostomy, need for mechanical ventilation (breathing machine), death.  Benefits: removal of polyp/tissue, burn/clip/or inject to stop bleeding, removal of foreign body, dilate any stricture.  Alternatives: Xray or CT, surgery, do nothing with associated risk   The patient was given time to ask question and received explanation, and agrees to proceed as per History and Physical.   No guarantee given or expressed.    EMR Dragon/transcription disclaimer: Much of this encounter note is an electronic transcription/translation of spoken language to printed text.  The electronic translation of spoken language may permit erroneous, or at times, nonsensical words or phrases to be inadvertently transcribed.  Although I have reviewed the note for such errors, some may still " exist.    Marco Antonio Vallejo MD  13:00 CDT  6/2/2022

## 2022-06-02 NOTE — ANESTHESIA PREPROCEDURE EVALUATION
Anesthesia Evaluation     Patient summary reviewed and Nursing notes reviewed   NPO Solid Status: > 8 hours  NPO Liquid Status: > 6 hours           Airway   Mallampati: I  TM distance: >3 FB  Neck ROM: full  No difficulty expected  Dental - normal exam     Pulmonary    (+) pleural effusion, COPD moderate, shortness of breath, decreased breath sounds,   Cardiovascular - normal exam  Exercise tolerance: good (4-7 METS)    PT is on anticoagulation therapy  Patient on routine beta blocker    (+) hypertension 2 medications or greater, CAD, ORTIZ, PVD, hyperlipidemia,       Neuro/Psych  (+) syncope,    GI/Hepatic/Renal/Endo    (+)  GERD well controlled, PUD,  liver disease fatty liver disease, thyroid problem hypothyroidism    Musculoskeletal     (+) arthralgias, neck pain,   Abdominal  - normal exam   Substance History      OB/GYN          Other   arthritis,    history of cancer                    Anesthesia Plan    ASA 3     MAC   total IV anesthesia  intravenous induction     Anesthetic plan, all risks, benefits, and alternatives have been provided, discussed and informed consent has been obtained with: patient.        CODE STATUS:

## 2022-06-02 NOTE — ANESTHESIA POSTPROCEDURE EVALUATION
"Patient: Trinidad Zhu    Procedure Summary     Date: 06/02/22 Room / Location: [facility] ENDOSCOPY 2 /  [facility] ENDOS[facility]    Anesthesia Start: 1300 Anesthesia Stop: 1326    Procedure: COLONOSCOPY WITH ANESTHESIA (N/A ) Diagnosis:       Encounter for screening for malignant neoplasm of colon      (Encounter for screening for malignant neoplasm of colon [Z12.11])    Surgeons: Marco Antonio Vallejo MD Provider: Jamil Han CRNA    Anesthesia Type: MAC ASA Status: 3          Anesthesia Type: MAC    Vitals  No vitals data found for the desired time range.          Post Anesthesia Care and Evaluation    Patient location during evaluation: PHASE II  Patient participation: complete - patient participated  Level of consciousness: awake and alert  Pain management: adequate  Airway patency: patent  Anesthetic complications: No anesthetic complications    Cardiovascular status: acceptable  Respiratory status: acceptable  Hydration status: acceptable    Comments: Blood pressure 142/71, pulse 86, temperature 98.2 °F (36.8 °C), temperature source Temporal, resp. rate 18, height 160 cm (63\"), weight 46.7 kg (103 lb), SpO2 99 %, not currently breastfeeding.    Pt discharged from PACU based on desmond score >8      "

## 2022-06-04 ENCOUNTER — TELEPHONE (OUTPATIENT)
Dept: GASTROENTEROLOGY | Facility: CLINIC | Age: 77
End: 2022-06-04

## 2022-06-06 ENCOUNTER — LAB (OUTPATIENT)
Dept: LAB | Facility: HOSPITAL | Age: 77
End: 2022-06-06

## 2022-06-06 ENCOUNTER — PRE-ADMISSION TESTING (OUTPATIENT)
Dept: PREADMISSION TESTING | Facility: HOSPITAL | Age: 77
End: 2022-06-06

## 2022-06-06 VITALS
WEIGHT: 107.36 LBS | DIASTOLIC BLOOD PRESSURE: 64 MMHG | HEART RATE: 75 BPM | RESPIRATION RATE: 18 BRPM | SYSTOLIC BLOOD PRESSURE: 127 MMHG | OXYGEN SATURATION: 100 % | HEIGHT: 63 IN | BODY MASS INDEX: 19.02 KG/M2

## 2022-06-06 DIAGNOSIS — I73.9 PAD (PERIPHERAL ARTERY DISEASE): ICD-10-CM

## 2022-06-06 DIAGNOSIS — Z51.81 ENCOUNTER FOR MONITORING ANTIPLATELET THERAPY: ICD-10-CM

## 2022-06-06 DIAGNOSIS — Z79.02 ENCOUNTER FOR MONITORING ANTIPLATELET THERAPY: ICD-10-CM

## 2022-06-06 DIAGNOSIS — Z01.818 PREOP TESTING: ICD-10-CM

## 2022-06-06 LAB
ANION GAP SERPL CALCULATED.3IONS-SCNC: 7 MMOL/L (ref 5–15)
APTT PPP: 31.2 SECONDS (ref 24.1–35)
BASOPHILS # BLD AUTO: 0.04 10*3/MM3 (ref 0–0.2)
BASOPHILS NFR BLD AUTO: 0.6 % (ref 0–1.5)
BUN SERPL-MCNC: 11 MG/DL (ref 8–23)
BUN/CREAT SERPL: 12.9 (ref 7–25)
CALCIUM SPEC-SCNC: 9.7 MG/DL (ref 8.6–10.5)
CHLORIDE SERPL-SCNC: 102 MMOL/L (ref 98–107)
CO2 SERPL-SCNC: 33 MMOL/L (ref 22–29)
CREAT SERPL-MCNC: 0.85 MG/DL (ref 0.57–1)
DEPRECATED RDW RBC AUTO: 51.2 FL (ref 37–54)
EGFRCR SERPLBLD CKD-EPI 2021: 71.1 ML/MIN/1.73
EOSINOPHIL # BLD AUTO: 0.37 10*3/MM3 (ref 0–0.4)
EOSINOPHIL NFR BLD AUTO: 5.6 % (ref 0.3–6.2)
ERYTHROCYTE [DISTWIDTH] IN BLOOD BY AUTOMATED COUNT: 13.6 % (ref 12.3–15.4)
GLUCOSE SERPL-MCNC: 70 MG/DL (ref 65–99)
HCT VFR BLD AUTO: 37.2 % (ref 34–46.6)
HGB BLD-MCNC: 11.8 G/DL (ref 12–15.9)
IMM GRANULOCYTES # BLD AUTO: 0.01 10*3/MM3 (ref 0–0.05)
IMM GRANULOCYTES NFR BLD AUTO: 0.2 % (ref 0–0.5)
INR PPP: 1.09 (ref 0.91–1.09)
LYMPHOCYTES # BLD AUTO: 1.87 10*3/MM3 (ref 0.7–3.1)
LYMPHOCYTES NFR BLD AUTO: 28.5 % (ref 19.6–45.3)
MCH RBC QN AUTO: 32.2 PG (ref 26.6–33)
MCHC RBC AUTO-ENTMCNC: 31.7 G/DL (ref 31.5–35.7)
MCV RBC AUTO: 101.4 FL (ref 79–97)
MONOCYTES # BLD AUTO: 0.84 10*3/MM3 (ref 0.1–0.9)
MONOCYTES NFR BLD AUTO: 12.8 % (ref 5–12)
NEUTROPHILS NFR BLD AUTO: 3.42 10*3/MM3 (ref 1.7–7)
NEUTROPHILS NFR BLD AUTO: 52.3 % (ref 42.7–76)
NRBC BLD AUTO-RTO: 0 /100 WBC (ref 0–0.2)
PLATELET # BLD AUTO: 166 10*3/MM3 (ref 140–450)
PMV BLD AUTO: 10.2 FL (ref 6–12)
POTASSIUM SERPL-SCNC: 3.7 MMOL/L (ref 3.5–5.2)
PROTHROMBIN TIME: 13.7 SECONDS (ref 11.9–14.6)
RBC # BLD AUTO: 3.67 10*6/MM3 (ref 3.77–5.28)
SARS-COV-2 ORF1AB RESP QL NAA+PROBE: NOT DETECTED
SODIUM SERPL-SCNC: 142 MMOL/L (ref 136–145)
WBC NRBC COR # BLD: 6.55 10*3/MM3 (ref 3.4–10.8)

## 2022-06-06 PROCEDURE — U0005 INFEC AGEN DETEC AMPLI PROBE: HCPCS

## 2022-06-06 PROCEDURE — 85025 COMPLETE CBC W/AUTO DIFF WBC: CPT

## 2022-06-06 PROCEDURE — 85610 PROTHROMBIN TIME: CPT

## 2022-06-06 PROCEDURE — C9803 HOPD COVID-19 SPEC COLLECT: HCPCS

## 2022-06-06 PROCEDURE — U0004 COV-19 TEST NON-CDC HGH THRU: HCPCS

## 2022-06-06 PROCEDURE — 80048 BASIC METABOLIC PNL TOTAL CA: CPT

## 2022-06-06 PROCEDURE — 36415 COLL VENOUS BLD VENIPUNCTURE: CPT

## 2022-06-06 PROCEDURE — 85730 THROMBOPLASTIN TIME PARTIAL: CPT

## 2022-06-06 NOTE — DISCHARGE INSTRUCTIONS
Before you come to the hospital        Arrival time: AS DIRECTED BY OFFICE     YOU MAY TAKE THE FOLLOWING MEDICATION(S) THE MORNING OF SURGERY WITH A SIP OF WATER: ok to take coreg and ultram morning of surgery; hold benazepril 24 hours prior to surgery            ALL OTHER HOME MEDICATION CHECK WITH YOUR PHYSICIAN (especially if you are taking diabetes medicines or blood thinners)    Do not take any Erectile Dysfunction medications (EX: CIALIS, VIAGRA) 24 hours prior to surgery      If you were given and instructed to use a germ- killing soap, use as directed the night before surgery and the morning of surgery before coming to the hospital.             Eating and drinking restrictions prior to scheduled arrival time    2 Hours before arrival time STOP   Drinking Clear liquids (water, apple juice-no pulp)     6 Hours before arrival time STOP   Milk or drinks that contain milk, full liquids    6 Hours before arrival time STOP   Light meals or foods, such as toast or cereal    8 Hours before arrival time STOP   Heavy foods, such as meat, fried foods, or fatty foods    (It is extremely important that you follow these guidelines to prevent delay or cancelation of your procedure)     Clear Liquids  Water and flavored water                                                                      Clear Fruit juices, such as cranberry juice and apple juice.  Black coffee (NO cream of any kind, including powdered).  Plain tea  Clear bouillon or broth.  Flavored gelatin.  Soda.  Gatorade or Powerade.  Full liquid examples  Juices that have pulp.  Frozen ice pops that contain fruit pieces.  Coffee with creamer  Milk.  Yogurt.              MANAGING PAIN AFTER SURGERY    We know you are probably wondering what your pain will be like after surgery.  Following surgery it is unrealistic to expect you will not have pain.   Pain is how our bodies let us know that something is wrong or cautions us to be careful.  That said, our goal is to  make your pain tolerable.    Methods we may use to treat your pain include (oral or IV medications, PCAs, epidurals, nerve blocks, etc.)   While some procedures require IV pain medications for a short time after surgery, transitioning to pain medications by mouth allows for better management of pain.   Your nurse will encourage you to take oral pain medications whenever possible.  IV medications work almost immediately, but only last a short while.  Taking medications by mouth allows for a more constant level of medication in your blood stream for a longer period of time.      Once your pain is out of control it is harder to get back under control.  It is important you are aware when your next dose of pain medication is due.  If you are admitted, your nurse may write the time of your next dose on the white board in your room to help you remember.      We are interested in your pain and encourage you to inform us about aggravating factors during your visit.   Many times a simple repositioning every few hours can make a big difference.    If your physician says it is okay, do not let your pain prevent you from getting out of bed. Be sure to call your nurse for assistance prior to getting up so you do not fall.      Before surgery, please decide your tolerable pain goal.  These faces help describe the pain ratings we use on a 0-10 scale.   Be prepared to tell us your goal and whether or not you take pain or anxiety medications at home.

## 2022-06-07 ENCOUNTER — TELEPHONE (OUTPATIENT)
Dept: VASCULAR SURGERY | Facility: CLINIC | Age: 77
End: 2022-06-07

## 2022-06-07 NOTE — TELEPHONE ENCOUNTER
PT CALLED WITH A MEDICATION QUESTION THAT WAS ANSWERED. PT WAS ALSO REMINDED OF 0600 ARRIVAL FOR SURGERY.

## 2022-06-08 ENCOUNTER — APPOINTMENT (OUTPATIENT)
Dept: INTERVENTIONAL RADIOLOGY/VASCULAR | Facility: HOSPITAL | Age: 77
End: 2022-06-08

## 2022-06-08 ENCOUNTER — ANESTHESIA (OUTPATIENT)
Dept: PERIOP | Facility: HOSPITAL | Age: 77
End: 2022-06-08

## 2022-06-08 ENCOUNTER — HOSPITAL ENCOUNTER (OUTPATIENT)
Facility: HOSPITAL | Age: 77
LOS: 1 days | Discharge: HOME | End: 2022-06-09
Attending: SURGERY | Admitting: SURGERY

## 2022-06-08 ENCOUNTER — ANESTHESIA EVENT (OUTPATIENT)
Dept: PERIOP | Facility: HOSPITAL | Age: 77
End: 2022-06-08

## 2022-06-08 DIAGNOSIS — I73.9 PAD (PERIPHERAL ARTERY DISEASE): ICD-10-CM

## 2022-06-08 DIAGNOSIS — Z01.818 PREOP TESTING: ICD-10-CM

## 2022-06-08 DIAGNOSIS — I82.90 THROMBUS: ICD-10-CM

## 2022-06-08 DIAGNOSIS — G89.18 ACUTE POST-OPERATIVE PAIN: Primary | ICD-10-CM

## 2022-06-08 PROBLEM — I74.3: Status: ACTIVE | Noted: 2022-06-08

## 2022-06-08 LAB
ABO GROUP BLD: NORMAL
BLD GP AB SCN SERPL QL: NEGATIVE
HCT VFR BLD AUTO: 16.5 % (ref 34–46.6)
HCT VFR BLD AUTO: 33.7 % (ref 34–46.6)
HGB BLD-MCNC: 11 G/DL (ref 12–15.9)
HGB BLD-MCNC: 5.6 G/DL (ref 12–15.9)
RH BLD: POSITIVE
T&S EXPIRATION DATE: NORMAL

## 2022-06-08 PROCEDURE — 25010000002 HEPARIN (PORCINE) PER 1000 UNITS: Performed by: NURSE ANESTHETIST, CERTIFIED REGISTERED

## 2022-06-08 PROCEDURE — 25010000002 PROPOFOL 1000 MG/100ML EMULSION: Performed by: NURSE ANESTHETIST, CERTIFIED REGISTERED

## 2022-06-08 PROCEDURE — C1769 GUIDE WIRE: HCPCS | Performed by: SURGERY

## 2022-06-08 PROCEDURE — 34203 REMOVAL OF LEG ARTERY CLOT: CPT | Performed by: SURGERY

## 2022-06-08 PROCEDURE — C1884 EMBOLIZATION PROTECT SYST: HCPCS | Performed by: SURGERY

## 2022-06-08 PROCEDURE — 25010000002 HEPARIN (PORCINE) PER 1000 UNITS: Performed by: SURGERY

## 2022-06-08 PROCEDURE — 85018 HEMOGLOBIN: CPT | Performed by: SURGERY

## 2022-06-08 PROCEDURE — 63710000001 HYDROCHLOROTHIAZIDE 25 MG TABLET: Performed by: SURGERY

## 2022-06-08 PROCEDURE — 85014 HEMATOCRIT: CPT | Performed by: NURSE ANESTHETIST, CERTIFIED REGISTERED

## 2022-06-08 PROCEDURE — 25010000002 FENTANYL CITRATE (PF) 50 MCG/ML SOLUTION: Performed by: ANESTHESIOLOGY

## 2022-06-08 PROCEDURE — A9270 NON-COVERED ITEM OR SERVICE: HCPCS | Performed by: SURGERY

## 2022-06-08 PROCEDURE — C1757 CATH, THROMBECTOMY/EMBOLECT: HCPCS | Performed by: SURGERY

## 2022-06-08 PROCEDURE — 37799 UNLISTED PX VASCULAR SURGERY: CPT | Performed by: SURGERY

## 2022-06-08 PROCEDURE — 25010000002 IOPAMIDOL 61 % SOLUTION: Performed by: SURGERY

## 2022-06-08 PROCEDURE — P9041 ALBUMIN (HUMAN),5%, 50ML: HCPCS | Performed by: NURSE ANESTHETIST, CERTIFIED REGISTERED

## 2022-06-08 PROCEDURE — 86923 COMPATIBILITY TEST ELECTRIC: CPT

## 2022-06-08 PROCEDURE — 25010000002 PROPOFOL 10 MG/ML EMULSION: Performed by: NURSE ANESTHETIST, CERTIFIED REGISTERED

## 2022-06-08 PROCEDURE — C1768 GRAFT, VASCULAR: HCPCS | Performed by: SURGERY

## 2022-06-08 PROCEDURE — C1714 CATH, TRANS ATHERECTOMY, DIR: HCPCS | Performed by: SURGERY

## 2022-06-08 PROCEDURE — 25010000002 CEFAZOLIN PER 500 MG: Performed by: NURSE ANESTHETIST, CERTIFIED REGISTERED

## 2022-06-08 PROCEDURE — 76000 FLUOROSCOPY <1 HR PHYS/QHP: CPT

## 2022-06-08 PROCEDURE — 86850 RBC ANTIBODY SCREEN: CPT | Performed by: SURGERY

## 2022-06-08 PROCEDURE — 75710 ARTERY X-RAYS ARM/LEG: CPT

## 2022-06-08 PROCEDURE — 63710000001 TRAMADOL 50 MG TABLET: Performed by: SURGERY

## 2022-06-08 PROCEDURE — 25010000002 CEFAZOLIN PER 500 MG: Performed by: SURGERY

## 2022-06-08 PROCEDURE — 85014 HEMATOCRIT: CPT | Performed by: SURGERY

## 2022-06-08 PROCEDURE — C1894 INTRO/SHEATH, NON-LASER: HCPCS | Performed by: SURGERY

## 2022-06-08 PROCEDURE — C1725 CATH, TRANSLUMIN NON-LASER: HCPCS | Performed by: SURGERY

## 2022-06-08 PROCEDURE — 86901 BLOOD TYPING SEROLOGIC RH(D): CPT

## 2022-06-08 PROCEDURE — 63710000001 CARVEDILOL 6.25 MG TABLET: Performed by: SURGERY

## 2022-06-08 PROCEDURE — 63710000001 CETIRIZINE 10 MG TABLET: Performed by: SURGERY

## 2022-06-08 PROCEDURE — 25010000002 HYDROMORPHONE PER 4 MG: Performed by: ANESTHESIOLOGY

## 2022-06-08 PROCEDURE — 63710000001 CLOPIDOGREL 75 MG TABLET: Performed by: SURGERY

## 2022-06-08 PROCEDURE — C1760 CLOSURE DEV, VASC: HCPCS

## 2022-06-08 PROCEDURE — 63710000001 FAMOTIDINE 20 MG TABLET: Performed by: SURGERY

## 2022-06-08 PROCEDURE — C1887 CATHETER, GUIDING: HCPCS | Performed by: SURGERY

## 2022-06-08 PROCEDURE — 75625 CONTRAST EXAM ABDOMINL AORTA: CPT

## 2022-06-08 PROCEDURE — 63710000001 ROSUVASTATIN 20 MG TABLET: Performed by: SURGERY

## 2022-06-08 PROCEDURE — 63710000001 ASPIRIN 81 MG CHEWABLE TABLET: Performed by: SURGERY

## 2022-06-08 PROCEDURE — 25010000002 MORPHINE PER 10 MG: Performed by: SURGERY

## 2022-06-08 PROCEDURE — 25010000002 ALBUMIN HUMAN 5% PER 50 ML: Performed by: NURSE ANESTHETIST, CERTIFIED REGISTERED

## 2022-06-08 PROCEDURE — 36430 TRANSFUSION BLD/BLD COMPNT: CPT

## 2022-06-08 PROCEDURE — 63710000001 MULTIVITAMIN WITH MINERALS TABLET: Performed by: SURGERY

## 2022-06-08 PROCEDURE — P9016 RBC LEUKOCYTES REDUCED: HCPCS

## 2022-06-08 PROCEDURE — 63710000001 LISINOPRIL 5 MG TABLET: Performed by: SURGERY

## 2022-06-08 PROCEDURE — 85018 HEMOGLOBIN: CPT | Performed by: NURSE ANESTHETIST, CERTIFIED REGISTERED

## 2022-06-08 PROCEDURE — 86900 BLOOD TYPING SEROLOGIC ABO: CPT

## 2022-06-08 PROCEDURE — 86901 BLOOD TYPING SEROLOGIC RH(D): CPT | Performed by: SURGERY

## 2022-06-08 PROCEDURE — 37229 PR REVSC OPN/PRQ TIB/PERO W/ATHRC/ANGIOP SM VSL: CPT | Performed by: SURGERY

## 2022-06-08 PROCEDURE — 86900 BLOOD TYPING SEROLOGIC ABO: CPT | Performed by: SURGERY

## 2022-06-08 PROCEDURE — 34201 REMOVAL OF ARTERY CLOT: CPT | Performed by: SURGERY

## 2022-06-08 DEVICE — PTCH VASC XENOSURE BIO 0.8X8CM: Type: IMPLANTABLE DEVICE | Site: GROIN | Status: FUNCTIONAL

## 2022-06-08 RX ORDER — ALBUTEROL SULFATE 90 UG/1
2 AEROSOL, METERED RESPIRATORY (INHALATION) EVERY 4 HOURS PRN
Status: DISCONTINUED | OUTPATIENT
Start: 2022-06-08 | End: 2022-06-09 | Stop reason: HOSPADM

## 2022-06-08 RX ORDER — SODIUM CHLORIDE 9 MG/ML
50 INJECTION, SOLUTION INTRAVENOUS CONTINUOUS
Status: DISCONTINUED | OUTPATIENT
Start: 2022-06-08 | End: 2022-06-09 | Stop reason: HOSPADM

## 2022-06-08 RX ORDER — CEFAZOLIN SODIUM 1 G/3ML
INJECTION, POWDER, FOR SOLUTION INTRAMUSCULAR; INTRAVENOUS AS NEEDED
Status: DISCONTINUED | OUTPATIENT
Start: 2022-06-08 | End: 2022-06-08 | Stop reason: SURG

## 2022-06-08 RX ORDER — ROCURONIUM BROMIDE 10 MG/ML
INJECTION, SOLUTION INTRAVENOUS AS NEEDED
Status: DISCONTINUED | OUTPATIENT
Start: 2022-06-08 | End: 2022-06-08 | Stop reason: SURG

## 2022-06-08 RX ORDER — SODIUM CHLORIDE 0.9 % (FLUSH) 0.9 %
10 SYRINGE (ML) INJECTION EVERY 12 HOURS SCHEDULED
Status: DISCONTINUED | OUTPATIENT
Start: 2022-06-08 | End: 2022-06-09 | Stop reason: HOSPADM

## 2022-06-08 RX ORDER — SODIUM CHLORIDE, SODIUM LACTATE, POTASSIUM CHLORIDE, CALCIUM CHLORIDE 600; 310; 30; 20 MG/100ML; MG/100ML; MG/100ML; MG/100ML
1000 INJECTION, SOLUTION INTRAVENOUS CONTINUOUS
Status: DISCONTINUED | OUTPATIENT
Start: 2022-06-08 | End: 2022-06-08

## 2022-06-08 RX ORDER — CETIRIZINE HYDROCHLORIDE 10 MG/1
10 TABLET ORAL NIGHTLY
Status: DISCONTINUED | OUTPATIENT
Start: 2022-06-08 | End: 2022-06-09 | Stop reason: HOSPADM

## 2022-06-08 RX ORDER — LIDOCAINE HYDROCHLORIDE 20 MG/ML
INJECTION, SOLUTION EPIDURAL; INFILTRATION; INTRACAUDAL; PERINEURAL AS NEEDED
Status: DISCONTINUED | OUTPATIENT
Start: 2022-06-08 | End: 2022-06-08 | Stop reason: SURG

## 2022-06-08 RX ORDER — DROPERIDOL 2.5 MG/ML
0.62 INJECTION, SOLUTION INTRAMUSCULAR; INTRAVENOUS ONCE AS NEEDED
Status: DISCONTINUED | OUTPATIENT
Start: 2022-06-08 | End: 2022-06-08 | Stop reason: HOSPADM

## 2022-06-08 RX ORDER — TRAMADOL HYDROCHLORIDE 50 MG/1
50 TABLET ORAL
Status: DISCONTINUED | OUTPATIENT
Start: 2022-06-08 | End: 2022-06-09 | Stop reason: HOSPADM

## 2022-06-08 RX ORDER — ONDANSETRON 2 MG/ML
4 INJECTION INTRAMUSCULAR; INTRAVENOUS
Status: DISCONTINUED | OUTPATIENT
Start: 2022-06-08 | End: 2022-06-08 | Stop reason: HOSPADM

## 2022-06-08 RX ORDER — HYDROCHLOROTHIAZIDE 25 MG/1
12.5 TABLET ORAL DAILY
Status: DISCONTINUED | OUTPATIENT
Start: 2022-06-08 | End: 2022-06-09 | Stop reason: HOSPADM

## 2022-06-08 RX ORDER — HYDROCODONE BITARTRATE AND ACETAMINOPHEN 5; 325 MG/1; MG/1
1 TABLET ORAL EVERY 4 HOURS PRN
Status: DISCONTINUED | OUTPATIENT
Start: 2022-06-08 | End: 2022-06-09 | Stop reason: HOSPADM

## 2022-06-08 RX ORDER — FENTANYL CITRATE 50 UG/ML
25 INJECTION, SOLUTION INTRAMUSCULAR; INTRAVENOUS
Status: DISCONTINUED | OUTPATIENT
Start: 2022-06-08 | End: 2022-06-08

## 2022-06-08 RX ORDER — LISINOPRIL 5 MG/1
5 TABLET ORAL
Status: DISCONTINUED | OUTPATIENT
Start: 2022-06-08 | End: 2022-06-09 | Stop reason: HOSPADM

## 2022-06-08 RX ORDER — BUPIVACAINE HYDROCHLORIDE 5 MG/ML
INJECTION, SOLUTION EPIDURAL; INTRACAUDAL AS NEEDED
Status: DISCONTINUED | OUTPATIENT
Start: 2022-06-08 | End: 2022-06-08 | Stop reason: HOSPADM

## 2022-06-08 RX ORDER — ONDANSETRON 2 MG/ML
4 INJECTION INTRAMUSCULAR; INTRAVENOUS EVERY 6 HOURS PRN
Status: DISCONTINUED | OUTPATIENT
Start: 2022-06-08 | End: 2022-06-09 | Stop reason: HOSPADM

## 2022-06-08 RX ORDER — HYDROCODONE BITARTRATE AND ACETAMINOPHEN 5; 325 MG/1; MG/1
1 TABLET ORAL ONCE AS NEEDED
Status: DISCONTINUED | OUTPATIENT
Start: 2022-06-08 | End: 2022-06-08

## 2022-06-08 RX ORDER — ONDANSETRON 4 MG/1
4 TABLET, FILM COATED ORAL EVERY 8 HOURS PRN
Status: DISCONTINUED | OUTPATIENT
Start: 2022-06-08 | End: 2022-06-09 | Stop reason: SDUPTHER

## 2022-06-08 RX ORDER — CLOPIDOGREL BISULFATE 75 MG/1
75 TABLET ORAL DAILY
Status: DISCONTINUED | OUTPATIENT
Start: 2022-06-08 | End: 2022-06-09 | Stop reason: HOSPADM

## 2022-06-08 RX ORDER — BUDESONIDE AND FORMOTEROL FUMARATE DIHYDRATE 160; 4.5 UG/1; UG/1
2 AEROSOL RESPIRATORY (INHALATION)
Status: DISCONTINUED | OUTPATIENT
Start: 2022-06-08 | End: 2022-06-09 | Stop reason: HOSPADM

## 2022-06-08 RX ORDER — HEPARIN SODIUM 1000 [USP'U]/ML
INJECTION, SOLUTION INTRAVENOUS; SUBCUTANEOUS AS NEEDED
Status: DISCONTINUED | OUTPATIENT
Start: 2022-06-08 | End: 2022-06-08 | Stop reason: SURG

## 2022-06-08 RX ORDER — MULTIPLE VITAMINS W/ MINERALS TAB 9MG-400MCG
1 TAB ORAL DAILY
Status: DISCONTINUED | OUTPATIENT
Start: 2022-06-08 | End: 2022-06-09 | Stop reason: HOSPADM

## 2022-06-08 RX ORDER — LIDOCAINE HYDROCHLORIDE 40 MG/ML
SOLUTION TOPICAL AS NEEDED
Status: DISCONTINUED | OUTPATIENT
Start: 2022-06-08 | End: 2022-06-08 | Stop reason: SURG

## 2022-06-08 RX ORDER — FAMOTIDINE 20 MG/1
40 TABLET, FILM COATED ORAL 2 TIMES DAILY
Status: DISCONTINUED | OUTPATIENT
Start: 2022-06-08 | End: 2022-06-09 | Stop reason: HOSPADM

## 2022-06-08 RX ORDER — HYDROMORPHONE HYDROCHLORIDE 1 MG/ML
0.2 INJECTION, SOLUTION INTRAMUSCULAR; INTRAVENOUS; SUBCUTANEOUS
Status: DISCONTINUED | OUTPATIENT
Start: 2022-06-08 | End: 2022-06-08 | Stop reason: HOSPADM

## 2022-06-08 RX ORDER — NALOXONE HCL 0.4 MG/ML
0.4 VIAL (ML) INJECTION AS NEEDED
Status: DISCONTINUED | OUTPATIENT
Start: 2022-06-08 | End: 2022-06-08

## 2022-06-08 RX ORDER — OXYCODONE HYDROCHLORIDE AND ACETAMINOPHEN 5; 325 MG/1; MG/1
1 TABLET ORAL ONCE AS NEEDED
Status: DISCONTINUED | OUTPATIENT
Start: 2022-06-08 | End: 2022-06-08

## 2022-06-08 RX ORDER — FENTANYL CITRATE 50 UG/ML
25 INJECTION, SOLUTION INTRAMUSCULAR; INTRAVENOUS
Status: DISCONTINUED | OUTPATIENT
Start: 2022-06-08 | End: 2022-06-08 | Stop reason: HOSPADM

## 2022-06-08 RX ORDER — ASPIRIN 81 MG/1
81 TABLET, CHEWABLE ORAL DAILY
Status: DISCONTINUED | OUTPATIENT
Start: 2022-06-08 | End: 2022-06-09 | Stop reason: HOSPADM

## 2022-06-08 RX ORDER — SODIUM CHLORIDE 0.9 % (FLUSH) 0.9 %
10 SYRINGE (ML) INJECTION AS NEEDED
Status: DISCONTINUED | OUTPATIENT
Start: 2022-06-08 | End: 2022-06-09 | Stop reason: HOSPADM

## 2022-06-08 RX ORDER — BUPIVACAINE HCL/0.9 % NACL/PF 0.1 %
2 PLASTIC BAG, INJECTION (ML) EPIDURAL EVERY 8 HOURS
Status: COMPLETED | OUTPATIENT
Start: 2022-06-08 | End: 2022-06-09

## 2022-06-08 RX ORDER — ONDANSETRON 4 MG/1
4 TABLET, FILM COATED ORAL EVERY 6 HOURS PRN
Status: DISCONTINUED | OUTPATIENT
Start: 2022-06-08 | End: 2022-06-09 | Stop reason: HOSPADM

## 2022-06-08 RX ORDER — SODIUM CHLORIDE, SODIUM LACTATE, POTASSIUM CHLORIDE, CALCIUM CHLORIDE 600; 310; 30; 20 MG/100ML; MG/100ML; MG/100ML; MG/100ML
100 INJECTION, SOLUTION INTRAVENOUS CONTINUOUS
Status: DISCONTINUED | OUTPATIENT
Start: 2022-06-08 | End: 2022-06-08

## 2022-06-08 RX ORDER — NALOXONE HCL 0.4 MG/ML
0.4 VIAL (ML) INJECTION
Status: DISCONTINUED | OUTPATIENT
Start: 2022-06-08 | End: 2022-06-09 | Stop reason: HOSPADM

## 2022-06-08 RX ORDER — FLUMAZENIL 0.1 MG/ML
0.2 INJECTION INTRAVENOUS AS NEEDED
Status: DISCONTINUED | OUTPATIENT
Start: 2022-06-08 | End: 2022-06-08 | Stop reason: HOSPADM

## 2022-06-08 RX ORDER — NALOXONE HCL 0.4 MG/ML
0.04 VIAL (ML) INJECTION AS NEEDED
Status: DISCONTINUED | OUTPATIENT
Start: 2022-06-08 | End: 2022-06-08 | Stop reason: HOSPADM

## 2022-06-08 RX ORDER — PHENYLEPHRINE HCL IN 0.9% NACL 1 MG/10 ML
SYRINGE (ML) INTRAVENOUS AS NEEDED
Status: DISCONTINUED | OUTPATIENT
Start: 2022-06-08 | End: 2022-06-08 | Stop reason: SURG

## 2022-06-08 RX ORDER — IBUPROFEN 600 MG/1
600 TABLET ORAL ONCE AS NEEDED
Status: DISCONTINUED | OUTPATIENT
Start: 2022-06-08 | End: 2022-06-08

## 2022-06-08 RX ORDER — ONDANSETRON 2 MG/ML
4 INJECTION INTRAMUSCULAR; INTRAVENOUS ONCE AS NEEDED
Status: DISCONTINUED | OUTPATIENT
Start: 2022-06-08 | End: 2022-06-08

## 2022-06-08 RX ORDER — SODIUM CHLORIDE 0.9 % (FLUSH) 0.9 %
3 SYRINGE (ML) INJECTION AS NEEDED
Status: DISCONTINUED | OUTPATIENT
Start: 2022-06-08 | End: 2022-06-08 | Stop reason: HOSPADM

## 2022-06-08 RX ORDER — LEVOTHYROXINE SODIUM 0.05 MG/1
50 TABLET ORAL DAILY
Status: DISCONTINUED | OUTPATIENT
Start: 2022-06-08 | End: 2022-06-09 | Stop reason: HOSPADM

## 2022-06-08 RX ORDER — ACETAMINOPHEN 325 MG/1
650 TABLET ORAL EVERY 4 HOURS PRN
Status: DISCONTINUED | OUTPATIENT
Start: 2022-06-08 | End: 2022-06-09 | Stop reason: HOSPADM

## 2022-06-08 RX ORDER — PROPOFOL 10 MG/ML
VIAL (ML) INTRAVENOUS AS NEEDED
Status: DISCONTINUED | OUTPATIENT
Start: 2022-06-08 | End: 2022-06-08 | Stop reason: SURG

## 2022-06-08 RX ORDER — FLUMAZENIL 0.1 MG/ML
0.2 INJECTION INTRAVENOUS AS NEEDED
Status: DISCONTINUED | OUTPATIENT
Start: 2022-06-08 | End: 2022-06-08

## 2022-06-08 RX ORDER — SODIUM CHLORIDE 0.9 % (FLUSH) 0.9 %
3-10 SYRINGE (ML) INJECTION AS NEEDED
Status: DISCONTINUED | OUTPATIENT
Start: 2022-06-08 | End: 2022-06-08 | Stop reason: HOSPADM

## 2022-06-08 RX ORDER — OXYCODONE HYDROCHLORIDE AND ACETAMINOPHEN 5; 325 MG/1; MG/1
1 TABLET ORAL ONCE AS NEEDED
Status: DISCONTINUED | OUTPATIENT
Start: 2022-06-08 | End: 2022-06-08 | Stop reason: HOSPADM

## 2022-06-08 RX ORDER — SODIUM CHLORIDE 0.9 % (FLUSH) 0.9 %
3 SYRINGE (ML) INJECTION EVERY 12 HOURS SCHEDULED
Status: DISCONTINUED | OUTPATIENT
Start: 2022-06-08 | End: 2022-06-08 | Stop reason: HOSPADM

## 2022-06-08 RX ORDER — LABETALOL HYDROCHLORIDE 5 MG/ML
5 INJECTION, SOLUTION INTRAVENOUS
Status: DISCONTINUED | OUTPATIENT
Start: 2022-06-08 | End: 2022-06-08 | Stop reason: HOSPADM

## 2022-06-08 RX ORDER — PROPOFOL 10 MG/ML
INJECTION, EMULSION INTRAVENOUS AS NEEDED
Status: DISCONTINUED | OUTPATIENT
Start: 2022-06-08 | End: 2022-06-08 | Stop reason: SURG

## 2022-06-08 RX ORDER — LIDOCAINE HYDROCHLORIDE 10 MG/ML
0.5 INJECTION, SOLUTION EPIDURAL; INFILTRATION; INTRACAUDAL; PERINEURAL ONCE AS NEEDED
Status: DISCONTINUED | OUTPATIENT
Start: 2022-06-08 | End: 2022-06-08 | Stop reason: HOSPADM

## 2022-06-08 RX ORDER — CARVEDILOL 6.25 MG/1
12.5 TABLET ORAL 2 TIMES DAILY WITH MEALS
Status: DISCONTINUED | OUTPATIENT
Start: 2022-06-08 | End: 2022-06-09 | Stop reason: HOSPADM

## 2022-06-08 RX ORDER — ALBUMIN, HUMAN INJ 5% 5 %
SOLUTION INTRAVENOUS CONTINUOUS PRN
Status: DISCONTINUED | OUTPATIENT
Start: 2022-06-08 | End: 2022-06-08 | Stop reason: SURG

## 2022-06-08 RX ORDER — EPHEDRINE SULFATE 50 MG/ML
INJECTION, SOLUTION INTRAVENOUS AS NEEDED
Status: DISCONTINUED | OUTPATIENT
Start: 2022-06-08 | End: 2022-06-08 | Stop reason: SURG

## 2022-06-08 RX ORDER — ROSUVASTATIN CALCIUM 20 MG/1
40 TABLET, COATED ORAL NIGHTLY
Status: DISCONTINUED | OUTPATIENT
Start: 2022-06-08 | End: 2022-06-09 | Stop reason: HOSPADM

## 2022-06-08 RX ORDER — NEOSTIGMINE METHYLSULFATE 5 MG/5 ML
SYRINGE (ML) INTRAVENOUS AS NEEDED
Status: DISCONTINUED | OUTPATIENT
Start: 2022-06-08 | End: 2022-06-08 | Stop reason: SURG

## 2022-06-08 RX ORDER — OXYCODONE AND ACETAMINOPHEN 7.5; 325 MG/1; MG/1
1 TABLET ORAL EVERY 4 HOURS PRN
Status: DISCONTINUED | OUTPATIENT
Start: 2022-06-08 | End: 2022-06-08

## 2022-06-08 RX ORDER — LABETALOL HYDROCHLORIDE 5 MG/ML
5 INJECTION, SOLUTION INTRAVENOUS
Status: DISCONTINUED | OUTPATIENT
Start: 2022-06-08 | End: 2022-06-08

## 2022-06-08 RX ADMIN — Medication 1 TABLET: at 20:38

## 2022-06-08 RX ADMIN — PROPOFOL 120 MG: 10 INJECTION, EMULSION INTRAVENOUS at 15:11

## 2022-06-08 RX ADMIN — CEFAZOLIN SODIUM 2 G: 10 INJECTION, POWDER, FOR SOLUTION INTRAVENOUS at 20:40

## 2022-06-08 RX ADMIN — ASPIRIN 81 MG: 81 TABLET, CHEWABLE ORAL at 20:45

## 2022-06-08 RX ADMIN — Medication 10 ML: at 20:41

## 2022-06-08 RX ADMIN — CLOPIDOGREL BISULFATE 75 MG: 75 TABLET ORAL at 20:37

## 2022-06-08 RX ADMIN — HEPARIN SODIUM 1000 UNITS: 1000 INJECTION, SOLUTION INTRAVENOUS; SUBCUTANEOUS at 10:06

## 2022-06-08 RX ADMIN — HEPARIN SODIUM 3000 UNITS: 1000 INJECTION, SOLUTION INTRAVENOUS; SUBCUTANEOUS at 15:28

## 2022-06-08 RX ADMIN — FENTANYL CITRATE 25 MCG: 50 INJECTION INTRAMUSCULAR; INTRAVENOUS at 17:15

## 2022-06-08 RX ADMIN — ROSUVASTATIN CALCIUM 40 MG: 20 TABLET, FILM COATED ORAL at 20:38

## 2022-06-08 RX ADMIN — LISINOPRIL 5 MG: 5 TABLET ORAL at 20:38

## 2022-06-08 RX ADMIN — LIDOCAINE HYDROCHLORIDE 1 EACH: 40 SOLUTION TOPICAL at 15:11

## 2022-06-08 RX ADMIN — MORPHINE SULFATE 4 MG: 4 INJECTION, SOLUTION INTRAMUSCULAR; INTRAVENOUS at 18:42

## 2022-06-08 RX ADMIN — Medication 300 MCG: at 15:14

## 2022-06-08 RX ADMIN — EPHEDRINE SULFATE 20 MG: 50 INJECTION INTRAVENOUS at 15:20

## 2022-06-08 RX ADMIN — EPHEDRINE SULFATE 10 MG: 50 INJECTION INTRAVENOUS at 15:53

## 2022-06-08 RX ADMIN — Medication 200 MCG: at 15:45

## 2022-06-08 RX ADMIN — FENTANYL CITRATE 25 MCG: 50 INJECTION INTRAMUSCULAR; INTRAVENOUS at 11:56

## 2022-06-08 RX ADMIN — SODIUM CHLORIDE 50 ML/HR: 9 INJECTION, SOLUTION INTRAVENOUS at 18:18

## 2022-06-08 RX ADMIN — GLYCOPYRROLATE 0.4 MG: 0.2 INJECTION INTRAMUSCULAR; INTRAVENOUS at 16:23

## 2022-06-08 RX ADMIN — ALBUMIN HUMAN: 0.05 INJECTION, SOLUTION INTRAVENOUS at 15:51

## 2022-06-08 RX ADMIN — MORPHINE SULFATE 4 MG: 4 INJECTION, SOLUTION INTRAMUSCULAR; INTRAVENOUS at 21:57

## 2022-06-08 RX ADMIN — PROPOFOL 150 MCG/KG/MIN: 10 INJECTION, EMULSION INTRAVENOUS at 09:46

## 2022-06-08 RX ADMIN — SODIUM CHLORIDE, POTASSIUM CHLORIDE, SODIUM LACTATE AND CALCIUM CHLORIDE 100 ML/HR: 600; 310; 30; 20 INJECTION, SOLUTION INTRAVENOUS at 15:03

## 2022-06-08 RX ADMIN — PROPOFOL 30 MG: 10 INJECTION, EMULSION INTRAVENOUS at 09:45

## 2022-06-08 RX ADMIN — TRAMADOL HYDROCHLORIDE 50 MG: 50 TABLET, COATED ORAL at 20:37

## 2022-06-08 RX ADMIN — FENTANYL CITRATE 25 MCG: 50 INJECTION INTRAMUSCULAR; INTRAVENOUS at 17:20

## 2022-06-08 RX ADMIN — HEPARIN SODIUM 2000 UNITS: 1000 INJECTION, SOLUTION INTRAVENOUS; SUBCUTANEOUS at 15:48

## 2022-06-08 RX ADMIN — HYDROCHLOROTHIAZIDE 12.5 MG: 25 TABLET ORAL at 20:37

## 2022-06-08 RX ADMIN — FAMOTIDINE 40 MG: 20 TABLET, FILM COATED ORAL at 20:38

## 2022-06-08 RX ADMIN — CETIRIZINE HYDROCHLORIDE 10 MG: 10 TABLET ORAL at 20:38

## 2022-06-08 RX ADMIN — HYDROMORPHONE HYDROCHLORIDE 0.2 MG: 1 INJECTION, SOLUTION INTRAMUSCULAR; INTRAVENOUS; SUBCUTANEOUS at 17:00

## 2022-06-08 RX ADMIN — SODIUM CHLORIDE, POTASSIUM CHLORIDE, SODIUM LACTATE AND CALCIUM CHLORIDE 1000 ML: 600; 310; 30; 20 INJECTION, SOLUTION INTRAVENOUS at 12:00

## 2022-06-08 RX ADMIN — Medication 3 MG: at 16:23

## 2022-06-08 RX ADMIN — FENTANYL CITRATE 25 MCG: 50 INJECTION INTRAMUSCULAR; INTRAVENOUS at 11:51

## 2022-06-08 RX ADMIN — ROCURONIUM BROMIDE 30 MG: 10 SOLUTION INTRAVENOUS at 15:11

## 2022-06-08 RX ADMIN — Medication 200 MCG: at 15:48

## 2022-06-08 RX ADMIN — FENTANYL CITRATE 25 MCG: 50 INJECTION INTRAMUSCULAR; INTRAVENOUS at 11:46

## 2022-06-08 RX ADMIN — CEFAZOLIN 1 G: 330 INJECTION, POWDER, FOR SOLUTION INTRAMUSCULAR; INTRAVENOUS at 15:14

## 2022-06-08 RX ADMIN — HEPARIN SODIUM 4000 UNITS: 1000 INJECTION, SOLUTION INTRAVENOUS; SUBCUTANEOUS at 10:10

## 2022-06-08 RX ADMIN — OXYCODONE HYDROCHLORIDE AND ACETAMINOPHEN 1 TABLET: 7.5; 325 TABLET ORAL at 11:47

## 2022-06-08 RX ADMIN — HYDROMORPHONE HYDROCHLORIDE 0.2 MG: 1 INJECTION, SOLUTION INTRAMUSCULAR; INTRAVENOUS; SUBCUTANEOUS at 17:50

## 2022-06-08 RX ADMIN — EPHEDRINE SULFATE 20 MG: 50 INJECTION INTRAVENOUS at 15:23

## 2022-06-08 RX ADMIN — SODIUM CHLORIDE, POTASSIUM CHLORIDE, SODIUM LACTATE AND CALCIUM CHLORIDE: 600; 310; 30; 20 INJECTION, SOLUTION INTRAVENOUS at 15:20

## 2022-06-08 RX ADMIN — SODIUM CHLORIDE, POTASSIUM CHLORIDE, SODIUM LACTATE AND CALCIUM CHLORIDE 1000 ML: 600; 310; 30; 20 INJECTION, SOLUTION INTRAVENOUS at 07:52

## 2022-06-08 RX ADMIN — CARVEDILOL 12.5 MG: 6.25 TABLET, FILM COATED ORAL at 20:39

## 2022-06-08 RX ADMIN — LIDOCAINE HYDROCHLORIDE 100 MG: 20 INJECTION, SOLUTION EPIDURAL; INFILTRATION; INTRACAUDAL; PERINEURAL at 15:11

## 2022-06-08 NOTE — ANESTHESIA POSTPROCEDURE EVALUATION
Patient: Trinidad Zhu    Procedure Summary     Date: 06/08/22 Room / Location:  [facility] OR  /  [facility] HYBRID OR 12    Anesthesia Start: 0941 Anesthesia Stop: 1121    Procedure: RIGHT LOWER EXTREMITY ANGIOGRAM, INTRAVASCULAR LITHOTRIPSY, HAWK ATHRECTOMY, BALLOON ANGIOPLASTY, MYNX CLOSURE (Right Leg Lower) Diagnosis:       PAD [facility]peripheral [facility] disease) [facility])      Preop testing      (PAD (peripheral artery disease) (HCC) [I73.9])      (Preop testing [Z01.818])    Surgeons: Sukhdev Condon DO Provider: Aurelio Penny CRNA    Anesthesia Type: MAC ASA Status: 3          Anesthesia Type: MAC    Vitals  Vitals Value Taken Time   /58 06/08/22 1255   Temp 96.9 °F (36.1 °C) 06/08/22 1255   Pulse 62 06/08/22 1300   Resp 11 06/08/22 1255   SpO2 99 % 06/08/22 1259   Vitals shown include unvalidated device data.        Post Anesthesia Care and Evaluation    Patient location during evaluation: PACU  Patient participation: complete - patient participated  Level of consciousness: awake and alert  Pain management: adequate    Airway patency: patent  Anesthetic complications: No anesthetic complications  PONV Status: none  Cardiovascular status: acceptable and hemodynamically stable  Respiratory status: acceptable  Hydration status: acceptable    Comments: Blood pressure 104/58, pulse 59, temperature 96.9 °F (36.1 °C), temperature source Temporal, resp. rate 11, SpO2 99 %, not currently breastfeeding.    Patient discharged from PACU based upon Sahara score. Please see RN notes for further details

## 2022-06-08 NOTE — ANESTHESIA PREPROCEDURE EVALUATION
Anesthesia Evaluation     Patient summary reviewed and Nursing notes reviewed   no history of anesthetic complications:  NPO Solid Status: > 8 hours  NPO Liquid Status: > 8 hours           Airway   Mallampati: I  TM distance: >3 FB  Neck ROM: full  No difficulty expected  Dental          Pulmonary - normal exam   (+) a smoker Former, lung cancer (adenocarcinoma lung, s/p surgery), COPD, shortness of breath,   Cardiovascular   Exercise tolerance: good (4-7 METS)    ECG reviewed  Patient on routine beta blocker and Beta blocker given within 24 hours of surgery  Rhythm: regular  Rate: normal    (+) hypertension, CAD (60% blockage 20 years ago, medical management), PVD, hyperlipidemia,     ROS comment: Stress test 5/2021  Adult Stress Echo W/ Cont or Stress Agent if Necessary Per Protocol     · Left ventricular ejection fraction appears to be 61 - 65%. Left   ventricular systolic function is normal.       LOW RISK FOR ISCHEMIA     Neuro/Psych  GI/Hepatic/Renal/Endo    (+)  GERD, PUD,  thyroid problem hypothyroidism    Musculoskeletal     (+) neck pain,   Abdominal  - normal exam   Substance History      OB/GYN          Other   arthritis,                        Anesthesia Plan    ASA 3 - emergent     general     intravenous induction     Anesthetic plan, risks, benefits, and alternatives have been provided, discussed and informed consent has been obtained with: patient.

## 2022-06-08 NOTE — H&P
6/7/2022         Andrew Layne MD  [address]  [facility] [address]     Trinidad Zhu  1945          Chief Complaint   Patient presents with   • Follow-up       6 Month Follow Up For Bilateral Carotid Artery Stenosis and Peripheral Artery Disease. Test [id number] US pad carotid bilateral and US pad ankle / brach ind ext comp. Patient denies any stroke like symptoms .   • former smoker       Patient is a Former SMoker - quit 1996      • Med Management       Verified medications from list patient brought in          Dear Andrew Layne MD         HPI  I had the pleasure of seeing your patient Trinidad Zhu in the office today.   As you recall, Trinidad Zhu is a 77 y.o.  female who we are following for lower extremity PAD.  She did undergo a left lower extremity angiogram on 11/9/2020, 3/26/2022, and last 08/06/2022.  She does report numbness in her toes and foot.  She has done well with regards to the left leg.  She is having complaints of claudication to her right lower extremity.  She is maintained on aspirin, Plavix, and Crestor.  She did have noninvasive testing performed today, which I did review in office.    Past Medical History:   Diagnosis Date   • Antral ulcer    • Bladder cystocele    • C. difficile diarrhea    • CAD (coronary artery disease)    • Cancer (HCC)     lung 2021   • Colon polyp    • COPD (chronic obstructive pulmonary disease) (HCC)    • COVID-19 vaccine series completed    • Diarrhea    • Difficulty swallowing    • Disease of thyroid gland    • Diverticulosis    • Elevated cholesterol    • Gastritis    • Generalized OA    • GERD (gastroesophageal reflux disease)    • History of bladder surgery 01/07/2020   • History of transfusion     after lung surgery 2021   • Hyperlipidemia    • Hypertension    • Liver cyst    • Lung nodule    • Microscopic colitis    • Multiple gastric ulcers     last 5 years ago   • Neck pain    • Neuropathy    • Normal body mass index    •  NSAID induced gastritis    • Ulcer of pyloric antrum     UNSPECIFIED ULCER CHRONICITY   • UTI (urinary tract infection)    • Weight loss      Past Surgical History:   Procedure Laterality Date   • ANTERIOR CERVICAL DISCECTOMY W/ FUSION N/A 5/22/2017    Procedure: CERVICAL DISCECTOMY ANTERIOR FUSION WITH INSTRUMENTATION;  Surgeon: NADINE Sarabia MD;  Location: [facility] OR;  Service:    • AORTAGRAM Left 11/9/2020    Procedure: left lower extremtiy angiogram, hawk athrectomy, balloon angioplasty, stent placement, mynx closure;  Surgeon: Sukhdev Condon DO;  Location: [facility] HYBRID OR 12;  Service: Vascular;  Laterality: Left;   • AORTAGRAM Left 3/26/2021    Procedure: LEFT LOWER EXTREMITY ANGIOGRAM, BALLOON ANGIOPLASTY, STENT PLACEMENT, MYNX CLOSURE;  Surgeon: Sukhdev Condon DO;  Location: [facility] HYBRID OR 12;  Service: Vascular;  Laterality: Left;   • AORTAGRAM Left 8/6/2021    Procedure: LEFT LOWER EXTREMITY ANGIOGRAM, HAWK ATHERECTOMY, BALLOON ANGIOPLASTY, MYNX CLOSURE;  Surgeon: Sukhdev Condon DO;  Location: [facility] HYBRID OR 12;  Service: Vascular;  Laterality: Left;   • BLADDER SUSPENSION      also had pelvic reconstructive surgery   • BREAST EXCISIONAL BIOPSY Right 1985   • CATARACT EXTRACTION, BILATERAL     • CERVICAL CORPECTOMY N/A 5/22/2017    Procedure:  ANTERIOR CERVICAL DISCECTOMY FUSION C-6 to T1,  ANTERIOR FUSION WITH INSTRUMENTATION C-5 T-1;  Surgeon: NADINE Sarabia MD;  Location: [facility] OR;  Service:    • COLONOSCOPY  08/19/2015    TIC BX RT COLON   • COLONOSCOPY  12/04/2013    RT COLON BX RECALL 5YR   • COLONOSCOPY N/A 11/29/2016    The entire examined colon is normal; No specimens collected   • COLONOSCOPY N/A 6/2/2022    Procedure: COLONOSCOPY WITH ANESTHESIA;  Surgeon: Marco Antonio Vallejo MD;  Location: [facility] ENDOSCOPY;  Service: Gastroenterology;  Laterality: N/A;  Pre: screen  Post: diverticulosis  Andrew Layne MD   • ENDOSCOPY  12/03/2015    HEALED ULCER SLANT BX   •  HYSTERECTOMY     • LOBECTOMY Right 2021    Procedure: RIGHT THORACOSCOPY WITH [employer] ROBOT, RIGHT UPPER LOBE LOBECTOMY;  Surgeon: Jarad Ignacio MD;  Location: [facility];  Service: Robotics - [employer];  Laterality: Right;   • LUNG SURGERY     • OTHER SURGICAL HISTORY      KNEE CARTILAGE REMOVED   • OTHER SURGICAL HISTORY      BENIGN FIBROID TUMOR OF BREAST 1985 REMOVED   • TONSILLECTOMY       Family History   Problem Relation Age of Onset   • Breast cancer Maternal Aunt    • Heart disease Mother    • Heart disease Father    • GI problems Neg Hx         MALIGNANCIES   • Colon cancer Neg Hx    • Colon polyps Neg Hx      Social History     Tobacco Use   • Smoking status: Former Smoker     Packs/day: 1.50     Years: 20.00     Pack years: 30.00     Types: Cigarettes     Quit date:      Years since quittin.4   • Smokeless tobacco: Never Used   Vaping Use   • Vaping Use: Never used   Substance Use Topics   • Alcohol use: Yes     Comment: occasionally   • Drug use: No     Allergies   Allergen Reactions   • Baclofen Other (See Comments)     MUSCULOSKELETAL THERAPY AGENTS knocked her out for a day    Other reaction(s): Unknown   • Gabapentin Confusion     Other reaction(s): over sedation   • Sulfa Antibiotics Rash     Mouth blisters   • Sulfacetamide Sodium Rash   • Amitriptyline Hcl Confusion     Other reaction(s): ambulation difficulties/confusion   • Niferex [Iron Polysaccharide] Swelling   • Oxycodone-Acetaminophen GI Intolerance     Other reaction(s): vomiting     Current Outpatient Medications   Medication Instructions   • albuterol sulfate  (90 Base) MCG/ACT inhaler 2 puffs, Inhalation, Every 4 Hours PRN   • alendronate (FOSAMAX) 70 mg, Oral, Every 7 Days,    • aspirin 81 mg, Oral, Daily   • benazepril (LOTENSIN) 5 mg, Oral, Daily   • Budeson-Glycopyrrol-Formoterol (BREZTRI) 160-9-4.8 MCG/ACT aerosol inhaler 2 puffs, Inhalation, 2 Times Daily   • Calcium Carb-Cholecalciferol (CALCIUM  "500+D PO) 1 tablet, Oral, Daily   • carvedilol (COREG) 12.5 mg, Oral, 2 Times Daily With Meals   • cetirizine (ZYRTEC) 10 mg, Oral, Nightly   • clopidogrel (PLAVIX) 75 MG tablet TAKE 1 TABLET BY MOUTH EVERY DAY    • famotidine (PEPCID) 40 mg, Oral, 2 Times Daily   • hydroCHLOROthiazide (HYDRODIURIL) 12.5 mg, Oral, Daily   • hydrOXYzine pamoate (VISTARIL) 25 mg, Oral, 3 Times Daily PRN   • levothyroxine (SYNTHROID, LEVOTHROID) 50 mcg, Oral, Daily   • multivitamin with minerals tablet tablet 1 tablet, Oral, Daily   • ondansetron (ZOFRAN) 4 mg, Oral, Every 8 Hours PRN   • rosuvastatin (CRESTOR) 40 mg, Oral, Nightly   • traMADol (ULTRAM) 50 mg, Oral, Every 3 Hours PRN          Review of Systems   Constitutional: Negative.    HENT: Negative.    Eyes: Negative.    Respiratory: Negative.    Cardiovascular: Negative.    Gastrointestinal: Negative.    Endocrine: Negative.    Genitourinary: Negative.    Musculoskeletal: Negative.    Skin: Negative.    Allergic/Immunologic: Negative.    Neurological: Positive for numbness.   Hematological: Negative.    Psychiatric/Behavioral: Negative.          /68 (BP Location: Left arm, Patient Position: Sitting, Cuff Size: Adult)   Pulse 81   Ht 160.7 cm (63.25\")   Wt 49 kg (108 lb)   SpO2 98%   BMI 18.98 kg/m²   Physical Exam  Vitals and nursing note reviewed.   Constitutional:       Appearance: Normal appearance. She is well-developed.   HENT:      Head: Normocephalic and atraumatic.   Eyes:      General: No scleral icterus.     Pupils: Pupils are equal, round, and reactive to light.   Neck:      Thyroid: No thyromegaly.      Vascular: No carotid bruit or JVD.   Cardiovascular:      Rate and Rhythm: Normal rate and regular rhythm.      Pulses:           Carotid pulses are 2+ on the right side and 2+ on the left side.       Femoral pulses are 2+ on the right side and 2+ on the left side.       Popliteal pulses are 2+ on the right side and 2+ on the left side.        Dorsalis " pedis pulses are detected w/ Doppler on the right side and detected w/ Doppler on the left side.        Posterior tibial pulses are detected w/ Doppler on the right side and detected w/ Doppler on the left side.      Heart sounds: Normal heart sounds.   Pulmonary:      Effort: Pulmonary effort is normal.      Breath sounds: Normal breath sounds.   Abdominal:      General: Bowel sounds are normal. There is no distension or abdominal bruit.      Palpations: Abdomen is soft. There is no mass.      Tenderness: There is no abdominal tenderness.   Musculoskeletal:         General: Normal range of motion.      Cervical back: Neck supple.   Lymphadenopathy:      Cervical: No cervical adenopathy.   Skin:     General: Skin is warm and dry.   Neurological:      General: No focal deficit present.      Mental Status: She is alert and oriented to person, place, and time.      Cranial Nerves: No cranial nerve deficit.      Sensory: No sensory deficit.   Psychiatric:         Mood and Affect: Mood normal.         Behavior: Behavior normal.         Thought Content: Thought content normal.         Judgment: Judgment normal.            Diagnostic data:  Noninvasive testing including OTTO show right OTTO of 0.60 and left OTTO 1.10.  Carotid duplex shows less than 50% carotid stenosis bilaterally with bilateral antegrade vertebral flow.  There is heavy plaque noted at the right bifurcation.          Patient Active Problem List   Diagnosis   • Spinal stenosis, cervical region   • Lung nodules   • Bronchiectasis without complication ([facility])   • Underweight   • Personal history of nicotine dependence   • Restrictive lung disease   • Pulmonary emphysema ([facility])   • PAD (peripheral artery disease) ([facility])   • Preop testing   • Gastroesophageal reflux disease   • Allergic rhinitis   • ORTIZ (dyspnea on exertion)   • CAD (coronary artery disease)   • Hyperlipidemia   • Neuropathy   • Hypertension   • Stage 2 moderate COPD by GOLD classification ([facility])   •  Former smoker   • Lung nodule   • Mass of right lung   • Postoperative anemia due to acute blood loss   • Syncope and collapse   • Adenocarcinoma (HCC)   • Personal history of malignant neoplasm of bronchus and lung   • Hydropneumothorax         Visit Diagnosis       ICD-10-CM ICD-9-CM   1. PAD (peripheral artery disease) (HCC)  I73.9 443.9   2. Preop testing  Z01.818 V72.84   3. Encounter for monitoring antiplatelet therapy  Z51.81 V58.83     Z79.02 V58.63   4. Hyperlipidemia, unspecified hyperlipidemia type  E78.5 272.4   5. Primary hypertension  I10 401.9               Plan: After thoroughly evaluating Trinidad Zhu, I believe the best course of action is to proceed with a right lower extremity angiogram.  Risks of angiogram were discussed.  These include, but are not limited to, bleeding, infection, vessel damage, nerve damage, embolus, and loss of limb.  The patient understands these risks and wishes to proceed with procedure.  She is having short distance claudication.  I did review her testing which does show a significant drop since previous testing now with moderate arterial insufficiency to the right lower extremity.  Her left leg looks to be in pretty good shape and she is having no symptoms.  I did also review her carotid duplex and well less than 50% stenosis bilaterally there is heavy plaque noted the right bifurcation and needs further evaluation with a CTA of the neck.  We will plan for CTA of the neck with her 2-week postop appointment.  She is to continue on her current medications.  I did discuss vascular risk factors as they pertain to the progression of vascular disease including controlling her hypertension and hyperlipidemia.  These risk factors are currently stable.  The patient can continue taking their current medication regimen as previously planned.  This was all discussed in full with complete understanding.     Thank you for allowing me to participate in the care of your patient.   Please do not hesitate with any questions or concerns.  I will keep you aware of any further encounters with Trinidad Zhu.           Sincerely yours,           Sukhdev Condon, DO

## 2022-06-08 NOTE — ANESTHESIA PROCEDURE NOTES
Airway  Urgency: elective    Date/Time: 6/8/2022 3:11 PM  Airway not difficult    General Information and Staff    Patient location during procedure: OR  CRNA/CAA: Aurelio Penny CRNA    Indications and Patient Condition  Indications for airway management: airway protection    Preoxygenated: yes  Mask difficulty assessment: 1 - vent by mask    Final Airway Details  Final airway type: endotracheal airway      Successful airway: ETT  Cuffed: yes   Successful intubation technique: direct laryngoscopy  Endotracheal tube insertion site: oral  Blade: Johnson  Blade size: 2  ETT size (mm): 7.0  Cormack-Lehane Classification: grade I - full view of glottis  Placement verified by: capnometry   Measured from: lips  Number of attempts at approach: 1  Assessment: lips, teeth, and gum same as pre-op and atraumatic intubation    Additional Comments  Small abrasion noted on roof of mouth before laryngscopy

## 2022-06-08 NOTE — ANESTHESIA PREPROCEDURE EVALUATION
Anesthesia Evaluation     Patient summary reviewed and Nursing notes reviewed   no history of anesthetic complications:  NPO Solid Status: > 8 hours  NPO Liquid Status: > 8 hours           Airway   Mallampati: I  TM distance: >3 FB  Neck ROM: full  No difficulty expected  Dental          Pulmonary - normal exam   (+) a smoker Former, lung cancer (adenocarcinoma lung, s/p surgery), COPD, shortness of breath,   Cardiovascular   Exercise tolerance: good (4-7 METS)    ECG reviewed  Patient on routine beta blocker and Beta blocker given within 24 hours of surgery  Rhythm: regular  Rate: normal    (+) hypertension, CAD (60% blockage 20 years ago, medical management), PVD, hyperlipidemia,     ROS comment: Stress test 5/2021  Adult Stress Echo W/ Cont or Stress Agent if Necessary Per Protocol     · Left ventricular ejection fraction appears to be 61 - 65%. Left   ventricular systolic function is normal.       LOW RISK FOR ISCHEMIA     Neuro/Psych  GI/Hepatic/Renal/Endo    (+)  GERD, PUD,  thyroid problem hypothyroidism    Musculoskeletal     (+) neck pain,   Abdominal  - normal exam   Substance History      OB/GYN          Other   arthritis,                        Anesthesia Plan    ASA 3     MAC     intravenous induction     Anesthetic plan, risks, benefits, and alternatives have been provided, discussed and informed consent has been obtained with: patient.

## 2022-06-08 NOTE — OP NOTE
Trinidad Zhu  6/8/2022     PREOPERATIVE DIAGNOSIS:   1.  PAD  2.  Acutely ischemic left lower extremity     POSTOPERATIVE DIAGNOSIS: Same     PROCEDURE PERFORMED:   1.  Left common femoral artery cutdown/exposure  2.  Thrombectomy of the left SFA, profunda femoris, and popliteal artery with a #3 and #4 Barbie cath  3.  Thrombectomy of the left common and external iliac artery with a #3 Barbie catheter  4.  Bovine patch angioplasty of the common femoral artery  5.  Completion aortoiliac angiogram with left lower extremity runoff with radiographic supervision and interpretation     SURGEON: Sukhdev Condon DO      ANESTHESIA: General    PREPARATION: Routine.    STAFF: Circulator: Dayana Matthew RN  Scrub Person: Pooja Tavarez  Assistant: Barbara Dexter  Vascular Radiology Technician: Kathleen Suarez    Estimated Blood Loss: 100ml    SPECIMENS: None    COMPLICATIONS: None    INDICATIONS: Trinidad Zhu is a 77 y.o. female who we are following for lower extremity PAD.  She did undergo a left lower extremity angiogram on 11/9/2020, 3/26/2022, and last 08/06/2022.  She does report numbness in her toes and foot.  She has done well with regards to the left leg.  She is having complaints of claudication to her right lower extremity.  She is maintained on aspirin, Plavix, and Crestor.  She did have noninvasive testing performed today, which I did review in office.  She underwent successful revascularization of her right lower extremity earlier in the day and was sent back to outpatient in stable condition.  She developed an acutely ischemic left lower extremity and was brought back for revascularization. The indications, risks, and possible complications of the procedure were explained to the patient, who voiced understanding and wished to proceed with surgery.     PROCEDURE IN DETAIL: The patient was taken to the operating room and placed on the operating table in a supine position. After  general anesthesia was obtained, the bilateral groins was prepped and draped in a sterile manner.  An oblique incision was made with a 15 blade in the left groin overlying the ligament.  Careful dissection was made down through the subcutaneous tissues using Bovie cautery to ensure hemostasis.  Any crossing veins were ligated with hemoclips.  The inguinal ligament was identified and just inferior to that the femoral sheath was entered with the Metzenbaum scissors.  The common femoral artery was identified and freed from its local attachments proximally and distally.  Proximal and distal control was established with Vesseloops.  The patient was given 3000 units of intravenous heparin.  Upon in the inguinal ligament there appeared to be fresh clot consistent with a retroperitoneal hematoma.  There was no active bleeding in the groin.  After 3 minutes, the artery was clamped proximally and distally.  An 11 blade was used to make a vertical arteriotomy.  A number of 3 and #4 Barbie catheters were passed all the way down into the below-knee popliteal artery under fluoroscopic guidance.  A very small amount of clot was retrieved.  This was done multiple times and intimal hyperplasia was essentially pulled out.  There was a small kink noted at the proximal part of the stent due to manual groin pressure.  This was balloon angio plastied with the Barbie balloon.  An advantage Glidewire and Lucas cross catheter were passed down into the below-knee popliteal artery.  There is one-vessel runoff to the foot by the peroneal artery which reconstituted the anterior tibial and peroneal posterior tibial arteries at the ankle.  This was consistent with previous examination.  Next, the #3 Barbie catheter was passed into the profunda femoris artery and retrieved.  No significant clot was removed.  Next, the #3 Barbie catheter was passed up to the aortic bifurcation and retrieved.  No significant clot burden was removed.  Excellent  inflow remained.  Next a bovine pericardial patch was brought to the field and the patch anastomosis was performed to the common femoral artery with a 5-0 Prolene in a running fashion.  Prior to completion of the patch anastomosis the appropriate flushing maneuvers were performed and the anastomosis was completed.  Flow was reestablished down the leg.  Doppler signals were checked.  Gelfoam with thrombin was used to help ensure hemostasis in the groin.  Wound bed was irrigated with antibiotic saline and hemostasis was observed.  The deep layers were closed in 2 separate layers of 2-0 Vicryl in a running fashion.  The subcutaneous layers were closed with 3-0 Vicryl in a running fashion.  Skin was then reapproximated using a 4-0 Monocryl in a subcuticular fashion.  The wounds then cleaned.  Sterile dressings were applied. The patient tolerated the procedure well. Sponge and needle counts were correct. The patient was then awakened and extubated in the operating room and taken to the recovery room in good condition.    Sukhdev Condon,   Date: 6/8/2022 Time: 16:33 CDT

## 2022-06-08 NOTE — OP NOTE
Trinidad Zhu  6/8/2022     PREOPERATIVE DIAGNOSIS: PAD (peripheral artery disease) ([facility]) [I73.9]  Preop testing [Z01.818]     POSTOPERATIVE DIAGNOSIS: Post-Op Diagnosis Codes:     * PAD (peripheral artery disease) ([facility]) [I73.9]     * Preop testing [Z01.818]     PROCEDURE PERFORMED:   1.  Introduction of catheter/sheath into the aorta  2.  Aortoiliac angiogram with right lower extremity runoff  3.  Contralateral cannulation of the right common iliac artery  4.  Crossing of a long SFA chronic total occlusion  5.  Placement of a 6 mm spider distal embolic protection device in the popliteal artery  6.  Hawkone directional atherectomy of the entire superficial femoral artery  7.  Intravascular lithotripsy of the entire SFA using a 5.5 x 135 mm Shockwave M5+ balloon  6.  Selective cannulation and crossing of the tibioperoneal trunk and peroneal artery chronic total occlusion  7.  Hawkone directional atherectomy of the tibioperoneal trunk and proximal peroneal artery  8.  Balloon angioplasty of the entire tibioperoneal trunk and peroneal artery using a 3 x 120 mm chocolate balloon  9.  Retrieval of the spider distal embolic protection device  10.  Completion right lower extremity angiogram with radiographic supervision and interpretation  11.  Minx closure of the left common femoral artery     SURGEON: Sukhdev Condon DO      ANESTHESIA: MAC    PREPARATION: Routine.    STAFF: Circulator: Dayana Matthew RN  Scrub Person: Pooja Tavarez  Assistant: David Nobles  Vascular Radiology Technician: Gabriela Zamora    Estimated Blood Loss: minimal    SPECIMENS: None    COMPLICATIONS: None    INDICATIONS: Trinidad Zhu is a 77 y.o. female who we are following for lower extremity PAD.  She did undergo a left lower extremity angiogram on 11/9/2020, 3/26/2022, and last 08/06/2022.  She does report numbness in her toes and foot.  She has done well with regards to the left leg.  She is having complaints of  claudication to her right lower extremity.  She is maintained on aspirin, Plavix, and Crestor.  She did have noninvasive testing performed today, which I did review in office. The indications, risks, and possible complications of the procedure were explained to the patient, who voiced understanding and wished to proceed with surgery.     PROCEDURE IN DETAIL: The patient was taken to the operating room and placed on the operating table in a supine position. After MAC anesthesia was obtained, the bilateral groins was prepped and draped in a sterile manner.  5 cc of 0.5% Marcaine plain was used to infiltrate the left groin for local anesthesia.  Using a micropuncture technique the left common femoral artery was cannulated and a micro sheath was placed.  The advantage Glidewire was advanced into the aorta and a short 6 Czech sheath was placed.  The patient was given 1000 units of intravenous heparin.  The Omni Flush catheter was advanced to the aorta and an aortoiliac angiogram was performed.  Findings are as follows:  1.  Patent renal arteries bilaterally without stenosis  2.  Patent aorta without stenosis  3.  Patent iliac systems bilaterally without stenosis    Contralateral cannulation was established of the right common iliac artery.  The catheter was then brought down to the level of the femoral head.  Angiogram with runoff was performed.  Findings are as follows:  1.  Patent common femoral and profunda femoris arteries without stenosis  2.  Occluded superficial femoral artery approximately 2 to 3 cm past the takeoff with reconstitution of the distal SFA at the adductor hiatus  3.  Patent popliteal artery without stenosis  4.  Patent 1.5 vessel runoff to the foot by the anterior tibial and peroneal arteries.  The tibioperoneal trunk and proximal peroneal artery were occluded but the peroneal artery reconstituted in the mid calf.  The posterior tibial artery was occluded from the takeoff with no reconstitution.      At this point, the decision was made to revascularize these areas.  A 7 Estonian by 45 cm destination sheath was placed down into the proximal common femoral artery.  The patient was given 4000 units of intravenous heparin.  Using the Lucas cross catheter, and under roadmap guidance, the SFA  was traversed.  An angiogram was performed distally to ensure that I was in true lumen.  A 6 mm spider distal embolic protection device was placed in the popliteal artery.  Hawkone directional atherectomy was then performed of the SFA .  Multiple passes were made achieving significant luminal gain.  Next, intravascular lithotripsy was performed of the entire SFA using a 5.5 x 135 cm Shockwave M 5+ balloon.  Approximately 300 pulses were delivered to the entire SFA causing adequate fracture of the plaque and increased pliability of the arterial wall.  Completion angiogram was performed of the SFA which showed rapid flow down through the SFA without any residual stenosis, dissection, or occlusion.  Next, the spider was successfully treated.  The attention was then turned to the tibial vessels.  Under roadmap guidance, and with the use of the Lucas cross catheter, the tibioperoneal trunk  was accessed and the wire and catheter were placed down into the distal peroneal artery.  An angiogram was performed to ensure that I was in true lumen.  An 014 advantage Glidewire was exchanged.  Hawkone directional atherectomy was then performed of the tibioperoneal trunk and proximal peroneal artery .  Two passes were made achieving significant luminal gain.  Next, a 3 x 120 mm chocolate balloon was used to balloon angioplasty the tibioperoneal trunk and entire peroneal artery all the way to the ankle.  Completion angiogram was performed which showed rapid two-vessel runoff to the foot by the anterior tibial and peroneal artery.  There was no residual stenosis, dissection, or occlusion.  At this point, I felt no further  intervention was warranted.  The sheath and wire were removed.  A minx was used to seal off the left common femoral artery.  Direct pressure was held for an additional 10 to 15 minutes to help ensure hemostasis.  Sterile dressings were applied. The patient tolerated the procedure well. Sponge and needle counts were correct. The patient was then awakened in the operating room and taken to the recovery room in good condition.    Sukhdev Condon,   Date: 6/8/2022 Time: 11:05 CDT     CC:Andrew Layne MD

## 2022-06-09 VITALS
BODY MASS INDEX: 19.06 KG/M2 | TEMPERATURE: 98.1 F | RESPIRATION RATE: 17 BRPM | WEIGHT: 107.58 LBS | HEART RATE: 76 BPM | SYSTOLIC BLOOD PRESSURE: 93 MMHG | DIASTOLIC BLOOD PRESSURE: 52 MMHG | HEIGHT: 63 IN | OXYGEN SATURATION: 97 %

## 2022-06-09 LAB
ANION GAP SERPL CALCULATED.3IONS-SCNC: 8 MMOL/L (ref 5–15)
BASOPHILS # BLD AUTO: 0.01 10*3/MM3 (ref 0–0.2)
BASOPHILS # BLD AUTO: 0.02 10*3/MM3 (ref 0–0.2)
BASOPHILS NFR BLD AUTO: 0.1 % (ref 0–1.5)
BASOPHILS NFR BLD AUTO: 0.2 % (ref 0–1.5)
BH BB BLOOD EXPIRATION DATE: NORMAL
BH BB BLOOD EXPIRATION DATE: NORMAL
BH BB BLOOD TYPE BARCODE: 5100
BH BB BLOOD TYPE BARCODE: 5100
BH BB DISPENSE STATUS: NORMAL
BH BB DISPENSE STATUS: NORMAL
BH BB PRODUCT CODE: NORMAL
BH BB PRODUCT CODE: NORMAL
BH BB UNIT NUMBER: NORMAL
BH BB UNIT NUMBER: NORMAL
BUN SERPL-MCNC: 14 MG/DL (ref 8–23)
BUN/CREAT SERPL: 16.3 (ref 7–25)
CALCIUM SPEC-SCNC: 7.8 MG/DL (ref 8.6–10.5)
CHLORIDE SERPL-SCNC: 109 MMOL/L (ref 98–107)
CO2 SERPL-SCNC: 26 MMOL/L (ref 22–29)
CREAT SERPL-MCNC: 0.86 MG/DL (ref 0.57–1)
CROSSMATCH INTERPRETATION: NORMAL
CROSSMATCH INTERPRETATION: NORMAL
DEPRECATED RDW RBC AUTO: 57 FL (ref 37–54)
DEPRECATED RDW RBC AUTO: 57.4 FL (ref 37–54)
EGFRCR SERPLBLD CKD-EPI 2021: 70.1 ML/MIN/1.73
EOSINOPHIL # BLD AUTO: 0 10*3/MM3 (ref 0–0.4)
EOSINOPHIL # BLD AUTO: 0 10*3/MM3 (ref 0–0.4)
EOSINOPHIL NFR BLD AUTO: 0 % (ref 0.3–6.2)
EOSINOPHIL NFR BLD AUTO: 0 % (ref 0.3–6.2)
ERYTHROCYTE [DISTWIDTH] IN BLOOD BY AUTOMATED COUNT: 16 % (ref 12.3–15.4)
ERYTHROCYTE [DISTWIDTH] IN BLOOD BY AUTOMATED COUNT: 16.1 % (ref 12.3–15.4)
GLUCOSE SERPL-MCNC: 132 MG/DL (ref 65–99)
HCT VFR BLD AUTO: 28 % (ref 34–46.6)
HCT VFR BLD AUTO: 32 % (ref 34–46.6)
HGB BLD-MCNC: 10.5 G/DL (ref 12–15.9)
HGB BLD-MCNC: 9.2 G/DL (ref 12–15.9)
IMM GRANULOCYTES # BLD AUTO: 0.04 10*3/MM3 (ref 0–0.05)
IMM GRANULOCYTES # BLD AUTO: 0.05 10*3/MM3 (ref 0–0.05)
IMM GRANULOCYTES NFR BLD AUTO: 0.4 % (ref 0–0.5)
IMM GRANULOCYTES NFR BLD AUTO: 0.5 % (ref 0–0.5)
LYMPHOCYTES # BLD AUTO: 1.26 10*3/MM3 (ref 0.7–3.1)
LYMPHOCYTES # BLD AUTO: 2.02 10*3/MM3 (ref 0.7–3.1)
LYMPHOCYTES NFR BLD AUTO: 16 % (ref 19.6–45.3)
LYMPHOCYTES NFR BLD AUTO: 17.5 % (ref 19.6–45.3)
MCH RBC QN AUTO: 31.7 PG (ref 26.6–33)
MCH RBC QN AUTO: 32.1 PG (ref 26.6–33)
MCHC RBC AUTO-ENTMCNC: 32.8 G/DL (ref 31.5–35.7)
MCHC RBC AUTO-ENTMCNC: 32.9 G/DL (ref 31.5–35.7)
MCV RBC AUTO: 96.6 FL (ref 79–97)
MCV RBC AUTO: 97.9 FL (ref 79–97)
MONOCYTES # BLD AUTO: 0.46 10*3/MM3 (ref 0.1–0.9)
MONOCYTES # BLD AUTO: 0.97 10*3/MM3 (ref 0.1–0.9)
MONOCYTES NFR BLD AUTO: 5.8 % (ref 5–12)
MONOCYTES NFR BLD AUTO: 8.4 % (ref 5–12)
NEUTROPHILS NFR BLD AUTO: 6.12 10*3/MM3 (ref 1.7–7)
NEUTROPHILS NFR BLD AUTO: 73.5 % (ref 42.7–76)
NEUTROPHILS NFR BLD AUTO: 77.6 % (ref 42.7–76)
NEUTROPHILS NFR BLD AUTO: 8.48 10*3/MM3 (ref 1.7–7)
NRBC BLD AUTO-RTO: 0 /100 WBC (ref 0–0.2)
NRBC BLD AUTO-RTO: 0 /100 WBC (ref 0–0.2)
PLATELET # BLD AUTO: 102 10*3/MM3 (ref 140–450)
PLATELET # BLD AUTO: 135 10*3/MM3 (ref 140–450)
PMV BLD AUTO: 10.4 FL (ref 6–12)
PMV BLD AUTO: 9.9 FL (ref 6–12)
POTASSIUM SERPL-SCNC: 4.2 MMOL/L (ref 3.5–5.2)
RBC # BLD AUTO: 2.9 10*6/MM3 (ref 3.77–5.28)
RBC # BLD AUTO: 3.27 10*6/MM3 (ref 3.77–5.28)
SODIUM SERPL-SCNC: 143 MMOL/L (ref 136–145)
UNIT  ABO: NORMAL
UNIT  ABO: NORMAL
UNIT  RH: NORMAL
UNIT  RH: NORMAL
WBC NRBC COR # BLD: 11.54 10*3/MM3 (ref 3.4–10.8)
WBC NRBC COR # BLD: 7.89 10*3/MM3 (ref 3.4–10.8)

## 2022-06-09 PROCEDURE — A9270 NON-COVERED ITEM OR SERVICE: HCPCS | Performed by: SURGERY

## 2022-06-09 PROCEDURE — 63710000001 CLOPIDOGREL 75 MG TABLET: Performed by: SURGERY

## 2022-06-09 PROCEDURE — 63710000001 LEVOTHYROXINE 50 MCG TABLET: Performed by: SURGERY

## 2022-06-09 PROCEDURE — 85025 COMPLETE CBC W/AUTO DIFF WBC: CPT | Performed by: SURGERY

## 2022-06-09 PROCEDURE — 63710000001 TRAMADOL 50 MG TABLET: Performed by: SURGERY

## 2022-06-09 PROCEDURE — 25010000002 CEFAZOLIN PER 500 MG: Performed by: SURGERY

## 2022-06-09 PROCEDURE — 97165 OT EVAL LOW COMPLEX 30 MIN: CPT | Performed by: OCCUPATIONAL THERAPIST

## 2022-06-09 PROCEDURE — 63710000001 MULTIVITAMIN WITH MINERALS TABLET: Performed by: SURGERY

## 2022-06-09 PROCEDURE — 63710000001 FAMOTIDINE 20 MG TABLET: Performed by: SURGERY

## 2022-06-09 PROCEDURE — 99024 POSTOP FOLLOW-UP VISIT: CPT | Performed by: SURGERY

## 2022-06-09 PROCEDURE — 80048 BASIC METABOLIC PNL TOTAL CA: CPT | Performed by: SURGERY

## 2022-06-09 PROCEDURE — 63710000001 ASPIRIN 81 MG CHEWABLE TABLET: Performed by: SURGERY

## 2022-06-09 PROCEDURE — 63710000001 HYDROCODONE-ACETAMINOPHEN 5-325 MG TABLET: Performed by: SURGERY

## 2022-06-09 PROCEDURE — 63710000001 CARVEDILOL 6.25 MG TABLET: Performed by: SURGERY

## 2022-06-09 RX ORDER — HYDROCODONE BITARTRATE AND ACETAMINOPHEN 5; 325 MG/1; MG/1
1 TABLET ORAL EVERY 6 HOURS PRN
Qty: 30 TABLET | Refills: 0 | Status: SHIPPED | OUTPATIENT
Start: 2022-06-09 | End: 2022-12-05

## 2022-06-09 RX ADMIN — Medication 10 ML: at 10:01

## 2022-06-09 RX ADMIN — FAMOTIDINE 40 MG: 20 TABLET, FILM COATED ORAL at 08:21

## 2022-06-09 RX ADMIN — HYDROCODONE BITARTRATE AND ACETAMINOPHEN 1 TABLET: 5; 325 TABLET ORAL at 11:35

## 2022-06-09 RX ADMIN — SODIUM CHLORIDE 50 ML/HR: 9 INJECTION, SOLUTION INTRAVENOUS at 02:39

## 2022-06-09 RX ADMIN — Medication 1 TABLET: at 08:21

## 2022-06-09 RX ADMIN — CLOPIDOGREL BISULFATE 75 MG: 75 TABLET ORAL at 08:06

## 2022-06-09 RX ADMIN — ASPIRIN 81 MG: 81 TABLET, CHEWABLE ORAL at 08:06

## 2022-06-09 RX ADMIN — CEFAZOLIN SODIUM 2 G: 10 INJECTION, POWDER, FOR SOLUTION INTRAVENOUS at 02:45

## 2022-06-09 RX ADMIN — LEVOTHYROXINE SODIUM 50 MCG: 50 TABLET ORAL at 08:21

## 2022-06-09 RX ADMIN — TRAMADOL HYDROCHLORIDE 50 MG: 50 TABLET, COATED ORAL at 08:06

## 2022-06-09 RX ADMIN — SODIUM CHLORIDE 500 ML: 9 INJECTION, SOLUTION INTRAVENOUS at 06:23

## 2022-06-09 RX ADMIN — CARVEDILOL 12.5 MG: 6.25 TABLET, FILM COATED ORAL at 10:00

## 2022-06-09 NOTE — PLAN OF CARE
Goal Outcome Evaluation:  Plan of Care Reviewed With: patient           Outcome Evaluation: OT eval completed. Pt presents alert and oriented, sitting up EOB upon entry to room. Pt states at baseline being independent with all adls, home mgt and mobility. She reports her pain is her biggest factor at this time. She was able to complete sit <> stand t/f and amb 400ft around unit with Sup. One LOB during mobility that pt was able to self correct. Independent with LBD. At this time, pt does not display deficits which require skilled OT services. OT to sign off. Anticipate d/c home with spouse.

## 2022-06-09 NOTE — NURSING NOTE
PIVs removed, cath intact, bleeding controlled, pt stanley well. Pt prepared for discharge, EDU provided. Assisted to personal vehicle with all belongings and d/c instructions without incident.

## 2022-06-09 NOTE — DISCHARGE SUMMARY
Date of Discharge:  6/9/2022    Discharge Diagnosis: PAD    Presenting Problem/History of Present Illness  PAD (peripheral artery disease) ([facility]) [I73.9]  Preop testing [Z01.818]  Lower extremity embolism ([facility]) [I74.3]       Hospital Course  Trinidad Zhu is a 77 y.o.  female who we are following for lower extremity PAD.  She did undergo a left lower extremity angiogram on 11/9/2020, 3/26/2022, and last 08/06/2022.  She does report numbness in her toes and foot.  She has done well with regards to the left leg.  She is having complaints of claudication to her right lower extremity.  She is maintained on aspirin, Plavix, and Crestor.  She did have noninvasive testing performed today, which I did review in office.  She presented on 6/8/2022 for elective revascularization of her right lower extremity.  This was successful.  Postoperatively she developed a small retroperitoneal hematoma and an ischemic left lower extremity due to pressure holding on the left groin.  She was brought back to the operating room for left groin exposure and thrombectomy/embolectomy.  She spent 1 night in the hospital and received 2 units of blood.  On postop day 1 her hemoglobin was stable.  She was stable for discharge home.  I will see her back in 2 weeks time for continued follow-up and care.    Procedures Performed  Procedure(s):  LOWER EXTREMITY THROMBECTOMY/EMBOLECTOMY, PATCH GRAFT TO  FEMORAL ARTERY       Consults:   Consults     No orders found for last 30 day(s).            Condition on Discharge: Good    Discharge Medications     Discharge Medications      New Medications      Instructions Start Date   HYDROcodone-acetaminophen 5-325 MG per tablet  Commonly known as: NORCO   1 tablet, Oral, Every 6 Hours PRN         Continue These Medications      Instructions Start Date   albuterol sulfate  (90 Base) MCG/ACT inhaler  Commonly known as: PROVENTIL HFA;VENTOLIN HFA;PROAIR HFA   2 puffs, Inhalation, Every 4 Hours PRN       alendronate 70 MG tablet  Commonly known as: FOSAMAX   70 mg, Oral, Every 7 Days, Saturdays      aspirin 81 MG chewable tablet   81 mg, Oral, Daily      benazepril 5 MG tablet  Commonly known as: LOTENSIN   5 mg, Oral, Daily      Budeson-Glycopyrrol-Formoterol 160-9-4.8 MCG/ACT aerosol inhaler  Commonly known as: BREZTRI   2 puffs, Inhalation, 2 Times Daily      CALCIUM 500+D PO   1 tablet, Oral, Daily      carvedilol 12.5 MG tablet  Commonly known as: COREG   12.5 mg, Oral, 2 Times Daily With Meals      cetirizine 10 MG tablet  Commonly known as: zyrTEC   10 mg, Oral, Nightly      clopidogrel 75 MG tablet  Commonly known as: PLAVIX   TAKE 1 TABLET BY MOUTH EVERY DAY       famotidine 40 MG tablet  Commonly known as: PEPCID   40 mg, Oral, 2 Times Daily      hydroCHLOROthiazide 12.5 MG tablet  Commonly known as: HYDRODIURIL   12.5 mg, Oral, Daily      levothyroxine 50 MCG tablet  Commonly known as: SYNTHROID, LEVOTHROID   50 mcg, Oral, Daily      multivitamin with minerals tablet tablet   1 tablet, Oral, Daily      ondansetron 4 MG tablet  Commonly known as: ZOFRAN   4 mg, Oral, Every 8 Hours PRN      rosuvastatin 40 MG tablet  Commonly known as: CRESTOR   40 mg, Oral, Nightly      traMADol 50 MG tablet  Commonly known as: ULTRAM   50 mg, Oral, Every 3 Hours PRN             Discharge Diet:   Diet Instructions     Diet:      Diet Texture / Consistency: Regular    Diet: Regular; Thin      Discharge Diet: Regular    Fluid Consistency: Thin          Activity at Discharge:   Activity Instructions     Bathing Restrictions      Type of Restriction: Bathing    Bathing Restrictions: Other    Explain Bathing Restrictions: may shower tomorrow    Discharge Activity      1) No driving for 1 week and no longer taking narcotics.   2) Return to school / work in 1 week.  3) May shower / sponge bathe for 24 hours.  4) Do not lift / push / pull more then 10 lbs.    Driving Restrictions      Type of Restriction: Driving    Driving  Restrictions: No Driving (Time Limited)    Length: 1 Week    Lifting Restrictions      Type of Restriction: Lifting    Lifting Restrictions: Lifting Restriction (Indicate Limit)    Weight Limit (Pounds): 10    Length of Lifting Restriction: 1 week    Other Activity Restrictions      Type of Restriction: Other    Explain Other Restrictions: no squatting,          Follow-up Appointments  Future Appointments   Date Time Provider Department Center   6/22/2022 11:30 AM PAD BIC [facility] 2 [facility] PAD MA BI PAD   6/22/2022  2:30 PM THERAPIST RESPIRATORY DI PAD [facility] PAD PAD   6/22/2022  3:00 PM Dion Cortés MD [facility] PAD PAD   6/23/2022  9:00 AM PAD CT 2  PAD CAT PAD   6/23/2022  9:30 AM Sukhdev Condon DO [facility] PAD PAD   6/30/2022  1:00 PM PAD BIC MRI 2  PAD MR BI PAD   12/5/2022 10:30 AM BH PAD CANCER CTR LAB  PAD CCLAB PAD   12/5/2022 11:00 AM Cyrus Palacios MD [facility]C PAD PAD   5/10/2023 11:00 AM Ravi Hylton MD [facility] PAD PAD     Additional Instructions for the Follow-ups that You Need to Schedule     Discharge Follow-up with Specialty: Dr. Condon; 2 Weeks   As directed      Specialty: Dr. Condon    Follow Up: 2 Weeks         Discharge Follow-up with Specified Provider: Taurus; 2 Weeks   As directed      To: Taurus    Follow Up: 2 Weeks             Approximate 35 minutes of time was spent with chart review, face-to-face interaction, and arranging discharge.     Sukhdev Condon DO  06/09/22  13:23 CDT

## 2022-06-09 NOTE — CASE MANAGEMENT/SOCIAL WORK
Discharge Planning Assessment  [facility]     Patient Name: Trinidad Zhu  MRN: [id number]  Today's Date: 6/9/2022    Admit Date: 6/8/2022     Discharge Needs Assessment     Row Name 06/09/22 1040       Living Environment    People in Home spouse    Name(s) of People in Home Don    Current Living Arrangements home    Primary Care Provided by spouse/significant other    Provides Primary Care For no one    Family Caregiver if Needed spouse    Family Caregiver Names Don       Transition Planning    Patient/Family Anticipates Transition to home with family    Patient/Family Anticipated Services at Transition none    Transportation Anticipated family or friend will provide       Discharge Needs Assessment    Readmission Within the Last 30 Days no previous admission in last 30 days    Equipment Currently Used at Home none    Concerns to be Addressed no discharge needs identified    Equipment Needed After Discharge none    Discharge Coordination/Progress spoke to patient; patient lives with spouse Don; has RX coverage and PCP; independent at home prior to illness will follow for DC needs               Discharge Plan    No documentation.               Continued Care and Services - Admitted Since 6/8/2022    Coordination has not been started for this encounter.       Expected Discharge Date and Time     Expected Discharge Date Expected Discharge Time    Jun 8, 2022          Demographic Summary    No documentation.                Functional Status    No documentation.                Psychosocial    No documentation.                Abuse/Neglect    No documentation.                Legal    No documentation.                Substance Abuse    No documentation.                Patient Forms    No documentation.                   Brittni Robert RN

## 2022-06-09 NOTE — ANESTHESIA POSTPROCEDURE EVALUATION
"Patient: Trinidad Zhu    Procedure Summary     Date: 06/08/22 Room / Location: [facility] OR  /  PAD HYBRID OR 12    Anesthesia Start: 1506 Anesthesia Stop: 1643    Procedure: LOWER EXTREMITY THROMBECTOMY/EMBOLECTOMY, PATCH GRAFT TO  FEMORAL ARTERY (Left Groin) Diagnosis:     Surgeons: Sukhdev Condon DO Provider: Aurelio Penny CRNA    Anesthesia Type: general ASA Status: 3 - Emergent          Anesthesia Type: general    Vitals  Vitals Value Taken Time   /75 06/08/22 1759   Temp 97.9 °F (36.6 °C) 06/08/22 1759   Pulse 82 06/08/22 1759   Resp 16 06/08/22 1759   SpO2 100 % 06/08/22 1759           Post Anesthesia Care and Evaluation    Patient location during evaluation: PACU  Patient participation: complete - patient participated  Level of consciousness: awake and alert  Pain management: adequate    Airway patency: patent  Anesthetic complications: No anesthetic complications    Cardiovascular status: acceptable  Respiratory status: acceptable  Hydration status: acceptable    Comments: Blood pressure 100/55, pulse 87, temperature 98 °F (36.7 °C), temperature source Oral, resp. rate 12, height 160 cm (62.99\"), weight 48.8 kg (107 lb 9.4 oz), SpO2 97 %, not currently breastfeeding.    Pt discharged from PACU based on desmond score >8      "

## 2022-06-09 NOTE — THERAPY DISCHARGE NOTE
Acute Care - Occupational Therapy Discharge  [facility]    Patient Name: Trinidad Zhu  : 1945    MRN: [id number]                              Today's Date: 2022       Admit Date: 2022    Visit Dx:     ICD-10-CM ICD-9-CM   1. Preop testing  Z01.818 V72.84   2. PAD (peripheral artery disease) (HCC)  I73.9 443.9   3. Thrombus  I82.90 453.9     Patient Active Problem List   Diagnosis   • Spinal stenosis, cervical region   • Lung nodules   • Bronchiectasis without complication (HCC)   • Underweight   • Personal history of nicotine dependence   • Restrictive lung disease   • Pulmonary emphysema (HCC)   • PAD (peripheral artery disease) (HCC)   • Preop testing   • Gastroesophageal reflux disease   • Allergic rhinitis   • ORTIZ (dyspnea on exertion)   • CAD (coronary artery disease)   • Hyperlipidemia   • Neuropathy   • Hypertension   • Stage 2 moderate COPD by GOLD classification (HCC)   • Former smoker   • Lung nodule   • Mass of right lung   • Postoperative anemia due to acute blood loss   • Syncope and collapse   • Adenocarcinoma (HCC)   • Personal history of malignant neoplasm of bronchus and lung   • Hydropneumothorax   • Lower extremity embolism (HCC)     Past Medical History:   Diagnosis Date   • Antral ulcer    • Bladder cystocele    • C. difficile diarrhea    • CAD (coronary artery disease)    • Cancer (HCC)     lung    • Colon polyp    • COPD (chronic obstructive pulmonary disease) (HCC)    • COVID-19 vaccine series completed    • Diarrhea    • Difficulty swallowing    • Disease of thyroid gland    • Diverticulosis    • Elevated cholesterol    • Gastritis    • Generalized OA    • GERD (gastroesophageal reflux disease)    • History of bladder surgery 2020   • History of transfusion     after lung surgery    • Hyperlipidemia    • Hypertension    • Liver cyst    • Lung nodule    • Microscopic colitis    • Multiple gastric ulcers     last 5 years ago   • Neck pain    • Neuropathy    •  Normal body mass index    • NSAID induced gastritis    • Ulcer of pyloric antrum     UNSPECIFIED ULCER CHRONICITY   • UTI (urinary tract infection)    • Weight loss      Past Surgical History:   Procedure Laterality Date   • ANTERIOR CERVICAL DISCECTOMY W/ FUSION N/A 5/22/2017    Procedure: CERVICAL DISCECTOMY ANTERIOR FUSION WITH INSTRUMENTATION;  Surgeon: NADINE Sarabia MD;  Location: [facility] OR;  Service:    • AORTAGRAM Left 11/9/2020    Procedure: left lower extremtiy angiogram, hawk athrectomy, balloon angioplasty, stent placement, mynx closure;  Surgeon: Sukhdev Condon DO;  Location: [facility] HYBRID OR 12;  Service: Vascular;  Laterality: Left;   • AORTAGRAM Left 3/26/2021    Procedure: LEFT LOWER EXTREMITY ANGIOGRAM, BALLOON ANGIOPLASTY, STENT PLACEMENT, MYNX CLOSURE;  Surgeon: Sukhdev Condon DO;  Location:  [facility] HYBRID OR 12;  Service: Vascular;  Laterality: Left;   • AORTAGRAM Left 8/6/2021    Procedure: LEFT LOWER EXTREMITY ANGIOGRAM, HAWK ATHERECTOMY, BALLOON ANGIOPLASTY, MYNX CLOSURE;  Surgeon: Sukhdev Condon DO;  Location: [facility] HYBRID OR 12;  Service: Vascular;  Laterality: Left;   • BLADDER SUSPENSION      also had pelvic reconstructive surgery   • BREAST EXCISIONAL BIOPSY Right 1985   • CATARACT EXTRACTION, BILATERAL     • CERVICAL CORPECTOMY N/A 5/22/2017    Procedure:  ANTERIOR CERVICAL DISCECTOMY FUSION C-6 to T1,  ANTERIOR FUSION WITH INSTRUMENTATION C-5 T-1;  Surgeon: NADINE Sarabia MD;  Location: [facility] OR;  Service:    • COLONOSCOPY  08/19/2015    TIC BX RT COLON   • COLONOSCOPY  12/04/2013    RT COLON BX RECALL 5YR   • COLONOSCOPY N/A 11/29/2016    The entire examined colon is normal; No specimens collected   • COLONOSCOPY N/A 6/2/2022    Procedure: COLONOSCOPY WITH ANESTHESIA;  Surgeon: Marco Antonio Vallejo MD;  Location: [facility] ENDOSCOPY;  Service: Gastroenterology;  Laterality: N/A;  Pre: screen  Post: diverticulosis  Andrew Layne MD   • ENDOSCOPY  12/03/2015     HEALED ULCER SLANT BX   • HYSTERECTOMY     • LOBECTOMY Right 11/5/2021    Procedure: RIGHT THORACOSCOPY WITH [email or web address] ROBOT, RIGHT UPPER LOBE LOBECTOMY;  Surgeon: Jarad Ignacio MD;  Location: [facility];  Service: Robotics - [email or web address];  Laterality: Right;   • LUNG SURGERY     • OTHER SURGICAL HISTORY      KNEE CARTILAGE REMOVED   • OTHER SURGICAL HISTORY      BENIGN FIBROID TUMOR OF BREAST 1985 REMOVED   • TONSILLECTOMY        General Information     Row Name 06/09/22 1034          OT Time and Intention    Document Type evaluation  Pt s/p R LE angiogram intravascular lithotripsy, hawk arthectomy, balloon angioplasty, mynx closure on 6/18. Pt developed an ischemic R LE. Pt s/p L LE thrombectomy/embolectomy, patch graft to femoral artery on 6/8.  -     Mode of Treatment occupational therapy  -     Row Name 06/09/22 1034          General Information    Patient Profile Reviewed yes  -     Prior Level of Function independent:;community mobility;all household mobility;bed mobility;ADL's  -     Existing Precautions/Restrictions fall  -     Row Name 06/09/22 1034          Living Environment    People in Home spouse  -     Row Name 06/09/22 1034          Home Main Entrance    Number of Stairs, Main Entrance four  -JJ     Stair Railings, Main Entrance railing on left side (ascending)  -     Row Name 06/09/22 1034          Stairs Within Home, Primary    Number of Stairs, Within Home, Primary none  -J     Stair Railings, Within Home, Primary none  -     Row Name 06/09/22 1034          Cognition    Orientation Status (Cognition) oriented x 4  -J     Row Name 06/09/22 1034          Safety Issues, Functional Mobility    Impairments Affecting Function (Mobility) pain  -J           User Key  (r) = Recorded By, (t) = Taken By, (c) = Cosigned By    Initials Name Provider Type    JMelanie Vergara, STELLA/L, CSRS Occupational Therapist               Mobility/ADL's     Row Name 06/09/22 1034          Bed Mobility     Comment, (Bed Mobility) sitting up EOB upon entry to room  -[facility] Name 06/09/22 1034          Transfers    Transfers sit-stand transfer;stand-sit transfer  -     Sit-Stand [place] (Transfers) supervision  -     Stand-Sit [place] (Transfers) supervision  -[facility] Name 06/09/22 1034          Functional Mobility    Functional Mobility- Ind. Level supervision required  -     Functional Mobility- Comment amb 400ft around unit with Sup. One LOB that pt was able to self correct.  -[facility] Name 06/09/22 1034          Activities of Daily Living    BADL Assessment/Intervention lower body dressing  -JJ     Row Name 06/09/22 1034          Lower Body Dressing Assessment/Training    [place] Level (Lower Body Dressing) don;shoes/slippers;independent  -     Position (Lower Body Dressing) edge of bed sitting  -           User Key  (r) = Recorded By, (t) = Taken By, (c) = Cosigned By    Initials Name Provider Type     Melanie Garvin, STELLA/L, CSRS Occupational Therapist               Obj/Interventions     Row Name 06/09/22 1034          Sensory Assessment (Somatosensory)    Sensory Assessment (Somatosensory) UE sensation intact  -JJ     Row Name 06/09/22 1034          Vision Assessment/Intervention    Visual Impairment/Limitations L  [facility] Name 06/09/22 1034          Range of Motion Comprehensive    General Range of Motion bilateral upper extremity ROM WFL  -JJ     Row Name 06/09/22 1034          Strength Comprehensive (MMT)    Comment, General Manual Muscle Testing (MMT) Assessment B UE strength 5/5  -JJ     Row Name 06/09/22 1034          Balance    Balance Assessment sitting static balance;sitting dynamic balance;standing dynamic balance  -     Static Sitting Balance independent  -     Dynamic Sitting Balance independent  -     Position, Sitting Balance unsupported;sitting edge of bed  -     Dynamic Standing Balance supervision  -     Position/Device Used, Standing Balance  unsupported  -           User Key  (r) = Recorded By, (t) = Taken By, (c) = Cosigned By    Initials Name Provider Type    Melanie Mckeon, OTR/L, CSRS Occupational Therapist               Goals/Plan    No documentation.                Clinical Impression     Row Name 06/09/22 1034          Pain Assessment    Additional Documentation Pain Scale: FACES Pre/Post-Treatment (Group)  -[facility] Name 06/09/22 1034          Pain Scale: FACES Pre/Post-Treatment    Pain: FACES Scale, Pretreatment 8-->hurts whole lot  -JJ     Posttreatment Pain Rating 8-->hurts whole lot  -J     Pain Location - abdomen  -     Row Name 06/09/22 1034          Plan of Care Review    Plan of Care Reviewed With patient  -     Outcome Evaluation OT eval completed. Pt presents alert and oriented, sitting up EOB upon entry to room. Pt states at baseline being independent with all adls, home mgt and mobility. She reports her pain is her biggest factor at this time. She was able to complete sit <> stand t/f and amb 400ft around unit with Sup. One LOB during mobility that pt was able to self correct. Independent with LBD. At this time, pt does not display deficits which require skilled OT services. OT to sign off. Anticipate d/c home with spouse.  -[facility] Name 06/09/22 1034          Therapy Assessment/Plan (OT)    Criteria for Skilled Therapeutic Interventions Met (OT) no;skilled treatment is necessary  -     Therapy Frequency (OT) evaluation only  -[facility] Name 06/09/22 1034          Therapy Plan Review/Discharge Plan (OT)    Anticipated Discharge Disposition (OT) home with assist  -JJ     Row Name 06/09/22 1034          Positioning and Restraints    Pre-Treatment Position in bed  -     Post Treatment Position chair  -     In Chair notified nsg;reclined;call light within reach;encouraged to call for assist;with family/caregiver;with nsg  -           User Key  (r) = Recorded By, (t) = Taken By, (c) = Cosigned By    Initials  Name Provider Type    Melanie Mckeon OTR/L, HERMELINDA Occupational Therapist               Outcome Measures     Row Name 06/09/22 1034          How much help from another is currently needed...    Putting on and taking off regular lower body clothing? 4  -JJ     Bathing (including washing, rinsing, and drying) 4  -JJ     Toileting (which includes using toilet bed pan or urinal) 4  -JJ     Putting on and taking off regular upper body clothing 4  -JJ     Taking care of personal grooming (such as brushing teeth) 4  -JJ     Eating meals 4  -JJ     AM-PAC 6 Clicks Score (OT) 24  -JJ     Row Name 06/09/22 1034          Functional Assessment    Outcome Measure Options AM-PAC 6 Clicks Daily Activity (OT)  -           User Key  (r) = Recorded By, (t) = Taken By, (c) = Cosigned By    Initials Name Provider Type    Melanie Mckeon OTR/L, HERMELINDA Occupational Therapist              Occupational Therapy Education                 Title: PT OT SLP Therapies (In Progress)     Topic: Occupational Therapy (In Progress)     Point: ADL training (Done)     Description:   Instruct learner(s) on proper safety adaptation and remediation techniques during self care or transfers.   Instruct in proper use of assistive devices.              Learning Progress Summary           Patient Acceptance, E, VU by JOSELYN at 6/9/2022 1112                   Point: Home exercise program (Not Started)     Description:   Instruct learner(s) on appropriate technique for monitoring, assisting and/or progressing therapeutic exercises/activities.              Learner Progress:  Not documented in this visit.          Point: Precautions (Done)     Description:   Instruct learner(s) on prescribed precautions during self-care and functional transfers.              Learning Progress Summary           Patient Acceptance, E, VU by JOSELYN at 6/9/2022 1112                   Point: Body mechanics (Not Started)     Description:   Instruct learner(s) on proper positioning  and spine alignment during self-care, functional mobility activities and/or exercises.              Learner Progress:  Not documented in this visit.                      User Key     Initials Effective Dates Name Provider Type Discipline     11/10/21 -  Melanie Garvin OTR/L, CSRS Occupational Therapist OT              OT Recommendation and Plan  Therapy Frequency (OT): evaluation only  Plan of Care Review  Plan of Care Reviewed With: patient  Outcome Evaluation: OT eval completed. Pt presents alert and oriented, sitting up EOB upon entry to room. Pt states at baseline being independent with all adls, home mgt and mobility. She reports her pain is her biggest factor at this time. She was able to complete sit <> stand t/f and amb 400ft around unit with Sup. One LOB during mobility that pt was able to self correct. Independent with LBD. At this time, pt does not display deficits which require skilled OT services. OT to sign off. Anticipate d/c home with spouse.  Plan of Care Reviewed With: patient  Outcome Evaluation: OT eval completed. Pt presents alert and oriented, sitting up EOB upon entry to room. Pt states at baseline being independent with all adls, home mgt and mobility. She reports her pain is her biggest factor at this time. She was able to complete sit <> stand t/f and amb 400ft around unit with Sup. One LOB during mobility that pt was able to self correct. Independent with LBD. At this time, pt does not display deficits which require skilled OT services. OT to sign off. Anticipate d/c home with spouse.     Time Calculation:            OT Discharge Summary  Anticipated Discharge Disposition (OT): home with assist  Reason for Discharge: Independent  Outcomes Achieved: Other (eval x1)  Discharge Destination: Home with assist    Melanie Garvin, OTR/L, CSRS  6/9/2022

## 2022-06-12 LAB
BH BB BLOOD EXPIRATION DATE: NORMAL
BH BB BLOOD TYPE BARCODE: 5100
BH BB DISPENSE STATUS: NORMAL
BH BB PRODUCT CODE: NORMAL
BH BB UNIT NUMBER: NORMAL
CROSSMATCH INTERPRETATION: NORMAL
UNIT  ABO: NORMAL
UNIT  RH: NORMAL

## 2022-06-16 ENCOUNTER — APPOINTMENT (OUTPATIENT)
Dept: MRI IMAGING | Facility: HOSPITAL | Age: 77
End: 2022-06-16

## 2022-06-21 ENCOUNTER — LAB (OUTPATIENT)
Dept: LAB | Facility: HOSPITAL | Age: 77
End: 2022-06-21

## 2022-06-21 DIAGNOSIS — J44.9 STAGE 2 MODERATE COPD BY GOLD CLASSIFICATION: ICD-10-CM

## 2022-06-21 LAB — SARS-COV-2 ORF1AB RESP QL NAA+PROBE: NOT DETECTED

## 2022-06-21 PROCEDURE — C9803 HOPD COVID-19 SPEC COLLECT: HCPCS

## 2022-06-21 PROCEDURE — U0004 COV-19 TEST NON-CDC HGH THRU: HCPCS

## 2022-06-21 PROCEDURE — U0005 INFEC AGEN DETEC AMPLI PROBE: HCPCS

## 2022-06-22 ENCOUNTER — OFFICE VISIT (OUTPATIENT)
Dept: PULMONOLOGY | Facility: CLINIC | Age: 77
End: 2022-06-22

## 2022-06-22 ENCOUNTER — APPOINTMENT (OUTPATIENT)
Dept: MAMMOGRAPHY | Facility: HOSPITAL | Age: 77
End: 2022-06-22

## 2022-06-22 ENCOUNTER — TELEPHONE (OUTPATIENT)
Dept: VASCULAR SURGERY | Facility: CLINIC | Age: 77
End: 2022-06-22

## 2022-06-22 VITALS
HEIGHT: 64 IN | SYSTOLIC BLOOD PRESSURE: 134 MMHG | DIASTOLIC BLOOD PRESSURE: 84 MMHG | BODY MASS INDEX: 18.82 KG/M2 | HEART RATE: 78 BPM | OXYGEN SATURATION: 98 % | WEIGHT: 110.2 LBS

## 2022-06-22 DIAGNOSIS — J98.4 RESTRICTIVE LUNG DISEASE: Chronic | ICD-10-CM

## 2022-06-22 DIAGNOSIS — Z85.118 PERSONAL HISTORY OF MALIGNANT NEOPLASM OF BRONCHUS AND LUNG: ICD-10-CM

## 2022-06-22 DIAGNOSIS — J41.0 SIMPLE CHRONIC BRONCHITIS: Primary | ICD-10-CM

## 2022-06-22 DIAGNOSIS — J47.9 BRONCHIECTASIS WITHOUT COMPLICATION: Chronic | ICD-10-CM

## 2022-06-22 DIAGNOSIS — J44.9 STAGE 2 MODERATE COPD BY GOLD CLASSIFICATION: Chronic | ICD-10-CM

## 2022-06-22 DIAGNOSIS — Z87.891 PERSONAL HISTORY OF NICOTINE DEPENDENCE: Chronic | ICD-10-CM

## 2022-06-22 PROCEDURE — 99214 OFFICE O/P EST MOD 30 MIN: CPT | Performed by: INTERNAL MEDICINE

## 2022-06-22 PROCEDURE — 94727 GAS DIL/WSHOT DETER LNG VOL: CPT | Performed by: INTERNAL MEDICINE

## 2022-06-22 PROCEDURE — 94729 DIFFUSING CAPACITY: CPT | Performed by: INTERNAL MEDICINE

## 2022-06-22 PROCEDURE — 94010 BREATHING CAPACITY TEST: CPT | Performed by: INTERNAL MEDICINE

## 2022-06-22 RX ORDER — AMOXICILLIN 875 MG/1
TABLET, COATED ORAL
COMMUNITY
Start: 2022-06-16 | End: 2022-11-13

## 2022-06-22 NOTE — ASSESSMENT & PLAN NOTE
Her most recent CTs showed no evidence of recurrent disease.  I will get a follow-up scan in November and see her back in the office at that time.

## 2022-06-22 NOTE — PATIENT INSTRUCTIONS
The patient was off her Breztri for a period of time but then had to resume it.  Her pulmonary functions today show a decline in her FVC but stability of her FEV1 and current studies with actually not be consistent with COPD but rather a mild restrictive defect.  The change in the FVC almost relates to her recent lung surgery.  I did go over these results with her.  A sample of the Breztri Aerosphere was provided.  I will plan on a follow-up visit in early November with a repeat chest CT.  I encouraged her to continue to exercise on a regular basis.

## 2022-06-22 NOTE — ASSESSMENT & PLAN NOTE
Again preop pulmonary functions would have been most consistent with moderate COPD although her FEV1 was just into the moderate range at 75% of predicted prebronchodilator.  Current PFTs would be more consistent with a mild restrictive ventilatory defect with a coexisting decrease in midflows.  She can continue her Breztri Aerosphere and a sample was provided.  She may also continue her albuterol HFA inhaler as needed.

## 2022-06-22 NOTE — PROCEDURES
Full Pulmonary Function Test Without Bronchodilator  Performed by: Samantha Cantor, RRT  Authorized by: Dion Cortés MD      Pre Drug % Predicted    FVC: 77%   FEV1: 73%   FEF 25-75%: 67%   FEV1/FVC: 73%   T%   RV: 262%   DLCO: 54%   D/VAsb: 74%    Interpretation   Spirometry   Spirometry shows mild restriction. There is reduced midflow suggesting small airway/airflow obstruction.   Diffusion Capacity  The patient's diffusion capacity is moderately reduced.  Diffusion capacity is mildly reduced when corrected for alveolar volume.   Overall comments: The patient's spirometry is consistent with a mild restrictive ventilatory defect with a coexisting decrease in midflows.  Accurate lung volumes could not be obtained.  She has a moderate diffusion impairment which when corrected for alveolar volume is a mild diffusion impairment.  When current studies are compared to studies from [facility] from 2021, there has been a decline in her FVC on current baseline spirometry compared to previous pre and postbronchodilator studies but no significant change in her FEV1 compared to previous pre and postbronchodilator studies.  When corrected for alveolar volume, there has been a decline in her diffusion capacity compared to previous.

## 2022-06-22 NOTE — ASSESSMENT & PLAN NOTE
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

## 2022-06-22 NOTE — PROGRESS NOTES
Chief Complaint  Shortness of Breath and Personal history of lung cancer.    Subjective    History of Present Illness     Trinidad Zhu presents to [facility] for shortness of breath and a personal history of lung cancer.    History of Present Illness   The patient accompanied by her  today.  She states she stopped the Breztri for a period of time but then had to resume it after a few days.  She has had more difficulties in the very hot humid weather that we are currently experiencing.  Pulmonary functions today did show decline in her FVC but a stable FEV1 and as a result her PFTs today would not be consistent with COPD but would be more consistent with a mild restrictive defect with a coexisting decrease in mid flows.  Accurate lung volumes could not be obtained.  She did have a mild diffusion impairment when corrected for alveolar volume.  When the studies are compared to studies done prior to her surgery her FVC has dropped somewhat which is not unexpected based on her previous lung resection.  Her FEV1 is actually quite stable.  When corrected for alveolar volume there has been a drop in diffusion capacity compared to previous.  Again she does have evidence of bronchiectasis on prior imaging studies and I think has some component of chronic bronchitis associated with her previous smoking history but actually her current PFTs would not be consistent with COPD by GOLD criteria.  She is performing some respiratory exercise maneuvers and I encouraged her to continue that at home and to continue exercise in general.  She has had her COVID-19 vaccine in the form of the Moderna vaccine including 2 boosters.  She also had her flu shot this past flu season and is also the Prevnar 13 and a Pneumovax.  Prior to Admission medications    Medication Sig Start Date End Date Taking? Authorizing Provider   albuterol sulfate  (90 Base) MCG/ACT inhaler Inhale  2 puffs Every 4 (Four) Hours As Needed for Wheezing.   Yes Mary Lou Rodas MD   alendronate (FOSAMAX) 70 MG tablet Take 70 mg by mouth Every 7 (Seven) Days. Saturdays 5/21/19  Yes Mary Lou Rodas MD   aspirin 81 MG chewable tablet Chew 81 mg Daily.   Yes Mary Lou Rodas MD   benazepril (LOTENSIN) 5 MG tablet Take 5 mg by mouth Daily. 6/19/19  Yes Mary Lou Rodas MD   Budeson-Glycopyrrol-Formoterol (BREZTRI) 160-9-4.8 MCG/ACT aerosol inhaler Inhale 2 puffs 2 (Two) Times a Day. Rinse and spit after using. 6/22/22  Yes Dion Cortés MD   Calcium Carb-Cholecalciferol (CALCIUM 500+D PO) Take 1 tablet by mouth Daily.   Yes Mary Lou Rodas MD   carvedilol (COREG) 12.5 MG tablet Take 12.5 mg by mouth 2 (Two) Times a Day With Meals.   Yes Mary Lou Rodas MD   cetirizine (ZyrTEC) 10 MG tablet Take 10 mg by mouth Every Night.   Yes Mary Lou Rodas MD   clopidogrel (PLAVIX) 75 MG tablet TAKE 1 TABLET BY MOUTH EVERY DAY  6/14/21  Yes Silvia Herrera APRN   famotidine (PEPCID) 40 MG tablet Take 40 mg by mouth 2 (Two) Times a Day. 4/15/19  Yes Mary Lou Rodas MD   hydroCHLOROthiazide (HYDRODIURIL) 12.5 MG tablet Take 12.5 mg by mouth Daily.   Yes Mary Lou Rodas MD   HYDROcodone-acetaminophen (NORCO) 5-325 MG per tablet Take 1 tablet by mouth Every 6 (Six) Hours As Needed for Moderate Pain . 6/9/22  Yes Sukhdev Condon DO   levothyroxine (SYNTHROID, LEVOTHROID) 50 MCG tablet Take 50 mcg by mouth Daily. 5/8/19  Yes Mary Lou Rodas MD   multivitamin with minerals tablet tablet Take 1 tablet by mouth Daily.   Yes Mary Lou Rodas MD   ondansetron (ZOFRAN) 4 MG tablet Take 4 mg by mouth Every 8 (Eight) Hours As Needed for Nausea or Vomiting.   Yes Mary Lou Rodas MD   rosuvastatin (CRESTOR) 40 MG tablet Take 40 mg by mouth Every Night. 9/2/21  Yes Mary Lou Rodas MD   traMADol (ULTRAM) 50 MG tablet Take 50 mg by mouth Every 3 (Three) Hours As  "Needed for Moderate Pain  or Severe Pain . 19  Yes ProviderMary Lou MD   Budeson-Glycopyrrol-Formoterol (BREZTRI) 160-9-4.8 MCG/ACT aerosol inhaler Inhale 2 puffs 2 (Two) Times a Day.  22 Yes ProviderMary Lou MD   amoxicillin (AMOXIL) 875 MG tablet  22   ProviderMary Lou MD       Social History     Socioeconomic History   • Marital status:    Tobacco Use   • Smoking status: Former Smoker     Packs/day: 1.50     Years: 20.00     Pack years: 30.00     Types: Cigarettes     Quit date:      Years since quittin.4   • Smokeless tobacco: Never Used   Vaping Use   • Vaping Use: Never used   Substance and Sexual Activity   • Alcohol use: Yes     Comment: occasionally   • Drug use: No   • Sexual activity: Not Currently       Objective   Vital Signs:   /84   Pulse 78   Ht 161.3 cm (63.5\")   Wt 50 kg (110 lb 3.2 oz)   SpO2 98% Comment: RA  BMI 19.21 kg/m²     Physical Exam  Vitals and nursing note reviewed.   HENT:      Head: Normocephalic.      Comments: She is wearing a mask.  Eyes:      Extraocular Movements: Extraocular movements intact.      Pupils: Pupils are equal, round, and reactive to light.   Cardiovascular:      Rate and Rhythm: Normal rate and regular rhythm.   Pulmonary:      Effort: Pulmonary effort is normal.      Comments: She has reasonable air movement bilaterally with no adventitious sounds heard.  Musculoskeletal:         General: Normal range of motion.   Skin:     General: Skin is warm and dry.   Neurological:      General: No focal deficit present.      Mental Status: She is alert and oriented to person, place, and time.   Psychiatric:         Mood and Affect: Mood normal.         Behavior: Behavior normal.        Result Review :    PFT Values        Some values may be hidden. Unless noted otherwise, only the newest values recorded on each date are displayed.         Old Values PFT Results 9/10/21 6/22/22   No data to display.      Pre Drug PFT " Results 9/10/21 6/22/22   FVC 80 77   FEV1 64 73   FEF 25-75% 28 67   FEV1/FVC 61.74 73      Post Drug PFT Results 9/10/21 6/22/22   No data to display.      Other Tests PFT Results 9/10/21 6/22/22   TLC  159   RV  262   DLCO  54   D/VAsb  74               Results for orders placed in visit on 22    Full Pulmonary Function Test Without Bronchodilator    Narrative  Full Pulmonary Function Test Without Bronchodilator  Performed by: Samantha Cantor, RRT  Authorized by: Dion Cortés MD    Pre Drug % Predicted  FVC: 77%  FEV1: 73%  FEF 25-75%: 67%  FEV1/FVC: 73%  T%  RV: 262%  DLCO: 54%  D/VAsb: 74%    Interpretation  Spirometry  Spirometry shows mild restriction. There is reduced midflow suggesting small airway/airflow obstruction.  Diffusion Capacity  The patient's diffusion capacity is moderately reduced.  Diffusion capacity is mildly reduced when corrected for alveolar volume.  Overall comments: The patient's spirometry is consistent with a mild restrictive ventilatory defect with a coexisting decrease in midflows.  Accurate lung volumes could not be obtained.  She has a moderate diffusion impairment which when corrected for alveolar volume is a mild diffusion impairment.  When current studies are compared to studies from [facility] from 2021, there has been a decline in her FVC on current baseline spirometry compared to previous pre and postbronchodilator studies but no significant change in her FEV1 compared to previous pre and postbronchodilator studies.  When corrected for alveolar volume, there has been a decline in her diffusion capacity compared to previous.      Results for orders placed during the hospital encounter of 21    Full Pulmonary Function Test With Bronchodilator & ABG    Narrative  [facility] - Pulmonary Function Test    [address] [place] [address]  [place]  [address]  [phone]    Patient : Trinidad Zhu  MRN : [id number]  CSN  : [id number]  Pulmonologist : Dion Cortés MD  Date : 11/1/2021    ______________________________________________________________________    Interpretation :  1.  Spirometry is consistent with a moderate obstructive ventilatory defect.  2.  There is no significant change in spirometry postbronchodilator.  3.  Lung volumes reveal an elevated expiratory reserve volume.  There is also a mild decrease in inspiratory capacity.  4.  There is a moderate diffusion impairment which when corrected for alveolar volume is a mild diffusion impairment.  5.  Arterial blood gases are within normal limits on room air.  6.  When current studies are compared to studies done at the respiratory disease clinic on September 10 of 2021, there has been slight improvement in both the patient's FVC and FEV1 on current pre and postbronchodilator studies compared to previous baseline spirometry.      Dion Cortés MD      Results for orders placed in visit on 09/10/21    Pulmonary Function Test    Narrative  Pulmonary Function Test  Performed by: Samantha Cantor, RRT  Authorized by: Brittni Christianson APRN    Pre Drug % Predicted  FVC: 80%  FEV1: 64%  FEF 25-75%: 28%  FEV1/FVC: 61.74%    Interpretation  Spirometry  Spirometry shows moderate obstruction. There is reduced midflow suggesting small airway/airflow obstruction.  Review of FVL curve  Patient's effort is normal.    Rest/Exercise Pulse Ox Values        Some values may be hidden. Unless noted otherwise, only the newest values recorded on each date are displayed.         Rest/Exercise Pulse Ox Results 1/4/22   Rest room air SAT % 99   Exercise room air SAT % 95                      My interpretation of imaging:    CT Chest Without Contrast Diagnostic (05/03/2022 11:54)    My interpretation of labs:   COVID PRE-OP / PRE-PROCEDURE SCREENING ORDER (NO ISOLATION) - Swab, Nasal Cavity (06/21/2022 12:20)      Assessment and Plan     Diagnoses and all orders for this  visit:    1. Simple chronic bronchitis (HCC) (Primary)  Assessment & Plan:  Again preop pulmonary functions would have been most consistent with moderate COPD although her FEV1 was just into the moderate range at 75% of predicted prebronchodilator.  Current PFTs would be more consistent with a mild restrictive ventilatory defect with a coexisting decrease in midflows.  She can continue her Breztri Aerosphere and a sample was provided.  She may also continue her albuterol HFA inhaler as needed.    Orders:  -     COVID PRE-OP / PRE-PROCEDURE SCREENING ORDER (NO ISOLATION) - Swab, Nasal Cavity; Future  -     Budeson-Glycopyrrol-Formoterol (BREZTRI) 160-9-4.8 MCG/ACT aerosol inhaler; Inhale 2 puffs 2 (Two) Times a Day. Rinse and spit after using.  Dispense: 1 each; Refill: 0    2. Restrictive lung disease  Assessment & Plan:  Her current spirometry would be consistent with a mild restrictive ventilatory defect related to her previous lung resection.      3. Bronchiectasis without complication (HCC)  Assessment & Plan:  I did encourage her to continue to perform breathing exercises.  She is not have any major issues with cough at this time but can use Mucinex if she has difficulty clearing secretions.      4. Personal history of malignant neoplasm of bronchus and lung  Assessment & Plan:  Her most recent CTs showed no evidence of recurrent disease.  I will get a follow-up scan in November and see her back in the office at that time.    Orders:  -     CT Chest Without Contrast Diagnostic; Future    5. Personal history of nicotine dependence  Assessment & Plan:  She quit smoking in 1996.          Dion Cortés MD  6/22/2022  17:42 CDT    Follow Up   Return in about 20 weeks (around 11/9/2022) for To see me specifically.    Patient was given instructions and counseling regarding her condition or for health maintenance advice. Please see specific information pulled into the AVS if appropriate.

## 2022-06-22 NOTE — ASSESSMENT & PLAN NOTE
----- Message from Cameron Hardy MD sent at 11/10/2017  9:01 AM CST -----  Please call and inform patient that his PSA is within normal limits.   I did encourage her to continue to perform breathing exercises.  She is not have any major issues with cough at this time but can use Mucinex if she has difficulty clearing secretions.

## 2022-06-22 NOTE — TELEPHONE ENCOUNTER
Spoke with Mrs Zhu reminding her of her appointments for Thursday, June 23rd, 2022. Reminded Mrs Zhu to arrive at the [facility] at 830 am for 9 o'clock testing with nothing to eat or drink 6 hours prior and follow up afterwards at 930 am with Silvia ARCHULETA as Dr Condon would be out of office. Mrs Zhu stated she would prefer to see Dr Condon so I then transferred her to scheduling to move her testing

## 2022-06-23 ENCOUNTER — APPOINTMENT (OUTPATIENT)
Dept: CT IMAGING | Facility: HOSPITAL | Age: 77
End: 2022-06-23

## 2022-06-23 ENCOUNTER — APPOINTMENT (OUTPATIENT)
Dept: MRI IMAGING | Facility: HOSPITAL | Age: 77
End: 2022-06-23

## 2022-06-24 ENCOUNTER — TELEPHONE (OUTPATIENT)
Dept: PULMONOLOGY | Facility: CLINIC | Age: 77
End: 2022-06-24

## 2022-06-24 NOTE — TELEPHONE ENCOUNTER
I did call the patient and went over her recent pulmonary functions with her again as I had in the office and advised her that she really did not have any major airflow obstruction on her most recent pulmonary functions and that the acute symptoms that Breztri would benefit would be related to bronchospasm and airflow obstruction.  It can also help in preventing exacerbations of COPD.  However the symptoms she describes recently with passing out although associated with shortness of breath did not appear to be associated wheezing or thing to suggest that bronchospasm was causing the episode.  Again I told her the Breztri can be helpful in preventing exacerbations and treating airflow obstruction but I do not think that was the cause of her recent problems.  Again I told her she can certainly continue it if it seems to be helping her symptomatically and again preventing issues with bronchitis and bronchospasm.  In terms of her recent acute symptoms I did advise her that if she had these in the future to go to the nearest emergency room to look for something such as an occult pulmonary embolus or cardiac issue causing these problems.  She will be following up with Dr. Layne next week and I have sent a summary of her recent office visit to Dr. Layne's office.

## 2022-06-30 ENCOUNTER — HOSPITAL ENCOUNTER (OUTPATIENT)
Dept: MAMMOGRAPHY | Facility: HOSPITAL | Age: 77
Discharge: HOME | End: 2022-06-30

## 2022-06-30 ENCOUNTER — HOSPITAL ENCOUNTER (OUTPATIENT)
Dept: MRI IMAGING | Facility: HOSPITAL | Age: 77
Discharge: HOME | End: 2022-06-30

## 2022-06-30 DIAGNOSIS — Z12.31 ENCOUNTER FOR SCREENING MAMMOGRAM FOR MALIGNANT NEOPLASM OF BREAST: ICD-10-CM

## 2022-06-30 DIAGNOSIS — C34.11 PRIMARY CANCER OF RIGHT UPPER LOBE OF LUNG: ICD-10-CM

## 2022-06-30 LAB — CREAT BLDA-MCNC: 0.8 MG/DL (ref 0.6–1.3)

## 2022-06-30 PROCEDURE — A9577 INJ MULTIHANCE: HCPCS | Performed by: NURSE PRACTITIONER

## 2022-06-30 PROCEDURE — 77063 BREAST TOMOSYNTHESIS BI: CPT

## 2022-06-30 PROCEDURE — 0 GADOBENATE DIMEGLUMINE 529 MG/ML SOLUTION: Performed by: NURSE PRACTITIONER

## 2022-06-30 PROCEDURE — 70553 MRI BRAIN STEM W/O & W/DYE: CPT

## 2022-06-30 PROCEDURE — 82565 ASSAY OF CREATININE: CPT

## 2022-06-30 PROCEDURE — 77067 SCR MAMMO BI INCL CAD: CPT

## 2022-06-30 RX ADMIN — GADOBENATE DIMEGLUMINE 10 ML: 529 INJECTION, SOLUTION INTRAVENOUS at 14:35

## 2022-07-05 ENCOUNTER — HOSPITAL ENCOUNTER (OUTPATIENT)
Dept: CT IMAGING | Facility: HOSPITAL | Age: 77
Discharge: HOME | End: 2022-07-05
Admitting: NURSE PRACTITIONER

## 2022-07-05 ENCOUNTER — OFFICE VISIT (OUTPATIENT)
Dept: VASCULAR SURGERY | Facility: CLINIC | Age: 77
End: 2022-07-05

## 2022-07-05 VITALS
HEART RATE: 61 BPM | HEIGHT: 63 IN | OXYGEN SATURATION: 98 % | BODY MASS INDEX: 18.78 KG/M2 | DIASTOLIC BLOOD PRESSURE: 68 MMHG | WEIGHT: 106 LBS | SYSTOLIC BLOOD PRESSURE: 112 MMHG

## 2022-07-05 DIAGNOSIS — I10 PRIMARY HYPERTENSION: ICD-10-CM

## 2022-07-05 DIAGNOSIS — M10.9 ACUTE GOUT INVOLVING TOE OF RIGHT FOOT, UNSPECIFIED CAUSE: ICD-10-CM

## 2022-07-05 DIAGNOSIS — I65.23 BILATERAL CAROTID ARTERY STENOSIS: ICD-10-CM

## 2022-07-05 DIAGNOSIS — I73.9 PAD (PERIPHERAL ARTERY DISEASE): Primary | ICD-10-CM

## 2022-07-05 DIAGNOSIS — E78.5 HYPERLIPIDEMIA, UNSPECIFIED HYPERLIPIDEMIA TYPE: ICD-10-CM

## 2022-07-05 LAB — CREAT BLDA-MCNC: 0.8 MG/DL (ref 0.6–1.3)

## 2022-07-05 PROCEDURE — 99024 POSTOP FOLLOW-UP VISIT: CPT | Performed by: SURGERY

## 2022-07-05 PROCEDURE — 82565 ASSAY OF CREATININE: CPT

## 2022-07-05 PROCEDURE — 0 IOPAMIDOL PER 1 ML: Performed by: NURSE PRACTITIONER

## 2022-07-05 PROCEDURE — 70498 CT ANGIOGRAPHY NECK: CPT

## 2022-07-05 RX ORDER — COLCHICINE 0.6 MG/1
TABLET ORAL
Qty: 5 TABLET | Refills: 0 | Status: ON HOLD | OUTPATIENT
Start: 2022-07-05 | End: 2022-10-21

## 2022-07-05 RX ADMIN — IOPAMIDOL 100 ML: 755 INJECTION, SOLUTION INTRAVENOUS at 12:45

## 2022-10-12 ENCOUNTER — APPOINTMENT (OUTPATIENT)
Dept: ULTRASOUND IMAGING | Facility: HOSPITAL | Age: 77
End: 2022-10-12
Payer: MEDICARE

## 2022-10-12 ENCOUNTER — TRANSCRIBE ORDERS (OUTPATIENT)
Dept: ADMINISTRATIVE | Facility: HOSPITAL | Age: 77
End: 2022-10-12

## 2022-10-12 ENCOUNTER — HOSPITAL ENCOUNTER (OUTPATIENT)
Dept: ULTRASOUND IMAGING | Facility: HOSPITAL | Age: 77
Discharge: HOME | End: 2022-10-12

## 2022-10-12 DIAGNOSIS — I73.9 CLAUDICATION OF BOTH LOWER EXTREMITIES: Primary | ICD-10-CM

## 2022-10-12 DIAGNOSIS — I73.9 CLAUDICATION IN PERIPHERAL VASCULAR DISEASE: Primary | ICD-10-CM

## 2022-10-12 DIAGNOSIS — M79.662 PAIN OF LEFT CALF: Primary | ICD-10-CM

## 2022-10-12 DIAGNOSIS — I73.9 CLAUDICATION OF BOTH LOWER EXTREMITIES: ICD-10-CM

## 2022-10-12 DIAGNOSIS — M79.605 LEFT LEG PAIN: Primary | ICD-10-CM

## 2022-10-12 PROCEDURE — 93971 EXTREMITY STUDY: CPT

## 2022-10-12 PROCEDURE — 93923 UPR/LXTR ART STDY 3+ LVLS: CPT

## 2022-10-13 ENCOUNTER — OFFICE VISIT (OUTPATIENT)
Dept: VASCULAR SURGERY | Facility: CLINIC | Age: 77
End: 2022-10-13

## 2022-10-13 VITALS
DIASTOLIC BLOOD PRESSURE: 76 MMHG | BODY MASS INDEX: 18.82 KG/M2 | SYSTOLIC BLOOD PRESSURE: 138 MMHG | HEART RATE: 65 BPM | HEIGHT: 63 IN | OXYGEN SATURATION: 100 % | WEIGHT: 106.2 LBS

## 2022-10-13 DIAGNOSIS — I10 PRIMARY HYPERTENSION: ICD-10-CM

## 2022-10-13 DIAGNOSIS — I73.9 PAD (PERIPHERAL ARTERY DISEASE): Primary | ICD-10-CM

## 2022-10-13 DIAGNOSIS — Z01.818 PREOP TESTING: ICD-10-CM

## 2022-10-13 DIAGNOSIS — Z79.02 ENCOUNTER FOR MONITORING ANTIPLATELET THERAPY: ICD-10-CM

## 2022-10-13 DIAGNOSIS — L60.0 INGROWN TOENAIL OF LEFT FOOT: ICD-10-CM

## 2022-10-13 DIAGNOSIS — Z51.81 ENCOUNTER FOR MONITORING ANTIPLATELET THERAPY: ICD-10-CM

## 2022-10-13 DIAGNOSIS — E78.5 HYPERLIPIDEMIA, UNSPECIFIED HYPERLIPIDEMIA TYPE: ICD-10-CM

## 2022-10-13 PROCEDURE — 99214 OFFICE O/P EST MOD 30 MIN: CPT | Performed by: SURGERY

## 2022-10-13 NOTE — PROGRESS NOTES
"10/13/2022       Andrew Layne MD  [address] [facility] [place] [address]    Trinidad Zhu  1945    Chief Complaint   Patient presents with   • Follow-up     Patient states that she was sent by [facility] office for lack of blood flow.  Last seen in office 7/5/22. Denies pain/swelling and stroke symptoms.        Dear Andrew Layne MD       HPI  I had the pleasure of seeing your patient Trinidad Zhu in the office today.   As you recall, Trinidad Zhu is a 77 y.o.  female who we are following for lower extremity PAD.  She did undergo a left lower extremity angiogram on 11/9/2020, 3/26/2021, and last 08/06/2021.  She was having complaints of claudication to her right lower extremity.  She underwent a right lower extremity angiogram on 6/8/22 and subsequently developed ischemia of left lower extremity and underwent thrombectomy of the left lower extremity later that evening. She is maintained on aspirin, Plavix, and Crestor.  She began having pain to bilateral lower extremities, most severe to the left lower extremity. She has some decreased sensation to the left foot with motor function. It is slightly cooler than the right.  She did have testing yesterday, which I did personally review.     Review of Systems   Constitutional: Negative.    HENT: Negative.    Eyes: Negative.    Respiratory: Negative.    Cardiovascular: Negative.    Gastrointestinal: Negative.    Endocrine: Negative.    Genitourinary: Negative.    Musculoskeletal: Positive for joint swelling.   Skin: Positive for color change.   Allergic/Immunologic: Negative.    Neurological: Positive for numbness.   Hematological: Negative.    Psychiatric/Behavioral: Negative.         /76   Pulse 65   Ht 160 cm (63\")   Wt 48.2 kg (106 lb 3.2 oz)   SpO2 100%   BMI 18.81 kg/m²   Physical Exam  Vitals and nursing note reviewed.   Constitutional:       Appearance: Normal appearance. She is well-developed and normal weight.   HENT:      " Head: Normocephalic and atraumatic.   Eyes:      General: No scleral icterus.     Pupils: Pupils are equal, round, and reactive to light.   Neck:      Thyroid: No thyromegaly.      Vascular: No carotid bruit or JVD.   Cardiovascular:      Rate and Rhythm: Normal rate and regular rhythm.      Pulses:           Dorsalis pedis pulses are detected w/ Doppler on the right side and 0 on the left side.        Posterior tibial pulses are 0 on the right side and 0 on the left side.      Heart sounds: Murmur heard.    Crescendo systolic murmur is present.     Comments: Right: doppler peroneal/DP  Left: doppler peroneal  Pulmonary:      Effort: Pulmonary effort is normal.      Breath sounds: Normal breath sounds.   Abdominal:      General: Bowel sounds are normal. There is no distension or abdominal bruit.      Palpations: Abdomen is soft. There is no mass.      Tenderness: There is no abdominal tenderness.   Musculoskeletal:         General: Normal range of motion.      Cervical back: Neck supple.   Lymphadenopathy:      Cervical: No cervical adenopathy.   Skin:     General: Skin is warm and dry.   Neurological:      General: No focal deficit present.      Mental Status: She is alert and oriented to person, place, and time.      Cranial Nerves: No cranial nerve deficit.      Sensory: No sensory deficit.   Psychiatric:         Mood and Affect: Mood normal.         Behavior: Behavior normal.         Thought Content: Thought content normal.         Judgment: Judgment normal.       Diagnostic Data:  US Ankle / Brachial Indices Extremity Complete    Result Date: 10/12/2022  Narrative: EXAMINATION:  US ANKLE / BRACHIAL INDICES EXTREMITY COMPLETE- 10/12/2022 1:39 PM CDT  HISTORY: Worsening claudication. I73.9-Peripheral vascular disease, unspecified.  COMPARISON: 5/19/2022.  TECHNIQUE: Pressure measurements and spectral Doppler imaging was performed.  FINDINGS: Pulses could not be identified on the left side and therefore there are  no spectral waveforms on the left. The ankle-brachial index could not be obtained on the left. The right posterior tibial ankle brachial index is 0.2 and the right dorsalis pedis ankle brachial index is 0.2. On the previous study, these were both 0.6.      Impression: 1. Ankle brachial index values on the right are 0.2, decreased from 0.6 previously. The findings are consistent with severe right lower extremity arterial disease. 2. No pulses could be identified with ultrasound Doppler evaluation of the left foot. Previously, the ankle-brachial index on the left was 1.1. Therefore, there is significant change on the left side.  Results were called to the referring provider's office. I spoke with Anayeli, a physician assistant. I encouraged acute vascular referral. This report was finalized on 10/12/2022 15:19 by Dr. Ramesh Ortiz MD.    US Venous Doppler Lower Extremity Left (duplex)    Result Date: 10/12/2022  Narrative: US VENOUS DOPPLER LOWER EXTREMITY LEFT (DUPLEX)- 10/12/2022 1:39 PM CDT  HISTORY: left leg pain; M79.605-Pain in left leg  COMPARISON: NONE  FINDINGS: Transverse and longitudinal grayscale and Doppler sonographic images of the left lower extremity were obtained. The study is performed utilizing B-mode/grayscale imaging with spectral analysis and color flow images. This includes augmentation and compression imaging.  There is normal flow and compressibility of the left common femoral, superficial femoral, popliteal, peroneal, anterior tibial, and posterior tibial veins.      Impression: 1. Normal duplex ultrasound of the left lower extremity without evidence of DVT.  This report was finalized on 10/12/2022 14:51 by Dr. Rigoberto Milian MD.       Patient Active Problem List   Diagnosis   • Spinal stenosis, cervical region   • Lung nodules   • Bronchiectasis without complication (HCC)   • Underweight   • Personal history of nicotine dependence   • Restrictive lung disease   • Pulmonary emphysema (HCC)    • PAD (peripheral artery disease) (HCC)   • Preop testing   • Gastroesophageal reflux disease   • Allergic rhinitis   • ORTIZ (dyspnea on exertion)   • CAD (coronary artery disease)   • Hyperlipidemia   • Neuropathy   • Hypertension   • Simple chronic bronchitis (HCC)   • Former smoker   • Lung nodule   • Mass of right lung   • Postoperative anemia due to acute blood loss   • Syncope and collapse   • Adenocarcinoma (HCC)   • Personal history of malignant neoplasm of bronchus and lung   • Hydropneumothorax   • Lower extremity embolism (HCC)         ICD-10-CM ICD-9-CM   1. PAD (peripheral artery disease) (HCC)  I73.9 443.9   2. Hyperlipidemia, unspecified hyperlipidemia type  E78.5 272.4   3. Primary hypertension  I10 401.9         Plan: After thoroughly evaluating Trinidad Zhu, I believe the best course of action is to proceed with a left lower extremity angiogram with left groin exposure.  Risks of angiogram were discussed.  These include, but are not limited to, bleeding, infection, vessel damage, nerve damage, embolus, and loss of limb.  The patient understands these risks and wishes to proceed with procedure.  I  Will see how she does and can address her right lower extremity following.  I will likely place her on anticoagulation postoperatively instead of Plavix.  I did encourage her to walk as much as possible.  I did discuss vascular risk factors as they pertain to the progression of vascular disease including controlling her hypertension and hyperlipidemia.  These risk factors are currently stable.  The patient can continue taking their current medication regimen as previously planned.  This was all discussed in full with complete understanding.    Thank you for allowing me to participate in the care of your patient.  Please do not hesitate with any questions or concerns.  I will keep you aware of any further encounters with Trinidad Zhu.        Sincerely yours,         Sukhdev Condon,  DO

## 2022-10-18 DIAGNOSIS — F41.9 ANXIETY DISORDER, UNSPECIFIED TYPE: ICD-10-CM

## 2022-10-18 RX ORDER — HYDROXYZINE PAMOATE 25 MG/1
25 CAPSULE ORAL 3 TIMES DAILY PRN
Qty: 45 CAPSULE | Refills: 2 | Status: SHIPPED | OUTPATIENT
Start: 2022-10-18 | End: 2022-12-21

## 2022-10-18 NOTE — TELEPHONE ENCOUNTER
[facility] is requesting a refill on Hydroxyzine pamoate 25 mg    Has been seeing Dr Cortés but has seen Brittni in the past. Dr Cortés is out of the office so sending to Brittni.

## 2022-10-19 ENCOUNTER — TELEPHONE (OUTPATIENT)
Dept: VASCULAR SURGERY | Facility: CLINIC | Age: 77
End: 2022-10-19

## 2022-10-19 ENCOUNTER — PRE-ADMISSION TESTING (OUTPATIENT)
Dept: PREADMISSION TESTING | Facility: HOSPITAL | Age: 77
End: 2022-10-19

## 2022-10-19 VITALS
DIASTOLIC BLOOD PRESSURE: 63 MMHG | SYSTOLIC BLOOD PRESSURE: 131 MMHG | BODY MASS INDEX: 18.4 KG/M2 | HEART RATE: 78 BPM | OXYGEN SATURATION: 100 % | WEIGHT: 107.81 LBS | HEIGHT: 64 IN | RESPIRATION RATE: 18 BRPM

## 2022-10-19 DIAGNOSIS — Z01.818 PREOP TESTING: ICD-10-CM

## 2022-10-19 DIAGNOSIS — I73.9 PAD (PERIPHERAL ARTERY DISEASE): ICD-10-CM

## 2022-10-19 DIAGNOSIS — Z51.81 ENCOUNTER FOR MONITORING ANTIPLATELET THERAPY: ICD-10-CM

## 2022-10-19 DIAGNOSIS — Z79.02 ENCOUNTER FOR MONITORING ANTIPLATELET THERAPY: ICD-10-CM

## 2022-10-19 LAB
ALBUMIN SERPL-MCNC: 4.9 G/DL (ref 3.5–5.2)
ALBUMIN/GLOB SERPL: 1.9 G/DL
ALP SERPL-CCNC: 53 U/L (ref 39–117)
ALT SERPL W P-5'-P-CCNC: 34 U/L (ref 1–33)
ANION GAP SERPL CALCULATED.3IONS-SCNC: 11 MMOL/L (ref 5–15)
APTT PPP: 27.5 SECONDS (ref 24.1–35)
AST SERPL-CCNC: 63 U/L (ref 1–32)
BASOPHILS # BLD AUTO: 0.02 10*3/MM3 (ref 0–0.2)
BASOPHILS NFR BLD AUTO: 0.2 % (ref 0–1.5)
BILIRUB SERPL-MCNC: 0.4 MG/DL (ref 0–1.2)
BUN SERPL-MCNC: 19 MG/DL (ref 8–23)
BUN/CREAT SERPL: 27.5 (ref 7–25)
CALCIUM SPEC-SCNC: 10 MG/DL (ref 8.6–10.5)
CHLORIDE SERPL-SCNC: 96 MMOL/L (ref 98–107)
CHOLEST SERPL-MCNC: 133 MG/DL (ref 0–200)
CO2 SERPL-SCNC: 32 MMOL/L (ref 22–29)
CREAT SERPL-MCNC: 0.69 MG/DL (ref 0.57–1)
DEPRECATED RDW RBC AUTO: 44.6 FL (ref 37–54)
EGFRCR SERPLBLD CKD-EPI 2021: 90.1 ML/MIN/1.73
EOSINOPHIL # BLD AUTO: 0.01 10*3/MM3 (ref 0–0.4)
EOSINOPHIL NFR BLD AUTO: 0.1 % (ref 0.3–6.2)
ERYTHROCYTE [DISTWIDTH] IN BLOOD BY AUTOMATED COUNT: 12.5 % (ref 12.3–15.4)
GLOBULIN UR ELPH-MCNC: 2.6 GM/DL
GLUCOSE SERPL-MCNC: 113 MG/DL (ref 65–99)
HCT VFR BLD AUTO: 36.1 % (ref 34–46.6)
HDLC SERPL-MCNC: 79 MG/DL (ref 40–60)
HGB BLD-MCNC: 12.5 G/DL (ref 12–15.9)
IMM GRANULOCYTES # BLD AUTO: 0.05 10*3/MM3 (ref 0–0.05)
IMM GRANULOCYTES NFR BLD AUTO: 0.4 % (ref 0–0.5)
INR PPP: 1.11 (ref 0.91–1.09)
LDLC SERPL CALC-MCNC: 40 MG/DL (ref 0–100)
LDLC/HDLC SERPL: 0.51 {RATIO}
LYMPHOCYTES # BLD AUTO: 1.77 10*3/MM3 (ref 0.7–3.1)
LYMPHOCYTES NFR BLD AUTO: 14.5 % (ref 19.6–45.3)
MCH RBC QN AUTO: 33.3 PG (ref 26.6–33)
MCHC RBC AUTO-ENTMCNC: 34.6 G/DL (ref 31.5–35.7)
MCV RBC AUTO: 96.3 FL (ref 79–97)
MONOCYTES # BLD AUTO: 1.21 10*3/MM3 (ref 0.1–0.9)
MONOCYTES NFR BLD AUTO: 9.9 % (ref 5–12)
NEUTROPHILS NFR BLD AUTO: 74.9 % (ref 42.7–76)
NEUTROPHILS NFR BLD AUTO: 9.17 10*3/MM3 (ref 1.7–7)
NRBC BLD AUTO-RTO: 0 /100 WBC (ref 0–0.2)
PLATELET # BLD AUTO: 201 10*3/MM3 (ref 140–450)
PMV BLD AUTO: 9.4 FL (ref 6–12)
POTASSIUM SERPL-SCNC: 3.5 MMOL/L (ref 3.5–5.2)
PROT SERPL-MCNC: 7.5 G/DL (ref 6–8.5)
PROTHROMBIN TIME: 13.9 SECONDS (ref 11.9–14.6)
RBC # BLD AUTO: 3.75 10*6/MM3 (ref 3.77–5.28)
SODIUM SERPL-SCNC: 139 MMOL/L (ref 136–145)
T4 FREE SERPL-MCNC: 1.28 NG/DL (ref 0.93–1.7)
TRIGL SERPL-MCNC: 68 MG/DL (ref 0–150)
TSH SERPL DL<=0.05 MIU/L-ACNC: 0.68 UIU/ML (ref 0.27–4.2)
VLDLC SERPL-MCNC: 14 MG/DL (ref 5–40)
WBC NRBC COR # BLD: 12.23 10*3/MM3 (ref 3.4–10.8)

## 2022-10-19 PROCEDURE — 84439 ASSAY OF FREE THYROXINE: CPT

## 2022-10-19 PROCEDURE — 85025 COMPLETE CBC W/AUTO DIFF WBC: CPT

## 2022-10-19 PROCEDURE — 85610 PROTHROMBIN TIME: CPT

## 2022-10-19 PROCEDURE — 80061 LIPID PANEL: CPT

## 2022-10-19 PROCEDURE — 36415 COLL VENOUS BLD VENIPUNCTURE: CPT

## 2022-10-19 PROCEDURE — 80053 COMPREHEN METABOLIC PANEL: CPT

## 2022-10-19 PROCEDURE — 84443 ASSAY THYROID STIM HORMONE: CPT

## 2022-10-19 PROCEDURE — 85730 THROMBOPLASTIN TIME PARTIAL: CPT

## 2022-10-19 NOTE — TELEPHONE ENCOUNTER
SPOKE WITH PATIENT REGARDING SURGERY. PT WAS INFORMED OF PRE WORK ON 10/19 AT 1315. PT WAS ALSO INFORMED OF SURGERY ON 10/21 AT 0800. PT WAS INFORMED OF COVID TEST AND VISITATION. PT STATED UNDERSTANDING OF INSTRUCTIONS AND ALL INFORMATION.

## 2022-10-20 ENCOUNTER — TELEPHONE (OUTPATIENT)
Dept: VASCULAR SURGERY | Facility: CLINIC | Age: 77
End: 2022-10-20

## 2022-10-20 NOTE — H&P
10/20/2022         Andrew Layne MD  [address] [facility] [place] [address]     Trinidad Zhu  1945          Chief Complaint   Patient presents with   • Follow-up       Patient states that she was sent by [facility] for lack of blood flow.  Last seen in office 7/5/22. Denies pain/swelling and stroke symptoms.          Dear Andrew Layne MD         HPI  I had the pleasure of seeing your patient Trinidad Zhu in the office today.   As you recall, Trinidad Zhu is a 77 y.o.  female who we are following for lower extremity PAD.  She did undergo a left lower extremity angiogram on 11/9/2020, 3/26/2021, and last 08/06/2021.  She was having complaints of claudication to her right lower extremity.  She underwent a right lower extremity angiogram on 6/8/22 and subsequently developed ischemia of left lower extremity and underwent thrombectomy of the left lower extremity later that evening. She is maintained on aspirin, Plavix, and Crestor.  She began having pain to bilateral lower extremities, most severe to the left lower extremity. She has some decreased sensation to the left foot with motor function. It is slightly cooler than the right.  She did have testing yesterday, which I did personally review.     Past Medical History:   Diagnosis Date   • Antral ulcer    • Bladder cystocele    • C. difficile diarrhea    • CAD (coronary artery disease)    • Cancer (HCC)     lung 2021   • Colon polyp    • COPD (chronic obstructive pulmonary disease) (HCC)    • COVID-19 vaccine series completed    • Diarrhea    • Difficulty swallowing    • Disease of thyroid gland    • Diverticulosis    • Elevated cholesterol    • Gastritis    • Generalized OA    • GERD (gastroesophageal reflux disease)    • History of bladder surgery 01/07/2020   • History of transfusion     after lung surgery 2021   • Hyperlipidemia    • Hypertension    • Liver cyst    • Lung nodule    • Microscopic colitis    • Multiple gastric ulcers      last 5 years ago   • Neck pain    • Neuropathy    • Normal body mass index    • NSAID induced gastritis    • Ulcer of pyloric antrum     UNSPECIFIED ULCER CHRONICITY   • UTI (urinary tract infection)    • Weight loss      Past Surgical History:   Procedure Laterality Date   • ANTERIOR CERVICAL DISCECTOMY W/ FUSION N/A 5/22/2017    Procedure: CERVICAL DISCECTOMY ANTERIOR FUSION WITH INSTRUMENTATION;  Surgeon: NADINE Sarabia MD;  Location:  [facility] OR;  Service:    • AORTAGRAM Left 11/9/2020    Procedure: left lower extremtiy angiogram, hawk athrectomy, balloon angioplasty, stent placement, mynx closure;  Surgeon: Sukhdev Condon DO;  Location:  [facility] HYBRID OR 12;  Service: Vascular;  Laterality: Left;   • AORTAGRAM Left 3/26/2021    Procedure: LEFT LOWER EXTREMITY ANGIOGRAM, BALLOON ANGIOPLASTY, STENT PLACEMENT, MYNX CLOSURE;  Surgeon: Sukhdev Condon DO;  Location:  [facility] HYBRID OR 12;  Service: Vascular;  Laterality: Left;   • AORTAGRAM Left 8/6/2021    Procedure: LEFT LOWER EXTREMITY ANGIOGRAM, HAWK ATHERECTOMY, BALLOON ANGIOPLASTY, MYNX CLOSURE;  Surgeon: Sukhdev Condon DO;  Location:  [facility] HYBRID OR 12;  Service: Vascular;  Laterality: Left;   • AORTAGRAM Right 6/8/2022    Procedure: RIGHT LOWER EXTREMITY ANGIOGRAM, INTRAVASCULAR LITHOTRIPSY, HAWK ATHRECTOMY, BALLOON ANGIOPLASTY, MYNX CLOSURE;  Surgeon: Sukhdev Condon DO;  Location:  [facility] HYBRID OR 12;  Service: Vascular;  Laterality: Right;   • BLADDER SUSPENSION      also had pelvic reconstructive surgery   • BREAST EXCISIONAL BIOPSY Right 1985   • CATARACT EXTRACTION, BILATERAL     • CERVICAL CORPECTOMY N/A 5/22/2017    Procedure:  ANTERIOR CERVICAL DISCECTOMY FUSION C-6 to T1,  ANTERIOR FUSION WITH INSTRUMENTATION C-5 T-1;  Surgeon: NADINE Sarabia MD;  Location:  [facility] OR;  Service:    • COLONOSCOPY  08/19/2015    TIC BX RT COLON   • COLONOSCOPY  12/04/2013    RT COLON BX RECALL 5YR   • COLONOSCOPY N/A 11/29/2016    The entire  examined colon is normal; No specimens collected   • COLONOSCOPY N/A 2022    Procedure: COLONOSCOPY WITH ANESTHESIA;  Surgeon: Marco Antonio Vallejo MD;  Location: [facility] ENDOSCOPY;  Service: Gastroenterology;  Laterality: N/A;  Pre: screen  Post: diverticulosis  Andrew Layne MD   • ENDOSCOPY  2015    HEALED ULCER SLANT BX   • HYSTERECTOMY     • LEG THROMBECTOMY/EMBOLECTOMY Left 2022    Procedure: LOWER EXTREMITY THROMBECTOMY/EMBOLECTOMY, PATCH GRAFT TO  FEMORAL ARTERY;  Surgeon: Sukhdev Condon DO;  Location: [facility] HYBRID OR 12;  Service: Vascular;  Laterality: Left;   • LOBECTOMY Right 2021    Procedure: RIGHT THORACOSCOPY WITH DAVINCI ROBOT, RIGHT UPPER LOBE LOBECTOMY;  Surgeon: Jarad Ignacio MD;  Location: [facility] OR;  Service: Robotics - DaVinci;  Laterality: Right;   • LUNG SURGERY     • OTHER SURGICAL HISTORY      KNEE CARTILAGE REMOVED   • OTHER SURGICAL HISTORY      BENIGN FIBROID TUMOR OF BREAST 1985 REMOVED   • TONSILLECTOMY       Family History   Problem Relation Age of Onset   • Breast cancer Maternal Aunt    • Heart disease Mother    • Heart disease Father    • GI problems Neg Hx         MALIGNANCIES   • Colon cancer Neg Hx    • Colon polyps Neg Hx      Social History     Tobacco Use   • Smoking status: Former     Packs/day: 1.50     Years: 20.00     Pack years: 30.00     Types: Cigarettes     Quit date:      Years since quittin.8   • Smokeless tobacco: Never   Vaping Use   • Vaping Use: Never used   Substance Use Topics   • Alcohol use: Yes     Comment: occasionally   • Drug use: No     Allergies   Allergen Reactions   • Baclofen Other (See Comments)     MUSCULOSKELETAL THERAPY AGENTS knocked her out for a day    Other reaction(s): Unknown   • Gabapentin Confusion     Other reaction(s): over sedation   • Sulfa Antibiotics Rash     Mouth blisters   • Sulfacetamide Sodium Rash   • Amitriptyline Hcl Confusion     Other reaction(s): ambulation difficulties/confusion   •  Niferex [Iron Polysaccharide] Swelling   • Oxycodone-Acetaminophen GI Intolerance     Other reaction(s): vomiting     Current Outpatient Medications   Medication Instructions   • albuterol sulfate  (90 Base) MCG/ACT inhaler 2 puffs, Inhalation, Every 4 Hours PRN   • alendronate (FOSAMAX) 70 mg, Oral, Every 7 Days, Saturdays   • amoxicillin (AMOXIL) 875 MG tablet No dose, route, or frequency recorded.   • aspirin 81 mg, Oral, Daily   • benazepril (LOTENSIN) 5 mg, Oral, Daily   • Budeson-Glycopyrrol-Formoterol (BREZTRI) 160-9-4.8 MCG/ACT aerosol inhaler 2 puffs, Inhalation, 2 Times Daily, Rinse and spit after using.   • Calcium Carb-Cholecalciferol (CALCIUM 500+D PO) 1 tablet, Oral, Daily   • carvedilol (COREG) 12.5 mg, Oral, 2 Times Daily With Meals   • cetirizine (ZYRTEC) 10 mg, Oral, Nightly   • clopidogrel (PLAVIX) 75 MG tablet TAKE 1 TABLET BY MOUTH EVERY DAY    • colchicine 0.6 MG tablet Take 1 tablet now and I tablet an hour later, then daily for the next 3 days   • famotidine (PEPCID) 40 mg, Oral, 2 Times Daily   • hydroCHLOROthiazide (HYDRODIURIL) 12.5 mg, Oral, Daily   • HYDROcodone-acetaminophen (NORCO) 5-325 MG per tablet 1 tablet, Oral, Every 6 Hours PRN   • hydrOXYzine pamoate (VISTARIL) 25 mg, Oral, 3 Times Daily PRN   • levothyroxine (SYNTHROID, LEVOTHROID) 50 mcg, Oral, Daily   • multivitamin with minerals tablet tablet 1 tablet, Oral, Daily   • ondansetron (ZOFRAN) 4 mg, Oral, Every 8 Hours PRN   • rosuvastatin (CRESTOR) 40 mg, Oral, Nightly   • traMADol (ULTRAM) 50 mg, Oral, Every 3 Hours PRN          Review of Systems   Constitutional: Negative.    HENT: Negative.    Eyes: Negative.    Respiratory: Negative.    Cardiovascular: Negative.    Gastrointestinal: Negative.    Endocrine: Negative.    Genitourinary: Negative.    Musculoskeletal: Positive for joint swelling.   Skin: Positive for color change.   Allergic/Immunologic: Negative.    Neurological: Positive for numbness.   Hematological:  "Negative.    Psychiatric/Behavioral: Negative.          /76   Pulse 65   Ht 160 cm (63\")   Wt 48.2 kg (106 lb 3.2 oz)   SpO2 100%   BMI 18.81 kg/m²   Physical Exam  Vitals and nursing note reviewed.   Constitutional:       Appearance: Normal appearance. She is well-developed and normal weight.   HENT:      Head: Normocephalic and atraumatic.   Eyes:      General: No scleral icterus.     Pupils: Pupils are equal, round, and reactive to light.   Neck:      Thyroid: No thyromegaly.      Vascular: No carotid bruit or JVD.   Cardiovascular:      Rate and Rhythm: Normal rate and regular rhythm.      Pulses:           Dorsalis pedis pulses are detected w/ Doppler on the right side and 0 on the left side.        Posterior tibial pulses are 0 on the right side and 0 on the left side.      Heart sounds: Murmur heard.    Crescendo systolic murmur is present.     Comments: Right: doppler peroneal/DP  Left: doppler peroneal  Pulmonary:      Effort: Pulmonary effort is normal.      Breath sounds: Normal breath sounds.   Abdominal:      General: Bowel sounds are normal. There is no distension or abdominal bruit.      Palpations: Abdomen is soft. There is no mass.      Tenderness: There is no abdominal tenderness.   Musculoskeletal:         General: Normal range of motion.      Cervical back: Neck supple.   Lymphadenopathy:      Cervical: No cervical adenopathy.   Skin:     General: Skin is warm and dry.   Neurological:      General: No focal deficit present.      Mental Status: She is alert and oriented to person, place, and time.      Cranial Nerves: No cranial nerve deficit.      Sensory: No sensory deficit.   Psychiatric:         Mood and Affect: Mood normal.         Behavior: Behavior normal.         Thought Content: Thought content normal.         Judgment: Judgment normal.         Diagnostic Data:  US Ankle / Brachial Indices Extremity Complete     Result Date: 10/12/2022  Narrative: EXAMINATION:  US ANKLE / " BRACHIAL INDICES EXTREMITY COMPLETE- 10/12/2022 1:39 PM CDT  HISTORY: Worsening claudication. I73.9-Peripheral vascular disease, unspecified.  COMPARISON: 5/19/2022.  TECHNIQUE: Pressure measurements and spectral Doppler imaging was performed.  FINDINGS: Pulses could not be identified on the left side and therefore there are no spectral waveforms on the left. The ankle-brachial index could not be obtained on the left. The right posterior tibial ankle brachial index is 0.2 and the right dorsalis pedis ankle brachial index is 0.2. On the previous study, these were both 0.6.       Impression: 1. Ankle brachial index values on the right are 0.2, decreased from 0.6 previously. The findings are consistent with severe right lower extremity arterial disease. 2. No pulses could be identified with ultrasound Doppler evaluation of the left foot. Previously, the ankle-brachial index on the left was 1.1. Therefore, there is significant change on the left side.  Results were called to the referring provider's office. I spoke with Anayeli, a physician assistant. I encouraged acute vascular referral. This report was finalized on 10/12/2022 15:19 by Dr. Ramesh Ortiz MD.     US Venous Doppler Lower Extremity Left (duplex)     Result Date: 10/12/2022  Narrative: US VENOUS DOPPLER LOWER EXTREMITY LEFT (DUPLEX)- 10/12/2022 1:39 PM CDT  HISTORY: left leg pain; M79.605-Pain in left leg  COMPARISON: NONE  FINDINGS: Transverse and longitudinal grayscale and Doppler sonographic images of the left lower extremity were obtained. The study is performed utilizing B-mode/grayscale imaging with spectral analysis and color flow images. This includes augmentation and compression imaging.  There is normal flow and compressibility of the left common femoral, superficial femoral, popliteal, peroneal, anterior tibial, and posterior tibial veins.       Impression: 1. Normal duplex ultrasound of the left lower extremity without evidence of DVT.  This  report was finalized on 10/12/2022 14:51 by Dr. Rigoberto Milian MD.        Patient Active Problem List   Diagnosis   • Spinal stenosis, cervical region   • Lung nodules   • Bronchiectasis without complication (HCC)   • Underweight   • Personal history of nicotine dependence   • Restrictive lung disease   • Pulmonary emphysema (HCC)   • PAD (peripheral artery disease) ([facility])   • Preop testing   • Gastroesophageal reflux disease   • Allergic rhinitis   • ORTIZ (dyspnea on exertion)   • CAD (coronary artery disease)   • Hyperlipidemia   • Neuropathy   • Hypertension   • Simple chronic bronchitis (HCC)   • Former smoker   • Lung nodule   • Mass of right lung   • Postoperative anemia due to acute blood loss   • Syncope and collapse   • Adenocarcinoma (HCC)   • Personal history of malignant neoplasm of bronchus and lung   • Hydropneumothorax   • Lower extremity embolism ([facility])         Visit Diagnosis       ICD-10-CM ICD-9-CM   1. PAD (peripheral artery disease) (HCC)  I73.9 443.9   2. Hyperlipidemia, unspecified hyperlipidemia type  E78.5 272.4   3. Primary hypertension  I10 401.9               Plan: After thoroughly evaluating Trinidad Zhu, I believe the best course of action is to proceed with a left lower extremity angiogram with left groin exposure.  Risks of angiogram were discussed.  These include, but are not limited to, bleeding, infection, vessel damage, nerve damage, embolus, and loss of limb.  The patient understands these risks and wishes to proceed with procedure.  I  Will see how she does and can address her right lower extremity following.  I will likely place her on anticoagulation postoperatively instead of Plavix.  I did encourage her to walk as much as possible.  I did discuss vascular risk factors as they pertain to the progression of vascular disease including controlling her hypertension and hyperlipidemia.  These risk factors are currently stable.  The patient can continue taking their current  medication regimen as previously planned.  This was all discussed in full with complete understanding.     Thank you for allowing me to participate in the care of your patient.  Please do not hesitate with any questions or concerns.  I will keep you aware of any further encounters with Trinidad Zhu.           Sincerely yours,           Sukhdev Condon, DO

## 2022-10-21 ENCOUNTER — HOSPITAL ENCOUNTER (OUTPATIENT)
Facility: HOSPITAL | Age: 77
Setting detail: SURGERY ADMIT
Discharge: HOME | End: 2022-10-21
Attending: SURGERY | Admitting: SURGERY

## 2022-10-21 ENCOUNTER — ANESTHESIA EVENT (OUTPATIENT)
Dept: PERIOP | Facility: HOSPITAL | Age: 77
End: 2022-10-21

## 2022-10-21 ENCOUNTER — APPOINTMENT (OUTPATIENT)
Dept: INTERVENTIONAL RADIOLOGY/VASCULAR | Facility: HOSPITAL | Age: 77
End: 2022-10-21

## 2022-10-21 ENCOUNTER — ANESTHESIA (OUTPATIENT)
Dept: PERIOP | Facility: HOSPITAL | Age: 77
End: 2022-10-21

## 2022-10-21 VITALS
OXYGEN SATURATION: 96 % | HEIGHT: 64 IN | RESPIRATION RATE: 18 BRPM | BODY MASS INDEX: 18.52 KG/M2 | TEMPERATURE: 98.4 F | SYSTOLIC BLOOD PRESSURE: 151 MMHG | WEIGHT: 108.47 LBS | HEART RATE: 71 BPM | DIASTOLIC BLOOD PRESSURE: 72 MMHG

## 2022-10-21 DIAGNOSIS — Z01.818 PREOP TESTING: ICD-10-CM

## 2022-10-21 DIAGNOSIS — I73.9 PAD (PERIPHERAL ARTERY DISEASE): ICD-10-CM

## 2022-10-21 PROCEDURE — 86900 BLOOD TYPING SEROLOGIC ABO: CPT | Performed by: SURGERY

## 2022-10-21 PROCEDURE — 25010000002 CEFAZOLIN PER 500 MG: Performed by: SURGERY

## 2022-10-21 PROCEDURE — 25010000002 IOPAMIDOL 61 % SOLUTION: Performed by: SURGERY

## 2022-10-21 PROCEDURE — C1760 CLOSURE DEV, VASC: HCPCS | Performed by: SURGERY

## 2022-10-21 PROCEDURE — C1884 EMBOLIZATION PROTECT SYST: HCPCS | Performed by: SURGERY

## 2022-10-21 PROCEDURE — C1714 CATH, TRANS ATHERECTOMY, DIR: HCPCS | Performed by: SURGERY

## 2022-10-21 PROCEDURE — 86850 RBC ANTIBODY SCREEN: CPT | Performed by: SURGERY

## 2022-10-21 PROCEDURE — C1769 GUIDE WIRE: HCPCS | Performed by: SURGERY

## 2022-10-21 PROCEDURE — 76000 FLUOROSCOPY <1 HR PHYS/QHP: CPT

## 2022-10-21 PROCEDURE — 25010000002 PROPOFOL 1000 MG/100ML EMULSION: Performed by: NURSE ANESTHETIST, CERTIFIED REGISTERED

## 2022-10-21 PROCEDURE — 75625 CONTRAST EXAM ABDOMINL AORTA: CPT

## 2022-10-21 PROCEDURE — 86901 BLOOD TYPING SEROLOGIC RH(D): CPT | Performed by: SURGERY

## 2022-10-21 PROCEDURE — 25010000002 HEPARIN (PORCINE) PER 1000 UNITS: Performed by: NURSE ANESTHETIST, CERTIFIED REGISTERED

## 2022-10-21 PROCEDURE — C2623 CATH, TRANSLUMIN, DRUG-COAT: HCPCS | Performed by: SURGERY

## 2022-10-21 PROCEDURE — 25010000002 HEPARIN (PORCINE) PER 1000 UNITS: Performed by: SURGERY

## 2022-10-21 PROCEDURE — C1887 CATHETER, GUIDING: HCPCS | Performed by: SURGERY

## 2022-10-21 PROCEDURE — C1874 STENT, COATED/COV W/DEL SYS: HCPCS | Performed by: SURGERY

## 2022-10-21 PROCEDURE — 37227 PR REVSC OPN/PRQ FEM/POP W/STNT/ATHRC/ANGIOP SM VSL: CPT | Performed by: SURGERY

## 2022-10-21 PROCEDURE — 75625 CONTRAST EXAM ABDOMINL AORTA: CPT | Performed by: SURGERY

## 2022-10-21 PROCEDURE — 75710 ARTERY X-RAYS ARM/LEG: CPT | Performed by: SURGERY

## 2022-10-21 PROCEDURE — C1894 INTRO/SHEATH, NON-LASER: HCPCS | Performed by: SURGERY

## 2022-10-21 PROCEDURE — C1725 CATH, TRANSLUMIN NON-LASER: HCPCS | Performed by: SURGERY

## 2022-10-21 PROCEDURE — 75710 ARTERY X-RAYS ARM/LEG: CPT

## 2022-10-21 DEVICE — STENTGR ENDOPROSTH VIABAHN RO HEP 6F 6MM 10X120CM: Type: IMPLANTABLE DEVICE | Site: GROIN | Status: FUNCTIONAL

## 2022-10-21 RX ORDER — PROPOFOL 10 MG/ML
INJECTION, EMULSION INTRAVENOUS AS NEEDED
Status: DISCONTINUED | OUTPATIENT
Start: 2022-10-21 | End: 2022-10-21 | Stop reason: SURG

## 2022-10-21 RX ORDER — IBUPROFEN 600 MG/1
600 TABLET ORAL ONCE AS NEEDED
Status: DISCONTINUED | OUTPATIENT
Start: 2022-10-21 | End: 2022-10-21 | Stop reason: HOSPADM

## 2022-10-21 RX ORDER — OXYCODONE AND ACETAMINOPHEN 10; 325 MG/1; MG/1
1 TABLET ORAL ONCE AS NEEDED
Status: DISCONTINUED | OUTPATIENT
Start: 2022-10-21 | End: 2022-10-21 | Stop reason: HOSPADM

## 2022-10-21 RX ORDER — FLUMAZENIL 0.1 MG/ML
0.2 INJECTION INTRAVENOUS AS NEEDED
Status: DISCONTINUED | OUTPATIENT
Start: 2022-10-21 | End: 2022-10-21 | Stop reason: HOSPADM

## 2022-10-21 RX ORDER — SODIUM CHLORIDE, SODIUM LACTATE, POTASSIUM CHLORIDE, CALCIUM CHLORIDE 600; 310; 30; 20 MG/100ML; MG/100ML; MG/100ML; MG/100ML
1000 INJECTION, SOLUTION INTRAVENOUS CONTINUOUS
Status: DISCONTINUED | OUTPATIENT
Start: 2022-10-21 | End: 2022-10-21 | Stop reason: HOSPADM

## 2022-10-21 RX ORDER — FENTANYL CITRATE 50 UG/ML
25 INJECTION, SOLUTION INTRAMUSCULAR; INTRAVENOUS
Status: DISCONTINUED | OUTPATIENT
Start: 2022-10-21 | End: 2022-10-21 | Stop reason: HOSPADM

## 2022-10-21 RX ORDER — BUPIVACAINE HYDROCHLORIDE 5 MG/ML
INJECTION, SOLUTION EPIDURAL; INTRACAUDAL AS NEEDED
Status: DISCONTINUED | OUTPATIENT
Start: 2022-10-21 | End: 2022-10-21 | Stop reason: HOSPADM

## 2022-10-21 RX ORDER — ONDANSETRON 2 MG/ML
4 INJECTION INTRAMUSCULAR; INTRAVENOUS ONCE AS NEEDED
Status: DISCONTINUED | OUTPATIENT
Start: 2022-10-21 | End: 2022-10-21 | Stop reason: HOSPADM

## 2022-10-21 RX ORDER — SODIUM CHLORIDE 0.9 % (FLUSH) 0.9 %
10 SYRINGE (ML) INJECTION EVERY 12 HOURS SCHEDULED
Status: DISCONTINUED | OUTPATIENT
Start: 2022-10-21 | End: 2022-10-21 | Stop reason: HOSPADM

## 2022-10-21 RX ORDER — SODIUM CHLORIDE 0.9 % (FLUSH) 0.9 %
10 SYRINGE (ML) INJECTION AS NEEDED
Status: DISCONTINUED | OUTPATIENT
Start: 2022-10-21 | End: 2022-10-21 | Stop reason: HOSPADM

## 2022-10-21 RX ORDER — SODIUM CHLORIDE 0.9 % (FLUSH) 0.9 %
3 SYRINGE (ML) INJECTION AS NEEDED
Status: DISCONTINUED | OUTPATIENT
Start: 2022-10-21 | End: 2022-10-21 | Stop reason: HOSPADM

## 2022-10-21 RX ORDER — LIDOCAINE HYDROCHLORIDE 10 MG/ML
0.5 INJECTION, SOLUTION EPIDURAL; INFILTRATION; INTRACAUDAL; PERINEURAL ONCE AS NEEDED
Status: DISCONTINUED | OUTPATIENT
Start: 2022-10-21 | End: 2022-10-21 | Stop reason: HOSPADM

## 2022-10-21 RX ORDER — DROPERIDOL 2.5 MG/ML
0.62 INJECTION, SOLUTION INTRAMUSCULAR; INTRAVENOUS ONCE AS NEEDED
Status: DISCONTINUED | OUTPATIENT
Start: 2022-10-21 | End: 2022-10-21 | Stop reason: HOSPADM

## 2022-10-21 RX ORDER — HEPARIN SODIUM 1000 [USP'U]/ML
INJECTION, SOLUTION INTRAVENOUS; SUBCUTANEOUS AS NEEDED
Status: DISCONTINUED | OUTPATIENT
Start: 2022-10-21 | End: 2022-10-21 | Stop reason: SURG

## 2022-10-21 RX ORDER — SODIUM CHLORIDE, SODIUM LACTATE, POTASSIUM CHLORIDE, CALCIUM CHLORIDE 600; 310; 30; 20 MG/100ML; MG/100ML; MG/100ML; MG/100ML
9 INJECTION, SOLUTION INTRAVENOUS CONTINUOUS
Status: DISCONTINUED | OUTPATIENT
Start: 2022-10-21 | End: 2022-10-21 | Stop reason: HOSPADM

## 2022-10-21 RX ORDER — LABETALOL HYDROCHLORIDE 5 MG/ML
5 INJECTION, SOLUTION INTRAVENOUS
Status: DISCONTINUED | OUTPATIENT
Start: 2022-10-21 | End: 2022-10-21 | Stop reason: HOSPADM

## 2022-10-21 RX ORDER — OXYCODONE AND ACETAMINOPHEN 7.5; 325 MG/1; MG/1
2 TABLET ORAL EVERY 4 HOURS PRN
Status: DISCONTINUED | OUTPATIENT
Start: 2022-10-21 | End: 2022-10-21 | Stop reason: HOSPADM

## 2022-10-21 RX ORDER — NALOXONE HCL 0.4 MG/ML
0.4 VIAL (ML) INJECTION AS NEEDED
Status: DISCONTINUED | OUTPATIENT
Start: 2022-10-21 | End: 2022-10-21 | Stop reason: HOSPADM

## 2022-10-21 RX ORDER — HYDROCODONE BITARTRATE AND ACETAMINOPHEN 5; 325 MG/1; MG/1
1 TABLET ORAL ONCE AS NEEDED
Status: DISCONTINUED | OUTPATIENT
Start: 2022-10-21 | End: 2022-10-21 | Stop reason: HOSPADM

## 2022-10-21 RX ORDER — DEXTROSE MONOHYDRATE 25 G/50ML
12.5 INJECTION, SOLUTION INTRAVENOUS AS NEEDED
Status: DISCONTINUED | OUTPATIENT
Start: 2022-10-21 | End: 2022-10-21 | Stop reason: HOSPADM

## 2022-10-21 RX ADMIN — RIVAROXABAN 20 MG: 20 TABLET, FILM COATED ORAL at 15:53

## 2022-10-21 RX ADMIN — HEPARIN SODIUM 4000 UNITS: 1000 INJECTION, SOLUTION INTRAVENOUS; SUBCUTANEOUS at 14:22

## 2022-10-21 RX ADMIN — PROPOFOL 70 MG: 10 INJECTION, EMULSION INTRAVENOUS at 14:09

## 2022-10-21 RX ADMIN — PROPOFOL 100 MCG/KG/MIN: 10 INJECTION, EMULSION INTRAVENOUS at 14:10

## 2022-10-21 RX ADMIN — HEPARIN SODIUM 1000 UNITS: 1000 INJECTION, SOLUTION INTRAVENOUS; SUBCUTANEOUS at 14:18

## 2022-10-21 RX ADMIN — SODIUM CHLORIDE, POTASSIUM CHLORIDE, SODIUM LACTATE AND CALCIUM CHLORIDE 1000 ML: 600; 310; 30; 20 INJECTION, SOLUTION INTRAVENOUS at 09:13

## 2022-10-21 NOTE — ANESTHESIA POSTPROCEDURE EVALUATION
"Patient: Trinidad Zhu    Procedure Summary     Date: 10/21/22 Room / Location:  [facility] OR  /  [facility] HYBRID OR 12    Anesthesia Start: 1409 Anesthesia Stop: 1543    Procedures:       LEFT LOWER EXTREMITY ANGIOGRAM WITH HAWK ATHRECTOMY, BALLOON ANGIOPLASTY, STENT PLACEMENT, MYNX CLOSURE (Left: Groin)      LEFT LOWER EXTREMITY ANGIOGRAM (Left: Groin) Diagnosis:       PAD (peripheral artery disease) (HCC)      Preop testing      (PAD (peripheral artery disease) (HCC) [I73.9])      (Preop testing [Z01.818])    Surgeons: Sukhdev Condon DO Provider: Vinicio Schmitt CRNA    Anesthesia Type: MAC ASA Status: 3          Anesthesia Type: MAC    Vitals  Vitals Value Taken Time   /78 10/21/22 1630   Temp 98.4 °F (36.9 °C) 10/21/22 1624   Pulse 74 10/21/22 1630   Resp 12 10/21/22 1624   SpO2 97 % 10/21/22 1630   Vitals shown include unvalidated device data.        Post Anesthesia Care and Evaluation    PONV Status: none  Comments: Patient d/c from PACU prior to anes eval based on Sahara score.  Please see RN notes for details of d/c criteria.    Blood pressure 167/79, pulse 76, temperature 98.4 °F (36.9 °C), resp. rate 12, height 162 cm (63.78\"), weight 49.2 kg (108 lb 7.5 oz), SpO2 99 %, not currently breastfeeding.          "

## 2022-10-21 NOTE — OP NOTE
Trinidad Zhu  10/21/2022     PREOPERATIVE DIAGNOSIS: PAD (peripheral artery disease) ([facility]) [I73.9]  Preop testing [Z01.818]     POSTOPERATIVE DIAGNOSIS: Post-Op Diagnosis Codes:     * PAD (peripheral artery disease) ([facility]) [I73.9]     * Preop testing [Z01.818]     PROCEDURE PERFORMED:   1.  Introduction of catheter/sheath into the aorta  2.  Aortoiliac angiogram with left lower extremity runoff  3.  Contralateral cannulation of the left common iliac artery  4.  Crossing of a long SFA in-stent chronic total occlusion  5.  Placement of a 5 mm spider distal embolic protection device in the below-knee popliteal artery  6.  Hawkone directional atherectomy of the entire SFA in-stent occlusion and above-knee popliteal artery  7.  Balloon angioplasty of the entire SFA stents and popliteal artery with a 5 x 150 mm and a 6 x 250 mm Medtronic drug-coated balloons.  Balloon angioplasty of the below-knee popliteal artery with a 4 x 60 mm EverCross balloon  8.  Placement of a 6 x 10 cm VBX covered stent graft in the proximal SFA  9.  Retrieval of the spider distal embolic protection device  10.  Completion left lower extremity angiogram with radiographic supervision and interpretation  11.  Minx closure of the right common femoral artery     SURGEON: Sukhdev Condon DO      ANESTHESIA: MAC    PREPARATION: Routine.    STAFF: Circulator: Dayana Matthew RN  Scrub Person: Apurva Padron Melissa K  Assistant: David Nobles  Vascular Radiology Technician: Kathleen Barboza    Estimated Blood Loss: minimal    SPECIMENS: None    COMPLICATIONS: None    INDICATIONS: Trinidad Zhu is a 77 y.o. female who we are following for lower extremity PAD.  She did undergo a left lower extremity angiogram on 11/9/2020, 3/26/2021, and last 08/06/2021.  She was having complaints of claudication to her right lower extremity.  She underwent a right lower extremity angiogram on 6/8/22 and subsequently developed ischemia of  left lower extremity and underwent thrombectomy of the left lower extremity later that evening. She is maintained on aspirin, Plavix, and Crestor.  She began having pain to bilateral lower extremities, most severe to the left lower extremity. She has some decreased sensation to the left foot with motor function. It is slightly cooler than the right.  She did have testing yesterday, which I did personally review.  The indications, risks, and possible complications of the procedure were explained to the patient, who voiced understanding and wished to proceed with surgery.     PROCEDURE IN DETAIL: The patient was taken to the operating room and placed on the operating table in a supine position. After MAC anesthesia was obtained, the bilateral groins was prepped and draped in a sterile manner.  5 cc of 0.5% Marcaine plain was used to infiltrate the right groin for local anesthesia.  Using a micropuncture technique the right common femoral artery was cannulated and a micro sheath was placed.  Advantage Glidewire was advanced into the aorta and a short 6 Icelandic sheath was placed.  The patient was given 1000 units of intravenous heparin.  The Omni Flush catheter was advanced to the aorta and an aortoiliac angiogram was performed.  Findings are as follows:  1.  Patent renal arteries bilaterally without stenosis  2.  Patent aorta without stenosis  3.  Patent iliac systems bilaterally without stenosis    Contralateral cannulation was established of the left common iliac artery.  The catheter was then brought down to the level of the femoral head.  An angiogram with runoff was performed.  Findings are as follows:  1.  Patent common femoral and profunda femoris arteries without stenosis  2.  Occluded superficial femoral artery stents with reconstitution of the popliteal artery just above the knee joint  3.  Patent below-knee popliteal artery without stenosis  4.  Patent one-vessel runoff to the foot by the peroneal artery.   The anterior tibial artery occluded in the mid calf.  The posterior tibial artery was occluded from the takeoff with no reconstitution.     At this point, the decision was made to try and revascularize the SFA stents.  A 7 Czech by 45 cm destination sheath was placed down into the proximal common femoral artery.  The patient was given 4000 units of intravenous heparin.  With the help of the Lucas cross catheter, the SFA stent occlusion was traversed.  An angiogram was performed distally to ensure that I was in true lumen.  A 5 mm spider distal embolic protection device was placed in the below-knee popliteal artery.  Hawkone directional atherectomy was then performed of the entire SFA stent occlusion as well as the proximal popliteal artery down to the knee joint.  Multiple passes were made achieving significant luminal gain.  Next the entire SFA and popliteal artery were balloon angioplastied with a 5 x 150 mm and a 6 x 250 mm Medtronic drug-coated balloons.  The below-knee popliteal artery down to the anterior tibial artery takeoff was also balloon angioplastied with a 4 x 60 mm EverCross balloon.  An 018 Gladius wire was exchanged.  Lastly, the proximal SFA was restented with a 6 x 10 cm VBX covered stent graft due to narrowing and irregularities.  It was postdilated appropriately.  The spider was successfully retrieved.  Completion angiogram was performed which showed rapid flow down through the stents without any residual stenosis, dissection, or occlusion.  There was still maintained one-vessel runoff to the foot.  At this point, I felt no further intervention was warranted.  The sheath and wire were removed.  A minx was used to seal off the right common femoral artery.  Direct pressure was held for an additional 10 to 15 minutes to help ensure hemostasis.  Sterile dressings were applied. The patient tolerated the procedure well. Sponge and needle counts were correct. The patient was then awakened in the  operating room and taken to the recovery room in good condition.    Sukhdev Condon,   Date: 10/21/2022 Time: 15:27 CDT     CC:Andrew Layne MD

## 2022-10-21 NOTE — ANESTHESIA PREPROCEDURE EVALUATION
Anesthesia Evaluation     Patient summary reviewed and Nursing notes reviewed   no history of anesthetic complications:  NPO Solid Status: > 8 hours  NPO Liquid Status: > 8 hours           Airway   Mallampati: I  TM distance: >3 FB  Neck ROM: full  No difficulty expected  Dental    (+) partials        Pulmonary    (+) a smoker Former, lung cancer (adenocarcinoma lung, s/p lobectomy), COPD, shortness of breath,   (-) asthma, sleep apnea  Cardiovascular   Exercise tolerance: poor (<4 METS)    ECG reviewed    (+) hypertension, CAD (60% blockage 20 years ago, medical management), PVD, hyperlipidemia,   (-) pacemaker, past MI, angina, cardiac stents    ROS comment: Stress test 5/2021  Adult Stress Echo W/ Cont or Stress Agent if Necessary Per Protocol     · Left ventricular ejection fraction appears to be 61 - 65%. Left   ventricular systolic function is normal.       LOW RISK FOR ISCHEMIA     Neuro/Psych  (-) seizures, TIA, CVA  GI/Hepatic/Renal/Endo    (+)  GERD,  thyroid problem hypothyroidism  (-) liver disease, no renal disease, diabetes    Musculoskeletal     (+) neck pain,   Abdominal    Substance History      OB/GYN          Other   arthritis,                        Anesthesia Plan    ASA 3     MAC     intravenous induction     Anesthetic plan, risks, benefits, and alternatives have been provided, discussed and informed consent has been obtained with: patient.

## 2022-10-22 ENCOUNTER — NURSE TRIAGE (OUTPATIENT)
Dept: CALL CENTER | Facility: HOSPITAL | Age: 77
End: 2022-10-22

## 2022-10-22 NOTE — TELEPHONE ENCOUNTER
"Referred to call Dr. hollingsworth now    Reason for Disposition  • [1] Caller has URGENT question AND [2] triager unable to answer question    Additional Information  • Negative: Sounds like a life-threatening emergency to the triager  • Negative: Chest pain  • Negative: Difficulty breathing  • Negative: Acting confused (e.g., disoriented, slurred speech) or excessively sleepy  • Negative: Surgical incision symptoms and questions  • Negative: [1] Discomfort (pain, burning or stinging) when passing urine AND [2] male  • Negative: [1] Discomfort (pain, burning or stinging) when passing urine AND [2] female  • Negative: Constipation  • Negative: New or worsening leg (calf, thigh) pain  • Negative: New or worsening leg swelling  • Negative: Dizziness is severe, or persists > 24 hours after surgery  • Negative: Pain, redness, swelling, or pus at IV Site  • Negative: Symptoms arising from use of a urinary catheter (Quan or Coude)  • Negative: Cast problems or questions  • Negative: Medication question  • Negative: [1] Widespread rash AND [2] bright red, sunburn-like  • Negative: [1] SEVERE headache AND [2] after spinal (epidural) anesthesia  • Negative: [1] Vomiting AND [2] persists > 4 hours  • Negative: [1] Vomiting AND [2] abdomen looks much more swollen than usual  • Negative: [1] Drinking very little AND [2] dehydration suspected (e.g., no urine > 12 hours, very dry mouth, very lightheaded)  • Negative: Patient sounds very sick or weak to the triager  • Negative: Sounds like a serious complication to the triager  • Negative: Fever > 100.4 F (38.0 C)  • Negative: [1] SEVERE post-op pain (e.g., excruciating, pain scale 8-10) AND [2] not controlled with pain medications    Answer Assessment - Initial Assessment Questions  1. SYMPTOM: \"What's the main symptom you're concerned about?\" (e.g., pain, fever, vomiting)      Left foot throbbing with sharp pains  2. ONSET: \"When did thsi  start?\"      Today  3. SURGERY: \"What " "surgery was performed?\"       Left angiogram  4. DATE of SURGERY: \"When was surgery performed?\"        10/21/22  5. ANESTHESIA: \" What type of anesthesia did you have?\" (e.g., general, spinal, epidural, local)      general  6. PAIN: \"Is there any pain?\" If Yes, ask: \"How bad is it?\"  (Scale 1-10; or mild, moderate, severe)      5  7. FEVER: \"Do you have a fever?\" If Yes, ask: \"What is your temperature, how was it measured, and when did it start?\"      afebrile  8. VOMITING: \"Is there any vomiting?\" If yes, ask: \"How many times?\"      none  9. BLEEDING: \"Is there any bleeding?\" If Yes, ask: \"How much?\" and \"Where?\"      non3  10. OTHER SYMPTOMS: \"Do you have any other symptoms?\" (e.g., drainage from wound, painful urination, constipation)        none    Protocols used: POST-OP SYMPTOMS AND QUESTIONS-ADULT-AH      "

## 2022-10-27 ENCOUNTER — TELEPHONE (OUTPATIENT)
Dept: VASCULAR SURGERY | Facility: CLINIC | Age: 77
End: 2022-10-27

## 2022-10-27 NOTE — TELEPHONE ENCOUNTER
----- Message from GEREMIAS Head sent at 10/24/2022  6:20 PM CDT -----  Regarding: RE: Meds  Xarelto 2.5 mg daily, Plavix, and aspirin.  Yes, Dr. Condon wants on those three meds  ----- Message -----  From: Maia Vale MA  Sent: 10/24/2022   4:33 PM CDT  To: GEREMIAS Head  Subject: Meds                                             What blood thinners is this patient currently taking?  Gracy from Dr Sanchez's called.  Please advise.

## 2022-10-27 NOTE — TELEPHONE ENCOUNTER
Contacted pt of blood thinner medications.  Pt confirmed that she does take these medications at this current time.  Contacted Dr Sanchez's office of the current blood thinners that pt is currently taking.

## 2022-11-02 ENCOUNTER — APPOINTMENT (OUTPATIENT)
Dept: CT IMAGING | Facility: HOSPITAL | Age: 77
End: 2022-11-02

## 2022-11-03 ENCOUNTER — TELEPHONE (OUTPATIENT)
Dept: VASCULAR SURGERY | Facility: CLINIC | Age: 77
End: 2022-11-03

## 2022-11-03 ENCOUNTER — TELEPHONE (OUTPATIENT)
Dept: PODIATRY | Facility: CLINIC | Age: 77
End: 2022-11-03

## 2022-11-03 NOTE — TELEPHONE ENCOUNTER
Patient called to cancer her apt with kellie hilton on 11/04. She stated she would call back to reschedule as soon as she know whether or not she wont be tied up for the next few days.

## 2022-11-09 ENCOUNTER — APPOINTMENT (OUTPATIENT)
Dept: CT IMAGING | Facility: HOSPITAL | Age: 77
End: 2022-11-09

## 2022-11-13 ENCOUNTER — HOSPITAL ENCOUNTER (EMERGENCY)
Facility: HOSPITAL | Age: 77
Discharge: HOME | End: 2022-11-13
Attending: EMERGENCY MEDICINE | Admitting: EMERGENCY MEDICINE

## 2022-11-13 VITALS
SYSTOLIC BLOOD PRESSURE: 132 MMHG | DIASTOLIC BLOOD PRESSURE: 85 MMHG | BODY MASS INDEX: 18.27 KG/M2 | RESPIRATION RATE: 16 BRPM | HEART RATE: 92 BPM | OXYGEN SATURATION: 99 % | TEMPERATURE: 98.3 F | WEIGHT: 107 LBS | HEIGHT: 64 IN

## 2022-11-13 DIAGNOSIS — R04.0 EPISTAXIS: Primary | ICD-10-CM

## 2022-11-13 LAB
ALBUMIN SERPL-MCNC: 4.5 G/DL (ref 3.5–5.2)
ALBUMIN/GLOB SERPL: 1.9 G/DL
ALP SERPL-CCNC: 49 U/L (ref 39–117)
ALT SERPL W P-5'-P-CCNC: 20 U/L (ref 1–33)
ANION GAP SERPL CALCULATED.3IONS-SCNC: 9 MMOL/L (ref 5–15)
APTT PPP: 39.8 SECONDS (ref 24.1–35)
AST SERPL-CCNC: 33 U/L (ref 1–32)
BASOPHILS # BLD AUTO: 0.03 10*3/MM3 (ref 0–0.2)
BASOPHILS NFR BLD AUTO: 0.5 % (ref 0–1.5)
BILIRUB SERPL-MCNC: 0.4 MG/DL (ref 0–1.2)
BUN SERPL-MCNC: 15 MG/DL (ref 8–23)
BUN/CREAT SERPL: 16.1 (ref 7–25)
CALCIUM SPEC-SCNC: 9.5 MG/DL (ref 8.6–10.5)
CHLORIDE SERPL-SCNC: 99 MMOL/L (ref 98–107)
CO2 SERPL-SCNC: 31 MMOL/L (ref 22–29)
CREAT SERPL-MCNC: 0.93 MG/DL (ref 0.57–1)
DEPRECATED RDW RBC AUTO: 48.5 FL (ref 37–54)
EGFRCR SERPLBLD CKD-EPI 2021: 63.8 ML/MIN/1.73
EOSINOPHIL # BLD AUTO: 0.22 10*3/MM3 (ref 0–0.4)
EOSINOPHIL NFR BLD AUTO: 3.7 % (ref 0.3–6.2)
ERYTHROCYTE [DISTWIDTH] IN BLOOD BY AUTOMATED COUNT: 13.4 % (ref 12.3–15.4)
GLOBULIN UR ELPH-MCNC: 2.4 GM/DL
GLUCOSE SERPL-MCNC: 117 MG/DL (ref 65–99)
HCT VFR BLD AUTO: 33.8 % (ref 34–46.6)
HGB BLD-MCNC: 11.4 G/DL (ref 12–15.9)
IMM GRANULOCYTES # BLD AUTO: 0.01 10*3/MM3 (ref 0–0.05)
IMM GRANULOCYTES NFR BLD AUTO: 0.2 % (ref 0–0.5)
INR PPP: 1.59 (ref 0.91–1.09)
LYMPHOCYTES # BLD AUTO: 2.34 10*3/MM3 (ref 0.7–3.1)
LYMPHOCYTES NFR BLD AUTO: 39.1 % (ref 19.6–45.3)
MCH RBC QN AUTO: 33.2 PG (ref 26.6–33)
MCHC RBC AUTO-ENTMCNC: 33.7 G/DL (ref 31.5–35.7)
MCV RBC AUTO: 98.5 FL (ref 79–97)
MONOCYTES # BLD AUTO: 0.67 10*3/MM3 (ref 0.1–0.9)
MONOCYTES NFR BLD AUTO: 11.2 % (ref 5–12)
NEUTROPHILS NFR BLD AUTO: 2.71 10*3/MM3 (ref 1.7–7)
NEUTROPHILS NFR BLD AUTO: 45.3 % (ref 42.7–76)
NRBC BLD AUTO-RTO: 0 /100 WBC (ref 0–0.2)
PLATELET # BLD AUTO: 200 10*3/MM3 (ref 140–450)
PMV BLD AUTO: 9.2 FL (ref 6–12)
POTASSIUM SERPL-SCNC: 4 MMOL/L (ref 3.5–5.2)
PROT SERPL-MCNC: 6.9 G/DL (ref 6–8.5)
PROTHROMBIN TIME: 18.3 SECONDS (ref 11.9–14.6)
RBC # BLD AUTO: 3.43 10*6/MM3 (ref 3.77–5.28)
SODIUM SERPL-SCNC: 139 MMOL/L (ref 136–145)
WBC NRBC COR # BLD: 5.98 10*3/MM3 (ref 3.4–10.8)

## 2022-11-13 PROCEDURE — 99283 EMERGENCY DEPT VISIT LOW MDM: CPT

## 2022-11-13 PROCEDURE — 85730 THROMBOPLASTIN TIME PARTIAL: CPT | Performed by: EMERGENCY MEDICINE

## 2022-11-13 PROCEDURE — 80053 COMPREHEN METABOLIC PANEL: CPT | Performed by: EMERGENCY MEDICINE

## 2022-11-13 PROCEDURE — 85610 PROTHROMBIN TIME: CPT | Performed by: EMERGENCY MEDICINE

## 2022-11-13 PROCEDURE — 85025 COMPLETE CBC W/AUTO DIFF WBC: CPT | Performed by: EMERGENCY MEDICINE

## 2022-11-13 RX ORDER — AMOXICILLIN AND CLAVULANATE POTASSIUM 875; 125 MG/1; MG/1
1 TABLET, FILM COATED ORAL EVERY 12 HOURS
Qty: 14 TABLET | Refills: 0 | Status: SHIPPED | OUTPATIENT
Start: 2022-11-13 | End: 2022-12-05

## 2022-11-13 RX ORDER — SODIUM CHLORIDE 0.9 % (FLUSH) 0.9 %
10 SYRINGE (ML) INJECTION AS NEEDED
Status: DISCONTINUED | OUTPATIENT
Start: 2022-11-13 | End: 2022-11-14 | Stop reason: HOSPADM

## 2022-11-13 RX ORDER — OXYMETAZOLINE HYDROCHLORIDE 0.05 G/100ML
2 SPRAY NASAL 2 TIMES DAILY
Status: DISCONTINUED | OUTPATIENT
Start: 2022-11-13 | End: 2022-11-14 | Stop reason: HOSPADM

## 2022-11-13 RX ADMIN — Medication 2 SPRAY: at 20:50

## 2022-11-14 NOTE — ED PROVIDER NOTES
Subjective   History of Present Illness  Patient complains of a nosebleed from her right nares that started this morning and is continued all day.  It sometimes it comes out in clots.  She has not had any cough or congestion or fever or chills.  She was recently started on a third blood thinner for vascular disease in her legs.  She did have a nosebleed many years ago that had to be cauterized but has not had any recently.    History provided by:  Patient and spouse   used: No    Nose Bleed  Location:  R nare  Severity:  Moderate  Duration:  12 hours  Timing:  Constant  Progression:  Unchanged  Chronicity:  New  Context: anticoagulants    Context: not aspirin use, not drug use and not home oxygen    Relieved by:  Nothing  Worsened by:  Nothing  Ineffective treatments:  None tried  Associated symptoms: no blood in oropharynx, no cough, no facial pain, no fever, no headaches, no sinus pain, no sneezing and no sore throat    Risk factors: no alcohol use, no change in medication, no head and neck surgery, no head and neck tumor, no intranasal steroids, no radiation treatment, no recent chemotherapy and no recent nasal surgery        Review of Systems   Constitutional: Negative.  Negative for fever.   HENT: Positive for nosebleeds. Negative for sinus pain, sneezing and sore throat.    Eyes: Negative.    Respiratory: Negative.  Negative for cough.    Cardiovascular: Negative.    Gastrointestinal: Negative.    Genitourinary: Negative.    Musculoskeletal: Negative.    Skin: Negative.    Neurological: Negative.  Negative for headaches.   Psychiatric/Behavioral: Negative.    All other systems reviewed and are negative.      Past Medical History:   Diagnosis Date   • Antral ulcer    • Bladder cystocele    • C. difficile diarrhea    • CAD (coronary artery disease)    • Cancer (HCC)     lung 2021   • Colon polyp    • COPD (chronic obstructive pulmonary disease) (HCC)    • COVID-19 vaccine series completed     • Diarrhea    • Difficulty swallowing    • Disease of thyroid gland    • Diverticulosis    • Elevated cholesterol    • Gastritis    • Generalized OA    • GERD (gastroesophageal reflux disease)    • History of bladder surgery 01/07/2020   • History of transfusion     after lung surgery 2021   • Hyperlipidemia    • Hypertension    • Liver cyst    • Lung nodule    • Microscopic colitis    • Multiple gastric ulcers     last 5 years ago   • Neck pain    • Neuropathy    • Normal body mass index    • NSAID induced gastritis    • Ulcer of pyloric antrum     UNSPECIFIED ULCER CHRONICITY   • UTI (urinary tract infection)    • Weight loss        Allergies   Allergen Reactions   • Baclofen Other (See Comments)     MUSCULOSKELETAL THERAPY AGENTS knocked her out for a day    Other reaction(s): Unknown   • Gabapentin Confusion     Other reaction(s): over sedation   • Sulfa Antibiotics Rash     Mouth blisters   • Sulfacetamide Sodium Rash   • Amitriptyline Hcl Confusion     Other reaction(s): ambulation difficulties/confusion   • Niferex [Iron Polysaccharide] Swelling     LIP SWELLING   • Oxycodone-Acetaminophen GI Intolerance     Other reaction(s): vomiting       Past Surgical History:   Procedure Laterality Date   • ANTERIOR CERVICAL DISCECTOMY W/ FUSION N/A 5/22/2017    Procedure: CERVICAL DISCECTOMY ANTERIOR FUSION WITH INSTRUMENTATION;  Surgeon: NADINE Sarabia MD;  Location: [facility] OR;  Service:    • AORTAGRAM Left 11/9/2020    Procedure: left lower extremtiy angiogram, hawk athrectomy, balloon angioplasty, stent placement, mynx closure;  Surgeon: Sukhdev Condon DO;  Location: [facility] HYBRID OR 12;  Service: Vascular;  Laterality: Left;   • AORTAGRAM Left 3/26/2021    Procedure: LEFT LOWER EXTREMITY ANGIOGRAM, BALLOON ANGIOPLASTY, STENT PLACEMENT, MYNX CLOSURE;  Surgeon: Sukhdev Condon DO;  Location: [facility] HYBRID OR 12;  Service: Vascular;  Laterality: Left;   • AORTAGRAM Left 8/6/2021    Procedure: LEFT LOWER  EXTREMITY ANGIOGRAM, HAWK ATHERECTOMY, BALLOON ANGIOPLASTY, MYNX CLOSURE;  Surgeon: Sukhdev Condon DO;  Location:  [facility] HYBRID OR 12;  Service: Vascular;  Laterality: Left;   • AORTAGRAM Right 6/8/2022    Procedure: RIGHT LOWER EXTREMITY ANGIOGRAM, INTRAVASCULAR LITHOTRIPSY, HAWK ATHRECTOMY, BALLOON ANGIOPLASTY, MYNX CLOSURE;  Surgeon: Sukhdev Condon DO;  Location:  [facility] HYBRID OR 12;  Service: Vascular;  Laterality: Right;   • AORTAGRAM Left 10/21/2022    Procedure: LEFT LOWER EXTREMITY ANGIOGRAM WITH HAWK ATHRECTOMY, BALLOON ANGIOPLASTY, STENT PLACEMENT, MYNX CLOSURE;  Surgeon: Sukhdev Condon DO;  Location:  [facility] HYBRID OR 12;  Service: Vascular;  Laterality: Left;   • BLADDER SUSPENSION      also had pelvic reconstructive surgery   • BREAST EXCISIONAL BIOPSY Right 1985   • CATARACT EXTRACTION, BILATERAL     • CERVICAL CORPECTOMY N/A 5/22/2017    Procedure:  ANTERIOR CERVICAL DISCECTOMY FUSION C-6 to T1,  ANTERIOR FUSION WITH INSTRUMENTATION C-5 T-1;  Surgeon: NADINE Sarabia MD;  Location: [facility] OR;  Service:    • COLONOSCOPY  08/19/2015    TIC BX RT COLON   • COLONOSCOPY  12/04/2013    RT COLON BX RECALL 5YR   • COLONOSCOPY N/A 11/29/2016    The entire examined colon is normal; No specimens collected   • COLONOSCOPY N/A 6/2/2022    Procedure: COLONOSCOPY WITH ANESTHESIA;  Surgeon: Marco Antonio Vallejo MD;  Location: [facility] ENDOSCOPY;  Service: Gastroenterology;  Laterality: N/A;  Pre: screen  Post: diverticulosis  Andrew Layne MD   • ENDOSCOPY  12/03/2015    HEALED ULCER SLANT BX   • FEMORAL ENDARTERECTOMY Left 10/21/2022    Procedure: LEFT LOWER EXTREMITY ANGIOGRAM;  Surgeon: Sukhdev Condon DO;  Location: [facility] HYBRID OR 12;  Service: Vascular;  Laterality: Left;   • HYSTERECTOMY     • LEG THROMBECTOMY/EMBOLECTOMY Left 6/8/2022    Procedure: LOWER EXTREMITY THROMBECTOMY/EMBOLECTOMY, PATCH GRAFT TO  FEMORAL ARTERY;  Surgeon: Sukhdev Condon DO;  Location: [facility] HYBRID OR 12;   Service: Vascular;  Laterality: Left;   • LOBECTOMY Right 2021    Procedure: RIGHT THORACOSCOPY WITH DAVINCI ROBOT, RIGHT UPPER LOBE LOBECTOMY;  Surgeon: Jarad Ignacio MD;  Location: [facility] OR;  Service: Robotics - DaVinci;  Laterality: Right;   • LUNG SURGERY     • OTHER SURGICAL HISTORY      KNEE CARTILAGE REMOVED   • OTHER SURGICAL HISTORY      BENIGN FIBROID TUMOR OF BREAST 1985 REMOVED   • TONSILLECTOMY         Family History   Problem Relation Age of Onset   • Breast cancer Maternal Aunt    • Heart disease Mother    • Heart disease Father    • GI problems Neg Hx         MALIGNANCIES   • Colon cancer Neg Hx    • Colon polyps Neg Hx        Social History     Socioeconomic History   • Marital status:    Tobacco Use   • Smoking status: Former     Packs/day: 1.50     Years: 20.00     Pack years: 30.00     Types: Cigarettes     Quit date:      Years since quittin.8   • Smokeless tobacco: Never   Vaping Use   • Vaping Use: Never used   Substance and Sexual Activity   • Alcohol use: Yes     Comment: occasionally   • Drug use: No   • Sexual activity: Not Currently       Prior to Admission medications    Medication Sig Start Date End Date Taking? Authorizing Provider   albuterol sulfate  (90 Base) MCG/ACT inhaler Inhale 2 puffs Every 4 (Four) Hours As Needed for Wheezing.    ProviderMary Lou MD   alendronate (FOSAMAX) 70 MG tablet Take 70 mg by mouth Every 7 (Seven) Days.    Mary Lou Rodas MD   amoxicillin (AMOXIL) 875 MG tablet  22   Mary Lou Rodas MD   aspirin 81 MG chewable tablet Chew 81 mg Daily.    ProviderMary Lou MD   benazepril (LOTENSIN) 5 MG tablet Take 5 mg by mouth Daily. 19   Mary Lou Rodas MD   Budeson-Glycopyrrol-Formoterol (BREZTRI) 160-9-4.8 MCG/ACT aerosol inhaler Inhale 2 puffs 2 (Two) Times a Day. Rinse and spit after using. 22   Dion Cortés MD   Calcium Carb-Cholecalciferol (CALCIUM 500+D PO)  Take 1 tablet by mouth Daily.    Mary Lou Rodas MD   carvedilol (COREG) 12.5 MG tablet Take 12.5 mg by mouth 2 (Two) Times a Day With Meals.    Mary Lou Rodas MD   cetirizine (ZyrTEC) 10 MG tablet Take 10 mg by mouth Every Night.    Mary Lou Rodas MD   clopidogrel (PLAVIX) 75 MG tablet TAKE 1 TABLET BY MOUTH EVERY DAY  6/14/21   Silvia Herrera APRN   famotidine (PEPCID) 40 MG tablet Take 40 mg by mouth 2 (Two) Times a Day. 4/15/19   Mary Lou Rodas MD   hydroCHLOROthiazide (HYDRODIURIL) 12.5 MG tablet Take 12.5 mg by mouth Daily.    Mary Lou Rodas MD   HYDROcodone-acetaminophen (NORCO) 5-325 MG per tablet Take 1 tablet by mouth Every 6 (Six) Hours As Needed for Moderate Pain . 6/9/22   Sukhdev Condon DO   hydrOXYzine pamoate (VISTARIL) 25 MG capsule Take 1 capsule by mouth 3 (Three) Times a Day As Needed for Anxiety for up to 30 days. 10/18/22 11/17/22  Brittni Christianson APRN   levothyroxine (SYNTHROID, LEVOTHROID) 50 MCG tablet Take 50 mcg by mouth Daily. 5/8/19   Mary Lou Rodas MD   multivitamin with minerals tablet tablet Take 1 tablet by mouth Daily.    Mary Lou Rodas MD   ondansetron (ZOFRAN) 4 MG tablet Take 4 mg by mouth Every 8 (Eight) Hours As Needed for Nausea or Vomiting.    Mary Lou Rodas MD   rivaroxaban (XARELTO) 2.5 MG tablet Take 1 tablet by mouth Daily for 30 days. 10/21/22 11/20/22  Sukhdev Condon DO   Rivaroxaban (Xarelto) 2.5 MG tablet Take 1 tablet by mouth Daily With Dinner. 10/21/22   Paul Richards MD   rosuvastatin (CRESTOR) 40 MG tablet Take 40 mg by mouth Every Night. 9/2/21   Mary Lou Rodas MD   traMADol (ULTRAM) 50 MG tablet Take 50 mg by mouth Every 3 (Three) Hours As Needed for Moderate Pain  or Severe Pain . 5/28/19   Mary Lou Rodas MD       Medications   sodium chloride 0.9 % flush 10 mL (has no administration in time range)   oxymetazoline (AFRIN) nasal spray 2 spray (2 sprays Each Nare  Given 11/13/22 2050)       Vitals:    11/13/22 2353   BP: 132/85   Pulse: 92   Resp:    Temp:    SpO2: 99%         Objective   Physical Exam  Vitals and nursing note reviewed.   Constitutional:       Appearance: Normal appearance.   HENT:      Head: Normocephalic and atraumatic.      Nose:      Comments: Patient is having active maroonish bleeding and bright red bleeding from her right nares     Mouth/Throat:      Mouth: Mucous membranes are moist.      Pharynx: Oropharynx is clear.   Cardiovascular:      Rate and Rhythm: Normal rate and regular rhythm.   Pulmonary:      Effort: Pulmonary effort is normal.      Breath sounds: Normal breath sounds.   Neurological:      General: No focal deficit present.      Mental Status: She is alert and oriented to person, place, and time.   Psychiatric:         Mood and Affect: Mood normal.         Behavior: Behavior normal.         Epistaxis Management    Date/Time: 11/13/2022 9:19 PM  Performed by: Garland Spivey Jr., MD  Authorized by: Garland Spivey Jr., MD     Consent:     Consent obtained:  Verbal    Consent given by:  Patient    Risks, benefits, and alternatives were discussed: yes      Risks discussed:  Infection, nasal injury, pain and bleeding    Alternatives discussed:  Alternative treatment  Universal protocol:     Procedure explained and questions answered to patient or proxy's satisfaction: yes      Relevant documents present and verified: yes      Test results available: yes      Imaging studies available: yes      Required blood products, implants, devices, and special equipment available: yes      Site/side marked: yes      Immediately prior to procedure, a time out was called: yes      Patient identity confirmed:  Verbally with patient and arm band  Anesthesia:     Anesthesia method:  None  Procedure details:     Treatment site:  R anterior    Treatment method:  Nasal balloon    Treatment complexity:  Limited    Treatment episode: initial    Post-procedure  details:     Assessment:  Bleeding stopped    Procedure completion:  Tolerated             Lab Results (last 24 hours)     Procedure Component Value Units Date/Time    CBC & Differential [[id number]]  (Abnormal) Collected: 11/13/22 2023    Specimen: Blood Updated: 11/13/22 2044    Narrative:      The following orders were created for panel order CBC & Differential.  Procedure                               Abnormality         Status                     ---------                               -----------         ------                     CBC Auto Differential[[id number]]        Abnormal            Final result                 Please view results for these tests on the individual orders.    Comprehensive Metabolic Panel [[id number]]  (Abnormal) Collected: 11/13/22 2023    Specimen: Blood Updated: 11/13/22 2101     Glucose 117 mg/dL      BUN 15 mg/dL      Creatinine 0.93 mg/dL      Sodium 139 mmol/L      Potassium 4.0 mmol/L      Chloride 99 mmol/L      CO2 31.0 mmol/L      Calcium 9.5 mg/dL      Total Protein 6.9 g/dL      Albumin 4.50 g/dL      ALT (SGPT) 20 U/L      AST (SGOT) 33 U/L      Alkaline Phosphatase 49 U/L      Total Bilirubin 0.4 mg/dL      Globulin 2.4 gm/dL      A/G Ratio 1.9 g/dL      BUN/Creatinine Ratio 16.1     Anion Gap 9.0 mmol/L      eGFR 63.8 mL/min/1.73      Comment: National Kidney Foundation and American Society of Nephrology (ASN) Task Force recommended calculation based on the Chronic Kidney Disease Epidemiology Collaboration (CKD-EPI) equation refit without adjustment for race.       Narrative:      GFR Normal >60  Chronic Kidney Disease <60  Kidney Failure <15    The GFR formula is only valid for adults with stable renal function between ages 18 and 70.    Protime-INR [[id number]]  (Abnormal) Collected: 11/13/22 2023    Specimen: Blood Updated: 11/13/22 2054     Protime 18.3 Seconds      INR 1.59    aPTT [[id number]]  (Abnormal) Collected: 11/13/22 2023    Specimen: Blood Updated: 11/13/22  2054     PTT 39.8 seconds     CBC Auto Differential [[id number]]  (Abnormal) Collected: 11/13/22 2023    Specimen: Blood Updated: 11/13/22 2044     WBC 5.98 10*3/mm3      RBC 3.43 10*6/mm3      Hemoglobin 11.4 g/dL      Hematocrit 33.8 %      MCV 98.5 fL      MCH 33.2 pg      MCHC 33.7 g/dL      RDW 13.4 %      RDW-SD 48.5 fl      MPV 9.2 fL      Platelets 200 10*3/mm3      Neutrophil % 45.3 %      Lymphocyte % 39.1 %      Monocyte % 11.2 %      Eosinophil % 3.7 %      Basophil % 0.5 %      Immature Grans % 0.2 %      Neutrophils, Absolute 2.71 10*3/mm3      Lymphocytes, Absolute 2.34 10*3/mm3      Monocytes, Absolute 0.67 10*3/mm3      Eosinophils, Absolute 0.22 10*3/mm3      Basophils, Absolute 0.03 10*3/mm3      Immature Grans, Absolute 0.01 10*3/mm3      nRBC 0.0 /100 WBC           No orders to display       ED Course  ED Course as of 11/14/22 0013   Sun Nov 13, 2022 2227 The patient bled around the rapid Rhino rocket and was having blood continue to grow in her posterior pharynx.  I remove the Rhino Rocket and have now packed the nose with Vaseline gauze.  This seems to stop it for the time being.  We will watch her little bit. [TR]   2320 I spoke with Tremaine of ENT and then with Dr. Miller.  After putting the Vaseline gauze and the patient seem to stop the anterior bleeding.  She still had some blood posteriorly but they tell me that this is to be expected somewhat.  We did give her some ice water here that seem to stop a lot of it and right now she seems to be much more stable.  Patient does want to go home and feels comfortable in home.  Dr. Loznao had to stop all blood thinners for at least 1 day since they are not for any life-threatening loss but just as a treatment for her PAD.  I did send in prescriptions for Augmentin and Bactroban ointment.  Dr. Miller's office will call her tomorrow with an appointment.  She is discharged in stable condition [TR]   Mon Nov 14, 2022   0012 Bleeding seems to have  stopped anteriorly.  There is still little bit of blood that started on the back and she spit some out but it is much improved from what it was previously.  I spoke again with Tremaine of ENT and they tell me that this is somewhat expected with on the blood thinner she is on and he may never get it completely stopped.  We did give her some ice water here and then also seem to help.  The present on the patient is comfortable going home.-Or just to watch and see if the bleeding gets worse again to come back and we can also put a different packing in the right now she seems to be stable.  Dr. Miller's office will call her tomorrow with an appointment.  We will put her on some antibiotics and ointment.  She is discharged in stable condition. [TR]      ED Course User Index  [TR] Garland Spivey Jr., MD          MDM  Number of Diagnoses or Management Options  Epistaxis: new and requires workup     Amount and/or Complexity of Data Reviewed  Clinical lab tests: ordered and reviewed  Decide to obtain previous medical records or to obtain history from someone other than the patient: yes  Discuss the patient with other providers: yes    Risk of Complications, Morbidity, and/or Mortality  Presenting problems: moderate  Diagnostic procedures: moderate  Management options: moderate    Patient Progress  Patient progress: stable      Final diagnoses:   Epistaxis          Garland Spivey Jr., MD  11/14/22 0013

## 2022-11-16 ENCOUNTER — OFFICE VISIT (OUTPATIENT)
Dept: OTOLARYNGOLOGY | Facility: CLINIC | Age: 77
End: 2022-11-16

## 2022-11-16 VITALS
SYSTOLIC BLOOD PRESSURE: 103 MMHG | HEART RATE: 82 BPM | RESPIRATION RATE: 16 BRPM | DIASTOLIC BLOOD PRESSURE: 77 MMHG | TEMPERATURE: 96.8 F | WEIGHT: 107 LBS | BODY MASS INDEX: 18.27 KG/M2 | HEIGHT: 64 IN

## 2022-11-16 DIAGNOSIS — R04.0 EPISTAXIS: Primary | ICD-10-CM

## 2022-11-16 PROCEDURE — 31237 NSL/SINS NDSC SURG BX POLYPC: CPT | Performed by: NURSE PRACTITIONER

## 2022-11-16 NOTE — PATIENT INSTRUCTIONS
Use afrin once for nose bleed and apply pressure. If unable to control, go to ER.  Use nasal ointment as directed  Continue oral Augmentin as directed  Call with any new/worsening problems or concerns      Antibiotic ointment or Vasoline to anterior nares twice a day. Salt water spray or gel to nose every 2 hours and as needed. Hypertension control is important, keeping systolic pressures less than 140 is possible. If epistaxis present, hold all ASA or NSAIDs. Use Afrin or Neosynephrine spray if epistaxis returns, Hold direct pressure to the fleshy portion of the nose for five minutes. If epistaxis continues, repeat and hold pressure for another five minutes. If bleeding continues, call the office or go to the emergency room for evaluation.

## 2022-11-17 NOTE — PROGRESS NOTES
YOB: 1945  Location: [facility]  Location Address: [address] [place] [address], [facility], [address] [place] [address]  Location Phone: [phone]    Chief Complaint   Patient presents with   • removal of rhino rocket   • Nose Bleed       History of Present Illness  Trinidad Zhu is a 77 y.o. female.  Trinidad Zhu is here for evaluation of ENT complaints. The patient presented to the ER 3 days ago for epistaxis from the right nostril. This was controlled and she was sent home with nasal packing, nasal ointment, and Augmentin.    She reports that she did not have any additional nose bleeds while home.    She is currently taking plavix and aspirin.     Past Medical History:   Diagnosis Date   • Antral ulcer    • Bladder cystocele    • C. difficile diarrhea    • CAD (coronary artery disease)    • Cancer (HCC)     lung    • Colon polyp    • COPD (chronic obstructive pulmonary disease) (HCC)    • COVID-19 vaccine series completed    • Diarrhea    • Difficulty swallowing    • Disease of thyroid gland    • Diverticulosis    • Elevated cholesterol    • Gastritis    • Generalized OA    • GERD (gastroesophageal reflux disease)    • History of bladder surgery 2020   • History of transfusion     after lung surgery    • Hyperlipidemia    • Hypertension    • Liver cyst    • Lung nodule    • Microscopic colitis    • Multiple gastric ulcers     last 5 years ago   • Neck pain    • Neuropathy    • Normal body mass index    • NSAID induced gastritis    • Ulcer of pyloric antrum     UNSPECIFIED ULCER CHRONICITY   • UTI (urinary tract infection)    • Weight loss        Past Surgical History:   Procedure Laterality Date   • ANTERIOR CERVICAL DISCECTOMY W/ FUSION N/A 2017    Procedure: CERVICAL DISCECTOMY ANTERIOR FUSION WITH INSTRUMENTATION;  Surgeon: NADINE Sarabia MD;  Location: [facility];  Service:    • AORTAGRAM Left 2020    Procedure: left lower extremtiy angiogram, hawk athrectomy, balloon  angioplasty, stent placement, mynx closure;  Surgeon: Sukhdev Condon DO;  Location:  [facility];  Service: Vascular;  Laterality: Left;   • AORTAGRAM Left 3/26/2021    Procedure: LEFT LOWER EXTREMITY ANGIOGRAM, BALLOON ANGIOPLASTY, STENT PLACEMENT, MYNX CLOSURE;  Surgeon: Sukhdev Condon DO;  Location:  [facility];  Service: Vascular;  Laterality: Left;   • AORTAGRAM Left 8/6/2021    Procedure: LEFT LOWER EXTREMITY ANGIOGRAM, HAWK ATHERECTOMY, BALLOON ANGIOPLASTY, MYNX CLOSURE;  Surgeon: Sukhdev Condon DO;  Location:  [facility];  Service: Vascular;  Laterality: Left;   • AORTAGRAM Right 6/8/2022    Procedure: RIGHT LOWER EXTREMITY ANGIOGRAM, INTRAVASCULAR LITHOTRIPSY, HAWK ATHRECTOMY, BALLOON ANGIOPLASTY, MYNX CLOSURE;  Surgeon: Sukhdev Condon DO;  Location:  [facility];  Service: Vascular;  Laterality: Right;   • AORTAGRAM Left 10/21/2022    Procedure: LEFT LOWER EXTREMITY ANGIOGRAM WITH HAWK ATHRECTOMY, BALLOON ANGIOPLASTY, STENT PLACEMENT, MYNX CLOSURE;  Surgeon: Sukhdev Condon DO;  Location:  [facility];  Service: Vascular;  Laterality: Left;   • BLADDER SUSPENSION      also had pelvic reconstructive surgery   • BREAST EXCISIONAL BIOPSY Right 1985   • CATARACT EXTRACTION, BILATERAL     • CERVICAL CORPECTOMY N/A 5/22/2017    Procedure:  ANTERIOR CERVICAL DISCECTOMY FUSION C-6 to T1,  ANTERIOR FUSION WITH INSTRUMENTATION C-5 T-1;  Surgeon: NADINE Sarabia MD;  Location: [facility] OR;  Service:    • COLONOSCOPY  08/19/2015    TIC BX RT COLON   • COLONOSCOPY  12/04/2013    RT COLON BX RECALL 5YR   • COLONOSCOPY N/A 11/29/2016    The entire examined colon is normal; No specimens collected   • COLONOSCOPY N/A 6/2/2022    Procedure: COLONOSCOPY WITH ANESTHESIA;  Surgeon: Marco Antonio Vallejo MD;  Location: [facility];  Service: Gastroenterology;  Laterality: N/A;  Pre: screen  Post: diverticulosis  Andrew Layne MD   • ENDOSCOPY  12/03/2015     HEALED ULCER SLANT BX   • FEMORAL ENDARTERECTOMY Left 10/21/2022    Procedure: LEFT LOWER EXTREMITY ANGIOGRAM;  Surgeon: Sukhdev Condon DO;  Location:  [facility];  Service: Vascular;  Laterality: Left;   • HYSTERECTOMY     • LEG THROMBECTOMY/EMBOLECTOMY Left 6/8/2022    Procedure: LOWER EXTREMITY THROMBECTOMY/EMBOLECTOMY, PATCH GRAFT TO  FEMORAL ARTERY;  Surgeon: Sukhdev Condon DO;  Location:  [facility];  Service: Vascular;  Laterality: Left;   • LOBECTOMY Right 11/5/2021    Procedure: RIGHT THORACOSCOPY WITH DAVINCI ROBOT, RIGHT UPPER LOBE LOBECTOMY;  Surgeon: Jarad Ignacio MD;  Location:  [facility];  Service: Robotics - DaVinci;  Laterality: Right;   • LUNG SURGERY     • OTHER SURGICAL HISTORY      KNEE CARTILAGE REMOVED   • OTHER SURGICAL HISTORY      BENIGN FIBROID TUMOR OF BREAST 1985 REMOVED   • TONSILLECTOMY         Outpatient Medications Marked as Taking for the 11/16/22 encounter (Office Visit) with Tremaine Calderon APRN   Medication Sig Dispense Refill   • albuterol sulfate  (90 Base) MCG/ACT inhaler Inhale 2 puffs Every 4 (Four) Hours As Needed for Wheezing.     • alendronate (FOSAMAX) 70 MG tablet Take 70 mg by mouth Every 7 (Seven) Days. Saturdays  3   • amoxicillin-clavulanate (AUGMENTIN) 875-125 MG per tablet Take 1 tablet by mouth Every 12 (Twelve) Hours. 14 tablet 0   • aspirin 81 MG chewable tablet Chew 81 mg Daily.     • benazepril (LOTENSIN) 5 MG tablet Take 5 mg by mouth Daily.     • Budeson-Glycopyrrol-Formoterol (BREZTRI) 160-9-4.8 MCG/ACT aerosol inhaler Inhale 2 puffs 2 (Two) Times a Day. Rinse and spit after using. 1 each 0   • Calcium Carb-Cholecalciferol (CALCIUM 500+D PO) Take 1 tablet by mouth Daily.     • carvedilol (COREG) 12.5 MG tablet Take 12.5 mg by mouth 2 (Two) Times a Day With Meals.     • cetirizine (ZyrTEC) 10 MG tablet Take 10 mg by mouth Every Night.     • clopidogrel (PLAVIX) 75 MG tablet TAKE 1 TABLET BY MOUTH EVERY DAY  30 tablet 4   •  famotidine (PEPCID) 40 MG tablet Take 40 mg by mouth 2 (Two) Times a Day.  3   • hydroCHLOROthiazide (HYDRODIURIL) 12.5 MG tablet Take 12.5 mg by mouth Daily.     • hydrOXYzine pamoate (VISTARIL) 25 MG capsule Take 1 capsule by mouth 3 (Three) Times a Day As Needed for Anxiety for up to 30 days. 45 capsule 2   • levothyroxine (SYNTHROID, LEVOTHROID) 50 MCG tablet Take 50 mcg by mouth Daily.  3   • multivitamin with minerals tablet tablet Take 1 tablet by mouth Daily.     • mupirocin (BACTROBAN) 2 % ointment Apply 1 application topically to the appropriate area as directed 3 (Three) Times a Day. 15 g 0   • rivaroxaban (XARELTO) 2.5 MG tablet Take 1 tablet by mouth Daily for 30 days. 60 tablet 5   • rosuvastatin (CRESTOR) 40 MG tablet Take 40 mg by mouth Every Night.     • traMADol (ULTRAM) 50 MG tablet Take 50 mg by mouth Every 3 (Three) Hours As Needed for Moderate Pain  or Severe Pain .  0   • [DISCONTINUED] mupirocin (BACTROBAN) 2 % ointment Apply 1 application topically to the appropriate area as directed 3 (Three) Times a Day. 15 g 0   • [DISCONTINUED] mupirocin (BACTROBAN) 2 % ointment Apply 1 application topically to the appropriate area as directed 3 (Three) Times a Day. 15 g 0   • [DISCONTINUED] Rivaroxaban (Xarelto) 2.5 MG tablet Take 1 tablet by mouth Daily With Dinner. 30 tablet 0       Baclofen, Gabapentin, Sulfa antibiotics, Sulfacetamide sodium, Amitriptyline hcl, Niferex [iron polysaccharide], and Oxycodone-acetaminophen    Family History   Problem Relation Age of Onset   • Breast cancer Maternal Aunt    • Heart disease Mother    • Heart disease Father    • GI problems Neg Hx         MALIGNANCIES   • Colon cancer Neg Hx    • Colon polyps Neg Hx        Social History     Socioeconomic History   • Marital status:    Tobacco Use   • Smoking status: Former     Packs/day: 1.50     Years: 20.00     Pack years: 30.00     Types: Cigarettes     Quit date:      Years since quittin.8   •  Smokeless tobacco: Never   Vaping Use   • Vaping Use: Never used   Substance and Sexual Activity   • Alcohol use: Yes     Comment: occasionally   • Drug use: No   • Sexual activity: Not Currently       Review of Systems   Constitutional: Negative.    HENT: Positive for nosebleeds.    Eyes: Negative.    Respiratory: Negative.    Cardiovascular: Negative.    Gastrointestinal: Negative.    Endocrine: Negative.    Genitourinary: Negative.    Musculoskeletal: Negative.    Allergic/Immunologic: Negative.    Neurological: Negative.        Vitals:    11/16/22 1447   BP: 103/77   Pulse: 82   Resp: 16   Temp: 96.8 °F (36 °C)       Body mass index is 18.66 kg/m².    Objective     Physical Exam  Vitals reviewed.   Constitutional:       Appearance: Normal appearance. She is normal weight.   HENT:      Head: Normocephalic and atraumatic.      Right Ear: Hearing and external ear normal.      Left Ear: Hearing and external ear normal.      Nose:      Comments: Nasal packing noted in the right nostril with dried blood surrounding. Removed without difficulty. No active bleeding noted.     Mouth/Throat:      Lips: Pink.      Mouth: Mucous membranes are moist.      Pharynx: Oropharynx is clear.   Musculoskeletal:      Cervical back: Full passive range of motion without pain.   Neurological:      Mental Status: She is alert.   Psychiatric:         Behavior: Behavior is cooperative.     Nasal endoscopy with right nasal debridement    Date/Time: 11/16/2022 8:33 PM  Performed by: Tremaine Calderon APRN  Authorized by: Tremaine Calderon APRN     Procedure details:     Debridement location:  Right nasal debridement  Nasal cavity:     Normal inferior turbinate: with dried blood.    Sinus/ Nasopharynx:     Right middle meatus: normal and patent    Post-procedure details:     Patient tolerance of procedure:  Tolerated well  Comments:      No evidence of active bleeding noted        Assessment & Plan   Diagnoses and all orders for this  visit:    1. Epistaxis (Primary)    Other orders  -     Discontinue: mupirocin (BACTROBAN) 2 % ointment; Apply 1 application topically to the appropriate area as directed 3 (Three) Times a Day.  Dispense: 15 g; Refill: 0  -     mupirocin (BACTROBAN) 2 % ointment; Apply 1 application topically to the appropriate area as directed 3 (Three) Times a Day.  Dispense: 15 g; Refill: 0  -     $ Nasal Endsocopy with Debridement      * Surgery not found *  Orders Placed This Encounter   Procedures   • $ Nasal Endsocopy with Debridement     This order was created via procedure documentation     Order Specific Question:   Release to patient     Answer:   Routine Release     Use afrin once for nose bleed and apply pressure. If unable to control, go to ER.  Use nasal ointment as directed  Nasal saline spray  Continue oral Augmentin as directed  Call with any new/worsening problems or concerns    Return in about 3 weeks (around 12/7/2022).       Patient Instructions   Use afrin once for nose bleed and apply pressure. If unable to control, go to ER.  Use nasal ointment as directed  Continue oral Augmentin as directed  Call with any new/worsening problems or concerns      Antibiotic ointment or Vasoline to anterior nares twice a day. Salt water spray or gel to nose every 2 hours and as needed. Hypertension control is important, keeping systolic pressures less than 140 is possible. If epistaxis present, hold all ASA or NSAIDs. Use Afrin or Neosynephrine spray if epistaxis returns, Hold direct pressure to the fleshy portion of the nose for five minutes. If epistaxis continues, repeat and hold pressure for another five minutes. If bleeding continues, call the office or go to the emergency room for evaluation.

## 2022-11-30 ENCOUNTER — TELEPHONE (OUTPATIENT)
Dept: OTOLARYNGOLOGY | Facility: CLINIC | Age: 77
End: 2022-11-30

## 2022-11-30 ENCOUNTER — TELEPHONE (OUTPATIENT)
Dept: VASCULAR SURGERY | Facility: CLINIC | Age: 77
End: 2022-11-30

## 2022-11-30 NOTE — TELEPHONE ENCOUNTER
Spoke to patient and told her Dr Condon prescribed medication twice daily.  Pt states she took her last pill today and needs a new one sent to [facility] so insurance will allow her to get a refill.  GEREMIAS Head sent in new Rx to pt's pharmacy.

## 2022-11-30 NOTE — TELEPHONE ENCOUNTER
----- Message from Sukhdev Condon DO sent at 11/29/2022  3:17 PM CST -----  It should be twice daily  ----- Message -----  From: Maia Vale MA  Sent: 11/29/2022  12:55 PM CST  To: GEREMIAS Head    Pt called and asked if she is supposed to take Xarelto 2.5 one time daily or twice daily?  The script that was written when she had her last procedure was 1 time daily.  Please let me know.

## 2022-12-05 ENCOUNTER — LAB (OUTPATIENT)
Dept: LAB | Facility: HOSPITAL | Age: 77
End: 2022-12-05

## 2022-12-05 ENCOUNTER — OFFICE VISIT (OUTPATIENT)
Dept: ONCOLOGY | Facility: CLINIC | Age: 77
End: 2022-12-05

## 2022-12-05 VITALS
OXYGEN SATURATION: 98 % | HEART RATE: 70 BPM | HEIGHT: 64 IN | DIASTOLIC BLOOD PRESSURE: 62 MMHG | SYSTOLIC BLOOD PRESSURE: 130 MMHG | RESPIRATION RATE: 18 BRPM | TEMPERATURE: 98.1 F | WEIGHT: 109 LBS | BODY MASS INDEX: 18.61 KG/M2

## 2022-12-05 DIAGNOSIS — C34.11 PRIMARY CANCER OF RIGHT UPPER LOBE OF LUNG: Primary | ICD-10-CM

## 2022-12-05 DIAGNOSIS — D50.8 IRON DEFICIENCY ANEMIA SECONDARY TO INADEQUATE DIETARY IRON INTAKE: ICD-10-CM

## 2022-12-05 DIAGNOSIS — R51.9 FREQUENT HEADACHES: ICD-10-CM

## 2022-12-05 DIAGNOSIS — C34.11 PRIMARY CANCER OF RIGHT UPPER LOBE OF LUNG: ICD-10-CM

## 2022-12-05 DIAGNOSIS — C80.1 ADENOCARCINOMA: Primary | Chronic | ICD-10-CM

## 2022-12-05 DIAGNOSIS — R41.3 MEMORY CHANGE: ICD-10-CM

## 2022-12-05 LAB
ALBUMIN SERPL-MCNC: 4.4 G/DL (ref 3.5–5.2)
ALBUMIN/GLOB SERPL: 2 G/DL
ALP SERPL-CCNC: 49 U/L (ref 39–117)
ALT SERPL W P-5'-P-CCNC: 17 U/L (ref 1–33)
ANION GAP SERPL CALCULATED.3IONS-SCNC: 9 MMOL/L (ref 5–15)
AST SERPL-CCNC: 28 U/L (ref 1–32)
BASOPHILS # BLD AUTO: 0.03 10*3/MM3 (ref 0–0.2)
BASOPHILS NFR BLD AUTO: 0.5 % (ref 0–1.5)
BILIRUB SERPL-MCNC: 0.4 MG/DL (ref 0–1.2)
BUN SERPL-MCNC: 13 MG/DL (ref 8–23)
BUN/CREAT SERPL: 18.6 (ref 7–25)
CALCIUM SPEC-SCNC: 9.3 MG/DL (ref 8.6–10.5)
CHLORIDE SERPL-SCNC: 100 MMOL/L (ref 98–107)
CO2 SERPL-SCNC: 32 MMOL/L (ref 22–29)
CREAT SERPL-MCNC: 0.7 MG/DL (ref 0.57–1)
DEPRECATED RDW RBC AUTO: 54 FL (ref 37–54)
EGFRCR SERPLBLD CKD-EPI 2021: 89.8 ML/MIN/1.73
EOSINOPHIL # BLD AUTO: 0.21 10*3/MM3 (ref 0–0.4)
EOSINOPHIL NFR BLD AUTO: 3.2 % (ref 0.3–6.2)
ERYTHROCYTE [DISTWIDTH] IN BLOOD BY AUTOMATED COUNT: 14.1 % (ref 12.3–15.4)
FERRITIN SERPL-MCNC: 42.83 NG/ML (ref 13–150)
FOLATE SERPL-MCNC: >20 NG/ML (ref 4.78–24.2)
GLOBULIN UR ELPH-MCNC: 2.2 GM/DL
GLUCOSE SERPL-MCNC: 96 MG/DL (ref 65–99)
HCT VFR BLD AUTO: 34.2 % (ref 34–46.6)
HGB BLD-MCNC: 11.1 G/DL (ref 12–15.9)
HOLD SPECIMEN: NORMAL
IMM GRANULOCYTES # BLD AUTO: 0.02 10*3/MM3 (ref 0–0.05)
IMM GRANULOCYTES NFR BLD AUTO: 0.3 % (ref 0–0.5)
IRON 24H UR-MRATE: 77 MCG/DL (ref 37–145)
IRON SATN MFR SERPL: 16 % (ref 20–50)
LYMPHOCYTES # BLD AUTO: 2 10*3/MM3 (ref 0.7–3.1)
LYMPHOCYTES NFR BLD AUTO: 30.7 % (ref 19.6–45.3)
MCH RBC QN AUTO: 34 PG (ref 26.6–33)
MCHC RBC AUTO-ENTMCNC: 32.5 G/DL (ref 31.5–35.7)
MCV RBC AUTO: 104.9 FL (ref 79–97)
MONOCYTES # BLD AUTO: 0.79 10*3/MM3 (ref 0.1–0.9)
MONOCYTES NFR BLD AUTO: 12.1 % (ref 5–12)
NEUTROPHILS NFR BLD AUTO: 3.46 10*3/MM3 (ref 1.7–7)
NEUTROPHILS NFR BLD AUTO: 53.2 % (ref 42.7–76)
NRBC BLD AUTO-RTO: 0 /100 WBC (ref 0–0.2)
PLATELET # BLD AUTO: 174 10*3/MM3 (ref 140–450)
PMV BLD AUTO: 9.3 FL (ref 6–12)
POTASSIUM SERPL-SCNC: 3.6 MMOL/L (ref 3.5–5.2)
PROT SERPL-MCNC: 6.6 G/DL (ref 6–8.5)
RBC # BLD AUTO: 3.26 10*6/MM3 (ref 3.77–5.28)
RETICS # AUTO: 0.1 10*6/MM3 (ref 0.02–0.13)
RETICS/RBC NFR AUTO: 3.05 % (ref 0.7–1.9)
SODIUM SERPL-SCNC: 141 MMOL/L (ref 136–145)
TIBC SERPL-MCNC: 481 MCG/DL (ref 298–536)
TRANSFERRIN SERPL-MCNC: 323 MG/DL (ref 200–360)
VIT B12 BLD-MCNC: 742 PG/ML (ref 211–946)
WBC NRBC COR # BLD: 6.51 10*3/MM3 (ref 3.4–10.8)

## 2022-12-05 PROCEDURE — 82607 VITAMIN B-12: CPT

## 2022-12-05 PROCEDURE — 82746 ASSAY OF FOLIC ACID SERUM: CPT

## 2022-12-05 PROCEDURE — 85045 AUTOMATED RETICULOCYTE COUNT: CPT

## 2022-12-05 PROCEDURE — 99214 OFFICE O/P EST MOD 30 MIN: CPT | Performed by: INTERNAL MEDICINE

## 2022-12-05 PROCEDURE — 80053 COMPREHEN METABOLIC PANEL: CPT

## 2022-12-05 PROCEDURE — 84466 ASSAY OF TRANSFERRIN: CPT

## 2022-12-05 PROCEDURE — 83540 ASSAY OF IRON: CPT

## 2022-12-05 PROCEDURE — 36415 COLL VENOUS BLD VENIPUNCTURE: CPT

## 2022-12-05 PROCEDURE — 82728 ASSAY OF FERRITIN: CPT

## 2022-12-05 PROCEDURE — 85025 COMPLETE CBC W/AUTO DIFF WBC: CPT

## 2022-12-05 RX ORDER — FERROUS SULFATE 325(65) MG
325 TABLET ORAL
Qty: 60 TABLET | Refills: 2 | Status: CANCELLED | OUTPATIENT
Start: 2022-12-05

## 2022-12-07 ENCOUNTER — TELEPHONE (OUTPATIENT)
Dept: ONCOLOGY | Facility: CLINIC | Age: 77
End: 2022-12-07

## 2022-12-07 NOTE — TELEPHONE ENCOUNTER
Received call back from patient Trinidad Zhu, she was informed Dr Palacios has reviewed her recent iron Profile and notes iron stores of 16%. Dr Palacios is wanted to send prescription of oral Iron tabs Ferrous sulfate take as prescribed.   Patient v/u, says she has taken oral iron in the past and is aware of possible side effects. She questions if her PCP Dr Layne can prescribe the iron tablets, she has a f/u apt tim tomorrow 12/8/22,   Per patient request Dr Layne will address her iron needs.

## 2022-12-08 ENCOUNTER — OFFICE VISIT (OUTPATIENT)
Dept: OTOLARYNGOLOGY | Facility: CLINIC | Age: 77
End: 2022-12-08

## 2022-12-08 VITALS
HEIGHT: 64 IN | WEIGHT: 109 LBS | SYSTOLIC BLOOD PRESSURE: 133 MMHG | BODY MASS INDEX: 18.61 KG/M2 | TEMPERATURE: 97.1 F | HEART RATE: 73 BPM | RESPIRATION RATE: 16 BRPM | DIASTOLIC BLOOD PRESSURE: 70 MMHG

## 2022-12-08 DIAGNOSIS — R04.0 EPISTAXIS: Primary | ICD-10-CM

## 2022-12-08 DIAGNOSIS — J34.89 NASAL SORE: ICD-10-CM

## 2022-12-08 PROCEDURE — 99213 OFFICE O/P EST LOW 20 MIN: CPT | Performed by: NURSE PRACTITIONER

## 2022-12-08 NOTE — PROGRESS NOTES
YOB: 1945  Location: [facility]  Location Address: [address] [place] [address], [facility], [address] [place] [address]  Location Phone: [phone]    Chief Complaint   Patient presents with   • Nose Bleed     Most mornings there is dry blood in her nose       History of Present Illness  Trinidad Zhu is a 77 y.o. female.  Trinidad Zhu is here for follow up of ENT complaints. The patient has had problems with epistaxis.  The symptoms are localized to the right nasal cavity. She states that she has not had any active bleed since her last visit. She does have some dried blood when she blows her nose in the morning. She only used the mupirocin for a few days.      Past Medical History:   Diagnosis Date   • Antral ulcer    • Bladder cystocele    • C. difficile diarrhea    • CAD (coronary artery disease)    • Cancer (HCC)     lung    • Colon polyp    • COPD (chronic obstructive pulmonary disease) (HCC)    • COVID-19 vaccine series completed    • Diarrhea    • Difficulty swallowing    • Disease of thyroid gland    • Diverticulosis    • Elevated cholesterol    • Gastritis    • Generalized OA    • GERD (gastroesophageal reflux disease)    • History of bladder surgery 2020   • History of transfusion     after lung surgery    • Hyperlipidemia    • Hypertension    • Liver cyst    • Lung nodule    • Microscopic colitis    • Multiple gastric ulcers     last 5 years ago   • Neck pain    • Neuropathy    • Normal body mass index    • NSAID induced gastritis    • Ulcer of pyloric antrum     UNSPECIFIED ULCER CHRONICITY   • UTI (urinary tract infection)    • Weight loss        Past Surgical History:   Procedure Laterality Date   • ANTERIOR CERVICAL DISCECTOMY W/ FUSION N/A 2017    Procedure: CERVICAL DISCECTOMY ANTERIOR FUSION WITH INSTRUMENTATION;  Surgeon: NADINE Sarabia MD;  Location: [facility];  Service:    • AORTAGRAM Left 2020    Procedure: left lower extremtiy angiogram, hawk athrectomy,  balloon angioplasty, stent placement, mynx closure;  Surgeon: Sukhdev Condon DO;  Location:  [facility];  Service: Vascular;  Laterality: Left;   • AORTAGRAM Left 3/26/2021    Procedure: LEFT LOWER EXTREMITY ANGIOGRAM, BALLOON ANGIOPLASTY, STENT PLACEMENT, MYNX CLOSURE;  Surgeon: Sukhdev Condon DO;  Location:  [facility];  Service: Vascular;  Laterality: Left;   • AORTAGRAM Left 8/6/2021    Procedure: LEFT LOWER EXTREMITY ANGIOGRAM, HAWK ATHERECTOMY, BALLOON ANGIOPLASTY, MYNX CLOSURE;  Surgeon: Sukhdev Condon DO;  Location:  [facility];  Service: Vascular;  Laterality: Left;   • AORTAGRAM Right 6/8/2022    Procedure: RIGHT LOWER EXTREMITY ANGIOGRAM, INTRAVASCULAR LITHOTRIPSY, HAWK ATHRECTOMY, BALLOON ANGIOPLASTY, MYNX CLOSURE;  Surgeon: Sukhdev Condon DO;  Location:  [facility];  Service: Vascular;  Laterality: Right;   • AORTAGRAM Left 10/21/2022    Procedure: LEFT LOWER EXTREMITY ANGIOGRAM WITH HAWK ATHRECTOMY, BALLOON ANGIOPLASTY, STENT PLACEMENT, MYNX CLOSURE;  Surgeon: Sukhdev Condon DO;  Location:  [facility];  Service: Vascular;  Laterality: Left;   • BLADDER SUSPENSION      also had pelvic reconstructive surgery   • BREAST EXCISIONAL BIOPSY Right 1985   • CATARACT EXTRACTION, BILATERAL     • CERVICAL CORPECTOMY N/A 5/22/2017    Procedure:  ANTERIOR CERVICAL DISCECTOMY FUSION C-6 to T1,  ANTERIOR FUSION WITH INSTRUMENTATION C-5 T-1;  Surgeon: NADINE Sarabia MD;  Location: [facility] OR;  Service:    • COLONOSCOPY  08/19/2015    TIC BX RT COLON   • COLONOSCOPY  12/04/2013    RT COLON BX RECALL 5YR   • COLONOSCOPY N/A 11/29/2016    The entire examined colon is normal; No specimens collected   • COLONOSCOPY N/A 6/2/2022    Procedure: COLONOSCOPY WITH ANESTHESIA;  Surgeon: Marco Antonio Vallejo MD;  Location: [facility] ENDOSCOPY;  Service: Gastroenterology;  Laterality: N/A;  Pre: screen  Post: diverticulosis  Andrew Layne MD   • ENDOSCOPY   12/03/2015    HEALED ULCER SLANT BX   • FEMORAL ENDARTERECTOMY Left 10/21/2022    Procedure: LEFT LOWER EXTREMITY ANGIOGRAM;  Surgeon: Sukhdev Condon DO;  Location:  [facility];  Service: Vascular;  Laterality: Left;   • HYSTERECTOMY     • LEG THROMBECTOMY/EMBOLECTOMY Left 6/8/2022    Procedure: LOWER EXTREMITY THROMBECTOMY/EMBOLECTOMY, PATCH GRAFT TO  FEMORAL ARTERY;  Surgeon: Sukhdev Condon DO;  Location:  [facility];  Service: Vascular;  Laterality: Left;   • LOBECTOMY Right 11/5/2021    Procedure: RIGHT THORACOSCOPY WITH DAVINCI ROBOT, RIGHT UPPER LOBE LOBECTOMY;  Surgeon: Jarad Ignacio MD;  Location:  [facility];  Service: Robotics - DaVinci;  Laterality: Right;   • LUNG SURGERY     • OTHER SURGICAL HISTORY      KNEE CARTILAGE REMOVED   • OTHER SURGICAL HISTORY      BENIGN FIBROID TUMOR OF BREAST 1985 REMOVED   • TONSILLECTOMY         Outpatient Medications Marked as Taking for the 12/8/22 encounter (Office Visit) with Tremaine Calderon APRN   Medication Sig Dispense Refill   • albuterol sulfate  (90 Base) MCG/ACT inhaler Inhale 2 puffs Every 4 (Four) Hours As Needed for Wheezing.     • alendronate (FOSAMAX) 70 MG tablet Take 70 mg by mouth Every 7 (Seven) Days. Saturdays  3   • aspirin 81 MG chewable tablet Chew 81 mg Daily.     • benazepril (LOTENSIN) 5 MG tablet Take 5 mg by mouth Daily.     • Budeson-Glycopyrrol-Formoterol (BREZTRI) 160-9-4.8 MCG/ACT aerosol inhaler Inhale 2 puffs 2 (Two) Times a Day. Rinse and spit after using. 1 each 0   • Calcium Carb-Cholecalciferol (CALCIUM 500+D PO) Take 1 tablet by mouth Daily.     • carvedilol (COREG) 12.5 MG tablet Take 12.5 mg by mouth 2 (Two) Times a Day With Meals.     • cetirizine (ZyrTEC) 10 MG tablet Take 10 mg by mouth Every Night.     • clopidogrel (PLAVIX) 75 MG tablet TAKE 1 TABLET BY MOUTH EVERY DAY  30 tablet 4   • famotidine (PEPCID) 40 MG tablet Take 40 mg by mouth 2 (Two) Times a Day.  3   • hydroCHLOROthiazide  (HYDRODIURIL) 12.5 MG tablet Take 12.5 mg by mouth Daily.     • hydrOXYzine pamoate (VISTARIL) 25 MG capsule Take 1 capsule by mouth 3 (Three) Times a Day As Needed for Anxiety for up to 30 days. 45 capsule 2   • levothyroxine (SYNTHROID, LEVOTHROID) 50 MCG tablet Take 50 mcg by mouth Daily.  3   • multivitamin with minerals tablet tablet Take 1 tablet by mouth Daily.     • Rivaroxaban (XARELTO) 2.5 MG tablet Take 1 tablet by mouth 2 (Two) Times a Day for 30 days. 60 tablet 5   • rosuvastatin (CRESTOR) 40 MG tablet Take 40 mg by mouth Every Night.     • traMADol (ULTRAM) 50 MG tablet Take 50 mg by mouth Every 3 (Three) Hours As Needed for Moderate Pain  or Severe Pain .  0       Baclofen, Gabapentin, Sulfa antibiotics, Sulfacetamide sodium, Amitriptyline hcl, Niferex [iron polysaccharide], and Oxycodone-acetaminophen    Family History   Problem Relation Age of Onset   • Breast cancer Maternal Aunt    • Heart disease Mother    • Heart disease Father    • GI problems Neg Hx         MALIGNANCIES   • Colon cancer Neg Hx    • Colon polyps Neg Hx        Social History     Socioeconomic History   • Marital status:    Tobacco Use   • Smoking status: Former     Packs/day: 1.50     Years: 20.00     Pack years: 30.00     Types: Cigarettes     Quit date:      Years since quittin.   • Smokeless tobacco: Never   Vaping Use   • Vaping Use: Never used   Substance and Sexual Activity   • Alcohol use: Yes     Comment: occasionally   • Drug use: No   • Sexual activity: Not Currently       Review of Systems   Constitutional: Negative.    HENT: Negative.    Respiratory: Negative.    Genitourinary: Negative.    Neurological: Negative.        Vitals:    22 1324   BP: 133/70   Pulse: 73   Resp: 16   Temp: 97.1 °F (36.2 °C)       Body mass index is 19.01 kg/m².    Objective     Physical Exam  Vitals reviewed.   Constitutional:       Appearance: Normal appearance. She is normal weight.   HENT:      Head: Normocephalic  and atraumatic.      Right Ear: Hearing and external ear normal.      Left Ear: Hearing and external ear normal.      Nose:      Comments: Nasal sore noted on the right septum. No clots or active bleeding noted.     Mouth/Throat:      Lips: Pink.      Mouth: Mucous membranes are moist.      Pharynx: Oropharynx is clear. Uvula midline.   Musculoskeletal:      Cervical back: Full passive range of motion without pain.   Neurological:      Mental Status: She is alert.   Psychiatric:         Behavior: Behavior is cooperative.         Assessment & Plan   Diagnoses and all orders for this visit:    1. Epistaxis (Primary)    2. Nasal sore      * Surgery not found *  No orders of the defined types were placed in this encounter.    Use mupirocin nasal ointment twice daily consistently   Call with any increase in epistaxis  ER precautions    Return in about 6 months (around 6/8/2023) for Recheck.       Patient Instructions   Use mupirocin nasal ointment twice daily consistently   Call with any increase in epistaxis  ER precautions    Antibiotic ointment or Vasoline to anterior nares twice a day. Salt water spray or gel to nose every 2 hours and as needed. Hypertension control is important, keeping systolic pressures less than 140 is possible. If epistaxis present, hold all ASA or NSAIDs. Use Afrin or Neosynephrine spray if epistaxis returns, Hold direct pressure to the fleshy portion of the nose for five minutes. If epistaxis continues, repeat and hold pressure for another five minutes. If bleeding continues, call the office or go to the emergency room for evaluation.

## 2022-12-08 NOTE — PATIENT INSTRUCTIONS
Use mupirocin nasal ointment twice daily consistently   Call with any increase in epistaxis  ER precautions    Antibiotic ointment or Vasoline to anterior nares twice a day. Salt water spray or gel to nose every 2 hours and as needed. Hypertension control is important, keeping systolic pressures less than 140 is possible. If epistaxis present, hold all ASA or NSAIDs. Use Afrin or Neosynephrine spray if epistaxis returns, Hold direct pressure to the fleshy portion of the nose for five minutes. If epistaxis continues, repeat and hold pressure for another five minutes. If bleeding continues, call the office or go to the emergency room for evaluation.

## 2022-12-12 ENCOUNTER — TELEPHONE (OUTPATIENT)
Dept: ONCOLOGY | Facility: CLINIC | Age: 77
End: 2022-12-12

## 2022-12-12 NOTE — TELEPHONE ENCOUNTER
Caller: BOB    Relationship:  LUNG NAVIGATOR    Best call back number: [phone]      What was the call regarding: BOB WANTED TO SPEAK TO DR CHEUNG NURSE REGARDING PATIENTS SCANS TOMORROW     Do you require a callback: YES

## 2022-12-13 ENCOUNTER — HOSPITAL ENCOUNTER (OUTPATIENT)
Dept: CT IMAGING | Facility: HOSPITAL | Age: 77
Discharge: HOME | End: 2022-12-13
Admitting: INTERNAL MEDICINE

## 2022-12-13 DIAGNOSIS — C34.11 MALIGNANT NEOPLASM OF UPPER LOBE OF RIGHT LUNG: ICD-10-CM

## 2022-12-13 DIAGNOSIS — C34.11 PRIMARY CANCER OF RIGHT UPPER LOBE OF LUNG: ICD-10-CM

## 2022-12-13 DIAGNOSIS — D50.8 IRON DEFICIENCY ANEMIA SECONDARY TO INADEQUATE DIETARY IRON INTAKE: ICD-10-CM

## 2022-12-13 LAB — CREAT BLDA-MCNC: 0.9 MG/DL (ref 0.6–1.3)

## 2022-12-13 PROCEDURE — 82565 ASSAY OF CREATININE: CPT

## 2022-12-13 PROCEDURE — 25010000002 IOPAMIDOL 61 % SOLUTION: Performed by: INTERNAL MEDICINE

## 2022-12-13 PROCEDURE — 74177 CT ABD & PELVIS W/CONTRAST: CPT

## 2022-12-13 PROCEDURE — 71260 CT THORAX DX C+: CPT

## 2022-12-13 RX ADMIN — IOPAMIDOL 100 ML: 612 INJECTION, SOLUTION INTRAVENOUS at 12:00

## 2022-12-14 ENCOUNTER — TELEPHONE (OUTPATIENT)
Dept: OTOLARYNGOLOGY | Facility: CLINIC | Age: 77
End: 2022-12-14

## 2022-12-14 ENCOUNTER — TELEPHONE (OUTPATIENT)
Dept: ONCOLOGY | Facility: CLINIC | Age: 77
End: 2022-12-14

## 2022-12-14 DIAGNOSIS — R93.89 ABNORMAL CT SCAN: Primary | ICD-10-CM

## 2022-12-14 NOTE — TELEPHONE ENCOUNTER
Caller: Trinidad Zhu    Relationship to patient: SELF     Best call back number: [phone]    Patient is needing: PT CALLED AND IS HAVING NOSEBLEEDS. LAST NOSEBLEED WAS LAST NIGHT ON 12/13/22. CALLED THE AFTER HOURS NURSE TO GET HELP WITH STOPPING THE NOSEBLEED. PT WOULD LIKE A CALL BACK REGARDING WHAT TO DO NEXT WITH THE FUTURE NOSEBLEEDS AT PTS PH#[phone]

## 2022-12-14 NOTE — TELEPHONE ENCOUNTER
"Called and spoke with patient Trinidad Rochamagalys jose as informed DR Palacios has reviewed her recent CT Chest Scan and per impression results:  \"New clustered pulmonary nodules in the Right lower lobe measuring up to 11 mm. Differential diagnosis includes infectious process versus recurrent neoplasm. Recommended close clinical and imaging follow up with repeat chest CT in one to two months.   Patient v/u.  Patient informed, Dr Palacios recommends repeat CT Chest x 1 month. Apt.  Patient informed once the apt is tim she will be called with time and date. She v/u  "

## 2022-12-16 ENCOUNTER — APPOINTMENT (OUTPATIENT)
Dept: CT IMAGING | Facility: HOSPITAL | Age: 77
End: 2022-12-16

## 2022-12-19 ENCOUNTER — OFFICE VISIT (OUTPATIENT)
Dept: PULMONOLOGY | Facility: CLINIC | Age: 77
End: 2022-12-19

## 2022-12-19 VITALS
HEART RATE: 76 BPM | SYSTOLIC BLOOD PRESSURE: 126 MMHG | WEIGHT: 111 LBS | OXYGEN SATURATION: 99 % | DIASTOLIC BLOOD PRESSURE: 82 MMHG | HEIGHT: 64 IN | BODY MASS INDEX: 18.95 KG/M2

## 2022-12-19 DIAGNOSIS — Z85.118 PERSONAL HISTORY OF MALIGNANT NEOPLASM OF BRONCHUS AND LUNG: ICD-10-CM

## 2022-12-19 DIAGNOSIS — Z87.891 PERSONAL HISTORY OF NICOTINE DEPENDENCE: Chronic | ICD-10-CM

## 2022-12-19 DIAGNOSIS — R91.8 LUNG NODULES: Primary | ICD-10-CM

## 2022-12-19 DIAGNOSIS — J41.0 SIMPLE CHRONIC BRONCHITIS: ICD-10-CM

## 2022-12-19 PROCEDURE — 99214 OFFICE O/P EST MOD 30 MIN: CPT | Performed by: INTERNAL MEDICINE

## 2022-12-19 RX ORDER — FERROUS SULFATE 325(65) MG
325 TABLET ORAL
Status: ON HOLD | COMMUNITY
End: 2023-04-06 | Stop reason: SDUPTHER

## 2022-12-19 NOTE — PROGRESS NOTES
Chief Complaint  Simple chronic bronchitis, Lung nodules, and Personal history of lung cancer    Subjective    History of Present Illness     Trinidad Zhu presents to [facility] for simple chronic bronchitis, lung nodules, and history of lung cancer.    History of Present Illness   The patient had a recent chest CT per Dr. Palacios which showed some new nodular densities in the right lower lobe.  A follow-up scans can be performed in mid January.  I reviewed the scan with the patient and her  who accompanies her for day.  Hopefully this represents some areas of inflammation or infection or scarring but again a repeat scan is to be performed in mid January.  She also wonders if she needs to continue her maintenance therapy with Breztri.  I will plan on a follow-up visit in about 5 weeks with a flow-volume loop and depending on how she is doing clinically and what her flow-volume loop shows I can give her better information about the need to continue Breztri.  If she wanted to stop it in the interim that was fine and she can use her rescue inhaler as needed.  She has had the COVID-19 vaccine including 2 boosters in the form of the Moderna vaccine and recent received a bivalent Moderna booster.  Prior to Admission medications    Medication Sig Start Date End Date Taking? Authorizing Provider   albuterol sulfate  (90 Base) MCG/ACT inhaler Inhale 2 puffs Every 4 (Four) Hours As Needed for Wheezing.   Yes Provider, MD Mary Lou   alendronate (FOSAMAX) 70 MG tablet Take 70 mg by mouth Every 7 (Seven) Days. Saturdays 5/21/19  Yes ProviderMary Lou MD   aspirin 81 MG chewable tablet Chew 81 mg Daily.   Yes Provider, MD Mary Lou   benazepril (LOTENSIN) 5 MG tablet Take 5 mg by mouth Daily. 6/19/19  Yes Provider, MD Mary Lou   Budeson-Glycopyrrol-Formoterol (BREZTRI) 160-9-4.8 MCG/ACT aerosol inhaler Inhale 2 puffs 2 (Two) Times a Day. Rinse and  spit after using. 22  Yes Dion Cortés MD   Calcium Carb-Cholecalciferol (CALCIUM 500+D PO) Take 1 tablet by mouth Daily.   Yes Mary Lou Rodas MD   carvedilol (COREG) 12.5 MG tablet Take 12.5 mg by mouth 2 (Two) Times a Day With Meals.   Yes Mary Lou Rodsa MD   cetirizine (ZyrTEC) 10 MG tablet Take 10 mg by mouth Every Night.   Yes Mary Lou Rodas MD   clopidogrel (PLAVIX) 75 MG tablet TAKE 1 TABLET BY MOUTH EVERY DAY  21  Yes Silvia Herrera APRN   famotidine (PEPCID) 40 MG tablet Take 40 mg by mouth 2 (Two) Times a Day. 4/15/19  Yes Mary Lou Rodas MD   ferrous sulfate 325 (65 FE) MG tablet Take 325 mg by mouth Daily With Breakfast.   Yes Mary Lou Rodas MD   hydroCHLOROthiazide (HYDRODIURIL) 12.5 MG tablet Take 12.5 mg by mouth Daily.   Yes Mary Lou Rodas MD   levothyroxine (SYNTHROID, LEVOTHROID) 50 MCG tablet Take 50 mcg by mouth Daily. 19  Yes Mary Lou Rodas MD   multivitamin with minerals tablet tablet Take 1 tablet by mouth Daily.   Yes Mary Lou Rodas MD   Rivaroxaban (XARELTO) 2.5 MG tablet Take 1 tablet by mouth 2 (Two) Times a Day for 30 days. 22 Yes Silvia Herrera APRN   rosuvastatin (CRESTOR) 40 MG tablet Take 40 mg by mouth Every Night. 21  Yes Mary Lou Rodas MD   traMADol (ULTRAM) 50 MG tablet Take 50 mg by mouth Every 3 (Three) Hours As Needed for Moderate Pain  or Severe Pain . 19  Yes Mary Lou Rodas MD   hydrOXYzine pamoate (VISTARIL) 25 MG capsule Take 1 capsule by mouth 3 (Three) Times a Day As Needed for Anxiety for up to 30 days. 10/18/22 12/8/22  Brittni Christianson APRN       Social History     Socioeconomic History   • Marital status:    Tobacco Use   • Smoking status: Former     Packs/day: 1.50     Years: 20.00     Pack years: 30.00     Types: Cigarettes     Quit date:      Years since quittin.9   • Smokeless tobacco: Never   Vaping Use   • Vaping  "Use: Never used   Substance and Sexual Activity   • Alcohol use: Yes     Comment: occasionally   • Drug use: No   • Sexual activity: Not Currently       Objective   Vital Signs:   /82   Pulse 76   Ht 161.3 cm (63.5\")   Wt 50.3 kg (111 lb)   SpO2 99% Comment: RA  BMI 19.35 kg/m²     Physical Exam  Vitals and nursing note reviewed.   HENT:      Head: Normocephalic.      Comments: She is wearing a mask.  Eyes:      Extraocular Movements: Extraocular movements intact.      Pupils: Pupils are equal, round, and reactive to light.   Cardiovascular:      Rate and Rhythm: Normal rate and regular rhythm.   Pulmonary:      Effort: Pulmonary effort is normal.      Breath sounds: Normal breath sounds.   Musculoskeletal:         General: Normal range of motion.   Skin:     General: Skin is warm and dry.   Neurological:      General: No focal deficit present.      Mental Status: She is alert and oriented to person, place, and time.   Psychiatric:         Mood and Affect: Mood normal.         Behavior: Behavior normal.        Result Review :    PFT Values        Some values may be hidden. Unless noted otherwise, only the newest values recorded on each date are displayed.         Old Values PFT Results 9/10/21 6/22/22   No data to display.      Pre Drug PFT Results 9/10/21 6/22/22   FVC 80 77   FEV1 64 73   FEF 25-75% 28 67   FEV1/FVC 61.74 73      Post Drug PFT Results 9/10/21 6/22/22   No data to display.      Other Tests PFT Results 9/10/21 6/22/22   TLC  159   RV  262   DLCO  54   D/VAsb  74               Results for orders placed in visit on 22    Full Pulmonary Function Test Without Bronchodilator    Narrative  Full Pulmonary Function Test Without Bronchodilator  Performed by: Samantha Cantor, RRT  Authorized by: Dion Cortés MD    Pre Drug % Predicted  FVC: 77%  FEV1: 73%  FEF 25-75%: 67%  FEV1/FVC: 73%  T%  RV: 262%  DLCO: 54%  D/VAsb: 74%    Interpretation  Spirometry  Spirometry " shows mild restriction. There is reduced midflow suggesting small airway/airflow obstruction.  Diffusion Capacity  The patient's diffusion capacity is moderately reduced.  Diffusion capacity is mildly reduced when corrected for alveolar volume.  Overall comments: The patient's spirometry is consistent with a mild restrictive ventilatory defect with a coexisting decrease in midflows.  Accurate lung volumes could not be obtained.  She has a moderate diffusion impairment which when corrected for alveolar volume is a mild diffusion impairment.  When current studies are compared to studies from [facility] from November 1, 2021, there has been a decline in her FVC on current baseline spirometry compared to previous pre and postbronchodilator studies but no significant change in her FEV1 compared to previous pre and postbronchodilator studies.  When corrected for alveolar volume, there has been a decline in her diffusion capacity compared to previous.      Results for orders placed during the hospital encounter of 11/01/21    Full Pulmonary Function Test With Bronchodilator & ABG    Narrative  [facility] - Pulmonary Function Test    [address]  [place]  [address]  [phone]    Patient : Trinidad Zhu  MRN : [id number]  CSN : [id number]  Pulmonologist : Dion Cortés MD  Date : 11/1/2021    ______________________________________________________________________    Interpretation :  1.  Spirometry is consistent with a moderate obstructive ventilatory defect.  2.  There is no significant change in spirometry postbronchodilator.  3.  Lung volumes reveal an elevated expiratory reserve volume.  There is also a mild decrease in inspiratory capacity.  4.  There is a moderate diffusion impairment which when corrected for alveolar volume is a mild diffusion impairment.  5.  Arterial blood gases are within normal limits on room air.  6.  When current studies are compared to studies done at  the respiratory disease clinic on September 10 of 2021, there has been slight improvement in both the patient's FVC and FEV1 on current pre and postbronchodilator studies compared to previous baseline spirometry.      Dion Cortés MD      Results for orders placed in visit on 09/10/21    Pulmonary Function Test    Narrative  Pulmonary Function Test  Performed by: Samantha Cantor, RRT  Authorized by: Brittni Christianson APRN    Pre Drug % Predicted  FVC: 80%  FEV1: 64%  FEF 25-75%: 28%  FEV1/FVC: 61.74%    Interpretation  Spirometry  Spirometry shows moderate obstruction. There is reduced midflow suggesting small airway/airflow obstruction.  Review of FVL curve  Patient's effort is normal.    Rest/Exercise Pulse Ox Values        Some values may be hidden. Unless noted otherwise, only the newest values recorded on each date are displayed.         Rest/Exercise Pulse Ox Results 1/4/22   Rest room air SAT % 99   Exercise room air SAT % 95                      My interpretation of imaging:    CT Chest With Contrast Diagnostic (12/13/2022 11:57)          Assessment and Plan     Diagnoses and all orders for this visit:    1. Lung nodules (Primary)  Assessment & Plan:  New nodules were noted on recent CT and a repeat scan has been scheduled for mid January per Dr. Palacios.      2. Simple chronic bronchitis (HCC)  Assessment & Plan:  She may continue her albuterol HFA as needed.  If she wants to discontinue her Breztri in the short-term pending follow-up pulmonary functions here in the office in several weeks that is fine and she can always resume it if she has more symptoms.  I told her Breztri might help her symptoms and also may help in preventing exacerbations.    Orders:  -     Spirometry Without Bronchodilator; Future    3. Personal history of malignant neoplasm of bronchus and lung  Assessment & Plan:  She again is scheduled for a follow-up scan in mid January as her recent scan did show some new nodular  changes in the right lower lobe.  I reviewed the scan with the patient and her  today.      4. Personal history of nicotine dependence  Assessment & Plan:  She has not smoked since 1996.            Dion Cortés MD  12/19/2022  13:36 CST    Follow Up {Instructions Charge Capture  Follow-up Communications :23}  Return in about 5 weeks (around 1/23/2023) for FVL, To see me specifically.    Patient was given instructions and counseling regarding her condition or for health maintenance advice. Please see specific information pulled into the AVS if appropriate.

## 2022-12-19 NOTE — ASSESSMENT & PLAN NOTE
She may continue her albuterol HFA as needed.  If she wants to discontinue her Breztri in the short-term pending follow-up pulmonary functions here in the office in several weeks that is fine and she can always resume it if she has more symptoms.  I told her Breztri might help her symptoms and also may help in preventing exacerbations.

## 2022-12-19 NOTE — ASSESSMENT & PLAN NOTE
New nodules were noted on recent CT and a repeat scan has been scheduled for mid January per Dr. Palacios.

## 2022-12-19 NOTE — ASSESSMENT & PLAN NOTE
She again is scheduled for a follow-up scan in mid January as her recent scan did show some new nodular changes in the right lower lobe.  I reviewed the scan with the patient and her  today.

## 2022-12-19 NOTE — PATIENT INSTRUCTIONS
The patient's recent chest CT showed some new nodular change in the right lower lobe.  A follow-up scan is going to be performed in mid January.  I will see her back in several weeks ago the results of the above and we will get a flow-volume loop as well.  In the interim she is going to have a vascular surgical procedure on her right lower extremity in [place].

## 2022-12-20 ENCOUNTER — APPOINTMENT (OUTPATIENT)
Dept: CT IMAGING | Facility: HOSPITAL | Age: 77
End: 2022-12-20

## 2022-12-21 DIAGNOSIS — F41.9 ANXIETY DISORDER, UNSPECIFIED TYPE: ICD-10-CM

## 2022-12-21 RX ORDER — HYDROXYZINE PAMOATE 25 MG/1
25 CAPSULE ORAL 3 TIMES DAILY PRN
Qty: 45 CAPSULE | Refills: 2 | Status: ON HOLD | OUTPATIENT
Start: 2022-12-21 | End: 2023-04-01

## 2022-12-21 NOTE — TELEPHONE ENCOUNTER
[facility] is requesting refill on Hydroxyzine capsules.  Patient is seeing Dr Cortés now so sending to Dr Cortés instead of Brittni.  Rx Refill Note  Requested Prescriptions     Pending Prescriptions Disp Refills   • hydrOXYzine pamoate (VISTARIL) 25 MG capsule [Pharmacy Med Name: HYDROXYZINE PAMOATE 25MG CAPSULE]  11     Sig: TAKE 1 CAPSULE BY MOUTH 3 (THREE) TIMES A DAY AS NEEDED FOR ANXIETY FOR UP TO 30 DAYS.      Last office visit with prescribing clinician: 12/19/2022 with Dr Cortés  Last telemedicine visit with prescribing clinician: 1/26/2023   Next office visit with prescribing clinician: 01/26/2023 with Dr Cortés                         Would you like a call back once the refill request has been completed: [] Yes [] No    If the office needs to give you a call back, can they leave a voicemail: [] Yes [] No    Dayana Hernández MA  12/21/22, 15:08 CST

## 2022-12-27 ENCOUNTER — TELEPHONE (OUTPATIENT)
Dept: PULMONOLOGY | Facility: CLINIC | Age: 77
End: 2022-12-27

## 2023-01-04 ENCOUNTER — TELEPHONE (OUTPATIENT)
Dept: VASCULAR SURGERY | Facility: CLINIC | Age: 78
End: 2023-01-04
Payer: MEDICARE

## 2023-01-04 NOTE — TELEPHONE ENCOUNTER
Pt called and states she just had surgery in [place] and will call back in about 3-4 weeks to reschedule testing and f/u appt with Dr Condon.

## 2023-01-05 ENCOUNTER — APPOINTMENT (OUTPATIENT)
Dept: ULTRASOUND IMAGING | Facility: HOSPITAL | Age: 78
End: 2023-01-05
Payer: MEDICARE

## 2023-01-13 ENCOUNTER — HOSPITAL ENCOUNTER (OUTPATIENT)
Dept: CT IMAGING | Facility: HOSPITAL | Age: 78
Discharge: HOME | End: 2023-01-13
Admitting: INTERNAL MEDICINE
Payer: MEDICARE

## 2023-01-13 DIAGNOSIS — R93.89 ABNORMAL CT SCAN: ICD-10-CM

## 2023-01-13 LAB — CREAT BLDA-MCNC: 0.8 MG/DL (ref 0.6–1.3)

## 2023-01-13 PROCEDURE — 82565 ASSAY OF CREATININE: CPT

## 2023-01-13 PROCEDURE — 25010000002 IOPAMIDOL 61 % SOLUTION: Performed by: INTERNAL MEDICINE

## 2023-01-13 PROCEDURE — 71260 CT THORAX DX C+: CPT

## 2023-01-13 RX ADMIN — IOPAMIDOL 100 ML: 612 INJECTION, SOLUTION INTRAVENOUS at 12:08

## 2023-02-15 ENCOUNTER — OFFICE VISIT (OUTPATIENT)
Dept: PULMONOLOGY | Facility: CLINIC | Age: 78
End: 2023-02-15
Payer: MEDICARE

## 2023-02-15 VITALS
OXYGEN SATURATION: 100 % | HEIGHT: 64 IN | BODY MASS INDEX: 19.29 KG/M2 | SYSTOLIC BLOOD PRESSURE: 124 MMHG | DIASTOLIC BLOOD PRESSURE: 82 MMHG | HEART RATE: 75 BPM | WEIGHT: 113 LBS

## 2023-02-15 DIAGNOSIS — J41.0 SIMPLE CHRONIC BRONCHITIS: ICD-10-CM

## 2023-02-15 DIAGNOSIS — R91.8 LUNG NODULES: Primary | Chronic | ICD-10-CM

## 2023-02-15 DIAGNOSIS — Z87.891 PERSONAL HISTORY OF NICOTINE DEPENDENCE: Chronic | ICD-10-CM

## 2023-02-15 DIAGNOSIS — Z85.118 PERSONAL HISTORY OF MALIGNANT NEOPLASM OF BRONCHUS AND LUNG: ICD-10-CM

## 2023-02-15 PROCEDURE — 99214 OFFICE O/P EST MOD 30 MIN: CPT | Performed by: INTERNAL MEDICINE

## 2023-02-15 PROCEDURE — 94010 BREATHING CAPACITY TEST: CPT | Performed by: INTERNAL MEDICINE

## 2023-02-15 RX ORDER — BUDESONIDE, GLYCOPYRROLATE, AND FORMOTEROL FUMARATE 160; 9; 4.8 UG/1; UG/1; UG/1
2 AEROSOL, METERED RESPIRATORY (INHALATION) 2 TIMES DAILY
Qty: 1 EACH | Refills: 0 | COMMUNITY
Start: 2023-02-15

## 2023-02-15 NOTE — PROGRESS NOTES
Chief Complaint  Lung nodules and History of lung cancer    Subjective    History of Present Illness     Trinidad Zhu presents to [facility] for lung nodules and history of lung cancer.    History of Present Illness   The patient did have a recent chest CT and I reviewed it with her and her  who accompanies her today.  She has some small nodular densities on the right which are unchanged.  She will be getting a follow-up CT by Dr. Palacios in early June I will see her back a few weeks thereafter.  Pulmonary functions today in terms of a flow-volume loop would be most consistent with a mild restrictive ventilatory defect with a mild decrease in mid flows and show slight improvement from studies performed in June of last year.  She clearly does not have COPD but I told her she still could have some component of asthma or just simple chronic bronchitis but if she wants to try to taper her Breztri dose downward to see how she does that is fine and she could start with taking 1 puff twice a day.  I did provide her a sample.  She had a recent vascular procedure at [place] involving the right lower extremity and may have to have the procedure performed on the left in the future as well but she is recovered well from this.  She has had the COVID-19 vaccine including 2 boosters in the form of the Moderna vaccine and then more recently had a bivalent Moderna booster as well.  She has had the flu shot and is also had a Prevnar 13 and Pneumovax in the past.  Prior to Admission medications    Medication Sig Start Date End Date Taking? Authorizing Provider   albuterol sulfate  (90 Base) MCG/ACT inhaler Inhale 2 puffs Every 4 (Four) Hours As Needed for Wheezing.   Yes ProviderMary Lou MD   alendronate (FOSAMAX) 70 MG tablet Take 70 mg by mouth Every 7 (Seven) Days. Saturdays 5/21/19  Yes ProviderMary Lou MD   aspirin 81 MG chewable tablet Chew 81 mg  Daily.   Yes ProviderMary Lou MD   benazepril (LOTENSIN) 5 MG tablet Take 5 mg by mouth Daily. 6/19/19  Yes Mary Lou Rodas MD   Budeson-Glycopyrrol-Formoterol (BREZTRI) 160-9-4.8 MCG/ACT aerosol inhaler Inhale 2 puffs 2 (Two) Times a Day. Rinse and spit after using. 6/22/22  Yes Dion Cortés MD   Calcium Carb-Cholecalciferol (CALCIUM 500+D PO) Take 1 tablet by mouth Daily.   Yes ProviderMary Lou MD   carvedilol (COREG) 12.5 MG tablet Take 12.5 mg by mouth 2 (Two) Times a Day With Meals.   Yes Mary Lou Rodas MD   cetirizine (ZyrTEC) 10 MG tablet Take 10 mg by mouth Every Night.   Yes Mary Lou Rodas MD   clopidogrel (PLAVIX) 75 MG tablet TAKE 1 TABLET BY MOUTH EVERY DAY  6/14/21  Yes Silvia Herrera APRN   famotidine (PEPCID) 40 MG tablet Take 40 mg by mouth 2 (Two) Times a Day. 4/15/19  Yes Mary Lou Rodas MD   hydroCHLOROthiazide (HYDRODIURIL) 12.5 MG tablet Take 12.5 mg by mouth Daily.   Yes Mary Lou Rodas MD   hydrOXYzine pamoate (VISTARIL) 25 MG capsule TAKE 1 CAPSULE BY MOUTH 3 (THREE) TIMES A DAY AS NEEDED FOR ANXIETY FOR UP TO 30 DAYS. 12/21/22 2/15/23 Yes Dion Cortés MD   levothyroxine (SYNTHROID, LEVOTHROID) 50 MCG tablet Take 50 mcg by mouth Daily. 5/8/19  Yes ProviderMary Lou MD   multivitamin with minerals tablet tablet Take 1 tablet by mouth Daily.   Yes Mary Lou Rodas MD   Rivaroxaban (XARELTO) 2.5 MG tablet Take 1 tablet by mouth 2 (Two) Times a Day for 30 days. 11/30/22 2/15/23 Yes Silvia Herrera APRN   rosuvastatin (CRESTOR) 40 MG tablet Take 40 mg by mouth Every Night. 9/2/21  Yes Mary Lou Rodas MD   traMADol (ULTRAM) 50 MG tablet Take 50 mg by mouth Every 3 (Three) Hours As Needed for Moderate Pain  or Severe Pain . 5/28/19  Yes Provider, Historical, MD   Budeson-Glycopyrrol-Formoterol (Breztri Aerosphere) 160-9-4.8 MCG/ACT aerosol inhaler Inhale 2 puffs 2 (Two) Times a Day. 2/15/23   Dion Cortés,  "MD   ferrous sulfate 325 (65 FE) MG tablet Take 325 mg by mouth Daily With Breakfast.    Provider, Historical, MD       Social History     Socioeconomic History   • Marital status:    Tobacco Use   • Smoking status: Former     Packs/day: 1.50     Years: 20.00     Pack years: 30.00     Types: Cigarettes     Quit date:      Years since quittin.1   • Smokeless tobacco: Never   Vaping Use   • Vaping Use: Never used   Substance and Sexual Activity   • Alcohol use: Yes     Comment: occasionally   • Drug use: No   • Sexual activity: Not Currently       Objective   Vital Signs:   /82   Pulse 75   Ht 161.3 cm (63.5\")   Wt 51.3 kg (113 lb)   SpO2 100% Comment: RA  BMI 19.70 kg/m²     Physical Exam  Vitals and nursing note reviewed.   HENT:      Head: Normocephalic.      Comments: She is wearing a mask.  Eyes:      Extraocular Movements: Extraocular movements intact.      Pupils: Pupils are equal, round, and reactive to light.   Cardiovascular:      Rate and Rhythm: Normal rate and regular rhythm.   Pulmonary:      Effort: Pulmonary effort is normal.      Breath sounds: Normal breath sounds.   Musculoskeletal:         General: Normal range of motion.   Skin:     General: Skin is warm and dry.   Neurological:      General: No focal deficit present.      Mental Status: She is alert and oriented to person, place, and time.   Psychiatric:         Mood and Affect: Mood normal.         Behavior: Behavior normal.        Result Review :    PFT Values        Some values may be hidden. Unless noted otherwise, only the newest values recorded on each date are displayed.         Old Values PFT Results 9/10/21 6/22/22 2/15/23   No data to display.      Pre Drug PFT Results 9/10/21 6/22/22 2/15/23   FVC 80 77 79   FEV1 64 73 78   FEF 25-75% 28 67 75   FEV1/FVC 61.74 73 76      Post Drug PFT Results 9/10/21 6/22/22 2/15/23   No data to display.      Other Tests PFT Results 9/10/21 6/22/22 2/15/23   TLC  159    RV  " 262    DLCO  54    D/VAsb  74                Results for orders placed in visit on 02/15/23    Spirometry Without Bronchodilator    Narrative  Spirometry Without Bronchodilator  Performed by: Samantha Cantor, RRT  Authorized by: Dion Cortés MD    Pre Drug % Predicted  FVC: 79%  FEV1: 78%  FEF 25-75%: 75%  FEV1/FVC: 76%    Interpretation  Spirometry  Spirometry shows mild restriction. There is reduced midflow suggesting small airway/airflow obstruction.  Overall comments: The patient's spirometry is most consistent with a mild restrictive ventilatory defect with a coexisting mild decrease in midflows.  When current studies are compared to studies performed on  of last year, she has had slight improvement in both her FVC and FEV1 compared to previous.  Midflows also show some improvement as well.      Results for orders placed in visit on 22    Full Pulmonary Function Test Without Bronchodilator    Narrative  Full Pulmonary Function Test Without Bronchodilator  Performed by: Samantha Cantor, RRT  Authorized by: Dion Cortés MD    Pre Drug % Predicted  FVC: 77%  FEV1: 73%  FEF 25-75%: 67%  FEV1/FVC: 73%  T%  RV: 262%  DLCO: 54%  D/VAsb: 74%    Interpretation  Spirometry  Spirometry shows mild restriction. There is reduced midflow suggesting small airway/airflow obstruction.  Diffusion Capacity  The patient's diffusion capacity is moderately reduced.  Diffusion capacity is mildly reduced when corrected for alveolar volume.  Overall comments: The patient's spirometry is consistent with a mild restrictive ventilatory defect with a coexisting decrease in midflows.  Accurate lung volumes could not be obtained.  She has a moderate diffusion impairment which when corrected for alveolar volume is a mild diffusion impairment.  When current studies are compared to studies from [facility] from 2021, there has been a decline in her FVC on current baseline  spirometry compared to previous pre and postbronchodilator studies but no significant change in her FEV1 compared to previous pre and postbronchodilator studies.  When corrected for alveolar volume, there has been a decline in her diffusion capacity compared to previous.      Results for orders placed during the hospital encounter of 11/01/21    Full Pulmonary Function Test With Bronchodilator & ABG    Narrative  [facility] - Pulmonary Function Test    [address]  [place]  [place]  [address]  [phone]    Patient : Trinidad Zhu  MRN : [id number]  CSN : [id number]  Pulmonologist : Dion Cortés MD  Date : 11/1/2021    ______________________________________________________________________    Interpretation :  1.  Spirometry is consistent with a moderate obstructive ventilatory defect.  2.  There is no significant change in spirometry postbronchodilator.  3.  Lung volumes reveal an elevated expiratory reserve volume.  There is also a mild decrease in inspiratory capacity.  4.  There is a moderate diffusion impairment which when corrected for alveolar volume is a mild diffusion impairment.  5.  Arterial blood gases are within normal limits on room air.  6.  When current studies are compared to studies done at the respiratory disease clinic on September 10 of 2021, there has been slight improvement in both the patient's FVC and FEV1 on current pre and postbronchodilator studies compared to previous baseline spirometry.      Dion Cortés MD    Rest/Exercise Pulse Ox Values        Some values may be hidden. Unless noted otherwise, only the newest values recorded on each date are displayed.         Rest/Exercise Pulse Ox Results 1/4/22   Rest room air SAT % 99   Exercise room air SAT % 95                      My interpretation of imaging:    CT Chest With Contrast Diagnostic (01/13/2023 12:07)        Assessment and Plan     Diagnoses and all orders for this visit:    1. Lung nodules  (Primary)  Assessment & Plan:  Her recent CT was stable and I reviewed it with the patient and her .  I think these nodules may just relate to residual from a prior infectious or inflammatory process or even some postop changes but they will be followed with serial imaging studies.  If any of the nodules showed a significant increase in size over time and I think the procedure of choice in terms of a biopsy would be navigational bronchoscopy again.      2. Simple chronic bronchitis (HCC)  Assessment & Plan:  She may continue her Breztri but may taper the dose if she remains stable clinically and she also has a ProAir inhaler which she can use as needed as well.    Orders:  -     Spirometry Without Bronchodilator  -     Budeson-Glycopyrrol-Formoterol (Breztri Aerosphere) 160-9-4.8 MCG/ACT aerosol inhaler; Inhale 2 puffs 2 (Two) Times a Day.  Dispense: 1 each; Refill: 0    3. Personal history of malignant neoplasm of bronchus and lung  Assessment & Plan:  Her recent chest CT did not show evidence of any recurrent disease but she will continue to get serial imaging studies per Dr. Mcbride.      4. Personal history of nicotine dependence  Assessment & Plan:  She has not smoked since 1996.          Dion Cortés MD  2/15/2023  18:05 CST    Follow Up   Return in about 4 months (around 6/15/2023) for To see me specifically.    Patient was given instructions and counseling regarding her condition or for health maintenance advice. Please see specific information pulled into the AVS if appropriate.

## 2023-02-15 NOTE — PATIENT INSTRUCTIONS
The patient's CT scan done recently was stable I reviewed it with the patient and her .  She is scheduled for a follow-up scan in early June and I will see her back shortly thereafter.  Pulmonary function today show slight improvement.  I did tell her she could try to cut back on her Breztri but if she had increasing symptoms she can go back to her baseline dose of 2 puffs twice a day.  A sample was provided.

## 2023-02-15 NOTE — PROCEDURES
Spirometry Without Bronchodilator  Performed by: Samantha Cantor, RRT  Authorized by: Dion Cortés MD      Pre Drug % Predicted    FVC: 79%   FEV1: 78%   FEF 25-75%: 75%   FEV1/FVC: 76%    Interpretation   Spirometry   Spirometry shows mild restriction. There is reduced midflow suggesting small airway/airflow obstruction.   Overall comments: The patient's spirometry is most consistent with a mild restrictive ventilatory defect with a coexisting mild decrease in midflows.  When current studies are compared to studies performed on June 22 of last year, she has had slight improvement in both her FVC and FEV1 compared to previous.  Midflows also show some improvement as well.

## 2023-02-16 NOTE — ASSESSMENT & PLAN NOTE
Her recent chest CT did not show evidence of any recurrent disease but she will continue to get serial imaging studies per Dr. Palacios.

## 2023-02-16 NOTE — ASSESSMENT & PLAN NOTE
She may continue her Breztri but may taper the dose if she remains stable clinically and she also has a ProAir inhaler which she can use as needed as well.

## 2023-02-16 NOTE — ASSESSMENT & PLAN NOTE
Her recent CT was stable and I reviewed it with the patient and her .  I think these nodules may just relate to residual from a prior infectious or inflammatory process or even some postop changes but they will be followed with serial imaging studies.  If any of the nodules showed a significant increase in size over time and I think the procedure of choice in terms of a biopsy would be navigational bronchoscopy again.

## 2023-02-16 NOTE — ASSESSMENT & PLAN NOTE
Her current spirometry would again be consistent with a very mild restrictive ventilatory defect with a coexisting mild decrease in midflows.  There was some improvement compared to her prior pulmonary functions.  I reviewed the studies with the patient and her  today.

## 2023-03-31 ENCOUNTER — APPOINTMENT (OUTPATIENT)
Dept: GENERAL RADIOLOGY | Facility: HOSPITAL | Age: 78
DRG: 378 | End: 2023-03-31
Payer: MEDICARE

## 2023-03-31 ENCOUNTER — APPOINTMENT (OUTPATIENT)
Dept: CT IMAGING | Facility: HOSPITAL | Age: 78
DRG: 378 | End: 2023-03-31
Payer: MEDICARE

## 2023-03-31 ENCOUNTER — HOSPITAL ENCOUNTER (INPATIENT)
Facility: HOSPITAL | Age: 78
LOS: 5 days | Discharge: HOME | DRG: 378 | End: 2023-04-06
Attending: FAMILY MEDICINE | Admitting: FAMILY MEDICINE
Payer: MEDICARE

## 2023-03-31 DIAGNOSIS — K92.1 MELENA: ICD-10-CM

## 2023-03-31 DIAGNOSIS — Z74.09 IMPAIRED MOBILITY: ICD-10-CM

## 2023-03-31 DIAGNOSIS — D62 ACUTE BLOOD LOSS ANEMIA: Primary | ICD-10-CM

## 2023-03-31 DIAGNOSIS — K92.2 GASTROINTESTINAL HEMORRHAGE, UNSPECIFIED GASTROINTESTINAL HEMORRHAGE TYPE: ICD-10-CM

## 2023-03-31 PROBLEM — M62.81 GENERALIZED MUSCLE WEAKNESS: Status: ACTIVE | Noted: 2023-03-31

## 2023-03-31 LAB
ABO GROUP BLD: NORMAL
ALBUMIN SERPL-MCNC: 4.3 G/DL (ref 3.5–5.2)
ALBUMIN/GLOB SERPL: 2.2 G/DL
ALP SERPL-CCNC: 41 U/L (ref 39–117)
ALT SERPL W P-5'-P-CCNC: 19 U/L (ref 1–33)
ANION GAP SERPL CALCULATED.3IONS-SCNC: 13 MMOL/L (ref 5–15)
AST SERPL-CCNC: 22 U/L (ref 1–32)
BACTERIA UR QL AUTO: ABNORMAL /HPF
BASOPHILS # BLD AUTO: 0 10*3/MM3 (ref 0–0.2)
BASOPHILS NFR BLD AUTO: 0 % (ref 0–1.5)
BILIRUB SERPL-MCNC: 0.3 MG/DL (ref 0–1.2)
BILIRUB UR QL STRIP: NEGATIVE
BLD GP AB SCN SERPL QL: NEGATIVE
BUN SERPL-MCNC: 37 MG/DL (ref 8–23)
BUN/CREAT SERPL: 49.3 (ref 7–25)
CALCIUM SPEC-SCNC: 9.6 MG/DL (ref 8.6–10.5)
CHLORIDE SERPL-SCNC: 96 MMOL/L (ref 98–107)
CHOLEST SERPL-MCNC: 109 MG/DL (ref 0–200)
CK SERPL-CCNC: 41 U/L (ref 20–180)
CLARITY UR: CLEAR
CO2 SERPL-SCNC: 25 MMOL/L (ref 22–29)
COLOR UR: YELLOW
CREAT SERPL-MCNC: 0.75 MG/DL (ref 0.57–1)
CRP SERPL-MCNC: <0.3 MG/DL (ref 0–0.5)
D DIMER PPP FEU-MCNC: 0.53 MCGFEU/ML (ref 0–0.77)
D-LACTATE SERPL-SCNC: 1.7 MMOL/L (ref 0.5–2)
DEPRECATED RDW RBC AUTO: 46.4 FL (ref 37–54)
EGFRCR SERPLBLD CKD-EPI 2021: 82.1 ML/MIN/1.73
EOSINOPHIL # BLD AUTO: 0.01 10*3/MM3 (ref 0–0.4)
EOSINOPHIL NFR BLD AUTO: 0.1 % (ref 0.3–6.2)
ERYTHROCYTE [DISTWIDTH] IN BLOOD BY AUTOMATED COUNT: 13.4 % (ref 12.3–15.4)
ERYTHROCYTE [SEDIMENTATION RATE] IN BLOOD: 3 MM/HR (ref 0–30)
FLUAV RNA RESP QL NAA+PROBE: NOT DETECTED
FLUBV RNA RESP QL NAA+PROBE: NOT DETECTED
GEN 5 2HR TROPONIN T REFLEX: 16 NG/L
GLOBULIN UR ELPH-MCNC: 2 GM/DL
GLUCOSE SERPL-MCNC: 127 MG/DL (ref 65–99)
GLUCOSE UR STRIP-MCNC: NEGATIVE MG/DL
HCT VFR BLD AUTO: 18.7 % (ref 34–46.6)
HDLC SERPL-MCNC: 60 MG/DL (ref 40–60)
HGB BLD-MCNC: 5.9 G/DL (ref 12–15.9)
HGB UR QL STRIP.AUTO: NEGATIVE
HYALINE CASTS UR QL AUTO: ABNORMAL /LPF
IMM GRANULOCYTES # BLD AUTO: 0.07 10*3/MM3 (ref 0–0.05)
IMM GRANULOCYTES NFR BLD AUTO: 0.5 % (ref 0–0.5)
KETONES UR QL STRIP: NEGATIVE
LDLC SERPL CALC-MCNC: 31 MG/DL (ref 0–100)
LDLC/HDLC SERPL: 0.49 {RATIO}
LEUKOCYTE ESTERASE UR QL STRIP.AUTO: ABNORMAL
LIPASE SERPL-CCNC: 72 U/L (ref 13–60)
LYMPHOCYTES # BLD AUTO: 1.6 10*3/MM3 (ref 0.7–3.1)
LYMPHOCYTES NFR BLD AUTO: 11.6 % (ref 19.6–45.3)
MAGNESIUM SERPL-MCNC: 1.8 MG/DL (ref 1.6–2.4)
MCH RBC QN AUTO: 29.9 PG (ref 26.6–33)
MCHC RBC AUTO-ENTMCNC: 31.6 G/DL (ref 31.5–35.7)
MCV RBC AUTO: 94.9 FL (ref 79–97)
MONOCYTES # BLD AUTO: 0.94 10*3/MM3 (ref 0.1–0.9)
MONOCYTES NFR BLD AUTO: 6.8 % (ref 5–12)
NEUTROPHILS NFR BLD AUTO: 11.12 10*3/MM3 (ref 1.7–7)
NEUTROPHILS NFR BLD AUTO: 81 % (ref 42.7–76)
NITRITE UR QL STRIP: NEGATIVE
NRBC BLD AUTO-RTO: 0.1 /100 WBC (ref 0–0.2)
NT-PROBNP SERPL-MCNC: 274.8 PG/ML (ref 0–1800)
PH UR STRIP.AUTO: 6 [PH] (ref 5–8)
PHOSPHATE SERPL-MCNC: 5.2 MG/DL (ref 2.5–4.5)
PLATELET # BLD AUTO: 370 10*3/MM3 (ref 140–450)
PMV BLD AUTO: 9.4 FL (ref 6–12)
POTASSIUM SERPL-SCNC: 4.4 MMOL/L (ref 3.5–5.2)
PROCALCITONIN SERPL-MCNC: 0.06 NG/ML (ref 0–0.25)
PROT SERPL-MCNC: 6.3 G/DL (ref 6–8.5)
PROT UR QL STRIP: NEGATIVE
RBC # BLD AUTO: 1.97 10*6/MM3 (ref 3.77–5.28)
RBC # UR STRIP: ABNORMAL /HPF
REF LAB TEST METHOD: ABNORMAL
RH BLD: POSITIVE
SARS-COV-2 RNA RESP QL NAA+PROBE: NOT DETECTED
SODIUM SERPL-SCNC: 134 MMOL/L (ref 136–145)
SP GR UR STRIP: 1.02 (ref 1–1.03)
SQUAMOUS #/AREA URNS HPF: ABNORMAL /HPF
T&S EXPIRATION DATE: NORMAL
T4 FREE SERPL-MCNC: 1.14 NG/DL (ref 0.93–1.7)
TRIGL SERPL-MCNC: 97 MG/DL (ref 0–150)
TROPONIN T DELTA: -2 NG/L
TROPONIN T SERPL HS-MCNC: 18 NG/L
TSH SERPL DL<=0.05 MIU/L-ACNC: 3.09 UIU/ML (ref 0.27–4.2)
UROBILINOGEN UR QL STRIP: ABNORMAL
VLDLC SERPL-MCNC: 18 MG/DL (ref 5–40)
WBC # UR STRIP: ABNORMAL /HPF
WBC NRBC COR # BLD: 13.74 10*3/MM3 (ref 3.4–10.8)

## 2023-03-31 PROCEDURE — 93010 ELECTROCARDIOGRAM REPORT: CPT | Performed by: INTERNAL MEDICINE

## 2023-03-31 PROCEDURE — 85379 FIBRIN DEGRADATION QUANT: CPT | Performed by: FAMILY MEDICINE

## 2023-03-31 PROCEDURE — 94640 AIRWAY INHALATION TREATMENT: CPT

## 2023-03-31 PROCEDURE — 85652 RBC SED RATE AUTOMATED: CPT | Performed by: FAMILY MEDICINE

## 2023-03-31 PROCEDURE — 82550 ASSAY OF CK (CPK): CPT | Performed by: FAMILY MEDICINE

## 2023-03-31 PROCEDURE — 83874 ASSAY OF MYOGLOBIN: CPT | Performed by: FAMILY MEDICINE

## 2023-03-31 PROCEDURE — 83690 ASSAY OF LIPASE: CPT | Performed by: FAMILY MEDICINE

## 2023-03-31 PROCEDURE — 94799 UNLISTED PULMONARY SVC/PX: CPT

## 2023-03-31 PROCEDURE — P9040 RBC LEUKOREDUCED IRRADIATED: HCPCS

## 2023-03-31 PROCEDURE — 94760 N-INVAS EAR/PLS OXIMETRY 1: CPT

## 2023-03-31 PROCEDURE — 94664 DEMO&/EVAL PT USE INHALER: CPT

## 2023-03-31 PROCEDURE — G0378 HOSPITAL OBSERVATION PER HR: HCPCS

## 2023-03-31 PROCEDURE — P9016 RBC LEUKOCYTES REDUCED: HCPCS

## 2023-03-31 PROCEDURE — 84443 ASSAY THYROID STIM HORMONE: CPT | Performed by: FAMILY MEDICINE

## 2023-03-31 PROCEDURE — 86923 COMPATIBILITY TEST ELECTRIC: CPT

## 2023-03-31 PROCEDURE — 84145 PROCALCITONIN (PCT): CPT | Performed by: FAMILY MEDICINE

## 2023-03-31 PROCEDURE — 86901 BLOOD TYPING SEROLOGIC RH(D): CPT | Performed by: FAMILY MEDICINE

## 2023-03-31 PROCEDURE — 87040 BLOOD CULTURE FOR BACTERIA: CPT | Performed by: FAMILY MEDICINE

## 2023-03-31 PROCEDURE — 93005 ELECTROCARDIOGRAM TRACING: CPT | Performed by: FAMILY MEDICINE

## 2023-03-31 PROCEDURE — 81001 URINALYSIS AUTO W/SCOPE: CPT | Performed by: FAMILY MEDICINE

## 2023-03-31 PROCEDURE — 86850 RBC ANTIBODY SCREEN: CPT | Performed by: FAMILY MEDICINE

## 2023-03-31 PROCEDURE — 84484 ASSAY OF TROPONIN QUANT: CPT | Performed by: FAMILY MEDICINE

## 2023-03-31 PROCEDURE — 36430 TRANSFUSION BLD/BLD COMPNT: CPT

## 2023-03-31 PROCEDURE — 84100 ASSAY OF PHOSPHORUS: CPT | Performed by: FAMILY MEDICINE

## 2023-03-31 PROCEDURE — 71046 X-RAY EXAM CHEST 2 VIEWS: CPT

## 2023-03-31 PROCEDURE — 99222 1ST HOSP IP/OBS MODERATE 55: CPT | Performed by: INTERNAL MEDICINE

## 2023-03-31 PROCEDURE — 83605 ASSAY OF LACTIC ACID: CPT | Performed by: FAMILY MEDICINE

## 2023-03-31 PROCEDURE — 70450 CT HEAD/BRAIN W/O DYE: CPT

## 2023-03-31 PROCEDURE — 87636 SARSCOV2 & INF A&B AMP PRB: CPT | Performed by: FAMILY MEDICINE

## 2023-03-31 PROCEDURE — 83735 ASSAY OF MAGNESIUM: CPT | Performed by: FAMILY MEDICINE

## 2023-03-31 PROCEDURE — 80053 COMPREHEN METABOLIC PANEL: CPT | Performed by: FAMILY MEDICINE

## 2023-03-31 PROCEDURE — 80061 LIPID PANEL: CPT | Performed by: FAMILY MEDICINE

## 2023-03-31 PROCEDURE — 86140 C-REACTIVE PROTEIN: CPT | Performed by: FAMILY MEDICINE

## 2023-03-31 PROCEDURE — 84439 ASSAY OF FREE THYROXINE: CPT | Performed by: FAMILY MEDICINE

## 2023-03-31 PROCEDURE — 83880 ASSAY OF NATRIURETIC PEPTIDE: CPT | Performed by: FAMILY MEDICINE

## 2023-03-31 PROCEDURE — 85025 COMPLETE CBC W/AUTO DIFF WBC: CPT | Performed by: FAMILY MEDICINE

## 2023-03-31 PROCEDURE — 86900 BLOOD TYPING SEROLOGIC ABO: CPT | Performed by: FAMILY MEDICINE

## 2023-03-31 PROCEDURE — 86900 BLOOD TYPING SEROLOGIC ABO: CPT

## 2023-03-31 RX ORDER — BISACODYL 10 MG
10 SUPPOSITORY, RECTAL RECTAL DAILY PRN
Status: DISCONTINUED | OUTPATIENT
Start: 2023-03-31 | End: 2023-04-06 | Stop reason: HOSPADM

## 2023-03-31 RX ORDER — ROSUVASTATIN CALCIUM 20 MG/1
40 TABLET, COATED ORAL NIGHTLY
Status: DISCONTINUED | OUTPATIENT
Start: 2023-03-31 | End: 2023-03-31

## 2023-03-31 RX ORDER — CARVEDILOL 6.25 MG/1
12.5 TABLET ORAL 2 TIMES DAILY WITH MEALS
Status: DISCONTINUED | OUTPATIENT
Start: 2023-03-31 | End: 2023-04-03

## 2023-03-31 RX ORDER — TRAMADOL HYDROCHLORIDE 50 MG/1
50 TABLET ORAL EVERY 4 HOURS PRN
Status: DISCONTINUED | OUTPATIENT
Start: 2023-03-31 | End: 2023-04-06 | Stop reason: HOSPADM

## 2023-03-31 RX ORDER — PANTOPRAZOLE SODIUM 40 MG/10ML
40 INJECTION, POWDER, LYOPHILIZED, FOR SOLUTION INTRAVENOUS
Status: DISCONTINUED | OUTPATIENT
Start: 2023-04-01 | End: 2023-04-06 | Stop reason: HOSPADM

## 2023-03-31 RX ORDER — ASPIRIN 81 MG/1
81 TABLET, CHEWABLE ORAL NIGHTLY
Status: DISCONTINUED | OUTPATIENT
Start: 2023-03-31 | End: 2023-03-31

## 2023-03-31 RX ORDER — ALBUTEROL SULFATE 2.5 MG/3ML
2.5 SOLUTION RESPIRATORY (INHALATION)
Status: DISCONTINUED | OUTPATIENT
Start: 2023-03-31 | End: 2023-04-06 | Stop reason: HOSPADM

## 2023-03-31 RX ORDER — LISINOPRIL 10 MG/1
10 TABLET ORAL
Status: DISCONTINUED | OUTPATIENT
Start: 2023-04-01 | End: 2023-04-03

## 2023-03-31 RX ORDER — OXYCODONE HYDROCHLORIDE 5 MG/1
5 TABLET ORAL EVERY 4 HOURS PRN
Status: DISCONTINUED | OUTPATIENT
Start: 2023-03-31 | End: 2023-04-06 | Stop reason: HOSPADM

## 2023-03-31 RX ORDER — FAMOTIDINE 20 MG/1
40 TABLET, FILM COATED ORAL 2 TIMES DAILY
Status: DISCONTINUED | OUTPATIENT
Start: 2023-03-31 | End: 2023-03-31

## 2023-03-31 RX ORDER — ONDANSETRON 4 MG/1
4 TABLET, FILM COATED ORAL EVERY 6 HOURS PRN
Status: DISCONTINUED | OUTPATIENT
Start: 2023-03-31 | End: 2023-04-06 | Stop reason: HOSPADM

## 2023-03-31 RX ORDER — LEVOTHYROXINE SODIUM 0.05 MG/1
50 TABLET ORAL
Status: DISCONTINUED | OUTPATIENT
Start: 2023-04-01 | End: 2023-04-06 | Stop reason: HOSPADM

## 2023-03-31 RX ORDER — BISACODYL 5 MG/1
5 TABLET, DELAYED RELEASE ORAL DAILY PRN
Status: DISCONTINUED | OUTPATIENT
Start: 2023-03-31 | End: 2023-04-06 | Stop reason: HOSPADM

## 2023-03-31 RX ORDER — AMOXICILLIN 250 MG
2 CAPSULE ORAL 2 TIMES DAILY
Status: DISCONTINUED | OUTPATIENT
Start: 2023-03-31 | End: 2023-04-06 | Stop reason: HOSPADM

## 2023-03-31 RX ORDER — CLOPIDOGREL BISULFATE 75 MG/1
75 TABLET ORAL DAILY
Status: DISCONTINUED | OUTPATIENT
Start: 2023-04-01 | End: 2023-03-31

## 2023-03-31 RX ORDER — ACETAMINOPHEN 500 MG
1000 TABLET ORAL 3 TIMES DAILY
Status: DISCONTINUED | OUTPATIENT
Start: 2023-03-31 | End: 2023-03-31

## 2023-03-31 RX ORDER — ONDANSETRON 2 MG/ML
4 INJECTION INTRAMUSCULAR; INTRAVENOUS EVERY 6 HOURS PRN
Status: DISCONTINUED | OUTPATIENT
Start: 2023-03-31 | End: 2023-04-06 | Stop reason: HOSPADM

## 2023-03-31 RX ORDER — ACETAMINOPHEN 500 MG
1000 TABLET ORAL 3 TIMES DAILY
Status: DISPENSED | OUTPATIENT
Start: 2023-03-31 | End: 2023-04-02

## 2023-03-31 RX ORDER — HYDROCHLOROTHIAZIDE 25 MG/1
12.5 TABLET ORAL DAILY
Status: DISCONTINUED | OUTPATIENT
Start: 2023-04-01 | End: 2023-04-03

## 2023-03-31 RX ORDER — LORAZEPAM 0.5 MG/1
0.5 TABLET ORAL EVERY 8 HOURS PRN
Status: DISCONTINUED | OUTPATIENT
Start: 2023-03-31 | End: 2023-04-06 | Stop reason: HOSPADM

## 2023-03-31 RX ORDER — SODIUM CHLORIDE 0.9 % (FLUSH) 0.9 %
10 SYRINGE (ML) INJECTION EVERY 12 HOURS SCHEDULED
Status: DISCONTINUED | OUTPATIENT
Start: 2023-03-31 | End: 2023-04-06 | Stop reason: HOSPADM

## 2023-03-31 RX ORDER — TRAMADOL HYDROCHLORIDE 50 MG/1
50 TABLET ORAL EVERY 4 HOURS PRN
Status: DISCONTINUED | OUTPATIENT
Start: 2023-03-31 | End: 2023-03-31

## 2023-03-31 RX ORDER — SODIUM CHLORIDE 9 MG/ML
40 INJECTION, SOLUTION INTRAVENOUS AS NEEDED
Status: DISCONTINUED | OUTPATIENT
Start: 2023-03-31 | End: 2023-04-06 | Stop reason: HOSPADM

## 2023-03-31 RX ORDER — SODIUM CHLORIDE 0.9 % (FLUSH) 0.9 %
10 SYRINGE (ML) INJECTION AS NEEDED
Status: DISCONTINUED | OUTPATIENT
Start: 2023-03-31 | End: 2023-04-06 | Stop reason: HOSPADM

## 2023-03-31 RX ORDER — POLYETHYLENE GLYCOL 3350 17 G/17G
17 POWDER, FOR SOLUTION ORAL DAILY PRN
Status: DISCONTINUED | OUTPATIENT
Start: 2023-03-31 | End: 2023-04-06 | Stop reason: HOSPADM

## 2023-03-31 RX ORDER — NITROGLYCERIN 0.4 MG/1
0.4 TABLET SUBLINGUAL
Status: DISCONTINUED | OUTPATIENT
Start: 2023-03-31 | End: 2023-04-06 | Stop reason: HOSPADM

## 2023-03-31 RX ADMIN — ACETAMINOPHEN 1000 MG: 500 TABLET, FILM COATED ORAL at 21:24

## 2023-03-31 RX ADMIN — DOCUSATE SODIUM 50 MG AND SENNOSIDES 8.6 MG 2 TABLET: 8.6; 5 TABLET, FILM COATED ORAL at 21:23

## 2023-03-31 RX ADMIN — ALBUTEROL SULFATE 2.5 MG: 2.5 SOLUTION RESPIRATORY (INHALATION) at 19:39

## 2023-03-31 RX ADMIN — TRAMADOL HYDROCHLORIDE 50 MG: 50 TABLET, COATED ORAL at 16:10

## 2023-03-31 RX ADMIN — Medication 10 ML: at 21:35

## 2023-03-31 RX ADMIN — LORAZEPAM 0.5 MG: 0.5 TABLET ORAL at 17:14

## 2023-03-31 RX ADMIN — CARVEDILOL 12.5 MG: 6.25 TABLET, FILM COATED ORAL at 17:14

## 2023-03-31 RX ADMIN — OXYCODONE HYDROCHLORIDE 5 MG: 5 TABLET ORAL at 21:31

## 2023-03-31 NOTE — PROGRESS NOTES
Admitted from office with 3-4 day history of progressive weakness, urinary incontinence and increasing frequency of falls. She had been seen about a week ago and treated for sacroiliitis and back pain with prednisone.   Admit obs  Orders placed  Andrew Layne MD

## 2023-03-31 NOTE — PLAN OF CARE
Goal Outcome Evaluation:  Plan of Care Reviewed With: patient        Progress: no change  Outcome Evaluation: Patient sent from Dr. Layne's office with weakness, SOA. Awaiting orders. C/o headache. S 82-86. Continue to monitor.

## 2023-04-01 ENCOUNTER — APPOINTMENT (OUTPATIENT)
Dept: CARDIOLOGY | Facility: HOSPITAL | Age: 78
DRG: 378 | End: 2023-04-01
Payer: MEDICARE

## 2023-04-01 ENCOUNTER — ANESTHESIA EVENT (OUTPATIENT)
Dept: PERIOP | Facility: HOSPITAL | Age: 78
DRG: 378 | End: 2023-04-01
Payer: MEDICARE

## 2023-04-01 ENCOUNTER — ANESTHESIA (OUTPATIENT)
Dept: PERIOP | Facility: HOSPITAL | Age: 78
DRG: 378 | End: 2023-04-01
Payer: MEDICARE

## 2023-04-01 PROBLEM — K92.1 MELENA: Status: ACTIVE | Noted: 2023-04-01

## 2023-04-01 PROBLEM — R32 URINARY INCONTINENCE: Status: ACTIVE | Noted: 2023-04-01

## 2023-04-01 LAB
ANION GAP SERPL CALCULATED.3IONS-SCNC: 10 MMOL/L (ref 5–15)
BASOPHILS # BLD AUTO: 0.01 10*3/MM3 (ref 0–0.2)
BASOPHILS NFR BLD AUTO: 0.1 % (ref 0–1.5)
BH CV ECHO MEAS - AO MAX PG: 8.3 MMHG
BH CV ECHO MEAS - AO MEAN PG: 5 MMHG
BH CV ECHO MEAS - AO ROOT DIAM: 2.7 CM
BH CV ECHO MEAS - AO V2 MAX: 144 CM/SEC
BH CV ECHO MEAS - AO V2 VTI: 28.5 CM
BH CV ECHO MEAS - AVA(I,D): 2.46 CM2
BH CV ECHO MEAS - EDV(CUBED): 28.4 ML
BH CV ECHO MEAS - EDV(MOD-SP2): 30 ML
BH CV ECHO MEAS - EDV(MOD-SP4): 33.5 ML
BH CV ECHO MEAS - EF(MOD-BP): 71.5 %
BH CV ECHO MEAS - EF(MOD-SP2): 66.3 %
BH CV ECHO MEAS - EF(MOD-SP4): 73.2 %
BH CV ECHO MEAS - ESV(CUBED): 1.91 ML
BH CV ECHO MEAS - ESV(MOD-SP2): 10.1 ML
BH CV ECHO MEAS - ESV(MOD-SP4): 9 ML
BH CV ECHO MEAS - FS: 59.3 %
BH CV ECHO MEAS - IVS/LVPW: 1.17 CM
BH CV ECHO MEAS - IVSD: 0.96 CM
BH CV ECHO MEAS - LA DIMENSION: 3 CM
BH CV ECHO MEAS - LAT PEAK E' VEL: 8.3 CM/SEC
BH CV ECHO MEAS - LV MASS(C)D: 70.3 GRAMS
BH CV ECHO MEAS - LV MAX PG: 5.5 MMHG
BH CV ECHO MEAS - LV MEAN PG: 3 MMHG
BH CV ECHO MEAS - LV V1 MAX: 117 CM/SEC
BH CV ECHO MEAS - LV V1 VTI: 22.3 CM
BH CV ECHO MEAS - LVIDD: 3.1 CM
BH CV ECHO MEAS - LVIDS: 1.24 CM
BH CV ECHO MEAS - LVOT AREA: 3.1 CM2
BH CV ECHO MEAS - LVOT DIAM: 2 CM
BH CV ECHO MEAS - LVPWD: 0.82 CM
BH CV ECHO MEAS - MED PEAK E' VEL: 6.3 CM/SEC
BH CV ECHO MEAS - MV A MAX VEL: 110 CM/SEC
BH CV ECHO MEAS - MV DEC TIME: 0.23 MSEC
BH CV ECHO MEAS - MV E MAX VEL: 85.2 CM/SEC
BH CV ECHO MEAS - MV E/A: 0.77
BH CV ECHO MEAS - MV MAX PG: 5.9 MMHG
BH CV ECHO MEAS - MV MEAN PG: 2 MMHG
BH CV ECHO MEAS - MV V2 VTI: 30.1 CM
BH CV ECHO MEAS - MVA(VTI): 2.33 CM2
BH CV ECHO MEAS - PA V2 MAX: 132 CM/SEC
BH CV ECHO MEAS - RAP SYSTOLE: 5 MMHG
BH CV ECHO MEAS - RV MAX PG: 5.3 MMHG
BH CV ECHO MEAS - RV V1 MAX: 115 CM/SEC
BH CV ECHO MEAS - RV V1 VTI: 21 CM
BH CV ECHO MEAS - RVSP: 32.9 MMHG
BH CV ECHO MEAS - SV(LVOT): 70.1 ML
BH CV ECHO MEAS - SV(MOD-SP2): 19.9 ML
BH CV ECHO MEAS - SV(MOD-SP4): 24.5 ML
BH CV ECHO MEAS - TAPSE (>1.6): 2.4 CM
BH CV ECHO MEAS - TR MAX PG: 27.9 MMHG
BH CV ECHO MEAS - TR MAX VEL: 264 CM/SEC
BH CV ECHO MEASUREMENTS AVERAGE E/E' RATIO: 11.67
BH CV XLRA - TDI S': 16.4 CM/SEC
BUN SERPL-MCNC: 35 MG/DL (ref 8–23)
BUN/CREAT SERPL: 41.7 (ref 7–25)
CALCIUM SPEC-SCNC: 8.7 MG/DL (ref 8.6–10.5)
CHLORIDE SERPL-SCNC: 99 MMOL/L (ref 98–107)
CO2 SERPL-SCNC: 26 MMOL/L (ref 22–29)
CREAT SERPL-MCNC: 0.84 MG/DL (ref 0.57–1)
DEPRECATED RDW RBC AUTO: 44.8 FL (ref 37–54)
DEPRECATED RDW RBC AUTO: 46.4 FL (ref 37–54)
DEPRECATED RDW RBC AUTO: 46.9 FL (ref 37–54)
DEPRECATED RDW RBC AUTO: 47 FL (ref 37–54)
EGFRCR SERPLBLD CKD-EPI 2021: 71.7 ML/MIN/1.73
EOSINOPHIL # BLD AUTO: 0.1 10*3/MM3 (ref 0–0.4)
EOSINOPHIL NFR BLD AUTO: 1 % (ref 0.3–6.2)
ERYTHROCYTE [DISTWIDTH] IN BLOOD BY AUTOMATED COUNT: 13.1 % (ref 12.3–15.4)
ERYTHROCYTE [DISTWIDTH] IN BLOOD BY AUTOMATED COUNT: 13.5 % (ref 12.3–15.4)
ERYTHROCYTE [DISTWIDTH] IN BLOOD BY AUTOMATED COUNT: 13.7 % (ref 12.3–15.4)
ERYTHROCYTE [DISTWIDTH] IN BLOOD BY AUTOMATED COUNT: 13.9 % (ref 12.3–15.4)
GLUCOSE BLDC GLUCOMTR-MCNC: 100 MG/DL (ref 70–130)
GLUCOSE SERPL-MCNC: 81 MG/DL (ref 65–99)
HCT VFR BLD AUTO: 21.5 % (ref 34–46.6)
HCT VFR BLD AUTO: 22.2 % (ref 34–46.6)
HCT VFR BLD AUTO: 23.3 % (ref 34–46.6)
HCT VFR BLD AUTO: 24.6 % (ref 34–46.6)
HEMOCCULT STL QL: POSITIVE
HGB BLD-MCNC: 7.1 G/DL (ref 12–15.9)
HGB BLD-MCNC: 7.3 G/DL (ref 12–15.9)
HGB BLD-MCNC: 7.5 G/DL (ref 12–15.9)
HGB BLD-MCNC: 7.9 G/DL (ref 12–15.9)
IMM GRANULOCYTES # BLD AUTO: 0.05 10*3/MM3 (ref 0–0.05)
IMM GRANULOCYTES NFR BLD AUTO: 0.5 % (ref 0–0.5)
LEFT ATRIUM VOLUME INDEX: 15.9 ML/M2
LEFT ATRIUM VOLUME: 23.4 ML
LYMPHOCYTES # BLD AUTO: 2.52 10*3/MM3 (ref 0.7–3.1)
LYMPHOCYTES NFR BLD AUTO: 25.1 % (ref 19.6–45.3)
MAXIMAL PREDICTED HEART RATE: 143 BPM
MCH RBC QN AUTO: 30 PG (ref 26.6–33)
MCH RBC QN AUTO: 30.2 PG (ref 26.6–33)
MCH RBC QN AUTO: 30.3 PG (ref 26.6–33)
MCH RBC QN AUTO: 31.6 PG (ref 26.6–33)
MCHC RBC AUTO-ENTMCNC: 32 G/DL (ref 31.5–35.7)
MCHC RBC AUTO-ENTMCNC: 32.1 G/DL (ref 31.5–35.7)
MCHC RBC AUTO-ENTMCNC: 32.2 G/DL (ref 31.5–35.7)
MCHC RBC AUTO-ENTMCNC: 34 G/DL (ref 31.5–35.7)
MCV RBC AUTO: 93.1 FL (ref 79–97)
MCV RBC AUTO: 93.7 FL (ref 79–97)
MCV RBC AUTO: 94 FL (ref 79–97)
MCV RBC AUTO: 94.3 FL (ref 79–97)
MONOCYTES # BLD AUTO: 1.49 10*3/MM3 (ref 0.1–0.9)
MONOCYTES NFR BLD AUTO: 14.8 % (ref 5–12)
MYOGLOBIN SERPL-MCNC: 41.7 NG/ML (ref 25–58)
NEUTROPHILS NFR BLD AUTO: 5.87 10*3/MM3 (ref 1.7–7)
NEUTROPHILS NFR BLD AUTO: 58.5 % (ref 42.7–76)
NRBC BLD AUTO-RTO: 0 /100 WBC (ref 0–0.2)
PLATELET # BLD AUTO: 212 10*3/MM3 (ref 140–450)
PLATELET # BLD AUTO: 221 10*3/MM3 (ref 140–450)
PLATELET # BLD AUTO: 246 10*3/MM3 (ref 140–450)
PLATELET # BLD AUTO: 249 10*3/MM3 (ref 140–450)
PMV BLD AUTO: 9.1 FL (ref 6–12)
PMV BLD AUTO: 9.2 FL (ref 6–12)
PMV BLD AUTO: 9.2 FL (ref 6–12)
PMV BLD AUTO: 9.3 FL (ref 6–12)
POTASSIUM SERPL-SCNC: 3.8 MMOL/L (ref 3.5–5.2)
QT INTERVAL: 368 MS
QTC INTERVAL: 447 MS
RBC # BLD AUTO: 2.31 10*6/MM3 (ref 3.77–5.28)
RBC # BLD AUTO: 2.37 10*6/MM3 (ref 3.77–5.28)
RBC # BLD AUTO: 2.48 10*6/MM3 (ref 3.77–5.28)
RBC # BLD AUTO: 2.61 10*6/MM3 (ref 3.77–5.28)
SODIUM SERPL-SCNC: 135 MMOL/L (ref 136–145)
STRESS TARGET HR: 122 BPM
WBC NRBC COR # BLD: 10 10*3/MM3 (ref 3.4–10.8)
WBC NRBC COR # BLD: 10.04 10*3/MM3 (ref 3.4–10.8)
WBC NRBC COR # BLD: 10.29 10*3/MM3 (ref 3.4–10.8)
WBC NRBC COR # BLD: 9.41 10*3/MM3 (ref 3.4–10.8)

## 2023-04-01 PROCEDURE — 82272 OCCULT BLD FECES 1-3 TESTS: CPT | Performed by: FAMILY MEDICINE

## 2023-04-01 PROCEDURE — 80048 BASIC METABOLIC PNL TOTAL CA: CPT | Performed by: FAMILY MEDICINE

## 2023-04-01 PROCEDURE — 93306 TTE W/DOPPLER COMPLETE: CPT

## 2023-04-01 PROCEDURE — 25010000002 PROPOFOL 10 MG/ML EMULSION: Performed by: NURSE ANESTHETIST, CERTIFIED REGISTERED

## 2023-04-01 PROCEDURE — 0DJ08ZZ INSPECTION OF UPPER INTESTINAL TRACT, VIA NATURAL OR ARTIFICIAL OPENING ENDOSCOPIC: ICD-10-PCS | Performed by: INTERNAL MEDICINE

## 2023-04-01 PROCEDURE — 94799 UNLISTED PULMONARY SVC/PX: CPT

## 2023-04-01 PROCEDURE — 93306 TTE W/DOPPLER COMPLETE: CPT | Performed by: INTERNAL MEDICINE

## 2023-04-01 PROCEDURE — 82962 GLUCOSE BLOOD TEST: CPT

## 2023-04-01 PROCEDURE — 85027 COMPLETE CBC AUTOMATED: CPT | Performed by: FAMILY MEDICINE

## 2023-04-01 PROCEDURE — 85025 COMPLETE CBC W/AUTO DIFF WBC: CPT | Performed by: FAMILY MEDICINE

## 2023-04-01 PROCEDURE — 43235 EGD DIAGNOSTIC BRUSH WASH: CPT | Performed by: INTERNAL MEDICINE

## 2023-04-01 PROCEDURE — 94664 DEMO&/EVAL PT USE INHALER: CPT

## 2023-04-01 PROCEDURE — 94760 N-INVAS EAR/PLS OXIMETRY 1: CPT

## 2023-04-01 RX ORDER — SODIUM CHLORIDE 9 MG/ML
INJECTION, SOLUTION INTRAVENOUS CONTINUOUS PRN
Status: DISCONTINUED | OUTPATIENT
Start: 2023-04-01 | End: 2023-04-01 | Stop reason: SURG

## 2023-04-01 RX ORDER — SODIUM CHLORIDE 0.9 % (FLUSH) 0.9 %
10 SYRINGE (ML) INJECTION EVERY 12 HOURS SCHEDULED
Status: DISCONTINUED | OUTPATIENT
Start: 2023-04-01 | End: 2023-04-01 | Stop reason: HOSPADM

## 2023-04-01 RX ORDER — SODIUM CHLORIDE 9 MG/ML
100 INJECTION, SOLUTION INTRAVENOUS CONTINUOUS
Status: DISCONTINUED | OUTPATIENT
Start: 2023-04-01 | End: 2023-04-06 | Stop reason: HOSPADM

## 2023-04-01 RX ORDER — PROPOFOL 10 MG/ML
VIAL (ML) INTRAVENOUS AS NEEDED
Status: DISCONTINUED | OUTPATIENT
Start: 2023-04-01 | End: 2023-04-01 | Stop reason: SURG

## 2023-04-01 RX ORDER — SODIUM CHLORIDE 0.9 % (FLUSH) 0.9 %
10 SYRINGE (ML) INJECTION AS NEEDED
Status: DISCONTINUED | OUTPATIENT
Start: 2023-04-01 | End: 2023-04-01 | Stop reason: HOSPADM

## 2023-04-01 RX ORDER — CLOPIDOGREL BISULFATE 75 MG/1
75 TABLET ORAL DAILY
COMMUNITY
End: 2023-04-06 | Stop reason: HOSPADM

## 2023-04-01 RX ORDER — HYDROXYZINE PAMOATE 25 MG/1
25 CAPSULE ORAL 3 TIMES DAILY PRN
COMMUNITY

## 2023-04-01 RX ORDER — SODIUM CHLORIDE 9 MG/ML
40 INJECTION, SOLUTION INTRAVENOUS AS NEEDED
Status: DISCONTINUED | OUTPATIENT
Start: 2023-04-01 | End: 2023-04-01 | Stop reason: HOSPADM

## 2023-04-01 RX ORDER — LIDOCAINE HYDROCHLORIDE 20 MG/ML
INJECTION, SOLUTION EPIDURAL; INFILTRATION; INTRACAUDAL; PERINEURAL AS NEEDED
Status: DISCONTINUED | OUTPATIENT
Start: 2023-04-01 | End: 2023-04-01 | Stop reason: SURG

## 2023-04-01 RX ADMIN — ACETAMINOPHEN 1000 MG: 500 TABLET, FILM COATED ORAL at 09:00

## 2023-04-01 RX ADMIN — DOCUSATE SODIUM 50 MG AND SENNOSIDES 8.6 MG 2 TABLET: 8.6; 5 TABLET, FILM COATED ORAL at 09:00

## 2023-04-01 RX ADMIN — LORAZEPAM 0.5 MG: 0.5 TABLET ORAL at 09:01

## 2023-04-01 RX ADMIN — TRAMADOL HYDROCHLORIDE 50 MG: 50 TABLET, COATED ORAL at 13:55

## 2023-04-01 RX ADMIN — LORAZEPAM 0.5 MG: 0.5 TABLET ORAL at 20:17

## 2023-04-01 RX ADMIN — ALBUTEROL SULFATE 2.5 MG: 2.5 SOLUTION RESPIRATORY (INHALATION) at 15:48

## 2023-04-01 RX ADMIN — PANTOPRAZOLE SODIUM 40 MG: 40 INJECTION, POWDER, FOR SOLUTION INTRAVENOUS at 09:48

## 2023-04-01 RX ADMIN — ALBUTEROL SULFATE 2.5 MG: 2.5 SOLUTION RESPIRATORY (INHALATION) at 07:08

## 2023-04-01 RX ADMIN — PROPOFOL INJECTABLE EMULSION 100 MG: 10 INJECTION, EMULSION INTRAVENOUS at 11:19

## 2023-04-01 RX ADMIN — ACETAMINOPHEN 1000 MG: 500 TABLET, FILM COATED ORAL at 20:16

## 2023-04-01 RX ADMIN — DOCUSATE SODIUM 50 MG AND SENNOSIDES 8.6 MG 2 TABLET: 8.6; 5 TABLET, FILM COATED ORAL at 20:16

## 2023-04-01 RX ADMIN — ALBUTEROL SULFATE 2.5 MG: 2.5 SOLUTION RESPIRATORY (INHALATION) at 19:56

## 2023-04-01 RX ADMIN — TRAMADOL HYDROCHLORIDE 50 MG: 50 TABLET, COATED ORAL at 20:17

## 2023-04-01 RX ADMIN — ALBUTEROL SULFATE 2.5 MG: 2.5 SOLUTION RESPIRATORY (INHALATION) at 10:31

## 2023-04-01 RX ADMIN — LISINOPRIL 10 MG: 10 TABLET ORAL at 09:00

## 2023-04-01 RX ADMIN — ACETAMINOPHEN 1000 MG: 500 TABLET, FILM COATED ORAL at 17:24

## 2023-04-01 RX ADMIN — Medication 10 ML: at 20:17

## 2023-04-01 RX ADMIN — TRAMADOL HYDROCHLORIDE 50 MG: 50 TABLET, COATED ORAL at 09:01

## 2023-04-01 RX ADMIN — HYDROCHLOROTHIAZIDE 12.5 MG: 25 TABLET ORAL at 09:00

## 2023-04-01 RX ADMIN — PANTOPRAZOLE SODIUM 40 MG: 40 INJECTION, POWDER, FOR SOLUTION INTRAVENOUS at 17:24

## 2023-04-01 RX ADMIN — OXYCODONE HYDROCHLORIDE 5 MG: 5 TABLET ORAL at 21:24

## 2023-04-01 RX ADMIN — SODIUM CHLORIDE: 9 INJECTION, SOLUTION INTRAVENOUS at 11:20

## 2023-04-01 RX ADMIN — CARVEDILOL 12.5 MG: 6.25 TABLET, FILM COATED ORAL at 09:01

## 2023-04-01 RX ADMIN — LIDOCAINE HYDROCHLORIDE 200 MG: 20 INJECTION, SOLUTION EPIDURAL; INFILTRATION; INTRACAUDAL at 11:19

## 2023-04-01 RX ADMIN — Medication 10 ML: at 09:49

## 2023-04-01 NOTE — ANESTHESIA POSTPROCEDURE EVALUATION
Patient: Trinidad Zhu    Procedure Summary     Date: 04/01/23 Room / Location:  [facility] OR 01 / [facility] OR    Anesthesia Start: 1119 Anesthesia Stop: 1131    Procedure: ESOPHAGOGASTRODUODENOSCOPY WITH ANESTHESIA Diagnosis:       Acute blood loss anemia      (Acute blood loss anemia [D62])    Surgeons: Patric Reyes DO Provider: Jamil Han CRNA    Anesthesia Type: MAC ASA Status: 3 - Emergent          Anesthesia Type: MAC    Vitals  No vitals data found for the desired time range.          Post Anesthesia Care and Evaluation    Patient location during evaluation: PHASE II  Patient participation: complete - patient participated  Level of consciousness: awake and alert  Pain score: 0  Pain management: adequate    Airway patency: patent  Anesthetic complications: No anesthetic complications    Cardiovascular status: acceptable and stable  Respiratory status: room air  Hydration status: acceptable

## 2023-04-01 NOTE — H&P (VIEW-ONLY)
Initial Inpatient Consult Note  Referring Provider: Xi  Reason for Consultation: Melena and severe anemia    Subjective     History of present illness: Over the past few days patient has noticed black tarry stool.  This has been associated with progressive weakness.  She reported to the emergency department, hemoglobin at that time was 5.9.  She was seen for sacroiliitis about a week ago and was treated with prednisone.  She underwent colonoscopy in June 2022 and it demonstrated some diverticulosis.  Her last upper endoscopy was at or around 2016 and it was a normal examination at that time.  She has significant atherosclerotic arteriopathy and she is on a combination of Xarelto, Plavix, and aspirin.  She does not take any other NSAIDs.    Past Medical History:  Past Medical History:   Diagnosis Date   • Antral ulcer    • Bladder cystocele    • C. difficile diarrhea    • CAD (coronary artery disease)    • Cancer (HCC)     lung 2021   • Colon polyp    • COPD (chronic obstructive pulmonary disease) (HCC)    • COVID-19 vaccine series completed    • Diarrhea    • Difficulty swallowing    • Disease of thyroid gland    • Diverticulosis    • Elevated cholesterol    • Gastritis    • Generalized OA    • GERD (gastroesophageal reflux disease)    • History of bladder surgery 01/07/2020   • History of transfusion     after lung surgery 2021   • Hyperlipidemia    • Hypertension    • Liver cyst    • Lung nodule    • Microscopic colitis    • Multiple gastric ulcers     last 5 years ago   • Neck pain    • Neuropathy    • Normal body mass index    • NSAID induced gastritis    • Ulcer of pyloric antrum     UNSPECIFIED ULCER CHRONICITY   • UTI (urinary tract infection)    • Weight loss        Past Surgical History:  Past Surgical History:   Procedure Laterality Date   • ANTERIOR CERVICAL DISCECTOMY W/ FUSION N/A 5/22/2017    Procedure: CERVICAL DISCECTOMY ANTERIOR FUSION WITH INSTRUMENTATION;  Surgeon: NADINE Mackay  MD No;  Location: [facility] OR;  Service:    • AORTAGRAM Left 11/9/2020    Procedure: left lower extremtiy angiogram, hawk athrectomy, balloon angioplasty, stent placement, mynx closure;  Surgeon: Sukhdev Condon DO;  Location:  [facility];  Service: Vascular;  Laterality: Left;   • AORTAGRAM Left 3/26/2021    Procedure: LEFT LOWER EXTREMITY ANGIOGRAM, BALLOON ANGIOPLASTY, STENT PLACEMENT, MYNX CLOSURE;  Surgeon: Sukhdev Condon DO;  Location:  [facility];  Service: Vascular;  Laterality: Left;   • AORTAGRAM Left 8/6/2021    Procedure: LEFT LOWER EXTREMITY ANGIOGRAM, HAWK ATHERECTOMY, BALLOON ANGIOPLASTY, MYNX CLOSURE;  Surgeon: Sukhdev Condon DO;  Location:  [facility];  Service: Vascular;  Laterality: Left;   • AORTAGRAM Right 6/8/2022    Procedure: RIGHT LOWER EXTREMITY ANGIOGRAM, INTRAVASCULAR LITHOTRIPSY, HAWK ATHRECTOMY, BALLOON ANGIOPLASTY, MYNX CLOSURE;  Surgeon: Sukhdev Condon DO;  Location:  [facility];  Service: Vascular;  Laterality: Right;   • AORTAGRAM Left 10/21/2022    Procedure: LEFT LOWER EXTREMITY ANGIOGRAM WITH HAWK ATHRECTOMY, BALLOON ANGIOPLASTY, STENT PLACEMENT, MYNX CLOSURE;  Surgeon: Sukhdev Condon DO;  Location:  [facility];  Service: Vascular;  Laterality: Left;   • BLADDER SUSPENSION      also had pelvic reconstructive surgery   • BREAST EXCISIONAL BIOPSY Right 1985   • CATARACT EXTRACTION, BILATERAL     • CERVICAL CORPECTOMY N/A 5/22/2017    Procedure:  ANTERIOR CERVICAL DISCECTOMY FUSION C-6 to T1,  ANTERIOR FUSION WITH INSTRUMENTATION C-5 T-1;  Surgeon: NADINE Sarabia MD;  Location: [facility] OR;  Service:    • COLONOSCOPY  08/19/2015    TIC BX RT COLON   • COLONOSCOPY  12/04/2013    RT COLON BX RECALL 5YR   • COLONOSCOPY N/A 11/29/2016    The entire examined colon is normal; No specimens collected   • COLONOSCOPY N/A 6/2/2022    Procedure: COLONOSCOPY WITH ANESTHESIA;  Surgeon: Marco Antonio Vallejo MD;  Location: [facility]  ENDOSCOPY;  Service: Gastroenterology;  Laterality: N/A;  Pre: screen  Post: diverticulosis  Andrew Layne MD   • ENDOSCOPY  2015    HEALED ULCER SLANT BX   • FEMORAL ENDARTERECTOMY Left 10/21/2022    Procedure: LEFT LOWER EXTREMITY ANGIOGRAM;  Surgeon: Sukhdev Condon DO;  Location:  [facility];  Service: Vascular;  Laterality: Left;   • HYSTERECTOMY     • LEG THROMBECTOMY/EMBOLECTOMY Left 2022    Procedure: LOWER EXTREMITY THROMBECTOMY/EMBOLECTOMY, PATCH GRAFT TO  FEMORAL ARTERY;  Surgeon: Sukhdev Condon DO;  Location:  [facility];  Service: Vascular;  Laterality: Left;   • LOBECTOMY Right 2021    Procedure: RIGHT THORACOSCOPY WITH ZeccoI ROBOT, RIGHT UPPER LOBE LOBECTOMY;  Surgeon: Jarad Ignacio MD;  Location: [facility] OR;  Service: Robotics - DaVinci;  Laterality: Right;   • LUNG SURGERY     • OTHER SURGICAL HISTORY      KNEE CARTILAGE REMOVED   • OTHER SURGICAL HISTORY      BENIGN FIBROID TUMOR OF BREAST 1985 REMOVED   • TONSILLECTOMY          Social History:   Social History     Tobacco Use   • Smoking status: Former     Packs/day: 1.50     Years: 20.00     Pack years: 30.00     Types: Cigarettes     Quit date:      Years since quittin.2   • Smokeless tobacco: Never   Substance Use Topics   • Alcohol use: Yes     Comment: occasionally        Family History:  Family History   Problem Relation Age of Onset   • Breast cancer Maternal Aunt    • Heart disease Mother    • Heart disease Father    • GI problems Neg Hx         MALIGNANCIES   • Colon cancer Neg Hx    • Colon polyps Neg Hx        Home Meds:  Medications Prior to Admission   Medication Sig Dispense Refill Last Dose   • albuterol sulfate  (90 Base) MCG/ACT inhaler Inhale 2 puffs Every 4 (Four) Hours As Needed for Wheezing.      • alendronate (FOSAMAX) 70 MG tablet Take 1 tablet by mouth Every 7 (Seven) Days.   3    • aspirin 81 MG chewable tablet Chew 1 tablet Every Night.   3/30/2023    • benazepril (LOTENSIN) 5 MG tablet Take 1 tablet by mouth Daily.   3/31/2023   • Budeson-Glycopyrrol-Formoterol (Breztri Aerosphere) 160-9-4.8 MCG/ACT aerosol inhaler Inhale 2 puffs 2 (Two) Times a Day. 1 each 0 3/31/2023   • Calcium Carb-Cholecalciferol (CALCIUM 500+D PO) Take 1 tablet by mouth Daily.   3/31/2023   • carvedilol (COREG) 12.5 MG tablet Take 1 tablet by mouth 2 (Two) Times a Day With Meals.   3/31/2023   • cetirizine (ZyrTEC) 10 MG tablet Take 1 tablet by mouth At Night As Needed.      • clopidogrel (PLAVIX) 75 MG tablet TAKE 1 TABLET BY MOUTH EVERY DAY  30 tablet 4 3/31/2023   • famotidine (PEPCID) 40 MG tablet Take 1 tablet by mouth 2 (Two) Times a Day.  3 3/31/2023   • ferrous sulfate 325 (65 FE) MG tablet Take 1 tablet by mouth Daily With Breakfast.   3/31/2023   • hydroCHLOROthiazide (HYDRODIURIL) 12.5 MG tablet Take 1 tablet by mouth Daily.   3/31/2023   • levothyroxine (SYNTHROID, LEVOTHROID) 50 MCG tablet Take 1 tablet by mouth Daily.  3 3/31/2023   • multivitamin with minerals tablet tablet Take 1 tablet by mouth Daily.   3/31/2023   • Rivaroxaban (XARELTO) 2.5 MG tablet Take 1 tablet by mouth 2 (Two) Times a Day for 30 days. 60 tablet 5 3/31/2023   • rosuvastatin (CRESTOR) 40 MG tablet Take 1 tablet by mouth Every Night.   3/30/2023   • traMADol (ULTRAM) 50 MG tablet Take 1 tablet by mouth Every 3 (Three) Hours As Needed for Moderate Pain or Severe Pain.  0    • Budeson-Glycopyrrol-Formoterol (BREZTRI) 160-9-4.8 MCG/ACT aerosol inhaler Inhale 2 puffs 2 (Two) Times a Day. Rinse and spit after using. 1 each 0    • hydrOXYzine pamoate (VISTARIL) 25 MG capsule TAKE 1 CAPSULE BY MOUTH 3 (THREE) TIMES A DAY AS NEEDED FOR ANXIETY FOR UP TO 30 DAYS. 45 capsule 2        Current Meds:   acetaminophen, 1,000 mg, Oral, TID  albuterol, 2.5 mg, Nebulization, Q4H - RT  aspirin, 81 mg, Oral, Nightly  carvedilol, 12.5 mg, Oral, BID With Meals  [START ON 4/1/2023] clopidogrel, 75 mg, Oral,  Daily  famotidine, 40 mg, Oral, BID  [START ON 4/1/2023] hydroCHLOROthiazide, 12.5 mg, Oral, Daily  [START ON 4/1/2023] levothyroxine, 50 mcg, Oral, Q AM  [START ON 4/1/2023] lisinopril, 10 mg, Oral, Q24H  Rivaroxaban, 2.5 mg, Oral, BID  rosuvastatin, 40 mg, Oral, Nightly  senna-docusate sodium, 2 tablet, Oral, BID  sodium chloride, 10 mL, Intravenous, Q12H        Allergies:  Allergies   Allergen Reactions   • Baclofen Other (See Comments)     MUSCULOSKELETAL THERAPY AGENTS knocked her out for a day    Other reaction(s): Unknown   • Gabapentin Confusion     Other reaction(s): over sedation   • Sulfa Antibiotics Rash     Mouth blisters   • Sulfacetamide Sodium Rash   • Amitriptyline Hcl Confusion     Other reaction(s): ambulation difficulties/confusion   • Niferex [Iron Polysaccharide] Swelling     LIP SWELLING   • Oxycodone-Acetaminophen GI Intolerance     Other reaction(s): vomiting       Review of Systems  Pertinent items are noted in HPI, all other systems reviewed and negative    Objective     Vital Signs  Temp:  [97.5 °F (36.4 °C)-98.1 °F (36.7 °C)] 98.1 °F (36.7 °C)  Heart Rate:  [72-89] 89  Resp:  [18] 18  BP: (107-127)/(51-53) 127/51    Physical Exam:     General Appearance:    Alert, cooperative, in no acute distress   Head:    Normocephalic, without obvious abnormality, atraumatic   Eyes:            Lids and lashes normal, conjunctivae and sclerae pale,  PERRLA   Ears:    Ears appear intact with no abnormalities noted   Throat:   No oral lesions, no thrush, oral mucosa moist   Neck:   No adenopathy, supple, trachea midline, no thyromegaly, no     carotid bruit, no JVD   Back:     No kyphosis present, no scoliosis present, no skin lesions,       erythema or scars, no tenderness to percussion or                   palpation,   range of motion normal   Lungs:     Clear to auscultation,respirations regular, even and                   unlabored    Heart:    Regular rhythm and normal rate, normal S1 and S2, grade  2  systolic ejection murmur heard, no gallop, no rub, no click   Breast Exam:    Deferred   Abdomen:     Normal bowel sounds, no masses, no organomegaly, soft        non-tender, non-distended, no guarding, no rebound                 tenderness   Genitalia:    Deferred   Extremities:   Moves all extremities well, no edema, no cyanosis, no              redness   Pulses:   Pulses palpable and equal bilaterally   Skin:   No bleeding, bruising or rash   Lymph nodes:   No palpable adenopathy   Neurologic:   Cranial nerves 2 - 12 grossly intact, sensation intact, DTR        present and equal bilaterally       Results Review:   I reviewed the patient's new clinical results.    WBC No results found for: WBCS   HGB Hemoglobin   Date Value Ref Range Status   03/31/2023 5.9 (C) 12.0 - 15.9 g/dL Final      HCT Hematocrit   Date Value Ref Range Status   03/31/2023 18.7 (C) 34.0 - 46.6 % Final      Platelets No results found for: LABPLAT   MCV MCV   Date Value Ref Range Status   03/31/2023 94.9 79.0 - 97.0 fL Final          Sodium Sodium   Date Value Ref Range Status   03/31/2023 134 (L) 136 - 145 mmol/L Final      Potassium Potassium   Date Value Ref Range Status   03/31/2023 4.4 3.5 - 5.2 mmol/L Final      Chloride Chloride   Date Value Ref Range Status   03/31/2023 96 (L) 98 - 107 mmol/L Final      CO2 CO2   Date Value Ref Range Status   03/31/2023 25.0 22.0 - 29.0 mmol/L Final      BUN BUN   Date Value Ref Range Status   03/31/2023 37 (H) 8 - 23 mg/dL Final      Creatinine Creatinine   Date Value Ref Range Status   03/31/2023 0.75 0.57 - 1.00 mg/dL Final      Calcium Calcium   Date Value Ref Range Status   03/31/2023 9.6 8.6 - 10.5 mg/dL Final      Albumin Albumin   Date Value Ref Range Status   03/31/2023 4.3 3.5 - 5.2 g/dL Final      AST  ALT  PT/INR:   AST (SGOT)   Date Value Ref Range Status   03/31/2023 22 1 - 32 U/L Final     ALT (SGPT)   Date Value Ref Range Status   03/31/2023 19 1 - 33 U/L Final     No results found  for: PROTIME/No results found for: INR         Imaging Results (Last 72 Hours)     Procedure Component Value Units Date/Time    XR Chest PA & Lateral [[id number]] Collected: 03/31/23 1845     Updated: 03/31/23 1850    Narrative:      EXAM/TECHNIQUE: XR CHEST PA AND LATERAL-     INDICATION: dyspnea     COMPARISON: Chest radiograph 12/20/2021, chest CT 01/13/2023     FINDINGS:     Cardiac silhouette is normal size. Chronic small RIGHT pleural effusion.  Chronic RIGHT apical pleural thickening and right-sided volume loss. No  LEFT pleural effusion. No pneumothorax or area of consolidation. No  acute osseous finding.       Impression:         1.  No acute finding or significant change.  2.  Chronic small RIGHT pleural effusion and RIGHT apical pleural  thickening with postsurgical RIGHT sided volume loss.  This report was finalized on 03/31/2023 18:47 by Dr. Patrick Hylton MD.    CT Head Without Contrast [[id number]] Collected: 03/31/23 1658     Updated: 03/31/23 1704    Narrative:      EXAM/TECHNIQUE: CT head without contrast     INDICATION: headache     COMPARISON: 7/5/2022 and 11/13/2021     DLP: 584 mGy cm. Automated exposure control was also utilized to  decrease patient radiation dose.     FINDINGS:     No evidence of intracranial hemorrhage. Gray-white differentiation is  maintained. No midline shift or mass effect. Lateral ventricles are  nondilated. Basilar cisterns are patent. No acute orbital finding.  Mastoid air cells are clear. No acute osseous finding.       Impression:         No acute intracranial findings.  This report was finalized on 03/31/2023 17:01 by Dr. Patrick Hylton MD.          Assessment & Plan       Generalized muscle weakness      Melena and profound anemia.  She is on an ulcerogenic combination of aspirin and prednisone and to complicate issues she is on both Xarelto and Plavix.  I think it would be very important to define her gastric pathology and we will proceed with EGD in the morning.   In the meantime, I will treat her with at least double dose PPI.  She is also scheduled to get a couple units of packed red blood cells.    I discussed the patients findings and my recommendations with patient, family and nursing staff    Jung Ferrara MD  [facility]/[place]  03/31/23  19:36 CDT

## 2023-04-01 NOTE — ANESTHESIA PREPROCEDURE EVALUATION
Anesthesia Evaluation     Patient summary reviewed and Nursing notes reviewed   NPO Solid Status: > 8 hours  NPO Liquid Status: > 8 hours           Airway   Mallampati: I  TM distance: >3 FB  Neck ROM: full  No difficulty expected  Dental - normal exam     Pulmonary - normal exam   (+) pleural effusion, a smoker Former, COPD moderate, shortness of breath,   Cardiovascular - normal exam  Exercise tolerance: poor (<4 METS)    PT is on anticoagulation therapy  Patient on routine beta blocker and Beta blocker given within 24 hours of surgery    (+) hypertension well controlled 2 medications or greater, CAD, ORTIZ, PVD, DVT resolved, hyperlipidemia,       Neuro/Psych  (+) syncope,    GI/Hepatic/Renal/Endo    (+)  GERD well controlled, PUD,  liver disease fatty liver disease, thyroid problem hypothyroidism    Musculoskeletal     (+) neck pain,   Abdominal  - normal exam   Substance History      OB/GYN          Other   arthritis,    history of cancer remission                    Anesthesia Plan    ASA 3 - emergent     MAC             CODE STATUS:    Level Of Support Discussed With: Patient  Code Status (Patient has no pulse and is not breathing): CPR (Attempt to Resuscitate)  Medical Interventions (Patient has pulse or is breathing): Full Support  Release to patient: Routine Release

## 2023-04-01 NOTE — PLAN OF CARE
Problem: Adult Inpatient Plan of Care  Goal: Plan of Care Review  Outcome: Ongoing, Progressing  Flowsheets (Taken 4/1/2023 1521)  Progress: no change  Plan of Care Reviewed With: patient  Outcome Evaluation: Patient alert and oriented, pleasant. Ultram given prn for pain. EGD done today, non-bleeding ulcer noted per report. Most recent hemoglobin 7.3. Echo done today. No BM so far this shift. Patient is curious about plan regarding her home Xarelto/Plavix. Awaiting plans per MD. Family at bedside. Fall protocol in place.

## 2023-04-01 NOTE — CONSULTS
Initial Inpatient Consult Note  Referring Provider: Xi  Reason for Consultation: Melena and severe anemia    Subjective     History of present illness: Over the past few days patient has noticed black tarry stool.  This has been associated with progressive weakness.  She reported to the emergency department, hemoglobin at that time was 5.9.  She was seen for sacroiliitis about a week ago and was treated with prednisone.  She underwent colonoscopy in June 2022 and it demonstrated some diverticulosis.  Her last upper endoscopy was at or around 2016 and it was a normal examination at that time.  She has significant atherosclerotic arteriopathy and she is on a combination of Xarelto, Plavix, and aspirin.  She does not take any other NSAIDs.    Past Medical History:  Past Medical History:   Diagnosis Date   • Antral ulcer    • Bladder cystocele    • C. difficile diarrhea    • CAD (coronary artery disease)    • Cancer (HCC)     lung 2021   • Colon polyp    • COPD (chronic obstructive pulmonary disease) (HCC)    • COVID-19 vaccine series completed    • Diarrhea    • Difficulty swallowing    • Disease of thyroid gland    • Diverticulosis    • Elevated cholesterol    • Gastritis    • Generalized OA    • GERD (gastroesophageal reflux disease)    • History of bladder surgery 01/07/2020   • History of transfusion     after lung surgery 2021   • Hyperlipidemia    • Hypertension    • Liver cyst    • Lung nodule    • Microscopic colitis    • Multiple gastric ulcers     last 5 years ago   • Neck pain    • Neuropathy    • Normal body mass index    • NSAID induced gastritis    • Ulcer of pyloric antrum     UNSPECIFIED ULCER CHRONICITY   • UTI (urinary tract infection)    • Weight loss        Past Surgical History:  Past Surgical History:   Procedure Laterality Date   • ANTERIOR CERVICAL DISCECTOMY W/ FUSION N/A 5/22/2017    Procedure: CERVICAL DISCECTOMY ANTERIOR FUSION WITH INSTRUMENTATION;  Surgeon: NADINE Mackay  MD No;  Location: [facility] OR;  Service:    • AORTAGRAM Left 11/9/2020    Procedure: left lower extremtiy angiogram, hawk athrectomy, balloon angioplasty, stent placement, mynx closure;  Surgeon: Sukhdev Condon DO;  Location:  [facility];  Service: Vascular;  Laterality: Left;   • AORTAGRAM Left 3/26/2021    Procedure: LEFT LOWER EXTREMITY ANGIOGRAM, BALLOON ANGIOPLASTY, STENT PLACEMENT, MYNX CLOSURE;  Surgeon: Sukhdev Condon DO;  Location:  [facility];  Service: Vascular;  Laterality: Left;   • AORTAGRAM Left 8/6/2021    Procedure: LEFT LOWER EXTREMITY ANGIOGRAM, HAWK ATHERECTOMY, BALLOON ANGIOPLASTY, MYNX CLOSURE;  Surgeon: Sukhdev Condon DO;  Location:  [facility];  Service: Vascular;  Laterality: Left;   • AORTAGRAM Right 6/8/2022    Procedure: RIGHT LOWER EXTREMITY ANGIOGRAM, INTRAVASCULAR LITHOTRIPSY, HAWK ATHRECTOMY, BALLOON ANGIOPLASTY, MYNX CLOSURE;  Surgeon: Sukhdev Condon DO;  Location:  [facility];  Service: Vascular;  Laterality: Right;   • AORTAGRAM Left 10/21/2022    Procedure: LEFT LOWER EXTREMITY ANGIOGRAM WITH HAWK ATHRECTOMY, BALLOON ANGIOPLASTY, STENT PLACEMENT, MYNX CLOSURE;  Surgeon: Sukhdev Condon DO;  Location:  [facility];  Service: Vascular;  Laterality: Left;   • BLADDER SUSPENSION      also had pelvic reconstructive surgery   • BREAST EXCISIONAL BIOPSY Right 1985   • CATARACT EXTRACTION, BILATERAL     • CERVICAL CORPECTOMY N/A 5/22/2017    Procedure:  ANTERIOR CERVICAL DISCECTOMY FUSION C-6 to T1,  ANTERIOR FUSION WITH INSTRUMENTATION C-5 T-1;  Surgeon: NADINE Sarabia MD;  Location: [facility] OR;  Service:    • COLONOSCOPY  08/19/2015    TIC BX RT COLON   • COLONOSCOPY  12/04/2013    RT COLON BX RECALL 5YR   • COLONOSCOPY N/A 11/29/2016    The entire examined colon is normal; No specimens collected   • COLONOSCOPY N/A 6/2/2022    Procedure: COLONOSCOPY WITH ANESTHESIA;  Surgeon: Marco Antonio Vallejo MD;  Location: [facility]  ENDOSCOPY;  Service: Gastroenterology;  Laterality: N/A;  Pre: screen  Post: diverticulosis  Andrew Layne MD   • ENDOSCOPY  2015    HEALED ULCER SLANT BX   • FEMORAL ENDARTERECTOMY Left 10/21/2022    Procedure: LEFT LOWER EXTREMITY ANGIOGRAM;  Surgeon: Sukhdev Condon DO;  Location:  [facility];  Service: Vascular;  Laterality: Left;   • HYSTERECTOMY     • LEG THROMBECTOMY/EMBOLECTOMY Left 2022    Procedure: LOWER EXTREMITY THROMBECTOMY/EMBOLECTOMY, PATCH GRAFT TO  FEMORAL ARTERY;  Surgeon: Sukhdev Condon DO;  Location:  [facility];  Service: Vascular;  Laterality: Left;   • LOBECTOMY Right 2021    Procedure: RIGHT THORACOSCOPY WITH [email or web address] ROBOT, RIGHT UPPER LOBE LOBECTOMY;  Surgeon: Jarad Ignacio MD;  Location: [facility] OR;  Service: Robotics - DaVinci;  Laterality: Right;   • LUNG SURGERY     • OTHER SURGICAL HISTORY      KNEE CARTILAGE REMOVED   • OTHER SURGICAL HISTORY      BENIGN FIBROID TUMOR OF BREAST 1985 REMOVED   • TONSILLECTOMY          Social History:   Social History     Tobacco Use   • Smoking status: Former     Packs/day: 1.50     Years: 20.00     Pack years: 30.00     Types: Cigarettes     Quit date:      Years since quittin.2   • Smokeless tobacco: Never   Substance Use Topics   • Alcohol use: Yes     Comment: occasionally        Family History:  Family History   Problem Relation Age of Onset   • Breast cancer Maternal Aunt    • Heart disease Mother    • Heart disease Father    • GI problems Neg Hx         MALIGNANCIES   • Colon cancer Neg Hx    • Colon polyps Neg Hx        Home Meds:  Medications Prior to Admission   Medication Sig Dispense Refill Last Dose   • albuterol sulfate  (90 Base) MCG/ACT inhaler Inhale 2 puffs Every 4 (Four) Hours As Needed for Wheezing.      • alendronate (FOSAMAX) 70 MG tablet Take 1 tablet by mouth Every 7 (Seven) Days.   3    • aspirin 81 MG chewable tablet Chew 1 tablet Every Night.   3/30/2023    • benazepril (LOTENSIN) 5 MG tablet Take 1 tablet by mouth Daily.   3/31/2023   • Budeson-Glycopyrrol-Formoterol (Breztri Aerosphere) 160-9-4.8 MCG/ACT aerosol inhaler Inhale 2 puffs 2 (Two) Times a Day. 1 each 0 3/31/2023   • Calcium Carb-Cholecalciferol (CALCIUM 500+D PO) Take 1 tablet by mouth Daily.   3/31/2023   • carvedilol (COREG) 12.5 MG tablet Take 1 tablet by mouth 2 (Two) Times a Day With Meals.   3/31/2023   • cetirizine (ZyrTEC) 10 MG tablet Take 1 tablet by mouth At Night As Needed.      • clopidogrel (PLAVIX) 75 MG tablet TAKE 1 TABLET BY MOUTH EVERY DAY  30 tablet 4 3/31/2023   • famotidine (PEPCID) 40 MG tablet Take 1 tablet by mouth 2 (Two) Times a Day.  3 3/31/2023   • ferrous sulfate 325 (65 FE) MG tablet Take 1 tablet by mouth Daily With Breakfast.   3/31/2023   • hydroCHLOROthiazide (HYDRODIURIL) 12.5 MG tablet Take 1 tablet by mouth Daily.   3/31/2023   • levothyroxine (SYNTHROID, LEVOTHROID) 50 MCG tablet Take 1 tablet by mouth Daily.  3 3/31/2023   • multivitamin with minerals tablet tablet Take 1 tablet by mouth Daily.   3/31/2023   • Rivaroxaban (XARELTO) 2.5 MG tablet Take 1 tablet by mouth 2 (Two) Times a Day for 30 days. 60 tablet 5 3/31/2023   • rosuvastatin (CRESTOR) 40 MG tablet Take 1 tablet by mouth Every Night.   3/30/2023   • traMADol (ULTRAM) 50 MG tablet Take 1 tablet by mouth Every 3 (Three) Hours As Needed for Moderate Pain or Severe Pain.  0    • Budeson-Glycopyrrol-Formoterol (BREZTRI) 160-9-4.8 MCG/ACT aerosol inhaler Inhale 2 puffs 2 (Two) Times a Day. Rinse and spit after using. 1 each 0    • hydrOXYzine pamoate (VISTARIL) 25 MG capsule TAKE 1 CAPSULE BY MOUTH 3 (THREE) TIMES A DAY AS NEEDED FOR ANXIETY FOR UP TO 30 DAYS. 45 capsule 2        Current Meds:   acetaminophen, 1,000 mg, Oral, TID  albuterol, 2.5 mg, Nebulization, Q4H - RT  aspirin, 81 mg, Oral, Nightly  carvedilol, 12.5 mg, Oral, BID With Meals  [START ON 4/1/2023] clopidogrel, 75 mg, Oral,  Daily  famotidine, 40 mg, Oral, BID  [START ON 4/1/2023] hydroCHLOROthiazide, 12.5 mg, Oral, Daily  [START ON 4/1/2023] levothyroxine, 50 mcg, Oral, Q AM  [START ON 4/1/2023] lisinopril, 10 mg, Oral, Q24H  Rivaroxaban, 2.5 mg, Oral, BID  rosuvastatin, 40 mg, Oral, Nightly  senna-docusate sodium, 2 tablet, Oral, BID  sodium chloride, 10 mL, Intravenous, Q12H        Allergies:  Allergies   Allergen Reactions   • Baclofen Other (See Comments)     MUSCULOSKELETAL THERAPY AGENTS knocked her out for a day    Other reaction(s): Unknown   • Gabapentin Confusion     Other reaction(s): over sedation   • Sulfa Antibiotics Rash     Mouth blisters   • Sulfacetamide Sodium Rash   • Amitriptyline Hcl Confusion     Other reaction(s): ambulation difficulties/confusion   • Niferex [Iron Polysaccharide] Swelling     LIP SWELLING   • Oxycodone-Acetaminophen GI Intolerance     Other reaction(s): vomiting       Review of Systems  Pertinent items are noted in HPI, all other systems reviewed and negative    Objective     Vital Signs  Temp:  [97.5 °F (36.4 °C)-98.1 °F (36.7 °C)] 98.1 °F (36.7 °C)  Heart Rate:  [72-89] 89  Resp:  [18] 18  BP: (107-127)/(51-53) 127/51    Physical Exam:     General Appearance:    Alert, cooperative, in no acute distress   Head:    Normocephalic, without obvious abnormality, atraumatic   Eyes:            Lids and lashes normal, conjunctivae and sclerae pale,  PERRLA   Ears:    Ears appear intact with no abnormalities noted   Throat:   No oral lesions, no thrush, oral mucosa moist   Neck:   No adenopathy, supple, trachea midline, no thyromegaly, no     carotid bruit, no JVD   Back:     No kyphosis present, no scoliosis present, no skin lesions,       erythema or scars, no tenderness to percussion or                   palpation,   range of motion normal   Lungs:     Clear to auscultation,respirations regular, even and                   unlabored    Heart:    Regular rhythm and normal rate, normal S1 and S2, grade  2  systolic ejection murmur heard, no gallop, no rub, no click   Breast Exam:    Deferred   Abdomen:     Normal bowel sounds, no masses, no organomegaly, soft        non-tender, non-distended, no guarding, no rebound                 tenderness   Genitalia:    Deferred   Extremities:   Moves all extremities well, no edema, no cyanosis, no              redness   Pulses:   Pulses palpable and equal bilaterally   Skin:   No bleeding, bruising or rash   Lymph nodes:   No palpable adenopathy   Neurologic:   Cranial nerves 2 - 12 grossly intact, sensation intact, DTR        present and equal bilaterally       Results Review:   I reviewed the patient's new clinical results.    WBC No results found for: WBCS   HGB Hemoglobin   Date Value Ref Range Status   03/31/2023 5.9 (C) 12.0 - 15.9 g/dL Final      HCT Hematocrit   Date Value Ref Range Status   03/31/2023 18.7 (C) 34.0 - 46.6 % Final      Platelets No results found for: LABPLAT   MCV MCV   Date Value Ref Range Status   03/31/2023 94.9 79.0 - 97.0 fL Final          Sodium Sodium   Date Value Ref Range Status   03/31/2023 134 (L) 136 - 145 mmol/L Final      Potassium Potassium   Date Value Ref Range Status   03/31/2023 4.4 3.5 - 5.2 mmol/L Final      Chloride Chloride   Date Value Ref Range Status   03/31/2023 96 (L) 98 - 107 mmol/L Final      CO2 CO2   Date Value Ref Range Status   03/31/2023 25.0 22.0 - 29.0 mmol/L Final      BUN BUN   Date Value Ref Range Status   03/31/2023 37 (H) 8 - 23 mg/dL Final      Creatinine Creatinine   Date Value Ref Range Status   03/31/2023 0.75 0.57 - 1.00 mg/dL Final      Calcium Calcium   Date Value Ref Range Status   03/31/2023 9.6 8.6 - 10.5 mg/dL Final      Albumin Albumin   Date Value Ref Range Status   03/31/2023 4.3 3.5 - 5.2 g/dL Final      AST  ALT  PT/INR:   AST (SGOT)   Date Value Ref Range Status   03/31/2023 22 1 - 32 U/L Final     ALT (SGPT)   Date Value Ref Range Status   03/31/2023 19 1 - 33 U/L Final     No results found  for: PROTIME/No results found for: INR         Imaging Results (Last 72 Hours)     Procedure Component Value Units Date/Time    XR Chest PA & Lateral [[id number]] Collected: 03/31/23 1845     Updated: 03/31/23 1850    Narrative:      EXAM/TECHNIQUE: XR CHEST PA AND LATERAL-     INDICATION: dyspnea     COMPARISON: Chest radiograph 12/20/2021, chest CT 01/13/2023     FINDINGS:     Cardiac silhouette is normal size. Chronic small RIGHT pleural effusion.  Chronic RIGHT apical pleural thickening and right-sided volume loss. No  LEFT pleural effusion. No pneumothorax or area of consolidation. No  acute osseous finding.       Impression:         1.  No acute finding or significant change.  2.  Chronic small RIGHT pleural effusion and RIGHT apical pleural  thickening with postsurgical RIGHT sided volume loss.  This report was finalized on 03/31/2023 18:47 by Dr. Patrick Hylton MD.    CT Head Without Contrast [[id number]] Collected: 03/31/23 1658     Updated: 03/31/23 1704    Narrative:      EXAM/TECHNIQUE: CT head without contrast     INDICATION: headache     COMPARISON: 7/5/2022 and 11/13/2021     DLP: 584 mGy cm. Automated exposure control was also utilized to  decrease patient radiation dose.     FINDINGS:     No evidence of intracranial hemorrhage. Gray-white differentiation is  maintained. No midline shift or mass effect. Lateral ventricles are  nondilated. Basilar cisterns are patent. No acute orbital finding.  Mastoid air cells are clear. No acute osseous finding.       Impression:         No acute intracranial findings.  This report was finalized on 03/31/2023 17:01 by Dr. Patrick Hylton MD.          Assessment & Plan       Generalized muscle weakness      Melena and profound anemia.  She is on an ulcerogenic combination of aspirin and prednisone and to complicate issues she is on both Xarelto and Plavix.  I think it would be very important to define her gastric pathology and we will proceed with EGD in the morning.   In the meantime, I will treat her with at least double dose PPI.  She is also scheduled to get a couple units of packed red blood cells.    I discussed the patients findings and my recommendations with patient, family and nursing staff    Jung Ferrara MD  [facility]  03/31/23  19:36 CDT

## 2023-04-01 NOTE — H&P
History and Physical    Patient:  Trinidad Zhu  MRN: [id number]    CHIEF COMPLAINT: Generalized weakness    History Obtained From: the patient   PCP: Andrew Layne MD    HISTORY OF PRESENT ILLNESS:   The patient is a 78 y.o. female who presents with approximately 3 to 4-day history of progressive weakness, urinary incontinence, increasingly frequent falls.  She was seen in the office with the above complaints on 3/31/2023 and directly admitted for further delineation of care.  She was found to have acute drop in hemoglobin to 5.9.  GI was consulted.  She does report black, tarry stools over the past few weeks.  Was recently treated for urinary tract infection about 2 or 3 weeks ago.  Reports resolution of the symptoms.    REVIEW OF SYSTEMS:    Review of Systems   Constitutional: Positive for activity change and fatigue. Negative for fever.   Respiratory: Negative for shortness of breath.    Cardiovascular: Negative for chest pain.   Gastrointestinal: Positive for blood in stool. Negative for abdominal pain and nausea.   Skin: Negative for color change.   Neurological: Positive for weakness.          Past Medical History:  Past Medical History:   Diagnosis Date   • Antral ulcer    • Bladder cystocele    • C. difficile diarrhea    • CAD (coronary artery disease)    • Cancer     lung 2021   • Colon polyp    • COPD (chronic obstructive pulmonary disease)    • COVID-19 vaccine series completed    • Diarrhea    • Difficulty swallowing    • Disease of thyroid gland    • Diverticulosis    • Elevated cholesterol    • Gastritis    • Generalized OA    • GERD (gastroesophageal reflux disease)    • History of bladder surgery 01/07/2020   • History of transfusion     after lung surgery 2021   • Hyperlipidemia    • Hypertension    • Liver cyst    • Lung nodule    • Microscopic colitis    • Multiple gastric ulcers     last 5 years ago   • Neck pain    • Neuropathy    • Normal body mass index    • NSAID induced  gastritis    • Ulcer of pyloric antrum     UNSPECIFIED ULCER CHRONICITY   • UTI (urinary tract infection)    • Weight loss        Past Surgical History:  Past Surgical History:   Procedure Laterality Date   • ANTERIOR CERVICAL DISCECTOMY W/ FUSION N/A 5/22/2017    Procedure: CERVICAL DISCECTOMY ANTERIOR FUSION WITH INSTRUMENTATION;  Surgeon: NADINE Sarabia MD;  Location:  [facility] OR;  Service:    • AORTAGRAM Left 11/9/2020    Procedure: left lower extremtiy angiogram, hawk athrectomy, balloon angioplasty, stent placement, mynx closure;  Surgeon: Sukhdev Condon DO;  Location:  [facility] HYBRID OR 12;  Service: Vascular;  Laterality: Left;   • AORTAGRAM Left 3/26/2021    Procedure: LEFT LOWER EXTREMITY ANGIOGRAM, BALLOON ANGIOPLASTY, STENT PLACEMENT, MYNX CLOSURE;  Surgeon: Sukhdev Condon DO;  Location:  [facility] 12;  Service: Vascular;  Laterality: Left;   • AORTAGRAM Left 8/6/2021    Procedure: LEFT LOWER EXTREMITY ANGIOGRAM, HAWK ATHERECTOMY, BALLOON ANGIOPLASTY, MYNX CLOSURE;  Surgeon: Sukhdev Condon DO;  Location:  [facility] OR 12;  Service: Vascular;  Laterality: Left;   • AORTAGRAM Right 6/8/2022    Procedure: RIGHT LOWER EXTREMITY ANGIOGRAM, INTRAVASCULAR LITHOTRIPSY, HAWK ATHRECTOMY, BALLOON ANGIOPLASTY, MYNX CLOSURE;  Surgeon: Sukhdev Condon DO;  Location:  [facility] 12;  Service: Vascular;  Laterality: Right;   • AORTAGRAM Left 10/21/2022    Procedure: LEFT LOWER EXTREMITY ANGIOGRAM WITH HAWK ATHRECTOMY, BALLOON ANGIOPLASTY, STENT PLACEMENT, MYNX CLOSURE;  Surgeon: Sukhdev Condon DO;  Location:  [facility] 12;  Service: Vascular;  Laterality: Left;   • BLADDER SUSPENSION      also had pelvic reconstructive surgery   • BREAST EXCISIONAL BIOPSY Right 1985   • CATARACT EXTRACTION, BILATERAL     • CERVICAL CORPECTOMY N/A 5/22/2017    Procedure:  ANTERIOR CERVICAL DISCECTOMY FUSION C-6 to T1,  ANTERIOR FUSION WITH INSTRUMENTATION C-5 T-1;  Surgeon: NADINE Sarabia  MD;  Location: [facility] OR;  Service:    • COLONOSCOPY  08/19/2015    TIC BX RT COLON   • COLONOSCOPY  12/04/2013    RT COLON BX RECALL 5YR   • COLONOSCOPY N/A 11/29/2016    The entire examined colon is normal; No specimens collected   • COLONOSCOPY N/A 6/2/2022    Procedure: COLONOSCOPY WITH ANESTHESIA;  Surgeon: Marco Antonio Vallejo MD;  Location: [facility] ENDOSCOPY;  Service: Gastroenterology;  Laterality: N/A;  Pre: screen  Post: diverticulosis  Andrew Layne MD   • ENDOSCOPY  12/03/2015    HEALED ULCER SLANT BX   • FEMORAL ENDARTERECTOMY Left 10/21/2022    Procedure: LEFT LOWER EXTREMITY ANGIOGRAM;  Surgeon: Sukhdev Condon DO;  Location: [facility] HYBRID OR 12;  Service: Vascular;  Laterality: Left;   • HYSTERECTOMY     • LEG THROMBECTOMY/EMBOLECTOMY Left 6/8/2022    Procedure: LOWER EXTREMITY THROMBECTOMY/EMBOLECTOMY, PATCH GRAFT TO  FEMORAL ARTERY;  Surgeon: Sukhdev Condon DO;  Location:  [facility] OR 12;  Service: Vascular;  Laterality: Left;   • LOBECTOMY Right 11/5/2021    Procedure: RIGHT THORACOSCOPY WITH DAVINCI ROBOT, RIGHT UPPER LOBE LOBECTOMY;  Surgeon: Jarad Ignacio MD;  Location: [facility] OR;  Service: Robotics - DaVinci;  Laterality: Right;   • LUNG SURGERY     • OTHER SURGICAL HISTORY      KNEE CARTILAGE REMOVED   • OTHER SURGICAL HISTORY      BENIGN FIBROID TUMOR OF BREAST 1985 REMOVED   • TONSILLECTOMY         Medications Prior to Admission:    Medications Prior to Admission   Medication Sig Dispense Refill Last Dose   • albuterol sulfate  (90 Base) MCG/ACT inhaler Inhale 2 puffs Every 4 (Four) Hours As Needed for Wheezing.   3/31/2023   • alendronate (FOSAMAX) 70 MG tablet Take 1 tablet by mouth Every 7 (Seven) Days. Saturdays  3 3/25/2023   • aspirin 81 MG chewable tablet Chew 1 tablet Every Night.   3/30/2023   • benazepril (LOTENSIN) 5 MG tablet Take 1 tablet by mouth Daily.   3/31/2023   • Budeson-Glycopyrrol-Formoterol (Breztri Aerosphere) 160-9-4.8 MCG/ACT aerosol inhaler  Inhale 2 puffs 2 (Two) Times a Day. 1 each 0 3/31/2023   • Calcium Carb-Cholecalciferol (CALCIUM 500+D PO) Take 1 tablet by mouth Daily.   3/31/2023   • carvedilol (COREG) 12.5 MG tablet Take 1 tablet by mouth 2 (Two) Times a Day With Meals.   3/31/2023   • cetirizine (ZyrTEC) 10 MG tablet Take 1 tablet by mouth At Night As Needed.   Past Week   • clopidogrel (PLAVIX) 75 MG tablet Take 1 tablet by mouth Daily.   3/31/2023   • famotidine (PEPCID) 40 MG tablet Take 1 tablet by mouth 2 (Two) Times a Day.  3 3/31/2023   • ferrous sulfate 325 (65 FE) MG tablet Take 1 tablet by mouth Daily With Breakfast.   3/31/2023   • hydroCHLOROthiazide (HYDRODIURIL) 12.5 MG tablet Take 1 tablet by mouth Daily.   3/31/2023   • hydrOXYzine pamoate (VISTARIL) 25 MG capsule Take 1 capsule by mouth 3 (Three) Times a Day As Needed for Itching or Anxiety.   Past Month   • levothyroxine (SYNTHROID, LEVOTHROID) 50 MCG tablet Take 1 tablet by mouth Daily.  3 3/31/2023   • multivitamin with minerals tablet tablet Take 1 tablet by mouth Daily.   3/31/2023   • Rivaroxaban (XARELTO) 2.5 MG tablet Take 1 tablet by mouth 2 (Two) Times a Day.   3/31/2023   • rosuvastatin (CRESTOR) 40 MG tablet Take 1 tablet by mouth Every Night.   3/30/2023   • traMADol (ULTRAM) 50 MG tablet Take 1 tablet by mouth Every 3 (Three) Hours As Needed for Moderate Pain or Severe Pain.  0 3/31/2023       Allergies:  Baclofen, Gabapentin, Sulfa antibiotics, Sulfacetamide sodium, Amitriptyline hcl, Niferex [iron polysaccharide], and Oxycodone-acetaminophen    Social History:   Social History     Socioeconomic History   • Marital status:    Tobacco Use   • Smoking status: Former     Packs/day: 1.50     Years: 20.00     Pack years: 30.00     Types: Cigarettes     Quit date:      Years since quittin.2   • Smokeless tobacco: Never   Vaping Use   • Vaping Use: Never used   Substance and Sexual Activity   • Alcohol use: Yes     Comment: occasionally   • Drug use: No  "  • Sexual activity: Not Currently       Family History:   Family History   Problem Relation Age of Onset   • Breast cancer Maternal Aunt    • Heart disease Mother    • Heart disease Father    • GI problems Neg Hx         MALIGNANCIES   • Colon cancer Neg Hx    • Colon polyps Neg Hx            Physical Exam:    Vitals: /54   Pulse 80   Temp 99.5 °F (37.5 °C) (Oral)   Resp 16   Ht 160 cm (63\")   Wt 47.6 kg (105 lb)   SpO2 98%   BMI 18.60 kg/m²   Physical Exam  Vitals reviewed.   Constitutional:       Appearance: Normal appearance. She is not ill-appearing.   HENT:      Head: Normocephalic and atraumatic.   Eyes:      General:         Right eye: No discharge.         Left eye: No discharge.      Extraocular Movements: Extraocular movements intact.      Conjunctiva/sclera: Conjunctivae normal.   Cardiovascular:      Rate and Rhythm: Normal rate and regular rhythm.      Pulses: Normal pulses.      Heart sounds: No murmur heard.  Pulmonary:      Effort: Pulmonary effort is normal. No respiratory distress.   Abdominal:      General: Abdomen is flat. Bowel sounds are normal. There is no distension.   Musculoskeletal:      Right lower leg: No edema.      Left lower leg: No edema.   Skin:     Capillary Refill: Capillary refill takes less than 2 seconds.   Neurological:      General: No focal deficit present.      Mental Status: She is alert.   Psychiatric:         Mood and Affect: Mood normal.         Behavior: Behavior normal.           Lab Results (last 24 hours)     Procedure Component Value Units Date/Time    Basic Metabolic Panel [[id number]]  (Abnormal) Collected: 04/01/23 0448    Specimen: Blood Updated: 04/01/23 0543     Glucose 81 mg/dL      BUN 35 mg/dL      Creatinine 0.84 mg/dL      Sodium 135 mmol/L      Potassium 3.8 mmol/L      Chloride 99 mmol/L      CO2 26.0 mmol/L      Calcium 8.7 mg/dL      BUN/Creatinine Ratio 41.7     Anion Gap 10.0 mmol/L      eGFR 71.7 mL/min/1.73     Narrative:      GFR " Normal >60  Chronic Kidney Disease <60  Kidney Failure <15    The GFR formula is only valid for adults with stable renal function between ages 18 and 70.    CBC & Differential [[id number]]  (Abnormal) Collected: 04/01/23 0448    Specimen: Blood Updated: 04/01/23 0529    Narrative:      The following orders were created for panel order CBC & Differential.  Procedure                               Abnormality         Status                     ---------                               -----------         ------                     CBC Auto Differential[[id number]]        Abnormal            Final result                 Please view results for these tests on the individual orders.    CBC Auto Differential [[id number]]  (Abnormal) Collected: 04/01/23 0448    Specimen: Blood Updated: 04/01/23 0529     WBC 10.04 10*3/mm3      RBC 2.61 10*6/mm3      Hemoglobin 7.9 g/dL      Hematocrit 24.6 %      MCV 94.3 fL      MCH 30.3 pg      MCHC 32.1 g/dL      RDW 13.1 %      RDW-SD 44.8 fl      MPV 9.3 fL      Platelets 246 10*3/mm3      Neutrophil % 58.5 %      Lymphocyte % 25.1 %      Monocyte % 14.8 %      Eosinophil % 1.0 %      Basophil % 0.1 %      Immature Grans % 0.5 %      Neutrophils, Absolute 5.87 10*3/mm3      Lymphocytes, Absolute 2.52 10*3/mm3      Monocytes, Absolute 1.49 10*3/mm3      Eosinophils, Absolute 0.10 10*3/mm3      Basophils, Absolute 0.01 10*3/mm3      Immature Grans, Absolute 0.05 10*3/mm3      nRBC 0.0 /100 WBC     Myoglobin, Serum [[id number]]  (Normal) Collected: 03/31/23 1730    Specimen: Blood Updated: 04/01/23 0143     Myoglobin 41.7 ng/mL     Narrative:      Results may be falsely decreased if patient taking Biotin.      Urinalysis With Culture If Indicated - Urine, Clean Catch [[id number]]  (Abnormal) Collected: 03/31/23 2125    Specimen: Urine, Clean Catch Updated: 03/31/23 2140     Color, UA Yellow     Appearance, UA Clear     pH, UA 6.0     Specific Gravity, UA 1.018     Glucose, UA Negative      Ketones, UA Negative     Bilirubin, UA Negative     Blood, UA Negative     Protein, UA Negative     Leuk Esterase, UA Trace     Nitrite, UA Negative     Urobilinogen, UA 0.2 E.U./dL    Narrative:      In absence of clinical symptoms, the presence of pyuria, bacteria, and/or nitrites on the urinalysis result does not correlate with infection.    Urinalysis, Microscopic Only - Urine, Clean Catch [[id number]]  (Abnormal) Collected: 03/31/23 2125    Specimen: Urine, Clean Catch Updated: 03/31/23 2140     RBC, UA 0-2 /HPF      WBC, UA 3-5 /HPF      Comment: Urine culture not indicated.        Bacteria, UA None Seen /HPF      Squamous Epithelial Cells, UA 0-2 /HPF      Hyaline Casts, UA 0-2 /LPF      Methodology Automated Microscopy    High Sensitivity Troponin T 2Hr [[id number]]  (Abnormal) Collected: 03/31/23 1923    Specimen: Blood Updated: 03/31/23 2000     HS Troponin T 16 ng/L      Troponin T Delta -2 ng/L     Narrative:      High Sensitive Troponin T Reference Range:  <10.0 ng/L- Negative Female for AMI  <15.0 ng/L- Negative Male for AMI  >=10 - Abnormal Female indicating possible myocardial injury.  >=15 - Abnormal Male indicating possible myocardial injury.   Clinicians would have to utilize clinical acumen, EKG, Troponin, and serial changes to determine if it is an Acute Myocardial Infarction or myocardial injury due to an underlying chronic condition.         TSH [[id number]]  (Normal) Collected: 03/31/23 1730    Specimen: Blood Updated: 03/31/23 1832     TSH 3.090 uIU/mL     T4, Free [[id number]]  (Normal) Collected: 03/31/23 1730    Specimen: Blood Updated: 03/31/23 1831     Free T4 1.14 ng/dL     Narrative:      Results may be falsely increased if patient taking Biotin.      COVID PRE-OP / PRE-PROCEDURE SCREENING ORDER (NO ISOLATION) - Swab, Nasopharynx [[id number]]  (Normal) Collected: 03/31/23 1724    Specimen: Swab from Nasopharynx Updated: 03/31/23 1829    Narrative:      The following orders were created for  "panel order COVID PRE-OP / PRE-PROCEDURE SCREENING ORDER (NO ISOLATION) - Swab, Nasopharynx.  Procedure                               Abnormality         Status                     ---------                               -----------         ------                     COVID-19 and FLU A/B PCR...[[id number]]  Normal              Final result                 Please view results for these tests on the individual orders.    COVID-19 and FLU A/B PCR - Swab, Nasopharynx [[id number]]  (Normal) Collected: 03/31/23 1724    Specimen: Swab from Nasopharynx Updated: 03/31/23 1829     COVID19 Not Detected     Influenza A PCR Not Detected     Influenza B PCR Not Detected    Narrative:      Fact sheet for providers: https://www.fda.gov/media/638848/download    Fact sheet for patients: https://www.fda.gov/media/436254/download    Test performed by PCR.    Procalcitonin [[id number]]  (Normal) Collected: 03/31/23 1730    Specimen: Blood Updated: 03/31/23 1828     Procalcitonin 0.06 ng/mL     Narrative:      As a Marker for Sepsis (Non-Neonates):    1. <0.5 ng/mL represents a low risk of severe sepsis and/or septic shock.  2. >2 ng/mL represents a high risk of severe sepsis and/or septic shock.    As a Marker for Lower Respiratory Tract Infections that require antibiotic therapy:    PCT on Admission    Antibiotic Therapy       6-12 Hrs later    >0.5                Strongly Recommended  >0.25 - <0.5        Recommended   0.1 - 0.25          Discouraged              Remeasure/reassess PCT  <0.1                Strongly Discouraged     Remeasure/reassess PCT    As 28 day mortality risk marker: \"Change in Procalcitonin Result\" (>80% or <=80%) if Day 0 (or Day 1) and Day 4 values are available. Refer to http://www.[employer]-pct-calculator.com    Change in PCT <=80%  A decrease of PCT levels below or equal to 80% defines a positive change in PCT test result representing a higher risk for 28-day all-cause mortality of patients diagnosed with severe " sepsis for septic shock.    Change in PCT >80%  A decrease of PCT levels of more than 80% defines a negative change in PCT result representing a lower risk for 28-day all-cause mortality of patients diagnosed with severe sepsis or septic shock.       Lipid Panel [[id number]] Collected: 03/31/23 1730    Specimen: Blood Updated: 03/31/23 1828     Total Cholesterol 109 mg/dL      Triglycerides 97 mg/dL      HDL Cholesterol 60 mg/dL      LDL Cholesterol  31 mg/dL      VLDL Cholesterol 18 mg/dL      LDL/HDL Ratio 0.49    Narrative:      Cholesterol Reference Ranges  (U.S. Department of Health and Human Services ATP III Classifications)    Desirable          <200 mg/dL  Borderline High    200-239 mg/dL  High Risk          >240 mg/dL      Triglyceride Reference Ranges  (U.S. Department of Health and Human Services ATP III Classifications)    Normal           <150 mg/dL  Borderline High  150-199 mg/dL  High             200-499 mg/dL  Very High        >500 mg/dL    HDL Reference Ranges  (U.S. Department of Health and Human Services ATP III Classifications)    Low     <40 mg/dl (major risk factor for CHD)  High    >60 mg/dl ('negative' risk factor for CHD)        LDL Reference Ranges  (U.S. Department of Health and Human Services ATP III Classifications)    Optimal          <100 mg/dL  Near Optimal     100-129 mg/dL  Borderline High  130-159 mg/dL  High             160-189 mg/dL  Very High        >189 mg/dL    C-reactive Protein [[id number]]  (Normal) Collected: 03/31/23 1730    Specimen: Blood Updated: 03/31/23 1828     C-Reactive Protein <0.30 mg/dL     Comprehensive Metabolic Panel [[id number]]  (Abnormal) Collected: 03/31/23 1730    Specimen: Blood Updated: 03/31/23 1826     Glucose 127 mg/dL      BUN 37 mg/dL      Creatinine 0.75 mg/dL      Sodium 134 mmol/L      Potassium 4.4 mmol/L      Chloride 96 mmol/L      CO2 25.0 mmol/L      Calcium 9.6 mg/dL      Total Protein 6.3 g/dL      Albumin 4.3 g/dL      ALT (SGPT) 19 U/L       AST (SGOT) 22 U/L      Alkaline Phosphatase 41 U/L      Total Bilirubin 0.3 mg/dL      Globulin 2.0 gm/dL      A/G Ratio 2.2 g/dL      BUN/Creatinine Ratio 49.3     Anion Gap 13.0 mmol/L      eGFR 82.1 mL/min/1.73     Narrative:      GFR Normal >60  Chronic Kidney Disease <60  Kidney Failure <15    The GFR formula is only valid for adults with stable renal function between ages 18 and 70.    CK [[id number]]  (Normal) Collected: 03/31/23 1730    Specimen: Blood Updated: 03/31/23 1826     Creatine Kinase 41 U/L     Lactic Acid, Plasma [[id number]]  (Normal) Collected: 03/31/23 1730    Specimen: Blood Updated: 03/31/23 1824     Lactate 1.7 mmol/L     Phosphorus [[id number]]  (Abnormal) Collected: 03/31/23 1730    Specimen: Blood Updated: 03/31/23 1823     Phosphorus 5.2 mg/dL     BNP [[id number]]  (Normal) Collected: 03/31/23 1730    Specimen: Blood Updated: 03/31/23 1822     proBNP 274.8 pg/mL     Narrative:      Among patients with dyspnea, NT-proBNP is highly sensitive for the detection of acute congestive heart failure. In addition NT-proBNP of <300 pg/ml effectively rules out acute congestive heart failure with 99% negative predictive value.    Results may be falsely decreased if patient taking Biotin.      High Sensitivity Troponin T [[id number]]  (Abnormal) Collected: 03/31/23 1730    Specimen: Blood Updated: 03/31/23 1821     HS Troponin T 18 ng/L     Narrative:      High Sensitive Troponin T Reference Range:  <10.0 ng/L- Negative Female for AMI  <15.0 ng/L- Negative Male for AMI  >=10 - Abnormal Female indicating possible myocardial injury.  >=15 - Abnormal Male indicating possible myocardial injury.   Clinicians would have to utilize clinical acumen, EKG, Troponin, and serial changes to determine if it is an Acute Myocardial Infarction or myocardial injury due to an underlying chronic condition.         Lipase [[id number]]  (Abnormal) Collected: 03/31/23 1730    Specimen: Blood Updated: 03/31/23 1821     Lipase  "72 U/L     Magnesium [[id number]]  (Normal) Collected: 03/31/23 1730    Specimen: Blood Updated: 03/31/23 1820     Magnesium 1.8 mg/dL     D-dimer, Quantitative [[id number]]  (Normal) Collected: 03/31/23 1730    Specimen: Blood Updated: 03/31/23 1814     D-Dimer, Quantitative 0.53 MCGFEU/mL     Narrative:      According to the assay 's published package insert, a normal (<0.50 MCGFEU/mL) D-dimer result in conjunction with a non-high clinical probability assessment, excludes deep vein thrombosis (DVT) and pulmonary embolism (PE) with high sensitivity.    D-dimer values increase with age and this can make VTE exclusion of an older population difficult. To address this, the [employer] College [employer], based on best available evidence and recent guidelines, recommends that clinicians use age-adjusted D-dimer thresholds in patients greater than 50 years of age with: a) a low probability of PE who do not meet all Pulmonary Embolism Rule Out Criteria, or b) in those with intermediate probability of PE.   The formula for an age-adjusted D-dimer cut-off is \"age/100\".  For example, a 60 year old patient would have an age-adjusted cut-off of 0.60 MCGFEU/mL and an 80 year old 0.80 MCGFEU/mL.    Sedimentation Rate [[id number]]  (Normal) Collected: 03/31/23 1730    Specimen: Blood Updated: 03/31/23 1813     Sed Rate 3 mm/hr     CBC Auto Differential [[id number]]  (Abnormal) Collected: 03/31/23 1730    Specimen: Blood Updated: 03/31/23 1809     WBC 13.74 10*3/mm3      RBC 1.97 10*6/mm3      Hemoglobin 5.9 g/dL      Hematocrit 18.7 %      MCV 94.9 fL      MCH 29.9 pg      MCHC 31.6 g/dL      RDW 13.4 %      RDW-SD 46.4 fl      MPV 9.4 fL      Platelets 370 10*3/mm3      Neutrophil % 81.0 %      Lymphocyte % 11.6 %      Monocyte % 6.8 %      Eosinophil % 0.1 %      Basophil % 0.0 %      Immature Grans % 0.5 %      Neutrophils, Absolute 11.12 10*3/mm3      Lymphocytes, Absolute 1.60 10*3/mm3      Monocytes, Absolute 0.94 " 10*3/mm3      Eosinophils, Absolute 0.01 10*3/mm3      Basophils, Absolute 0.00 10*3/mm3      Immature Grans, Absolute 0.07 10*3/mm3      nRBC 0.1 /100 WBC     Blood Culture - Blood, Arm, Left [[id number]] Collected: 03/31/23 1730    Specimen: Blood from Arm, Left Updated: 03/31/23 1803    Blood Culture - Blood, Arm, Right [[id number]] Collected: 03/31/23 1730    Specimen: Blood from Arm, Right Updated: 03/31/23 1803           -----------------------------------------------------------------  EKG:   Radiology:     CT Head Without Contrast    Result Date: 3/31/2023  EXAM/TECHNIQUE: CT head without contrast  INDICATION: headache  COMPARISON: 7/5/2022 and 11/13/2021  DLP: 584 mGy cm. Automated exposure control was also utilized to decrease patient radiation dose.  FINDINGS:  No evidence of intracranial hemorrhage. Gray-white differentiation is maintained. No midline shift or mass effect. Lateral ventricles are nondilated. Basilar cisterns are patent. No acute orbital finding. Mastoid air cells are clear. No acute osseous finding.       No acute intracranial findings. This report was finalized on 03/31/2023 17:01 by Dr. Patrick Hylton MD.    XR Chest PA & Lateral    Result Date: 3/31/2023  EXAM/TECHNIQUE: XR CHEST PA AND LATERAL-  INDICATION: dyspnea  COMPARISON: Chest radiograph 12/20/2021, chest CT 01/13/2023  FINDINGS:  Cardiac silhouette is normal size. Chronic small RIGHT pleural effusion. Chronic RIGHT apical pleural thickening and right-sided volume loss. No LEFT pleural effusion. No pneumothorax or area of consolidation. No acute osseous finding.       1.  No acute finding or significant change. 2.  Chronic small RIGHT pleural effusion and RIGHT apical pleural thickening with postsurgical RIGHT sided volume loss. This report was finalized on 03/31/2023 18:47 by Dr. Patrick Hylton MD.      Assessment and Plan   1.       Generalized muscle weakness    Acute blood loss anemia    Melena    Urinary  incontinence    Anemia: Acutely noted hemoglobin 5.9.  Status post 2 unit PRBC, improved to 7.9.  Does report black stool.  GI consulted, plans for EGD today.  - Echo pending    Generalized weakness: PT/OT.    Melena: As above    CODE STATUS: Full    DVT prophylaxis: Held due to concern for GI bleed    Disposition: Inpatient    Jung Wilson MD 4/1/2023 11:17 CDT

## 2023-04-02 LAB
ANION GAP SERPL CALCULATED.3IONS-SCNC: 7 MMOL/L (ref 5–15)
BH BB BLOOD EXPIRATION DATE: NORMAL
BH BB BLOOD TYPE BARCODE: 9500
BH BB DISPENSE STATUS: NORMAL
BH BB PRODUCT CODE: NORMAL
BH BB UNIT NUMBER: NORMAL
BUN SERPL-MCNC: 31 MG/DL (ref 8–23)
BUN/CREAT SERPL: 36.9 (ref 7–25)
CALCIUM SPEC-SCNC: 8.5 MG/DL (ref 8.6–10.5)
CHLORIDE SERPL-SCNC: 103 MMOL/L (ref 98–107)
CO2 SERPL-SCNC: 27 MMOL/L (ref 22–29)
CREAT SERPL-MCNC: 0.84 MG/DL (ref 0.57–1)
CROSSMATCH INTERPRETATION: NORMAL
DEPRECATED RDW RBC AUTO: 46.6 FL (ref 37–54)
DEPRECATED RDW RBC AUTO: 47.4 FL (ref 37–54)
DEPRECATED RDW RBC AUTO: 47.6 FL (ref 37–54)
EGFRCR SERPLBLD CKD-EPI 2021: 71.7 ML/MIN/1.73
ERYTHROCYTE [DISTWIDTH] IN BLOOD BY AUTOMATED COUNT: 13.7 % (ref 12.3–15.4)
ERYTHROCYTE [DISTWIDTH] IN BLOOD BY AUTOMATED COUNT: 13.9 % (ref 12.3–15.4)
ERYTHROCYTE [DISTWIDTH] IN BLOOD BY AUTOMATED COUNT: 14.1 % (ref 12.3–15.4)
GLUCOSE SERPL-MCNC: 91 MG/DL (ref 65–99)
HCT VFR BLD AUTO: 24 % (ref 34–46.6)
HCT VFR BLD AUTO: 24.6 % (ref 34–46.6)
HCT VFR BLD AUTO: 24.8 % (ref 34–46.6)
HGB BLD-MCNC: 7.9 G/DL (ref 12–15.9)
HGB BLD-MCNC: 7.9 G/DL (ref 12–15.9)
HGB BLD-MCNC: 8 G/DL (ref 12–15.9)
MCH RBC QN AUTO: 30 PG (ref 26.6–33)
MCH RBC QN AUTO: 30.4 PG (ref 26.6–33)
MCH RBC QN AUTO: 30.9 PG (ref 26.6–33)
MCHC RBC AUTO-ENTMCNC: 32.1 G/DL (ref 31.5–35.7)
MCHC RBC AUTO-ENTMCNC: 32.3 G/DL (ref 31.5–35.7)
MCHC RBC AUTO-ENTMCNC: 32.9 G/DL (ref 31.5–35.7)
MCV RBC AUTO: 93.5 FL (ref 79–97)
MCV RBC AUTO: 93.8 FL (ref 79–97)
MCV RBC AUTO: 94.3 FL (ref 79–97)
PLATELET # BLD AUTO: 236 10*3/MM3 (ref 140–450)
PLATELET # BLD AUTO: 281 10*3/MM3 (ref 140–450)
PLATELET # BLD AUTO: 289 10*3/MM3 (ref 140–450)
PMV BLD AUTO: 9 FL (ref 6–12)
PMV BLD AUTO: 9.3 FL (ref 6–12)
PMV BLD AUTO: 9.4 FL (ref 6–12)
POTASSIUM SERPL-SCNC: 3.8 MMOL/L (ref 3.5–5.2)
RBC # BLD AUTO: 2.56 10*6/MM3 (ref 3.77–5.28)
RBC # BLD AUTO: 2.63 10*6/MM3 (ref 3.77–5.28)
RBC # BLD AUTO: 2.63 10*6/MM3 (ref 3.77–5.28)
SODIUM SERPL-SCNC: 137 MMOL/L (ref 136–145)
UNIT  ABO: NORMAL
UNIT  RH: NORMAL
WBC NRBC COR # BLD: 10.87 10*3/MM3 (ref 3.4–10.8)
WBC NRBC COR # BLD: 11.45 10*3/MM3 (ref 3.4–10.8)
WBC NRBC COR # BLD: 8.85 10*3/MM3 (ref 3.4–10.8)

## 2023-04-02 PROCEDURE — 97161 PT EVAL LOW COMPLEX 20 MIN: CPT | Performed by: PHYSICAL THERAPIST

## 2023-04-02 PROCEDURE — 80048 BASIC METABOLIC PNL TOTAL CA: CPT | Performed by: FAMILY MEDICINE

## 2023-04-02 PROCEDURE — 85027 COMPLETE CBC AUTOMATED: CPT | Performed by: FAMILY MEDICINE

## 2023-04-02 PROCEDURE — 83540 ASSAY OF IRON: CPT | Performed by: FAMILY MEDICINE

## 2023-04-02 PROCEDURE — 97165 OT EVAL LOW COMPLEX 30 MIN: CPT

## 2023-04-02 PROCEDURE — 84466 ASSAY OF TRANSFERRIN: CPT | Performed by: FAMILY MEDICINE

## 2023-04-02 PROCEDURE — 86677 HELICOBACTER PYLORI ANTIBODY: CPT | Performed by: INTERNAL MEDICINE

## 2023-04-02 PROCEDURE — 25010000002 ENOXAPARIN PER 10 MG: Performed by: FAMILY MEDICINE

## 2023-04-02 PROCEDURE — 94664 DEMO&/EVAL PT USE INHALER: CPT

## 2023-04-02 PROCEDURE — 94799 UNLISTED PULMONARY SVC/PX: CPT

## 2023-04-02 PROCEDURE — 94760 N-INVAS EAR/PLS OXIMETRY 1: CPT

## 2023-04-02 RX ORDER — ENOXAPARIN SODIUM 100 MG/ML
30 INJECTION SUBCUTANEOUS EVERY 24 HOURS
Status: DISCONTINUED | OUTPATIENT
Start: 2023-04-02 | End: 2023-04-06 | Stop reason: HOSPADM

## 2023-04-02 RX ADMIN — ACETAMINOPHEN 1000 MG: 500 TABLET, FILM COATED ORAL at 17:07

## 2023-04-02 RX ADMIN — DOCUSATE SODIUM 50 MG AND SENNOSIDES 8.6 MG 2 TABLET: 8.6; 5 TABLET, FILM COATED ORAL at 21:19

## 2023-04-02 RX ADMIN — PANTOPRAZOLE SODIUM 40 MG: 40 INJECTION, POWDER, FOR SOLUTION INTRAVENOUS at 17:09

## 2023-04-02 RX ADMIN — CARVEDILOL 12.5 MG: 6.25 TABLET, FILM COATED ORAL at 08:52

## 2023-04-02 RX ADMIN — ALBUTEROL SULFATE 2.5 MG: 2.5 SOLUTION RESPIRATORY (INHALATION) at 07:41

## 2023-04-02 RX ADMIN — ALBUTEROL SULFATE 2.5 MG: 2.5 SOLUTION RESPIRATORY (INHALATION) at 10:34

## 2023-04-02 RX ADMIN — Medication 10 ML: at 22:07

## 2023-04-02 RX ADMIN — TRAMADOL HYDROCHLORIDE 50 MG: 50 TABLET, COATED ORAL at 07:06

## 2023-04-02 RX ADMIN — ENOXAPARIN SODIUM 30 MG: 100 INJECTION SUBCUTANEOUS at 14:03

## 2023-04-02 RX ADMIN — ALBUTEROL SULFATE 2.5 MG: 2.5 SOLUTION RESPIRATORY (INHALATION) at 14:00

## 2023-04-02 RX ADMIN — DOCUSATE SODIUM 50 MG AND SENNOSIDES 8.6 MG 2 TABLET: 8.6; 5 TABLET, FILM COATED ORAL at 08:51

## 2023-04-02 RX ADMIN — TRAMADOL HYDROCHLORIDE 50 MG: 50 TABLET, COATED ORAL at 21:19

## 2023-04-02 RX ADMIN — ALBUTEROL SULFATE 2.5 MG: 2.5 SOLUTION RESPIRATORY (INHALATION) at 19:59

## 2023-04-02 RX ADMIN — LISINOPRIL 10 MG: 10 TABLET ORAL at 08:51

## 2023-04-02 RX ADMIN — HYDROCHLOROTHIAZIDE 12.5 MG: 25 TABLET ORAL at 08:53

## 2023-04-02 RX ADMIN — LORAZEPAM 0.5 MG: 0.5 TABLET ORAL at 21:19

## 2023-04-02 RX ADMIN — PANTOPRAZOLE SODIUM 40 MG: 40 INJECTION, POWDER, FOR SOLUTION INTRAVENOUS at 07:06

## 2023-04-02 RX ADMIN — LORAZEPAM 0.5 MG: 0.5 TABLET ORAL at 08:51

## 2023-04-02 RX ADMIN — LEVOTHYROXINE SODIUM 50 MCG: 50 TABLET ORAL at 06:48

## 2023-04-02 RX ADMIN — OXYCODONE HYDROCHLORIDE 5 MG: 5 TABLET ORAL at 23:04

## 2023-04-02 RX ADMIN — TRAMADOL HYDROCHLORIDE 50 MG: 50 TABLET, COATED ORAL at 14:03

## 2023-04-02 RX ADMIN — ACETAMINOPHEN 1000 MG: 500 TABLET, FILM COATED ORAL at 08:53

## 2023-04-02 NOTE — THERAPY EVALUATION
Patient Name: Trinidad Zhu  : 1945    MRN: [id number]                              Today's Date: 2023       Admit Date: 3/31/2023    Visit Dx:     ICD-10-CM ICD-9-CM   1. Acute blood loss anemia  D62 285.1     Patient Active Problem List   Diagnosis   • Spinal stenosis, cervical region   • Lung nodules   • Bronchiectasis without complication (HCC)   • Underweight   • Personal history of nicotine dependence   • Restrictive lung disease   • Pulmonary emphysema (HCC)   • PAD (peripheral artery disease) (HCC)   • Preop testing   • Gastroesophageal reflux disease   • Allergic rhinitis   • ORTIZ (dyspnea on exertion)   • CAD (coronary artery disease)   • Hyperlipidemia   • Neuropathy   • Hypertension   • Simple chronic bronchitis (HCC)   • Former smoker   • Lung nodule   • Mass of right lung   • Postoperative anemia due to acute blood loss   • Syncope and collapse   • Adenocarcinoma   • Personal history of malignant neoplasm of bronchus and lung   • Hydropneumothorax   • Lower extremity embolism   • Generalized muscle weakness   • Acute blood loss anemia   • Melena   • Urinary incontinence     Past Medical History:   Diagnosis Date   • Antral ulcer    • Bladder cystocele    • C. difficile diarrhea    • CAD (coronary artery disease)    • Cancer     lung    • Colon polyp    • COPD (chronic obstructive pulmonary disease)    • COVID-19 vaccine series completed    • Diarrhea    • Difficulty swallowing    • Disease of thyroid gland    • Diverticulosis    • Elevated cholesterol    • Gastritis    • Generalized OA    • GERD (gastroesophageal reflux disease)    • History of bladder surgery 2020   • History of transfusion     after lung surgery    • Hyperlipidemia    • Hypertension    • Liver cyst    • Lung nodule    • Microscopic colitis    • Multiple gastric ulcers     last 5 years ago   • Neck pain    • Neuropathy    • Normal body mass index    • NSAID induced gastritis    • Ulcer of pyloric antrum      UNSPECIFIED ULCER CHRONICITY   • UTI (urinary tract infection)    • Weight loss      Past Surgical History:   Procedure Laterality Date   • ANTERIOR CERVICAL DISCECTOMY W/ FUSION N/A 5/22/2017    Procedure: CERVICAL DISCECTOMY ANTERIOR FUSION WITH INSTRUMENTATION;  Surgeon: NADINE Sarabia MD;  Location:  [facility];  Service:    • AORTAGRAM Left 11/9/2020    Procedure: left lower extremtiy angiogram, hawk athrectomy, balloon angioplasty, stent placement, mynx closure;  Surgeon: Sukhdev Condon DO;  Location:  [facility];  Service: Vascular;  Laterality: Left;   • AORTAGRAM Left 3/26/2021    Procedure: LEFT LOWER EXTREMITY ANGIOGRAM, BALLOON ANGIOPLASTY, STENT PLACEMENT, MYNX CLOSURE;  Surgeon: Sukhdev Condon DO;  Location:  [facility];  Service: Vascular;  Laterality: Left;   • AORTAGRAM Left 8/6/2021    Procedure: LEFT LOWER EXTREMITY ANGIOGRAM, HAWK ATHERECTOMY, BALLOON ANGIOPLASTY, MYNX CLOSURE;  Surgeon: Sukhdev Condon DO;  Location:  [facility];  Service: Vascular;  Laterality: Left;   • AORTAGRAM Right 6/8/2022    Procedure: RIGHT LOWER EXTREMITY ANGIOGRAM, INTRAVASCULAR LITHOTRIPSY, HAWK ATHRECTOMY, BALLOON ANGIOPLASTY, MYNX CLOSURE;  Surgeon: Sukhdev Condon DO;  Location:  [facility];  Service: Vascular;  Laterality: Right;   • AORTAGRAM Left 10/21/2022    Procedure: LEFT LOWER EXTREMITY ANGIOGRAM WITH HAWK ATHRECTOMY, BALLOON ANGIOPLASTY, STENT PLACEMENT, MYNX CLOSURE;  Surgeon: Sukhdev Condon DO;  Location:  [facility];  Service: Vascular;  Laterality: Left;   • BLADDER SUSPENSION      also had pelvic reconstructive surgery   • BREAST EXCISIONAL BIOPSY Right 1985   • CATARACT EXTRACTION, BILATERAL     • CERVICAL CORPECTOMY N/A 5/22/2017    Procedure:  ANTERIOR CERVICAL DISCECTOMY FUSION C-6 to T1,  ANTERIOR FUSION WITH INSTRUMENTATION C-5 T-1;  Surgeon: NADINE Sarabia MD;  Location:  [facility];  Service:    • COLONOSCOPY  08/19/2015     TIC BX RT COLON   • COLONOSCOPY  12/04/2013    RT COLON BX RECALL 5YR   • COLONOSCOPY N/A 11/29/2016    The entire examined colon is normal; No specimens collected   • COLONOSCOPY N/A 6/2/2022    Procedure: COLONOSCOPY WITH ANESTHESIA;  Surgeon: Marco Antonio Vallejo MD;  Location:  [facility];  Service: Gastroenterology;  Laterality: N/A;  Pre: screen  Post: diverticulosis  Andrew Layne MD   • ENDOSCOPY  12/03/2015    HEALED ULCER SLANT BX   • FEMORAL ENDARTERECTOMY Left 10/21/2022    Procedure: LEFT LOWER EXTREMITY ANGIOGRAM;  Surgeon: Sukhdev Condon DO;  Location:  [facility];  Service: Vascular;  Laterality: Left;   • HYSTERECTOMY     • LEG THROMBECTOMY/EMBOLECTOMY Left 6/8/2022    Procedure: LOWER EXTREMITY THROMBECTOMY/EMBOLECTOMY, PATCH GRAFT TO  FEMORAL ARTERY;  Surgeon: Sukhdev Condon DO;  Location:  [facility];  Service: Vascular;  Laterality: Left;   • LOBECTOMY Right 11/5/2021    Procedure: RIGHT THORACOSCOPY WITH DAVINCI ROBOT, RIGHT UPPER LOBE LOBECTOMY;  Surgeon: Jarad Ignacio MD;  Location:  [facility];  Service: Robotics - DaVinci;  Laterality: Right;   • LUNG SURGERY     • OTHER SURGICAL HISTORY      KNEE CARTILAGE REMOVED   • OTHER SURGICAL HISTORY      BENIGN FIBROID TUMOR OF BREAST 1985 REMOVED   • TONSILLECTOMY        General Information     Row Name 04/02/23 0939          OT Time and Intention    Document Type evaluation  pt presents with progressive weakness, urinary incontinence, and increased falls; Dx: Anemia (Hgb: 5.9, Hct: 18.7 s/p 2 PRBC transfusion and Hgb now 7.9), melena s/p EGD 4/1/23  -CS     Mode of Treatment occupational therapy  -CS     Row Name 04/02/23 0939          General Information    Patient Profile Reviewed yes  -CS     Prior Level of Function independent:;all household mobility;community mobility;ADL's;home management;cooking;cleaning;driving  -CS     Existing Precautions/Restrictions fall  -CS     Row Name 04/02/23 0939           Occupational Profile    Environmental Supports and Barriers (Occupational Profile) walk in shower, grab bars, shower chair, RWX  -     Row Name 04/02/23 0939          Living Environment    People in Home spouse  -     Row Name 04/02/23 0939          Home Main Entrance    Number of Stairs, Main Entrance three  -     Stair Railings, Main Entrance railing on left side (ascending)  -     Row Name 04/02/23 0939          Stairs Within Home, Primary    Number of Stairs, Within Home, Primary none  -     Row Name 04/02/23 0939          Cognition    Orientation Status (Cognition) oriented x 4  -     Row Name 04/02/23 0939          Safety Issues, Functional Mobility    Impairments Affecting Function (Mobility) endurance/activity tolerance;shortness of breath;strength;pain  -           User Key  (r) = Recorded By, (t) = Taken By, (c) = Cosigned By    Initials Name Provider Type     Kathleen Krishnan S, OTR/L, CNT Occupational Therapist                 Mobility/ADL's     Row Name 04/02/23 0939          Bed Mobility    Bed Mobility supine-sit  -     Supine-Sit [place] (Bed Mobility) supervision  -     Assistive Device (Bed Mobility) bed rails;head of bed elevated  -     Row Name 04/02/23 0939          Transfers    Transfers sit-stand transfer;stand-sit transfer  -     Row Name 04/02/23 0939          Sit-Stand Transfer    Sit-Stand [place] (Transfers) standby assist  -     Assistive Device (Sit-Stand Transfers) walker, front-wheeled  -     Row Name 04/02/23 0939          Stand-Sit Transfer    Stand-Sit [place] (Transfers) standby assist  -     Assistive Device (Stand-Sit Transfers) walker, front-wheeled  -     Row Name 04/02/23 0939          Functional Mobility    Functional Mobility- Ind. Level contact guard assist  -     Functional Mobility- Device walker, front-wheeled  -     Functional Mobility- Comment Pt walked around the end of the bed to bedside chair requiring 2 standing  rest breaks due to her reporting significant weakness  -CS     Row Name 04/02/23 0939          Activities of Daily Living    BADL Assessment/Intervention lower body dressing  -     Row Name 04/02/23 0939          Lower Body Dressing Assessment/Training    Will Level (Lower Body Dressing) don;socks;shoes/slippers;supervision  -CS     Position (Lower Body Dressing) edge of bed sitting  -CS           User Key  (r) = Recorded By, (t) = Taken By, (c) = Cosigned By    Initials Name Provider Type    Kathleen Ferreira OTR/L, LORRIE Occupational Therapist               Obj/Interventions     Row Name 04/02/23 0939          Sensory Assessment (Somatosensory)    Sensory Assessment (Somatosensory) UE sensation intact  -     Row Name 04/02/23 0939          Range of Motion Comprehensive    General Range of Motion bilateral upper extremity ROM WFL  -[facility] Name 04/02/23 0939          Strength Comprehensive (MMT)    Comment, General Manual Muscle Testing (MMT) Assessment BUE: 4/5  -     Row Name 04/02/23 0939          Balance    Balance Assessment sitting static balance;sitting dynamic balance;standing static balance;standing dynamic balance  -CS     Static Sitting Balance independent  -CS     Dynamic Sitting Balance supervision  -CS     Position, Sitting Balance sitting edge of bed  -CS     Static Standing Balance standby assist;contact guard;verbal cues  -CS     Dynamic Standing Balance contact guard;verbal cues  -CS           User Key  (r) = Recorded By, (t) = Taken By, (c) = Cosigned By    Initials Name Provider Type    Kathleen Ferreira OTR/L, LORRIE Occupational Therapist               Goals/Plan     Row Name 04/02/23 1007          Bathing Goal 1 (OT)    Activity/Device (Bathing Goal 1, OT) bathing skills, all  -CS     Will Level/Cues Needed (Bathing Goal 1, OT) modified independence  -CS     Time Frame (Bathing Goal 1, OT) long term goal (LTG)  -CS     Strategies/Barriers (Bathing Goal 1, OT) Pt will  complete total body bathing routine with no vcs for energy conservation techniques and no rest breaks.  -CS     Progress/Outcomes (Bathing Goal 1, OT) new goal  -CS     Row Name 04/02/23 1007          Grooming Goal 1 (OT)    Activity/Device (Grooming Goal 1, OT) grooming skills, all  -CS     [place] (Grooming Goal 1, OT) modified independence  -CS     Time Frame (Grooming Goal 1, OT) long term goal (LTG)  -CS     Strategies/Barriers (Grooming Goal 1, OT) standing sink side  -CS     Progress/Outcome (Grooming Goal 1, OT) new goal  -CS     Row Name 04/02/23 1007          Strength Goal 1 (OT)    Strength Goal 1 (OT) Pt will be I with BUE strengthening HEP.  -CS     Time Frame (Strength Goal 1, OT) long term goal (LTG)  -CS     Progress/Outcome (Strength Goal 1, OT) new goal  -CS     Row Name 04/02/23 1007          Therapy Assessment/Plan (OT)    Planned Therapy Interventions (OT) activity tolerance training;BADL retraining;transfer/mobility retraining;strengthening exercise;occupation/activity based interventions;patient/caregiver education/training;functional balance retraining  -CS           User Key  (r) = Recorded By, (t) = Taken By, (c) = Cosigned By    Initials Name Provider Type    CS Kathleen Krishnan S, OTR/L, CNT Occupational Therapist               Clinical Impression     Row Name 04/02/23 0987          Pain Assessment    Pretreatment Pain Rating 8/10  weakness, fatigue, dyspnea  -CS     Pain Location lower  -CS     Pain Location - back;head  -CS     Pain Intervention(s) Medication (See MAR);Repositioned;Ambulation/increased activity  -CS     Row Name 04/02/23 7208          Plan of Care Review    Plan of Care Reviewed With patient  -CS     Progress no change  -CS     Outcome Evaluation OT evaluation completed.  Pt is A&Ox4.  Pt complains of significant weakness causing SOA during activity/mobility.  She completed bed mobility with S, functional transfers with RWX with SBA, and functional mobility in the  room with CGA and use of RWX.  Pt required 2 standing rest breaks due to reported weakness during ambulation trial around the end of the bed to the bedside chair.  She completed LB dressing of her house shoes with S sitting EOB.  Pt is limited significant due to generalized weakness, decreased endurance, SOA, and fatigue.  OT/PT provided her with edu regarding the benefit of being out of the bed more than she is in the bed and for her to complete all toileting in the bathroom.  OT will cont to follow to maximize her I and endurance with ADLs and functional mobility.  It is recommended for pt to discharge home with assist.  -CS     Row Name 04/02/23 0939          Therapy Assessment/Plan (OT)    Patient/Family Therapy Goal Statement (OT) to go home  -CS     Rehab Potential (OT) good, to achieve stated therapy goals  -CS     Criteria for Skilled Therapeutic Interventions Met (OT) yes;skilled treatment is necessary  -CS     Therapy Frequency (OT) 3 times/wk  -CS     Predicted Duration of Therapy Intervention (OT) until hospital discharge  -CS     Row Name 04/02/23 0939          Therapy Plan Review/Discharge Plan (OT)    Anticipated Discharge Disposition (OT) home with assist  -CS     Row Name 04/02/23 0939          Positioning and Restraints    Pre-Treatment Position in bed  -CS     Post Treatment Position chair  -CS     In Chair sitting;call light within reach;encouraged to call for assist;with family/caregiver  -CS           User Key  (r) = Recorded By, (t) = Taken By, (c) = Cosigned By    Initials Name Provider Type    CS Kathleen Krishnan, OTR/L, CNT Occupational Therapist               Outcome Measures     Row Name 04/02/23 0939          How much help from another is currently needed...    Putting on and taking off regular lower body clothing? 3  -CS     Bathing (including washing, rinsing, and drying) 3  -CS     Toileting (which includes using toilet bed pan or urinal) 3  -CS     Putting on and taking off regular  upper body clothing 4  -CS     Taking care of personal grooming (such as brushing teeth) 3  -CS     Eating meals 4  -CS     AM-PAC 6 Clicks Score (OT) 20  -CS     Row Name 04/02/23 0800          How much help from another person do you currently need...    Turning from your back to your side while in flat bed without using bedrails? 4  -WM     Moving from lying on back to sitting on the side of a flat bed without bedrails? 4  -WM     Moving to and from a bed to a chair (including a wheelchair)? 4  -WM     Standing up from a chair using your arms (e.g., wheelchair, bedside chair)? 4  -WM     Climbing 3-5 steps with a railing? 3  -WM     To walk in hospital room? 3  -WM     AM-PAC 6 Clicks Score (PT) 22  -WM     Highest level of mobility 7 --> Walked 25 feet or more  -WM     Row Name 04/02/23 0940 04/02/23 0939       Functional Assessment    Outcome Measure Options AM-PAC 6 Clicks Basic Mobility (PT)  -SB AM-PAC 6 Clicks Daily Activity (OT)  -CS          User Key  (r) = Recorded By, (t) = Taken By, (c) = Cosigned By    Initials Name Provider Type    CS Kathleen Krishnan, OTR/L, CNT Occupational Therapist    Aliyah Anderson, RN Registered Nurse    Chapis Baum, PT DPT Physical Therapist                Occupational Therapy Education     Title: PT OT SLP Therapies (In Progress)     Topic: Occupational Therapy (Done)     Point: ADL training (Done)     Description:   Instruct learner(s) on proper safety adaptation and remediation techniques during self care or transfers.   Instruct in proper use of assistive devices.              Learning Progress Summary           Patient Acceptance, E, VU,NR by  at 4/2/2023 1010   Family Acceptance, E, VU,NR by  at 4/2/2023 1010                   Point: Home exercise program (Done)     Description:   Instruct learner(s) on appropriate technique for monitoring, assisting and/or progressing therapeutic exercises/activities.              Learning Progress Summary            Patient Acceptance, E, VU,NR by  at 4/2/2023 1010   Family Acceptance, E, VU,NR by CS at 4/2/2023 1010                   Point: Precautions (Done)     Description:   Instruct learner(s) on prescribed precautions during self-care and functional transfers.              Learning Progress Summary           Patient Acceptance, E, VU,NR by  at 4/2/2023 1010   Family Acceptance, E, VU,NR by CS at 4/2/2023 1010                   Point: Body mechanics (Done)     Description:   Instruct learner(s) on proper positioning and spine alignment during self-care, functional mobility activities and/or exercises.              Learning Progress Summary           Patient Acceptance, E, VU,NR by  at 4/2/2023 1010   Family Acceptance, E, VU,NR by CS at 4/2/2023 1010                               User Key     Initials Effective Dates Name Provider Type Discipline     02/03/23 -  Kathleen Krishnan, OTR/L, CNT Occupational Therapist OT              OT Recommendation and Plan  Planned Therapy Interventions (OT): activity tolerance training, BADL retraining, transfer/mobility retraining, strengthening exercise, occupation/activity based interventions, patient/caregiver education/training, functional balance retraining  Therapy Frequency (OT): 3 times/wk  Plan of Care Review  Plan of Care Reviewed With: patient  Progress: no change  Outcome Evaluation: OT evaluation completed.  Pt is A&Ox4.  Pt complains of significant weakness causing SOA during activity/mobility.  She completed bed mobility with S, functional transfers with RWX with SBA, and functional mobility in the room with CGA and use of RWX.  Pt required 2 standing rest breaks due to reported weakness during ambulation trial around the end of the bed to the bedside chair.  She completed LB dressing of her house shoes with S sitting EOB.  Pt is limited significant due to generalized weakness, decreased endurance, SOA, and fatigue.  OT/PT provided her with edu regarding the  benefit of being out of the bed more than she is in the bed and for her to complete all toileting in the bathroom.  OT will cont to follow to maximize her I and endurance with ADLs and functional mobility.  It is recommended for pt to discharge home with assist.     Time Calculation:    Time Calculation- OT     Row Name 04/02/23 1008             Time Calculation- OT    OT Start Time 0939  (+13 min chart review)  -CS      OT Stop Time 1008  -CS      OT Time Calculation (min) 29 min  -CS      OT Received On 04/02/23  -CS      OT Goal Re-Cert Due Date 04/12/23  -CS         Untimed Charges    OT Eval/Re-eval Minutes 42  -CS         Total Minutes    Untimed Charges Total Minutes 42  -CS       Total Minutes 42  -CS            User Key  (r) = Recorded By, (t) = Taken By, (c) = Cosigned By    Initials Name Provider Type    CS Kathleen Krishnan OTR/L, LORRIE Occupational Therapist              Therapy Charges for Today     Code Description Service Date Service Provider Modifiers Qty    [id number] HC OT EVAL LOW COMPLEXITY 3 4/2/2023 Kathleen Krishnan OTR/L, LORRIE GO 1               STELLA Her/L, CNT  4/2/2023

## 2023-04-02 NOTE — PLAN OF CARE
Goal Outcome Evaluation:  Plan of Care Reviewed With: patient        Progress: no change  Outcome Evaluation: PT eval completed. Pt alert and oriented x4 with c/o headache and low back pain, weakness, dizziness and SOA. Pt performs sup to sit t/f SV and demos decreased weakness in BLE. Pt performs sit to stand t/f and gait training with SBA-CGA, ambulating short distance in room and reqd standing rest breaks. Pt reports feeling very weak and leans forward onto forearms onto walker for rest breaks. Pt with generalized weakness and decreased endurance that will benefit from skilled PT. Pt encouraged of OOB activity atleast 3x day for meals, and ambulating to BR as well. Recommend d/c home with assist and HH.

## 2023-04-02 NOTE — PROGRESS NOTES
Daily Progress Note  Trinidad Zhu  MRN: [id number] LOS: 1    Admit Date: 3/31/2023   4/2/2023 09:59 CDT    Subjective:          Chief Complaint:  No chief complaint on file.      Interval History:    Reviewed overnight events and nursing notes.   Overall stable, no events overnight.  Hemoglobin is stable at 7.9.  Long discussion about options today at the bedside with patient and her .    Review of Systems   Constitutional: Positive for activity change and fatigue. Negative for fever.   Respiratory: Positive for shortness of breath. Negative for wheezing.    Gastrointestinal: Positive for anal bleeding and blood in stool. Negative for abdominal pain, nausea and vomiting.   Skin: Positive for pallor.   Neurological: Positive for weakness.       DIET:  Diet: Cardiac Diets; Healthy Heart (2-3 Na+); Texture: Regular Texture (IDDSI 7); Fluid Consistency: Thin (IDDSI 0)    Medications:   sodium chloride, 100 mL/hr, Last Rate: Stopped (04/01/23 1327)      acetaminophen, 1,000 mg, Oral, TID  albuterol, 2.5 mg, Nebulization, Q4H - RT  carvedilol, 12.5 mg, Oral, BID With Meals  hydroCHLOROthiazide, 12.5 mg, Oral, Daily  levothyroxine, 50 mcg, Oral, Q AM  lisinopril, 10 mg, Oral, Q24H  pantoprazole, 40 mg, Intravenous, BID AC  senna-docusate sodium, 2 tablet, Oral, BID  sodium chloride, 10 mL, Intravenous, Q12H        Data:     Code Status:   Code Status and Medical Interventions:   Ordered at: 03/31/23 1623     Level Of Support Discussed With:    Patient     Code Status (Patient has no pulse and is not breathing):    CPR (Attempt to Resuscitate)     Medical Interventions (Patient has pulse or is breathing):    Full Support     Release to patient:    Routine Release       Family History   Problem Relation Age of Onset   • Breast cancer Maternal Aunt    • Heart disease Mother    • Heart disease Father    • GI problems Neg Hx         MALIGNANCIES   • Colon cancer Neg Hx    • Colon polyps Neg Hx      Social History      Socioeconomic History   • Marital status:    Tobacco Use   • Smoking status: Former     Packs/day: 1.50     Years: 20.00     Pack years: 30.00     Types: Cigarettes     Quit date:      Years since quittin.2   • Smokeless tobacco: Never   Vaping Use   • Vaping Use: Never used   Substance and Sexual Activity   • Alcohol use: Yes     Comment: occasionally   • Drug use: No   • Sexual activity: Not Currently       Labs:    Lab Results (last 72 hours)     Procedure Component Value Units Date/Time    Basic Metabolic Panel [[id number]]  (Abnormal) Collected: 23    Specimen: Blood Updated: 23 06     Glucose 91 mg/dL      BUN 31 mg/dL      Creatinine 0.84 mg/dL      Sodium 137 mmol/L      Potassium 3.8 mmol/L      Chloride 103 mmol/L      CO2 27.0 mmol/L      Calcium 8.5 mg/dL      BUN/Creatinine Ratio 36.9     Anion Gap 7.0 mmol/L      eGFR 71.7 mL/min/1.73     Narrative:      GFR Normal >60  Chronic Kidney Disease <60  Kidney Failure <15    The GFR formula is only valid for adults with stable renal function between ages 18 and 70.    CBC (No Diff) [[id number]]  (Abnormal) Collected: 23    Specimen: Blood Updated: 23 05     WBC 8.85 10*3/mm3      RBC 2.56 10*6/mm3      Hemoglobin 7.9 g/dL      Hematocrit 24.0 %      MCV 93.8 fL      MCH 30.9 pg      MCHC 32.9 g/dL      RDW 13.7 %      RDW-SD 46.6 fl      MPV 9.0 fL      Platelets 236 10*3/mm3     CBC (No Diff) [[id number]]  (Abnormal) Collected: 23 2324    Specimen: Blood Updated: 23 2348     WBC 10.00 10*3/mm3      RBC 2.37 10*6/mm3      Hemoglobin 7.1 g/dL      Hematocrit 22.2 %      MCV 93.7 fL      MCH 30.0 pg      MCHC 32.0 g/dL      RDW 13.9 %      RDW-SD 46.9 fl      MPV 9.2 fL      Platelets 221 10*3/mm3     CBC (No Diff) [[id number]]  (Abnormal) Collected: 23 1845    Specimen: Blood Updated: 23 1907     WBC 10.29 10*3/mm3      RBC 2.48 10*6/mm3      Hemoglobin 7.5 g/dL      Hematocrit 23.3  %      MCV 94.0 fL      MCH 30.2 pg      MCHC 32.2 g/dL      RDW 13.7 %      RDW-SD 47.0 fl      MPV 9.2 fL      Platelets 249 10*3/mm3     Blood Culture - Blood, Arm, Right [[id number]]  (Normal) Collected: 03/31/23 1730    Specimen: Blood from Arm, Right Updated: 04/01/23 1816     Blood Culture No growth at 24 hours    Blood Culture - Blood, Arm, Left [[id number]]  (Normal) Collected: 03/31/23 1730    Specimen: Blood from Arm, Left Updated: 04/01/23 1816     Blood Culture No growth at 24 hours    Occult Blood X 1, Stool - Stool, Per Rectum [[id number]]  (Abnormal) Collected: 04/01/23 1537    Specimen: Stool from Per Rectum Updated: 04/01/23 1548     Fecal Occult Blood Positive    CBC (No Diff) [[id number]]  (Abnormal) Collected: 04/01/23 1245    Specimen: Blood Updated: 04/01/23 1308     WBC 9.41 10*3/mm3      RBC 2.31 10*6/mm3      Hemoglobin 7.3 g/dL      Hematocrit 21.5 %      MCV 93.1 fL      MCH 31.6 pg      MCHC 34.0 g/dL      RDW 13.5 %      RDW-SD 46.4 fl      MPV 9.1 fL      Platelets 212 10*3/mm3     POC Glucose Once [[id number]]  (Normal) Collected: 04/01/23 1104    Specimen: Blood Updated: 04/01/23 1119     Glucose 100 mg/dL      Comment: : [id number] [facility] JenniferMeter ID: [id number]       Basic Metabolic Panel [[id number]]  (Abnormal) Collected: 04/01/23 0448    Specimen: Blood Updated: 04/01/23 0543     Glucose 81 mg/dL      BUN 35 mg/dL      Creatinine 0.84 mg/dL      Sodium 135 mmol/L      Potassium 3.8 mmol/L      Chloride 99 mmol/L      CO2 26.0 mmol/L      Calcium 8.7 mg/dL      BUN/Creatinine Ratio 41.7     Anion Gap 10.0 mmol/L      eGFR 71.7 mL/min/1.73     Narrative:      GFR Normal >60  Chronic Kidney Disease <60  Kidney Failure <15    The GFR formula is only valid for adults with stable renal function between ages 18 and 70.    CBC & Differential [[id number]]  (Abnormal) Collected: 04/01/23 0448    Specimen: Blood Updated: 04/01/23 0529    Narrative:      The following orders were created  for panel order CBC & Differential.  Procedure                               Abnormality         Status                     ---------                               -----------         ------                     CBC Auto Differential[[id number]]        Abnormal            Final result                 Please view results for these tests on the individual orders.    CBC Auto Differential [[id number]]  (Abnormal) Collected: 04/01/23 0448    Specimen: Blood Updated: 04/01/23 0529     WBC 10.04 10*3/mm3      RBC 2.61 10*6/mm3      Hemoglobin 7.9 g/dL      Hematocrit 24.6 %      MCV 94.3 fL      MCH 30.3 pg      MCHC 32.1 g/dL      RDW 13.1 %      RDW-SD 44.8 fl      MPV 9.3 fL      Platelets 246 10*3/mm3      Neutrophil % 58.5 %      Lymphocyte % 25.1 %      Monocyte % 14.8 %      Eosinophil % 1.0 %      Basophil % 0.1 %      Immature Grans % 0.5 %      Neutrophils, Absolute 5.87 10*3/mm3      Lymphocytes, Absolute 2.52 10*3/mm3      Monocytes, Absolute 1.49 10*3/mm3      Eosinophils, Absolute 0.10 10*3/mm3      Basophils, Absolute 0.01 10*3/mm3      Immature Grans, Absolute 0.05 10*3/mm3      nRBC 0.0 /100 WBC     Myoglobin, Serum [[id number]]  (Normal) Collected: 03/31/23 1730    Specimen: Blood Updated: 04/01/23 0143     Myoglobin 41.7 ng/mL     Narrative:      Results may be falsely decreased if patient taking Biotin.      Urinalysis With Culture If Indicated - Urine, Clean Catch [[id number]]  (Abnormal) Collected: 03/31/23 2125    Specimen: Urine, Clean Catch Updated: 03/31/23 2140     Color, UA Yellow     Appearance, UA Clear     pH, UA 6.0     Specific Gravity, UA 1.018     Glucose, UA Negative     Ketones, UA Negative     Bilirubin, UA Negative     Blood, UA Negative     Protein, UA Negative     Leuk Esterase, UA Trace     Nitrite, UA Negative     Urobilinogen, UA 0.2 E.U./dL    Narrative:      In absence of clinical symptoms, the presence of pyuria, bacteria, and/or nitrites on the urinalysis result does not  correlate with infection.    Urinalysis, Microscopic Only - Urine, Clean Catch [[id number]]  (Abnormal) Collected: 03/31/23 2125    Specimen: Urine, Clean Catch Updated: 03/31/23 2140     RBC, UA 0-2 /HPF      WBC, UA 3-5 /HPF      Comment: Urine culture not indicated.        Bacteria, UA None Seen /HPF      Squamous Epithelial Cells, UA 0-2 /HPF      Hyaline Casts, UA 0-2 /LPF      Methodology Automated Microscopy    High Sensitivity Troponin T 2Hr [[id number]]  (Abnormal) Collected: 03/31/23 1923    Specimen: Blood Updated: 03/31/23 2000     HS Troponin T 16 ng/L      Troponin T Delta -2 ng/L     Narrative:      High Sensitive Troponin T Reference Range:  <10.0 ng/L- Negative Female for AMI  <15.0 ng/L- Negative Male for AMI  >=10 - Abnormal Female indicating possible myocardial injury.  >=15 - Abnormal Male indicating possible myocardial injury.   Clinicians would have to utilize clinical acumen, EKG, Troponin, and serial changes to determine if it is an Acute Myocardial Infarction or myocardial injury due to an underlying chronic condition.         TSH [[id number]]  (Normal) Collected: 03/31/23 1730    Specimen: Blood Updated: 03/31/23 1832     TSH 3.090 uIU/mL     T4, Free [[id number]]  (Normal) Collected: 03/31/23 1730    Specimen: Blood Updated: 03/31/23 1831     Free T4 1.14 ng/dL     Narrative:      Results may be falsely increased if patient taking Biotin.      COVID PRE-OP / PRE-PROCEDURE SCREENING ORDER (NO ISOLATION) - Swab, Nasopharynx [[id number]]  (Normal) Collected: 03/31/23 1724    Specimen: Swab from Nasopharynx Updated: 03/31/23 1829    Narrative:      The following orders were created for panel order COVID PRE-OP / PRE-PROCEDURE SCREENING ORDER (NO ISOLATION) - Swab, Nasopharynx.  Procedure                               Abnormality         Status                     ---------                               -----------         ------                     COVID-19 and FLU A/B PCR...[[id number]]  Normal     "          Final result                 Please view results for these tests on the individual orders.    COVID-19 and FLU A/B PCR - Swab, Nasopharynx [[id number]]  (Normal) Collected: 03/31/23 1724    Specimen: Swab from Nasopharynx Updated: 03/31/23 1829     COVID19 Not Detected     Influenza A PCR Not Detected     Influenza B PCR Not Detected    Narrative:      Fact sheet for providers: https://www.fda.gov/media/776538/download    Fact sheet for patients: https://www.fda.gov/media/360458/download    Test performed by PCR.    Procalcitonin [[id number]]  (Normal) Collected: 03/31/23 1730    Specimen: Blood Updated: 03/31/23 1828     Procalcitonin 0.06 ng/mL     Narrative:      As a Marker for Sepsis (Non-Neonates):    1. <0.5 ng/mL represents a low risk of severe sepsis and/or septic shock.  2. >2 ng/mL represents a high risk of severe sepsis and/or septic shock.    As a Marker for Lower Respiratory Tract Infections that require antibiotic therapy:    PCT on Admission    Antibiotic Therapy       6-12 Hrs later    >0.5                Strongly Recommended  >0.25 - <0.5        Recommended   0.1 - 0.25          Discouraged              Remeasure/reassess PCT  <0.1                Strongly Discouraged     Remeasure/reassess PCT    As 28 day mortality risk marker: \"Change in Procalcitonin Result\" (>80% or <=80%) if Day 0 (or Day 1) and Day 4 values are available. Refer to http://www.Presentains-pct-calculator.com    Change in PCT <=80%  A decrease of PCT levels below or equal to 80% defines a positive change in PCT test result representing a higher risk for 28-day all-cause mortality of patients diagnosed with severe sepsis for septic shock.    Change in PCT >80%  A decrease of PCT levels of more than 80% defines a negative change in PCT result representing a lower risk for 28-day all-cause mortality of patients diagnosed with severe sepsis or septic shock.       Lipid Panel [[id number]] Collected: 03/31/23 1730    Specimen: Blood " Updated: 03/31/23 1828     Total Cholesterol 109 mg/dL      Triglycerides 97 mg/dL      HDL Cholesterol 60 mg/dL      LDL Cholesterol  31 mg/dL      VLDL Cholesterol 18 mg/dL      LDL/HDL Ratio 0.49    Narrative:      Cholesterol Reference Ranges  (U.S. Department of Health and Human Services ATP III Classifications)    Desirable          <200 mg/dL  Borderline High    200-239 mg/dL  High Risk          >240 mg/dL      Triglyceride Reference Ranges  (U.S. Department of Health and Human Services ATP III Classifications)    Normal           <150 mg/dL  Borderline High  150-199 mg/dL  High             200-499 mg/dL  Very High        >500 mg/dL    HDL Reference Ranges  (U.S. Department of Health and Human Services ATP III Classifications)    Low     <40 mg/dl (major risk factor for CHD)  High    >60 mg/dl ('negative' risk factor for CHD)        LDL Reference Ranges  (U.S. Department of Health and Human Services ATP III Classifications)    Optimal          <100 mg/dL  Near Optimal     100-129 mg/dL  Borderline High  130-159 mg/dL  High             160-189 mg/dL  Very High        >189 mg/dL    C-reactive Protein [[id number]]  (Normal) Collected: 03/31/23 1730    Specimen: Blood Updated: 03/31/23 1828     C-Reactive Protein <0.30 mg/dL     Comprehensive Metabolic Panel [[id number]]  (Abnormal) Collected: 03/31/23 1730    Specimen: Blood Updated: 03/31/23 1826     Glucose 127 mg/dL      BUN 37 mg/dL      Creatinine 0.75 mg/dL      Sodium 134 mmol/L      Potassium 4.4 mmol/L      Chloride 96 mmol/L      CO2 25.0 mmol/L      Calcium 9.6 mg/dL      Total Protein 6.3 g/dL      Albumin 4.3 g/dL      ALT (SGPT) 19 U/L      AST (SGOT) 22 U/L      Alkaline Phosphatase 41 U/L      Total Bilirubin 0.3 mg/dL      Globulin 2.0 gm/dL      A/G Ratio 2.2 g/dL      BUN/Creatinine Ratio 49.3     Anion Gap 13.0 mmol/L      eGFR 82.1 mL/min/1.73     Narrative:      GFR Normal >60  Chronic Kidney Disease <60  Kidney Failure <15    The GFR formula  is only valid for adults with stable renal function between ages 18 and 70.    CK [[id number]]  (Normal) Collected: 03/31/23 1730    Specimen: Blood Updated: 03/31/23 1826     Creatine Kinase 41 U/L     Lactic Acid, Plasma [[id number]]  (Normal) Collected: 03/31/23 1730    Specimen: Blood Updated: 03/31/23 1824     Lactate 1.7 mmol/L     Phosphorus [[id number]]  (Abnormal) Collected: 03/31/23 1730    Specimen: Blood Updated: 03/31/23 1823     Phosphorus 5.2 mg/dL     BNP [[id number]]  (Normal) Collected: 03/31/23 1730    Specimen: Blood Updated: 03/31/23 1822     proBNP 274.8 pg/mL     Narrative:      Among patients with dyspnea, NT-proBNP is highly sensitive for the detection of acute congestive heart failure. In addition NT-proBNP of <300 pg/ml effectively rules out acute congestive heart failure with 99% negative predictive value.    Results may be falsely decreased if patient taking Biotin.      High Sensitivity Troponin T [[id number]]  (Abnormal) Collected: 03/31/23 1730    Specimen: Blood Updated: 03/31/23 1821     HS Troponin T 18 ng/L     Narrative:      High Sensitive Troponin T Reference Range:  <10.0 ng/L- Negative Female for AMI  <15.0 ng/L- Negative Male for AMI  >=10 - Abnormal Female indicating possible myocardial injury.  >=15 - Abnormal Male indicating possible myocardial injury.   Clinicians would have to utilize clinical acumen, EKG, Troponin, and serial changes to determine if it is an Acute Myocardial Infarction or myocardial injury due to an underlying chronic condition.         Lipase [[id number]]  (Abnormal) Collected: 03/31/23 1730    Specimen: Blood Updated: 03/31/23 1821     Lipase 72 U/L     Magnesium [[id number]]  (Normal) Collected: 03/31/23 1730    Specimen: Blood Updated: 03/31/23 1820     Magnesium 1.8 mg/dL     D-dimer, Quantitative [[id number]]  (Normal) Collected: 03/31/23 1730    Specimen: Blood Updated: 03/31/23 1814     D-Dimer, Quantitative 0.53 MCGFEU/mL     Narrative:      According  "to the assay 's published package insert, a normal (<0.50 MCGFEU/mL) D-dimer result in conjunction with a non-high clinical probability assessment, excludes deep vein thrombosis (DVT) and pulmonary embolism (PE) with high sensitivity.    D-dimer values increase with age and this can make VTE exclusion of an older population difficult. To address this, the [employer], based on best available evidence and recent guidelines, recommends that clinicians use age-adjusted D-dimer thresholds in patients greater than 50 years of age with: a) a low probability of PE who do not meet all Pulmonary Embolism Rule Out Criteria, or b) in those with intermediate probability of PE.   The formula for an age-adjusted D-dimer cut-off is \"age/100\".  For example, a 60 year old patient would have an age-adjusted cut-off of 0.60 MCGFEU/mL and an 80 year old 0.80 MCGFEU/mL.    Sedimentation Rate [[id number]]  (Normal) Collected: 03/31/23 1730    Specimen: Blood Updated: 03/31/23 1813     Sed Rate 3 mm/hr     CBC Auto Differential [[id number]]  (Abnormal) Collected: 03/31/23 1730    Specimen: Blood Updated: 03/31/23 1809     WBC 13.74 10*3/mm3      RBC 1.97 10*6/mm3      Hemoglobin 5.9 g/dL      Hematocrit 18.7 %      MCV 94.9 fL      MCH 29.9 pg      MCHC 31.6 g/dL      RDW 13.4 %      RDW-SD 46.4 fl      MPV 9.4 fL      Platelets 370 10*3/mm3      Neutrophil % 81.0 %      Lymphocyte % 11.6 %      Monocyte % 6.8 %      Eosinophil % 0.1 %      Basophil % 0.0 %      Immature Grans % 0.5 %      Neutrophils, Absolute 11.12 10*3/mm3      Lymphocytes, Absolute 1.60 10*3/mm3      Monocytes, Absolute 0.94 10*3/mm3      Eosinophils, Absolute 0.01 10*3/mm3      Basophils, Absolute 0.00 10*3/mm3      Immature Grans, Absolute 0.07 10*3/mm3      nRBC 0.1 /100 WBC             Objective:     Vitals: /53 (BP Location: Left arm, Patient Position: Lying)   Pulse 78   Temp 98.7 °F (37.1 °C) (Oral)   Resp 16   Ht 160 cm " "(63\")   Wt 47.5 kg (104 lb 12.8 oz)   SpO2 100%   BMI 18.56 kg/m²      Intake/Output Summary (Last 24 hours) at 2023 0959  Last data filed at 2023 0400  Gross per 24 hour   Intake 410 ml   Output 500 ml   Net -90 ml    Temp (24hrs), Av.1 °F (36.7 °C), Min:97.3 °F (36.3 °C), Max:99.3 °F (37.4 °C)      Physical Exam  Vitals reviewed.   Constitutional:       Appearance: She is ill-appearing.   HENT:      Head: Normocephalic and atraumatic.   Eyes:      General:         Right eye: No discharge.         Left eye: No discharge.      Extraocular Movements: Extraocular movements intact.      Conjunctiva/sclera: Conjunctivae normal.   Cardiovascular:      Rate and Rhythm: Normal rate and regular rhythm.      Pulses: Normal pulses.      Heart sounds: No murmur heard.  Pulmonary:      Effort: Pulmonary effort is normal. No respiratory distress.      Breath sounds: No wheezing.   Abdominal:      General: Abdomen is flat. Bowel sounds are normal. There is no distension.   Musculoskeletal:      Right lower leg: No edema.      Left lower leg: No edema.   Skin:     Capillary Refill: Capillary refill takes less than 2 seconds.   Neurological:      General: No focal deficit present.      Mental Status: She is alert and oriented to person, place, and time.   Psychiatric:         Mood and Affect: Mood normal.         Behavior: Behavior normal.             Assessment and Plan:     Primary Problem:  Generalized muscle weakness    Hospital Problem list:    Generalized muscle weakness    Acute blood loss anemia    Melena    Urinary incontinence      PMH:  Past Medical History:   Diagnosis Date   • Antral ulcer    • Bladder cystocele    • C. difficile diarrhea    • CAD (coronary artery disease)    • Cancer     lung    • Colon polyp    • COPD (chronic obstructive pulmonary disease)    • COVID-19 vaccine series completed    • Diarrhea    • Difficulty swallowing    • Disease of thyroid gland    • Diverticulosis    • Elevated " cholesterol    • Gastritis    • Generalized OA    • GERD (gastroesophageal reflux disease)    • History of bladder surgery 01/07/2020   • History of transfusion     after lung surgery 2021   • Hyperlipidemia    • Hypertension    • Liver cyst    • Lung nodule    • Microscopic colitis    • Multiple gastric ulcers     last 5 years ago   • Neck pain    • Neuropathy    • Normal body mass index    • NSAID induced gastritis    • Ulcer of pyloric antrum     UNSPECIFIED ULCER CHRONICITY   • UTI (urinary tract infection)    • Weight loss        Treatment Plan:    Anemia: Acutely noted hemoglobin 5.9.  Status post 2 unit PRBC, improved to 7.9.  Does report black stool.    EGD with nonbleeding gastric ulcer, not source of bleeding.  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  Anticoagulation held due to GI bleed.  -Echo with EF 70%, overall normal  -Positive FOBT  -Hemoglobin stable 7.9, continue every 6 hours CBC  -[id number] score 5, bleeding rate 12.3%/year. HAS-BLED score 3, bleeding rate 3.74 %/year.  -Long discussion about bleeding risk versus clot risk with history of femoropopliteal bypass on the right side.  She wants to discuss further with Jeremy Layne (PCP) and Shaun (vascular surgeon in [place]).  -With her being high risk of clot, will order a tagged red blood cell scan and lieu of holding anticoagulation for colonoscopy.     Generalized weakness: PT/OT.     Melena: As above     CODE STATUS: Full     DVT prophylaxis: Held due to concern for GI bleed     Disposition: Inpatient    Reviewed treatment plans with the patient and/or family.   45 minutes spent in face to face interaction and coordination of care.     Electronically signed by Jung Wilson MD on 4/2/2023 at 09:59 CDT

## 2023-04-02 NOTE — PLAN OF CARE
Goal Outcome Evaluation:  Plan of Care Reviewed With: patient        Progress: no change  Outcome Evaluation: OT evaluation completed.  Pt is A&Ox4.  Pt complains of significant weakness causing SOA during activity/mobility.  She completed bed mobility with S, functional transfers with RWX with SBA, and functional mobility in the room with CGA and use of RWX.  Pt required 2 standing rest breaks due to reported weakness during ambulation trial around the end of the bed to the bedside chair.  She completed LB dressing of her house shoes with S sitting EOB.  Pt is limited significant due to generalized weakness, decreased endurance, SOA, and fatigue.  OT/PT provided her with edu regarding the benefit of being out of the bed more than she is in the bed and for her to complete all toileting in the bathroom.  OT will cont to follow to maximize her I and endurance with ADLs and functional mobility.  It is recommended for pt to discharge home with assist.

## 2023-04-02 NOTE — PROGRESS NOTES
"Pharmacy Dosing Service  Anticoagulant  Enoxaparin    Assessment/Action/Plan:  Patient is currently receiving Enoxaparin 40 mg SQ every 24 hours for indication of VTE prophylaxis. Labs/dose reviewed. Actual body weight is less than 50 kg. Low Hgb and advanced age noted. Will decrease dose to 30 mg SQ every 24 hours. Pharmacy will continue to monitor renal function and adjust dose accordingly.     Subjective:  Trinidad Zhu is a 78 y.o. female  Objective:  [Ht: 160 cm (63\"); Wt: 47.5 kg (104 lb 12.8 oz); BMI: Body mass index is 18.56 kg/m².]  Estimated Creatinine Clearance: 42.1 mL/min (by C-G formula based on SCr of 0.84 mg/dL).   Lab Results   Component Value Date    INR 1.59 (H) 11/13/2022    INR 1.11 (H) 10/19/2022    INR 1.09 06/06/2022    PROTIME 18.3 (H) 11/13/2022    PROTIME 13.9 10/19/2022    PROTIME 13.7 06/06/2022      Lab Results   Component Value Date    HGB 7.9 (L) 04/02/2023    HGB 7.9 (L) 04/02/2023    HGB 7.1 (L) 04/01/2023      Lab Results   Component Value Date     04/02/2023     04/02/2023     04/01/2023         Martir Caicedo, PharmD  04/02/23 13:43 CDT     "

## 2023-04-02 NOTE — PLAN OF CARE
Goal Outcome Evaluation:  Plan of Care Reviewed With: patient        Progress: no change  Outcome Evaluation: No changes. Another BM today, stool is black with some bright red blood. Weakness and low bp continues, hgb is stable at 7.9. Plans in place per Dr Santizo for colonoscopy on Wednesday. Lovenox until then, while patient's xarelto/plavix/asa are being held. Ultram given prn x2 for pain. Family at bedside. Cont to monitor.

## 2023-04-02 NOTE — PROGRESS NOTES
[facility]  Inpatient Progress Note  Today's date:  04/02/23    Trinidad Zhu  1945       Reason for Follow Up: Hematochezia    Subjective:   Patient sitting up in chair with good appetite and tolerating a regular diet.  EGD results reviewed noting a small clean-based ulcer in the antrum.  Patient reports a soft/pasty dark daily bowel movement, and notes red blood leaking in the periphery.  Her colonoscopy reports and photos from 8/19/2015 and 6/2/2022 were reviewed.  Interestingly there appeared to be a reddish area lateral to the appendiceal orifice that suggests an AVM which appeared to have increased in size on subsequent colonoscopy in 2022.  She has not received Xarelto or Plavix since Friday.  Her extensive vasculopathy and interventions were reviewed.  This included an ischemic lower extremity at one point that was emergently revascularized.  Her vascular surgeon is Dr. Dunn at [facility] who can be reached at [phone].    Allergies   Allergen Reactions   • Baclofen Other (See Comments)     MUSCULOSKELETAL THERAPY AGENTS knocked her out for a day    Other reaction(s): Unknown   • Gabapentin Confusion     Other reaction(s): over sedation   • Sulfa Antibiotics Rash     Mouth blisters   • Sulfacetamide Sodium Rash   • Amitriptyline Hcl Confusion     Other reaction(s): ambulation difficulties/confusion   • Niferex [Iron Polysaccharide] Swelling     LIP SWELLING   • Oxycodone-Acetaminophen GI Intolerance     Other reaction(s): vomiting       Current Facility-Administered Medications:   •  acetaminophen (TYLENOL) tablet 1,000 mg, 1,000 mg, Oral, TID, Patric Reyes, DO, 1,000 mg at 04/02/23 0853  •  albuterol (PROVENTIL) nebulizer solution 0.083% 2.5 mg/3mL, 2.5 mg, Nebulization, Q4H - RT, Patric Reyes, DO, 2.5 mg at 04/02/23 1400  •  sennosides-docusate (PERICOLACE) 8.6-50 MG per tablet 2 tablet, 2 tablet, Oral, BID, 2 tablet at 04/02/23 0851 **AND**  polyethylene glycol (MIRALAX) packet 17 g, 17 g, Oral, Daily PRN **AND** bisacodyl (DULCOLAX) EC tablet 5 mg, 5 mg, Oral, Daily PRN **AND** bisacodyl (DULCOLAX) suppository 10 mg, 10 mg, Rectal, Daily PRN, Patric Reyes, DO  •  carvedilol (COREG) tablet 12.5 mg, 12.5 mg, Oral, BID With Meals, Andrew Layne MD, 12.5 mg at 04/02/23 0852  •  Enoxaparin Sodium (LOVENOX) syringe 30 mg, 30 mg, Subcutaneous, Q24H, Andrew Layne MD, 30 mg at 04/02/23 1403  •  hydroCHLOROthiazide (HYDRODIURIL) tablet 12.5 mg, 12.5 mg, Oral, Daily, Patric Reyes, DO, 12.5 mg at 04/02/23 0853  •  levothyroxine (SYNTHROID, LEVOTHROID) tablet 50 mcg, 50 mcg, Oral, Q AM, Patric Reyes, DO, 50 mcg at 04/02/23 0648  •  lisinopril (PRINIVIL,ZESTRIL) tablet 10 mg, 10 mg, Oral, Q24H, Andrew Layne MD, 10 mg at 04/02/23 0851  •  LORazepam (ATIVAN) tablet 0.5 mg, 0.5 mg, Oral, Q8H PRN, Patric Reyes, DO, 0.5 mg at 04/02/23 0851  •  nitroglycerin (NITROSTAT) SL tablet 0.4 mg, 0.4 mg, Sublingual, Q5 Min PRN, Patric Reyes, DO  •  ondansetron (ZOFRAN) tablet 4 mg, 4 mg, Oral, Q6H PRN **OR** ondansetron (ZOFRAN) injection 4 mg, 4 mg, Intravenous, Q6H PRN, Patric Reyes, DO  •  oxyCODONE (ROXICODONE) immediate release tablet 5 mg, 5 mg, Oral, Q4H PRN, Patric Reyes, DO, 5 mg at 04/01/23 2124  •  pantoprazole (PROTONIX) injection 40 mg, 40 mg, Intravenous, BID AC, Patric Reyes, , 40 mg at 04/02/23 0706  •  sodium chloride 0.9 % flush 10 mL, 10 mL, Intravenous, Q12H, Patric Reyes, , 10 mL at 04/01/23 2017  •  sodium chloride 0.9 % flush 10 mL, 10 mL, Intravenous, PRN, Patric Reyes, DO  •  sodium chloride 0.9 % infusion 40 mL, 40 mL, Intravenous, PRN, Patric Reyes,   •  sodium chloride 0.9 % infusion, 100 mL/hr, Intravenous, Continuous, Jamil Han, CRNA, Stopped at 04/01/23 1327  •  traMADol (ULTRAM) tablet 50 mg, 50 mg, Oral, Q4H PRN, Patric Reyes, , 50 mg at 04/02/23  1403    Review of Systems:   Review of Systems     Vital Signs:  Temp:  [97.8 °F (36.6 °C)-98.8 °F (37.1 °C)] 98.8 °F (37.1 °C)  Heart Rate:  [65-89] 81  Resp:  [14-20] 15  BP: ()/(38-57) 90/48  Body mass index is 18.56 kg/m².     Intake/Output Summary (Last 24 hours) at 4/2/2023 1422  Last data filed at 4/2/2023 1334  Gross per 24 hour   Intake 600 ml   Output 100 ml   Net 500 ml     I/O this shift:  In: 600 [P.O.:600]  Out: -   Physical Exam:  Physical Exam   CV-RRR  Lung-equal expansion bilaterally  ABD-benign     Results Review:   I have reviewed all of the patient's current test results    Results from last 7 days   Lab Units 04/02/23  1219 04/02/23  0543 04/01/23  2324   WBC 10*3/mm3 11.45* 8.85 10.00   HEMOGLOBIN g/dL 7.9* 7.9* 7.1*   HEMATOCRIT % 24.6* 24.0* 22.2*   PLATELETS 10*3/mm3 281 236 221       Results from last 7 days   Lab Units 04/02/23  0543 04/01/23  0448 03/31/23  1730   SODIUM mmol/L 137 135* 134*   POTASSIUM mmol/L 3.8 3.8 4.4   CHLORIDE mmol/L 103 99 96*   CO2 mmol/L 27.0 26.0 25.0   BUN mg/dL 31* 35* 37*   CREATININE mg/dL 0.84 0.84 0.75   CALCIUM mg/dL 8.5* 8.7 9.6   BILIRUBIN mg/dL  --   --  0.3   ALK PHOS U/L  --   --  41   ALT (SGPT) U/L  --   --  19   AST (SGOT) U/L  --   --  22   GLUCOSE mg/dL 91 81 127*             Lab Results   Lab Value Date/Time    LIPASE 72 (H) 03/31/2023 1730       Radiology Review:  Imaging Results (Last 24 Hours)     ** No results found for the last 24 hours. **          Impression/Plan:  Patient Active Problem List   Diagnosis Code   • Spinal stenosis, cervical region M48.02   • Lung nodules R91.8   • Bronchiectasis without complication ([facility]) J47.9   • Underweight R63.6   • Personal history of nicotine dependence Z87.891   • Restrictive lung disease J98.4   • Pulmonary emphysema (Formerly [facility]) J43.9   • PAD (peripheral artery disease) (Formerly [facility]) I73.9   • Preop testing Z01.818   • Gastroesophageal reflux disease K21.9   • Allergic rhinitis J30.9   • ORTIZ (dyspnea on  exertion) R06.09   • CAD (coronary artery disease) I25.10   • Hyperlipidemia E78.5   • Neuropathy G62.9   • Hypertension I10   • Simple chronic bronchitis (HCC) J41.0   • Former smoker Z87.891   • Lung nodule R91.1   • Mass of right lung R91.8   • Postoperative anemia due to acute blood loss D62   • Syncope and collapse R55   • Adenocarcinoma C80.1   • Personal history of malignant neoplasm of bronchus and lung Z85.118   • Hydropneumothorax J94.8   • Lower extremity embolism I74.3   • Generalized muscle weakness M62.81   • Acute blood loss anemia D62   • Melena K92.1   • Urinary incontinence R32     Hematochezia/suggestive of slow intermittent bleeding most likely intensified by Plavix/Xarelto which has been on hold since Saturday.  The consistency of the stool and characteristics of the bleed could very well be a consequence of proximal colon AVMs.  Although the photos appear somewhat grainy, it appears as though she has an AVM in her cecum at the very least and certainly she would be at risk for AVMs there and in other areas of her  mid and lower intestines due to her vasculopathy.  The patient also reports a history of acute limb ischemia requiring vascular intervention.  She does have an extensive history of stenting and revascularization to the bilateral distal extremities.  As discussed with Dr. Wilson, Lovenox/heparin may be a better option for DVT prophylaxis and certainly safer than venous compression stockings regarding her vasculopathy.  We will allow 5-day full Plavix washout, which will be adequate for Xarelto as well.  She will receive Lovenox/heparin bridge during this time period.    -Plan colonoscopy on Wednesday with clear liquids to start on Tuesday.  I will change her diet to GI soft.  Should she have any significant bleeding on heparin this will be addressed at that time.    -Will attempt to contact Dr. Dunn, who is her vascular surgeon at [facility] ([phone]) to see whether  monotherapy with either Xarelto or Plavix would be reasonable following reinstitution of her anticoagulation/antiplatelets although I suspect he will want DAPT.    -If the colonoscopy is unrevealing, she will need an outpatient capsule endoscopy.    -Continue serial H&H every 6 hours and transfuse as indicated.    -Check H. pylori antibody and treat as indicated.    All discussed with the patient and her  who is at bedside and all questions were answered to their satisfaction.  Her nurse Aliyah was in the room as well and the plan was reviewed with her in addition to Dr. Wilson via telephone.            Paul Santizo MD  04/02/23  14:22 CDT

## 2023-04-02 NOTE — THERAPY EVALUATION
Patient Name: Trinidad Zhu  : 1945    MRN: [id number]                              Today's Date: 2023       Admit Date: 3/31/2023    Visit Dx:     ICD-10-CM ICD-9-CM   1. Acute blood loss anemia  D62 285.1   2. Impaired mobility  Z74.09 799.89     Patient Active Problem List   Diagnosis   • Spinal stenosis, cervical region   • Lung nodules   • Bronchiectasis without complication (HCC)   • Underweight   • Personal history of nicotine dependence   • Restrictive lung disease   • Pulmonary emphysema (HCC)   • PAD (peripheral artery disease) (HCC)   • Preop testing   • Gastroesophageal reflux disease   • Allergic rhinitis   • ORTIZ (dyspnea on exertion)   • CAD (coronary artery disease)   • Hyperlipidemia   • Neuropathy   • Hypertension   • Simple chronic bronchitis (HCC)   • Former smoker   • Lung nodule   • Mass of right lung   • Postoperative anemia due to acute blood loss   • Syncope and collapse   • Adenocarcinoma   • Personal history of malignant neoplasm of bronchus and lung   • Hydropneumothorax   • Lower extremity embolism   • Generalized muscle weakness   • Acute blood loss anemia   • Melena   • Urinary incontinence     Past Medical History:   Diagnosis Date   • Antral ulcer    • Bladder cystocele    • C. difficile diarrhea    • CAD (coronary artery disease)    • Cancer     lung    • Colon polyp    • COPD (chronic obstructive pulmonary disease)    • COVID-19 vaccine series completed    • Diarrhea    • Difficulty swallowing    • Disease of thyroid gland    • Diverticulosis    • Elevated cholesterol    • Gastritis    • Generalized OA    • GERD (gastroesophageal reflux disease)    • History of bladder surgery 2020   • History of transfusion     after lung surgery    • Hyperlipidemia    • Hypertension    • Liver cyst    • Lung nodule    • Microscopic colitis    • Multiple gastric ulcers     last 5 years ago   • Neck pain    • Neuropathy    • Normal body mass index    • NSAID induced  gastritis    • Ulcer of pyloric antrum     UNSPECIFIED ULCER CHRONICITY   • UTI (urinary tract infection)    • Weight loss      Past Surgical History:   Procedure Laterality Date   • ANTERIOR CERVICAL DISCECTOMY W/ FUSION N/A 5/22/2017    Procedure: CERVICAL DISCECTOMY ANTERIOR FUSION WITH INSTRUMENTATION;  Surgeon: NADINE Sarabia MD;  Location:  [facility] OR;  Service:    • AORTAGRAM Left 11/9/2020    Procedure: left lower extremtiy angiogram, hawk athrectomy, balloon angioplasty, stent placement, mynx closure;  Surgeon: Sukhdev Condon DO;  Location:  [facility];  Service: Vascular;  Laterality: Left;   • AORTAGRAM Left 3/26/2021    Procedure: LEFT LOWER EXTREMITY ANGIOGRAM, BALLOON ANGIOPLASTY, STENT PLACEMENT, MYNX CLOSURE;  Surgeon: Sukhdev Condon DO;  Location:  [facility];  Service: Vascular;  Laterality: Left;   • AORTAGRAM Left 8/6/2021    Procedure: LEFT LOWER EXTREMITY ANGIOGRAM, HAWK ATHERECTOMY, BALLOON ANGIOPLASTY, MYNX CLOSURE;  Surgeon: Sukhdev Condon DO;  Location:  [facility];  Service: Vascular;  Laterality: Left;   • AORTAGRAM Right 6/8/2022    Procedure: RIGHT LOWER EXTREMITY ANGIOGRAM, INTRAVASCULAR LITHOTRIPSY, HAWK ATHRECTOMY, BALLOON ANGIOPLASTY, MYNX CLOSURE;  Surgeon: Sukhdev Condon DO;  Location:  [facility];  Service: Vascular;  Laterality: Right;   • AORTAGRAM Left 10/21/2022    Procedure: LEFT LOWER EXTREMITY ANGIOGRAM WITH HAWK ATHRECTOMY, BALLOON ANGIOPLASTY, STENT PLACEMENT, MYNX CLOSURE;  Surgeon: Sukhdev Condon DO;  Location:  [facility];  Service: Vascular;  Laterality: Left;   • BLADDER SUSPENSION      also had pelvic reconstructive surgery   • BREAST EXCISIONAL BIOPSY Right 1985   • CATARACT EXTRACTION, BILATERAL     • CERVICAL CORPECTOMY N/A 5/22/2017    Procedure:  ANTERIOR CERVICAL DISCECTOMY FUSION C-6 to T1,  ANTERIOR FUSION WITH INSTRUMENTATION C-5 T-1;  Surgeon: NADINE Sarabia MD;  Location:  [facility] OR;   Service:    • COLONOSCOPY  08/19/2015    TIC BX RT COLON   • COLONOSCOPY  12/04/2013    RT COLON BX RECALL 5YR   • COLONOSCOPY N/A 11/29/2016    The entire examined colon is normal; No specimens collected   • COLONOSCOPY N/A 6/2/2022    Procedure: COLONOSCOPY WITH ANESTHESIA;  Surgeon: Marco Antonio Vallejo MD;  Location:  [facility];  Service: Gastroenterology;  Laterality: N/A;  Pre: screen  Post: diverticulosis  Andrew Layne MD   • ENDOSCOPY  12/03/2015    HEALED ULCER SLANT BX   • FEMORAL ENDARTERECTOMY Left 10/21/2022    Procedure: LEFT LOWER EXTREMITY ANGIOGRAM;  Surgeon: Sukhdev Condon DO;  Location:  [facility];  Service: Vascular;  Laterality: Left;   • HYSTERECTOMY     • LEG THROMBECTOMY/EMBOLECTOMY Left 6/8/2022    Procedure: LOWER EXTREMITY THROMBECTOMY/EMBOLECTOMY, PATCH GRAFT TO  FEMORAL ARTERY;  Surgeon: Sukhdev Condon DO;  Location:  [facility];  Service: Vascular;  Laterality: Left;   • LOBECTOMY Right 11/5/2021    Procedure: RIGHT THORACOSCOPY WITH DAVINCI ROBOT, RIGHT UPPER LOBE LOBECTOMY;  Surgeon: Jarad Ignacio MD;  Location: [facility] OR;  Service: Robotics - DaVinci;  Laterality: Right;   • LUNG SURGERY     • OTHER SURGICAL HISTORY      KNEE CARTILAGE REMOVED   • OTHER SURGICAL HISTORY      BENIGN FIBROID TUMOR OF BREAST 1985 REMOVED   • TONSILLECTOMY        General Information     Row Name 04/02/23 0940          Physical Therapy Time and Intention    Document Type evaluation  pt presents with progressive weakness, urinary incontinence, and increased falls; Dx: Anemia (Hgb: 5.9, Hct: 18.7 s/p 2 PRBC transfusion and Hgb now 7.9), melena s/p EGD 4/1/23  -SB     Mode of Treatment physical therapy  -SB     Row Name 04/02/23 0940          General Information    Patient Profile Reviewed yes  -SB     Prior Level of Function independent:;all household mobility;ADL's;cooking;cleaning;driving;shopping  walk in shower, shower chair, grab bars, RW  -SB     Existing  Precautions/Restrictions fall  -SB     Barriers to Rehab medically complex  -SB     Row Name 04/02/23 0940          Living Environment    People in Home spouse  -SB     Row Name 04/02/23 0940          Home Main Entrance    Number of Stairs, Main Entrance three  -SB     Stair Railings, Main Entrance railing on left side (ascending)  -SB     Row Name 04/02/23 0940          Stairs Within Home, Primary    Number of Stairs, Within Home, Primary none  -SB     Row Name 04/02/23 0940          Cognition    Orientation Status (Cognition) oriented x 4  -SB     Row Name 04/02/23 0940          Safety Issues, Functional Mobility    Safety Issues Affecting Function (Mobility) insight into deficits/self-awareness  -SB     Impairments Affecting Function (Mobility) endurance/activity tolerance;shortness of breath;strength;pain  -SB           User Key  (r) = Recorded By, (t) = Taken By, (c) = Cosigned By    Initials Name Provider Type    SB Chapis Hughes, PT DPT Physical Therapist               Mobility     Row Name 04/02/23 0940          Bed Mobility    Bed Mobility supine-sit  -SB     Supine-Sit [place] (Bed Mobility) supervision  -SB     Assistive Device (Bed Mobility) bed rails;head of bed elevated  -SB     Row Name 04/02/23 0940          Sit-Stand Transfer    Sit-Stand [place] (Transfers) standby assist  -SB     Assistive Device (Sit-Stand Transfers) walker, front-wheeled  -SB     Row Name 04/02/23 0940          Gait/Stairs (Locomotion)    [place] Level (Gait) contact guard  -SB     Assistive Device (Gait) walker, front-wheeled  -SB     Distance in Feet (Gait) 15 feet with two standing rest breaks  -SB     Deviations/Abnormal Patterns (Gait) gait speed decreased;stride length decreased;[place] decreased  -SB     Bilateral Gait Deviations forward flexed posture  -SB     Comment, (Gait/Stairs) pt reports feeling very weak and leans forward onto forearms onto walker for rest breaks  -SB           User Key  (r) =  Recorded By, (t) = Taken By, (c) = Cosigned By    Initials Name Provider Type    Chapis Baum PT DPT Physical Therapist               Obj/Interventions     Row Name 04/02/23 0940          Range of Motion Comprehensive    General Range of Motion bilateral lower extremity ROM WFL  -SB     Row Name 04/02/23 0940          Strength Comprehensive (MMT)    General Manual Muscle Testing (MMT) Assessment lower extremity strength deficits identified  -SB     Comment, General Manual Muscle Testing (MMT) Assessment BLE 4/5  -SB     Row Name 04/02/23 0940          Balance    Balance Assessment sitting static balance;sitting dynamic balance;standing static balance;standing dynamic balance  -SB     Static Sitting Balance independent  -SB     Dynamic Sitting Balance supervision  -SB     Position, Sitting Balance sitting edge of bed;supported  -SB     Static Standing Balance contact guard  -SB     Dynamic Standing Balance contact guard  -SB     Position/Device Used, Standing Balance walker, front-wheeled;supported  -SB     Row Name 04/02/23 0940          Sensory Assessment (Somatosensory)    Sensory Assessment (Somatosensory) LE sensation intact  -SB           User Key  (r) = Recorded By, (t) = Taken By, (c) = Cosigned By    Initials Name Provider Type    Chapis Baum PT DPT Physical Therapist               Goals/Plan     Row Name 04/02/23 0940          Bed Mobility Goal 1 (PT)    Activity/Assistive Device (Bed Mobility Goal 1, PT) sit to supine;supine to sit;rolling to right;rolling to left  -SB     [place] Level/Cues Needed (Bed Mobility Goal 1, PT) independent  -SB     Time Frame (Bed Mobility Goal 1, PT) long term goal (LTG)  -SB     Progress/Outcomes (Bed Mobility Goal 1, PT) new goal  -SB     Row Name 04/02/23 0940          Transfer Goal 1 (PT)    Activity/Assistive Device (Transfer Goal 1, PT) sit-to-stand/stand-to-sit;bed-to-chair/chair-to-bed  -SB     [place] Level/Cues Needed (Transfer Goal 1, PT)  independent  -SB     Time Frame (Transfer Goal 1, PT) long term goal (LTG)  -SB     Progress/Outcome (Transfer Goal 1, PT) new goal  -SB     Row Name 04/02/23 0940          Gait Training Goal 1 (PT)    Activity/Assistive Device (Gait Training Goal 1, PT) gait (walking locomotion);increase endurance/gait distance;increase energy conservation;walker, rolling  -SB     [place] Level (Gait Training Goal 1, PT) standby assist  -SB     Distance (Gait Training Goal 1, PT) 150  -SB     Time Frame (Gait Training Goal 1, PT) long term goal (LTG)  -SB     Progress/Outcome (Gait Training Goal 1, PT) new goal  -SB     Row Name 04/02/23 0940          Stairs Goal 1 (PT)    Activity/Assistive Device (Stairs Goal 1, PT) stairs, all skills;ascending stairs;descending stairs;using handrail, left  -SB     [place] Level/Cues Needed (Stairs Goal 1, PT) standby assist  -SB     Number of Stairs (Stairs Goal 1, PT) 3  -SB     Time Frame (Stairs Goal 1, PT) long term goal (LTG)  -SB     Progress/Outcome (Stairs Goal 1, PT) new goal  -SB     Row Name 04/02/23 0940          Therapy Assessment/Plan (PT)    Planned Therapy Interventions (PT) balance training;bed mobility training;gait training;patient/family education;transfer training;strengthening;stair training  -SB           User Key  (r) = Recorded By, (t) = Taken By, (c) = Cosigned By    Initials Name Provider Type    Chapis Baum, PT DPT Physical Therapist               Clinical Impression     Row Name 04/02/23 0940          Pain    Pretreatment Pain Rating 8/10  -SB     Pain Location lower  -SB     Pain Location - back;head  -SB     Pre/Posttreatment Pain Comment c/o dizziness, weakness, SOA  -SB     Pain Intervention(s) Medication (See MAR);Repositioned;Ambulation/increased activity  -SB     Additional Documentation Pain Scale: Numbers Pre/Post-Treatment (Group)  -SB     Row Name 04/02/23 0940          Plan of Care Review    Plan of Care Reviewed With patient  -SB      Progress no change  -SB     Outcome Evaluation PT eval completed. Pt alert and oriented x4 with c/o headache and low back pain, weakness, dizziness and SOA. Pt performs sup to sit t/f SV and demos decreased weakness in BLE. Pt performs sit to stand t/f and gait training with SBA-CGA, ambulating short distance in room and reqd standing rest breaks. Pt reports feeling very weak and leans forward onto forearms onto walker for rest breaks. Pt with generalized weakness and decreased endurance that will benefit from skilled PT. Pt encouraged of OOB activity atleast 3x day for meals, and ambulating to BR as well. Recommend d/c home with assist and HH.  -SB     Row Name 04/02/23 0940          Therapy Assessment/Plan (PT)    Patient/Family Therapy Goals Statement (PT) improve function  -SB     Rehab Potential (PT) good, to achieve stated therapy goals  -SB     Criteria for Skilled Interventions Met (PT) yes;meets criteria;skilled treatment is necessary  -SB     Therapy Frequency (PT) 2 times/day  -SB     Predicted Duration of Therapy Intervention (PT) until d/c or goals met  -SB     Row Name 04/02/23 0940          Vital Signs    O2 Delivery Pre Treatment room air  -SB     Row Name 04/02/23 0940          Positioning and Restraints    Pre-Treatment Position in bed  -SB     Post Treatment Position chair  -SB     In Chair sitting;call light within reach;encouraged to call for assist;with family/caregiver  -SB           User Key  (r) = Recorded By, (t) = Taken By, (c) = Cosigned By    Initials Name Provider Type    Chapis Baum, PT DPT Physical Therapist               Outcome Measures     Row Name 04/02/23 0940 04/02/23 0800       How much help from another person do you currently need...    Turning from your back to your side while in flat bed without using bedrails? 4  -SB 4  -WM    Moving from lying on back to sitting on the side of a flat bed without bedrails? 4  -SB 4  -WM    Moving to and from a bed to a chair  (including a wheelchair)? 4  -SB 4  -WM    Standing up from a chair using your arms (e.g., wheelchair, bedside chair)? 4  -SB 4  -WM    Climbing 3-5 steps with a railing? 3  -SB 3  -WM    To walk in hospital room? 3  -SB 3  -WM    AM-PAC 6 Clicks Score (PT) 22  -SB 22  -WM    Highest level of mobility 7 --> Walked 25 feet or more  -SB 7 --> Walked 25 feet or more  -WM    Row Name 04/02/23 0940 04/02/23 0939       Functional Assessment    Outcome Measure Options AM-PAC 6 Clicks Basic Mobility (PT)  -SB AM-PAC 6 Clicks Daily Activity (OT)  -CS          User Key  (r) = Recorded By, (t) = Taken By, (c) = Cosigned By    Initials Name Provider Type    CS Kathleen Krishnan, OTR/L, CNT Occupational Therapist    WM Aliyah Lopez, RN Registered Nurse    Chapis Baum, PT DPT Physical Therapist                             Physical Therapy Education     Title: PT OT SLP Therapies (In Progress)     Topic: Physical Therapy (In Progress)     Point: Mobility training (Done)     Learning Progress Summary           Patient Acceptance, E, VU by SB at 4/2/2023 0945    Comment: pt edu on POC, benefits of act and d/c plans                   Point: Home exercise program (Not Started)     Learner Progress:  Not documented in this visit.          Point: Body mechanics (Not Started)     Learner Progress:  Not documented in this visit.          Point: Precautions (Done)     Learning Progress Summary           Patient Acceptance, E, VU by SB at 4/2/2023 0945    Comment: pt edu on POC, benefits of act and d/c plans                               User Key     Initials Effective Dates Name Provider Type Discipline     06/16/21 -  Chapis Hughes, PT DPT Physical Therapist PT              PT Recommendation and Plan  Planned Therapy Interventions (PT): balance training, bed mobility training, gait training, patient/family education, transfer training, strengthening, stair training  Plan of Care Reviewed With: patient  Progress: no  change  Outcome Evaluation: PT eval completed. Pt alert and oriented x4 with c/o headache and low back pain, weakness, dizziness and SOA. Pt performs sup to sit t/f SV and demos decreased weakness in BLE. Pt performs sit to stand t/f and gait training with SBA-CGA, ambulating short distance in room and reqd standing rest breaks. Pt reports feeling very weak and leans forward onto forearms onto walker for rest breaks. Pt with generalized weakness and decreased endurance that will benefit from skilled PT. Pt encouraged of OOB activity atleast 3x day for meals, and ambulating to BR as well. Recommend d/c home with assist and HH.     Time Calculation:    PT Charges     Row Name 04/02/23 1045             Time Calculation    Start Time 0939  -SB      Stop Time 1005  -SB      Time Calculation (min) 26 min  -SB      PT Received On 04/02/23  -SB      PT Goal Re-Cert Due Date 04/12/23  -SB            User Key  (r) = Recorded By, (t) = Taken By, (c) = Cosigned By    Initials Name Provider Type    SB Chapis Hughes, PT DPT Physical Therapist              Therapy Charges for Today     Code Description Service Date Service Provider Modifiers Qty    10662549031 HC PT EVAL LOW COMPLEXITY 2 4/2/2023 Chapis Hughes PT DPT GP 1          PT G-Codes  Outcome Measure Options: AM-PAC 6 Clicks Basic Mobility (PT)  AM-PAC 6 Clicks Score (PT): 22  AM-PAC 6 Clicks Score (OT): 20  PT Discharge Summary  Anticipated Discharge Disposition (PT): home with assist, home with home health    Chapis Hughes PT DPT  4/2/2023

## 2023-04-03 ENCOUNTER — APPOINTMENT (OUTPATIENT)
Dept: NUCLEAR MEDICINE | Facility: HOSPITAL | Age: 78
DRG: 378 | End: 2023-04-03
Payer: MEDICARE

## 2023-04-03 LAB
DEPRECATED RDW RBC AUTO: 47.3 FL (ref 37–54)
DEPRECATED RDW RBC AUTO: 47.8 FL (ref 37–54)
DEPRECATED RDW RBC AUTO: 48.2 FL (ref 37–54)
DEPRECATED RDW RBC AUTO: 49.5 FL (ref 37–54)
ERYTHROCYTE [DISTWIDTH] IN BLOOD BY AUTOMATED COUNT: 13.8 % (ref 12.3–15.4)
ERYTHROCYTE [DISTWIDTH] IN BLOOD BY AUTOMATED COUNT: 14 % (ref 12.3–15.4)
ERYTHROCYTE [DISTWIDTH] IN BLOOD BY AUTOMATED COUNT: 14.1 % (ref 12.3–15.4)
ERYTHROCYTE [DISTWIDTH] IN BLOOD BY AUTOMATED COUNT: 14.1 % (ref 12.3–15.4)
HCT VFR BLD AUTO: 22.6 % (ref 34–46.6)
HCT VFR BLD AUTO: 22.7 % (ref 34–46.6)
HCT VFR BLD AUTO: 24.8 % (ref 34–46.6)
HCT VFR BLD AUTO: 25 % (ref 34–46.6)
HGB BLD-MCNC: 7.1 G/DL (ref 12–15.9)
HGB BLD-MCNC: 7.2 G/DL (ref 12–15.9)
HGB BLD-MCNC: 7.7 G/DL (ref 12–15.9)
HGB BLD-MCNC: 7.9 G/DL (ref 12–15.9)
IRON 24H UR-MRATE: 30 MCG/DL (ref 37–145)
IRON SATN MFR SERPL: 7 % (ref 20–50)
MCH RBC QN AUTO: 29.9 PG (ref 26.6–33)
MCH RBC QN AUTO: 30 PG (ref 26.6–33)
MCH RBC QN AUTO: 30 PG (ref 26.6–33)
MCH RBC QN AUTO: 30.5 PG (ref 26.6–33)
MCHC RBC AUTO-ENTMCNC: 30.8 G/DL (ref 31.5–35.7)
MCHC RBC AUTO-ENTMCNC: 31.4 G/DL (ref 31.5–35.7)
MCHC RBC AUTO-ENTMCNC: 31.7 G/DL (ref 31.5–35.7)
MCHC RBC AUTO-ENTMCNC: 31.9 G/DL (ref 31.5–35.7)
MCV RBC AUTO: 94.2 FL (ref 79–97)
MCV RBC AUTO: 94.3 FL (ref 79–97)
MCV RBC AUTO: 97 FL (ref 79–97)
MCV RBC AUTO: 97.3 FL (ref 79–97)
PLATELET # BLD AUTO: 234 10*3/MM3 (ref 140–450)
PLATELET # BLD AUTO: 257 10*3/MM3 (ref 140–450)
PLATELET # BLD AUTO: 272 10*3/MM3 (ref 140–450)
PLATELET # BLD AUTO: 277 10*3/MM3 (ref 140–450)
PMV BLD AUTO: 8.8 FL (ref 6–12)
PMV BLD AUTO: 9 FL (ref 6–12)
PMV BLD AUTO: 9.1 FL (ref 6–12)
PMV BLD AUTO: 9.3 FL (ref 6–12)
RBC # BLD AUTO: 2.33 10*6/MM3 (ref 3.77–5.28)
RBC # BLD AUTO: 2.41 10*6/MM3 (ref 3.77–5.28)
RBC # BLD AUTO: 2.57 10*6/MM3 (ref 3.77–5.28)
RBC # BLD AUTO: 2.63 10*6/MM3 (ref 3.77–5.28)
TIBC SERPL-MCNC: 426 MCG/DL (ref 298–536)
TRANSFERRIN SERPL-MCNC: 286 MG/DL (ref 200–360)
WBC NRBC COR # BLD: 10.24 10*3/MM3 (ref 3.4–10.8)
WBC NRBC COR # BLD: 9.17 10*3/MM3 (ref 3.4–10.8)
WBC NRBC COR # BLD: 9.36 10*3/MM3 (ref 3.4–10.8)
WBC NRBC COR # BLD: 9.97 10*3/MM3 (ref 3.4–10.8)

## 2023-04-03 PROCEDURE — 94799 UNLISTED PULMONARY SVC/PX: CPT

## 2023-04-03 PROCEDURE — 25010000002 ENOXAPARIN PER 10 MG: Performed by: FAMILY MEDICINE

## 2023-04-03 PROCEDURE — 99231 SBSQ HOSP IP/OBS SF/LOW 25: CPT | Performed by: INTERNAL MEDICINE

## 2023-04-03 PROCEDURE — 0 TECHNETIUM LABELED RED BLOOD CELLS: Performed by: FAMILY MEDICINE

## 2023-04-03 PROCEDURE — 97530 THERAPEUTIC ACTIVITIES: CPT

## 2023-04-03 PROCEDURE — 85027 COMPLETE CBC AUTOMATED: CPT | Performed by: FAMILY MEDICINE

## 2023-04-03 PROCEDURE — 94760 N-INVAS EAR/PLS OXIMETRY 1: CPT

## 2023-04-03 PROCEDURE — 78278 ACUTE GI BLOOD LOSS IMAGING: CPT

## 2023-04-03 PROCEDURE — 99232 SBSQ HOSP IP/OBS MODERATE 35: CPT | Performed by: INTERNAL MEDICINE

## 2023-04-03 PROCEDURE — A9560 TC99M LABELED RBC: HCPCS | Performed by: FAMILY MEDICINE

## 2023-04-03 RX ORDER — LISINOPRIL 2.5 MG/1
2.5 TABLET ORAL
Status: DISCONTINUED | OUTPATIENT
Start: 2023-04-03 | End: 2023-04-06 | Stop reason: HOSPADM

## 2023-04-03 RX ORDER — PEG-3350, SODIUM SULFATE, SODIUM CHLORIDE, POTASSIUM CHLORIDE, SODIUM ASCORBATE AND ASCORBIC ACID 7.5-2.691G
1000 KIT ORAL ONCE
Status: DISCONTINUED | OUTPATIENT
Start: 2023-04-04 | End: 2023-04-04

## 2023-04-03 RX ORDER — CARVEDILOL 3.12 MG/1
3.12 TABLET ORAL 2 TIMES DAILY WITH MEALS
Status: DISCONTINUED | OUTPATIENT
Start: 2023-04-03 | End: 2023-04-06 | Stop reason: HOSPADM

## 2023-04-03 RX ADMIN — ALBUTEROL SULFATE 2.5 MG: 2.5 SOLUTION RESPIRATORY (INHALATION) at 14:21

## 2023-04-03 RX ADMIN — ALBUTEROL SULFATE 2.5 MG: 2.5 SOLUTION RESPIRATORY (INHALATION) at 19:30

## 2023-04-03 RX ADMIN — LEVOTHYROXINE SODIUM 50 MCG: 50 TABLET ORAL at 06:50

## 2023-04-03 RX ADMIN — LORAZEPAM 0.5 MG: 0.5 TABLET ORAL at 08:46

## 2023-04-03 RX ADMIN — TRAMADOL HYDROCHLORIDE 50 MG: 50 TABLET, COATED ORAL at 13:52

## 2023-04-03 RX ADMIN — LORAZEPAM 0.5 MG: 0.5 TABLET ORAL at 21:18

## 2023-04-03 RX ADMIN — ALBUTEROL SULFATE 2.5 MG: 2.5 SOLUTION RESPIRATORY (INHALATION) at 07:39

## 2023-04-03 RX ADMIN — PANTOPRAZOLE SODIUM 40 MG: 40 INJECTION, POWDER, FOR SOLUTION INTRAVENOUS at 08:48

## 2023-04-03 RX ADMIN — DOCUSATE SODIUM 50 MG AND SENNOSIDES 8.6 MG 2 TABLET: 8.6; 5 TABLET, FILM COATED ORAL at 08:47

## 2023-04-03 RX ADMIN — LISINOPRIL 2.5 MG: 2.5 TABLET ORAL at 08:46

## 2023-04-03 RX ADMIN — TRAMADOL HYDROCHLORIDE 50 MG: 50 TABLET, COATED ORAL at 08:46

## 2023-04-03 RX ADMIN — PANTOPRAZOLE SODIUM 40 MG: 40 INJECTION, POWDER, FOR SOLUTION INTRAVENOUS at 17:34

## 2023-04-03 RX ADMIN — TECHNETIUM TC 99M-LABELED RED BLOOD CELLS 1 DOSE: KIT at 11:15

## 2023-04-03 RX ADMIN — ENOXAPARIN SODIUM 30 MG: 100 INJECTION SUBCUTANEOUS at 13:53

## 2023-04-03 RX ADMIN — TRAMADOL HYDROCHLORIDE 50 MG: 50 TABLET, COATED ORAL at 21:18

## 2023-04-03 RX ADMIN — Medication 10 ML: at 08:48

## 2023-04-03 RX ADMIN — DOCUSATE SODIUM 50 MG AND SENNOSIDES 8.6 MG 2 TABLET: 8.6; 5 TABLET, FILM COATED ORAL at 21:12

## 2023-04-03 RX ADMIN — Medication 10 ML: at 21:12

## 2023-04-03 RX ADMIN — CARVEDILOL 3.12 MG: 3.12 TABLET, FILM COATED ORAL at 08:47

## 2023-04-03 NOTE — PROGRESS NOTES
Patient is doing relatively well.  No abdominal pain.  No nausea vomiting.  Tolerating diet without problem.  Had another bowel movement with dark brown stool, but with some dark blood mixed with the stool.  Hemoglobin remains low, but stable - 7.7 at 12:39, up from 7.2 at 4:04 in the morning.    On physical exam: 78-year-old woman who appears to be no acute distress.  Awake, alert and oriented x3.   Vitals:    04/03/23 1501   BP: 99/52   Pulse: 80   Resp: 20   Temp: 97.9 °F (36.6 °C)   SpO2: 100%   Nonicteric sclera and moist oral mucosa.  Abdomen is soft, nondistended, nontender to palpation, with normoactive bowel sounds.    Labs:   Latest Reference Range & Units 04/03/23 00:30 04/03/23 04:04 04/03/23 12:39   WBC 3.40 - 10.80 10*3/mm3 10.24 9.36 9.17   RBC 3.77 - 5.28 10*6/mm3 2.63 (L) 2.41 (L) 2.57 (L)   Hemoglobin 12.0 - 15.9 g/dL 7.9 (L) 7.2 (L) 7.7 (L)   Hematocrit 34.0 - 46.6 % 24.8 (L) 22.7 (L) 25.0 (L)   RDW 12.3 - 15.4 % 14.1 13.8 14.1   MCV 79.0 - 97.0 fL 94.3 94.2 97.3 (H)   MCH 26.6 - 33.0 pg 30.0 29.9 30.0   MCHC 31.5 - 35.7 g/dL 31.9 31.7 30.8 (L)   MPV 6.0 - 12.0 fL 9.3 9.0 8.8   Platelets 140 - 450 10*3/mm3 272 234 277   RDW-SD 37.0 - 54.0 fl 47.8 47.3 49.5      Latest Reference Range & Units 04/01/23 04:48 04/01/23 11:04 04/02/23 05:43   Glucose 65 - 99 mg/dL 81 100 91   Sodium 136 - 145 mmol/L 135 (L)  137   Potassium 3.5 - 5.2 mmol/L 3.8  3.8   CO2 22.0 - 29.0 mmol/L 26.0  27.0   Chloride 98 - 107 mmol/L 99  103   Anion Gap 5.0 - 15.0 mmol/L 10.0  7.0   Creatinine 0.57 - 1.00 mg/dL 0.84  0.84   BUN 8 - 23 mg/dL 35 (H)  31 (H)   BUN/Creatinine Ratio 7.0 - 25.0  41.7 (H)  36.9 (H)   Calcium 8.6 - 10.5 mg/dL 8.7  8.5 (L)   eGFR >60.0 mL/min/1.73 71.7  71.7     A/P: 78-year-old woman with numerous medical problems, including significant peripheral vascular disease, requiring aggressive anticoagulation, currently with hematochezia.  Xarelto and Plavix was discontinued 3 days ago, currently on  Lovenox, with a plan to proceed with colonoscopy on Wednesday, April 5th.  Will initiate clear liquid diet with colonoscopy preparation tomorrow.  We will check prothrombin time/INR in a.m.

## 2023-04-03 NOTE — PROGRESS NOTES
[facility]  Inpatient Progress Note      Date of Admission: 3/31/2023  Date of Service:  04/03/23    Reason for Follow Up: GI bleed    Subjective     Subjective:   Patient lying in bed with  present at bedside.  She is tolerating current diet without difficulty.  No nausea or vomiting.  She is denying abdominal pain.  Bowel movement yesterday described as black with bright red blood around the sides and in the water.      UPPER GI ENDOSCOPY (04/01/2023 10:28)-small clean-based ulcer to antrum    COLONOSCOPY (06/02/2022 12:57)-scope advanced to cecum - diverticulosis, no polyps, no specimen collected      Current Facility-Administered Medications:   •  albuterol (PROVENTIL) nebulizer solution 0.083% 2.5 mg/3mL, 2.5 mg, Nebulization, Q4H - RT, Patric Reyes, , 2.5 mg at 04/03/23 0739  •  sennosides-docusate (PERICOLACE) 8.6-50 MG per tablet 2 tablet, 2 tablet, Oral, BID, 2 tablet at 04/02/23 2119 **AND** polyethylene glycol (MIRALAX) packet 17 g, 17 g, Oral, Daily PRN **AND** bisacodyl (DULCOLAX) EC tablet 5 mg, 5 mg, Oral, Daily PRN **AND** bisacodyl (DULCOLAX) suppository 10 mg, 10 mg, Rectal, Daily PRN, Patric Reyes, DO  •  carvedilol (COREG) tablet 3.125 mg, 3.125 mg, Oral, BID With Meals, Chuy Chong MD  •  Enoxaparin Sodium (LOVENOX) syringe 30 mg, 30 mg, Subcutaneous, Q24H, Andrew Layne MD, 30 mg at 04/02/23 1403  •  levothyroxine (SYNTHROID, LEVOTHROID) tablet 50 mcg, 50 mcg, Oral, Q AM, Patric Reyes DO, 50 mcg at 04/03/23 0650  •  lisinopril (PRINIVIL,ZESTRIL) tablet 2.5 mg, 2.5 mg, Oral, Q24H, Chuy Chong MD  •  LORazepam (ATIVAN) tablet 0.5 mg, 0.5 mg, Oral, Q8H PRN, Patric Reyes W, DO, 0.5 mg at 04/02/23 2119  •  nitroglycerin (NITROSTAT) SL tablet 0.4 mg, 0.4 mg, Sublingual, Q5 Min PRN, Patirc Reyes, DO  •  ondansetron (ZOFRAN) tablet 4 mg, 4 mg, Oral, Q6H PRN **OR** ondansetron (ZOFRAN) injection 4 mg, 4 mg, Intravenous,  Q6H PRN, Patric Reyes W, DO  •  oxyCODONE (ROXICODONE) immediate release tablet 5 mg, 5 mg, Oral, Q4H PRN, Patric Reyes, DO, 5 mg at 04/02/23 2304  •  pantoprazole (PROTONIX) injection 40 mg, 40 mg, Intravenous, BID AC, Patric Reyes, DO, 40 mg at 04/02/23 1709  •  sodium chloride 0.9 % flush 10 mL, 10 mL, Intravenous, Q12H, Patric Reyes, DO, 10 mL at 04/02/23 2207  •  sodium chloride 0.9 % flush 10 mL, 10 mL, Intravenous, PRN, Patric Reyes W, DO  •  sodium chloride 0.9 % infusion 40 mL, 40 mL, Intravenous, PRN, Patric Reyes W, DO  •  sodium chloride 0.9 % infusion, 100 mL/hr, Intravenous, Continuous, Jamil Han, CRNA, Stopped at 04/01/23 1327  •  traMADol (ULTRAM) tablet 50 mg, 50 mg, Oral, Q4H PRN, Patric Reyes, DO, 50 mg at 04/02/23 2119    Review of Systems     Constitution:  negative for chills, positive fatigue and negative fevers  Eyes:  negative for blurriness and change of vision  ENT:   negative for sore throat and voice change  Respiratory: negative for  cough and shortness of air  Cardiovascular:  Negative for chest pain or palpitations  Gastrointestinal:  negative for  See HPI  Genitourinary:  negative for  blood in urine and painful urination  Integument: negative for  rash and redness  Hematologic / Lymphatic: negative for  excessive bleeding and easy bruising  Musculoskeletal: negative for  joint pain and joint stiffness out of the ordinary  Neurological:  negative for  seizures and speech abnormality  Behavioral/Psych:  negative for  anxiety and depression out of the ordinary  Endocrine: negative for  diabetes and weight loss, unintended  Allergies / Immunologic:  negative for  anaphylaxis and rash        Objective     Vital Signs  Temp:  [97.3 °F (36.3 °C)-98.8 °F (37.1 °C)] 97.5 °F (36.4 °C)  Heart Rate:  [65-87] 80  Resp:  [14-18] 18  BP: ()/(38-77) 109/77  Body mass index is 18.12 kg/m².    Intake/Output Summary (Last 24 hours) at 4/3/2023 0848  Last data  filed at 4/2/2023 1802  Gross per 24 hour   Intake 960 ml   Output --   Net 960 ml     No intake/output data recorded.       Physical Exam:   General: patient awake, alert and cooperative   Eyes: Normal lids and lashes, no scleral icterus   Neck: supple, normal ROM   Skin: warm and dry, not jaundiced,    Cardiovascular: regular rhythm and rate, no murmurs auscultated   Pulm: clear to auscultation bilaterally, regular and unlabored   Abdomen: soft, nontender, nondistended; normal bowel sounds   Rectal: deferred   Extremities: no rash or edema   Psychiatric: Normal mood and behavior; memory intact         Results Review:    I have reviewed all of the patients current test results  Results from last 7 days   Lab Units 04/03/23  0404 04/03/23  0030 04/02/23  1813   WBC 10*3/mm3 9.36 10.24 10.87*   HEMOGLOBIN g/dL 7.2* 7.9* 8.0*   HEMATOCRIT % 22.7* 24.8* 24.8*   PLATELETS 10*3/mm3 234 272 289       Results from last 7 days   Lab Units 04/02/23  0543 04/01/23  0448 03/31/23  1730   SODIUM mmol/L 137 135* 134*   POTASSIUM mmol/L 3.8 3.8 4.4   CHLORIDE mmol/L 103 99 96*   CO2 mmol/L 27.0 26.0 25.0   BUN mg/dL 31* 35* 37*   CREATININE mg/dL 0.84 0.84 0.75   CALCIUM mg/dL 8.5* 8.7 9.6   BILIRUBIN mg/dL  --   --  0.3   ALK PHOS U/L  --   --  41   ALT (SGPT) U/L  --   --  19   AST (SGOT) U/L  --   --  22   GLUCOSE mg/dL 91 81 127*             Lab Results   Lab Value Date/Time    LIPASE 72 (H) 03/31/2023 1730       Radiology:    Imaging Results (Last 24 Hours)     ** No results found for the last 24 hours. **            Assessment & Plan     Patient Active Problem List   Diagnosis Code   • Spinal stenosis, cervical region M48.02   • Lung nodules R91.8   • Bronchiectasis without complication (HCC) J47.9   • Underweight R63.6   • Personal history of nicotine dependence Z87.891   • Restrictive lung disease J98.4   • Pulmonary emphysema (HCC) J43.9   • PAD (peripheral artery disease) ([facility]) I73.9   • Preop testing Z01.818   •  Gastroesophageal reflux disease K21.9   • Allergic rhinitis J30.9   • ORTIZ (dyspnea on exertion) R06.09   • CAD (coronary artery disease) I25.10   • Hyperlipidemia E78.5   • Neuropathy G62.9   • Hypertension I10   • Simple chronic bronchitis (HCC) J41.0   • Former smoker Z87.891   • Lung nodule R91.1   • Mass of right lung R91.8   • Postoperative anemia due to acute blood loss D62   • Syncope and collapse R55   • Adenocarcinoma C80.1   • Personal history of malignant neoplasm of bronchus and lung Z85.118   • Hydropneumothorax J94.8   • Lower extremity embolism I74.3   • Generalized muscle weakness M62.81   • Acute blood loss anemia D62   • Melena K92.1   • Urinary incontinence R32       Impression/Plan    Anemia, acute GI blood loss  Gastric ulcer  Plavix/Xarelto-on hold since Friday, March 31, 2023      Plavix and Xarelto are currently on hold, patient is receiving Lovenox.  It is unclear at this time if Dr. Dunn (vascular surgeon from [place]) was able to be contacted yesterday by Dr. Santizo.  Hemoglobin has trended downward.  Continue to monitor H&H, continue IV PPI twice daily.  At this time she has a tentative colonoscopy planned for Wednesday.  There is question if anemia/bleeding result of AVMs.  Dr Kearns has taken over GI services as of this morning.  I have spoken to Dr Santizo and he was not able to speak to Dr Dunn (Vascular Surgeon [place]-contact info included in note dated 4/2/23)  regarding Trinidad Zhu Plavix/Xarelto.        Further recommendations pending patient progress as well as results of ordered tests.     Electronically signed by GEREMIAS Palomino, 04/03/23, 8:32 AM CDT.       GEREMIAS Palomino  04/03/23  08:44 CDT

## 2023-04-03 NOTE — PLAN OF CARE
Goal Outcome Evaluation:  Plan of Care Reviewed With: patient        Progress: no change  Outcome Evaluation: No changes. Most recent hgb 7.7. Plans in place for colonoscopy on Wednesday. Coreg/Lisinopril adjusted d/t low BP. S/St  per tele. Ultram and Ativan given prn. BM x3 today. Cont to monitor.

## 2023-04-03 NOTE — PLAN OF CARE
Goal Outcome Evaluation:              Outcome Evaluation: NTN assessment. Modified diet from mechanical ground to GI soft/low irritant for MNT of suspected GI bleed. Average  mL oral fluid and 88% of meals. Wt loss of 6.2lb since October 2022. Current weight 102.3 lb, BMI 18.12 kg/m&^2. Will perform NFPE when able. Anticipate colonoscopy 4/5. NTN following per protocol.

## 2023-04-03 NOTE — PROGRESS NOTES
Daily Progress Note  Trinidad Zhu  MRN: [id number] LOS: 2    Admit Date: 3/31/2023   4/3/2023 07:16 CDT    Subjective:          Chief Complaint:  Weakness, dyspnea    Interval History:    Reviewed overnight events and nursing notes.   Overall stable, no events overnight.  Hemoglobin down trended slightly.    Review of Systems   Constitutional: Positive for activity change and fatigue. Negative for fever.   Respiratory: Positive for shortness of breath. Negative for wheezing.    Gastrointestinal: Positive for anal bleeding and blood in stool. Negative for abdominal pain, nausea and vomiting.   Skin: Positive for pallor.   Neurological: Positive for weakness.       DIET:  Diet: Cardiac Diets; Healthy Heart (2-3 Na+); Texture: Mechanical Ground (NDD 2); Fluid Consistency: Thin (IDDSI 0)    Medications:   sodium chloride, 100 mL/hr, Last Rate: Stopped (04/01/23 1327)      albuterol, 2.5 mg, Nebulization, Q4H - RT  carvedilol, 12.5 mg, Oral, BID With Meals  enoxaparin, 30 mg, Subcutaneous, Q24H  hydroCHLOROthiazide, 12.5 mg, Oral, Daily  levothyroxine, 50 mcg, Oral, Q AM  lisinopril, 10 mg, Oral, Q24H  pantoprazole, 40 mg, Intravenous, BID AC  senna-docusate sodium, 2 tablet, Oral, BID  sodium chloride, 10 mL, Intravenous, Q12H        Data:     Code Status:   Code Status and Medical Interventions:   Ordered at: 03/31/23 1623     Level Of Support Discussed With:    Patient     Code Status (Patient has no pulse and is not breathing):    CPR (Attempt to Resuscitate)     Medical Interventions (Patient has pulse or is breathing):    Full Support     Release to patient:    Routine Release       Family History   Problem Relation Age of Onset   • Breast cancer Maternal Aunt    • Heart disease Mother    • Heart disease Father    • GI problems Neg Hx         MALIGNANCIES   • Colon cancer Neg Hx    • Colon polyps Neg Hx      Social History     Socioeconomic History   • Marital status:    Tobacco Use   • Smoking  status: Former     Packs/day: 1.50     Years: 20.00     Pack years: 30.00     Types: Cigarettes     Quit date:      Years since quittin.2   • Smokeless tobacco: Never   Vaping Use   • Vaping Use: Never used   Substance and Sexual Activity   • Alcohol use: Yes     Comment: occasionally   • Drug use: No   • Sexual activity: Not Currently       Labs:    Lab Results (last 72 hours)     Procedure Component Value Units Date/Time    Basic Metabolic Panel [[id number]]  (Abnormal) Collected: 23    Specimen: Blood Updated: 23 0611     Glucose 91 mg/dL      BUN 31 mg/dL      Creatinine 0.84 mg/dL      Sodium 137 mmol/L      Potassium 3.8 mmol/L      Chloride 103 mmol/L      CO2 27.0 mmol/L      Calcium 8.5 mg/dL      BUN/Creatinine Ratio 36.9     Anion Gap 7.0 mmol/L      eGFR 71.7 mL/min/1.73     Narrative:      GFR Normal >60  Chronic Kidney Disease <60  Kidney Failure <15    The GFR formula is only valid for adults with stable renal function between ages 18 and 70.    CBC (No Diff) [[id number]]  (Abnormal) Collected: 23    Specimen: Blood Updated: 23 0551     WBC 8.85 10*3/mm3      RBC 2.56 10*6/mm3      Hemoglobin 7.9 g/dL      Hematocrit 24.0 %      MCV 93.8 fL      MCH 30.9 pg      MCHC 32.9 g/dL      RDW 13.7 %      RDW-SD 46.6 fl      MPV 9.0 fL      Platelets 236 10*3/mm3     CBC (No Diff) [[id number]]  (Abnormal) Collected: 234    Specimen: Blood Updated: 23 2348     WBC 10.00 10*3/mm3      RBC 2.37 10*6/mm3      Hemoglobin 7.1 g/dL      Hematocrit 22.2 %      MCV 93.7 fL      MCH 30.0 pg      MCHC 32.0 g/dL      RDW 13.9 %      RDW-SD 46.9 fl      MPV 9.2 fL      Platelets 221 10*3/mm3     CBC (No Diff) [[id number]]  (Abnormal) Collected: 23 1845    Specimen: Blood Updated: 23 1907     WBC 10.29 10*3/mm3      RBC 2.48 10*6/mm3      Hemoglobin 7.5 g/dL      Hematocrit 23.3 %      MCV 94.0 fL      MCH 30.2 pg      MCHC 32.2 g/dL      RDW 13.7 %       RDW-SD 47.0 fl      MPV 9.2 fL      Platelets 249 10*3/mm3     Blood Culture - Blood, Arm, Right [[id number]]  (Normal) Collected: 03/31/23 1730    Specimen: Blood from Arm, Right Updated: 04/01/23 1816     Blood Culture No growth at 24 hours    Blood Culture - Blood, Arm, Left [[id number]]  (Normal) Collected: 03/31/23 1730    Specimen: Blood from Arm, Left Updated: 04/01/23 1816     Blood Culture No growth at 24 hours    Occult Blood X 1, Stool - Stool, Per Rectum [[id number]]  (Abnormal) Collected: 04/01/23 1537    Specimen: Stool from Per Rectum Updated: 04/01/23 1548     Fecal Occult Blood Positive    CBC (No Diff) [[id number]]  (Abnormal) Collected: 04/01/23 1245    Specimen: Blood Updated: 04/01/23 1308     WBC 9.41 10*3/mm3      RBC 2.31 10*6/mm3      Hemoglobin 7.3 g/dL      Hematocrit 21.5 %      MCV 93.1 fL      MCH 31.6 pg      MCHC 34.0 g/dL      RDW 13.5 %      RDW-SD 46.4 fl      MPV 9.1 fL      Platelets 212 10*3/mm3     POC Glucose Once [[id number]]  (Normal) Collected: 04/01/23 1104    Specimen: Blood Updated: 04/01/23 1119     Glucose 100 mg/dL      Comment: : [id number] Jennifer[facility]: [id number]       Basic Metabolic Panel [[id number]]  (Abnormal) Collected: 04/01/23 0448    Specimen: Blood Updated: 04/01/23 0543     Glucose 81 mg/dL      BUN 35 mg/dL      Creatinine 0.84 mg/dL      Sodium 135 mmol/L      Potassium 3.8 mmol/L      Chloride 99 mmol/L      CO2 26.0 mmol/L      Calcium 8.7 mg/dL      BUN/Creatinine Ratio 41.7     Anion Gap 10.0 mmol/L      eGFR 71.7 mL/min/1.73     Narrative:      GFR Normal >60  Chronic Kidney Disease <60  Kidney Failure <15    The GFR formula is only valid for adults with stable renal function between ages 18 and 70.    CBC & Differential [[id number]]  (Abnormal) Collected: 04/01/23 0448    Specimen: Blood Updated: 04/01/23 0529    Narrative:      The following orders were created for panel order CBC & Differential.  Procedure                                Abnormality         Status                     ---------                               -----------         ------                     CBC Auto Differential[[id number]]        Abnormal            Final result                 Please view results for these tests on the individual orders.    CBC Auto Differential [[id number]]  (Abnormal) Collected: 04/01/23 0448    Specimen: Blood Updated: 04/01/23 0529     WBC 10.04 10*3/mm3      RBC 2.61 10*6/mm3      Hemoglobin 7.9 g/dL      Hematocrit 24.6 %      MCV 94.3 fL      MCH 30.3 pg      MCHC 32.1 g/dL      RDW 13.1 %      RDW-SD 44.8 fl      MPV 9.3 fL      Platelets 246 10*3/mm3      Neutrophil % 58.5 %      Lymphocyte % 25.1 %      Monocyte % 14.8 %      Eosinophil % 1.0 %      Basophil % 0.1 %      Immature Grans % 0.5 %      Neutrophils, Absolute 5.87 10*3/mm3      Lymphocytes, Absolute 2.52 10*3/mm3      Monocytes, Absolute 1.49 10*3/mm3      Eosinophils, Absolute 0.10 10*3/mm3      Basophils, Absolute 0.01 10*3/mm3      Immature Grans, Absolute 0.05 10*3/mm3      nRBC 0.0 /100 WBC     Myoglobin, Serum [[id number]]  (Normal) Collected: 03/31/23 1730    Specimen: Blood Updated: 04/01/23 0143     Myoglobin 41.7 ng/mL     Narrative:      Results may be falsely decreased if patient taking Biotin.      Urinalysis With Culture If Indicated - Urine, Clean Catch [[id number]]  (Abnormal) Collected: 03/31/23 2125    Specimen: Urine, Clean Catch Updated: 03/31/23 2140     Color, UA Yellow     Appearance, UA Clear     pH, UA 6.0     Specific Gravity, UA 1.018     Glucose, UA Negative     Ketones, UA Negative     Bilirubin, UA Negative     Blood, UA Negative     Protein, UA Negative     Leuk Esterase, UA Trace     Nitrite, UA Negative     Urobilinogen, UA 0.2 E.U./dL    Narrative:      In absence of clinical symptoms, the presence of pyuria, bacteria, and/or nitrites on the urinalysis result does not correlate with infection.    Urinalysis, Microscopic Only - Urine, Clean Catch  [[id number]]  (Abnormal) Collected: 03/31/23 2125    Specimen: Urine, Clean Catch Updated: 03/31/23 2140     RBC, UA 0-2 /HPF      WBC, UA 3-5 /HPF      Comment: Urine culture not indicated.        Bacteria, UA None Seen /HPF      Squamous Epithelial Cells, UA 0-2 /HPF      Hyaline Casts, UA 0-2 /LPF      Methodology Automated Microscopy    High Sensitivity Troponin T 2Hr [[id number]]  (Abnormal) Collected: 03/31/23 1923    Specimen: Blood Updated: 03/31/23 2000     HS Troponin T 16 ng/L      Troponin T Delta -2 ng/L     Narrative:      High Sensitive Troponin T Reference Range:  <10.0 ng/L- Negative Female for AMI  <15.0 ng/L- Negative Male for AMI  >=10 - Abnormal Female indicating possible myocardial injury.  >=15 - Abnormal Male indicating possible myocardial injury.   Clinicians would have to utilize clinical acumen, EKG, Troponin, and serial changes to determine if it is an Acute Myocardial Infarction or myocardial injury due to an underlying chronic condition.         TSH [[id number]]  (Normal) Collected: 03/31/23 1730    Specimen: Blood Updated: 03/31/23 1832     TSH 3.090 uIU/mL     T4, Free [[id number]]  (Normal) Collected: 03/31/23 1730    Specimen: Blood Updated: 03/31/23 1831     Free T4 1.14 ng/dL     Narrative:      Results may be falsely increased if patient taking Biotin.      COVID PRE-OP / PRE-PROCEDURE SCREENING ORDER (NO ISOLATION) - Swab, Nasopharynx [[id number]]  (Normal) Collected: 03/31/23 1724    Specimen: Swab from Nasopharynx Updated: 03/31/23 1829    Narrative:      The following orders were created for panel order COVID PRE-OP / PRE-PROCEDURE SCREENING ORDER (NO ISOLATION) - Swab, Nasopharynx.  Procedure                               Abnormality         Status                     ---------                               -----------         ------                     COVID-19 and FLU A/B PCR...[[id number]]  Normal              Final result                 Please view results for these tests on  "the individual orders.    COVID-19 and FLU A/B PCR - Swab, Nasopharynx [[id number]]  (Normal) Collected: 03/31/23 1724    Specimen: Swab from Nasopharynx Updated: 03/31/23 1829     COVID19 Not Detected     Influenza A PCR Not Detected     Influenza B PCR Not Detected    Narrative:      Fact sheet for providers: https://www.fda.gov/media/326888/download    Fact sheet for patients: https://www.fda.gov/media/015988/download    Test performed by PCR.    Procalcitonin [[id number]]  (Normal) Collected: 03/31/23 1730    Specimen: Blood Updated: 03/31/23 1828     Procalcitonin 0.06 ng/mL     Narrative:      As a Marker for Sepsis (Non-Neonates):    1. <0.5 ng/mL represents a low risk of severe sepsis and/or septic shock.  2. >2 ng/mL represents a high risk of severe sepsis and/or septic shock.    As a Marker for Lower Respiratory Tract Infections that require antibiotic therapy:    PCT on Admission    Antibiotic Therapy       6-12 Hrs later    >0.5                Strongly Recommended  >0.25 - <0.5        Recommended   0.1 - 0.25          Discouraged              Remeasure/reassess PCT  <0.1                Strongly Discouraged     Remeasure/reassess PCT    As 28 day mortality risk marker: \"Change in Procalcitonin Result\" (>80% or <=80%) if Day 0 (or Day 1) and Day 4 values are available. Refer to http://www.[employer]-pct-calculator.com    Change in PCT <=80%  A decrease of PCT levels below or equal to 80% defines a positive change in PCT test result representing a higher risk for 28-day all-cause mortality of patients diagnosed with severe sepsis for septic shock.    Change in PCT >80%  A decrease of PCT levels of more than 80% defines a negative change in PCT result representing a lower risk for 28-day all-cause mortality of patients diagnosed with severe sepsis or septic shock.       Lipid Panel [[id number]] Collected: 03/31/23 1730    Specimen: Blood Updated: 03/31/23 1828     Total Cholesterol 109 mg/dL      Triglycerides 97 " mg/dL      HDL Cholesterol 60 mg/dL      LDL Cholesterol  31 mg/dL      VLDL Cholesterol 18 mg/dL      LDL/HDL Ratio 0.49    Narrative:      Cholesterol Reference Ranges  (U.S. Department of Health and Human Services ATP III Classifications)    Desirable          <200 mg/dL  Borderline High    200-239 mg/dL  High Risk          >240 mg/dL      Triglyceride Reference Ranges  (U.S. Department of Health and Human Services ATP III Classifications)    Normal           <150 mg/dL  Borderline High  150-199 mg/dL  High             200-499 mg/dL  Very High        >500 mg/dL    HDL Reference Ranges  (U.S. Department of Health and Human Services ATP III Classifications)    Low     <40 mg/dl (major risk factor for CHD)  High    >60 mg/dl ('negative' risk factor for CHD)        LDL Reference Ranges  (U.S. Department of Health and Human Services ATP III Classifications)    Optimal          <100 mg/dL  Near Optimal     100-129 mg/dL  Borderline High  130-159 mg/dL  High             160-189 mg/dL  Very High        >189 mg/dL    C-reactive Protein [[id number]]  (Normal) Collected: 03/31/23 1730    Specimen: Blood Updated: 03/31/23 1828     C-Reactive Protein <0.30 mg/dL     Comprehensive Metabolic Panel [[id number]]  (Abnormal) Collected: 03/31/23 1730    Specimen: Blood Updated: 03/31/23 1826     Glucose 127 mg/dL      BUN 37 mg/dL      Creatinine 0.75 mg/dL      Sodium 134 mmol/L      Potassium 4.4 mmol/L      Chloride 96 mmol/L      CO2 25.0 mmol/L      Calcium 9.6 mg/dL      Total Protein 6.3 g/dL      Albumin 4.3 g/dL      ALT (SGPT) 19 U/L      AST (SGOT) 22 U/L      Alkaline Phosphatase 41 U/L      Total Bilirubin 0.3 mg/dL      Globulin 2.0 gm/dL      A/G Ratio 2.2 g/dL      BUN/Creatinine Ratio 49.3     Anion Gap 13.0 mmol/L      eGFR 82.1 mL/min/1.73     Narrative:      GFR Normal >60  Chronic Kidney Disease <60  Kidney Failure <15    The GFR formula is only valid for adults with stable renal function between ages 18 and 70.     CK [[id number]]  (Normal) Collected: 03/31/23 1730    Specimen: Blood Updated: 03/31/23 1826     Creatine Kinase 41 U/L     Lactic Acid, Plasma [[id number]]  (Normal) Collected: 03/31/23 1730    Specimen: Blood Updated: 03/31/23 1824     Lactate 1.7 mmol/L     Phosphorus [[id number]]  (Abnormal) Collected: 03/31/23 1730    Specimen: Blood Updated: 03/31/23 1823     Phosphorus 5.2 mg/dL     BNP [[id number]]  (Normal) Collected: 03/31/23 1730    Specimen: Blood Updated: 03/31/23 1822     proBNP 274.8 pg/mL     Narrative:      Among patients with dyspnea, NT-proBNP is highly sensitive for the detection of acute congestive heart failure. In addition NT-proBNP of <300 pg/ml effectively rules out acute congestive heart failure with 99% negative predictive value.    Results may be falsely decreased if patient taking Biotin.      High Sensitivity Troponin T [[id number]]  (Abnormal) Collected: 03/31/23 1730    Specimen: Blood Updated: 03/31/23 1821     HS Troponin T 18 ng/L     Narrative:      High Sensitive Troponin T Reference Range:  <10.0 ng/L- Negative Female for AMI  <15.0 ng/L- Negative Male for AMI  >=10 - Abnormal Female indicating possible myocardial injury.  >=15 - Abnormal Male indicating possible myocardial injury.   Clinicians would have to utilize clinical acumen, EKG, Troponin, and serial changes to determine if it is an Acute Myocardial Infarction or myocardial injury due to an underlying chronic condition.         Lipase [[id number]]  (Abnormal) Collected: 03/31/23 1730    Specimen: Blood Updated: 03/31/23 1821     Lipase 72 U/L     Magnesium [[id number]]  (Normal) Collected: 03/31/23 1730    Specimen: Blood Updated: 03/31/23 1820     Magnesium 1.8 mg/dL     D-dimer, Quantitative [[id number]]  (Normal) Collected: 03/31/23 1730    Specimen: Blood Updated: 03/31/23 1814     D-Dimer, Quantitative 0.53 MCGFEU/mL     Narrative:      According to the assay 's published package insert, a normal (<0.50  "MCGFEU/mL) D-dimer result in conjunction with a non-high clinical probability assessment, excludes deep vein thrombosis (DVT) and pulmonary embolism (PE) with high sensitivity.    D-dimer values increase with age and this can make VTE exclusion of an older population difficult. To address this, the [employer], based on best available evidence and recent guidelines, recommends that clinicians use age-adjusted D-dimer thresholds in patients greater than 50 years of age with: a) a low probability of PE who do not meet all Pulmonary Embolism Rule Out Criteria, or b) in those with intermediate probability of PE.   The formula for an age-adjusted D-dimer cut-off is \"age/100\".  For example, a 60 year old patient would have an age-adjusted cut-off of 0.60 MCGFEU/mL and an 80 year old 0.80 MCGFEU/mL.    Sedimentation Rate [[id number]]  (Normal) Collected: 03/31/23 1730    Specimen: Blood Updated: 03/31/23 1813     Sed Rate 3 mm/hr     CBC Auto Differential [[id number]]  (Abnormal) Collected: 03/31/23 1730    Specimen: Blood Updated: 03/31/23 1809     WBC 13.74 10*3/mm3      RBC 1.97 10*6/mm3      Hemoglobin 5.9 g/dL      Hematocrit 18.7 %      MCV 94.9 fL      MCH 29.9 pg      MCHC 31.6 g/dL      RDW 13.4 %      RDW-SD 46.4 fl      MPV 9.4 fL      Platelets 370 10*3/mm3      Neutrophil % 81.0 %      Lymphocyte % 11.6 %      Monocyte % 6.8 %      Eosinophil % 0.1 %      Basophil % 0.0 %      Immature Grans % 0.5 %      Neutrophils, Absolute 11.12 10*3/mm3      Lymphocytes, Absolute 1.60 10*3/mm3      Monocytes, Absolute 0.94 10*3/mm3      Eosinophils, Absolute 0.01 10*3/mm3      Basophils, Absolute 0.00 10*3/mm3      Immature Grans, Absolute 0.07 10*3/mm3      nRBC 0.1 /100 WBC             Objective:     Vitals: BP 99/50 (BP Location: Left arm, Patient Position: Lying)   Pulse 78   Temp 97.3 °F (36.3 °C) (Oral)   Resp 16   Ht 160 cm (63\")   Wt 46.4 kg (102 lb 4.8 oz)   SpO2 99%   BMI 18.12 kg/m²  "     Intake/Output Summary (Last 24 hours) at 4/3/2023 0716  Last data filed at 2023 1802  Gross per 24 hour   Intake 960 ml   Output --   Net 960 ml    Temp (24hrs), Av.1 °F (36.7 °C), Min:97.3 °F (36.3 °C), Max:98.8 °F (37.1 °C)      Physical Exam  Vitals reviewed.   Constitutional:       Appearance: She is ill-appearing.   HENT:      Head: Normocephalic and atraumatic.   Eyes:      General:         Right eye: No discharge.         Left eye: No discharge.      Extraocular Movements: Extraocular movements intact.      Conjunctiva/sclera: Conjunctivae normal.   Cardiovascular:      Rate and Rhythm: Normal rate and regular rhythm.      Pulses: Normal pulses.      Heart sounds: No murmur heard.  Pulmonary:      Effort: Pulmonary effort is normal. No respiratory distress.      Breath sounds: No wheezing.   Abdominal:      General: Abdomen is flat. Bowel sounds are normal. There is no distension.   Musculoskeletal:      Right lower leg: No edema.      Left lower leg: No edema.   Skin:     Capillary Refill: Capillary refill takes less than 2 seconds.   Neurological:      General: No focal deficit present.      Mental Status: She is alert and oriented to person, place, and time.   Psychiatric:         Mood and Affect: Mood normal.         Behavior: Behavior normal.             Assessment and Plan:     Primary Problem:  Generalized muscle weakness    Hospital Problem list:    Generalized muscle weakness    Acute blood loss anemia    Melena    Urinary incontinence      PMH:  Past Medical History:   Diagnosis Date   • Antral ulcer    • Bladder cystocele    • C. difficile diarrhea    • CAD (coronary artery disease)    • Cancer     lung    • Colon polyp    • COPD (chronic obstructive pulmonary disease)    • COVID-19 vaccine series completed    • Diarrhea    • Difficulty swallowing    • Disease of thyroid gland    • Diverticulosis    • Elevated cholesterol    • Gastritis    • Generalized OA    • GERD  (gastroesophageal reflux disease)    • History of bladder surgery 01/07/2020   • History of transfusion     after lung surgery 2021   • Hyperlipidemia    • Hypertension    • Liver cyst    • Lung nodule    • Microscopic colitis    • Multiple gastric ulcers     last 5 years ago   • Neck pain    • Neuropathy    • Normal body mass index    • NSAID induced gastritis    • Ulcer of pyloric antrum     UNSPECIFIED ULCER CHRONICITY   • UTI (urinary tract infection)    • Weight loss        Treatment Plan:    Anemia: Acutely noted hemoglobin 5.9.  Status post 2 unit PRBC, improved to 7.9.  Does report black stool.    EGD with nonbleeding gastric ulcer, not source of bleeding.  On Plavix, low-dose Xarelto, aspirin, had femoropopliteal bypass in November 2022.  Anticoagulation held due to GI bleed.  -Echo with EF 70%, overall normal  -Positive FOBT  -Hemoglobin downtrending after transfusion, continue every 6 hours CBC  -[id number] score 5, bleeding rate 12.3%/year. HAS-BLED score 3, bleeding rate 3.74 %/year.  -We will wait recommendations from vascular surgery.  Dr. Santizo with GI has stated that he will call vascular surgeon in [place] so I will hold off on doing so for now.  Expect she can at least discontinue low-dose Xarelto and stay on dual antiplatelet.  -Tagged red blood cells study ordered by Dr. Wilson.  -Plan for colonoscopy Wednesday    Peripheral vascular disease  Status post femoropopliteal bypass.  Holding anticoagulation and antiplatelet at this time.  Awaiting recommendations from vascular surgery in [place].     Generalized weakness: PT/OT.     Melena: As above     CODE STATUS: Full     DVT prophylaxis: Held due to concern for GI bleed     Disposition: Inpatient    Reviewed treatment plans with the patient and/or family.   30 minutes spent in face to face interaction and coordination of care.     Electronically signed by Chuy Chong MD on 4/3/2023 at 07:16 CDT

## 2023-04-03 NOTE — THERAPY TREATMENT NOTE
Patient Name: Trinidad Zhu  : 1945    MRN: [id number]                              Today's Date: 4/3/2023       Admit Date: 3/31/2023    Visit Dx: Therapist utilized gait belt, applied non-slipped socks, provided fall risk education/prevention, & facilitated muscle strengthening PRN to reduce patient falls risk during this session.      ICD-10-CM ICD-9-CM   1. Acute blood loss anemia  D62 285.1   2. Impaired mobility  Z74.09 799.89     Patient Active Problem List   Diagnosis   • Spinal stenosis, cervical region   • Lung nodules   • Bronchiectasis without complication (HCC)   • Underweight   • Personal history of nicotine dependence   • Restrictive lung disease   • Pulmonary emphysema (HCC)   • P[facility] (peripheral artery disease) ([facility])   • Preop testing   • Gastroesophageal reflux disease   • Allergic rhinitis   • ORTIZ (dyspnea on exertion)   • CAD (coronary artery disease)   • Hyperlipidemia   • Neuropathy   • Hypertension   • Simple chronic bronchitis (HCC)   • Former smoker   • Lung nodule   • Mass of right lung   • Postoperative anemia due to acute blood loss   • Syncope and collapse   • Adenocarcinoma   • Personal history of malignant neoplasm of bronchus and lung   • Hydropneumothorax   • Lower extremity embolism   • Generalized muscle weakness   • Acute blood loss anemia   • Melena   • Urinary incontinence     Past Medical History:   Diagnosis Date   • Antral ulcer    • Bladder cystocele    • C. difficile diarrhea    • CAD (coronary artery disease)    • Cancer     lung    • Colon polyp    • COPD (chronic obstructive pulmonary disease)    • COVID-19 vaccine series completed    • Diarrhea    • Difficulty swallowing    • Disease of thyroid gland    • Diverticulosis    • Elevated cholesterol    • Gastritis    • Generalized OA    • GERD (gastroesophageal reflux disease)    • History of bladder surgery 2020   • History of transfusion     after lung surgery    • Hyperlipidemia    • Hypertension     • Liver cyst    • Lung nodule    • Microscopic colitis    • Multiple gastric ulcers     last 5 years ago   • Neck pain    • Neuropathy    • Normal body mass index    • NSAID induced gastritis    • Ulcer of pyloric antrum     UNSPECIFIED ULCER CHRONICITY   • UTI (urinary tract infection)    • Weight loss      Past Surgical History:   Procedure Laterality Date   • ANTERIOR CERVICAL DISCECTOMY W/ FUSION N/A 5/22/2017    Procedure: CERVICAL DISCECTOMY ANTERIOR FUSION WITH INSTRUMENTATION;  Surgeon: NADINE Sarabia MD;  Location:  [facility] OR;  Service:    • AORTAGRAM Left 11/9/2020    Procedure: left lower extremtiy angiogram, hawk athrectomy, balloon angioplasty, stent placement, mynx closure;  Surgeon: Sukhdev Condon DO;  Location:  [facility] HYBRID OR 12;  Service: Vascular;  Laterality: Left;   • AORTAGRAM Left 3/26/2021    Procedure: LEFT LOWER EXTREMITY ANGIOGRAM, BALLOON ANGIOPLASTY, STENT PLACEMENT, MYNX CLOSURE;  Surgeon: Sukhdev Condon DO;  Location:  [facility] HYBRID OR 12;  Service: Vascular;  Laterality: Left;   • AORTAGRAM Left 8/6/2021    Procedure: LEFT LOWER EXTREMITY ANGIOGRAM, HAWK ATHERECTOMY, BALLOON ANGIOPLASTY, MYNX CLOSURE;  Surgeon: Sukhdev Condon DO;  Location:  [facility] HYBRID OR 12;  Service: Vascular;  Laterality: Left;   • AORTAGRAM Right 6/8/2022    Procedure: RIGHT LOWER EXTREMITY ANGIOGRAM, INTRAVASCULAR LITHOTRIPSY, HAWK ATHRECTOMY, BALLOON ANGIOPLASTY, MYNX CLOSURE;  Surgeon: Sukhdev Condon DO;  Location:  [facility] HYBRID OR 12;  Service: Vascular;  Laterality: Right;   • AORTAGRAM Left 10/21/2022    Procedure: LEFT LOWER EXTREMITY ANGIOGRAM WITH HAWK ATHRECTOMY, BALLOON ANGIOPLASTY, STENT PLACEMENT, MYNX CLOSURE;  Surgeon: Sukhdev Condon DO;  Location:  [facility] HYBRID OR 12;  Service: Vascular;  Laterality: Left;   • BLADDER SUSPENSION      also had pelvic reconstructive surgery   • BREAST EXCISIONAL BIOPSY Right 1985   • CATARACT EXTRACTION, BILATERAL     • CERVICAL  CORPECTOMY N/A 5/22/2017    Procedure:  ANTERIOR CERVICAL DISCECTOMY FUSION C-6 to T1,  ANTERIOR FUSION WITH INSTRUMENTATION C-5 T-1;  Surgeon: NADINE Sarabia MD;  Location: [facility] OR;  Service:    • COLONOSCOPY  08/19/2015    TIC BX RT COLON   • COLONOSCOPY  12/04/2013    RT COLON BX RECALL 5YR   • COLONOSCOPY N/A 11/29/2016    The entire examined colon is normal; No specimens collected   • COLONOSCOPY N/A 6/2/2022    Procedure: COLONOSCOPY WITH ANESTHESIA;  Surgeon: Marco Antonio Vallejo MD;  Location: [facility] ENDOSCOPY;  Service: Gastroenterology;  Laterality: N/A;  Pre: screen  Post: diverticulosis  Andrew Ty MD   • ENDOSCOPY  12/03/2015    HEALED ULCER SLANT BX   • ENDOSCOPY N/A 4/1/2023    Procedure: ESOPHAGOGASTRODUODENOSCOPY WITH ANESTHESIA;  Surgeon: Patric Reyes DO;  Location: [facility] OR;  Service: Gastroenterology;  Laterality: N/A;  PRE GI BLEED  POST gastric ulcers  DR LINN TY   • FEMORAL ENDARTERECTOMY Left 10/21/2022    Procedure: LEFT LOWER EXTREMITY ANGIOGRAM;  Surgeon: Sukhdev Condon DO;  Location: [facility] HYBRID OR 12;  Service: Vascular;  Laterality: Left;   • HYSTERECTOMY     • LEG THROMBECTOMY/EMBOLECTOMY Left 6/8/2022    Procedure: LOWER EXTREMITY THROMBECTOMY/EMBOLECTOMY, PATCH GRAFT TO  FEMORAL ARTERY;  Surgeon: Sukhdev Condon DO;  Location: [facility] HYBRID OR 12;  Service: Vascular;  Laterality: Left;   • LOBECTOMY Right 11/5/2021    Procedure: RIGHT THORACOSCOPY WITH DAVINCI ROBOT, RIGHT UPPER LOBE LOBECTOMY;  Surgeon: Jarad Ignacio MD;  Location: [facility] OR;  Service: Robotics - DaVinci;  Laterality: Right;   • LUNG SURGERY     • OTHER SURGICAL HISTORY      KNEE CARTILAGE REMOVED   • OTHER SURGICAL HISTORY      BENIGN FIBROID TUMOR OF BREAST 1985 REMOVED   • TONSILLECTOMY        General Information     Row Name 04/03/23 1423          OT Time and Intention    Document Type therapy note (daily note)  -MT     Mode of Treatment occupational therapy  -MT     Row Name  "04/03/23 1423          General Information    Patient Profile Reviewed yes  -MT     Existing Precautions/Restrictions fall  -MT     Row Name 04/03/23 1423          Cognition    Orientation Status (Cognition) oriented x 4  -MT     Row Name 04/03/23 1423          Safety Issues, Functional Mobility    Impairments Affecting Function (Mobility) endurance/activity tolerance;shortness of breath;strength;pain  -MT           User Key  (r) = Recorded By, (t) = Taken By, (c) = Cosigned By    Initials Name Provider Type    MT Roseann Escobar COTA Occupational Therapist Assistant                 Mobility/ADL's     Row Name 04/03/23 1424          Bed Mobility    Bed Mobility sit-supine  -MT     Supine-Sit [place] (Bed Mobility) supervision  -MT     Sit-Supine [place] (Bed Mobility) supervision  -MT     Assistive Device (Bed Mobility) bed rails;head of bed elevated  -MT     Row Name 04/03/23 1424          Transfers    Transfers sit-stand transfer;stand-sit transfer  -MT     Row Name 04/03/23 1424          Sit-Stand Transfer    Sit-Stand [place] (Transfers) standby assist  -MT     Row Name 04/03/23 1424          Stand-Sit Transfer    Stand-Sit [place] (Transfers) standby assist  -MT     Row Name 04/03/23 1424          Functional Mobility    Functional Mobility- Ind. Level contact guard assist  -MT     Functional Mobility- Comment Pt with increased SOA and WOB after 20-30 seconds in standing. Reports leaning over is only thing to ease SOA when in standing. Pt required multiple trials of standing prior to ability to perform short distance in room 1x. Pt sits EOB with minimal SOA, Moderate-Maximal SOA standing. O2 WNL. Pt with low endurance and deconditioned. Pt reports able to perform AROM ther ex, recommended in bed/EOB d/t SOA. Recommend assist with all fxl t/fs d/t fall risk. Pt reports \"I will be better when my blood goes up\" Hemoglobin 7.9 this date.  -MT     Row Name 04/03/23 1424          Activities of " Daily Living    BADL Assessment/Intervention lower body dressing  -MT     Row Name 04/03/23 1424          Lower Body Dressing Assessment/Training    [place] Level (Lower Body Dressing) don;socks;shoes/slippers;supervision  -MT     Position (Lower Body Dressing) edge of bed sitting  -MT     Comment, (Lower Body Dressing) declined need for bathing d/t completion yesterday per her report  -MT           User Key  (r) = Recorded By, (t) = Taken By, (c) = Cosigned By    Initials Name Provider Type    Roseann Thomas COTA Occupational Therapist Assistant               Obj/Interventions    No documentation.                Goals/Plan    No documentation.                Clinical Impression     Row Name 04/03/23 1424          Pain Assessment    Pretreatment Pain Rating 8/10  -MT     Posttreatment Pain Rating 8/10  -MT     Pre/Posttreatment Pain Comment chest/lungs d/t SOA/difficulty catching breath  -MT     Row Name 04/03/23 1424          Plan of Care Review    Plan of Care Reviewed With patient  -[facility] Name 04/03/23 1424          Therapy Plan Review/Discharge Plan (OT)    Anticipated Discharge Disposition (OT) home with assist;sub acute care setting;home with home health  -MT     Row Name 04/03/23 1424          Vital Signs    O2 Delivery Pre Treatment room air  -MT     Row Name 04/03/23 1424          Positioning and Restraints    Pre-Treatment Position in bed  -MT     Post Treatment Position bed  -MT     In Bed with nsg;fowlers;call light within reach;encouraged to call for assist;exit alarm on;side rails up x2  -MT           User Key  (r) = Recorded By, (t) = Taken By, (c) = Cosigned By    Initials Name Provider Type    Roseann Thomas COTA Occupational Therapist Assistant               Outcome Measures     Row Name 04/03/23 1424          How much help from another is currently needed...    Putting on and taking off regular lower body clothing? 3  -MT     Bathing (including washing, rinsing, and drying) 3   -MT     Toileting (which includes using toilet bed pan or urinal) 3  -MT     Putting on and taking off regular upper body clothing 4  -MT     Taking care of personal grooming (such as brushing teeth) 4  -MT     Eating meals 4  -MT     AM-PAC 6 Clicks Score (OT) 21  -MT     Row Name 04/03/23 0846          How much help from another person do you currently need...    Turning from your back to your side while in flat bed without using bedrails? 4  -WM     Moving from lying on back to sitting on the side of a flat bed without bedrails? 4  -WM     Moving to and from a bed to a chair (including a wheelchair)? 4  -WM     Standing up from a chair using your arms (e.g., wheelchair, bedside chair)? 3  -WM     Climbing 3-5 steps with a railing? 3  -WM     To walk in hospital room? 3  -WM     AM-PAC 6 Clicks Score (PT) 21  -WM     Highest level of mobility 6 --> Walked 10 steps or more  -WM           User Key  (r) = Recorded By, (t) = Taken By, (c) = Cosigned By    Initials Name Provider Type    WM Aliyah Lopez, RN Registered Nurse    Roseann Thomas COTA Occupational Therapist Assistant                Occupational Therapy Education     Title: PT OT SLP Therapies (In Progress)     Topic: Occupational Therapy (Done)     Point: ADL training (Done)     Description:   Instruct learner(s) on proper safety adaptation and remediation techniques during self care or transfers.   Instruct in proper use of assistive devices.              Learning Progress Summary           Patient Acceptance, E, VU,NR by CS at 4/2/2023 1010   Family Acceptance, E, VU,NR by CS at 4/2/2023 1010                   Point: Home exercise program (Done)     Description:   Instruct learner(s) on appropriate technique for monitoring, assisting and/or progressing therapeutic exercises/activities.              Learning Progress Summary           Patient Acceptance, E, VU,NR by CS at 4/2/2023 1010   Family Acceptance, E, VU,NR by CS at 4/2/2023 1010           "         Point: Precautions (Done)     Description:   Instruct learner(s) on prescribed precautions during self-care and functional transfers.              Learning Progress Summary           Patient Acceptance, E, VU,NR by  at 4/2/2023 1010   Family Acceptance, E, VU,NR by  at 4/2/2023 1010                   Point: Body mechanics (Done)     Description:   Instruct learner(s) on proper positioning and spine alignment during self-care, functional mobility activities and/or exercises.              Learning Progress Summary           Patient Acceptance, E, VU,NR by  at 4/2/2023 1010   Family Acceptance, E, VU,NR by CS at 4/2/2023 1010                               User Key     Initials Effective Dates Name Provider Type Discipline     02/03/23 -  Kathleen Krishnan, OTR/L, CNT Occupational Therapist OT              OT Recommendation and Plan     Plan of Care Review  Plan of Care Reviewed With: patient  Progress: no change  Outcome Evaluation: bed mobility S. Pt with increased SOA and WOB after 20-30 seconds in standing. Reports leaning over is only thing to ease SOA when in standing. Pt required multiple trials of standing prior to ability to perform short distance in room 1x. Pt sits EOB with minimal SOA, Moderate-Maximal SOA standing. O2 WNL. Pt with low endurance and deconditioned. Pt reports able to perform AROM BUE ther ex, recommended in bed/EOB d/t SOA. Recommend assist with all fxl t/fs d/t fall risk. Pt reports \"I will be better when my blood goes up\" Hemoglobin 7.9 this date. Recommend continued OT POC and potentially sub acute rehab d/t low endurance to tasks vs home with assist pending medical improvement     Time Calculation:    Time Calculation- OT     Row Name 04/03/23 1424             Time Calculation- OT    OT Start Time 1424  -MT      OT Stop Time 1503  -MT      OT Time Calculation (min) 39 min  -MT      Total Timed Code Minutes- OT 39 minute(s)  -MT      OT Received On 04/03/23  -MT         " Timed Charges    02621 - OT Therapeutic Activity Minutes 39  -MT         Total Minutes    Timed Charges Total Minutes 39  -MT       Total Minutes 39  -MT            User Key  (r) = Recorded By, (t) = Taken By, (c) = Cosigned By    Initials Name Provider Type    Roseann Thomas COTA Occupational Therapist Assistant              Therapy Charges for Today     Code Description Service Date Service Provider Modifiers Qty    [id number]  OT THERAPEUTIC ACT EA 15 MIN 4/3/2023 Roseann Escobar COTA GO 3               CHRISTIE Gonzalez  4/3/2023

## 2023-04-03 NOTE — PLAN OF CARE
"Goal Outcome Evaluation:  Plan of Care Reviewed With: patient        Progress: no change  Outcome Evaluation: bed mobility S. Pt with increased SOA and WOB after 20-30 seconds in standing. Reports leaning over is only thing to ease SOA when in standing. Pt required multiple trials of standing prior to ability to perform short distance in room 1x. Pt sits EOB with minimal SOA, Moderate-Maximal SOA standing. O2 WNL. Pt with low endurance and deconditioned. Pt reports able to perform AROM BUE ther ex, recommended in bed/EOB d/t SOA. Recommend assist with all fxl t/fs d/t fall risk. Pt reports \"I will be better when my blood goes up\" Hemoglobin 7.9 this date. Recommend continued OT POC and potentially sub acute rehab d/t low endurance to tasks vs home with assist pending medical improvement  "

## 2023-04-04 PROBLEM — K92.2 GASTROINTESTINAL HEMORRHAGE: Status: ACTIVE | Noted: 2023-03-31

## 2023-04-04 LAB
ABO GROUP BLD: NORMAL
BLD GP AB SCN SERPL QL: NEGATIVE
DEPRECATED RDW RBC AUTO: 47.8 FL (ref 37–54)
DEPRECATED RDW RBC AUTO: 48.3 FL (ref 37–54)
DEPRECATED RDW RBC AUTO: 48.8 FL (ref 37–54)
ERYTHROCYTE [DISTWIDTH] IN BLOOD BY AUTOMATED COUNT: 14.1 % (ref 12.3–15.4)
ERYTHROCYTE [DISTWIDTH] IN BLOOD BY AUTOMATED COUNT: 14.2 % (ref 12.3–15.4)
ERYTHROCYTE [DISTWIDTH] IN BLOOD BY AUTOMATED COUNT: 14.3 % (ref 12.3–15.4)
H PYLORI IGG SER IA-ACNC: 0.16 INDEX VALUE (ref 0–0.79)
HCT VFR BLD AUTO: 21 % (ref 34–46.6)
HCT VFR BLD AUTO: 21.4 % (ref 34–46.6)
HCT VFR BLD AUTO: 22.4 % (ref 34–46.6)
HGB BLD-MCNC: 6.6 G/DL (ref 12–15.9)
HGB BLD-MCNC: 7 G/DL (ref 12–15.9)
HGB BLD-MCNC: 7.1 G/DL (ref 12–15.9)
MCH RBC QN AUTO: 30.3 PG (ref 26.6–33)
MCH RBC QN AUTO: 30.5 PG (ref 26.6–33)
MCH RBC QN AUTO: 31.1 PG (ref 26.6–33)
MCHC RBC AUTO-ENTMCNC: 31.4 G/DL (ref 31.5–35.7)
MCHC RBC AUTO-ENTMCNC: 31.7 G/DL (ref 31.5–35.7)
MCHC RBC AUTO-ENTMCNC: 32.7 G/DL (ref 31.5–35.7)
MCV RBC AUTO: 95.1 FL (ref 79–97)
MCV RBC AUTO: 96.1 FL (ref 79–97)
MCV RBC AUTO: 96.3 FL (ref 79–97)
PLATELET # BLD AUTO: 226 10*3/MM3 (ref 140–450)
PLATELET # BLD AUTO: 229 10*3/MM3 (ref 140–450)
PLATELET # BLD AUTO: 243 10*3/MM3 (ref 140–450)
PMV BLD AUTO: 8.8 FL (ref 6–12)
PMV BLD AUTO: 8.8 FL (ref 6–12)
PMV BLD AUTO: 9.1 FL (ref 6–12)
RBC # BLD AUTO: 2.18 10*6/MM3 (ref 3.77–5.28)
RBC # BLD AUTO: 2.25 10*6/MM3 (ref 3.77–5.28)
RBC # BLD AUTO: 2.33 10*6/MM3 (ref 3.77–5.28)
RH BLD: POSITIVE
T&S EXPIRATION DATE: NORMAL
WBC NRBC COR # BLD: 6.27 10*3/MM3 (ref 3.4–10.8)
WBC NRBC COR # BLD: 7 10*3/MM3 (ref 3.4–10.8)
WBC NRBC COR # BLD: 7.38 10*3/MM3 (ref 3.4–10.8)

## 2023-04-04 PROCEDURE — 36430 TRANSFUSION BLD/BLD COMPNT: CPT

## 2023-04-04 PROCEDURE — 86900 BLOOD TYPING SEROLOGIC ABO: CPT

## 2023-04-04 PROCEDURE — 86900 BLOOD TYPING SEROLOGIC ABO: CPT | Performed by: FAMILY MEDICINE

## 2023-04-04 PROCEDURE — 94760 N-INVAS EAR/PLS OXIMETRY 1: CPT

## 2023-04-04 PROCEDURE — 86923 COMPATIBILITY TEST ELECTRIC: CPT

## 2023-04-04 PROCEDURE — 85027 COMPLETE CBC AUTOMATED: CPT | Performed by: FAMILY MEDICINE

## 2023-04-04 PROCEDURE — 97530 THERAPEUTIC ACTIVITIES: CPT

## 2023-04-04 PROCEDURE — 94799 UNLISTED PULMONARY SVC/PX: CPT

## 2023-04-04 PROCEDURE — 94664 DEMO&/EVAL PT USE INHALER: CPT

## 2023-04-04 PROCEDURE — P9016 RBC LEUKOCYTES REDUCED: HCPCS

## 2023-04-04 PROCEDURE — 99232 SBSQ HOSP IP/OBS MODERATE 35: CPT | Performed by: NURSE PRACTITIONER

## 2023-04-04 PROCEDURE — 86901 BLOOD TYPING SEROLOGIC RH(D): CPT | Performed by: FAMILY MEDICINE

## 2023-04-04 PROCEDURE — 25010000002 NA FERRIC GLUC CPLX PER 12.5 MG: Performed by: FAMILY MEDICINE

## 2023-04-04 PROCEDURE — 86850 RBC ANTIBODY SCREEN: CPT | Performed by: FAMILY MEDICINE

## 2023-04-04 RX ORDER — PEG-3350, SODIUM SULFATE, SODIUM CHLORIDE, POTASSIUM CHLORIDE, SODIUM ASCORBATE AND ASCORBIC ACID 7.5-2.691G
1000 KIT ORAL ONCE
Status: COMPLETED | OUTPATIENT
Start: 2023-04-04 | End: 2023-04-04

## 2023-04-04 RX ORDER — PEG-3350, SODIUM SULFATE, SODIUM CHLORIDE, POTASSIUM CHLORIDE, SODIUM ASCORBATE AND ASCORBIC ACID 7.5-2.691G
1000 KIT ORAL ONCE
Status: DISCONTINUED | OUTPATIENT
Start: 2023-04-04 | End: 2023-04-04

## 2023-04-04 RX ADMIN — ALBUTEROL SULFATE 2.5 MG: 2.5 SOLUTION RESPIRATORY (INHALATION) at 10:54

## 2023-04-04 RX ADMIN — LEVOTHYROXINE SODIUM 50 MCG: 50 TABLET ORAL at 06:36

## 2023-04-04 RX ADMIN — ALBUTEROL SULFATE 2.5 MG: 2.5 SOLUTION RESPIRATORY (INHALATION) at 07:13

## 2023-04-04 RX ADMIN — CARVEDILOL 3.12 MG: 3.12 TABLET, FILM COATED ORAL at 08:58

## 2023-04-04 RX ADMIN — LORAZEPAM 0.5 MG: 0.5 TABLET ORAL at 21:49

## 2023-04-04 RX ADMIN — PANTOPRAZOLE SODIUM 40 MG: 40 INJECTION, POWDER, FOR SOLUTION INTRAVENOUS at 17:56

## 2023-04-04 RX ADMIN — SODIUM CHLORIDE 125 MG: 9 INJECTION, SOLUTION INTRAVENOUS at 09:15

## 2023-04-04 RX ADMIN — LORAZEPAM 0.5 MG: 0.5 TABLET ORAL at 09:24

## 2023-04-04 RX ADMIN — ALBUTEROL SULFATE 2.5 MG: 2.5 SOLUTION RESPIRATORY (INHALATION) at 20:33

## 2023-04-04 RX ADMIN — TRAMADOL HYDROCHLORIDE 50 MG: 50 TABLET, COATED ORAL at 21:49

## 2023-04-04 RX ADMIN — Medication 10 ML: at 21:01

## 2023-04-04 RX ADMIN — DOCUSATE SODIUM 50 MG AND SENNOSIDES 8.6 MG 2 TABLET: 8.6; 5 TABLET, FILM COATED ORAL at 09:00

## 2023-04-04 RX ADMIN — PANTOPRAZOLE SODIUM 40 MG: 40 INJECTION, POWDER, FOR SOLUTION INTRAVENOUS at 09:23

## 2023-04-04 RX ADMIN — POLYETHYLENE GLYCOL 3350, SODIUM SULFATE, SODIUM CHLORIDE, POTASSIUM CHLORIDE, ASCORBIC ACID, SODIUM ASCORBATE 1000 ML: KIT at 18:50

## 2023-04-04 RX ADMIN — OXYCODONE HYDROCHLORIDE 5 MG: 5 TABLET ORAL at 09:25

## 2023-04-04 RX ADMIN — POLYETHYLENE GLYCOL 3350, SODIUM SULFATE, SODIUM CHLORIDE, POTASSIUM CHLORIDE, ASCORBIC ACID, SODIUM ASCORBATE 1000 ML: KIT at 11:57

## 2023-04-04 RX ADMIN — Medication 10 ML: at 09:15

## 2023-04-04 RX ADMIN — CARVEDILOL 3.12 MG: 3.12 TABLET, FILM COATED ORAL at 17:41

## 2023-04-04 RX ADMIN — LISINOPRIL 2.5 MG: 2.5 TABLET ORAL at 09:00

## 2023-04-04 NOTE — THERAPY TREATMENT NOTE
Patient Name: Trinidad Zhu  : 1945    MRN: [id number]                              Today's Date: 2023       Admit Date: 3/31/2023    Visit Dx: Therapist utilized gait belt, applied non-slipped socks, provided fall risk education/prevention, & facilitated muscle strengthening PRN to reduce patient falls risk during this session.      ICD-10-CM ICD-9-CM   1. Acute blood loss anemia  D62 285.1   2. Impaired mobility  Z74.09 799.89   3. Melena  K92.1 578.1     Patient Active Problem List   Diagnosis   • Spinal stenosis, cervical region   • Lung nodules   • Bronchiectasis without complication (HCC)   • Underweight   • Personal history of nicotine dependence   • Restrictive lung disease   • Pulmonary emphysema (HCC)   • PAD (peripheral artery disease) (HCC)   • Preop testing   • Gastroesophageal reflux disease   • Allergic rhinitis   • ORTIZ (dyspnea on exertion)   • CAD (coronary artery disease)   • Hyperlipidemia   • Neuropathy   • Hypertension   • Simple chronic bronchitis (HCC)   • Former smoker   • Lung nodule   • Mass of right lung   • Postoperative anemia due to acute blood loss   • Syncope and collapse   • Adenocarcinoma   • Personal history of malignant neoplasm of bronchus and lung   • Hydropneumothorax   • Lower extremity embolism   • Generalized muscle weakness   • Acute blood loss anemia   • Melena   • Urinary incontinence     Past Medical History:   Diagnosis Date   • Antral ulcer    • Bladder cystocele    • C. difficile diarrhea    • CAD (coronary artery disease)    • Cancer     lung    • Colon polyp    • COPD (chronic obstructive pulmonary disease)    • COVID-19 vaccine series completed    • Diarrhea    • Difficulty swallowing    • Disease of thyroid gland    • Diverticulosis    • Elevated cholesterol    • Gastritis    • Generalized OA    • GERD (gastroesophageal reflux disease)    • History of bladder surgery 2020   • History of transfusion     after lung surgery    •  Hyperlipidemia    • Hypertension    • Liver cyst    • Lung nodule    • Microscopic colitis    • Multiple gastric ulcers     last 5 years ago   • Neck pain    • Neuropathy    • Normal body mass index    • NSAID induced gastritis    • Ulcer of pyloric antrum     UNSPECIFIED ULCER CHRONICITY   • UTI (urinary tract infection)    • Weight loss      Past Surgical History:   Procedure Laterality Date   • ANTERIOR CERVICAL DISCECTOMY W/ FUSION N/A 5/22/2017    Procedure: CERVICAL DISCECTOMY ANTERIOR FUSION WITH INSTRUMENTATION;  Surgeon: NADINE Sarabia MD;  Location:  [facility] OR;  Service:    • AORTAGRAM Left 11/9/2020    Procedure: left lower extremtiy angiogram, hawk athrectomy, balloon angioplasty, stent placement, mynx closure;  Surgeon: Sukhdev Condon DO;  Location:  [facility] HYBRID OR 12;  Service: Vascular;  Laterality: Left;   • AORTAGRAM Left 3/26/2021    Procedure: LEFT LOWER EXTREMITY ANGIOGRAM, BALLOON ANGIOPLASTY, STENT PLACEMENT, MYNX CLOSURE;  Surgeon: Sukhdev Condon DO;  Location:  [facility]BRID OR 12;  Service: Vascular;  Laterality: Left;   • AORTAGRAM Left 8/6/2021    Procedure: LEFT LOWER EXTREMITY ANGIOGRAM, HAWK ATHERECTOMY, BALLOON ANGIOPLASTY, MYNX CLOSURE;  Surgeon: Sukhdev Condon DO;  Location:  [facility] HYBRID OR 12;  Service: Vascular;  Laterality: Left;   • AORTAGRAM Right 6/8/2022    Procedure: RIGHT LOWER EXTREMITY ANGIOGRAM, INTRAVASCULAR LITHOTRIPSY, HAWK ATHRECTOMY, BALLOON ANGIOPLASTY, MYNX CLOSURE;  Surgeon: Sukhdev Condon DO;  Location:  [facility] HYBRID OR 12;  Service: Vascular;  Laterality: Right;   • AORTAGRAM Left 10/21/2022    Procedure: LEFT LOWER EXTREMITY ANGIOGRAM WITH HAWK ATHRECTOMY, BALLOON ANGIOPLASTY, STENT PLACEMENT, MYNX CLOSURE;  Surgeon: Sukhdev Condon DO;  Location:  [facility] HYBRID OR 12;  Service: Vascular;  Laterality: Left;   • BLADDER SUSPENSION      also had pelvic reconstructive surgery   • BREAST EXCISIONAL BIOPSY Right 1985   • CATARACT  EXTRACTION, BILATERAL     • CERVICAL CORPECTOMY N/A 5/22/2017    Procedure:  ANTERIOR CERVICAL DISCECTOMY FUSION C-6 to T1,  ANTERIOR FUSION WITH INSTRUMENTATION C-5 T-1;  Surgeon: NADINE Sarabia MD;  Location: [facility] OR;  Service:    • COLONOSCOPY  08/19/2015    TIC BX RT COLON   • COLONOSCOPY  12/04/2013    RT COLON BX RECALL 5YR   • COLONOSCOPY N/A 11/29/2016    The entire examined colon is normal; No specimens collected   • COLONOSCOPY N/A 6/2/2022    Procedure: COLONOSCOPY WITH ANESTHESIA;  Surgeon: Marco Antonio Vallejo MD;  Location: [facility] ENDOSCOPY;  Service: Gastroenterology;  Laterality: N/A;  Pre: screen  Post: diverticulosis  Andrew Ty MD   • ENDOSCOPY  12/03/2015    HEALED ULCER SLANT BX   • ENDOSCOPY N/A 4/1/2023    Procedure: ESOPHAGOGASTRODUODENOSCOPY WITH ANESTHESIA;  Surgeon: Patric Reyes DO;  Location: [facility] OR;  Service: Gastroenterology;  Laterality: N/A;  PRE GI BLEED  POST gastric ulcers  DR LINN TY   • FEMORAL ENDARTERECTOMY Left 10/21/2022    Procedure: LEFT LOWER EXTREMITY ANGIOGRAM;  Surgeon: Sukhdev Condon DO;  Location: [facility] HYBRID OR 12;  Service: Vascular;  Laterality: Left;   • HYSTERECTOMY     • LEG THROMBECTOMY/EMBOLECTOMY Left 6/8/2022    Procedure: LOWER EXTREMITY THROMBECTOMY/EMBOLECTOMY, PATCH GRAFT TO  FEMORAL ARTERY;  Surgeon: Sukhdev Condon DO;  Location: [facility] HYBRID OR 12;  Service: Vascular;  Laterality: Left;   • LOBECTOMY Right 11/5/2021    Procedure: RIGHT THORACOSCOPY WITH DAVINCI ROBOT, RIGHT UPPER LOBE LOBECTOMY;  Surgeon: Jarad Ignacio MD;  Location: [facility] OR;  Service: Robotics - DaVinci;  Laterality: Right;   • LUNG SURGERY     • OTHER SURGICAL HISTORY      KNEE CARTILAGE REMOVED   • OTHER SURGICAL HISTORY      BENIGN FIBROID TUMOR OF BREAST 1985 REMOVED   • TONSILLECTOMY        General Information     Row Name 04/04/23 0823          OT Time and Intention    Document Type therapy note (daily note)  -MT     Mode of Treatment  occupational therapy  -MT     Row Name 04/04/23 0823          General Information    Patient Profile Reviewed yes  -MT     Existing Precautions/Restrictions fall   low hemoglobin 7.1  -MT     Row Name 04/04/23 0823          Cognition    Orientation Status (Cognition) oriented x 4  -MT     Row Name 04/04/23 0823          Safety Issues, Functional Mobility    Impairments Affecting Function (Mobility) endurance/activity tolerance;shortness of breath;strength;pain  -MT           User Key  (r) = Recorded By, (t) = Taken By, (c) = Cosigned By    Initials Name Provider Type    MT Roseann Escobar COTA Occupational Therapist Assistant                 Mobility/ADL's     Row Name 04/04/23 0824          Bed Mobility    Supine-Sit [place] (Bed Mobility) supervision  -MT     Sit-Supine [place] (Bed Mobility) supervision  -MT     Assistive Device (Bed Mobility) bed rails;head of bed elevated  -[facility] Name 04/04/23 0824          Transfers    Transfers sit-stand transfer;stand-sit transfer  -[facility] Name 04/04/23 0824          Sit-Stand Transfer    Sit-Stand [place] (Transfers) standby assist  -MT     Assistive Device (Sit-Stand Transfers) walker, front-wheeled  -[facility] Name 04/04/23 0824          Stand-Sit Transfer    Stand-Sit [place] (Transfers) standby assist  -MT     Assistive Device (Stand-Sit Transfers) walker, front-wheeled  -[facility] Name 04/04/23 0824          Functional Mobility    Functional Mobility- Ind. Level contact guard assist  -MT     Functional Mobility- Device walker, front-wheeled  -MT     Functional Mobility- Comment pt with increased endurance this date and slightly improved SOA with activity compared to previous tx. Fxl mbility around room>chair via RW CGA-SBA.  -MT     Row Name 04/04/23 0824          Activities of Daily Living    BADL Assessment/Intervention lower body dressing;toileting  -MT     Row Name 04/04/23 0824          Lower Body Dressing Assessment/Training     [place] Level (Lower Body Dressing) don;socks;shoes/slippers;supervision  -MT     Position (Lower Body Dressing) edge of bed sitting  -MT     Row Name 04/04/23 0824          Toileting Assessment/Training    Comment, (Toileting) pt reports just completing with S of NSG  -MT           User Key  (r) = Recorded By, (t) = Taken By, (c) = Cosigned By    Initials Name Provider Type    MT Roseann Escobar COTA Occupational Therapist Assistant               Obj/Interventions     Row Name 04/04/23 0824          Motor Skills    Therapeutic Exercise aerobic  -[facility] Name 04/04/23 0824          Aerobic Exercise    Comment, Aerobic Exercise (Therapeutic Exercise) pulmonary ther ex program 2-3 reps to ensure understanding and correct performance  -MT           User Key  (r) = Recorded By, (t) = Taken By, (c) = Cosigned By    Initials Name Provider Type    MT Roseann Escobar COTA Occupational Therapist Assistant               Goals/Plan    No documentation.                Clinical Impression     [facility] Name 04/04/23 0824          Pain Assessment    Additional Documentation Pain Scale: Word Pre/Post-Treatment (Group)  -MT     Row Name 04/04/23 0824          Therapy Plan Review/Discharge Plan (OT)    Anticipated Discharge Disposition (OT) home with assist;home with home health  -[facility] Name 04/04/23 0824          Vital Signs    O2 Delivery Pre Treatment room air  -MT     Row Name 04/04/23 0824          Positioning and Restraints    Pre-Treatment Position in bed  -MT     Post Treatment Position chair  -MT     In Chair sitting;call light within reach;encouraged to call for assist;with family/caregiver  -MT     Row Name 04/04/23 0824          Pain Scale: Word Pre/Post-Treatment    Pain: Word Scale, Pretreatment 2 - mild pain  -MT     Posttreatment Pain Rating 2 - mild pain  -MT     Pre/Posttreatment Pain Comment lungs with deep breath  -MT           User Key  (r) = Recorded By, (t) = Taken By, (c) = Cosigned By    Initials  Name Provider Type    Roseann Thomas COTA Occupational Therapist Assistant               Outcome Measures     Row Name 04/04/23 0824          How much help from another is currently needed...    Putting on and taking off regular lower body clothing? 4  -MT     Bathing (including washing, rinsing, and drying) 3  -MT     Toileting (which includes using toilet bed pan or urinal) 3  -MT     Putting on and taking off regular upper body clothing 4  -MT     Taking care of personal grooming (such as brushing teeth) 4  -MT     Eating meals 4  -MT     AM-PAC 6 Clicks Score (OT) 22  -MT           User Key  (r) = Recorded By, (t) = Taken By, (c) = Cosigned By    Initials Name Provider Type    Roseann Thomas COTA Occupational Therapist Assistant                Occupational Therapy Education     Title: PT OT SLP Therapies (In Progress)     Topic: Occupational Therapy (Done)     Point: ADL training (Done)     Description:   Instruct learner(s) on proper safety adaptation and remediation techniques during self care or transfers.   Instruct in proper use of assistive devices.              Learning Progress Summary           Patient Acceptance, E, VU,NR by CS at 4/2/2023 1010   Family Acceptance, E, VU,NR by CS at 4/2/2023 1010                   Point: Home exercise program (Done)     Description:   Instruct learner(s) on appropriate technique for monitoring, assisting and/or progressing therapeutic exercises/activities.              Learning Progress Summary           Patient Acceptance, E, VU,NR by CS at 4/2/2023 1010   Family Acceptance, E, VU,NR by CS at 4/2/2023 1010                   Point: Precautions (Done)     Description:   Instruct learner(s) on prescribed precautions during self-care and functional transfers.              Learning Progress Summary           Patient Acceptance, E, VU,NR by CS at 4/2/2023 1010   Family Acceptance, E, VU,NR by CS at 4/2/2023 1010                   Point: Body mechanics (Done)      Description:   Instruct learner(s) on proper positioning and spine alignment during self-care, functional mobility activities and/or exercises.              Learning Progress Summary           Patient Acceptance, E, VU,NR by  at 4/2/2023 1010   Family Acceptance, E, VU,NR by  at 4/2/2023 1010                               User Key     Initials Effective Dates Name Provider Type Discipline     02/03/23 -  Kathleen Krishnan S, OTR/L, CNT Occupational Therapist OT              OT Recommendation and Plan     Plan of Care Review  Plan of Care Reviewed With: patient, spouse  Progress: improving  Outcome Evaluation: pt with increased endurance this date and slightly improved SOA with activity compared to previous tx. Fxl mbility around room>chair via RW CGA-SBA. Performed BUE ther ex pulmonary program focusing on increased endurance and improved WOB with activity. Pt performed 2-3 reps each ther ex to ensure understanding. Pt motivated and eager to complete, but breakfast tray delivered. Pt I with self feeding. Recommend continued OT POC for increased overall strength/endurance for ADLs. Recommend home with assist     Time Calculation:    Time Calculation- OT     Row Name 04/04/23 0824             Time Calculation- OT    OT Start Time 0824  -MT      OT Stop Time 0850  -MT      OT Time Calculation (min) 26 min  -MT      Total Timed Code Minutes- OT 26 minute(s)  -MT      OT Received On 04/04/23  -MT         Timed Charges    13105 - OT Therapeutic Activity Minutes 26  -MT         Total Minutes    Timed Charges Total Minutes 26  -MT       Total Minutes 26  -MT            User Key  (r) = Recorded By, (t) = Taken By, (c) = Cosigned By    Initials Name Provider Type    MT Roseann Escobar COTA Occupational Therapist Assistant              Therapy Charges for Today     Code Description Service Date Service Provider Modifiers Qty    56588135557  OT THERAPEUTIC ACT EA 15 MIN 4/3/2023 Roseann Escobar COTA GO 3    [id number]  HC OT THERAPEUTIC ACT EA 15 MIN 4/4/2023 Roseann Escobar COTA  2               CHRISTIE Gonzalez  4/4/2023

## 2023-04-04 NOTE — PLAN OF CARE
Goal Outcome Evaluation:           Progress: no change  Outcome Evaluation: No changes. Pt a&o, c/o pain in head and back, no c/o abdominal discomfort, ativan and tramadol admin po at bedtime upon pt request. NSR 78-99. Clear liquid diet. Starting bowel prep on 4/4 for colonoscopy on 4/5. VSS. Safety maintained.

## 2023-04-04 NOTE — PLAN OF CARE
Goal Outcome Evaluation:  Plan of Care Reviewed With: patient           Outcome Evaluation: VSS, on RA, HR S-sT , on clear liq diet today, bowel prep started today, to complete 2nd part this evening, recieved one unit blood today, safety maintained

## 2023-04-04 NOTE — PROGRESS NOTES
[facility]  Inpatient Progress Note      Date of Admission: 3/31/2023  Date of Service:  04/04/23    Reason for Follow Up: GI bleed    Subjective     Subjective:   Patient lying in bed with  present at bedside.  She is denying abdominal pain.  She does continue to experience fatigue.  Stool is less black and more brown.  No nausea or vomiting.      Current Facility-Administered Medications:   •  albuterol (PROVENTIL) nebulizer solution 0.083% 2.5 mg/3mL, 2.5 mg, Nebulization, Q4H - RT, Patric Reyes, DO, 2.5 mg at 04/04/23 0713  •  sennosides-docusate (PERICOLACE) 8.6-50 MG per tablet 2 tablet, 2 tablet, Oral, BID, 2 tablet at 04/04/23 0900 **AND** polyethylene glycol (MIRALAX) packet 17 g, 17 g, Oral, Daily PRN **AND** bisacodyl (DULCOLAX) EC tablet 5 mg, 5 mg, Oral, Daily PRN **AND** bisacodyl (DULCOLAX) suppository 10 mg, 10 mg, Rectal, Daily PRN, Patric Reyes, DO  •  carvedilol (COREG) tablet 3.125 mg, 3.125 mg, Oral, BID With Meals, Chuy Chong MD, 3.125 mg at 04/04/23 0858  •  Enoxaparin Sodium (LOVENOX) syringe 30 mg, 30 mg, Subcutaneous, Q24H, TurnAndrew rivera MD, 30 mg at 04/03/23 1353  •  ferric gluconate (FERRLECIT) 125 mg in sodium chloride 0.9 % 110 mL IVPB, 125 mg, Intravenous, Once, Chuy Chong MD, Last Rate: 55 mL/hr at 04/04/23 0915, 125 mg at 04/04/23 0915  •  levothyroxine (SYNTHROID, LEVOTHROID) tablet 50 mcg, 50 mcg, Oral, Q AM, Patric Reyes, DO, 50 mcg at 04/04/23 0636  •  lisinopril (PRINIVIL,ZESTRIL) tablet 2.5 mg, 2.5 mg, Oral, Q24H, Chuy Chong MD, 2.5 mg at 04/04/23 0900  •  LORazepam (ATIVAN) tablet 0.5 mg, 0.5 mg, Oral, Q8H PRN, Patric Reyes, , 0.5 mg at 04/04/23 0924  •  nitroglycerin (NITROSTAT) SL tablet 0.4 mg, 0.4 mg, Sublingual, Q5 Min PRN, Patric Reyes,   •  ondansetron (ZOFRAN) tablet 4 mg, 4 mg, Oral, Q6H PRN **OR** ondansetron (ZOFRAN) injection 4 mg, 4 mg, Intravenous, Q6H PRN, Eric,  Patric DEAL DO  •  oxyCODONE (ROXICODONE) immediate release tablet 5 mg, 5 mg, Oral, Q4H PRN, Patric Reyes DO, 5 mg at 04/04/23 0925  •  pantoprazole (PROTONIX) injection 40 mg, 40 mg, Intravenous, BID AC, Patric Reyes DO, 40 mg at 04/04/23 0923  •  PEG-KCl-NaCl-NaSulf-Na Asc-C (MOVIPREP) powder 1,000 mL, 1,000 mL, Oral, Once **FOLLOWED BY** PEG-KCl-NaCl-NaSulf-Na Asc-C (MOVIPREP) powder 1,000 mL, 1,000 mL, Oral, Once, Mike Kearns MD  •  sodium chloride 0.9 % flush 10 mL, 10 mL, Intravenous, Q12H, Patric Reyes DO, 10 mL at 04/04/23 0915  •  sodium chloride 0.9 % flush 10 mL, 10 mL, Intravenous, PRN, Patric Reyes DO  •  sodium chloride 0.9 % infusion 40 mL, 40 mL, Intravenous, PRN, Patric Reyes DO  •  sodium chloride 0.9 % infusion, 100 mL/hr, Intravenous, Continuous, Jamil Han CRNA, Stopped at 04/01/23 1327  •  traMADol (ULTRAM) tablet 50 mg, 50 mg, Oral, Q4H PRN, Patric Reyes DO, 50 mg at 04/03/23 2118    Review of Systems     Constitution:  negative for chills, positive fatigue and negative fevers  Eyes:  negative for blurriness and change of vision  ENT:   negative for sore throat and voice change  Respiratory: negative for  cough and shortness of air  Cardiovascular:  Negative for chest pain or palpitations  Gastrointestinal:  negative for  See HPI  Genitourinary:  negative for  blood in urine and painful urination  Integument: negative for  rash and redness  Hematologic / Lymphatic: negative for  excessive bleeding and easy bruising  Musculoskeletal: negative for  joint pain and joint stiffness out of the ordinary  Neurological:  negative for  seizures and speech abnormality  Behavioral/Psych:  negative for  anxiety and depression out of the ordinary  Endocrine: negative for  diabetes and weight loss, unintended  Allergies / Immunologic:  negative for  anaphylaxis and rash        Objective     Vital Signs  Temp:  [97.9 °F (36.6 °C)-98.8 °F (37.1 °C)] 98.4 °F (36.9  °C)  Heart Rate:  [] 86  Resp:  [16-20] 18  BP: ()/(51-58) 102/51  Body mass index is 18.32 kg/m².  No intake or output data in the 24 hours ending 04/04/23 0927  No intake/output data recorded.       Physical Exam:   General: patient awake, alert and cooperative   Eyes: Normal lids and lashes, no scleral icterus   Neck: supple, normal ROM   Skin: warm and dry, not jaundiced   Cardiovascular: regular rhythm and rate, no murmurs auscultated   Pulm: clear to auscultation bilaterally, regular and unlabored   Abdomen: soft, nontender, nondistended; normal bowel sounds   Rectal: deferred   Extremities: no rash or edema   Psychiatric: Normal mood and behavior; memory intact         Results Review:    I have reviewed all of the patients current test results  Results from last 7 days   Lab Units 04/04/23  0547 04/03/23  2341 04/03/23  1824   WBC 10*3/mm3 6.27 7.38 9.97   HEMOGLOBIN g/dL 7.1* 7.0* 7.1*   HEMATOCRIT % 22.4* 21.4* 22.6*   PLATELETS 10*3/mm3 229 243 257       Results from last 7 days   Lab Units 04/02/23  0543 04/01/23  0448 03/31/23  1730   SODIUM mmol/L 137 135* 134*   POTASSIUM mmol/L 3.8 3.8 4.4   CHLORIDE mmol/L 103 99 96*   CO2 mmol/L 27.0 26.0 25.0   BUN mg/dL 31* 35* 37*   CREATININE mg/dL 0.84 0.84 0.75   CALCIUM mg/dL 8.5* 8.7 9.6   BILIRUBIN mg/dL  --   --  0.3   ALK PHOS U/L  --   --  41   ALT (SGPT) U/L  --   --  19   AST (SGOT) U/L  --   --  22   GLUCOSE mg/dL 91 81 127*             Lab Results   Lab Value Date/Time    LIPASE 72 (H) 03/31/2023 1730       Radiology:    Imaging Results (Last 24 Hours)     Procedure Component Value Units Date/Time    NM GI Blood Loss [[id number]] Collected: 04/03/23 1310     Updated: 04/03/23 1315    Narrative:      EXAMINATION: NM GI BLOOD LOSS-     4/3/2023 10:53 AM CDT     HISTORY: GI bleed     Nuclear medicine GI bleeding exam.  Dose: 26.9 mCi technetium labeled red blood cells.  UltraTag method of labeling an autologous red blood cell simple.  Route  administration: IV injection.     Dynamic imaging was performed for 1 hour.     Appropriate background and vascular uptake.  Urinary bladder uptake and splenic uptake noted.     No active GI bleed is present at the time of this exam.     Summary:  1. No active GI bleed.  This report was finalized on 04/03/2023 13:12 by Dr. Ananda Robbins MD.            Assessment & Plan     Patient Active Problem List   Diagnosis Code   • Spinal stenosis, cervical region M48.02   • Lung nodules R91.8   • Bronchiectasis without complication ([facility]) J47.9   • Underweight R63.6   • Personal history of nicotine dependence Z87.891   • Restrictive lung disease J98.4   • Pulmonary emphysema ([facility]) J43.9   • PAD (peripheral artery disease) ([facility]) I73.9   • Preop testing Z01.818   • Gastroesophageal reflux disease K21.9   • Allergic rhinitis J30.9   • ORTIZ (dyspnea on exertion) R06.09   • CAD (coronary artery disease) I25.10   • Hyperlipidemia E78.5   • Neuropathy G62.9   • Hypertension I10   • Simple chronic bronchitis ([facility]) J41.0   • Former smoker Z87.891   • Lung nodule R91.1   • Mass of right lung R91.8   • Postoperative anemia due to acute blood loss D62   • Syncope and collapse R55   • Adenocarcinoma C80.1   • Personal history of malignant neoplasm of bronchus and lung Z85.118   • Hydropneumothorax J94.8   • Lower extremity embolism I74.3   • Generalized muscle weakness M62.81   • Acute blood loss anemia D62   • Melena K92.1   • Urinary incontinence R32       Impression/Plan    Anemia, GI blood loss  Gastric ulcer  Anticoagulated    Continue clear liquid diet for today with plans for colonoscopy tomorrow.  Primary has been in touch with vascular surgeon at [place] in regards to Plavix/Xarelto/aspirin.  At this time Plavix and Xarelto have been completely on hold in preparation for colonoscopy.  Hemoglobin has trended downward however it does seem her her bowels have normalized and more brown in color.  Further recommendation pending  colonoscopy planned for tomorrow.    Electronically signed by GEREMIAS Palomino, 04/04/23, 9:24 AM CDT.       GEREMIAS Palomino  04/04/23  09:27 CDT

## 2023-04-04 NOTE — PLAN OF CARE
Goal Outcome Evaluation:  Plan of Care Reviewed With: patient, spouse        Progress: improving  Outcome Evaluation: pt with increased endurance this date and slightly improved SOA with activity compared to previous tx. Fxl mbility around room>chair via RW CGA-SBA. Performed BUE ther ex pulmonary program focusing on increased endurance and improved WOB with activity. Pt performed 2-3 reps each ther ex to ensure understanding. Pt motivated and eager to complete, but breakfast tray delivered. Pt I with self feeding. Recommend continued OT POC for increased overall strength/endurance for ADLs. Recommend home with assist

## 2023-04-04 NOTE — PROGRESS NOTES
Daily Progress Note  Trinidad Zhu  MRN: [id number] LOS: 3    Admit Date: 3/31/2023   4/4/2023 07:06 CDT    Subjective:          Chief Complaint:  Weakness, dyspnea    Interval History:    Reviewed overnight events and nursing notes.   Labs stable.  No new complaints.  Plans for colonoscopy tomorrow.    Review of Systems   Constitutional: Positive for activity change and fatigue. Negative for fever.   Respiratory: Positive for shortness of breath. Negative for wheezing.    Gastrointestinal: Positive for anal bleeding and blood in stool. Negative for abdominal pain, nausea and vomiting.   Skin: Positive for pallor.   Neurological: Positive for weakness.       DIET:  Diet: Liquid Diets; Clear Liquid; Texture: Regular Texture (IDDSI 7); Fluid Consistency: Thin (IDDSI 0)    Medications:   sodium chloride, 100 mL/hr, Last Rate: Stopped (04/01/23 1327)      albuterol, 2.5 mg, Nebulization, Q4H - RT  carvedilol, 3.125 mg, Oral, BID With Meals  enoxaparin, 30 mg, Subcutaneous, Q24H  levothyroxine, 50 mcg, Oral, Q AM  lisinopril, 2.5 mg, Oral, Q24H  pantoprazole, 40 mg, Intravenous, BID AC  PEG-KCl-NaCl-NaSulf-Na Asc-C, 1,000 mL, Oral, Once   Followed by  PEG-KCl-NaCl-NaSulf-Na Asc-C, 1,000 mL, Oral, Once  senna-docusate sodium, 2 tablet, Oral, BID  sodium chloride, 10 mL, Intravenous, Q12H        Data:     Code Status:   Code Status and Medical Interventions:   Ordered at: 03/31/23 1623     Level Of Support Discussed With:    Patient     Code Status (Patient has no pulse and is not breathing):    CPR (Attempt to Resuscitate)     Medical Interventions (Patient has pulse or is breathing):    Full Support     Release to patient:    Routine Release       Family History   Problem Relation Age of Onset   • Breast cancer Maternal Aunt    • Heart disease Mother    • Heart disease Father    • GI problems Neg Hx         MALIGNANCIES   • Colon cancer Neg Hx    • Colon polyps Neg Hx      Social History     Socioeconomic History    • Marital status:    Tobacco Use   • Smoking status: Former     Packs/day: 1.50     Years: 20.00     Pack years: 30.00     Types: Cigarettes     Quit date:      Years since quittin.2   • Smokeless tobacco: Never   Vaping Use   • Vaping Use: Never used   Substance and Sexual Activity   • Alcohol use: Yes     Comment: occasionally   • Drug use: No   • Sexual activity: Not Currently       Labs:    Lab Results (last 72 hours)     Procedure Component Value Units Date/Time    Basic Metabolic Panel [[id number]]  (Abnormal) Collected: 23    Specimen: Blood Updated: 23 06     Glucose 91 mg/dL      BUN 31 mg/dL      Creatinine 0.84 mg/dL      Sodium 137 mmol/L      Potassium 3.8 mmol/L      Chloride 103 mmol/L      CO2 27.0 mmol/L      Calcium 8.5 mg/dL      BUN/Creatinine Ratio 36.9     Anion Gap 7.0 mmol/L      eGFR 71.7 mL/min/1.73     Narrative:      GFR Normal >60  Chronic Kidney Disease <60  Kidney Failure <15    The GFR formula is only valid for adults with stable renal function between ages 18 and 70.    CBC (No Diff) [[id number]]  (Abnormal) Collected: 23    Specimen: Blood Updated: 23 0551     WBC 8.85 10*3/mm3      RBC 2.56 10*6/mm3      Hemoglobin 7.9 g/dL      Hematocrit 24.0 %      MCV 93.8 fL      MCH 30.9 pg      MCHC 32.9 g/dL      RDW 13.7 %      RDW-SD 46.6 fl      MPV 9.0 fL      Platelets 236 10*3/mm3     CBC (No Diff) [[id number]]  (Abnormal) Collected: 234    Specimen: Blood Updated: 23 2348     WBC 10.00 10*3/mm3      RBC 2.37 10*6/mm3      Hemoglobin 7.1 g/dL      Hematocrit 22.2 %      MCV 93.7 fL      MCH 30.0 pg      MCHC 32.0 g/dL      RDW 13.9 %      RDW-SD 46.9 fl      MPV 9.2 fL      Platelets 221 10*3/mm3     CBC (No Diff) [[id number]]  (Abnormal) Collected: 23 1845    Specimen: Blood Updated: 23 1907     WBC 10.29 10*3/mm3      RBC 2.48 10*6/mm3      Hemoglobin 7.5 g/dL      Hematocrit 23.3 %      MCV 94.0 fL       MCH 30.2 pg      MCHC 32.2 g/dL      RDW 13.7 %      RDW-SD 47.0 fl      MPV 9.2 fL      Platelets 249 10*3/mm3     Blood Culture - Blood, Arm, Right [[id number]]  (Normal) Collected: 03/31/23 1730    Specimen: Blood from Arm, Right Updated: 04/01/23 1816     Blood Culture No growth at 24 hours    Blood Culture - Blood, Arm, Left [[id number]]  (Normal) Collected: 03/31/23 1730    Specimen: Blood from Arm, Left Updated: 04/01/23 1816     Blood Culture No growth at 24 hours    Occult Blood X 1, Stool - Stool, Per Rectum [[id number]]  (Abnormal) Collected: 04/01/23 1537    Specimen: Stool from Per Rectum Updated: 04/01/23 1548     Fecal Occult Blood Positive    CBC (No Diff) [[id number]]  (Abnormal) Collected: 04/01/23 1245    Specimen: Blood Updated: 04/01/23 1308     WBC 9.41 10*3/mm3      RBC 2.31 10*6/mm3      Hemoglobin 7.3 g/dL      Hematocrit 21.5 %      MCV 93.1 fL      MCH 31.6 pg      MCHC 34.0 g/dL      RDW 13.5 %      RDW-SD 46.4 fl      MPV 9.1 fL      Platelets 212 10*3/mm3     POC Glucose Once [[id number]]  (Normal) Collected: 04/01/23 1104    Specimen: Blood Updated: 04/01/23 1119     Glucose 100 mg/dL      Comment: : [id number] [facility] Jennifer[facility] ID: [id number]       Basic Metabolic Panel [[id number]]  (Abnormal) Collected: 04/01/23 0448    Specimen: Blood Updated: 04/01/23 0543     Glucose 81 mg/dL      BUN 35 mg/dL      Creatinine 0.84 mg/dL      Sodium 135 mmol/L      Potassium 3.8 mmol/L      Chloride 99 mmol/L      CO2 26.0 mmol/L      Calcium 8.7 mg/dL      BUN/Creatinine Ratio 41.7     Anion Gap 10.0 mmol/L      eGFR 71.7 mL/min/1.73     Narrative:      GFR Normal >60  Chronic Kidney Disease <60  Kidney Failure <15    The GFR formula is only valid for adults with stable renal function between ages 18 and 70.    CBC & Differential [[id number]]  (Abnormal) Collected: 04/01/23 0448    Specimen: Blood Updated: 04/01/23 0529    Narrative:      The following orders were created for panel order CBC &  Differential.  Procedure                               Abnormality         Status                     ---------                               -----------         ------                     CBC Auto Differential[[id number]]        Abnormal            Final result                 Please view results for these tests on the individual orders.    CBC Auto Differential [[id number]]  (Abnormal) Collected: 04/01/23 0448    Specimen: Blood Updated: 04/01/23 0529     WBC 10.04 10*3/mm3      RBC 2.61 10*6/mm3      Hemoglobin 7.9 g/dL      Hematocrit 24.6 %      MCV 94.3 fL      MCH 30.3 pg      MCHC 32.1 g/dL      RDW 13.1 %      RDW-SD 44.8 fl      MPV 9.3 fL      Platelets 246 10*3/mm3      Neutrophil % 58.5 %      Lymphocyte % 25.1 %      Monocyte % 14.8 %      Eosinophil % 1.0 %      Basophil % 0.1 %      Immature Grans % 0.5 %      Neutrophils, Absolute 5.87 10*3/mm3      Lymphocytes, Absolute 2.52 10*3/mm3      Monocytes, Absolute 1.49 10*3/mm3      Eosinophils, Absolute 0.10 10*3/mm3      Basophils, Absolute 0.01 10*3/mm3      Immature Grans, Absolute 0.05 10*3/mm3      nRBC 0.0 /100 WBC     Myoglobin, Serum [[id number]]  (Normal) Collected: 03/31/23 1730    Specimen: Blood Updated: 04/01/23 0143     Myoglobin 41.7 ng/mL     Narrative:      Results may be falsely decreased if patient taking Biotin.      Urinalysis With Culture If Indicated - Urine, Clean Catch [[id number]]  (Abnormal) Collected: 03/31/23 2125    Specimen: Urine, Clean Catch Updated: 03/31/23 2140     Color, UA Yellow     Appearance, UA Clear     pH, UA 6.0     Specific Gravity, UA 1.018     Glucose, UA Negative     Ketones, UA Negative     Bilirubin, UA Negative     Blood, UA Negative     Protein, UA Negative     Leuk Esterase, UA Trace     Nitrite, UA Negative     Urobilinogen, UA 0.2 E.U./dL    Narrative:      In absence of clinical symptoms, the presence of pyuria, bacteria, and/or nitrites on the urinalysis result does not correlate with infection.     Urinalysis, Microscopic Only - Urine, Clean Catch [[id number]]  (Abnormal) Collected: 03/31/23 2125    Specimen: Urine, Clean Catch Updated: 03/31/23 2140     RBC, UA 0-2 /HPF      WBC, UA 3-5 /HPF      Comment: Urine culture not indicated.        Bacteria, UA None Seen /HPF      Squamous Epithelial Cells, UA 0-2 /HPF      Hyaline Casts, UA 0-2 /LPF      Methodology Automated Microscopy    High Sensitivity Troponin T 2Hr [[id number]]  (Abnormal) Collected: 03/31/23 1923    Specimen: Blood Updated: 03/31/23 2000     HS Troponin T 16 ng/L      Troponin T Delta -2 ng/L     Narrative:      High Sensitive Troponin T Reference Range:  <10.0 ng/L- Negative Female for AMI  <15.0 ng/L- Negative Male for AMI  >=10 - Abnormal Female indicating possible myocardial injury.  >=15 - Abnormal Male indicating possible myocardial injury.   Clinicians would have to utilize clinical acumen, EKG, Troponin, and serial changes to determine if it is an Acute Myocardial Infarction or myocardial injury due to an underlying chronic condition.         TSH [[id number]]  (Normal) Collected: 03/31/23 1730    Specimen: Blood Updated: 03/31/23 1832     TSH 3.090 uIU/mL     T4, Free [[id number]]  (Normal) Collected: 03/31/23 1730    Specimen: Blood Updated: 03/31/23 1831     Free T4 1.14 ng/dL     Narrative:      Results may be falsely increased if patient taking Biotin.      COVID PRE-OP / PRE-PROCEDURE SCREENING ORDER (NO ISOLATION) - Swab, Nasopharynx [[id number]]  (Normal) Collected: 03/31/23 1724    Specimen: Swab from Nasopharynx Updated: 03/31/23 1829    Narrative:      The following orders were created for panel order COVID PRE-OP / PRE-PROCEDURE SCREENING ORDER (NO ISOLATION) - Swab, Nasopharynx.  Procedure                               Abnormality         Status                     ---------                               -----------         ------                     COVID-19 and FLU A/B PCR...[[id number]]  Normal              Final result        "          Please view results for these tests on the individual orders.    COVID-19 and FLU A/B PCR - Swab, Nasopharynx [[id number]]  (Normal) Collected: 03/31/23 1724    Specimen: Swab from Nasopharynx Updated: 03/31/23 1829     COVID19 Not Detected     Influenza A PCR Not Detected     Influenza B PCR Not Detected    Narrative:      Fact sheet for providers: https://www.fda.gov/media/857050/download    Fact sheet for patients: https://www.fda.gov/media/256608/download    Test performed by PCR.    Procalcitonin [[id number]]  (Normal) Collected: 03/31/23 1730    Specimen: Blood Updated: 03/31/23 1828     Procalcitonin 0.06 ng/mL     Narrative:      As a Marker for Sepsis (Non-Neonates):    1. <0.5 ng/mL represents a low risk of severe sepsis and/or septic shock.  2. >2 ng/mL represents a high risk of severe sepsis and/or septic shock.    As a Marker for Lower Respiratory Tract Infections that require antibiotic therapy:    PCT on Admission    Antibiotic Therapy       6-12 Hrs later    >0.5                Strongly Recommended  >0.25 - <0.5        Recommended   0.1 - 0.25          Discouraged              Remeasure/reassess PCT  <0.1                Strongly Discouraged     Remeasure/reassess PCT    As 28 day mortality risk marker: \"Change in Procalcitonin Result\" (>80% or <=80%) if Day 0 (or Day 1) and Day 4 values are available. Refer to http://www.FST21s-pct-calculator.com    Change in PCT <=80%  A decrease of PCT levels below or equal to 80% defines a positive change in PCT test result representing a higher risk for 28-day all-cause mortality of patients diagnosed with severe sepsis for septic shock.    Change in PCT >80%  A decrease of PCT levels of more than 80% defines a negative change in PCT result representing a lower risk for 28-day all-cause mortality of patients diagnosed with severe sepsis or septic shock.       Lipid Panel [[id number]] Collected: 03/31/23 1730    Specimen: Blood Updated: 03/31/23 1828     " Total Cholesterol 109 mg/dL      Triglycerides 97 mg/dL      HDL Cholesterol 60 mg/dL      LDL Cholesterol  31 mg/dL      VLDL Cholesterol 18 mg/dL      LDL/HDL Ratio 0.49    Narrative:      Cholesterol Reference Ranges  (U.S. Department of Health and Human Services ATP III Classifications)    Desirable          <200 mg/dL  Borderline High    200-239 mg/dL  High Risk          >240 mg/dL      Triglyceride Reference Ranges  (U.S. Department of Health and Human Services ATP III Classifications)    Normal           <150 mg/dL  Borderline High  150-199 mg/dL  High             200-499 mg/dL  Very High        >500 mg/dL    HDL Reference Ranges  (U.S. Department of Health and Human Services ATP III Classifications)    Low     <40 mg/dl (major risk factor for CHD)  High    >60 mg/dl ('negative' risk factor for CHD)        LDL Reference Ranges  (U.S. Department of Health and Human Services ATP III Classifications)    Optimal          <100 mg/dL  Near Optimal     100-129 mg/dL  Borderline High  130-159 mg/dL  High             160-189 mg/dL  Very High        >189 mg/dL    C-reactive Protein [[id number]]  (Normal) Collected: 03/31/23 1730    Specimen: Blood Updated: 03/31/23 1828     C-Reactive Protein <0.30 mg/dL     Comprehensive Metabolic Panel [[id number]]  (Abnormal) Collected: 03/31/23 1730    Specimen: Blood Updated: 03/31/23 1826     Glucose 127 mg/dL      BUN 37 mg/dL      Creatinine 0.75 mg/dL      Sodium 134 mmol/L      Potassium 4.4 mmol/L      Chloride 96 mmol/L      CO2 25.0 mmol/L      Calcium 9.6 mg/dL      Total Protein 6.3 g/dL      Albumin 4.3 g/dL      ALT (SGPT) 19 U/L      AST (SGOT) 22 U/L      Alkaline Phosphatase 41 U/L      Total Bilirubin 0.3 mg/dL      Globulin 2.0 gm/dL      A/G Ratio 2.2 g/dL      BUN/Creatinine Ratio 49.3     Anion Gap 13.0 mmol/L      eGFR 82.1 mL/min/1.73     Narrative:      GFR Normal >60  Chronic Kidney Disease <60  Kidney Failure <15    The GFR formula is only valid for adults  with stable renal function between ages 18 and 70.    CK [[id number]]  (Normal) Collected: 03/31/23 1730    Specimen: Blood Updated: 03/31/23 1826     Creatine Kinase 41 U/L     Lactic Acid, Plasma [[id number]]  (Normal) Collected: 03/31/23 1730    Specimen: Blood Updated: 03/31/23 1824     Lactate 1.7 mmol/L     Phosphorus [[id number]]  (Abnormal) Collected: 03/31/23 1730    Specimen: Blood Updated: 03/31/23 1823     Phosphorus 5.2 mg/dL     BNP [[id number]]  (Normal) Collected: 03/31/23 1730    Specimen: Blood Updated: 03/31/23 1822     proBNP 274.8 pg/mL     Narrative:      Among patients with dyspnea, NT-proBNP is highly sensitive for the detection of acute congestive heart failure. In addition NT-proBNP of <300 pg/ml effectively rules out acute congestive heart failure with 99% negative predictive value.    Results may be falsely decreased if patient taking Biotin.      High Sensitivity Troponin T [[id number]]  (Abnormal) Collected: 03/31/23 1730    Specimen: Blood Updated: 03/31/23 1821     HS Troponin T 18 ng/L     Narrative:      High Sensitive Troponin T Reference Range:  <10.0 ng/L- Negative Female for AMI  <15.0 ng/L- Negative Male for AMI  >=10 - Abnormal Female indicating possible myocardial injury.  >=15 - Abnormal Male indicating possible myocardial injury.   Clinicians would have to utilize clinical acumen, EKG, Troponin, and serial changes to determine if it is an Acute Myocardial Infarction or myocardial injury due to an underlying chronic condition.         Lipase [[id number]]  (Abnormal) Collected: 03/31/23 1730    Specimen: Blood Updated: 03/31/23 1821     Lipase 72 U/L     Magnesium [[id number]]  (Normal) Collected: 03/31/23 1730    Specimen: Blood Updated: 03/31/23 1820     Magnesium 1.8 mg/dL     D-dimer, Quantitative [[id number]]  (Normal) Collected: 03/31/23 1730    Specimen: Blood Updated: 03/31/23 1814     D-Dimer, Quantitative 0.53 MCGFEU/mL     Narrative:      According to the assay  "'s published package insert, a normal (<0.50 MCGFEU/mL) D-dimer result in conjunction with a non-high clinical probability assessment, excludes deep vein thrombosis (DVT) and pulmonary embolism (PE) with high sensitivity.    D-dimer values increase with age and this can make VTE exclusion of an older population difficult. To address this, the [employer], based on best available evidence and recent guidelines, recommends that clinicians use age-adjusted D-dimer thresholds in patients greater than 50 years of age with: a) a low probability of PE who do not meet all Pulmonary Embolism Rule Out Criteria, or b) in those with intermediate probability of PE.   The formula for an age-adjusted D-dimer cut-off is \"age/100\".  For example, a 60 year old patient would have an age-adjusted cut-off of 0.60 MCGFEU/mL and an 80 year old 0.80 MCGFEU/mL.    Sedimentation Rate [[id number]]  (Normal) Collected: 03/31/23 1730    Specimen: Blood Updated: 03/31/23 1813     Sed Rate 3 mm/hr     CBC Auto Differential [[id number]]  (Abnormal) Collected: 03/31/23 1730    Specimen: Blood Updated: 03/31/23 1809     WBC 13.74 10*3/mm3      RBC 1.97 10*6/mm3      Hemoglobin 5.9 g/dL      Hematocrit 18.7 %      MCV 94.9 fL      MCH 29.9 pg      MCHC 31.6 g/dL      RDW 13.4 %      RDW-SD 46.4 fl      MPV 9.4 fL      Platelets 370 10*3/mm3      Neutrophil % 81.0 %      Lymphocyte % 11.6 %      Monocyte % 6.8 %      Eosinophil % 0.1 %      Basophil % 0.0 %      Immature Grans % 0.5 %      Neutrophils, Absolute 11.12 10*3/mm3      Lymphocytes, Absolute 1.60 10*3/mm3      Monocytes, Absolute 0.94 10*3/mm3      Eosinophils, Absolute 0.01 10*3/mm3      Basophils, Absolute 0.00 10*3/mm3      Immature Grans, Absolute 0.07 10*3/mm3      nRBC 0.1 /100 WBC             Objective:     Vitals: /58 (BP Location: Right arm, Patient Position: Lying)   Pulse 90   Temp 98.1 °F (36.7 °C) (Oral)   Resp 18   Ht 160 cm (63\")   Wt " 46.9 kg (103 lb 6.4 oz)   SpO2 98%   BMI 18.32 kg/m²    No intake or output data in the 24 hours ending 23 0706 Temp (24hrs), Av.2 °F (36.8 °C), Min:97.5 °F (36.4 °C), Max:98.8 °F (37.1 °C)      Physical Exam  Vitals reviewed.   Constitutional:       Appearance: She is ill-appearing.   HENT:      Head: Normocephalic and atraumatic.   Eyes:      General:         Right eye: No discharge.         Left eye: No discharge.      Extraocular Movements: Extraocular movements intact.      Conjunctiva/sclera: Conjunctivae normal.   Cardiovascular:      Rate and Rhythm: Normal rate and regular rhythm.      Pulses: Normal pulses.      Heart sounds: No murmur heard.  Pulmonary:      Effort: Pulmonary effort is normal. No respiratory distress.      Breath sounds: No wheezing.   Abdominal:      General: Abdomen is flat. Bowel sounds are normal. There is no distension.   Musculoskeletal:      Right lower leg: No edema.      Left lower leg: No edema.   Skin:     Capillary Refill: Capillary refill takes less than 2 seconds.   Neurological:      General: No focal deficit present.      Mental Status: She is alert and oriented to person, place, and time.   Psychiatric:         Mood and Affect: Mood normal.         Behavior: Behavior normal.             Assessment and Plan:     Primary Problem:  Generalized muscle weakness    Hospital Problem list:    Generalized muscle weakness    Acute blood loss anemia    Melena    Urinary incontinence      PMH:  Past Medical History:   Diagnosis Date   • Antral ulcer    • Bladder cystocele    • C. difficile diarrhea    • CAD (coronary artery disease)    • Cancer     lung    • Colon polyp    • COPD (chronic obstructive pulmonary disease)    • COVID-19 vaccine series completed    • Diarrhea    • Difficulty swallowing    • Disease of thyroid gland    • Diverticulosis    • Elevated cholesterol    • Gastritis    • Generalized OA    • GERD (gastroesophageal reflux disease)    • History of  bladder surgery 01/07/2020   • History of transfusion     after lung surgery 2021   • Hyperlipidemia    • Hypertension    • Liver cyst    • Lung nodule    • Microscopic colitis    • Multiple gastric ulcers     last 5 years ago   • Neck pain    • Neuropathy    • Normal body mass index    • NSAID induced gastritis    • Ulcer of pyloric antrum     UNSPECIFIED ULCER CHRONICITY   • UTI (urinary tract infection)    • Weight loss        Treatment Plan:    Anemia: Acutely noted hemoglobin 5.9.  Status post 2 unit PRBC, improved to 7.9.  Does report black stool.    EGD with nonbleeding gastric ulcer, not source of bleeding.  On Plavix, low-dose Xarelto, aspirin, had femoropopliteal bypass in November 2022.  Anticoagulation held due to GI bleed.  -Echo with EF 70%, overall normal  -Positive FOBT  -Hemoglobin downtrending after transfusion, continue every 6 hours CBC  -[id number] score 5, bleeding rate 12.3%/year. HAS-BLED score 3, bleeding rate 3.74 %/year.  -Discussed with vascular surgery at [facility].  Okay to completely discontinue Plavix.  If bleeding source is identified and treated, can resume Xarelto and aspirin.  If no bleeding source is identified, they recommend resuming aspirin until follow-up with vascular surgery.  -Tagged red blood cells study negative  -Plan for colonoscopy Wednesday    Peripheral vascular disease  Status post femoropopliteal bypass.  Holding anticoagulation and antiplatelet at this time.       Generalized weakness: PT/OT.     Melena: As above     CODE STATUS: Full     DVT prophylaxis: Held due to concern for GI bleed     Disposition: Inpatient    Reviewed treatment plans with the patient and/or family.   30 minutes spent in face to face interaction and coordination of care.     Electronically signed by Chuy Chong MD on 4/4/2023 at 07:06 CDT

## 2023-04-04 NOTE — CASE MANAGEMENT/SOCIAL WORK
Discharge Planning Assessment  [facility]     Patient Name: Trinidad Zhu  MRN: [id number]  Today's Date: 4/4/2023    Admit Date: 3/31/2023        Discharge Needs Assessment     Row Name 04/04/23 1505       Living Environment    People in Home spouse    Current Living Arrangements home    Primary Care Provided by self    Provides Primary Care For no one    Family Caregiver if Needed spouse    Quality of Family Relationships involved;helpful;supportive    Able to Return to Prior Arrangements yes       Resource/Environmental Concerns    Resource/Environmental Concerns none    Transportation Concerns none       Transition Planning    Patient/Family Anticipated Services at Transition none    Transportation Anticipated family or friend will provide       Discharge Needs Assessment    Readmission Within the Last 30 Days no previous admission in last 30 days    Equipment Currently Used at Home none    Concerns to be Addressed no discharge needs identified;denies needs/concerns at this time    Anticipated Changes Related to Illness none    Equipment Needed After Discharge none    Discharge Coordination/Progress Spoke with pt for DC planning.  Pt has a PCP and Rx coverage.  Denies the need for any DME or HH at this time.  Will follow for any DC needs.               Discharge Plan    No documentation.               Continued Care and Services - Admitted Since 3/31/2023    Coordination has not been started for this encounter.          Demographic Summary    No documentation.                Functional Status    No documentation.                Psychosocial    No documentation.                Abuse/Neglect    No documentation.                Legal    No documentation.                Substance Abuse    No documentation.                Patient Forms    No documentation.                   Isabel Potter RN

## 2023-04-05 ENCOUNTER — ANESTHESIA EVENT (OUTPATIENT)
Dept: GASTROENTEROLOGY | Facility: HOSPITAL | Age: 78
DRG: 378 | End: 2023-04-05
Payer: MEDICARE

## 2023-04-05 ENCOUNTER — ANESTHESIA (OUTPATIENT)
Dept: GASTROENTEROLOGY | Facility: HOSPITAL | Age: 78
DRG: 378 | End: 2023-04-05
Payer: MEDICARE

## 2023-04-05 LAB
BACTERIA SPEC AEROBE CULT: NORMAL
BACTERIA SPEC AEROBE CULT: NORMAL
BH BB BLOOD EXPIRATION DATE: NORMAL
BH BB BLOOD TYPE BARCODE: 5100
BH BB DISPENSE STATUS: NORMAL
BH BB PRODUCT CODE: NORMAL
BH BB UNIT NUMBER: NORMAL
CROSSMATCH INTERPRETATION: NORMAL
DEPRECATED RDW RBC AUTO: 55.5 FL (ref 37–54)
DEPRECATED RDW RBC AUTO: 55.7 FL (ref 37–54)
DEPRECATED RDW RBC AUTO: 55.9 FL (ref 37–54)
DEPRECATED RDW RBC AUTO: 57.4 FL (ref 37–54)
DEPRECATED RDW RBC AUTO: 58.3 FL (ref 37–54)
ERYTHROCYTE [DISTWIDTH] IN BLOOD BY AUTOMATED COUNT: 16.9 % (ref 12.3–15.4)
ERYTHROCYTE [DISTWIDTH] IN BLOOD BY AUTOMATED COUNT: 17.2 % (ref 12.3–15.4)
ERYTHROCYTE [DISTWIDTH] IN BLOOD BY AUTOMATED COUNT: 17.2 % (ref 12.3–15.4)
ERYTHROCYTE [DISTWIDTH] IN BLOOD BY AUTOMATED COUNT: 17.4 % (ref 12.3–15.4)
ERYTHROCYTE [DISTWIDTH] IN BLOOD BY AUTOMATED COUNT: 17.5 % (ref 12.3–15.4)
HCT VFR BLD AUTO: 23.8 % (ref 34–46.6)
HCT VFR BLD AUTO: 27.1 % (ref 34–46.6)
HCT VFR BLD AUTO: 27.2 % (ref 34–46.6)
HCT VFR BLD AUTO: 27.6 % (ref 34–46.6)
HCT VFR BLD AUTO: 28.5 % (ref 34–46.6)
HGB BLD-MCNC: 7.8 G/DL (ref 12–15.9)
HGB BLD-MCNC: 8.5 G/DL (ref 12–15.9)
HGB BLD-MCNC: 8.6 G/DL (ref 12–15.9)
HGB BLD-MCNC: 8.7 G/DL (ref 12–15.9)
HGB BLD-MCNC: 9 G/DL (ref 12–15.9)
INR PPP: 1.05 (ref 0.91–1.09)
MCH RBC QN AUTO: 28.8 PG (ref 26.6–33)
MCH RBC QN AUTO: 29.2 PG (ref 26.6–33)
MCH RBC QN AUTO: 29.4 PG (ref 26.6–33)
MCH RBC QN AUTO: 29.4 PG (ref 26.6–33)
MCH RBC QN AUTO: 29.5 PG (ref 26.6–33)
MCHC RBC AUTO-ENTMCNC: 31.4 G/DL (ref 31.5–35.7)
MCHC RBC AUTO-ENTMCNC: 31.5 G/DL (ref 31.5–35.7)
MCHC RBC AUTO-ENTMCNC: 31.6 G/DL (ref 31.5–35.7)
MCHC RBC AUTO-ENTMCNC: 31.6 G/DL (ref 31.5–35.7)
MCHC RBC AUTO-ENTMCNC: 32.8 G/DL (ref 31.5–35.7)
MCV RBC AUTO: 90.2 FL (ref 79–97)
MCV RBC AUTO: 91 FL (ref 79–97)
MCV RBC AUTO: 92.6 FL (ref 79–97)
MCV RBC AUTO: 93.1 FL (ref 79–97)
MCV RBC AUTO: 93.8 FL (ref 79–97)
PLATELET # BLD AUTO: 162 10*3/MM3 (ref 140–450)
PLATELET # BLD AUTO: 207 10*3/MM3 (ref 140–450)
PLATELET # BLD AUTO: 209 10*3/MM3 (ref 140–450)
PLATELET # BLD AUTO: 212 10*3/MM3 (ref 140–450)
PLATELET # BLD AUTO: 222 10*3/MM3 (ref 140–450)
PMV BLD AUTO: 8.8 FL (ref 6–12)
PMV BLD AUTO: 8.9 FL (ref 6–12)
PMV BLD AUTO: 9 FL (ref 6–12)
PROTHROMBIN TIME: 13.8 SECONDS (ref 11.8–14.8)
RBC # BLD AUTO: 2.64 10*6/MM3 (ref 3.77–5.28)
RBC # BLD AUTO: 2.89 10*6/MM3 (ref 3.77–5.28)
RBC # BLD AUTO: 2.98 10*6/MM3 (ref 3.77–5.28)
RBC # BLD AUTO: 2.99 10*6/MM3 (ref 3.77–5.28)
RBC # BLD AUTO: 3.06 10*6/MM3 (ref 3.77–5.28)
UNIT  ABO: NORMAL
UNIT  RH: NORMAL
WBC NRBC COR # BLD: 5.93 10*3/MM3 (ref 3.4–10.8)
WBC NRBC COR # BLD: 6.45 10*3/MM3 (ref 3.4–10.8)
WBC NRBC COR # BLD: 6.73 10*3/MM3 (ref 3.4–10.8)
WBC NRBC COR # BLD: 7.81 10*3/MM3 (ref 3.4–10.8)
WBC NRBC COR # BLD: 7.83 10*3/MM3 (ref 3.4–10.8)

## 2023-04-05 PROCEDURE — 85027 COMPLETE CBC AUTOMATED: CPT | Performed by: FAMILY MEDICINE

## 2023-04-05 PROCEDURE — 45380 COLONOSCOPY AND BIOPSY: CPT | Performed by: INTERNAL MEDICINE

## 2023-04-05 PROCEDURE — 94760 N-INVAS EAR/PLS OXIMETRY 1: CPT

## 2023-04-05 PROCEDURE — 94799 UNLISTED PULMONARY SVC/PX: CPT

## 2023-04-05 PROCEDURE — 25010000002 PROPOFOL 10 MG/ML EMULSION: Performed by: NURSE ANESTHETIST, CERTIFIED REGISTERED

## 2023-04-05 PROCEDURE — 88305 TISSUE EXAM BY PATHOLOGIST: CPT | Performed by: INTERNAL MEDICINE

## 2023-04-05 PROCEDURE — 85610 PROTHROMBIN TIME: CPT | Performed by: INTERNAL MEDICINE

## 2023-04-05 PROCEDURE — 25010000002 ENOXAPARIN PER 10 MG: Performed by: FAMILY MEDICINE

## 2023-04-05 PROCEDURE — 0DBH8ZX EXCISION OF CECUM, VIA NATURAL OR ARTIFICIAL OPENING ENDOSCOPIC, DIAGNOSTIC: ICD-10-PCS | Performed by: INTERNAL MEDICINE

## 2023-04-05 PROCEDURE — 0DJD8ZZ INSPECTION OF LOWER INTESTINAL TRACT, VIA NATURAL OR ARTIFICIAL OPENING ENDOSCOPIC: ICD-10-PCS | Performed by: INTERNAL MEDICINE

## 2023-04-05 PROCEDURE — 97110 THERAPEUTIC EXERCISES: CPT

## 2023-04-05 RX ORDER — PROPOFOL 10 MG/ML
VIAL (ML) INTRAVENOUS AS NEEDED
Status: DISCONTINUED | OUTPATIENT
Start: 2023-04-05 | End: 2023-04-05 | Stop reason: SURG

## 2023-04-05 RX ORDER — LIDOCAINE HYDROCHLORIDE 20 MG/ML
INJECTION, SOLUTION EPIDURAL; INFILTRATION; INTRACAUDAL; PERINEURAL AS NEEDED
Status: DISCONTINUED | OUTPATIENT
Start: 2023-04-05 | End: 2023-04-05 | Stop reason: SURG

## 2023-04-05 RX ORDER — BUPIVACAINE HCL/0.9 % NACL/PF 0.125 %
PLASTIC BAG, INJECTION (ML) EPIDURAL AS NEEDED
Status: DISCONTINUED | OUTPATIENT
Start: 2023-04-05 | End: 2023-04-05 | Stop reason: SURG

## 2023-04-05 RX ADMIN — LORAZEPAM 0.5 MG: 0.5 TABLET ORAL at 16:34

## 2023-04-05 RX ADMIN — PANTOPRAZOLE SODIUM 40 MG: 40 INJECTION, POWDER, FOR SOLUTION INTRAVENOUS at 16:38

## 2023-04-05 RX ADMIN — CARVEDILOL 3.12 MG: 3.12 TABLET, FILM COATED ORAL at 08:08

## 2023-04-05 RX ADMIN — PROPOFOL INJECTABLE EMULSION 450 MG: 10 INJECTION, EMULSION INTRAVENOUS at 11:41

## 2023-04-05 RX ADMIN — Medication 10 ML: at 08:08

## 2023-04-05 RX ADMIN — Medication 10 ML: at 20:50

## 2023-04-05 RX ADMIN — TRAMADOL HYDROCHLORIDE 50 MG: 50 TABLET, COATED ORAL at 06:33

## 2023-04-05 RX ADMIN — TRAMADOL HYDROCHLORIDE 50 MG: 50 TABLET, COATED ORAL at 16:34

## 2023-04-05 RX ADMIN — ALBUTEROL SULFATE 2.5 MG: 2.5 SOLUTION RESPIRATORY (INHALATION) at 18:55

## 2023-04-05 RX ADMIN — LISINOPRIL 2.5 MG: 2.5 TABLET ORAL at 08:08

## 2023-04-05 RX ADMIN — ALBUTEROL SULFATE 2.5 MG: 2.5 SOLUTION RESPIRATORY (INHALATION) at 15:22

## 2023-04-05 RX ADMIN — TRAMADOL HYDROCHLORIDE 50 MG: 50 TABLET, COATED ORAL at 20:50

## 2023-04-05 RX ADMIN — Medication 200 MCG: at 11:56

## 2023-04-05 RX ADMIN — OXYCODONE HYDROCHLORIDE 5 MG: 5 TABLET ORAL at 00:42

## 2023-04-05 RX ADMIN — LIDOCAINE HYDROCHLORIDE 50 MG: 20 INJECTION, SOLUTION EPIDURAL; INFILTRATION; INTRACAUDAL; PERINEURAL at 11:40

## 2023-04-05 RX ADMIN — ENOXAPARIN SODIUM 30 MG: 100 INJECTION SUBCUTANEOUS at 14:22

## 2023-04-05 RX ADMIN — LEVOTHYROXINE SODIUM 50 MCG: 50 TABLET ORAL at 06:33

## 2023-04-05 RX ADMIN — ALBUTEROL SULFATE 2.5 MG: 2.5 SOLUTION RESPIRATORY (INHALATION) at 07:15

## 2023-04-05 RX ADMIN — Medication 200 MCG: at 12:04

## 2023-04-05 RX ADMIN — SODIUM CHLORIDE 100 ML/HR: 9 INJECTION, SOLUTION INTRAVENOUS at 11:10

## 2023-04-05 RX ADMIN — LORAZEPAM 0.5 MG: 0.5 TABLET ORAL at 06:33

## 2023-04-05 RX ADMIN — CARVEDILOL 3.12 MG: 3.12 TABLET, FILM COATED ORAL at 17:14

## 2023-04-05 RX ADMIN — PANTOPRAZOLE SODIUM 40 MG: 40 INJECTION, POWDER, FOR SOLUTION INTRAVENOUS at 08:08

## 2023-04-05 NOTE — PLAN OF CARE
Goal Outcome Evaluation:   Patient agreed to sit EOB and exercise. Actively worked thru UE and LE ex's. Denied opportunity to walk, states she walks in restrepo with .

## 2023-04-05 NOTE — PLAN OF CARE
Goal Outcome Evaluation:  Plan of Care Reviewed With: patient           Outcome Evaluation: Soft BP 94/50, on RA, prn pain med given c/o of back and headache, pt reported being anxious, prn ativan given, down for colonoscopy today, pt tolerated procedure, great appetite since arrival back to floor, friend at bedside, safety maintained

## 2023-04-05 NOTE — PLAN OF CARE
Goal Outcome Evaluation:           Progress: no change  Outcome Evaluation: VSS, c/o pain in head and back throughout shift managed with tramadol and oxycodone prn. Finished bowel prep in preperation for colonoscopy on 4/5. Hgb stabilized. Safety maintained. Will continue to monitor.

## 2023-04-05 NOTE — OP NOTE
COLONOSCOPY    Date:  4/5/2023    Indications: Hematochezia.  Normocytic anemia.       Procedure: Colonoscopy.    Sedation: As per anesthesia.    Surgeon: Mike Kearns MD       Procedure  Description: After informed consent was obtained, a timeout was called in the endoscopy suite to confirm the correct patient and appropriate procedure.  Thereafter with the patient in the left lateral position, adult Olympus colonoscope was inserted into the rectum.  Thereafter under direct vision scope was slowly advanced into the cecum with moderate amount of technical difficulties due to severe diverticulosis of the sigmoid colon, colonic redundancy and sharp regulations of the colon, requiring brief counterpressure.  Ileocecal valve and appendiceal orifice were identified and photodocumentation was obtained.  Careful examination of the colon was performed while slowly withdrawing the scope.  Retroflex examination of the rectum was also performed.    Findings: Preparation was good.  Two small, 3 mm, polyps in the cecum - both polyps removed with a cold biopsy forceps.  No significant postpolypectomy bleeding noted.  Quite severe diverticulosis of the distal sigmoid colon without signs of diverticular bleeding or diverticulitis.  Small internal hemorrhoids.  No fresh or old blood or clots noted in the colon.    Complications: No immediate complications.    Recommendations: Resume diet.  Resume anticoagulation in 2 days.  Follow-up pathology results with gastroenterologist on an outpatient basis.  In case of recurrent significant hematochezia - consider angiography with possible embolization versus sigmoid colon resection.    Procedure CPT code: 26295    Postop diagnosis:  K63.5  K57.30  K64       Mike Kearns MD

## 2023-04-05 NOTE — PROGRESS NOTES
Daily Progress Note  Trinidad Zhu  MRN: [id number] LOS: 4    Admit Date: 3/31/2023   4/5/2023 07:50 CDT    Subjective:          Chief Complaint:  Weakness, dyspnea    Interval History:    Reviewed overnight events and nursing notes.   Patient doing well this morning.  Did have hemoglobin drop yesterday and required transfusion.  No new complaints this morning.    Review of Systems   Constitutional: Positive for activity change and fatigue. Negative for fever.   Respiratory: Positive for shortness of breath. Negative for wheezing.    Gastrointestinal: Positive for anal bleeding and blood in stool. Negative for abdominal pain, nausea and vomiting.   Skin: Positive for pallor.   Neurological: Positive for weakness.       DIET:  NPO Diet NPO Type: Strict NPO    Medications:   sodium chloride, 100 mL/hr, Last Rate: Stopped (04/01/23 1327)      albuterol, 2.5 mg, Nebulization, Q4H - RT  carvedilol, 3.125 mg, Oral, BID With Meals  enoxaparin, 30 mg, Subcutaneous, Q24H  levothyroxine, 50 mcg, Oral, Q AM  lisinopril, 2.5 mg, Oral, Q24H  pantoprazole, 40 mg, Intravenous, BID AC  senna-docusate sodium, 2 tablet, Oral, BID  sodium chloride, 10 mL, Intravenous, Q12H        Data:     Code Status:   Code Status and Medical Interventions:   Ordered at: 03/31/23 1623     Level Of Support Discussed With:    Patient     Code Status (Patient has no pulse and is not breathing):    CPR (Attempt to Resuscitate)     Medical Interventions (Patient has pulse or is breathing):    Full Support     Release to patient:    Routine Release       Family History   Problem Relation Age of Onset   • Breast cancer Maternal Aunt    • Heart disease Mother    • Heart disease Father    • GI problems Neg Hx         MALIGNANCIES   • Colon cancer Neg Hx    • Colon polyps Neg Hx      Social History     Socioeconomic History   • Marital status:    Tobacco Use   • Smoking status: Former     Packs/day: 1.50     Years: 20.00     Pack years: 30.00      Types: Cigarettes     Quit date:      Years since quittin.2   • Smokeless tobacco: Never   Vaping Use   • Vaping Use: Never used   Substance and Sexual Activity   • Alcohol use: Yes     Comment: occasionally   • Drug use: No   • Sexual activity: Not Currently       Labs:    Lab Results (last 72 hours)     Procedure Component Value Units Date/Time    Basic Metabolic Panel [[id number]]  (Abnormal) Collected: 23    Specimen: Blood Updated: 23 06     Glucose 91 mg/dL      BUN 31 mg/dL      Creatinine 0.84 mg/dL      Sodium 137 mmol/L      Potassium 3.8 mmol/L      Chloride 103 mmol/L      CO2 27.0 mmol/L      Calcium 8.5 mg/dL      BUN/Creatinine Ratio 36.9     Anion Gap 7.0 mmol/L      eGFR 71.7 mL/min/1.73     Narrative:      GFR Normal >60  Chronic Kidney Disease <60  Kidney Failure <15    The GFR formula is only valid for adults with stable renal function between ages 18 and 70.    CBC (No Diff) [[id number]]  (Abnormal) Collected: 23    Specimen: Blood Updated: 23 0551     WBC 8.85 10*3/mm3      RBC 2.56 10*6/mm3      Hemoglobin 7.9 g/dL      Hematocrit 24.0 %      MCV 93.8 fL      MCH 30.9 pg      MCHC 32.9 g/dL      RDW 13.7 %      RDW-SD 46.6 fl      MPV 9.0 fL      Platelets 236 10*3/mm3     CBC (No Diff) [[id number]]  (Abnormal) Collected: 23 2324    Specimen: Blood Updated: 23 2348     WBC 10.00 10*3/mm3      RBC 2.37 10*6/mm3      Hemoglobin 7.1 g/dL      Hematocrit 22.2 %      MCV 93.7 fL      MCH 30.0 pg      MCHC 32.0 g/dL      RDW 13.9 %      RDW-SD 46.9 fl      MPV 9.2 fL      Platelets 221 10*3/mm3     CBC (No Diff) [[id number]]  (Abnormal) Collected: 23 1845    Specimen: Blood Updated: 23 1907     WBC 10.29 10*3/mm3      RBC 2.48 10*6/mm3      Hemoglobin 7.5 g/dL      Hematocrit 23.3 %      MCV 94.0 fL      MCH 30.2 pg      MCHC 32.2 g/dL      RDW 13.7 %      RDW-SD 47.0 fl      MPV 9.2 fL      Platelets 249 10*3/mm3     Blood Culture -  Blood, Arm, Right [[id number]]  (Normal) Collected: 03/31/23 1730    Specimen: Blood from Arm, Right Updated: 04/01/23 1816     Blood Culture No growth at 24 hours    Blood Culture - Blood, Arm, Left [[id number]]  (Normal) Collected: 03/31/23 1730    Specimen: Blood from Arm, Left Updated: 04/01/23 1816     Blood Culture No growth at 24 hours    Occult Blood X 1, Stool - Stool, Per Rectum [[id number]]  (Abnormal) Collected: 04/01/23 1537    Specimen: Stool from Per Rectum Updated: 04/01/23 1548     Fecal Occult Blood Positive    CBC (No Diff) [[id number]]  (Abnormal) Collected: 04/01/23 1245    Specimen: Blood Updated: 04/01/23 1308     WBC 9.41 10*3/mm3      RBC 2.31 10*6/mm3      Hemoglobin 7.3 g/dL      Hematocrit 21.5 %      MCV 93.1 fL      MCH 31.6 pg      MCHC 34.0 g/dL      RDW 13.5 %      RDW-SD 46.4 fl      MPV 9.1 fL      Platelets 212 10*3/mm3     POC Glucose Once [[id number]]  (Normal) Collected: 04/01/23 1104    Specimen: Blood Updated: 04/01/23 1119     Glucose 100 mg/dL      Comment: : [id number] [facility] JenniferMeter ID: [id number]       Basic Metabolic Panel [[id number]]  (Abnormal) Collected: 04/01/23 0448    Specimen: Blood Updated: 04/01/23 0543     Glucose 81 mg/dL      BUN 35 mg/dL      Creatinine 0.84 mg/dL      Sodium 135 mmol/L      Potassium 3.8 mmol/L      Chloride 99 mmol/L      CO2 26.0 mmol/L      Calcium 8.7 mg/dL      BUN/Creatinine Ratio 41.7     Anion Gap 10.0 mmol/L      eGFR 71.7 mL/min/1.73     Narrative:      GFR Normal >60  Chronic Kidney Disease <60  Kidney Failure <15    The GFR formula is only valid for adults with stable renal function between ages 18 and 70.    CBC & Differential [[id number]]  (Abnormal) Collected: 04/01/23 0448    Specimen: Blood Updated: 04/01/23 0529    Narrative:      The following orders were created for panel order CBC & Differential.  Procedure                               Abnormality         Status                     ---------                                -----------         ------                     CBC Auto Differential[[id number]]        Abnormal            Final result                 Please view results for these tests on the individual orders.    CBC Auto Differential [[id number]]  (Abnormal) Collected: 04/01/23 0448    Specimen: Blood Updated: 04/01/23 0529     WBC 10.04 10*3/mm3      RBC 2.61 10*6/mm3      Hemoglobin 7.9 g/dL      Hematocrit 24.6 %      MCV 94.3 fL      MCH 30.3 pg      MCHC 32.1 g/dL      RDW 13.1 %      RDW-SD 44.8 fl      MPV 9.3 fL      Platelets 246 10*3/mm3      Neutrophil % 58.5 %      Lymphocyte % 25.1 %      Monocyte % 14.8 %      Eosinophil % 1.0 %      Basophil % 0.1 %      Immature Grans % 0.5 %      Neutrophils, Absolute 5.87 10*3/mm3      Lymphocytes, Absolute 2.52 10*3/mm3      Monocytes, Absolute 1.49 10*3/mm3      Eosinophils, Absolute 0.10 10*3/mm3      Basophils, Absolute 0.01 10*3/mm3      Immature Grans, Absolute 0.05 10*3/mm3      nRBC 0.0 /100 WBC     Myoglobin, Serum [[id number]]  (Normal) Collected: 03/31/23 1730    Specimen: Blood Updated: 04/01/23 0143     Myoglobin 41.7 ng/mL     Narrative:      Results may be falsely decreased if patient taking Biotin.      Urinalysis With Culture If Indicated - Urine, Clean Catch [[id number]]  (Abnormal) Collected: 03/31/23 2125    Specimen: Urine, Clean Catch Updated: 03/31/23 2140     Color, UA Yellow     Appearance, UA Clear     pH, UA 6.0     Specific Gravity, UA 1.018     Glucose, UA Negative     Ketones, UA Negative     Bilirubin, UA Negative     Blood, UA Negative     Protein, UA Negative     Leuk Esterase, UA Trace     Nitrite, UA Negative     Urobilinogen, UA 0.2 E.U./dL    Narrative:      In absence of clinical symptoms, the presence of pyuria, bacteria, and/or nitrites on the urinalysis result does not correlate with infection.    Urinalysis, Microscopic Only - Urine, Clean Catch [[id number]]  (Abnormal) Collected: 03/31/23 2125    Specimen: Urine, Clean Catch  Updated: 03/31/23 2140     RBC, UA 0-2 /HPF      WBC, UA 3-5 /HPF      Comment: Urine culture not indicated.        Bacteria, UA None Seen /HPF      Squamous Epithelial Cells, UA 0-2 /HPF      Hyaline Casts, UA 0-2 /LPF      Methodology Automated Microscopy    High Sensitivity Troponin T 2Hr [[id number]]  (Abnormal) Collected: 03/31/23 1923    Specimen: Blood Updated: 03/31/23 2000     HS Troponin T 16 ng/L      Troponin T Delta -2 ng/L     Narrative:      High Sensitive Troponin T Reference Range:  <10.0 ng/L- Negative Female for AMI  <15.0 ng/L- Negative Male for AMI  >=10 - Abnormal Female indicating possible myocardial injury.  >=15 - Abnormal Male indicating possible myocardial injury.   Clinicians would have to utilize clinical acumen, EKG, Troponin, and serial changes to determine if it is an Acute Myocardial Infarction or myocardial injury due to an underlying chronic condition.         TSH [[id number]]  (Normal) Collected: 03/31/23 1730    Specimen: Blood Updated: 03/31/23 1832     TSH 3.090 uIU/mL     T4, Free [[id number]]  (Normal) Collected: 03/31/23 1730    Specimen: Blood Updated: 03/31/23 1831     Free T4 1.14 ng/dL     Narrative:      Results may be falsely increased if patient taking Biotin.      COVID PRE-OP / PRE-PROCEDURE SCREENING ORDER (NO ISOLATION) - Swab, Nasopharynx [[id number]]  (Normal) Collected: 03/31/23 1724    Specimen: Swab from Nasopharynx Updated: 03/31/23 1829    Narrative:      The following orders were created for panel order COVID PRE-OP / PRE-PROCEDURE SCREENING ORDER (NO ISOLATION) - Swab, Nasopharynx.  Procedure                               Abnormality         Status                     ---------                               -----------         ------                     COVID-19 and FLU A/B PCR...[[id number]]  Normal              Final result                 Please view results for these tests on the individual orders.    COVID-19 and FLU A/B PCR - Swab, Nasopharynx  "[[id number]]  (Normal) Collected: 03/31/23 1724    Specimen: Swab from Nasopharynx Updated: 03/31/23 1829     COVID19 Not Detected     Influenza A PCR Not Detected     Influenza B PCR Not Detected    Narrative:      Fact sheet for providers: https://www.fda.gov/media/922549/download    Fact sheet for patients: https://www.fda.gov/media/820968/download    Test performed by PCR.    Procalcitonin [[id number]]  (Normal) Collected: 03/31/23 1730    Specimen: Blood Updated: 03/31/23 1828     Procalcitonin 0.06 ng/mL     Narrative:      As a Marker for Sepsis (Non-Neonates):    1. <0.5 ng/mL represents a low risk of severe sepsis and/or septic shock.  2. >2 ng/mL represents a high risk of severe sepsis and/or septic shock.    As a Marker for Lower Respiratory Tract Infections that require antibiotic therapy:    PCT on Admission    Antibiotic Therapy       6-12 Hrs later    >0.5                Strongly Recommended  >0.25 - <0.5        Recommended   0.1 - 0.25          Discouraged              Remeasure/reassess PCT  <0.1                Strongly Discouraged     Remeasure/reassess PCT    As 28 day mortality risk marker: \"Change in Procalcitonin Result\" (>80% or <=80%) if Day 0 (or Day 1) and Day 4 values are available. Refer to http://www.[employer]-pct-calculator.com    Change in PCT <=80%  A decrease of PCT levels below or equal to 80% defines a positive change in PCT test result representing a higher risk for 28-day all-cause mortality of patients diagnosed with severe sepsis for septic shock.    Change in PCT >80%  A decrease of PCT levels of more than 80% defines a negative change in PCT result representing a lower risk for 28-day all-cause mortality of patients diagnosed with severe sepsis or septic shock.       Lipid Panel [[id number]] Collected: 03/31/23 1730    Specimen: Blood Updated: 03/31/23 1828     Total Cholesterol 109 mg/dL      Triglycerides 97 mg/dL      HDL Cholesterol 60 mg/dL      LDL Cholesterol  31 mg/dL      " VLDL Cholesterol 18 mg/dL      LDL/HDL Ratio 0.49    Narrative:      Cholesterol Reference Ranges  (U.S. Department of Health and Human Services ATP III Classifications)    Desirable          <200 mg/dL  Borderline High    200-239 mg/dL  High Risk          >240 mg/dL      Triglyceride Reference Ranges  (U.S. Department of Health and Human Services ATP III Classifications)    Normal           <150 mg/dL  Borderline High  150-199 mg/dL  High             200-499 mg/dL  Very High        >500 mg/dL    HDL Reference Ranges  (U.S. Department of Health and Human Services ATP III Classifications)    Low     <40 mg/dl (major risk factor for CHD)  High    >60 mg/dl ('negative' risk factor for CHD)        LDL Reference Ranges  (U.S. Department of Health and Human Services ATP III Classifications)    Optimal          <100 mg/dL  Near Optimal     100-129 mg/dL  Borderline High  130-159 mg/dL  High             160-189 mg/dL  Very High        >189 mg/dL    C-reactive Protein [[id number]]  (Normal) Collected: 03/31/23 1730    Specimen: Blood Updated: 03/31/23 1828     C-Reactive Protein <0.30 mg/dL     Comprehensive Metabolic Panel [[id number]]  (Abnormal) Collected: 03/31/23 1730    Specimen: Blood Updated: 03/31/23 1826     Glucose 127 mg/dL      BUN 37 mg/dL      Creatinine 0.75 mg/dL      Sodium 134 mmol/L      Potassium 4.4 mmol/L      Chloride 96 mmol/L      CO2 25.0 mmol/L      Calcium 9.6 mg/dL      Total Protein 6.3 g/dL      Albumin 4.3 g/dL      ALT (SGPT) 19 U/L      AST (SGOT) 22 U/L      Alkaline Phosphatase 41 U/L      Total Bilirubin 0.3 mg/dL      Globulin 2.0 gm/dL      A/G Ratio 2.2 g/dL      BUN/Creatinine Ratio 49.3     Anion Gap 13.0 mmol/L      eGFR 82.1 mL/min/1.73     Narrative:      GFR Normal >60  Chronic Kidney Disease <60  Kidney Failure <15    The GFR formula is only valid for adults with stable renal function between ages 18 and 70.    CK [[id number]]  (Normal) Collected: 03/31/23 1730    Specimen: Blood  Updated: 03/31/23 1826     Creatine Kinase 41 U/L     Lactic Acid, Plasma [[id number]]  (Normal) Collected: 03/31/23 1730    Specimen: Blood Updated: 03/31/23 1824     Lactate 1.7 mmol/L     Phosphorus [[id number]]  (Abnormal) Collected: 03/31/23 1730    Specimen: Blood Updated: 03/31/23 1823     Phosphorus 5.2 mg/dL     BNP [[id number]]  (Normal) Collected: 03/31/23 1730    Specimen: Blood Updated: 03/31/23 1822     proBNP 274.8 pg/mL     Narrative:      Among patients with dyspnea, NT-proBNP is highly sensitive for the detection of acute congestive heart failure. In addition NT-proBNP of <300 pg/ml effectively rules out acute congestive heart failure with 99% negative predictive value.    Results may be falsely decreased if patient taking Biotin.      High Sensitivity Troponin T [[id number]]  (Abnormal) Collected: 03/31/23 1730    Specimen: Blood Updated: 03/31/23 1821     HS Troponin T 18 ng/L     Narrative:      High Sensitive Troponin T Reference Range:  <10.0 ng/L- Negative Female for AMI  <15.0 ng/L- Negative Male for AMI  >=10 - Abnormal Female indicating possible myocardial injury.  >=15 - Abnormal Male indicating possible myocardial injury.   Clinicians would have to utilize clinical acumen, EKG, Troponin, and serial changes to determine if it is an Acute Myocardial Infarction or myocardial injury due to an underlying chronic condition.         Lipase [[id number]]  (Abnormal) Collected: 03/31/23 1730    Specimen: Blood Updated: 03/31/23 1821     Lipase 72 U/L     Magnesium [[id number]]  (Normal) Collected: 03/31/23 1730    Specimen: Blood Updated: 03/31/23 1820     Magnesium 1.8 mg/dL     D-dimer, Quantitative [[id number]]  (Normal) Collected: 03/31/23 1730    Specimen: Blood Updated: 03/31/23 1814     D-Dimer, Quantitative 0.53 MCGFEU/mL     Narrative:      According to the assay 's published package insert, a normal (<0.50 MCGFEU/mL) D-dimer result in conjunction with a non-high clinical probability  "assessment, excludes deep vein thrombosis (DVT) and pulmonary embolism (PE) with high sensitivity.    D-dimer values increase with age and this can make VTE exclusion of an older population difficult. To address this, the [employer], based on best available evidence and recent guidelines, recommends that clinicians use age-adjusted D-dimer thresholds in patients greater than 50 years of age with: a) a low probability of PE who do not meet all Pulmonary Embolism Rule Out Criteria, or b) in those with intermediate probability of PE.   The formula for an age-adjusted D-dimer cut-off is \"age/100\".  For example, a 60 year old patient would have an age-adjusted cut-off of 0.60 MCGFEU/mL and an 80 year old 0.80 MCGFEU/mL.    Sedimentation Rate [[id number]]  (Normal) Collected: 03/31/23 1730    Specimen: Blood Updated: 03/31/23 1813     Sed Rate 3 mm/hr     CBC Auto Differential [[id number]]  (Abnormal) Collected: 03/31/23 1730    Specimen: Blood Updated: 03/31/23 1809     WBC 13.74 10*3/mm3      RBC 1.97 10*6/mm3      Hemoglobin 5.9 g/dL      Hematocrit 18.7 %      MCV 94.9 fL      MCH 29.9 pg      MCHC 31.6 g/dL      RDW 13.4 %      RDW-SD 46.4 fl      MPV 9.4 fL      Platelets 370 10*3/mm3      Neutrophil % 81.0 %      Lymphocyte % 11.6 %      Monocyte % 6.8 %      Eosinophil % 0.1 %      Basophil % 0.0 %      Immature Grans % 0.5 %      Neutrophils, Absolute 11.12 10*3/mm3      Lymphocytes, Absolute 1.60 10*3/mm3      Monocytes, Absolute 0.94 10*3/mm3      Eosinophils, Absolute 0.01 10*3/mm3      Basophils, Absolute 0.00 10*3/mm3      Immature Grans, Absolute 0.07 10*3/mm3      nRBC 0.1 /100 WBC             Objective:     Vitals: /57 (BP Location: Right arm, Patient Position: Lying)   Pulse 90   Temp 98 °F (36.7 °C) (Oral)   Resp 17   Ht 160 cm (63\")   Wt 48.3 kg (106 lb 6.4 oz)   SpO2 100%   BMI 18.85 kg/m²      Intake/Output Summary (Last 24 hours) at 4/5/2023 1053  Last data filed at " 2023 1815  Gross per 24 hour   Intake 300 ml   Output 200 ml   Net 100 ml    Temp (24hrs), Av.9 °F (36.6 °C), Min:97.5 °F (36.4 °C), Max:98.5 °F (36.9 °C)      Physical Exam  Vitals reviewed.   Constitutional:       Appearance: She is ill-appearing.   HENT:      Head: Normocephalic and atraumatic.   Eyes:      General:         Right eye: No discharge.         Left eye: No discharge.      Extraocular Movements: Extraocular movements intact.      Conjunctiva/sclera: Conjunctivae normal.   Cardiovascular:      Rate and Rhythm: Normal rate and regular rhythm.      Pulses: Normal pulses.      Heart sounds: No murmur heard.  Pulmonary:      Effort: Pulmonary effort is normal. No respiratory distress.      Breath sounds: No wheezing.   Abdominal:      General: Abdomen is flat. Bowel sounds are normal. There is no distension.   Musculoskeletal:      Right lower leg: No edema.      Left lower leg: No edema.   Skin:     Capillary Refill: Capillary refill takes less than 2 seconds.   Neurological:      General: No focal deficit present.      Mental Status: She is alert and oriented to person, place, and time.   Psychiatric:         Mood and Affect: Mood normal.         Behavior: Behavior normal.             Assessment and Plan:     Primary Problem:  Generalized muscle weakness    Hospital Problem list:    Generalized muscle weakness    Acute blood loss anemia    Melena    Urinary incontinence    Gastrointestinal hemorrhage      PMH:  Past Medical History:   Diagnosis Date   • Antral ulcer    • Bladder cystocele    • C. difficile diarrhea    • CAD (coronary artery disease)    • Cancer     lung    • Colon polyp    • COPD (chronic obstructive pulmonary disease)    • COVID-19 vaccine series completed    • Diarrhea    • Difficulty swallowing    • Disease of thyroid gland    • Diverticulosis    • Elevated cholesterol    • Gastritis    • Generalized OA    • GERD (gastroesophageal reflux disease)    • History of bladder  surgery 01/07/2020   • History of transfusion     after lung surgery 2021   • Hyperlipidemia    • Hypertension    • Liver cyst    • Lung nodule    • Microscopic colitis    • Multiple gastric ulcers     last 5 years ago   • Neck pain    • Neuropathy    • Normal body mass index    • NSAID induced gastritis    • Ulcer of pyloric antrum     UNSPECIFIED ULCER CHRONICITY   • UTI (urinary tract infection)    • Weight loss        Treatment Plan:    Anemia: Acutely noted hemoglobin 5.9.  Status post 2 unit PRBC, improved to 7.9.  Does report black stool.    EGD with nonbleeding gastric ulcer, not source of bleeding.  On Plavix, low-dose Xarelto, aspirin, had femoropopliteal bypass in November 2022.  Anticoagulation held due to GI bleed.  -Echo with EF 70%, overall normal  -Positive FOBT  -Hemoglobin downtrending after transfusion, continue every 6 hours CBC  -[id number] score 5, bleeding rate 12.3%/year. HAS-BLED score 3, bleeding rate 3.74 %/year.  -Discussed with vascular surgery at [place].  Okay to completely discontinue Plavix.  If bleeding source is identified and treated, can resume Xarelto and aspirin.  If no bleeding source is identified, they recommend resuming aspirin until follow-up with vascular surgery.  -Tagged red blood cells study negative  -Plan for colonoscopy today    Peripheral vascular disease  Status post femoropopliteal bypass.  Holding anticoagulation and antiplatelet at this time.       Generalized weakness: PT/OT.     Melena: As above     CODE STATUS: Full     DVT prophylaxis: Held due to concern for GI bleed     Disposition: Inpatient    Reviewed treatment plans with the patient and/or family.   30 minutes spent in face to face interaction and coordination of care.     Electronically signed by Chuy Chong MD on 4/5/2023 at 07:50 CDT

## 2023-04-05 NOTE — ANESTHESIA PREPROCEDURE EVALUATION
Anesthesia Evaluation     Patient summary reviewed and Nursing notes reviewed   NPO Solid Status: > 8 hours  NPO Liquid Status: > 2 hours           Airway   Mallampati: I  TM distance: >3 FB  Neck ROM: full  No difficulty expected  Dental - normal exam     Pulmonary - normal exam   (+) a smoker (quit 1996) Former, lung cancer (s/p right upper lobectomy), COPD moderate, shortness of breath,   Cardiovascular - normal exam  Exercise tolerance: good (4-7 METS)    PT is on anticoagulation therapy  Patient on routine beta blocker and Beta blocker given within 24 hours of surgery    (+) hypertension well controlled 2 medications or greater, PVD, DVT resolved, hyperlipidemia,       Neuro/Psych  (+) syncope,    (-) seizures, TIA, CVA  GI/Hepatic/Renal/Endo    (+)  GERD well controlled, PUD,  liver disease fatty liver disease, thyroid problem hypothyroidism  (-) no renal disease, diabetes    Musculoskeletal     (+) neck pain,   Abdominal  - normal exam   Substance History      OB/GYN          Other   arthritis,    history of cancer remission                      Anesthesia Plan    ASA 3     MAC     intravenous induction     Anesthetic plan, risks, benefits, and alternatives have been provided, discussed and informed consent has been obtained with: patient.        CODE STATUS:

## 2023-04-05 NOTE — ANESTHESIA POSTPROCEDURE EVALUATION
Patient: Trinidad Zhu    Procedure Summary     Date: 04/05/23 Room / Location: [facility] ENDOSCOPY 4 / [facility] ENDOSCOPY    Anesthesia Start: 1134 Anesthesia Stop: 1211    Procedure: COLONOSCOPY WITH ANESTHESIA Diagnosis:       Gastrointestinal hemorrhage, unspecified gastrointestinal hemorrhage type      (Gastrointestinal hemorrhage, unspecified gastrointestinal hemorrhage type [K92.2])    Surgeons: Mike Kearns MD Provider: Kem Booth CRNA    Anesthesia Type: MAC ASA Status: 3          Anesthesia Type: MAC    Vitals  Vitals Value Taken Time   BP     Temp     Pulse 69 04/05/23 1211   Resp     SpO2 97 % 04/05/23 1211   Vitals shown include unvalidated device data.        Post Anesthesia Care and Evaluation    Patient location during evaluation: PACU  Patient participation: complete - patient participated  Level of consciousness: awake and awake and alert  Pain score: 0  Pain management: adequate    Airway patency: patent  Anesthetic complications: No anesthetic complications    Cardiovascular status: acceptable and stable  Respiratory status: acceptable and unassisted  Hydration status: acceptable

## 2023-04-06 ENCOUNTER — READMISSION MANAGEMENT (OUTPATIENT)
Dept: CALL CENTER | Facility: HOSPITAL | Age: 78
End: 2023-04-06
Payer: MEDICARE

## 2023-04-06 VITALS
OXYGEN SATURATION: 100 % | RESPIRATION RATE: 16 BRPM | TEMPERATURE: 98 F | BODY MASS INDEX: 18.85 KG/M2 | DIASTOLIC BLOOD PRESSURE: 58 MMHG | HEIGHT: 63 IN | WEIGHT: 106.4 LBS | HEART RATE: 75 BPM | SYSTOLIC BLOOD PRESSURE: 115 MMHG

## 2023-04-06 LAB
CYTO UR: NORMAL
DEPRECATED RDW RBC AUTO: 56.2 FL (ref 37–54)
ERYTHROCYTE [DISTWIDTH] IN BLOOD BY AUTOMATED COUNT: 16.9 % (ref 12.3–15.4)
HCT VFR BLD AUTO: 24.3 % (ref 34–46.6)
HGB BLD-MCNC: 7.7 G/DL (ref 12–15.9)
LAB AP CASE REPORT: NORMAL
Lab: NORMAL
MCH RBC QN AUTO: 29.5 PG (ref 26.6–33)
MCHC RBC AUTO-ENTMCNC: 31.7 G/DL (ref 31.5–35.7)
MCV RBC AUTO: 93.1 FL (ref 79–97)
PATH REPORT.FINAL DX SPEC: NORMAL
PATH REPORT.GROSS SPEC: NORMAL
PLATELET # BLD AUTO: 171 10*3/MM3 (ref 140–450)
PMV BLD AUTO: 9 FL (ref 6–12)
RBC # BLD AUTO: 2.61 10*6/MM3 (ref 3.77–5.28)
WBC NRBC COR # BLD: 4.87 10*3/MM3 (ref 3.4–10.8)

## 2023-04-06 PROCEDURE — 85027 COMPLETE CBC AUTOMATED: CPT | Performed by: FAMILY MEDICINE

## 2023-04-06 RX ORDER — FERROUS SULFATE 325(65) MG
325 TABLET ORAL EVERY OTHER DAY
Qty: 45 TABLET | Refills: 0 | Status: SHIPPED | OUTPATIENT
Start: 2023-04-06

## 2023-04-06 RX ORDER — BENAZEPRIL HYDROCHLORIDE 5 MG/1
2.5 TABLET, FILM COATED ORAL DAILY
Qty: 30 TABLET | Refills: 0 | Status: SHIPPED | OUTPATIENT
Start: 2023-04-06

## 2023-04-06 RX ORDER — CARVEDILOL 3.12 MG/1
12.5 TABLET ORAL 2 TIMES DAILY WITH MEALS
Qty: 60 TABLET | Refills: 0 | Status: SHIPPED | OUTPATIENT
Start: 2023-04-06 | End: 2023-04-06 | Stop reason: SDUPTHER

## 2023-04-06 RX ORDER — PANTOPRAZOLE SODIUM 40 MG/1
40 TABLET, DELAYED RELEASE ORAL 2 TIMES DAILY
Qty: 180 TABLET | Refills: 0 | Status: SHIPPED | OUTPATIENT
Start: 2023-04-06

## 2023-04-06 RX ORDER — CARVEDILOL 3.12 MG/1
3.12 TABLET ORAL 2 TIMES DAILY WITH MEALS
Qty: 60 TABLET | Refills: 0 | Status: SHIPPED | OUTPATIENT
Start: 2023-04-06

## 2023-04-06 RX ORDER — ESCITALOPRAM OXALATE 10 MG/1
10 TABLET ORAL DAILY
Qty: 90 TABLET | Refills: 0 | Status: SHIPPED | OUTPATIENT
Start: 2023-04-06

## 2023-04-06 RX ADMIN — TRAMADOL HYDROCHLORIDE 50 MG: 50 TABLET, COATED ORAL at 10:11

## 2023-04-06 RX ADMIN — PANTOPRAZOLE SODIUM 40 MG: 40 INJECTION, POWDER, FOR SOLUTION INTRAVENOUS at 08:06

## 2023-04-06 RX ADMIN — LEVOTHYROXINE SODIUM 50 MCG: 50 TABLET ORAL at 06:50

## 2023-04-06 RX ADMIN — LORAZEPAM 0.5 MG: 0.5 TABLET ORAL at 00:50

## 2023-04-06 RX ADMIN — TRAMADOL HYDROCHLORIDE 50 MG: 50 TABLET, COATED ORAL at 00:50

## 2023-04-06 RX ADMIN — LORAZEPAM 0.5 MG: 0.5 TABLET ORAL at 10:11

## 2023-04-06 RX ADMIN — Medication 10 ML: at 08:06

## 2023-04-06 RX ADMIN — CARVEDILOL 3.12 MG: 3.12 TABLET, FILM COATED ORAL at 08:05

## 2023-04-06 RX ADMIN — LISINOPRIL 2.5 MG: 2.5 TABLET ORAL at 08:05

## 2023-04-06 NOTE — THERAPY DISCHARGE NOTE
Acute Care - Physical Therapy Discharge Summary  [facility]       Patient Name: Trinidad Zhu  : 1945  MRN: [id number]    Today's Date: 2023                 Admit Date: 3/31/2023      PT Recommendation and Plan    Visit Dx:    ICD-10-CM ICD-9-CM   1. Acute blood loss anemia  D62 285.1   2. Impaired mobility  Z74.09 799.89   3. Melena  K92.1 578.1   4. Gastrointestinal hemorrhage, unspecified gastrointestinal hemorrhage type  K92.2 578.9        Outcome Measures     Row Name 23 1500             How much help from another person do you currently need...    Turning from your back to your side while in flat bed without using bedrails? 4  -LAVERNE      Moving from lying on back to sitting on the side of a flat bed without bedrails? 4  -LAVERNE      Moving to and from a bed to a chair (including a wheelchair)? 4  -LAVERNE      Standing up from a chair using your arms (e.g., wheelchair, bedside chair)? 4  -LAVERNE      Climbing 3-5 steps with a railing? 4  -LAVERNE      To walk in hospital room? 4  -LAVERNE      AM-PAC 6 Clicks Score (PT) 24  -LAVERNE            User Key  (r) = Recorded By, (t) = Taken By, (c) = Cosigned By    Initials Name Provider Type    Vandana Valdes, PTA Physical Therapist Assistant                     PT Rehab Goals     Row Name 23 1100             Bed Mobility Goal 1 (PT)    Gable Level/Cues Needed (Bed Mobility Goal 1, PT) independent  Goal: ind  -LAVERNE      Progress/Outcomes (Bed Mobility Goal 1, PT) goal met  -LAVERNE         Transfer Goal 1 (PT)    Gable Level/Cues Needed (Transfer Goal 1, PT) standby assist  Goal: IND  -LAVERNE      Progress/Outcome (Transfer Goal 1, PT) goal not met  -LAVERNE         Gait Training Goal 1 (PT)    Gable Level (Gait Training Goal 1, PT) standby assist  GOAL SBA  -LAVERNE      Distance (Gait Training Goal 1, PT) 20  Nsg and pt. stae she walked the halls with , just not with PT.  -LAVERNE      Progress/Outcome (Gait Training Goal 1, PT) goal not met  -LAVERNE          Stairs Goal 1 (PT)    [place] Level/Cues Needed (Stairs Goal 1, PT) --  Goal: SBA. Refused to attemt  -LAVERNE      Number of Stairs (Stairs Goal 1, PT) 0  Goal: 3  -LAVERNE      Progress/Outcome (Stairs Goal 1, PT) goal not met  -LAVERNE            User Key  (r) = Recorded By, (t) = Taken By, (c) = Cosigned By    Initials Name Provider Type Discipline    Vandana Valdes PTA Physical Therapist Assistant PT                Therapy Charges for Today     Code Description Service Date Service Provider Modifiers Qty    [id number]  PT THER PROC EA 15 MIN 4/5/2023 Vandana Fischer PTA GP 1          PT Discharge Summary  Anticipated Discharge Disposition (PT): home with assist  Reason for Discharge: Discharge from facility  Outcomes Achieved: Patient able to partially acheive established goals (Pt. seldom participated.)  Discharge Destination: Home with assist      Vandana Fischer PTA   4/6/2023

## 2023-04-06 NOTE — THERAPY DISCHARGE NOTE
Acute Care - Occupational Therapy Discharge Summary  [facility]     Patient Name: Trinidad Zhu  : 1945  MRN: [id number]    Today's Date: 2023                 Admit Date: 3/31/2023        OT Recommendation and Plan    Visit Dx:    ICD-10-CM ICD-9-CM   1. Acute blood loss anemia  D62 285.1   2. Impaired mobility  Z74.09 799.89   3. Melena  K92.1 578.1   4. Gastrointestinal hemorrhage, unspecified gastrointestinal hemorrhage type  K92.2 578.9                OT Rehab Goals     Row Name 23 1100             Bathing Goal 1 (OT)    Activity/Device (Bathing Goal 1, OT) bathing skills, all  -LS      Page Level/Cues Needed (Bathing Goal 1, OT) modified independence  -LS      Time Frame (Bathing Goal 1, OT) long term goal (LTG)  -LS      Strategies/Barriers (Bathing Goal 1, OT) Pt will complete total body bathing routine with no vcs for energy conservation techniques and no rest breaks.  -LS      Progress/Outcomes (Bathing Goal 1, OT) goal partially met  -LS         Grooming Goal 1 (OT)    Activity/Device (Grooming Goal 1, OT) grooming skills, all  -LS      Page (Grooming Goal 1, OT) modified independence  -LS      Time Frame (Grooming Goal 1, OT) long term goal (LTG)  -LS      Strategies/Barriers (Grooming Goal 1, OT) standing sink side  -LS      Progress/Outcome (Grooming Goal 1, OT) goal partially met  -LS         Strength Goal 1 (OT)    Strength Goal 1 (OT) Pt will be I with BUE strengthening HEP.  -LS      Time Frame (Strength Goal 1, OT) long term goal (LTG)  -LS      Progress/Outcome (Strength Goal 1, OT) goal not met  -LS            User Key  (r) = Recorded By, (t) = Taken By, (c) = Cosigned By    Initials Name Provider Type Discipline    Viky Palma COTA Occupational Therapist Assistant THERAPIES                 Outcome Measures     Row Name 23 1500             How much help from another person do you currently need...    Turning from your back to your side while in  flat bed without using bedrails? 4  -LAVERNE      Moving from lying on back to sitting on the side of a flat bed without bedrails? 4  -LAVERNE      Moving to and from a bed to a chair (including a wheelchair)? 4  -LAVERNE      Standing up from a chair using your arms (e.g., wheelchair, bedside chair)? 4  -LAVERNE      Climbing 3-5 steps with a railing? 4  -LAVERNE      To walk in hospital room? 4  -LAVERNE      AM-PAC 6 Clicks Score (PT) 24  -LAVERNE            User Key  (r) = Recorded By, (t) = Taken By, (c) = Cosigned By    Initials Name Provider Type    Vandana Valdes, PTA Physical Therapist Assistant                        OT Discharge Summary  Anticipated Discharge Disposition (OT): home with assist, home with home health  Reason for Discharge: Discharge from facility  Outcomes Achieved: Refer to plan of care for updates on goals achieved  Discharge Destination: Home with assist, Home with home health      CHRISTIE Jones  4/6/2023

## 2023-04-06 NOTE — PLAN OF CARE
Goal Outcome Evaluation:  Plan of Care Reviewed With: patient        Progress: improving  Outcome Evaluation: VSS. C/o pain managed with prn tramadol, c/o anxiety managed with prn ativan. Pt's spouse remains at bedside. Pt safety was maintained.

## 2023-04-06 NOTE — DISCHARGE SUMMARY
Hospital Discharge Summary    Trinidad Zhu  :  1945  MRN:  [id number]    Admit date:  3/31/2023  Discharge date: 2023    Admitting Physician:    Andrew Layne MD    Discharge Diagnoses:      Generalized muscle weakness    Acute blood loss anemia    Melena    Urinary incontinence    Gastrointestinal hemorrhage      Hospital Course:   78-year-old female with peripheral artery disease  Antiplatelet and low-dose Xarelto presented with acute anemia as well as melena consistent with gastrointestinal hemorrhage.  Underwent EGD which showed nonbleeding ulcer.  Hemoglobin continued to drop she underwent colonoscopy which showed 2 polyps but no clear source of bleeding.  I discussed case at length with her vascular surgeon in [place] who recommended placing her only on aspirin for now if no source of bleeding was able to be identified and then get a follow-up with vascular surgery within the next 4 weeks to determine future plans as far as adding back Xarelto.  No need for triple therapy and Plavix can be entirely discontinued.  Patient fortunately continue to have slight hemoglobin decline.  She was given IV iron while in the hospital.  She will be discharged on aggressive acid suppression as well as oral iron.  She also developed some hypotension in the hospital and had significant adjustments to her blood pressure medications which have all been sent to her pharmacy.  She will need close follow-up in our office to recheck her CBC and overall clinical status.  She also complains of significant anxiety and will be started on Lexapro at discharge.    The patient was admitted for the above noted medical/surgical issues. Please see daily progress note for further details concerning their stay. The patient improved throughout their stay and reached maximum medical improvement on the day of discharge. The patient/family agree with the treatment plan as outlined above. All questions concerning their stay  were answered prior to discharge. They understand the importance of follow up concerning any abnormal test results.     Physical Exam  Constitutional:       Appearance: She is well-developed.   HENT:      Head: Normocephalic and atraumatic.   Eyes:      Conjunctiva/sclera: Conjunctivae normal.      Pupils: Pupils are equal, round, and reactive to light.   Cardiovascular:      Rate and Rhythm: Normal rate and regular rhythm.      Heart sounds: Normal heart sounds. No murmur heard.    No friction rub.   Pulmonary:      Effort: Pulmonary effort is normal. No respiratory distress.      Breath sounds: Normal breath sounds. No wheezing or rales.   Abdominal:      General: Bowel sounds are normal. There is no distension.      Palpations: Abdomen is soft.      Tenderness: There is no abdominal tenderness.   Musculoskeletal:         General: Normal range of motion.      Cervical back: Normal range of motion.   Skin:     Capillary Refill: Capillary refill takes less than 2 seconds.   Neurological:      Mental Status: She is alert and oriented to person, place, and time.      Cranial Nerves: No cranial nerve deficit.   Psychiatric:         Behavior: Behavior normal.         Discharge Medications:         Discharge Medications      New Medications      Instructions Start Date   escitalopram 10 MG tablet  Commonly known as: Lexapro   10 mg, Oral, Daily      pantoprazole 40 MG EC tablet  Commonly known as: Protonix   40 mg, Oral, 2 Times Daily         Changes to Medications      Instructions Start Date   benazepril 5 MG tablet  Commonly known as: LOTENSIN  What changed: how much to take   2.5 mg, Oral, Daily      carvedilol 3.125 MG tablet  Commonly known as: COREG  What changed:   · medication strength  · how much to take   3.125 mg, Oral, 2 Times Daily With Meals      ferrous sulfate 325 (65 FE) MG tablet  What changed: when to take this   325 mg, Oral, Every Other Day         Continue These Medications      Instructions Start  Date   albuterol sulfate  (90 Base) MCG/ACT inhaler  Commonly known as: PROVENTIL HFA;VENTOLIN HFA;PROAIR HFA   2 puffs, Inhalation, Every 4 Hours PRN      alendronate 70 MG tablet  Commonly known as: FOSAMAX   70 mg, Oral, Every 7 Days, Saturdays      aspirin 81 MG chewable tablet   81 mg, Oral, Nightly      Breztri Aerosphere 160-9-4.8 MCG/ACT aerosol inhaler  Generic drug: Budeson-Glycopyrrol-Formoterol   2 puffs, Inhalation, 2 Times Daily      CALCIUM 500+D PO   1 tablet, Oral, Daily      cetirizine 10 MG tablet  Commonly known as: zyrTEC   10 mg, Oral, Nightly PRN      famotidine 40 MG tablet  Commonly known as: PEPCID   40 mg, Oral, 2 Times Daily      hydrOXYzine pamoate 25 MG capsule  Commonly known as: VISTARIL   25 mg, Oral, 3 Times Daily PRN      levothyroxine 50 MCG tablet  Commonly known as: SYNTHROID, LEVOTHROID   50 mcg, Oral, Daily      multivitamin with minerals tablet tablet   1 tablet, Oral, Daily      rosuvastatin 40 MG tablet  Commonly known as: CRESTOR   40 mg, Oral, Nightly      traMADol 50 MG tablet  Commonly known as: ULTRAM   50 mg, Oral, Every 3 Hours PRN         Stop These Medications    clopidogrel 75 MG tablet  Commonly known as: PLAVIX     hydroCHLOROthiazide 12.5 MG tablet  Commonly known as: HYDRODIURIL     Rivaroxaban 2.5 MG tablet  Commonly known as: XARELTO            Activity: Ad pancho.    Diet: Ad pancho.    Consults:- GI    Significant Diagnostic Studies:    CT Head Without Contrast    Result Date: 3/31/2023   No acute intracranial findings. This report was finalized on 03/31/2023 17:01 by Dr. Patrick Hylton MD.    XR Chest PA & Lateral    Result Date: 3/31/2023   1.  No acute finding or significant change. 2.  Chronic small RIGHT pleural effusion and RIGHT apical pleural thickening with postsurgical RIGHT sided volume loss. This report was finalized on 03/31/2023 18:47 by Dr. Patrick Hylton MD.           Treatments:   IV fluids, blood transfusions, EGD,  colonoscopy    Disposition:   Home in stable condition    42 time spent on discharge including discussion with patient/family, SW, and coordination of care.     Follow up with Andrew Layne MD in 1 weeks.    Signed:  Chuy Chong MD   4/6/2023, 09:25 CDT

## 2023-04-07 NOTE — OUTREACH NOTE
Prep Survey    Flowsheet Row Responses   [facility] facility patient discharged from? [place]   Is LACE score < 7 ? No   Eligibility Readm Mgmt   Discharge diagnosis Generalized muscle weaknessAcute blood loss anemia   Does the patient have one of the following disease processes/diagnoses(primary or secondary)? Other   Does the patient have Home health ordered? No   Is there a DME ordered? No   Prep survey completed? Yes          CLIFFORD NEELY - Registered Nurse

## 2023-04-11 ENCOUNTER — READMISSION MANAGEMENT (OUTPATIENT)
Dept: CALL CENTER | Facility: HOSPITAL | Age: 78
End: 2023-04-11
Payer: MEDICARE

## 2023-04-11 NOTE — OUTREACH NOTE
"Medical Week 1 Survey    Flowsheet Row Responses   [facility] patient discharged from? [place]   Does the patient have one of the following disease processes/diagnoses(primary or secondary)? Other   Week 1 attempt successful? Yes   Call start time 1417   Call end time 1419   Discharge diagnosis Generalized muscle weakness Acute blood loss anemia   Meds reviewed with patient/caregiver? Yes   Is the patient having any side effects they believe may be caused by any medication additions or changes? No   Does the patient have all medications ordered at discharge? Yes   Is the patient taking all medications as directed (includes completed medication regime)? Yes   Comments regarding appointments lab work 4/12/23,  cards apt on 5/10/23   Does the patient have a primary care provider?  Yes   Comments regarding PCP PCP office rescheduled FU apt - new apt date is 4/17/23 per pt    Has the patient kept scheduled appointments due by today? N/A   Has home health visited the patient within 72 hours of discharge? N/A   Psychosocial issues? No   Did the patient receive a copy of their discharge instructions? Yes   Nursing interventions Reviewed instructions with patient   What is the patient's perception of their health status since discharge? Improving  [\"tired\" per pt ]   Is the patient/caregiver able to teach back signs and symptoms related to disease process for when to call PCP? Yes   Is the patient/caregiver able to teach back signs and symptoms related to disease process for when to call 911? Yes   Is the patient/caregiver able to teach back the hierarchy of who to call/visit for symptoms/problems? PCP, Specialist, Home health nurse, Urgent Care, ED, 911 Yes   If the patient is a current smoker, are they able to teach back resources for cessation? Not a smoker   Week 1 call completed? Yes          Clary H - Registered Nurse  "

## 2023-04-20 ENCOUNTER — READMISSION MANAGEMENT (OUTPATIENT)
Dept: CALL CENTER | Facility: HOSPITAL | Age: 78
End: 2023-04-20
Payer: MEDICARE

## 2023-04-20 NOTE — OUTREACH NOTE
Medical Week 2 Survey    Flowsheet Row Responses   [facility] facility patient discharged from? [place]   Does the patient have one of the following disease processes/diagnoses(primary or secondary)? Other   Week 2 attempt successful? No   Unsuccessful attempts Attempt 1          Cecy NEELY - Registered Nurse

## 2023-04-25 ENCOUNTER — READMISSION MANAGEMENT (OUTPATIENT)
Dept: CALL CENTER | Facility: HOSPITAL | Age: 78
End: 2023-04-25
Payer: MEDICARE

## 2023-04-25 NOTE — OUTREACH NOTE
Medical Week 2 Survey    Flowsheet Row Responses   [facility] patient discharged from? [place]   Does the patient have one of the following disease processes/diagnoses(primary or secondary)? Other   Week 2 attempt successful? Yes   Call start time 1428   Discharge diagnosis Generalized muscle weakness Acute blood loss anemia   Call end time 1435   Medication alerts for this patient Pt has seen her [facility] specialist in [place] and was advised to continue with the orders to hold PLAVIX AND XARELTO FOR NOW--pt is taking ASA   Meds reviewed with patient/caregiver? Yes   Is the patient having any side effects they believe may be caused by any medication additions or changes? No   Does the patient have all medications ordered at discharge? Yes   Is the patient taking all medications as directed (includes completed medication regime)? Yes   Medication comments Pt plans on discussing diuretic at f/u w/cardiology, noted Hydrodiuril d/c'd at discharge   Comments regarding appointments Cardiology on 5/10/23   Does the patient have a primary care provider?  Yes   Does the patient have an appointment with their PCP within 7 days of discharge? Greater than 7 days   Has the patient kept scheduled appointments due by today? Yes   Has home health visited the patient within 72 hours of discharge? N/A   Psychosocial issues? No   Did the patient receive a copy of their discharge instructions? Yes   Nursing interventions Reviewed instructions with patient   What is the patient's perception of their health status since discharge? Improving  [Pt is doing well--reports Hgb up to 9 and feels 100% better, out shopping today.  She does have some edema and had gained about 4lbs.  Her [facility] expected some edema to right leg due to past surgery.  Advised to monitor, elevate, watch QD wts.]   Is the patient/caregiver able to teach back signs and symptoms related to disease process for when to call PCP? Yes   Is the patient/caregiver able to  teach back signs and symptoms related to disease process for when to call 911? Yes   Additional teach back comments Pt aware to notify MD for any other s/s of bleeding, increased edema and associated s/s.   Week 2 Call Completed? Yes   Graduated Yes  [Improving]          IMMANUEL LEVY - Registered Nurse

## 2023-05-17 ENCOUNTER — OFFICE VISIT (OUTPATIENT)
Dept: CARDIOLOGY | Facility: CLINIC | Age: 78
End: 2023-05-17
Payer: MEDICARE

## 2023-05-17 VITALS
HEIGHT: 63 IN | HEART RATE: 75 BPM | DIASTOLIC BLOOD PRESSURE: 62 MMHG | BODY MASS INDEX: 20.73 KG/M2 | WEIGHT: 117 LBS | SYSTOLIC BLOOD PRESSURE: 138 MMHG

## 2023-05-17 DIAGNOSIS — I73.9 PAD (PERIPHERAL ARTERY DISEASE): ICD-10-CM

## 2023-05-17 DIAGNOSIS — I25.119 CORONARY ARTERY DISEASE INVOLVING NATIVE CORONARY ARTERY OF NATIVE HEART WITH ANGINA PECTORIS: Primary | ICD-10-CM

## 2023-05-17 DIAGNOSIS — I10 PRIMARY HYPERTENSION: ICD-10-CM

## 2023-05-17 DIAGNOSIS — E78.5 HYPERLIPIDEMIA, UNSPECIFIED HYPERLIPIDEMIA TYPE: ICD-10-CM

## 2023-05-17 PROCEDURE — 1160F RVW MEDS BY RX/DR IN RCRD: CPT | Performed by: INTERNAL MEDICINE

## 2023-05-17 PROCEDURE — 99213 OFFICE O/P EST LOW 20 MIN: CPT | Performed by: INTERNAL MEDICINE

## 2023-05-17 PROCEDURE — 3078F DIAST BP <80 MM HG: CPT | Performed by: INTERNAL MEDICINE

## 2023-05-17 PROCEDURE — 1159F MED LIST DOCD IN RCRD: CPT | Performed by: INTERNAL MEDICINE

## 2023-05-17 PROCEDURE — 93000 ELECTROCARDIOGRAM COMPLETE: CPT | Performed by: INTERNAL MEDICINE

## 2023-05-17 PROCEDURE — 3075F SYST BP GE 130 - 139MM HG: CPT | Performed by: INTERNAL MEDICINE

## 2023-05-17 NOTE — PROGRESS NOTES
"Subjective    Trinidad Zhu is a 78 y.o. female. Fu of cad and risks    History of Present Illness     CAD:  Only mild RCA dz per remote cath and has been on risk-factor control since. Has been limited by RLE art revasc in [place] but intends to start walking again soon. EKG today is nsc x cri for afb morev.    HTN:  Stable meds and home readings are now '130's/60's\"    PAD:  Recent RLE arterial bypass surgery and was on triple plt tx post-op and developed a severe GIB. Is now on just ASA tx and having no further bleeding. Upper and lower Endo's were ok.    HLD:  LDL=31 per recent check 3/23    The following portions of the patient's history were reviewed and updated as appropriate: allergies, current medications, past family history, past medical history, past social history, past surgical history and problem list.    Patient Active Problem List   Diagnosis    Spinal stenosis, cervical region    Lung nodules    Bronchiectasis without complication (HCC)    Underweight    Personal history of nicotine dependence    Restrictive lung disease    Pulmonary emphysema (HCC)    PAD (peripheral artery disease) (HCC)    Preop testing    Gastroesophageal reflux disease    Allergic rhinitis    ORTIZ (dyspnea on exertion)    CAD (coronary artery disease)    Hyperlipidemia    Neuropathy    Hypertension    Simple chronic bronchitis (HCC)    Former smoker    Lung nodule    Mass of right lung    Postoperative anemia due to acute blood loss    Syncope and collapse    Adenocarcinoma    Personal history of malignant neoplasm of bronchus and lung    Hydropneumothorax    Lower extremity embolism    Generalized muscle weakness    Acute blood loss anemia    Melena    Urinary incontinence    Gastrointestinal hemorrhage       Allergies   Allergen Reactions    Baclofen Other (See Comments)     MUSCULOSKELETAL THERAPY AGENTS knocked her out for a day    Other reaction(s): Unknown    Gabapentin Confusion     Other reaction(s): over sedation "    Sulfa Antibiotics Rash     Mouth blisters    Sulfacetamide Sodium Rash    Levofloxacin Other (See Comments)     tendonitis, muscle pain    Amitriptyline Hcl Confusion     Other reaction(s): ambulation difficulties/confusion    Niferex [Iron Polysaccharide] Swelling     LIP SWELLING    Oxycodone-Acetaminophen GI Intolerance     Other reaction(s): vomiting       Family History   Problem Relation Age of Onset    Breast cancer Maternal Aunt     Heart disease Mother     Heart disease Father     GI problems Neg Hx         MALIGNANCIES    Colon cancer Neg Hx     Colon polyps Neg Hx        Social History     Socioeconomic History    Marital status:    Tobacco Use    Smoking status: Former     Packs/day: 1.50     Years: 20.00     Pack years: 30.00     Types: Cigarettes     Quit date:      Years since quittin.3    Smokeless tobacco: Never   Vaping Use    Vaping Use: Never used   Substance and Sexual Activity    Alcohol use: Yes     Comment: occasionally    Drug use: No    Sexual activity: Not Currently         Current Outpatient Medications:     albuterol sulfate  (90 Base) MCG/ACT inhaler, Inhale 2 puffs Every 4 (Four) Hours As Needed for Wheezing., Disp: , Rfl:     alendronate (FOSAMAX) 70 MG tablet, Take 1 tablet by mouth Every 7 (Seven) Days. , Disp: , Rfl: 3    aspirin 81 MG chewable tablet, Chew 1 tablet Every Night., Disp: , Rfl:     benazepril (LOTENSIN) 5 MG tablet, Take 0.5 tablets by mouth Daily., Disp: 30 tablet, Rfl: 0    Budeson-Glycopyrrol-Formoterol (Breztri Aerosphere) 160-9-4.8 MCG/ACT aerosol inhaler, Inhale 2 puffs 2 (Two) Times a Day., Disp: 1 each, Rfl: 0    Calcium Carb-Cholecalciferol (CALCIUM 500+D PO), Take 1 tablet by mouth Daily., Disp: , Rfl:     carvedilol (COREG) 3.125 MG tablet, Take 1 tablet by mouth 2 (Two) Times a Day With Meals., Disp: 60 tablet, Rfl: 0    cetirizine (ZyrTEC) 10 MG tablet, Take 1 tablet by mouth At Night As Needed., Disp: , Rfl:      escitalopram (Lexapro) 10 MG tablet, Take 1 tablet by mouth Daily., Disp: 90 tablet, Rfl: 0    famotidine (PEPCID) 40 MG tablet, Take 1 tablet by mouth 2 (Two) Times a Day., Disp: , Rfl: 3    ferrous sulfate 325 (65 FE) MG tablet, Take 1 tablet by mouth Every Other Day., Disp: 45 tablet, Rfl: 0    hydrOXYzine pamoate (VISTARIL) 25 MG capsule, Take 1 capsule by mouth 3 (Three) Times a Day As Needed for Itching or Anxiety., Disp: , Rfl:     levothyroxine (SYNTHROID, LEVOTHROID) 50 MCG tablet, Take 1 tablet by mouth Daily., Disp: , Rfl: 3    multivitamin with minerals tablet tablet, Take 1 tablet by mouth Daily., Disp: , Rfl:     pantoprazole (Protonix) 40 MG EC tablet, Take 1 tablet by mouth 2 (Two) Times a Day., Disp: 180 tablet, Rfl: 0    rosuvastatin (CRESTOR) 40 MG tablet, Take 1 tablet by mouth Every Night., Disp: , Rfl:     traMADol (ULTRAM) 50 MG tablet, Take 1 tablet by mouth Every 3 (Three) Hours As Needed for Moderate Pain or Severe Pain., Disp: , Rfl: 0    Past Surgical History:   Procedure Laterality Date    ANTERIOR CERVICAL DISCECTOMY W/ FUSION N/A 5/22/2017    Procedure: CERVICAL DISCECTOMY ANTERIOR FUSION WITH INSTRUMENTATION;  Surgeon: NADINE Sarabia MD;  Location: [facility] OR;  Service:     AORTAGRAM Left 11/9/2020    Procedure: left lower extremtiy angiogram, hawk athrectomy, balloon angioplasty, stent placement, mynx closure;  Surgeon: Sukhdev Condon DO;  Location:  [facility];  Service: Vascular;  Laterality: Left;    AORTAGRAM Left 3/26/2021    Procedure: LEFT LOWER EXTREMITY ANGIOGRAM, BALLOON ANGIOPLASTY, STENT PLACEMENT, MYNX CLOSURE;  Surgeon: Sukhdev Condon DO;  Location:  [facility];  Service: Vascular;  Laterality: Left;    AORTAGRAM Left 8/6/2021    Procedure: LEFT LOWER EXTREMITY ANGIOGRAM, HAWK ATHERECTOMY, BALLOON ANGIOPLASTY, MYNX CLOSURE;  Surgeon: Sukhdev Condon DO;  Location:  [facility];  Service: Vascular;  Laterality: Left;    AORTAGRAM  Right 6/8/2022    Procedure: RIGHT LOWER EXTREMITY ANGIOGRAM, INTRAVASCULAR LITHOTRIPSY, HAWK ATHRECTOMY, BALLOON ANGIOPLASTY, MYNX CLOSURE;  Surgeon: Sukhdev Condon DO;  Location: [facility] OR 12;  Service: Vascular;  Laterality: Right;    AORTAGRAM Left 10/21/2022    Procedure: LEFT LOWER EXTREMITY ANGIOGRAM WITH HAWK ATHRECTOMY, BALLOON ANGIOPLASTY, STENT PLACEMENT, MYNX CLOSURE;  Surgeon: Sukhdev Condon DO;  Location: [facility] OR 12;  Service: Vascular;  Laterality: Left;    BLADDER SUSPENSION      also had pelvic reconstructive surgery    BREAST EXCISIONAL BIOPSY Right 1985    CATARACT EXTRACTION, BILATERAL      CERVICAL CORPECTOMY N/A 5/22/2017    Procedure:  ANTERIOR CERVICAL DISCECTOMY FUSION C-6 to T1,  ANTERIOR FUSION WITH INSTRUMENTATION C-5 T-1;  Surgeon: NADINE Sarabia MD;  Location: [facility] OR;  Service:     COLONOSCOPY  08/19/2015    TIC BX RT COLON    COLONOSCOPY  12/04/2013    RT COLON BX RECALL 5YR    COLONOSCOPY N/A 11/29/2016    The entire examined colon is normal; No specimens collected    COLONOSCOPY N/A 6/2/2022    Procedure: COLONOSCOPY WITH ANESTHESIA;  Surgeon: Marco Antonio Vallejo MD;  Location: [facility] ENDOSCOPY;  Service: Gastroenterology;  Laterality: N/A;  Pre: screen  Post: diverticulosis  Andrew Ty MD    COLONOSCOPY N/A 4/5/2023    Procedure: COLONOSCOPY WITH ANESTHESIA;  Surgeon: Mike Kearns MD;  Location: [facility] ENDOSCOPY;  Service: Gastroenterology;  Laterality: N/A;  preop: GI bleed;melena  post op: diverticulosis, avm  PCP: Andrew Ty    ENDOSCOPY  12/03/2015    HEALED ULCER SLANT BX    ENDOSCOPY N/A 4/1/2023    Procedure: ESOPHAGOGASTRODUODENOSCOPY WITH ANESTHESIA;  Surgeon: Patric Reyes DO;  Location: [facility] OR;  Service: Gastroenterology;  Laterality: N/A;  PRE GI BLEED  POST gastric ulcers  DR LINN TY    FEMORAL ENDARTERECTOMY Left 10/21/2022    Procedure: LEFT LOWER EXTREMITY ANGIOGRAM;  Surgeon: Sukhdev Condon DO;  Location:   "[facility] 12;  Service: Vascular;  Laterality: Left;    HYSTERECTOMY      LEG THROMBECTOMY/EMBOLECTOMY Left 6/8/2022    Procedure: LOWER EXTREMITY THROMBECTOMY/EMBOLECTOMY, PATCH GRAFT TO  FEMORAL ARTERY;  Surgeon: Sukhdev Condon DO;  Location:  [facility];  Service: Vascular;  Laterality: Left;    LOBECTOMY Right 11/5/2021    Procedure: RIGHT THORACOSCOPY WITH BoutirINCI ROBOT, RIGHT UPPER LOBE LOBECTOMY;  Surgeon: Jarad Ignacio MD;  Location:  [facility] OR;  Service: Robotics - DaVinci;  Laterality: Right;    LUNG SURGERY      OTHER SURGICAL HISTORY      KNEE CARTILAGE REMOVED    OTHER SURGICAL HISTORY      BENIGN FIBROID TUMOR OF BREAST 1985 REMOVED    TONSILLECTOMY         Review of Systems   Constitutional:  Negative for activity change, appetite change, fatigue and unexpected weight change.   Respiratory:  Negative for shortness of breath.    Cardiovascular:  Negative for chest pain, palpitations and leg swelling.   Gastrointestinal:  Negative for abdominal pain and blood in stool.   Genitourinary:  Negative for difficulty urinating and hematuria.   Musculoskeletal:  Positive for arthralgias.   Neurological:  Negative for syncope.   Psychiatric/Behavioral:  Positive for sleep disturbance.      /62   Pulse 75   Ht 160 cm (63\")   Wt 53.1 kg (117 lb)   BMI 20.73 kg/m²   Procedures    Objective   Physical Exam  Constitutional:       Appearance: Normal appearance.   Cardiovascular:      Rate and Rhythm: Normal rate and regular rhythm.      Pulses: Decreased pulses.      Heart sounds: No murmur heard.    No friction rub. No gallop.   Pulmonary:      Effort: Pulmonary effort is normal.      Breath sounds: Normal breath sounds. No wheezing or rales.   Abdominal:      General: Bowel sounds are normal.      Tenderness: There is no abdominal tenderness.   Musculoskeletal:      Right lower leg: No edema.      Left lower leg: No edema.   Skin:     General: Skin is warm and dry.   Neurological:      " General: No focal deficit present.      Mental Status: She is oriented to person, place, and time.   Psychiatric:         Mood and Affect: Mood normal.         Behavior: Behavior normal.       Assessment & Plan   Diagnoses and all orders for this visit:    1. Coronary artery disease involving native coronary artery of native heart with angina pectoris (Primary)  Comments:  no angina  Orders:  -     ECG 12 Lead    2. PAD (peripheral artery disease) (HCC)  Comments:  stable post RLE arterial bypass surgery    3. Hyperlipidemia, unspecified hyperlipidemia type  Comments:  on potent statin with good LDL control and favorable TGL and HDL    4. Primary hypertension  Comments:  good control                 Return in about 1 year (around 5/17/2024).  Orders Placed This Encounter   Procedures    ECG 12 Lead     Order Specific Question:   Reason for Exam:     Answer:   [email or web address]     Order Specific Question:   Release to patient     Answer:   Routine Release

## 2023-05-24 ENCOUNTER — HOSPITAL ENCOUNTER (OUTPATIENT)
Dept: ULTRASOUND IMAGING | Facility: HOSPITAL | Age: 78
Discharge: HOME | End: 2023-05-24
Admitting: PHYSICIAN ASSISTANT
Payer: MEDICARE

## 2023-05-24 ENCOUNTER — TRANSCRIBE ORDERS (OUTPATIENT)
Dept: ADMINISTRATIVE | Facility: HOSPITAL | Age: 78
End: 2023-05-24
Payer: MEDICARE

## 2023-05-24 DIAGNOSIS — R60.0 EDEMA OF LEFT LOWER EXTREMITY: ICD-10-CM

## 2023-05-24 DIAGNOSIS — R60.0 EDEMA OF LEFT LOWER EXTREMITY: Primary | ICD-10-CM

## 2023-05-24 PROCEDURE — 93971 EXTREMITY STUDY: CPT

## 2023-05-25 NOTE — PROGRESS NOTES
[facility] ONC [facility] HEMATOLOGY & ONCOLOGY [place] [id number] [facility] [address]  [facility] [address] [phone]    Patient Name: Trinidad Zhu  Encounter Date: 06/12/2023  YOB: 1945  Patient Number: [id number]      REASON FOR FOLLOW-UP: Trinidad Zhu is a pleasant 78 y.o.  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  She is seen with Yair, her spouse. History is obtained from patient.  History is considered accurate.        DIAGNOSTIC ABNORMALITIES:Patient found to have lung nodule.   CT chest 07/20/2020. No change in 1.5 cm RIGHT upper lobe pulmonary nodule. However, there has been very slight increase in size compared to multiple prior  studies dating back to 2016. Recommend continued imaging surveillance.    Increased size of clustered nodules in the inferior RIGHT upper  lobe and new cluster of tree-in-bud nodularity in the RIGHT lower lobe. Differential includes an indolent infectious/inflammatory process such as atypical mycobacterial infection (NAE). Recommend continued follow-up  CT chest 09/03/2021. Spiculated nodule in the right upper lobe have slightly increased in size. Recommend PET/CT.  PET 09/10/2021. FDG uptake within the spiculated 2 cm right upper lobe nodule with SUV of 2.85 concerning for malignancy. Mild FDG uptake within the reticulonodular changes of the more inferior right upper lobe and right middle lobe with SUV of 1.1 favoring an inflammatory/infectious process. No evidence of distant metastasis.  CT chest 10/07/2021.  Spiculated pleural-based right upper lobe nodule that appears stable compared to 9/3/2021 examination.  Right lung nodularity with peribronchial thickening and bronchiectasis, stable.  Developing mild reticular nodular opacities posteriorly in the left lower lobe and minimally in the right lower lobe. Acute inflammatory change  suspected. Correlate with patient presentation.  CT chest 11/09/2021. Postoperative change of VATS RIGHT upper lobectomy. There is fluid/soft tissue with intermixed gas in the RIGHT lung apex extending into the RIGHT hilum. A small RIGHT pneumothorax is present with right-sided chest tube in good position.  Pathology report 11/12/2021. Lung, right upper lobectomy (frozen section control):  A.  Moderately differentiated acinar predominant nonmucinous adenocarcinoma with a peripheral lepidic component (2.5 cm).    B.  The bronchial, vascular and parenchymal surgical margins are negative for malignancy.  C.  The tumor extends up to, but does not invade, the visceral pleura.  D.  Pulmonary caseating granulomata.  E.  Emphysematous changes.  F.  Peribronchial lymph nodes (2), with noncaseating granulomata, negative for metastatic carcinoma.  AJCC stage:pT1c, pN0.           PREVIOUS INTERVENTIONS:  She had undergone right upper lobe lobectomy 11/05/2021.          Oncology/Hematology History Overview Note     DIAGNOSTIC ABNORMALITIES:Patient found to have lung nodule.   CT chest 07/20/2020. No change in 1.5 cm RIGHT upper lobe pulmonary nodule. However, there has been very slight increase in size compared to multiple prior  studies dating back to 2016. Recommend continued imaging surveillance.    Increased size of clustered nodules in the inferior RIGHT upper  lobe and new cluster of tree-in-bud nodularity in the RIGHT lower lobe. Differential includes an indolent infectious/inflammatory process such as atypical mycobacterial infection (NAE). Recommend continued follow-up  CT chest 09/03/2021. Spiculated nodule in the right upper lobe have slightly increased in size. Recommend PET/CT.  PET 09/10/2021. FDG uptake within the spiculated 2 cm right upper lobe nodule with SUV of 2.85 concerning for malignancy. Mild FDG uptake within the reticulonodular changes of the more inferior right upper lobe and right middle lobe with SUV  of 1.1 favoring an inflammatory/infectious process. No evidence of distant metastasis.  CT chest 10/07/2021.  Spiculated pleural-based right upper lobe nodule that appears stable compared to 9/3/2021 examination.  Right lung nodularity with peribronchial thickening and bronchiectasis, stable.  Developing mild reticular nodular opacities posteriorly in the left lower lobe and minimally in the right lower lobe. Acute inflammatory change suspected. Correlate with patient presentation.  CT chest 11/09/2021. Postoperative change of VATS RIGHT upper lobectomy. There is fluid/soft tissue with intermixed gas in the RIGHT lung apex extending into the RIGHT hilum. A small RIGHT pneumothorax is present with right-sided chest tube in good position.  Pathology report 11/12/2021. Lung, right upper lobectomy (frozen section control):  A.  Moderately differentiated acinar predominant nonmucinous adenocarcinoma with a peripheral lepidic component (2.5 cm).    B.  The bronchial, vascular and parenchymal surgical margins are negative for malignancy.  C.  The tumor extends up to, but does not invade, the visceral pleura.  D.  Pulmonary caseating granulomata.  E.  Emphysematous changes.  F.  Peribronchial lymph nodes (2), with noncaseating granulomata, negative for metastatic carcinoma.  AJCC stage:pT1c, pN0.         PREVIOUS INTERVENTIONS:  She had undergone right upper lobe lobectomy 11/05/2021.     Lung nodules   1/23/2019 Initial Diagnosis    Lung nodules     9/3/2021 Imaging    CT Chest  Spiculated right upper lobe nodule is redemonstrated, measuring 2.1 x 1.3 x 1.2 cm, previously 1.6 x 1.1 x 1.2 cm. Associated peripheral groundglass opacities. Emphysema. Bronchiectasis in the right upper and middle lobe redemonstrated. Right fissure-based nodule measuring 5 mm, previously 3 (image 82, series 3. Additional nodularities along the right minor fissure are similar to decrease. Mm.  No pleural effusion is present. The trachea and  bronchial tree are patent.  1. Spiculated nodule in the right upper lobe have slightly increased in size. Recommend PET/CT.     9/10/2021 Procedure    09-  PFTs  Pulmonary Function Test   Pre Drug % Predicted    FVC: 80%   FEV1: 64%   FEF 25-75%: 28%   FEV1/FVC: 61.74%   Interpretation   Spirometry   Spirometry shows moderate obstruction. There is reduced midflow suggesting small airway/airflow obstruction.   Review of FVL curve   Patient's effort is normal.     9/10/2021 Imaging    PET/CT:  IMPRESSION:  1. FDG uptake within the spiculated 2 cm right upper lobe nodule with  SUV of 2.85 concerning for malignancy.  2. Mild FDG uptake within the reticulonodular changes of the more  inferior right upper lobe and right middle lobe with SUV of 1.1 favoring  an inflammatory/infectious process.  3. No evidence of distant metastasis.     9/21/2021 Biopsy    09-  Mike bronchoscopy RUL  Lung, Right upper lobe; transbronchial biopsy:  Atypical proliferation, highly suspicious for well-differentiated adenocarcinoma  Comments:  Recuts are evaluated.  The atypical proliferation is highlighted with immunohistochemical TIF1 staining (control appropriate).  The appearance could suggest a lepidic, well differentiated, or scar-carcinoma type lesion.    Lymph node, 10R FNA:  lymph node sampling  Lavage:  inflammation/ inflammatory debris and macrophages  Broncheoalveolar lavage, RUL  Rare groups of mildly atypical epithelial cells     10/7/2021 Imaging    CT Chest:  IMPRESSION:  1. Spiculated pleural-based right upper lobe nodule that appears stable  compared to 9/3/2021 examination.  2. Right lung nodularity with peribronchial thickening and  bronchiectasis, stable.  3. Developing mild reticular nodular opacities posteriorly in the left  lower lobe and minimally in the right lower lobe. Acute inflammatory  change suspected. Correlate with patient presentation.  4. Continued follow-up recommended.     Adenocarcinoma    12/14/2021 Initial Diagnosis    Adenocarcinoma (HCC)         PAST MEDICAL HISTORY:  ALLERGIES:  Allergies   Allergen Reactions    Baclofen Other (See Comments)     MUSCULOSKELETAL THERAPY AGENTS knocked her out for a day    Other reaction(s): Unknown    Gabapentin Confusion     Other reaction(s): over sedation    Sulfa Antibiotics Rash     Mouth blisters    Sulfacetamide Sodium Rash    Levofloxacin Other (See Comments)     tendonitis, muscle pain    Amitriptyline Hcl Confusion     Other reaction(s): ambulation difficulties/confusion    Niferex [Iron Polysaccharide] Swelling     LIP SWELLING    Oxycodone-Acetaminophen GI Intolerance     Other reaction(s): vomiting     CURRENT MEDICATIONS:  Outpatient Encounter Medications as of 6/12/2023   Medication Sig Dispense Refill    albuterol sulfate  (90 Base) MCG/ACT inhaler Inhale 2 puffs Every 4 (Four) Hours As Needed for Wheezing.      alendronate (FOSAMAX) 70 MG tablet Take 1 tablet by mouth Every 7 (Seven) Days. Saturdays  3    aspirin 81 MG chewable tablet Chew 1 tablet Every Night.      benazepril (LOTENSIN) 5 MG tablet Take 0.5 tablets by mouth Daily. 30 tablet 0    Budeson-Glycopyrrol-Formoterol (Breztri Aerosphere) 160-9-4.8 MCG/ACT aerosol inhaler Inhale 2 puffs 2 (Two) Times a Day. 1 each 0    Calcium Carb-Cholecalciferol (CALCIUM 500+D PO) Take 1 tablet by mouth Daily.      carvedilol (COREG) 3.125 MG tablet Take 1 tablet by mouth 2 (Two) Times a Day With Meals. 60 tablet 0    cetirizine (ZyrTEC) 10 MG tablet Take 1 tablet by mouth At Night As Needed.      escitalopram (Lexapro) 10 MG tablet Take 1 tablet by mouth Daily. 90 tablet 0    famotidine (PEPCID) 40 MG tablet Take 1 tablet by mouth 2 (Two) Times a Day.  3    ferrous sulfate 325 (65 FE) MG tablet Take 1 tablet by mouth Every Other Day. 45 tablet 0    hydrOXYzine pamoate (VISTARIL) 25 MG capsule Take 1 capsule by mouth 3 (Three) Times a Day As Needed for Itching or Anxiety.      levothyroxine  (SYNTHROID, LEVOTHROID) 50 MCG tablet Take 1 tablet by mouth Daily.  3    multivitamin with minerals tablet tablet Take 1 tablet by mouth Daily.      pantoprazole (Protonix) 40 MG EC tablet Take 1 tablet by mouth 2 (Two) Times a Day. 180 tablet 0    rosuvastatin (CRESTOR) 40 MG tablet Take 1 tablet by mouth Every Night.      traMADol (ULTRAM) 50 MG tablet Take 1 tablet by mouth Every 3 (Three) Hours As Needed for Moderate Pain or Severe Pain.  0     No facility-administered encounter medications on file as of 6/12/2023.     ADULT ILLNESSES:  Patient Active Problem List   Diagnosis Code    Spinal stenosis, cervical region M48.02    Lung nodules R91.8    Bronchiectasis without complication J47.9    Underweight R63.6    Personal history of nicotine dependence Z87.891    Restrictive lung disease J98.4    Pulmonary emphysema J43.9    PAD (peripheral artery disease) I73.9    Preop testing Z01.818    Gastroesophageal reflux disease K21.9    Allergic rhinitis J30.9    ORTIZ (dyspnea on exertion) R06.09    CAD (coronary artery disease) I25.10    Hyperlipidemia E78.5    Neuropathy G62.9    Hypertension I10    Simple chronic bronchitis J41.0    Former smoker Z87.891    Lung nodule R91.1    Mass of right lung R91.8    Postoperative anemia due to acute blood loss D62    Syncope and collapse R55    Adenocarcinoma C80.1    Personal history of malignant neoplasm of bronchus and lung Z85.118    Hydropneumothorax J94.8    Lower extremity embolism I74.3    Generalized muscle weakness M62.81    Acute blood loss anemia D62    Melena K92.1    Urinary incontinence R32    Gastrointestinal hemorrhage K92.2     SURGERIES:  Past Surgical History:   Procedure Laterality Date    ANTERIOR CERVICAL DISCECTOMY W/ FUSION N/A 5/22/2017    Procedure: CERVICAL DISCECTOMY ANTERIOR FUSION WITH INSTRUMENTATION;  Surgeon: NADINE Sarabia MD;  Location: [facility] OR;  Service:     AORTAGRAM Left 11/9/2020    Procedure: left lower extremtiy angiogram, [facility]  athrectomy, balloon angioplasty, stent placement, mynx closure;  Surgeon: Sukhdev Condon DO;  Location:  [facility] HYBRID OR 12;  Service: Vascular;  Laterality: Left;    AORTAGRAM Left 3/26/2021    Procedure: LEFT LOWER EXTREMITY ANGIOGRAM, BALLOON ANGIOPLASTY, STENT PLACEMENT, MYNX CLOSURE;  Surgeon: Sukhdev Condon DO;  Location:  [facility] HYBRID OR 12;  Service: Vascular;  Laterality: Left;    AORTAGRAM Left 8/6/2021    Procedure: LEFT LOWER EXTREMITY ANGIOGRAM, HAWK ATHERECTOMY, BALLOON ANGIOPLASTY, MYNX CLOSURE;  Surgeon: Sukhdev Condon DO;  Location:  [facility] HYBRID OR 12;  Service: Vascular;  Laterality: Left;    AORTAGRAM Right 6/8/2022    Procedure: RIGHT LOWER EXTREMITY ANGIOGRAM, INTRAVASCULAR LITHOTRIPSY, HAWK ATHRECTOMY, BALLOON ANGIOPLASTY, MYNX CLOSURE;  Surgeon: Sukhdev Condon DO;  Location:  [facility] HYBRID OR 12;  Service: Vascular;  Laterality: Right;    AORTAGRAM Left 10/21/2022    Procedure: LEFT LOWER EXTREMITY ANGIOGRAM WITH HAWK ATHRECTOMY, BALLOON ANGIOPLASTY, STENT PLACEMENT, MYNX CLOSURE;  Surgeon: Sukhdev Condon DO;  Location:  [facility] HYBRID OR 12;  Service: Vascular;  Laterality: Left;    BLADDER SUSPENSION      also had pelvic reconstructive surgery    BREAST EXCISIONAL BIOPSY Right 1985    CATARACT EXTRACTION, BILATERAL      CERVICAL CORPECTOMY N/A 5/22/2017    Procedure:  ANTERIOR CERVICAL DISCECTOMY FUSION C-6 to T1,  ANTERIOR FUSION WITH INSTRUMENTATION C-5 T-1;  Surgeon: NADINE Sarabia MD;  Location: [facility] OR;  Service:     COLONOSCOPY  08/19/2015    TIC BX RT COLON    COLONOSCOPY  12/04/2013    RT COLON BX RECALL 5YR    COLONOSCOPY N/A 11/29/2016    The entire examined colon is normal; No specimens collected    COLONOSCOPY N/A 6/2/2022    Procedure: COLONOSCOPY WITH ANESTHESIA;  Surgeon: Marco Antonio Vallejo MD;  Location: [facility] ENDOSCOPY;  Service: Gastroenterology;  Laterality: N/A;  Pre: screen  Post: diverticulosis  Andrew Layne MD    COLONOSCOPY N/A  4/5/2023    Procedure: COLONOSCOPY WITH ANESTHESIA;  Surgeon: Mike Kearns MD;  Location:  [facility] ENDOSCOPY;  Service: Gastroenterology;  Laterality: N/A;  preop: GI bleed;melena  post op: diverticulosis, avm  PCP: Andrew Ty    ENDOSCOPY  12/03/2015    HEALED ULCER SLANT BX    ENDOSCOPY N/A 4/1/2023    Procedure: ESOPHAGOGASTRODUODENOSCOPY WITH ANESTHESIA;  Surgeon: Patric Reyes DO;  Location:  [facility] OR;  Service: Gastroenterology;  Laterality: N/A;  PRE GI BLEED  POST gastric ulcers  DR LINN TY    FEMORAL ENDARTERECTOMY Left 10/21/2022    Procedure: LEFT LOWER EXTREMITY ANGIOGRAM;  Surgeon: Sukhdev Condon DO;  Location: [facility] HYBRID OR 12;  Service: Vascular;  Laterality: Left;    HYSTERECTOMY      LEG THROMBECTOMY/EMBOLECTOMY Left 6/8/2022    Procedure: LOWER EXTREMITY THROMBECTOMY/EMBOLECTOMY, PATCH GRAFT TO  FEMORAL ARTERY;  Surgeon: Sukhdev Condon DO;  Location: [facility] HYBRID OR 12;  Service: Vascular;  Laterality: Left;    LOBECTOMY Right 11/5/2021    Procedure: RIGHT THORACOSCOPY WITH DAVINCI ROBOT, RIGHT UPPER LOBE LOBECTOMY;  Surgeon: Jarad Ignacio MD;  Location: [facility] OR;  Service: Robotics - DaVinci;  Laterality: Right;    LUNG SURGERY      OTHER SURGICAL HISTORY      KNEE CARTILAGE REMOVED    OTHER SURGICAL HISTORY      BENIGN FIBROID TUMOR OF BREAST 1985 REMOVED    TONSILLECTOMY       HEALTH MAINTENANCE ITEMS:  Health Maintenance Due   Topic Date Due    TDAP/TD VACCINES (1 - Tdap) Never done    ZOSTER VACCINE (1 of 2) Never done    COVID-19 Vaccine (6 - Moderna series) 02/03/2023       <no information>  Last Completed Colonoscopy            COLORECTAL CANCER SCREENING (COLONOSCOPY - Every 7 Years) Next due on 4/5/2030 04/05/2023  Surgical Procedure: COLONOSCOPY    04/05/2023  SCANNED - COLONOSCOPY    04/01/2023  Fecal Occult Blood component of Occult Blood X 1, Stool - Stool, Per Rectum    06/02/2022  COLONOSCOPY    06/02/2022  Surgical Procedure: COLONOSCOPY    Only the  first 5 history entries have been loaded, but more history exists.                  Immunization History   Administered Date(s) Administered    COVID-19 (MODERNA) 1st,2nd,3rd Dose Monovalent 2021, 2021, 10/26/2021    COVID-19 (MODERNA) BIVALENT 12+YRS 10/03/2022    COVID-19 (MODERNA) Monovalent Original Booster 2022    Flu Vaccine Split Quad 2020    Fluzone High Dose =>65 Years ([employer] ONLY) 10/10/2019, 10/01/2021, 10/03/2022    Fluzone Quad >6mos (Multi-dose) 10/01/2018    INFLUENZA SPLIT TRI 10/01/2019    Influenza Quad Vaccine (Inpatient) 10/13/2010    Pneumococcal Conjugate 13-Valent (PCV13) 2016    Pneumococcal Polysaccharide (PPSV23) 2015, 10/01/2019     Last Completed Mammogram            MAMMOGRAM (Every 2 Years) Next due on 2022  Mammo Screening Digital Tomosynthesis Bilateral With CAD    2021  Mammo Screening Digital Tomosynthesis Bilateral With CAD    2020  Mammo Screening Digital Tomosynthesis Bilateral With CAD    2019  Mammo Screening Digital Tomosynthesis Bilateral With CAD    2018  Mammo screening digital tomosynthesis bilateral w CAD    Only the first 5 history entries have been loaded, but more history exists.                      FAMILY HISTORY:  Family History   Problem Relation Age of Onset    Breast cancer Maternal Aunt     Heart disease Mother     Heart disease Father     GI problems Neg Hx         MALIGNANCIES    Colon cancer Neg Hx     Colon polyps Neg Hx      SOCIAL HISTORY:  Social History     Socioeconomic History    Marital status:    Tobacco Use    Smoking status: Former     Packs/day: 1.50     Years: 20.00     Pack years: 30.00     Types: Cigarettes     Quit date:      Years since quittin.4    Smokeless tobacco: Never   Vaping Use    Vaping Use: Never used   Substance and Sexual Activity    Alcohol use: Yes     Comment: occasionally    Drug use: No    Sexual activity: Not Currently  "      REVIEW OF SYSTEMS:    Review of Systems   Constitutional:  Positive for fatigue. Negative for chills and fever.        \"Tired. I don't sleep good.\"   HENT:  Negative for congestion, nosebleeds and trouble swallowing.    Eyes:  Negative for redness and visual disturbance.   Respiratory:  Negative for shortness of breath and wheezing.    Cardiovascular:  Negative for chest pain and palpitations.   Gastrointestinal:  Negative for blood in stool, nausea and vomiting.   Endocrine: Negative for polydipsia and polyphagia.   Genitourinary:  Negative for dysuria, flank pain and hematuria.   Musculoskeletal:  Negative for gait problem and myalgias.   Skin:  Negative for pallor.   Allergic/Immunologic: Negative for food allergies.   Neurological:  Negative for dizziness, speech difficulty and weakness.   Hematological:  Negative for adenopathy.   Psychiatric/Behavioral:  Negative for agitation, confusion and hallucinations.      VITAL SIGNS: /78   Pulse 83   Temp 97.6 °F (36.4 °C) (Tympanic)   Resp 18   Ht 160 cm (63\")   Wt 52.4 kg (115 lb 9.6 oz)   SpO2 94%   BMI 20.48 kg/m²  Gained 6 pounds.   Pain Score    06/12/23 1100   PainSc: 0-No pain       PHYSICAL EXAMINATION:     Physical Exam  Vitals reviewed.   Constitutional:       General: She is not in acute distress.  HENT:      Head: Normocephalic and atraumatic.   Eyes:      General: No scleral icterus.  Cardiovascular:      Rate and Rhythm: Normal rate.   Pulmonary:      Effort: No respiratory distress.      Breath sounds: No wheezing or rales.   Abdominal:      General: Bowel sounds are normal.      Palpations: Abdomen is soft.      Tenderness: There is no abdominal tenderness.   Musculoskeletal:      Cervical back: Neck supple.      Comments: Bilateral ankle edema.   Skin:     Coloration: Skin is not pale.   Neurological:      Mental Status: She is alert and oriented to person, place, and time.   Psychiatric:         Mood and Affect: Mood normal.        "  Behavior: Behavior normal.         Thought Content: Thought content normal.         Judgment: Judgment normal.       LABS    Lab Results - Last 18 Months   Lab Units 06/05/23  0945 04/06/23  0541 04/05/23  2333 04/05/23  1829 04/05/23  1337 04/05/23  0509 04/01/23  1245 04/01/23  0448 03/31/23  1730 12/05/22  1035 11/13/22  2023 10/19/22  1356   HEMOGLOBIN g/dL 12.1 7.7* 7.8* 8.5* 9.0* 8.6*   < > 7.9* 5.9* 11.1* 11.4* 12.5   HEMATOCRIT % 39.8 24.3* 23.8* 27.1* 28.5* 27.2*   < > 24.6* 18.7* 34.2 33.8* 36.1   MCV fL 101.8* 93.1 90.2 93.8 93.1 91.0   < > 94.3 94.9 104.9* 98.5* 96.3   WBC 10*3/mm3 7.55 4.87 6.73 7.83 5.93 6.45   < > 10.04 13.74* 6.51 5.98 12.23*   RDW % 14.2 16.9* 16.9* 17.5* 17.4* 17.2*   < > 13.1 13.4 14.1 13.4 12.5   MPV fL 10.6 9.0 8.9 9.0 8.8 8.8   < > 9.3 9.4 9.3 9.2 9.4   PLATELETS 10*3/mm3 241 171 162 212 209 207   < > 246 370 174 200 201   IMM GRAN % % 0.3  --   --   --   --   --   --  0.5 0.5 0.3 0.2 0.4   NEUTROS ABS 10*3/mm3 4.32  --   --   --   --   --   --  5.87 11.12* 3.46 2.71 9.17*   LYMPHS ABS 10*3/mm3 2.00  --   --   --   --   --   --  2.52 1.60 2.00 2.34 1.77   MONOS ABS 10*3/mm3 0.87  --   --   --   --   --   --  1.49* 0.94* 0.79 0.67 1.21*   EOS ABS 10*3/mm3 0.30  --   --   --   --   --   --  0.10 0.01 0.21 0.22 0.01   BASOS ABS 10*3/mm3 0.04  --   --   --   --   --   --  0.01 0.00 0.03 0.03 0.02   IMMATURE GRANS (ABS) 10*3/mm3 0.02  --   --   --   --   --   --  0.05 0.07* 0.02 0.01 0.05   NRBC /100 WBC 0.0  --   --   --   --   --   --  0.0 0.1 0.0 0.0 0.0    < > = values in this interval not displayed.       Lab Results - Last 18 Months   Lab Units 06/05/23  0945 04/02/23  0543 04/01/23  0448 03/31/23  1730 01/13/23  1149 01/02/23  0446 12/29/22  0501 12/13/22  1139 12/05/22  1035 11/13/22  2023 10/19/22  1356   GLUCOSE mg/dL 86 91 81 127*  --   --   --   --  96 117* 113*   SODIUM mmol/L 142 137 135* 134*  --  141 137   < > 141 139 139   POTASSIUM mmol/L 3.9 3.8 3.8 4.4  --  4.1  3.7   < > 3.6 4.0 3.5   TOTAL CO2 mmol/L  --   --   --   --   --  26 26   < >  --   --   --    CO2 mmol/L 30.0* 27.0 26.0 25.0  --   --   --   --  32.0* 31.0* 32.0*   CHLORIDE mmol/L 101 103 99 96*  --  106 104   < > 100 99 96*   ANION GAP mmol/L 11.0 7.0 10.0 13.0  --  9 7   < > 9.0 9.0 11.0   CREATININE mg/dL 0.80  0.72 0.84 0.84 0.75 0.80 0.77 0.71   < > 0.70 0.93 0.69   BUN mg/dL 15 31* 35* 37*  --  16 14   < > 13 15 19   BUN / CREAT RATIO  20.8 36.9* 41.7* 49.3*  --   --   --   --  18.6 16.1 27.5*   CALCIUM mg/dL 9.8 8.5* 8.7 9.6  --  9.1 8.4   < > 9.3 9.5 10.0   ALK PHOS U/L 57  --   --  41  --   --   --   --  49 49 53   TOTAL PROTEIN g/dL 6.9  --   --  6.3  --   --   --   --  6.6 6.9 7.5   ALT (SGPT) U/L 16  --   --  19  --   --   --   --  17 20 34*   AST (SGOT) U/L 32  --   --  22  --   --   --   --  28 33* 63*   BILIRUBIN mg/dL 0.3  --   --  0.3  --   --   --   --  0.4 0.4 0.4   ALBUMIN g/dL 4.4  --   --  4.3  --   --   --   --  4.40 4.50 4.90   GLOBULIN gm/dL 2.5  --   --  2.0  --   --   --   --  2.2 2.4 2.6    < > = values in this interval not displayed.       No results for input(s): MSPIKE, KAPPALAMB, IGLFLC, URICACID, FREEKAPPAL, CEA, LDH, REFLABREPO in the last [id number] hours.    Lab Results - Last 18 Months   Lab Units 04/02/23  0543 03/31/23  1730 12/05/22  1035 10/19/22  1356 05/25/22  1319   IRON mcg/dL 30*  --  77  --  72   TIBC mcg/dL 426  --  481  --  405   IRON SATURATION (TSAT) % 7*  --  16*  --  18*   FERRITIN ng/mL  --   --  42.83  --  69.15   TSH uIU/mL  --  3.090  --  0.680  --    FOLATE ng/mL  --   --  >20.00  --  >20.00       Trinidad TRUJILLO Audra reports a pain score of 0.          ASSESSMENT:  1.  Adenocarcinoma of the lung, right upper lobe.Tumor size 2 cm. PDL1 15%, Negative ALK, ROS1, and EGFR. BRAF G644V positive.  AJCC stage:1A3(pTic, pN0, cM0, G2)  Treatment status:Post right upper lobe lobectomy.  2.  Performance status of 1.  3.  30 pack years smoking history, etiology of her lung  "cancer. She had quit smoking. \"27 years.\"   4.  Normocytic anemia from iron deficiency due to gastrointestinal hemorrhage.  Diverticulosis in the sigmoid colon on 06/02/202.  Ferrous sulfate 12/7/2022 through 6/12/2023. 1 unit packed RBC 4/1/2023 and 4/4/2023.  Ferric gluconate 125 mg on 4/4/2023.        PLAN:  1.   Re: Patient was admitted 3/31/2023 and discharged 4/6/2023 for gastrointestinal hemorrhage.  She had hematochezia.  Endoscopy showed nonbleeding ulcer.  Colonoscopy showed 2 polyps but no clear source of bleeding.  Plavix was discontinued.  2.   Re: CT chest on 1/13/2023. Stable exam. No definite evidence of recurrent or metastatic disease in the chest.  No change in clustered pulmonary nodules in the RIGHT lower lobe  which measure up to 9 mm. These are likely infectious or inflammatory in nature but warrant continued special attention on follow-up imaging to confirm stability or resolution. CT chest, abdomen, pelvis 6/5/2023. . Stable chest CT, with postoperative changes of previous right upper lobe resection, no evidence of metastatic disease is identified. There are several grouped small pulmonary nodules in the right lower lobe which appear unchanged, largest nodule measures approximately 8 mm, at the right hilar level. There is also a stable 5 mm nodule at the azygoesophageal recess. Mild chronic pleural thickening at the right lateral lung base with a small loculated pleural effusion. This is unchanged. Stable CT abdomen pelvis with no evidence of intra-abdominal or pelvic metastatic disease.  Mild chronic pleural thickening and loculated effusion at the right lateral lung base, stable.  Small area of focal fatty deposition on the surface of the liver, stable.  Mild mucosal thickening along the left bladder wall as before, without a discrete mass.  Bilateral benign-appearing renal cysts. No additional evaluation of the cysts is necessary. There is cortical thinning/scarring " involving  both kidneys as before. Extensive atherosclerotic changes of the aorta and its branches,  including apparent high-grade stenosis of the proximal left common iliac artery, without occlusion.  3.   Re: Heme status.  WBC 7.5, hemoglobin 12.1 and platelet 241.  Reticulocyte 3.05, saturation 16%, ferritin 42.8, B12 at 742 and folate >20 on 12/5/2022.   4.   Re: CMP.  GFR 86.2 mL per minute and CO2 at 30.   5.   Re: CT chest, abdomen and pelvis 12/13/2022. CT chest showed new clustered pulmonary nodule in the right lower lobe measuring up to 11 mm.  Recommend close clinical and imaging follow-up with repeat CT chest in 1 to 2 months.  No evidence of metastatic disease in the abdomen or pelvis.  6.   Re: Stable for observation, lung cancer.  7.  Continue care per primary care physician at the specialist.  8.  Plan of care discussed with patient and her spouse.  Understanding expressed.  Patient agreed to proceed.  9.  Advance Care Planning  ACP discussion was held with the patient during this visit. Patient has an advance directive in EMR which is still valid.   10.  Return to office in 6 months with preoffice CBC with differential, CMP, CT of the chest, abdomen and pelvis.       I have reviewed the assessment and plan and verified the accuracy of it. No changes to assessment and plan since the information was documented. Cyrus Palacios MD 06/12/23        I spent 31 total minutes, face-to-face, caring for Trinidad today.  Greater than 50% of this time involved counseling and/or coordination of care as documented within this note regarding the patient's illness(es), pros and cons of various treatment options, instructions and/or risk reduction.          (Dion Cortés MD)  (Jarad Ignacio MD)  (Kimberley Rodas MD)  Jose Layne MD

## 2023-06-05 ENCOUNTER — HOSPITAL ENCOUNTER (OUTPATIENT)
Dept: CT IMAGING | Facility: HOSPITAL | Age: 78
Discharge: HOME | End: 2023-06-05
Payer: MEDICARE

## 2023-06-05 ENCOUNTER — APPOINTMENT (OUTPATIENT)
Dept: LAB | Facility: HOSPITAL | Age: 78
End: 2023-06-05
Payer: MEDICARE

## 2023-06-05 ENCOUNTER — LAB (OUTPATIENT)
Dept: LAB | Facility: HOSPITAL | Age: 78
End: 2023-06-05
Payer: MEDICARE

## 2023-06-05 DIAGNOSIS — D50.8 IRON DEFICIENCY ANEMIA SECONDARY TO INADEQUATE DIETARY IRON INTAKE: ICD-10-CM

## 2023-06-05 DIAGNOSIS — C34.11 PRIMARY CANCER OF RIGHT UPPER LOBE OF LUNG: ICD-10-CM

## 2023-06-05 LAB
ALBUMIN SERPL-MCNC: 4.4 G/DL (ref 3.5–5.2)
ALBUMIN/GLOB SERPL: 1.8 G/DL
ALP SERPL-CCNC: 57 U/L (ref 39–117)
ALT SERPL W P-5'-P-CCNC: 16 U/L (ref 1–33)
ANION GAP SERPL CALCULATED.3IONS-SCNC: 11 MMOL/L (ref 5–15)
AST SERPL-CCNC: 32 U/L (ref 1–32)
BASOPHILS # BLD AUTO: 0.04 10*3/MM3 (ref 0–0.2)
BASOPHILS NFR BLD AUTO: 0.5 % (ref 0–1.5)
BILIRUB SERPL-MCNC: 0.3 MG/DL (ref 0–1.2)
BUN SERPL-MCNC: 15 MG/DL (ref 8–23)
BUN/CREAT SERPL: 20.8 (ref 7–25)
CALCIUM SPEC-SCNC: 9.8 MG/DL (ref 8.6–10.5)
CHLORIDE SERPL-SCNC: 101 MMOL/L (ref 98–107)
CO2 SERPL-SCNC: 30 MMOL/L (ref 22–29)
CREAT BLDA-MCNC: 0.8 MG/DL (ref 0.6–1.3)
CREAT SERPL-MCNC: 0.72 MG/DL (ref 0.57–1)
DEPRECATED RDW RBC AUTO: 53.1 FL (ref 37–54)
EGFRCR SERPLBLD CKD-EPI 2021: 86.2 ML/MIN/1.73
EOSINOPHIL # BLD AUTO: 0.3 10*3/MM3 (ref 0–0.4)
EOSINOPHIL NFR BLD AUTO: 4 % (ref 0.3–6.2)
ERYTHROCYTE [DISTWIDTH] IN BLOOD BY AUTOMATED COUNT: 14.2 % (ref 12.3–15.4)
GLOBULIN UR ELPH-MCNC: 2.5 GM/DL
GLUCOSE SERPL-MCNC: 86 MG/DL (ref 65–99)
HCT VFR BLD AUTO: 39.8 % (ref 34–46.6)
HGB BLD-MCNC: 12.1 G/DL (ref 12–15.9)
IMM GRANULOCYTES # BLD AUTO: 0.02 10*3/MM3 (ref 0–0.05)
IMM GRANULOCYTES NFR BLD AUTO: 0.3 % (ref 0–0.5)
LYMPHOCYTES # BLD AUTO: 2 10*3/MM3 (ref 0.7–3.1)
LYMPHOCYTES NFR BLD AUTO: 26.5 % (ref 19.6–45.3)
MCH RBC QN AUTO: 30.9 PG (ref 26.6–33)
MCHC RBC AUTO-ENTMCNC: 30.4 G/DL (ref 31.5–35.7)
MCV RBC AUTO: 101.8 FL (ref 79–97)
MONOCYTES # BLD AUTO: 0.87 10*3/MM3 (ref 0.1–0.9)
MONOCYTES NFR BLD AUTO: 11.5 % (ref 5–12)
NEUTROPHILS NFR BLD AUTO: 4.32 10*3/MM3 (ref 1.7–7)
NEUTROPHILS NFR BLD AUTO: 57.2 % (ref 42.7–76)
NRBC BLD AUTO-RTO: 0 /100 WBC (ref 0–0.2)
PLATELET # BLD AUTO: 241 10*3/MM3 (ref 140–450)
PMV BLD AUTO: 10.6 FL (ref 6–12)
POTASSIUM SERPL-SCNC: 3.9 MMOL/L (ref 3.5–5.2)
PROT SERPL-MCNC: 6.9 G/DL (ref 6–8.5)
RBC # BLD AUTO: 3.91 10*6/MM3 (ref 3.77–5.28)
SODIUM SERPL-SCNC: 142 MMOL/L (ref 136–145)
WBC NRBC COR # BLD: 7.55 10*3/MM3 (ref 3.4–10.8)

## 2023-06-05 PROCEDURE — 25510000001 IOPAMIDOL 61 % SOLUTION: Performed by: INTERNAL MEDICINE

## 2023-06-05 PROCEDURE — 71260 CT THORAX DX C+: CPT

## 2023-06-05 PROCEDURE — 80053 COMPREHEN METABOLIC PANEL: CPT | Performed by: INTERNAL MEDICINE

## 2023-06-05 PROCEDURE — 82565 ASSAY OF CREATININE: CPT

## 2023-06-05 PROCEDURE — 85025 COMPLETE CBC W/AUTO DIFF WBC: CPT | Performed by: INTERNAL MEDICINE

## 2023-06-05 PROCEDURE — 74177 CT ABD & PELVIS W/CONTRAST: CPT

## 2023-06-05 RX ADMIN — IOPAMIDOL 100 ML: 612 INJECTION, SOLUTION INTRAVENOUS at 10:10

## 2023-06-12 ENCOUNTER — OFFICE VISIT (OUTPATIENT)
Dept: ONCOLOGY | Facility: CLINIC | Age: 78
End: 2023-06-12
Payer: MEDICARE

## 2023-06-12 VITALS
OXYGEN SATURATION: 94 % | HEIGHT: 63 IN | WEIGHT: 115.6 LBS | BODY MASS INDEX: 20.48 KG/M2 | SYSTOLIC BLOOD PRESSURE: 114 MMHG | TEMPERATURE: 97.6 F | DIASTOLIC BLOOD PRESSURE: 78 MMHG | RESPIRATION RATE: 18 BRPM | HEART RATE: 83 BPM

## 2023-06-12 DIAGNOSIS — C34.11 PRIMARY CANCER OF RIGHT UPPER LOBE OF LUNG: Primary | ICD-10-CM

## 2023-06-12 PROCEDURE — 3078F DIAST BP <80 MM HG: CPT | Performed by: INTERNAL MEDICINE

## 2023-06-12 PROCEDURE — 99214 OFFICE O/P EST MOD 30 MIN: CPT | Performed by: INTERNAL MEDICINE

## 2023-06-12 PROCEDURE — 1159F MED LIST DOCD IN RCRD: CPT | Performed by: INTERNAL MEDICINE

## 2023-06-12 PROCEDURE — 1126F AMNT PAIN NOTED NONE PRSNT: CPT | Performed by: INTERNAL MEDICINE

## 2023-06-12 PROCEDURE — 3074F SYST BP LT 130 MM HG: CPT | Performed by: INTERNAL MEDICINE

## 2023-06-16 ENCOUNTER — OFFICE VISIT (OUTPATIENT)
Dept: PULMONOLOGY | Facility: CLINIC | Age: 78
End: 2023-06-16
Payer: MEDICARE

## 2023-06-16 VITALS
OXYGEN SATURATION: 99 % | BODY MASS INDEX: 20.73 KG/M2 | HEART RATE: 66 BPM | SYSTOLIC BLOOD PRESSURE: 126 MMHG | WEIGHT: 117 LBS | DIASTOLIC BLOOD PRESSURE: 78 MMHG | HEIGHT: 63 IN

## 2023-06-16 DIAGNOSIS — J98.4 RESTRICTIVE LUNG DISEASE: Chronic | ICD-10-CM

## 2023-06-16 DIAGNOSIS — Z85.118 PERSONAL HISTORY OF MALIGNANT NEOPLASM OF BRONCHUS AND LUNG: Chronic | ICD-10-CM

## 2023-06-16 DIAGNOSIS — R91.8 LUNG NODULES: Primary | Chronic | ICD-10-CM

## 2023-06-16 DIAGNOSIS — Z87.891 PERSONAL HISTORY OF NICOTINE DEPENDENCE: Chronic | ICD-10-CM

## 2023-06-16 PROCEDURE — 1160F RVW MEDS BY RX/DR IN RCRD: CPT | Performed by: INTERNAL MEDICINE

## 2023-06-16 PROCEDURE — 1159F MED LIST DOCD IN RCRD: CPT | Performed by: INTERNAL MEDICINE

## 2023-06-16 PROCEDURE — 99214 OFFICE O/P EST MOD 30 MIN: CPT | Performed by: INTERNAL MEDICINE

## 2023-06-16 PROCEDURE — 3078F DIAST BP <80 MM HG: CPT | Performed by: INTERNAL MEDICINE

## 2023-06-16 PROCEDURE — 3074F SYST BP LT 130 MM HG: CPT | Performed by: INTERNAL MEDICINE

## 2023-06-16 RX ORDER — FUROSEMIDE 40 MG/1
TABLET ORAL
COMMUNITY
Start: 2023-05-30

## 2023-06-16 RX ORDER — POTASSIUM CHLORIDE 20 MEQ/1
TABLET, EXTENDED RELEASE ORAL
COMMUNITY
Start: 2023-06-15

## 2023-06-16 RX ORDER — CLOPIDOGREL BISULFATE 75 MG/1
75 TABLET ORAL DAILY
Qty: 30 TABLET | Refills: 11 | COMMUNITY
Start: 2023-06-14 | End: 2024-06-14

## 2023-06-16 NOTE — PATIENT INSTRUCTIONS
The patient doing very well from a pulmonary standpoint is not having to use her inhalers at this time.  Her recent chest CT was stable and with no evidence of recurrent disease.  She will be getting a follow-up CT in mid December and I will see her back in the office shortly thereafter.

## 2023-06-16 NOTE — PROGRESS NOTES
Chief Complaint  Lung Nodules and History of lung cancer.    Subjective    History of Present Illness {CC  Problem List  Visit Diagnosis   Encounters  Notes  Medications  Labs  Result Review Imaging  Media: 23}    Trinidad Zhu presents to [facility] for lung nodules and a history of lung cancer.    History of Present Illness   Patient is accompanied by her  today.  She has had some recent vascular procedures involving the right lower extremity in [place].  She has had some problems with peripheral edema and has had work-up including assessment for DVT which has been negative.  This likely is a circulation issue and she may eventually require surgery on the left lower extremity as well.  Her recent chest CT stable and I reviewed the scan with her and her .  She had some issues with shortness of breath that did not respond to inhaled medications but she was found to be anemic and that clearly was the cause.  She is not using her inhalers at present.  Her last PFTs were most consistent with a mild restrictive defect with a mild decrease in midflows and I think the restriction clearly relates to her previous lung resection.  I told her we will just see her back in 6 months as she will be getting a follow-up chest CT in mid December.  She has had her COVID-19 vaccine putting 2 standard boosters and 1 bivalent booster in the form of the Moderna vaccine  She had the flu shot this past fall and has had a Prevnar 13 and Pneumovax in the past as well.  Medication Sig Start Date End Date Taking? Authorizing Provider   albuterol sulfate  (90 Base) MCG/ACT inhaler Inhale 2 puffs Every 4 (Four) Hours As Needed for Wheezing.   Yes ProviderMary Lou MD   alendronate (FOSAMAX) 70 MG tablet Take 1 tablet by mouth Every 7 (Seven) Days. Saturdays 5/21/19  Yes ProviderMary Lou MD   aspirin 81 MG chewable tablet Chew 1 tablet Every Night.    Yes ProviderMary Lou MD   benazepril (LOTENSIN) 5 MG tablet Take 0.5 tablets by mouth Daily. 4/6/23  Yes Chuy Chong MD   Budeson-Glycopyrrol-Formoterol (Breztri Aerosphere) 160-9-4.8 MCG/ACT aerosol inhaler Inhale 2 puffs 2 (Two) Times a Day. 2/15/23  Yes Dion Cortés MD   Calcium Carb-Cholecalciferol (CALCIUM 500+D PO) Take 1 tablet by mouth Daily.   Yes ProviderMary Lou MD   carvedilol (COREG) 3.125 MG tablet Take 1 tablet by mouth 2 (Two) Times a Day With Meals. 4/6/23  Yes Chuy Chong MD   cetirizine (ZyrTEC) 10 MG tablet Take 1 tablet by mouth At Night As Needed.   Yes Provider, MD Mary Lou   clopidogrel (PLAVIX) 75 MG tablet Take 1 tablet by mouth Daily. 6/14/23 6/14/24 Yes ProviderMary Lou MD   famotidine (PEPCID) 40 MG tablet Take 1 tablet by mouth 2 (Two) Times a Day. 4/15/19  Yes Mary Lou Rodas MD   furosemide (LASIX) 40 MG tablet  5/30/23  Yes ProviderMary Lou MD   levothyroxine (SYNTHROID, LEVOTHROID) 50 MCG tablet Take 1 tablet by mouth Daily. 5/8/19  Yes ProviderMary Lou MD   multivitamin with minerals tablet tablet Take 1 tablet by mouth Daily.   Yes Provider, MD Mary Lou   potassium chloride (K-DUR,KLOR-CON) 20 MEQ CR tablet  6/15/23  Yes Provider, MD Mary Lou   rosuvastatin (CRESTOR) 40 MG tablet Take 1 tablet by mouth Every Night. 9/2/21  Yes ProviderMary Lou MD   traMADol (ULTRAM) 50 MG tablet Take 1 tablet by mouth Every 3 (Three) Hours As Needed for Moderate Pain or Severe Pain. 5/28/19  Yes ProviderMary Lou MD   escitalopram (Lexapro) 10 MG tablet Take 1 tablet by mouth Daily.  Patient not taking: Reported on 6/16/2023 4/6/23   Chuy Chong MD   ferrous sulfate 325 (65 FE) MG tablet Take 1 tablet by mouth Every Other Day.  Patient not taking: Reported on 6/16/2023 4/6/23   Chuy Chong MD   hydrOXYzine pamoate (VISTARIL) 25 MG capsule Take 1 capsule by mouth 3 (Three) Times a Day As Needed for  "Itching or Anxiety.  Patient not taking: Reported on 2023    Provider, MD Mary Lou   pantoprazole (Protonix) 40 MG EC tablet Take 1 tablet by mouth 2 (Two) Times a Day.  Patient not taking: Reported on 2023   Chuy Chong MD       Social History     Socioeconomic History    Marital status:    Tobacco Use    Smoking status: Former     Packs/day: 1.50     Years: 20.00     Pack years: 30.00     Types: Cigarettes     Quit date:      Years since quittin.4    Smokeless tobacco: Never   Vaping Use    Vaping Use: Never used   Substance and Sexual Activity    Alcohol use: Yes     Comment: occasionally    Drug use: No    Sexual activity: Not Currently       Objective   Vital Signs:   /78   Pulse 66   Ht 160 cm (63\")   Wt 53.1 kg (117 lb)   SpO2 99% Comment: RA  BMI 20.73 kg/m²     Physical Exam  Vitals and nursing note reviewed.   HENT:      Head: Normocephalic.   Eyes:      Extraocular Movements: Extraocular movements intact.      Pupils: Pupils are equal, round, and reactive to light.   Cardiovascular:      Rate and Rhythm: Normal rate and regular rhythm.   Pulmonary:      Effort: Pulmonary effort is normal.      Breath sounds: Normal breath sounds.   Musculoskeletal:      Right lower leg: Edema present.      Left lower leg: Edema present.      Comments: She has trace to 1+ edema of the cast.   Skin:     General: Skin is warm and dry.   Neurological:      General: No focal deficit present.      Mental Status: She is alert and oriented to person, place, and time.   Psychiatric:         Mood and Affect: Mood normal.         Behavior: Behavior normal.      Result Review :    PFT Values          9/10/2021    11:15 2022    15:00 2/15/2023    15:15   Pre Drug PFT Results   FVC 80 77 79   FEV1 64 73 78   FEF 25-75% 28 67 75   FEV1/FVC 61.74 73 76   Other Tests PFT Results   TLC  159    RV  262    DLCO  54    D/VAsb  74          Results for orders placed in visit on " 02/15/23    Spirometry Without Bronchodilator    Narrative  Spirometry Without Bronchodilator  Performed by: Samantha Cantor, NESTOR  Authorized by: Dion Cortés MD    Pre Drug % Predicted  FVC: 79%  FEV1: 78%  FEF 25-75%: 75%  FEV1/FVC: 76%    Interpretation  Spirometry  Spirometry shows mild restriction. There is reduced midflow suggesting small airway/airflow obstruction.  Overall comments: The patient's spirometry is most consistent with a mild restrictive ventilatory defect with a coexisting mild decrease in midflows.  When current studies are compared to studies performed on  of last year, she has had slight improvement in both her FVC and FEV1 compared to previous.  Midflows also show some improvement as well.      Results for orders placed in visit on 22    Full Pulmonary Function Test Without Bronchodilator    Narrative  Full Pulmonary Function Test Without Bronchodilator  Performed by: Samantha Cantor, NESTOR  Authorized by: Dion Cortés MD    Pre Drug % Predicted  FVC: 77%  FEV1: 73%  FEF 25-75%: 67%  FEV1/FVC: 73%  T%  RV: 262%  DLCO: 54%  D/VAsb: 74%    Interpretation  Spirometry  Spirometry shows mild restriction. There is reduced midflow suggesting small airway/airflow obstruction.  Diffusion Capacity  The patient's diffusion capacity is moderately reduced.  Diffusion capacity is mildly reduced when corrected for alveolar volume.  Overall comments: The patient's spirometry is consistent with a mild restrictive ventilatory defect with a coexisting decrease in midflows.  Accurate lung volumes could not be obtained.  She has a moderate diffusion impairment which when corrected for alveolar volume is a mild diffusion impairment.  When current studies are compared to studies from [facility] from 2021, there has been a decline in her FVC on current baseline spirometry compared to previous pre and postbronchodilator studies but no  significant change in her FEV1 compared to previous pre and postbronchodilator studies.  When corrected for alveolar volume, there has been a decline in her diffusion capacity compared to previous.      Results for orders placed during the hospital encounter of 11/01/21    Full Pulmonary Function Test With Bronchodilator & ABG    Narrative  [facility] - Pulmonary Function Test    [address]  [place]  [place]  [address]  [phone]    Patient : Trinidad Zhu  MRN : [id number]  CSN : [id number]  Pulmonologist : Dion Cortés MD  Date : 11/1/2021    ______________________________________________________________________    Interpretation :  1.  Spirometry is consistent with a moderate obstructive ventilatory defect.  2.  There is no significant change in spirometry postbronchodilator.  3.  Lung volumes reveal an elevated expiratory reserve volume.  There is also a mild decrease in inspiratory capacity.  4.  There is a moderate diffusion impairment which when corrected for alveolar volume is a mild diffusion impairment.  5.  Arterial blood gases are within normal limits on room air.  6.  When current studies are compared to studies done at the respiratory disease clinic on September 10 of 2021, there has been slight improvement in both the patient's FVC and FEV1 on current pre and postbronchodilator studies compared to previous baseline spirometry.      Dion Cortés MD    Rest/Exercise Pulse Ox Values          1/4/2022    10:00   Rest/Exercise Pulse Ox Results   Rest room air SAT % 99   Exercise room air SAT % 95                My interpretation of imaging:    CT Chest With Contrast Diagnostic (06/05/2023 10:09)         Assessment and Plan {CC Problem List  Visit Diagnosis  ROS  Review (Popup)  Health Maintenance  Quality  BestPractice  Medications  SmartSets  SnapShot Encounters  Media : 23}    Diagnoses and all orders for this visit:    1. Lung nodules (Primary)  Assessment &  Plan:  Her most recent chest CT was stable.      2. Restrictive lung disease  Assessment & Plan:  A mild restrictive pattern was noted on her last pulmonary function studies and this almost certainly is related to her previous lung resection.  Very mild decrease in mid flows and can certainly utilize albuterol HFA if needed if she had episodes of bronchitis but I do not think she will need any maintenance inhaled therapy.      3. Personal history of malignant neoplasm of bronchus and lung  Assessment & Plan:  Her most recent chest CT showed no evidence of recurrent disease.      4. Personal history of nicotine dependence  Assessment & Plan:  She quit smoking in 1996.            Dion Cortés MD  6/16/2023  14:21 CDT    Follow Up   Return in about 26 weeks (around 12/15/2023) for To see me specifically.    Patient was given instructions and counseling regarding her condition or for health maintenance advice. Please see specific information pulled into the AVS if appropriate.

## 2023-06-16 NOTE — ASSESSMENT & PLAN NOTE
A mild restrictive pattern was noted on her last pulmonary function studies and this almost certainly is related to her previous lung resection.  Very mild decrease in mid flows and can certainly utilize albuterol HFA if needed if she had episodes of bronchitis but I do not think she will need any maintenance inhaled therapy.

## 2023-08-15 ENCOUNTER — APPOINTMENT (OUTPATIENT)
Dept: CT IMAGING | Facility: HOSPITAL | Age: 78
End: 2023-08-15
Payer: MEDICARE

## 2023-08-15 ENCOUNTER — HOSPITAL ENCOUNTER (EMERGENCY)
Facility: HOSPITAL | Age: 78
Discharge: HOME | End: 2023-08-15
Attending: FAMILY MEDICINE
Payer: MEDICARE

## 2023-08-15 VITALS
BODY MASS INDEX: 19.81 KG/M2 | DIASTOLIC BLOOD PRESSURE: 76 MMHG | RESPIRATION RATE: 14 BRPM | HEART RATE: 89 BPM | HEIGHT: 64 IN | TEMPERATURE: 97.6 F | WEIGHT: 116 LBS | OXYGEN SATURATION: 99 % | SYSTOLIC BLOOD PRESSURE: 157 MMHG

## 2023-08-15 DIAGNOSIS — R51.9 GENERALIZED HEADACHE: Primary | ICD-10-CM

## 2023-08-15 DIAGNOSIS — I10 ELEVATED BLOOD PRESSURE READING WITH DIAGNOSIS OF HYPERTENSION: ICD-10-CM

## 2023-08-15 LAB
ANION GAP SERPL CALCULATED.3IONS-SCNC: 12 MMOL/L (ref 5–15)
BASOPHILS # BLD AUTO: 0.03 10*3/MM3 (ref 0–0.2)
BASOPHILS NFR BLD AUTO: 0.5 % (ref 0–1.5)
BUN SERPL-MCNC: 7 MG/DL (ref 8–23)
BUN/CREAT SERPL: 10.6 (ref 7–25)
CALCIUM SPEC-SCNC: 9.5 MG/DL (ref 8.6–10.5)
CHLORIDE SERPL-SCNC: 105 MMOL/L (ref 98–107)
CO2 SERPL-SCNC: 27 MMOL/L (ref 22–29)
CREAT SERPL-MCNC: 0.66 MG/DL (ref 0.57–1)
DEPRECATED RDW RBC AUTO: 46.4 FL (ref 37–54)
EGFRCR SERPLBLD CKD-EPI 2021: 90.5 ML/MIN/1.73
EOSINOPHIL # BLD AUTO: 0.22 10*3/MM3 (ref 0–0.4)
EOSINOPHIL NFR BLD AUTO: 4 % (ref 0.3–6.2)
ERYTHROCYTE [DISTWIDTH] IN BLOOD BY AUTOMATED COUNT: 13.2 % (ref 12.3–15.4)
GLUCOSE SERPL-MCNC: 117 MG/DL (ref 65–99)
HCT VFR BLD AUTO: 36 % (ref 34–46.6)
HGB BLD-MCNC: 11.2 G/DL (ref 12–15.9)
HOLD SPECIMEN: NORMAL
HOLD SPECIMEN: NORMAL
IMM GRANULOCYTES # BLD AUTO: 0.01 10*3/MM3 (ref 0–0.05)
IMM GRANULOCYTES NFR BLD AUTO: 0.2 % (ref 0–0.5)
LYMPHOCYTES # BLD AUTO: 1.69 10*3/MM3 (ref 0.7–3.1)
LYMPHOCYTES NFR BLD AUTO: 30.7 % (ref 19.6–45.3)
MCH RBC QN AUTO: 29.8 PG (ref 26.6–33)
MCHC RBC AUTO-ENTMCNC: 31.1 G/DL (ref 31.5–35.7)
MCV RBC AUTO: 95.7 FL (ref 79–97)
MONOCYTES # BLD AUTO: 0.6 10*3/MM3 (ref 0.1–0.9)
MONOCYTES NFR BLD AUTO: 10.9 % (ref 5–12)
NEUTROPHILS NFR BLD AUTO: 2.95 10*3/MM3 (ref 1.7–7)
NEUTROPHILS NFR BLD AUTO: 53.7 % (ref 42.7–76)
NRBC BLD AUTO-RTO: 0 /100 WBC (ref 0–0.2)
PLATELET # BLD AUTO: 160 10*3/MM3 (ref 140–450)
PMV BLD AUTO: 9.9 FL (ref 6–12)
POTASSIUM SERPL-SCNC: 3.6 MMOL/L (ref 3.5–5.2)
RBC # BLD AUTO: 3.76 10*6/MM3 (ref 3.77–5.28)
SODIUM SERPL-SCNC: 144 MMOL/L (ref 136–145)
TROPONIN T SERPL HS-MCNC: 12 NG/L
WBC NRBC COR # BLD: 5.5 10*3/MM3 (ref 3.4–10.8)
WHOLE BLOOD HOLD COAG: NORMAL
WHOLE BLOOD HOLD SPECIMEN: NORMAL

## 2023-08-15 PROCEDURE — 96374 THER/PROPH/DIAG INJ IV PUSH: CPT

## 2023-08-15 PROCEDURE — 70450 CT HEAD/BRAIN W/O DYE: CPT

## 2023-08-15 PROCEDURE — 80048 BASIC METABOLIC PNL TOTAL CA: CPT | Performed by: FAMILY MEDICINE

## 2023-08-15 PROCEDURE — 25010000002 MORPHINE PER 10 MG: Performed by: FAMILY MEDICINE

## 2023-08-15 PROCEDURE — 25010000002 HYDRALAZINE PER 20 MG: Performed by: FAMILY MEDICINE

## 2023-08-15 PROCEDURE — 99284 EMERGENCY DEPT VISIT MOD MDM: CPT

## 2023-08-15 PROCEDURE — 84484 ASSAY OF TROPONIN QUANT: CPT | Performed by: FAMILY MEDICINE

## 2023-08-15 PROCEDURE — 96375 TX/PRO/DX INJ NEW DRUG ADDON: CPT

## 2023-08-15 PROCEDURE — 85025 COMPLETE CBC W/AUTO DIFF WBC: CPT | Performed by: FAMILY MEDICINE

## 2023-08-15 RX ORDER — HYDRALAZINE HYDROCHLORIDE 20 MG/ML
20 INJECTION INTRAMUSCULAR; INTRAVENOUS ONCE
Status: COMPLETED | OUTPATIENT
Start: 2023-08-15 | End: 2023-08-15

## 2023-08-15 RX ORDER — SODIUM CHLORIDE 0.9 % (FLUSH) 0.9 %
10 SYRINGE (ML) INJECTION AS NEEDED
Status: DISCONTINUED | OUTPATIENT
Start: 2023-08-15 | End: 2023-08-15 | Stop reason: HOSPADM

## 2023-08-15 RX ADMIN — MORPHINE SULFATE 4 MG: 4 INJECTION, SOLUTION INTRAMUSCULAR; INTRAVENOUS at 16:59

## 2023-08-15 RX ADMIN — HYDRALAZINE HYDROCHLORIDE 20 MG: 20 INJECTION INTRAMUSCULAR; INTRAVENOUS at 16:44

## 2023-08-15 NOTE — ED PROVIDER NOTES
Subjective   History of Present Illness  78-year-old female with a history of hypertension was sent here from her primary care's provider's office after having high blood pressure.  Patient had a systolic in the 240s.  Patient was given 2 clonidine she thinks prior to arrival to the emergency room.  Patient was also having a headache.  Patient states that she has been have difficulty controlling her blood pressure over the last couple weeks.  Patient states that she has episodes with systolic is in the 180s and 170s.  Patient states that she has no chest pain.  Patient has no shortness of breath.  Patient has no fevers or chills.  Patient denies any other symptoms at this time.    Review of Systems   All other systems reviewed and are negative.    Past Medical History:   Diagnosis Date    Antral ulcer     Bladder cystocele     C. difficile diarrhea     CAD (coronary artery disease)     Cancer     lung 2021    Colon polyp     COPD (chronic obstructive pulmonary disease)     COVID-19 vaccine series completed     Diarrhea     Difficulty swallowing     Disease of thyroid gland     Diverticulosis     Elevated cholesterol     Gastritis     Generalized OA     GERD (gastroesophageal reflux disease)     History of bladder surgery 01/07/2020    History of transfusion     after lung surgery 2021    Hyperlipidemia     Hypertension     Liver cyst     Lung nodule     Microscopic colitis     Multiple gastric ulcers     last 5 years ago    Neck pain     Neuropathy     Normal body mass index     NSAID induced gastritis     Ulcer of pyloric antrum     UNSPECIFIED ULCER CHRONICITY    UTI (urinary tract infection)     Weight loss        Allergies   Allergen Reactions    Baclofen Other (See Comments)     MUSCULOSKELETAL THERAPY AGENTS knocked her out for a day    Other reaction(s): Unknown    Gabapentin Confusion     Other reaction(s): over sedation    Sulfa Antibiotics Rash     Mouth blisters    Sulfacetamide Sodium Rash     Levofloxacin Other (See Comments)     tendonitis, muscle pain    Amitriptyline Hcl Confusion     Other reaction(s): ambulation difficulties/confusion    Niferex [Iron Polysaccharide] Swelling     LIP SWELLING    Oxycodone-Acetaminophen GI Intolerance     Other reaction(s): vomiting       Past Surgical History:   Procedure Laterality Date    ANTERIOR CERVICAL DISCECTOMY W/ FUSION N/A 5/22/2017    Procedure: CERVICAL DISCECTOMY ANTERIOR FUSION WITH INSTRUMENTATION;  Surgeon: NADINE Sarabia MD;  Location:  [facility] OR;  Service:     AORTAGRAM Left 11/9/2020    Procedure: left lower extremtiy angiogram, hawk athrectomy, balloon angioplasty, stent placement, mynx closure;  Surgeon: Sukhdev Condon DO;  Location:  [facility]BR[facility] OR 12;  Service: Vascular;  Laterality: Left;    AORTAGRAM Left 3/26/2021    Procedure: LEFT LOWER EXTREMITY ANGIOGRAM, BALLOON ANGIOPLASTY, STENT PLACEMENT, MYNX CLOSURE;  Surgeon: Sukhdev Condon DO;  Location:  [facility];  Service: Vascular;  Laterality: Left;    AORTAGRAM Left 8/6/2021    Procedure: LEFT LOWER EXTREMITY ANGIOGRAM, HAWK ATHERECTOMY, BALLOON ANGIOPLASTY, MYNX CLOSURE;  Surgeon: Sukhdev Condon DO;  Location:  [facility] 12;  Service: Vascular;  Laterality: Left;    AORTAGRAM Right 6/8/2022    Procedure: RIGHT LOWER EXTREMITY ANGIOGRAM, INTRAVASCULAR LITHOTRIPSY, HAWK ATHRECTOMY, BALLOON ANGIOPLASTY, MYNX CLOSURE;  Surgeon: Sukhdev Condon DO;  Location:  [facility] 12;  Service: Vascular;  Laterality: Right;    AORTAGRAM Left 10/21/2022    Procedure: LEFT LOWER EXTREMITY ANGIOGRAM WITH HAWK ATHRECTOMY, BALLOON ANGIOPLASTY, STENT PLACEMENT, MYNX CLOSURE;  Surgeon: Sukhdev Condon DO;  Location:  [facility] 12;  Service: Vascular;  Laterality: Left;    BLADDER SUSPENSION      also had pelvic reconstructive surgery    BREAST EXCISIONAL BIOPSY Right 1985    CATARACT EXTRACTION, BILATERAL      CERVICAL CORPECTOMY N/A 5/22/2017    Procedure:   ANTERIOR CERVICAL DISCECTOMY FUSION C-6 to T1,  ANTERIOR FUSION WITH INSTRUMENTATION C-5 T-1;  Surgeon: NADINE Sarabia MD;  Location: [facility] OR;  Service:     COLONOSCOPY  08/19/2015    TIC BX RT COLON    COLONOSCOPY  12/04/2013    RT COLON BX RECALL 5YR    COLONOSCOPY N/A 11/29/2016    The entire examined colon is normal; No specimens collected    COLONOSCOPY N/A 6/2/2022    Procedure: COLONOSCOPY WITH ANESTHESIA;  Surgeon: Marco Antonio Vallejo MD;  Location: [facility] ENDOSCOPY;  Service: Gastroenterology;  Laterality: N/A;  Pre: screen  Post: diverticulosis  Andrew Ty MD    COLONOSCOPY N/A 4/5/2023    Procedure: COLONOSCOPY WITH ANESTHESIA;  Surgeon: Mike Kearns MD;  Location: [facility] ENDOSCOPY;  Service: Gastroenterology;  Laterality: N/A;  preop: GI bleed;melena  post op: diverticulosis, avm  PCP: Andrew Ty    ENDOSCOPY  12/03/2015    HEALED ULCER SLANT BX    ENDOSCOPY N/A 4/1/2023    Procedure: ESOPHAGOGASTRODUODENOSCOPY WITH ANESTHESIA;  Surgeon: Patric Reyes DO;  Location: [facility] OR;  Service: Gastroenterology;  Laterality: N/A;  PRE GI BLEED  POST gastric ulcers  DR LINN TY    FEMORAL ENDARTERECTOMY Left 10/21/2022    Procedure: LEFT LOWER EXTREMITY ANGIOGRAM;  Surgeon: Sukhdev Condon DO;  Location: [facility] HYBRID OR 12;  Service: Vascular;  Laterality: Left;    HYSTERECTOMY      LEG THROMBECTOMY/EMBOLECTOMY Left 6/8/2022    Procedure: LOWER EXTREMITY THROMBECTOMY/EMBOLECTOMY, PATCH GRAFT TO  FEMORAL ARTERY;  Surgeon: Sukhdev Condon DO;  Location: [facility] HYBRID OR 12;  Service: Vascular;  Laterality: Left;    LOBECTOMY Right 11/5/2021    Procedure: RIGHT THORACOSCOPY WITH DAVINCI ROBOT, RIGHT UPPER LOBE LOBECTOMY;  Surgeon: Jarad Ignacio MD;  Location: [facility] OR;  Service: Robotics - DaVinci;  Laterality: Right;    LUNG SURGERY      OTHER SURGICAL HISTORY      KNEE CARTILAGE REMOVED    OTHER SURGICAL HISTORY      BENIGN FIBROID TUMOR OF BREAST 1985 REMOVED    TONSILLECTOMY          Family History   Problem Relation Age of Onset    Breast cancer Maternal Aunt     Heart disease Mother     Heart disease Father     GI problems Neg Hx         MALIGNANCIES    Colon cancer Neg Hx     Colon polyps Neg Hx        Social History     Socioeconomic History    Marital status:    Tobacco Use    Smoking status: Former     Packs/day: 1.50     Years: 20.00     Pack years: 30.00     Types: Cigarettes     Quit date:      Years since quittin.6    Smokeless tobacco: Never   Vaping Use    Vaping Use: Never used   Substance and Sexual Activity    Alcohol use: Yes     Comment: occasionally    Drug use: No    Sexual activity: Not Currently           Objective   Physical Exam  Vitals and nursing note reviewed.   Constitutional:       Appearance: Normal appearance.   HENT:      Head: Normocephalic and atraumatic.   Eyes:      Extraocular Movements: Extraocular movements intact.      Pupils: Pupils are equal, round, and reactive to light.   Cardiovascular:      Rate and Rhythm: Normal rate and regular rhythm.      Heart sounds: Normal heart sounds.   Pulmonary:      Effort: Pulmonary effort is normal.      Breath sounds: Normal breath sounds.   Skin:     General: Skin is warm and dry.   Neurological:      General: No focal deficit present.      Mental Status: She is alert and oriented to person, place, and time.      Cranial Nerves: No cranial nerve deficit.      Sensory: No sensory deficit.      Motor: No weakness.   Psychiatric:         Mood and Affect: Mood normal.         Behavior: Behavior normal.       Procedures           ED Course  ED Course as of 08/15/23 1745   Tue Aug 15, 2023   1547 EKG rate 73  Normal sinus rhythm  No STEMI  Artifact present [RP]      ED Course User Index  [RP] Vel Benito MD                                         Lab Results (last 24 hours)       Procedure Component Value Units Date/Time    CBC & Differential [[id number]]  (Abnormal) Collected: 08/15/23 9796     Specimen: Blood Updated: 08/15/23 1531    Narrative:      The following orders were created for panel order CBC & Differential.  Procedure                               Abnormality         Status                     ---------                               -----------         ------                     CBC Auto Differential[[id number]]        Abnormal            Final result                 Please view results for these tests on the individual orders.    Basic Metabolic Panel [[id number]]  (Abnormal) Collected: 08/15/23 1516    Specimen: Blood Updated: 08/15/23 1549     Glucose 117 mg/dL      BUN 7 mg/dL      Creatinine 0.66 mg/dL      Sodium 144 mmol/L      Potassium 3.6 mmol/L      Chloride 105 mmol/L      CO2 27.0 mmol/L      Calcium 9.5 mg/dL      BUN/Creatinine Ratio 10.6     Anion Gap 12.0 mmol/L      eGFR 90.5 mL/min/1.73     Narrative:      GFR Normal >60  Chronic Kidney Disease <60  Kidney Failure <15    The GFR formula is only valid for adults with stable renal function between ages 18 and 70.    Single High Sensitivity Troponin T [[id number]]  (Abnormal) Collected: 08/15/23 1516    Specimen: Blood Updated: 08/15/23 1549     HS Troponin T 12 ng/L     Narrative:      High Sensitive Troponin T Reference Range:  <10.0 ng/L- Negative Female for AMI  <15.0 ng/L- Negative Male for AMI  >=10 - Abnormal Female indicating possible myocardial injury.  >=15 - Abnormal Male indicating possible myocardial injury.   Clinicians would have to utilize clinical acumen, EKG, Troponin, and serial changes to determine if it is an Acute Myocardial Infarction or myocardial injury due to an underlying chronic condition.         CBC Auto Differential [[id number]]  (Abnormal) Collected: 08/15/23 1516    Specimen: Blood Updated: 08/15/23 1531     WBC 5.50 10*3/mm3      RBC 3.76 10*6/mm3      Hemoglobin 11.2 g/dL      Hematocrit 36.0 %      MCV 95.7 fL      MCH 29.8 pg      MCHC 31.1 g/dL      RDW 13.2 %      RDW-SD 46.4 fl      MPV 9.9  fL      Platelets 160 10*3/mm3      Neutrophil % 53.7 %      Lymphocyte % 30.7 %      Monocyte % 10.9 %      Eosinophil % 4.0 %      Basophil % 0.5 %      Immature Grans % 0.2 %      Neutrophils, Absolute 2.95 10*3/mm3      Lymphocytes, Absolute 1.69 10*3/mm3      Monocytes, Absolute 0.60 10*3/mm3      Eosinophils, Absolute 0.22 10*3/mm3      Basophils, Absolute 0.03 10*3/mm3      Immature Grans, Absolute 0.01 10*3/mm3      nRBC 0.0 /100 WBC             CT Head Without Contrast   Final Result   1. No acute intracranial abnormality.   2. Partial opacification of the mastoid air cells which may represent   small mastoid effusions.   This report was finalized on 08/15/2023 15:43 by Dr. Melanie Baron MD.          Medications   sodium chloride 0.9 % flush 10 mL (has no administration in time range)   hydrALAZINE (APRESOLINE) injection 20 mg (20 mg Intravenous Given 8/15/23 1644)   morphine injection 4 mg (4 mg Intravenous Given 8/15/23 1659)       Medical Decision Making  This is a 78-year-old female who came in with headache and high blood pressure.  Patient blood pressure was systolic in the 240s at her primary care doctor's office and she was sent here for evaluation.  Patient did have a high blood pressure here in the emergency room.  Patient was given hydralazine here in the emergency room.  Patient's blood pressure improved to his systolic was 148.  Patient was having a headache where she was given morphine for headache.  Patient CT scan of the head was negative for any acute findings.  Patient's neuro exam was unremarkable.  I discussed the case with the patient's primary care provider.  Patient's primary care provider after the lab work and findings prefers the patient be discharged home and not be admitted to the hospital under his service and follow-up with him tomorrow in the clinic at 9 AM.  All questions were answered for the patient prior to discharge.  Patient was advised to return to the emergency room  with new or worsening symptoms.  Patient verbalized understanding was agreeable plan as discussed.    Problems Addressed:  Elevated blood pressure reading with diagnosis of hypertension: complicated acute illness or injury  Generalized headache: complicated acute illness or injury    Amount and/or Complexity of Data Reviewed  Labs: ordered. Decision-making details documented in ED Course.  Radiology: ordered. Decision-making details documented in ED Course.  ECG/medicine tests: ordered. Decision-making details documented in ED Course.  Discussion of management or test interpretation with external provider(s): Dr. Xi Landon  Prescription drug management.        Final diagnoses:   Generalized headache   Elevated blood pressure reading with diagnosis of hypertension       ED Disposition  ED Disposition       ED Disposition   Discharge    Condition   Stable    Comment   --               Andrew Layne MD  [address] [facility] [address]  [phone]    Go on 8/16/2023      [facility] EMERGENCY DEPARTMENT  [address] [place] [id number] [phone]    As needed, If symptoms worsen         Medication List      No changes were made to your prescriptions during this visit.            Vel Benito MD  08/15/23 0810

## 2023-08-16 ENCOUNTER — TRANSCRIBE ORDERS (OUTPATIENT)
Dept: ADMINISTRATIVE | Facility: HOSPITAL | Age: 78
End: 2023-08-16
Payer: MEDICARE

## 2023-08-16 ENCOUNTER — HOSPITAL ENCOUNTER (OUTPATIENT)
Dept: ULTRASOUND IMAGING | Facility: HOSPITAL | Age: 78
Discharge: HOME | End: 2023-08-16
Admitting: PHYSICIAN ASSISTANT
Payer: MEDICARE

## 2023-08-16 DIAGNOSIS — I10 ACCELERATED HYPERTENSION: ICD-10-CM

## 2023-08-16 DIAGNOSIS — I10 ACCELERATED HYPERTENSION: Primary | ICD-10-CM

## 2023-08-16 PROCEDURE — 76775 US EXAM ABDO BACK WALL LIM: CPT

## 2023-08-16 PROCEDURE — 93975 VASCULAR STUDY: CPT

## 2023-08-31 ENCOUNTER — TRANSCRIBE ORDERS (OUTPATIENT)
Dept: ADMINISTRATIVE | Facility: HOSPITAL | Age: 78
End: 2023-08-31
Payer: MEDICARE

## 2023-08-31 DIAGNOSIS — Z12.31 SCREENING MAMMOGRAM FOR BREAST CANCER: Primary | ICD-10-CM

## 2023-09-27 ENCOUNTER — HOSPITAL ENCOUNTER (OUTPATIENT)
Dept: MAMMOGRAPHY | Facility: HOSPITAL | Age: 78
Discharge: HOME | End: 2023-09-27
Admitting: FAMILY MEDICINE
Payer: MEDICARE

## 2023-09-27 DIAGNOSIS — Z12.31 SCREENING MAMMOGRAM FOR BREAST CANCER: ICD-10-CM

## 2023-09-27 PROCEDURE — 77063 BREAST TOMOSYNTHESIS BI: CPT

## 2023-09-27 PROCEDURE — 77067 SCR MAMMO BI INCL CAD: CPT

## 2023-11-15 ENCOUNTER — TELEPHONE (OUTPATIENT)
Dept: ONCOLOGY | Facility: CLINIC | Age: 78
End: 2023-11-15

## 2023-11-15 NOTE — TELEPHONE ENCOUNTER
Caller: Trinidad Zhu    Relationship: Self    Best call back number: [phone]     What is the best time to reach you: ASAP    Who are you requesting to speak with (clinical staff, provider,  specific staff member): CLINICAL    What was the call regarding: PLEASE CALL PT TO LET HER KNOW WHEN DR HENSLEY WILL NEED LAB DONE PRIOR TO HER FOLLOW UP APPT DEC.12  SHE HAS CT SCAN DEC.4 AND SEES HER PCP DEC.7.  LAST YEAR ALL NEEDED LABS FOR BOTH DR HENSLEY AND PCP WERE DONE AT SAME TIME.  CAN THAT BE DONE AGAIN THIS YEAR, WILL THE TIMING WORK OUT FOR WHEN LAB IS NEEDED?  OR WILL IT BE DONE WHEN SHE IS HAVING THE CT SCAN?  SHE HAS ORDER FOR LABS FOR DR MARIEE, AND HE IS GOING TO WANT EXTRA LABS IN ADDITION TO WHAT DR HENSLEY WILL NEED.  PLEASE CALL PT TO ADVISE.

## 2023-11-15 NOTE — TELEPHONE ENCOUNTER
Return call to patient Trinidad Rochaley, patient questions if she would be able to have both Dr Palacios and her PCP Dr Layne's lab work drawn on the same day to avoid additional sticks and trips to the lab.   Patient informed that she can bring her order from PCP and they should be able to draw these labs at the same time, pt v/u.

## 2023-11-28 NOTE — PROGRESS NOTES
[facility] HEMATOLOGY & ONCOLOGY  [address]  [facility] [place] [address] [phone]    Patient Name: Trinidad Zhu  Encounter Date: 12/12/2023  YOB: 1945  Patient Number: [id number]      REASON FOR FOLLOW-UP: Trinidad Zhu is a pleasant 78 y.o.  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  She is seen with her spouse, Yair. History is obtained from patient.  She is a reliable historian.            Oncology/Hematology History Overview Note     DIAGNOSTIC ABNORMALITIES:Patient found to have lung nodule.   CT chest 07/20/2020. No change in 1.5 cm RIGHT upper lobe pulmonary nodule. However, there has been very slight increase in size compared to multiple prior  studies dating back to 2016. Recommend continued imaging surveillance.    Increased size of clustered nodules in the inferior RIGHT upper  lobe and new cluster of tree-in-bud nodularity in the RIGHT lower lobe. Differential includes an indolent infectious/inflammatory process such as atypical mycobacterial infection (NAE). Recommend continued follow-up  CT chest 09/03/2021. Spiculated nodule in the right upper lobe have slightly increased in size. Recommend PET/CT.  PET 09/10/2021. FDG uptake within the spiculated 2 cm right upper lobe nodule with SUV of 2.85 concerning for malignancy. Mild FDG uptake within the reticulonodular changes of the more inferior right upper lobe and right middle lobe with SUV of 1.1 favoring an inflammatory/infectious process. No evidence of distant metastasis.  CT chest 10/07/2021.  Spiculated pleural-based right upper lobe nodule that appears stable compared to 9/3/2021 examination.  Right lung nodularity with peribronchial thickening and bronchiectasis, stable.  Developing mild reticular nodular opacities posteriorly in the left lower lobe and minimally  in the right lower lobe. Acute inflammatory change suspected. Correlate with patient presentation.  CT chest 11/09/2021. Postoperative change of VATS RIGHT upper lobectomy. There is fluid/soft tissue with intermixed gas in the RIGHT lung apex extending into the RIGHT hilum. A small RIGHT pneumothorax is present with right-sided chest tube in good position.  Pathology report 11/12/2021. Lung, right upper lobectomy (frozen section control):  A.  Moderately differentiated acinar predominant nonmucinous adenocarcinoma with a peripheral lepidic component (2.5 cm).    B.  The bronchial, vascular and parenchymal surgical margins are negative for malignancy.  C.  The tumor extends up to, but does not invade, the visceral pleura.  D.  Pulmonary caseating granulomata.  E.  Emphysematous changes.  F.  Peribronchial lymph nodes (2), with noncaseating granulomata, negative for metastatic carcinoma.  AJCC stage:pT1c, pN0.         PREVIOUS INTERVENTIONS:  She had undergone right upper lobe lobectomy 11/05/2021.     Lung nodules   1/23/2019 Initial Diagnosis    Lung nodules     9/3/2021 Imaging    CT Chest  Spiculated right upper lobe nodule is redemonstrated, measuring 2.1 x 1.3 x 1.2 cm, previously 1.6 x 1.1 x 1.2 cm. Associated peripheral groundglass opacities. Emphysema. Bronchiectasis in the right upper and middle lobe redemonstrated. Right fissure-based nodule measuring 5 mm, previously 3 (image 82, series 3. Additional nodularities along the right minor fissure are similar to decrease. Mm.  No pleural effusion is present. The trachea and bronchial tree are patent.  1. Spiculated nodule in the right upper lobe have slightly increased in size. Recommend PET/CT.     9/10/2021 Procedure    09-  PFTs  Pulmonary Function Test   Pre Drug % Predicted    FVC: 80%   FEV1: 64%   FEF 25-75%: 28%   FEV1/FVC: 61.74%   Interpretation   Spirometry   Spirometry shows moderate obstruction. There is reduced midflow suggesting small  airway/airflow obstruction.   Review of FVL curve   Patient's effort is normal.     9/10/2021 Imaging    PET/CT:  IMPRESSION:  1. FDG uptake within the spiculated 2 cm right upper lobe nodule with  SUV of 2.85 concerning for malignancy.  2. Mild FDG uptake within the reticulonodular changes of the more  inferior right upper lobe and right middle lobe with SUV of 1.1 favoring  an inflammatory/infectious process.  3. No evidence of distant metastasis.     9/21/2021 Biopsy    09-  Mike bronchoscopy RUL  Lung, Right upper lobe; transbronchial biopsy:  Atypical proliferation, highly suspicious for well-differentiated adenocarcinoma  Comments:  Recuts are evaluated.  The atypical proliferation is highlighted with immunohistochemical TIF1 staining (control appropriate).  The appearance could suggest a lepidic, well differentiated, or scar-carcinoma type lesion.    Lymph node, 10R FNA:  lymph node sampling  Lavage:  inflammation/ inflammatory debris and macrophages  Broncheoalveolar lavage, RUL  Rare groups of mildly atypical epithelial cells     10/7/2021 Imaging    CT Chest:  IMPRESSION:  1. Spiculated pleural-based right upper lobe nodule that appears stable  compared to 9/3/2021 examination.  2. Right lung nodularity with peribronchial thickening and  bronchiectasis, stable.  3. Developing mild reticular nodular opacities posteriorly in the left  lower lobe and minimally in the right lower lobe. Acute inflammatory  change suspected. Correlate with patient presentation.  4. Continued follow-up recommended.     Adenocarcinoma   12/14/2021 Initial Diagnosis    Adenocarcinoma (HCC)         PAST MEDICAL HISTORY:  ALLERGIES:  Allergies   Allergen Reactions    Baclofen Other (See Comments)     MUSCULOSKELETAL THERAPY AGENTS knocked her out for a day    Other reaction(s): Unknown    Gabapentin Confusion     Other reaction(s): over sedation    Sulfa Antibiotics Rash     Mouth blisters    Sulfacetamide Sodium Rash     Levofloxacin Other (See Comments)     tendonitis, muscle pain    Amitriptyline Hcl Confusion     Other reaction(s): ambulation difficulties/confusion    Niferex [Iron Polysaccharide] Swelling     LIP SWELLING    Oxycodone-Acetaminophen GI Intolerance     Other reaction(s): vomiting     CURRENT MEDICATIONS:  Outpatient Encounter Medications as of 12/12/2023   Medication Sig Dispense Refill    albuterol sulfate  (90 Base) MCG/ACT inhaler Inhale 2 puffs Every 4 (Four) Hours As Needed for Wheezing.      alendronate (FOSAMAX) 70 MG tablet Take 1 tablet by mouth Every 7 (Seven) Days. Saturdays  3    aspirin 81 MG chewable tablet Chew 1 tablet Every Night.      Budeson-Glycopyrrol-Formoterol (Breztri Aerosphere) 160-9-4.8 MCG/ACT aerosol inhaler Inhale 2 puffs 2 (Two) Times a Day. 1 each 0    Calcium Carb-Cholecalciferol (CALCIUM 500+D PO) Take 1 tablet by mouth Daily.      carvedilol (COREG) 3.125 MG tablet Take 1 tablet by mouth 2 (Two) Times a Day With Meals. 60 tablet 0    cetirizine (ZyrTEC) 10 MG tablet Take 1 tablet by mouth At Night As Needed.      clopidogrel (PLAVIX) 75 MG tablet Take 1 tablet by mouth Daily. 30 tablet 11    famotidine (PEPCID) 40 MG tablet Take 1 tablet by mouth 2 (Two) Times a Day.  3    furosemide (LASIX) 40 MG tablet       levothyroxine (SYNTHROID, LEVOTHROID) 50 MCG tablet Take 1 tablet by mouth Daily.  3    losartan-hydrochlorothiazide (HYZAAR) 100-25 MG per tablet Take 1 tablet by mouth Daily.      multivitamin with minerals tablet tablet Take 1 tablet by mouth Daily.      potassium chloride (K-DUR,KLOR-CON) 20 MEQ CR tablet       rosuvastatin (CRESTOR) 40 MG tablet Take 1 tablet by mouth Every Night.      traMADol (ULTRAM) 50 MG tablet Take 1 tablet by mouth Every 3 (Three) Hours As Needed for Moderate Pain or Severe Pain.  0    [DISCONTINUED] benazepril (LOTENSIN) 5 MG tablet Take 0.5 tablets by mouth Daily. (Patient not taking: Reported on 12/12/2023) 30 tablet 0     [DISCONTINUED] escitalopram (Lexapro) 10 MG tablet Take 1 tablet by mouth Daily. (Patient not taking: Reported on 6/16/2023) 90 tablet 0    [DISCONTINUED] ferrous sulfate 325 (65 FE) MG tablet Take 1 tablet by mouth Every Other Day. (Patient not taking: Reported on 6/16/2023) 45 tablet 0    [DISCONTINUED] hydrOXYzine pamoate (VISTARIL) 25 MG capsule Take 1 capsule by mouth 3 (Three) Times a Day As Needed for Itching or Anxiety. (Patient not taking: Reported on 6/16/2023)      [DISCONTINUED] pantoprazole (Protonix) 40 MG EC tablet Take 1 tablet by mouth 2 (Two) Times a Day. (Patient not taking: Reported on 6/16/2023) 180 tablet 0     No facility-administered encounter medications on file as of 12/12/2023.     ADULT ILLNESSES:  Patient Active Problem List   Diagnosis Code    Spinal stenosis, cervical region M48.02    Lung nodules R91.8    Bronchiectasis without complication J47.9    Underweight R63.6    Personal history of nicotine dependence Z87.891    Restrictive lung disease J98.4    Pulmonary emphysema J43.9    PAD (peripheral artery disease) I73.9    Preop testing Z01.818    Gastroesophageal reflux disease K21.9    Allergic rhinitis J30.9    ORTIZ (dyspnea on exertion) R06.09    CAD (coronary artery disease) I25.10    Hyperlipidemia E78.5    Neuropathy G62.9    Hypertension I10    Simple chronic bronchitis J41.0    Former smoker Z87.891    Lung nodule R91.1    Mass of right lung R91.8    Postoperative anemia due to acute blood loss D62    Syncope and collapse R55    Adenocarcinoma C80.1    Personal history of malignant neoplasm of bronchus and lung Z85.118    Hydropneumothorax J94.8    Lower extremity embolism I74.3    Generalized muscle weakness M62.81    Acute blood loss anemia D62    Melena K92.1    Urinary incontinence R32    Gastrointestinal hemorrhage K92.2     SURGERIES:  Past Surgical History:   Procedure Laterality Date    ANTERIOR CERVICAL DISCECTOMY W/ FUSION N/A 5/22/2017    Procedure: CERVICAL  DISCECTOMY ANTERIOR FUSION WITH INSTRUMENTATION;  Surgeon: NADINE Sarabia MD;  Location:  [facility] OR;  Service:     AORTAGRAM Left 11/9/2020    Procedure: left lower extremtiy angiogram, hawk athrectomy, balloon angioplasty, stent placement, mynx closure;  Surgeon: Sukhdev Condon DO;  Location:  [facility];  Service: Vascular;  Laterality: Left;    AORTAGRAM Left 3/26/2021    Procedure: LEFT LOWER EXTREMITY ANGIOGRAM, BALLOON ANGIOPLASTY, STENT PLACEMENT, MYNX CLOSURE;  Surgeon: Sukhdev Condon DO;  Location:  [facility];  Service: Vascular;  Laterality: Left;    AORTAGRAM Left 8/6/2021    Procedure: LEFT LOWER EXTREMITY ANGIOGRAM, HAWK ATHERECTOMY, BALLOON ANGIOPLASTY, MYNX CLOSURE;  Surgeon: Sukhdev Condon DO;  Location:  [facility];  Service: Vascular;  Laterality: Left;    AORTAGRAM Right 6/8/2022    Procedure: RIGHT LOWER EXTREMITY ANGIOGRAM, INTRAVASCULAR LITHOTRIPSY, HAWK ATHRECTOMY, BALLOON ANGIOPLASTY, MYNX CLOSURE;  Surgeon: Sukhdev Condon DO;  Location:  [facility];  Service: Vascular;  Laterality: Right;    AORTAGRAM Left 10/21/2022    Procedure: LEFT LOWER EXTREMITY ANGIOGRAM WITH HAWK ATHRECTOMY, BALLOON ANGIOPLASTY, STENT PLACEMENT, MYNX CLOSURE;  Surgeon: Sukhdev Condon DO;  Location:  [facility];  Service: Vascular;  Laterality: Left;    BLADDER SUSPENSION      also had pelvic reconstructive surgery    BREAST EXCISIONAL BIOPSY Right 1985    CATARACT EXTRACTION, BILATERAL      CERVICAL CORPECTOMY N/A 5/22/2017    Procedure:  ANTERIOR CERVICAL DISCECTOMY FUSION C-6 to T1,  ANTERIOR FUSION WITH INSTRUMENTATION C-5 T-1;  Surgeon: NADINE Sarabia MD;  Location:  [facility] OR;  Service:     COLONOSCOPY  08/19/2015    TIC BX RT COLON    COLONOSCOPY  12/04/2013    RT COLON BX RECALL 5YR    COLONOSCOPY N/A 11/29/2016    The entire examined colon is normal; No specimens collected    COLONOSCOPY N/A 6/2/2022    Procedure: COLONOSCOPY WITH  ANESTHESIA;  Surgeon: Marco Antonio Vallejo MD;  Location:  [facility];  Service: Gastroenterology;  Laterality: N/A;  Pre: screen  Post: diverticulosis  Andrew Ty MD    COLONOSCOPY N/A 4/5/2023    Procedure: COLONOSCOPY WITH ANESTHESIA;  Surgeon: Mike Kearns MD;  Location:  [facility];  Service: Gastroenterology;  Laterality: N/A;  preop: GI bleed;melena  post op: diverticulosis, avm  PCP: Andrew Ty    ENDOSCOPY  12/03/2015    HEALED ULCER SLANT BX    ENDOSCOPY N/A 4/1/2023    Procedure: ESOPHAGOGASTRODUODENOSCOPY WITH ANESTHESIA;  Surgeon: Patric Reyes DO;  Location:  [facility];  Service: Gastroenterology;  Laterality: N/A;  PRE GI BLEED  POST gastric ulcers  DR LINN TY    FEMORAL ENDARTERECTOMY Left 10/21/2022    Procedure: LEFT LOWER EXTREMITY ANGIOGRAM;  Surgeon: Sukhdev Condon DO;  Location:  [facility];  Service: Vascular;  Laterality: Left;    HYSTERECTOMY      LEG THROMBECTOMY/EMBOLECTOMY Left 6/8/2022    Procedure: LOWER EXTREMITY THROMBECTOMY/EMBOLECTOMY, PATCH GRAFT TO  FEMORAL ARTERY;  Surgeon: Sukhdev Condon DO;  Location:  [facility];  Service: Vascular;  Laterality: Left;    LOBECTOMY Right 11/5/2021    Procedure: RIGHT THORACOSCOPY WITH DAVINCI ROBOT, RIGHT UPPER LOBE LOBECTOMY;  Surgeon: Jarad Ignacio MD;  [facility];  Service: Robotics - DaVinci;  Laterality: Right;    LUNG SURGERY      OTHER SURGICAL HISTORY      KNEE CARTILAGE REMOVED    OTHER SURGICAL HISTORY      BENIGN FIBROID TUMOR OF BREAST 1985 REMOVED    TONSILLECTOMY       HEALTH MAINTENANCE ITEMS:  Health Maintenance Due   Topic Date Due    TDAP/TD VACCINES (1 - Tdap) Never done    ZOSTER VACCINE (1 of 2) Never done    BMI FOLLOWUP  02/02/2023    DXA SCAN  06/17/2023       <no information>  Last Completed Colonoscopy            COLORECTAL CANCER SCREENING (COLONOSCOPY - Every 7 Years) Next due on 4/5/2030 04/05/2023  Surgical Procedure: COLONOSCOPY    04/05/2023   SCANNED - COLONOSCOPY    2023  Fecal Occult Blood component of Occult Blood X 1, Stool - Stool, Per Rectum    2022  COLONOSCOPY    2022  Surgical Procedure: COLONOSCOPY    Only the first 5 history entries have been loaded, but more history exists.                  Immunization History   Administered Date(s) Administered    Arexvy (RSV, Adults 60+ yrs) 10/19/2023    COVID-19 (MODERNA) 1st,2nd,3rd Dose Monovalent 2021, 2021, 10/26/2021    COVID-19 (MODERNA) BIVALENT 12+YRS 10/03/2022    COVID-19 (MODERNA) Monovalent Original Booster 2022    COVID-19 F23 (MODERNA) 12YRS+ (SPIKEVAX) 10/31/2023    Flu Vaccine Split Quad 2020    Fluzone High Dose =>65 Years (Vaxcare ONLY) 10/10/2019, 10/01/2021, 10/03/2022    Fluzone High-Dose 65+yrs 10/31/2023    Fluzone Quad >6mos (Multi-dose) 10/01/2018    INFLUENZA SPLIT TRI 10/01/2019    Influenza Quad Vaccine (Inpatient) 10/13/2010    Pneumococcal Conjugate 13-Valent (PCV13) 2016    Pneumococcal Polysaccharide (PPSV23) 2015, 10/01/2019     Last Completed Mammogram       This patient has no relevant Health Maintenance data.              FAMILY HISTORY:  Family History   Problem Relation Age of Onset    Breast cancer Maternal Aunt     Heart disease Mother     Heart disease Father     GI problems Neg Hx         MALIGNANCIES    Colon cancer Neg Hx     Colon polyps Neg Hx      SOCIAL HISTORY:  Social History     Socioeconomic History    Marital status:    Tobacco Use    Smoking status: Former     Packs/day: 1.50     Years: 20.00     Additional pack years: 0.00     Total pack years: 30.00     Types: Cigarettes     Quit date:      Years since quittin.9    Smokeless tobacco: Never   Vaping Use    Vaping Use: Never used   Substance and Sexual Activity    Alcohol use: Yes     Comment: occasionally    Drug use: No    Sexual activity: Not Currently       REVIEW OF SYSTEMS:    Review of Systems   Constitutional:  Positive for  "fatigue. Negative for fever.        \"Feel tired.\"   HENT:  Negative for congestion and trouble swallowing.    Eyes:  Negative for discharge and redness.   Respiratory:  Negative for shortness of breath and wheezing.    Cardiovascular:  Negative for chest pain and leg swelling.   Gastrointestinal:  Positive for constipation. Negative for diarrhea, nausea and vomiting.   Endocrine: Negative for polydipsia.   Genitourinary:  Negative for dysuria and flank pain.   Musculoskeletal:  Negative for gait problem and myalgias.   Skin:  Positive for pallor.   Allergic/Immunologic: Negative for food allergies.   Neurological:  Negative for dizziness and weakness.   Hematological:  Negative for adenopathy. Does not bruise/bleed easily.   Psychiatric/Behavioral:  Negative for agitation and hallucinations.        VITAL SIGNS: /62   Pulse 90   Temp 97.2 °F (36.2 °C)   Resp 18   Ht 161.3 cm (63.5\")   Wt 47.5 kg (104 lb 12.8 oz)   SpO2 97%   Breastfeeding No   BMI 18.27 kg/m²  Lost 11 pounds. \"Taking diuretic. My left lung has fluid.\"   Pain Score    12/12/23 1041   PainSc: 2  Comment: I have had some gout       PHYSICAL EXAMINATION:     Physical Exam    LABS    Lab Results - Last 18 Months   Lab Units 12/04/23  1056 08/15/23  1516 06/05/23  0945 04/06/23  0541 04/05/23  2333 04/05/23  1829 04/01/23  1245 04/01/23  0448 03/31/23  1730 12/05/22  1035   HEMOGLOBIN g/dL 11.5* 11.2* 12.1 7.7* 7.8* 8.5*   < > 7.9* 5.9* 11.1*   HEMATOCRIT % 36.6 36.0 39.8 24.3* 23.8* 27.1*   < > 24.6* 18.7* 34.2   MCV fL 93.6 95.7 101.8* 93.1 90.2 93.8   < > 94.3 94.9 104.9*   WBC 10*3/mm3 10.76 5.50 7.55 4.87 6.73 7.83   < > 10.04 13.74* 6.51   RDW % 13.5 13.2 14.2 16.9* 16.9* 17.5*   < > 13.1 13.4 14.1   MPV fL 9.5 9.9 10.6 9.0 8.9 9.0   < > 9.3 9.4 9.3   PLATELETS 10*3/mm3 258 160 241 171 162 212   < > 246 370 174   IMM GRAN % % 0.4 0.2 0.3  --   --   --   --  0.5 0.5 0.3   NEUTROS ABS 10*3/mm3 7.47* 2.95 4.32  --   --   --   --  5.87 " 11.12* 3.46   LYMPHS ABS 10*3/mm3 1.58 1.69 2.00  --   --   --   --  2.52 1.60 2.00   MONOS ABS 10*3/mm3 1.31* 0.60 0.87  --   --   --   --  1.49* 0.94* 0.79   EOS ABS 10*3/mm3 0.30 0.22 0.30  --   --   --   --  0.10 0.01 0.21   BASOS ABS 10*3/mm3 0.06 0.03 0.04  --   --   --   --  0.01 0.00 0.03   IMMATURE GRANS (ABS) 10*3/mm3 0.04 0.01 0.02  --   --   --   --  0.05 0.07* 0.02   NRBC /100 WBC 0.0 0.0 0.0  --   --   --   --  0.0 0.1 0.0    < > = values in this interval not displayed.       Lab Results - Last 18 Months   Lab Units 12/04/23  1056 12/04/23  1048 08/15/23  1516 06/05/23  0945 04/02/23  0543 04/01/23  0448 03/31/23  1730 12/13/22  1139 12/05/22  1035 11/13/22 2023 10/19/22  1356   GLUCOSE mg/dL 108*  --  117* 86 91 81 127*  --  96 117* 113*   SODIUM mmol/L 138  --  144 142 137 135* 134*   < > 141 139 139   POTASSIUM mmol/L 4.3  --  3.6 3.9 3.8 3.8 4.4   < > 3.6 4.0 3.5   TOTAL CO2   --   --   --   --   --   --   --    < >  --   --   --    CO2 mmol/L 28.0  --  27.0 30.0* 27.0 26.0 25.0  --  32.0* 31.0* 32.0*   CHLORIDE mmol/L 95*  --  105 101 103 99 96*   < > 100 99 96*   ANION GAP mmol/L 15.0  --  12.0 11.0 7.0 10.0 13.0   < > 9.0 9.0 11.0   CREATININE mg/dL 1.24* 1.40* 0.66 0.80  0.72 0.84 0.84 0.75   < > 0.70 0.93 0.69   BUN mg/dL 28*  --  7* 15 31* 35* 37*   < > 13 15 19   BUN / CREAT RATIO  22.6  --  10.6 20.8 36.9* 41.7* 49.3*  --  18.6 16.1 27.5*   CALCIUM mg/dL 10.0  --  9.5 9.8 8.5* 8.7 9.6   < > 9.3 9.5 10.0   ALK PHOS U/L 74  --   --  57  --   --  41  --  49 49 53   TOTAL PROTEIN g/dL 7.8  --   --  6.9  --   --  6.3  --  6.6 6.9 7.5   ALT (SGPT) U/L 25  --   --  16  --   --  19  --  17 20 34*   AST (SGOT) U/L 51*  --   --  32  --   --  22  --  28 33* 63*   BILIRUBIN mg/dL 0.5  --   --  0.3  --   --  0.3  --  0.4 0.4 0.4   ALBUMIN g/dL 4.4  --   --  4.4  --   --  4.3  --  4.40 4.50 4.90   GLOBULIN gm/dL 3.4  --   --  2.5  --   --  2.0  --  2.2 2.4 2.6    < > = values in this interval not  "displayed.       No results for input(s): \"MSPIKE\", \"KAPPALAMB\", \"IGLFLC\", \"URICACID\", \"FREEKAPPAL\", \"CEA\", \"LDH\", \"REFLABREPO\" in the last [id number] hours.    Lab Results - Last 18 Months   Lab Units 12/04/23  1056 04/02/23  0543 03/31/23  1730 12/05/22  1035 10/19/22  1356   IRON mcg/dL 32* 30*  --  77  --    TIBC mcg/dL 416 426  --  481  --    IRON SATURATION (TSAT) % 8* 7*  --  16*  --    FERRITIN ng/mL 163.00*  --   --  42.83  --    TSH uIU/mL  --   --  3.090  --  0.680   FOLATE ng/mL  --   --   --  >20.00  --        Trinidad Zhu reports a pain score of 2.  Given her pain assessment as noted, treatment options were discussed and the following options were decided upon as a follow-up plan to address the patient's pain: continuation of current treatment plan for pain.      ASSESSMENT:  1.  Adenocarcinoma of the lung, right upper lobe.Tumor size 2 cm. PDL1 15%, Negative ALK, ROS1, and EGFR. BRAF G644V positive.  AJCC stage:1A3(pTic, pN0, cM0, G2)  Treatment status:Post right upper lobe lobectomy.  2.  Performance status of 1.  3.  30 pack years smoking history, etiology of her lung cancer. She had quit smoking. \"27 years.\"   4.  Normocytic anemia from iron deficiency due to gastrointestinal hemorrhage.  Diverticulosis in the sigmoid colon on 06/02/202.  Ferrous sulfate 12/7/2022 through 6/12/2023. 1 unit packed RBC 4/1/2023 and 4/4/2023.  Ferric gluconate 125 mg on 4/4/2023. \"It's constipating.\"Plan for IV iron.            PLAN:  1.   Re: Seen by Dr. Dion Cortés on 6/16/2023.  Follow-up for restrictive lung disease.  Can utilize albuterol if needed.  Follow-up 12/15/2023.  2.   Re: CT chest on 12/4/2023.  The patient has undergone previous right upper lobe lobectomy.  Changes of centrilobular emphysema. Although there are several lung  nodules which are stable from the previous exam in the right lung there are several new and more suspicious nodules which have developed in the perihilar aspect " of the right lower lobe. Given the multiplicity of findings and rapid development I feel an infectious/inflammatory process  would be favored over neoplasia but this would warrant close radiographic follow-up. NAE should be considered in the differential.  Loculated appearing effusion/pleural thickening within the right apex  and posterior right lower lobe. These are likely compensatory in nature.  No developing mediastinal or axillar lymphadenopathy. Moderate coronary calcifications are present.  CT abdomen pelvis 12/4/2023. No evidence of metastatic disease in the abdomen or pelvis.  Large volume stool in the colon. Correlate for constipation. Colonic diverticulosis without evidence of diverticulitis.  3.   Re: Heme status.  WBC 10.7, hemoglobin 11.5 and platelet 258.   Ferritin 163 and saturation 8.  Retic 1.2.  4.   Re: CMP.  GFR 44.9 mL per minute and AST 51, on Crestor.   5.   Re: Mammogram report 9/27/2023.  No mammographic evidence of malignancy.  6.   Re: Stable for observation, lung cancer.  7.  Continue care per primary care physician at the specialist.  8.  Plan of care discussed with patient and spouse, Don.  Understanding expressed.  Patient agreeable to proceed.  9.  Advance Care Planning  ACP discussion was held with the patient during this visit. Patient has an advance directive in EMR which is still valid.   10. Injectafer 750 mg one dose.  Premed Tylenol 500 mg p.o. Monitor for infusion reactions or anaphylaxis.     11.  Return to office in 3 months with preoffice CBC with differential, CMP, and CT of the chest to follow lung nodules.        I have reviewed the assessment and plan and verified the accuracy of it. No changes to assessment and plan since the information was documented. Cyrus Palacios MD 12/12/23         I spent 32 total minutes, face-to-face, caring for Trinidad brizuela. Greater than 50% of this time involved counseling and/or coordination of care as  documented within this note.            Dion Cortés MD  (Jarad Ignacio MD)  (Kimberley Rodas MD)  Jose Layne MD

## 2023-12-01 ENCOUNTER — TRANSCRIBE ORDERS (OUTPATIENT)
Dept: ADMINISTRATIVE | Facility: HOSPITAL | Age: 78
End: 2023-12-01
Payer: MEDICARE

## 2023-12-01 DIAGNOSIS — I25.10 CORONARY ARTERY DISEASE INVOLVING NATIVE CORONARY ARTERY OF NATIVE HEART WITHOUT ANGINA PECTORIS: Primary | ICD-10-CM

## 2023-12-04 ENCOUNTER — HOSPITAL ENCOUNTER (OUTPATIENT)
Dept: CT IMAGING | Facility: HOSPITAL | Age: 78
Discharge: HOME | End: 2023-12-04
Admitting: INTERNAL MEDICINE
Payer: MEDICARE

## 2023-12-04 DIAGNOSIS — D50.8 IRON DEFICIENCY ANEMIA SECONDARY TO INADEQUATE DIETARY IRON INTAKE: Primary | ICD-10-CM

## 2023-12-04 DIAGNOSIS — D50.8 IRON DEFICIENCY ANEMIA SECONDARY TO INADEQUATE DIETARY IRON INTAKE: ICD-10-CM

## 2023-12-04 DIAGNOSIS — C34.11 PRIMARY CANCER OF RIGHT UPPER LOBE OF LUNG: ICD-10-CM

## 2023-12-04 LAB
ALBUMIN SERPL-MCNC: 4.4 G/DL (ref 3.5–5.2)
ALBUMIN/GLOB SERPL: 1.3 G/DL
ALP SERPL-CCNC: 74 U/L (ref 39–117)
ALT SERPL W P-5'-P-CCNC: 25 U/L (ref 1–33)
ANION GAP SERPL CALCULATED.3IONS-SCNC: 15 MMOL/L (ref 5–15)
AST SERPL-CCNC: 51 U/L (ref 1–32)
BASOPHILS # BLD AUTO: 0.06 10*3/MM3 (ref 0–0.2)
BASOPHILS NFR BLD AUTO: 0.6 % (ref 0–1.5)
BILIRUB SERPL-MCNC: 0.5 MG/DL (ref 0–1.2)
BUN SERPL-MCNC: 28 MG/DL (ref 8–23)
BUN/CREAT SERPL: 22.6 (ref 7–25)
CALCIUM SPEC-SCNC: 10 MG/DL (ref 8.6–10.5)
CHLORIDE SERPL-SCNC: 95 MMOL/L (ref 98–107)
CO2 SERPL-SCNC: 28 MMOL/L (ref 22–29)
CREAT BLDA-MCNC: 1.4 MG/DL (ref 0.6–1.3)
CREAT SERPL-MCNC: 1.24 MG/DL (ref 0.57–1)
DEPRECATED RDW RBC AUTO: 46.9 FL (ref 37–54)
EGFRCR SERPLBLD CKD-EPI 2021: 44.9 ML/MIN/1.73
EOSINOPHIL # BLD AUTO: 0.3 10*3/MM3 (ref 0–0.4)
EOSINOPHIL NFR BLD AUTO: 2.8 % (ref 0.3–6.2)
ERYTHROCYTE [DISTWIDTH] IN BLOOD BY AUTOMATED COUNT: 13.5 % (ref 12.3–15.4)
FERRITIN SERPL-MCNC: 163 NG/ML (ref 13–150)
GLOBULIN UR ELPH-MCNC: 3.4 GM/DL
GLUCOSE SERPL-MCNC: 108 MG/DL (ref 65–99)
HCT VFR BLD AUTO: 36.6 % (ref 34–46.6)
HGB BLD-MCNC: 11.5 G/DL (ref 12–15.9)
IMM GRANULOCYTES # BLD AUTO: 0.04 10*3/MM3 (ref 0–0.05)
IMM GRANULOCYTES NFR BLD AUTO: 0.4 % (ref 0–0.5)
IRON 24H UR-MRATE: 32 MCG/DL (ref 37–145)
IRON SATN MFR SERPL: 8 % (ref 20–50)
LYMPHOCYTES # BLD AUTO: 1.58 10*3/MM3 (ref 0.7–3.1)
LYMPHOCYTES NFR BLD AUTO: 14.7 % (ref 19.6–45.3)
MCH RBC QN AUTO: 29.4 PG (ref 26.6–33)
MCHC RBC AUTO-ENTMCNC: 31.4 G/DL (ref 31.5–35.7)
MCV RBC AUTO: 93.6 FL (ref 79–97)
MONOCYTES # BLD AUTO: 1.31 10*3/MM3 (ref 0.1–0.9)
MONOCYTES NFR BLD AUTO: 12.2 % (ref 5–12)
NEUTROPHILS NFR BLD AUTO: 69.3 % (ref 42.7–76)
NEUTROPHILS NFR BLD AUTO: 7.47 10*3/MM3 (ref 1.7–7)
NRBC BLD AUTO-RTO: 0 /100 WBC (ref 0–0.2)
PLATELET # BLD AUTO: 258 10*3/MM3 (ref 140–450)
PMV BLD AUTO: 9.5 FL (ref 6–12)
POTASSIUM SERPL-SCNC: 4.3 MMOL/L (ref 3.5–5.2)
PROT SERPL-MCNC: 7.8 G/DL (ref 6–8.5)
RBC # BLD AUTO: 3.91 10*6/MM3 (ref 3.77–5.28)
RETICS # AUTO: 0.05 10*6/MM3 (ref 0.02–0.13)
RETICS/RBC NFR AUTO: 1.2 % (ref 0.7–1.9)
SODIUM SERPL-SCNC: 138 MMOL/L (ref 136–145)
TIBC SERPL-MCNC: 416 MCG/DL (ref 298–536)
TRANSFERRIN SERPL-MCNC: 279 MG/DL (ref 200–360)
WBC NRBC COR # BLD AUTO: 10.76 10*3/MM3 (ref 3.4–10.8)

## 2023-12-04 PROCEDURE — 74177 CT ABD & PELVIS W/CONTRAST: CPT

## 2023-12-04 PROCEDURE — 82565 ASSAY OF CREATININE: CPT

## 2023-12-04 PROCEDURE — 82728 ASSAY OF FERRITIN: CPT | Performed by: INTERNAL MEDICINE

## 2023-12-04 PROCEDURE — 80053 COMPREHEN METABOLIC PANEL: CPT | Performed by: INTERNAL MEDICINE

## 2023-12-04 PROCEDURE — 71260 CT THORAX DX C+: CPT

## 2023-12-04 PROCEDURE — 25510000001 IOPAMIDOL 61 % SOLUTION: Performed by: INTERNAL MEDICINE

## 2023-12-04 PROCEDURE — 84466 ASSAY OF TRANSFERRIN: CPT | Performed by: INTERNAL MEDICINE

## 2023-12-04 PROCEDURE — 85025 COMPLETE CBC W/AUTO DIFF WBC: CPT | Performed by: INTERNAL MEDICINE

## 2023-12-04 PROCEDURE — 83540 ASSAY OF IRON: CPT | Performed by: INTERNAL MEDICINE

## 2023-12-04 PROCEDURE — 85045 AUTOMATED RETICULOCYTE COUNT: CPT | Performed by: INTERNAL MEDICINE

## 2023-12-04 RX ADMIN — IOPAMIDOL 100 ML: 612 INJECTION, SOLUTION INTRAVENOUS at 11:04

## 2023-12-05 ENCOUNTER — TELEPHONE (OUTPATIENT)
Dept: ONCOLOGY | Facility: CLINIC | Age: 78
End: 2023-12-05
Payer: MEDICARE

## 2023-12-05 NOTE — TELEPHONE ENCOUNTER
Contacted patient Trinidad Zhu regarding lab results, per Dr Palacios instructions due to decrease in her renal function, patient encouraged to increase her oral hydration, drink more water. Patient v/u of these instructions and will f/u with Dr Palacios in a couple of weeks.

## 2023-12-12 ENCOUNTER — OFFICE VISIT (OUTPATIENT)
Dept: ONCOLOGY | Facility: CLINIC | Age: 78
End: 2023-12-12
Payer: MEDICARE

## 2023-12-12 VITALS
TEMPERATURE: 97.2 F | HEIGHT: 64 IN | OXYGEN SATURATION: 97 % | WEIGHT: 104.8 LBS | RESPIRATION RATE: 18 BRPM | DIASTOLIC BLOOD PRESSURE: 62 MMHG | HEART RATE: 90 BPM | SYSTOLIC BLOOD PRESSURE: 114 MMHG | BODY MASS INDEX: 17.89 KG/M2

## 2023-12-12 DIAGNOSIS — C34.11 PRIMARY CANCER OF RIGHT UPPER LOBE OF LUNG: ICD-10-CM

## 2023-12-12 DIAGNOSIS — C80.1 ADENOCARCINOMA: Primary | Chronic | ICD-10-CM

## 2023-12-12 PROBLEM — D50.9 IRON DEFICIENCY ANEMIA: Status: ACTIVE | Noted: 2023-12-12

## 2023-12-12 RX ORDER — SODIUM CHLORIDE 9 MG/ML
20 INJECTION, SOLUTION INTRAVENOUS ONCE
OUTPATIENT
Start: 2023-12-21

## 2023-12-12 RX ORDER — DIPHENHYDRAMINE HYDROCHLORIDE 50 MG/ML
50 INJECTION INTRAMUSCULAR; INTRAVENOUS AS NEEDED
OUTPATIENT
Start: 2023-12-21

## 2023-12-12 RX ORDER — LOSARTAN POTASSIUM AND HYDROCHLOROTHIAZIDE 25; 100 MG/1; MG/1
1 TABLET ORAL DAILY
COMMUNITY

## 2023-12-12 RX ORDER — FAMOTIDINE 10 MG/ML
20 INJECTION, SOLUTION INTRAVENOUS AS NEEDED
OUTPATIENT
Start: 2023-12-21

## 2023-12-12 RX ORDER — ACETAMINOPHEN 325 MG/1
650 TABLET ORAL ONCE
OUTPATIENT
Start: 2023-12-21

## 2023-12-12 NOTE — LETTER
December 12, 2023       No Recipients    Patient: Trinidad Zhu   YOB: 1945   Date of Visit: 12/12/2023     Dear Andrew Layne MD:       Thank you for referring Trinidad Zhu to me for evaluation. Below are the relevant portions of my assessment and plan of care.    If you have questions, please do not hesitate to call me. I look forward to following Trinidad along with you.         Sincerely,        Cyrus Palacios MD        CC:   No Recipients    Cyrus Palacios MD  12/12/23 1111  Sign when Signing Visit  [facility] HEMATOLOGY & ONCOLOGY  [address]  [place] [address]  [phone]    Patient Name: Trinidad Zhu  Encounter Date: 12/12/2023  YOB: 1945  Patient Number: [id number]      REASON FOR FOLLOW-UP: Trinidad Zhu is a pleasant 78 y.o.  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  She is seen with her spouse, Yair. History is obtained from patient.  She is a reliable historian.            Oncology/Hematology History Overview Note     DIAGNOSTIC ABNORMALITIES:Patient found to have lung nodule.   CT chest 07/20/2020. No change in 1.5 cm RIGHT upper lobe pulmonary nodule. However, there has been very slight increase in size compared to multiple prior  studies dating back to 2016. Recommend continued imaging surveillance.    Increased size of clustered nodules in the inferior RIGHT upper  lobe and new cluster of tree-in-bud nodularity in the RIGHT lower lobe. Differential includes an indolent infectious/inflammatory process such as atypical mycobacterial infection (NAE). Recommend continued follow-up  CT chest 09/03/2021. Spiculated nodule in the right upper lobe have slightly increased in size. Recommend PET/CT.  PET 09/10/2021. FDG uptake within the spiculated 2 cm right upper lobe nodule with SUV of 2.85  concerning for malignancy. Mild FDG uptake within the reticulonodular changes of the more inferior right upper lobe and right middle lobe with SUV of 1.1 favoring an inflammatory/infectious process. No evidence of distant metastasis.  CT chest 10/07/2021.  Spiculated pleural-based right upper lobe nodule that appears stable compared to 9/3/2021 examination.  Right lung nodularity with peribronchial thickening and bronchiectasis, stable.  Developing mild reticular nodular opacities posteriorly in the left lower lobe and minimally in the right lower lobe. Acute inflammatory change suspected. Correlate with patient presentation.  CT chest 11/09/2021. Postoperative change of VATS RIGHT upper lobectomy. There is fluid/soft tissue with intermixed gas in the RIGHT lung apex extending into the RIGHT hilum. A small RIGHT pneumothorax is present with right-sided chest tube in good position.  Pathology report 11/12/2021. Lung, right upper lobectomy (frozen section control):  A.  Moderately differentiated acinar predominant nonmucinous adenocarcinoma with a peripheral lepidic component (2.5 cm).    B.  The bronchial, vascular and parenchymal surgical margins are negative for malignancy.  C.  The tumor extends up to, but does not invade, the visceral pleura.  D.  Pulmonary caseating granulomata.  E.  Emphysematous changes.  F.  Peribronchial lymph nodes (2), with noncaseating granulomata, negative for metastatic carcinoma.  AJCC stage:pT1c, pN0.         PREVIOUS INTERVENTIONS:  She had undergone right upper lobe lobectomy 11/05/2021.     Lung nodules   1/23/2019 Initial Diagnosis    Lung nodules     9/3/2021 Imaging    CT Chest  Spiculated right upper lobe nodule is redemonstrated, measuring 2.1 x 1.3 x 1.2 cm, previously 1.6 x 1.1 x 1.2 cm. Associated peripheral groundglass opacities. Emphysema. Bronchiectasis in the right upper and middle lobe redemonstrated. Right fissure-based nodule measuring 5 mm, previously 3 (image 82,  series 3. Additional nodularities along the right minor fissure are similar to decrease. Mm.  No pleural effusion is present. The trachea and bronchial tree are patent.  1. Spiculated nodule in the right upper lobe have slightly increased in size. Recommend PET/CT.     9/10/2021 Procedure    09-  PFTs  Pulmonary Function Test   Pre Drug % Predicted    FVC: 80%   FEV1: 64%   FEF 25-75%: 28%   FEV1/FVC: 61.74%   Interpretation   Spirometry   Spirometry shows moderate obstruction. There is reduced midflow suggesting small airway/airflow obstruction.   Review of FVL curve   Patient's effort is normal.     9/10/2021 Imaging    PET/CT:  IMPRESSION:  1. FDG uptake within the spiculated 2 cm right upper lobe nodule with  SUV of 2.85 concerning for malignancy.  2. Mild FDG uptake within the reticulonodular changes of the more  inferior right upper lobe and right middle lobe with SUV of 1.1 favoring  an inflammatory/infectious process.  3. No evidence of distant metastasis.     9/21/2021 Biopsy    09-  Mike bronchoscopy [facility]  Lung, Right upper lobe; transbronchial biopsy:  Atypical proliferation, highly suspicious for well-differentiated adenocarcinoma  Comments:  Recuts are evaluated.  The atypical proliferation is highlighted with immunohistochemical TIF1 staining (control appropriate).  The appearance could suggest a lepidic, well differentiated, or scar-carcinoma type lesion.    Lymph node, 10R FNA:  lymph node sampling  Lavage:  inflammation/ inflammatory debris and macrophages  Broncheoalveolar lavage, RUL  Rare groups of mildly atypical epithelial cells     10/7/2021 Imaging    CT Chest:  IMPRESSION:  1. Spiculated pleural-based right upper lobe nodule that appears stable  compared to 9/3/2021 examination.  2. Right lung nodularity with peribronchial thickening and  bronchiectasis, stable.  3. Developing mild reticular nodular opacities posteriorly in the left  lower lobe and minimally in the right lower  lobe. Acute inflammatory  change suspected. Correlate with patient presentation.  4. Continued follow-up recommended.     Adenocarcinoma   12/14/2021 Initial Diagnosis    Adenocarcinoma (HCC)         PAST MEDICAL HISTORY:  ALLERGIES:  Allergies   Allergen Reactions   • Baclofen Other (See Comments)     MUSCULOSKELETAL THERAPY AGENTS knocked her out for a day    Other reaction(s): Unknown   • Gabapentin Confusion     Other reaction(s): over sedation   • Sulfa Antibiotics Rash     Mouth blisters   • Sulfacetamide Sodium Rash   • Levofloxacin Other (See Comments)     tendonitis, muscle pain   • Amitriptyline Hcl Confusion     Other reaction(s): ambulation difficulties/confusion   • Niferex [Iron Polysaccharide] Swelling     LIP SWELLING   • Oxycodone-Acetaminophen GI Intolerance     Other reaction(s): vomiting     CURRENT MEDICATIONS:  Outpatient Encounter Medications as of 12/12/2023   Medication Sig Dispense Refill   • albuterol sulfate  (90 Base) MCG/ACT inhaler Inhale 2 puffs Every 4 (Four) Hours As Needed for Wheezing.     • alendronate (FOSAMAX) 70 MG tablet Take 1 tablet by mouth Every 7 (Seven) Days. Saturdays  3   • aspirin 81 MG chewable tablet Chew 1 tablet Every Night.     • Budeson-Glycopyrrol-Formoterol (Breztri Aerosphere) 160-9-4.8 MCG/ACT aerosol inhaler Inhale 2 puffs 2 (Two) Times a Day. 1 each 0   • Calcium Carb-Cholecalciferol (CALCIUM 500+D PO) Take 1 tablet by mouth Daily.     • carvedilol (COREG) 3.125 MG tablet Take 1 tablet by mouth 2 (Two) Times a Day With Meals. 60 tablet 0   • cetirizine (ZyrTEC) 10 MG tablet Take 1 tablet by mouth At Night As Needed.     • clopidogrel (PLAVIX) 75 MG tablet Take 1 tablet by mouth Daily. 30 tablet 11   • famotidine (PEPCID) 40 MG tablet Take 1 tablet by mouth 2 (Two) Times a Day.  3   • furosemide (LASIX) 40 MG tablet      • levothyroxine (SYNTHROID, LEVOTHROID) 50 MCG tablet Take 1 tablet by mouth Daily.  3   • losartan-hydrochlorothiazide (HYZAAR)  100-25 MG per tablet Take 1 tablet by mouth Daily.     • multivitamin with minerals tablet tablet Take 1 tablet by mouth Daily.     • potassium chloride (K-DUR,KLOR-CON) 20 MEQ CR tablet      • rosuvastatin (CRESTOR) 40 MG tablet Take 1 tablet by mouth Every Night.     • traMADol (ULTRAM) 50 MG tablet Take 1 tablet by mouth Every 3 (Three) Hours As Needed for Moderate Pain or Severe Pain.  0   • [DISCONTINUED] benazepril (LOTENSIN) 5 MG tablet Take 0.5 tablets by mouth Daily. (Patient not taking: Reported on 12/12/2023) 30 tablet 0   • [DISCONTINUED] escitalopram (Lexapro) 10 MG tablet Take 1 tablet by mouth Daily. (Patient not taking: Reported on 6/16/2023) 90 tablet 0   • [DISCONTINUED] ferrous sulfate 325 (65 FE) MG tablet Take 1 tablet by mouth Every Other Day. (Patient not taking: Reported on 6/16/2023) 45 tablet 0   • [DISCONTINUED] hydrOXYzine pamoate (VISTARIL) 25 MG capsule Take 1 capsule by mouth 3 (Three) Times a Day As Needed for Itching or Anxiety. (Patient not taking: Reported on 6/16/2023)     • [DISCONTINUED] pantoprazole (Protonix) 40 MG EC tablet Take 1 tablet by mouth 2 (Two) Times a Day. (Patient not taking: Reported on 6/16/2023) 180 tablet 0     No facility-administered encounter medications on file as of 12/12/2023.     ADULT ILLNESSES:  Patient Active Problem List   Diagnosis Code   • Spinal stenosis, cervical region M48.02   • Lung nodules R91.8   • Bronchiectasis without complication J47.9   • Underweight R63.6   • Personal history of nicotine dependence Z87.891   • Restrictive lung disease J98.4   • Pulmonary emphysema J43.9   • PAD (peripheral artery disease) I73.9   • Preop testing Z01.818   • Gastroesophageal reflux disease K21.9   • Allergic rhinitis J30.9   • ORTIZ (dyspnea on exertion) R06.09   • CAD (coronary artery disease) I25.10   • Hyperlipidemia E78.5   • Neuropathy G62.9   • Hypertension I10   • Simple chronic bronchitis J41.0   • Former smoker Z87.891   • Lung nodule R91.1   •  Mass of right lung R91.8   • Postoperative anemia due to acute blood loss D62   • Syncope and collapse R55   • Adenocarcinoma C80.1   • Personal history of malignant neoplasm of bronchus and lung Z85.118   • Hydropneumothorax J94.8   • Lower extremity embolism I74.3   • Generalized muscle weakness M62.81   • Acute blood loss anemia D62   • Melena K92.1   • Urinary incontinence R32   • Gastrointestinal hemorrhage K92.2     SURGERIES:  Past Surgical History:   Procedure Laterality Date   • ANTERIOR CERVICAL DISCECTOMY W/ FUSION N/A 5/22/2017    Procedure: CERVICAL DISCECTOMY ANTERIOR FUSION WITH INSTRUMENTATION;  Surgeon: NADINE Sarabia MD;  Location:  [facility] OR;  Service:    • AORTAGRAM Left 11/9/2020    Procedure: left lower extremtiy angiogram, hawk athrectomy, balloon angioplasty, stent placement, mynx closure;  Surgeon: Sukhdev Condon DO;  Location:  [facility] HYBRID OR 12;  Service: Vascular;  Laterality: Left;   • AORTAGRAM Left 3/26/2021    Procedure: LEFT LOWER EXTREMITY ANGIOGRAM, BALLOON ANGIOPLASTY, STENT PLACEMENT, MYNX CLOSURE;  Surgeon: Sukhdev Condon DO;  Location:  [facility]BRID OR 12;  Service: Vascular;  Laterality: Left;   • AORTAGRAM Left 8/6/2021    Procedure: LEFT LOWER EXTREMITY ANGIOGRAM, HAWK ATHERECTOMY, BALLOON ANGIOPLASTY, MYNX CLOSURE;  Surgeon: Sukhdev Condon DO;  Location:  [facility] OR 12;  Service: Vascular;  Laterality: Left;   • AORTAGRAM Right 6/8/2022    Procedure: RIGHT LOWER EXTREMITY ANGIOGRAM, INTRAVASCULAR LITHOTRIPSY, HAWK ATHRECTOMY, BALLOON ANGIOPLASTY, MYNX CLOSURE;  Surgeon: Sukhdev Condon DO;  Location:  [facility] 12;  Service: Vascular;  Laterality: Right;   • AORTAGRAM Left 10/21/2022    Procedure: LEFT LOWER EXTREMITY ANGIOGRAM WITH HAWK ATHRECTOMY, BALLOON ANGIOPLASTY, STENT PLACEMENT, MYNX CLOSURE;  Surgeon: Sukhdev Condon DO;  Location:  [facility] OR 12;  Service: Vascular;  Laterality: Left;   • BLADDER SUSPENSION      also had  pelvic reconstructive surgery   • BREAST EXCISIONAL BIOPSY Right 1985   • CATARACT EXTRACTION, BILATERAL     • CERVICAL CORPECTOMY N/A 5/22/2017    Procedure:  ANTERIOR CERVICAL DISCECTOMY FUSION C-6 to T1,  ANTERIOR FUSION WITH INSTRUMENTATION C-5 T-1;  Surgeon: NADINE Sarabia MD;  Location: [facility] OR;  Service:    • COLONOSCOPY  08/19/2015    TIC BX RT COLON   • COLONOSCOPY  12/04/2013    RT COLON BX RECALL 5YR   • COLONOSCOPY N/A 11/29/2016    The entire examined colon is normal; No specimens collected   • COLONOSCOPY N/A 6/2/2022    Procedure: COLONOSCOPY WITH ANESTHESIA;  Surgeon: Marco Antonio Vallejo MD;  Location: [facility] ENDOSCOPY;  Service: Gastroenterology;  Laterality: N/A;  Pre: screen  Post: diverticulosis  Andrew Ty MD   • COLONOSCOPY N/A 4/5/2023    Procedure: COLONOSCOPY WITH ANESTHESIA;  Surgeon: Mike Kearns MD;  Location: [facility] ENDOSCOPY;  Service: Gastroenterology;  Laterality: N/A;  preop: GI bleed;melena  post op: diverticulosis, avm  PCP: Andrew Ty   • ENDOSCOPY  12/03/2015    HEALED ULCER SLANT BX   • ENDOSCOPY N/A 4/1/2023    Procedure: ESOPHAGOGASTRODUODENOSCOPY WITH ANESTHESIA;  Surgeon: Patric Reyes DO;  Location: [facility] OR;  Service: Gastroenterology;  Laterality: N/A;  PRE GI BLEED  POST gastric ulcers  DR LINN TY   • FEMORAL ENDARTERECTOMY Left 10/21/2022    Procedure: LEFT LOWER EXTREMITY ANGIOGRAM;  Surgeon: Sukhdev Condon DO;  Location: [facility] HYBRID OR 12;  Service: Vascular;  Laterality: Left;   • HYSTERECTOMY     • LEG THROMBECTOMY/EMBOLECTOMY Left 6/8/2022    Procedure: LOWER EXTREMITY THROMBECTOMY/EMBOLECTOMY, PATCH GRAFT TO  FEMORAL ARTERY;  Surgeon: Sukhdev Condon DO;  Location: [facility] HYBRID OR 12;  Service: Vascular;  Laterality: Left;   • LOBECTOMY Right 11/5/2021    Procedure: RIGHT THORACOSCOPY WITH DAVINCI ROBOT, RIGHT UPPER LOBE LOBECTOMY;  Surgeon: Jarad Ignacio MD;  Location: [facility] OR;  Service: Robotics - DaVinci;  Laterality:  Right;   • LUNG SURGERY     • OTHER SURGICAL HISTORY      KNEE CARTILAGE REMOVED   • OTHER SURGICAL HISTORY      BENIGN FIBROID TUMOR OF BREAST 1985 REMOVED   • TONSILLECTOMY       HEALTH MAINTENANCE ITEMS:  Health Maintenance Due   Topic Date Due   • TDAP/TD VACCINES (1 - Tdap) Never done   • ZOSTER VACCINE (1 of 2) Never done   • BMI FOLLOWUP  02/02/2023   • DXA SCAN  06/17/2023       <no information>  Last Completed Colonoscopy            COLORECTAL CANCER SCREENING (COLONOSCOPY - Every 7 Years) Next due on 4/5/2030 04/05/2023  Surgical Procedure: COLONOSCOPY    04/05/2023  SCANNED - COLONOSCOPY    04/01/2023  Fecal Occult Blood component of Occult Blood X 1, Stool - Stool, Per Rectum    06/02/2022  COLONOSCOPY    06/02/2022  Surgical Procedure: COLONOSCOPY    Only the first 5 history entries have been loaded, but more history exists.                  Immunization History   Administered Date(s) Administered   • Arexvy (RSV, Adults 60+ yrs) 10/19/2023   • COVID-19 (MODERNA) 1st,2nd,3rd Dose Monovalent 01/19/2021, 02/17/2021, 10/26/2021   • COVID-19 (MODERNA) BIVALENT 12+YRS 10/03/2022   • COVID-19 (MODERNA) Monovalent Original Booster 05/04/2022   • COVID-19 F23 (MODERNA) 12YRS+ (SPIKEVAX) 10/31/2023   • Flu Vaccine Split Quad 11/01/2020   • Fluzone High Dose =>65 Years ([employer] ONLY) 10/10/2019, 10/01/2021, 10/03/2022   • Fluzone High-Dose 65+yrs 10/31/2023   • Fluzone Quad >6mos (Multi-dose) 10/01/2018   • INFLUENZA SPLIT TRI 10/01/2019   • Influenza Quad Vaccine (Inpatient) 10/13/2010   • Pneumococcal Conjugate 13-Valent (PCV13) 08/04/2016   • Pneumococcal Polysaccharide (PPSV23) 01/02/2015, 10/01/2019     Last Completed Mammogram       This patient has no relevant Health Maintenance data.              FAMILY HISTORY:  Family History   Problem Relation Age of Onset   • Breast cancer Maternal Aunt    • Heart disease Mother    • Heart disease Father    • GI problems Neg Hx         MALIGNANCIES   • Colon  "cancer Neg Hx    • Colon polyps Neg Hx      SOCIAL HISTORY:  Social History     Socioeconomic History   • Marital status:    Tobacco Use   • Smoking status: Former     Packs/day: 1.50     Years: 20.00     Additional pack years: 0.00     Total pack years: 30.00     Types: Cigarettes     Quit date:      Years since quittin.9   • Smokeless tobacco: Never   Vaping Use   • Vaping Use: Never used   Substance and Sexual Activity   • Alcohol use: Yes     Comment: occasionally   • Drug use: No   • Sexual activity: Not Currently       REVIEW OF SYSTEMS:    Review of Systems   Constitutional:  Positive for fatigue. Negative for fever.        \"Feel tired.\"   HENT:  Negative for congestion and trouble swallowing.    Eyes:  Negative for discharge and redness.   Respiratory:  Negative for shortness of breath and wheezing.    Cardiovascular:  Negative for chest pain and leg swelling.   Gastrointestinal:  Positive for constipation. Negative for diarrhea, nausea and vomiting.   Endocrine: Negative for polydipsia.   Genitourinary:  Negative for dysuria and flank pain.   Musculoskeletal:  Negative for gait problem and myalgias.   Skin:  Positive for pallor.   Allergic/Immunologic: Negative for food allergies.   Neurological:  Negative for dizziness and weakness.   Hematological:  Negative for adenopathy. Does not bruise/bleed easily.   Psychiatric/Behavioral:  Negative for agitation and hallucinations.        VITAL SIGNS: /62   Pulse 90   Temp 97.2 °F (36.2 °C)   Resp 18   Ht 161.3 cm (63.5\")   Wt 47.5 kg (104 lb 12.8 oz)   SpO2 97%   Breastfeeding No   BMI 18.27 kg/m²  Lost 11 pounds. \"Taking diuretic. My left lung has fluid.\"   Pain Score    23 1041   PainSc: 2  Comment: I have had some gout       PHYSICAL EXAMINATION:     Physical Exam    LABS    Lab Results - Last 18 Months   Lab Units 23  1056 08/15/23  1516 23  0945 23  0541 23  2333 23  1829 23  1245 " 04/01/23  0448 03/31/23  1730 12/05/22  1035   HEMOGLOBIN g/dL 11.5* 11.2* 12.1 7.7* 7.8* 8.5*   < > 7.9* 5.9* 11.1*   HEMATOCRIT % 36.6 36.0 39.8 24.3* 23.8* 27.1*   < > 24.6* 18.7* 34.2   MCV fL 93.6 95.7 101.8* 93.1 90.2 93.8   < > 94.3 94.9 104.9*   WBC 10*3/mm3 10.76 5.50 7.55 4.87 6.73 7.83   < > 10.04 13.74* 6.51   RDW % 13.5 13.2 14.2 16.9* 16.9* 17.5*   < > 13.1 13.4 14.1   MPV fL 9.5 9.9 10.6 9.0 8.9 9.0   < > 9.3 9.4 9.3   PLATELETS 10*3/mm3 258 160 241 171 162 212   < > 246 370 174   IMM GRAN % % 0.4 0.2 0.3  --   --   --   --  0.5 0.5 0.3   NEUTROS ABS 10*3/mm3 7.47* 2.95 4.32  --   --   --   --  5.87 11.12* 3.46   LYMPHS ABS 10*3/mm3 1.58 1.69 2.00  --   --   --   --  2.52 1.60 2.00   MONOS ABS 10*3/mm3 1.31* 0.60 0.87  --   --   --   --  1.49* 0.94* 0.79   EOS ABS 10*3/mm3 0.30 0.22 0.30  --   --   --   --  0.10 0.01 0.21   BASOS ABS 10*3/mm3 0.06 0.03 0.04  --   --   --   --  0.01 0.00 0.03   IMMATURE GRANS (ABS) 10*3/mm3 0.04 0.01 0.02  --   --   --   --  0.05 0.07* 0.02   NRBC /100 WBC 0.0 0.0 0.0  --   --   --   --  0.0 0.1 0.0    < > = values in this interval not displayed.       Lab Results - Last 18 Months   Lab Units 12/04/23  1056 12/04/23  1048 08/15/23  1516 06/05/23  0945 04/02/23  0543 04/01/23  0448 03/31/23  1730 12/13/22  1139 12/05/22  1035 11/13/22 2023 10/19/22  1356   GLUCOSE mg/dL 108*  --  117* 86 91 81 127*  --  96 117* 113*   SODIUM mmol/L 138  --  144 142 137 135* 134*   < > 141 139 139   POTASSIUM mmol/L 4.3  --  3.6 3.9 3.8 3.8 4.4   < > 3.6 4.0 3.5   TOTAL CO2   --   --   --   --   --   --   --    < >  --   --   --    CO2 mmol/L 28.0  --  27.0 30.0* 27.0 26.0 25.0  --  32.0* 31.0* 32.0*   CHLORIDE mmol/L 95*  --  105 101 103 99 96*   < > 100 99 96*   ANION GAP mmol/L 15.0  --  12.0 11.0 7.0 10.0 13.0   < > 9.0 9.0 11.0   CREATININE mg/dL 1.24* 1.40* 0.66 0.80  0.72 0.84 0.84 0.75   < > 0.70 0.93 0.69   BUN mg/dL 28*  --  7* 15 31* 35* 37*   < > 13 15 19   BUN / CREAT  "RATIO  22.6  --  10.6 20.8 36.9* 41.7* 49.3*  --  18.6 16.1 27.5*   CALCIUM mg/dL 10.0  --  9.5 9.8 8.5* 8.7 9.6   < > 9.3 9.5 10.0   ALK PHOS U/L 74  --   --  57  --   --  41  --  49 49 53   TOTAL PROTEIN g/dL 7.8  --   --  6.9  --   --  6.3  --  6.6 6.9 7.5   ALT (SGPT) U/L 25  --   --  16  --   --  19  --  17 20 34*   AST (SGOT) U/L 51*  --   --  32  --   --  22  --  28 33* 63*   BILIRUBIN mg/dL 0.5  --   --  0.3  --   --  0.3  --  0.4 0.4 0.4   ALBUMIN g/dL 4.4  --   --  4.4  --   --  4.3  --  4.40 4.50 4.90   GLOBULIN gm/dL 3.4  --   --  2.5  --   --  2.0  --  2.2 2.4 2.6    < > = values in this interval not displayed.       No results for input(s): \"MSPIKE\", \"KAPPALAMB\", \"IGLFLC\", \"URICACID\", \"FREEKAPPAL\", \"CEA\", \"LDH\", \"REFLABREPO\" in the last 14736 hours.    Lab Results - Last 18 Months   Lab Units 12/04/23  1056 04/02/23  0543 03/31/23  1730 12/05/22  1035 10/19/22  1356   IRON mcg/dL 32* 30*  --  77  --    TIBC mcg/dL 416 426  --  481  --    IRON SATURATION (TSAT) % 8* 7*  --  16*  --    FERRITIN ng/mL 163.00*  --   --  42.83  --    TSH uIU/mL  --   --  3.090  --  0.680   FOLATE ng/mL  --   --   --  >20.00  --        Trinidad Zhu reports a pain score of 2.  Given her pain assessment as noted, treatment options were discussed and the following options were decided upon as a follow-up plan to address the patient's pain: continuation of current treatment plan for pain.      ASSESSMENT:  1.  Adenocarcinoma of the lung, right upper lobe.Tumor size 2 cm. PDL1 15%, Negative ALK, ROS1, and EGFR. BRAF G644V positive.  AJCC stage:1A3(pTic, pN0, cM0, G2)  Treatment status:Post right upper lobe lobectomy.  2.  Performance status of 1.  3.  30 pack years smoking history, etiology of her lung cancer. She had quit smoking. \"27 years.\"   4.  Normocytic anemia from iron deficiency due to gastrointestinal hemorrhage.  Diverticulosis in the sigmoid colon on 06/02/202.  Ferrous sulfate 12/7/2022 through 6/12/2023. 1 " "unit packed RBC 4/1/2023 and 4/4/2023.  Ferric gluconate 125 mg on 4/4/2023. \"It's constipating.\"Plan for IV iron.            PLAN:  1.   Re: Seen by Dr. Dion Cortés on 6/16/2023.  Follow-up for restrictive lung disease.  Can utilize albuterol if needed.  Follow-up 12/15/2023.  2.   Re: CT chest on 12/4/2023.  The patient has undergone previous right upper lobe lobectomy.  Changes of centrilobular emphysema. Although there are several lung  nodules which are stable from the previous exam in the right lung there are several new and more suspicious nodules which have developed in the perihilar aspect of the right lower lobe. Given the multiplicity of findings and rapid development I feel an infectious/inflammatory process  would be favored over neoplasia but this would warrant close radiographic follow-up. NAE should be considered in the differential.  Loculated appearing effusion/pleural thickening within the right apex  and posterior right lower lobe. These are likely compensatory in nature.  No developing mediastinal or axillar lymphadenopathy. Moderate coronary calcifications are present.  CT abdomen pelvis 12/4/2023. No evidence of metastatic disease in the abdomen or pelvis.  Large volume stool in the colon. Correlate for constipation. Colonic diverticulosis without evidence of diverticulitis.  3.   Re: Heme status.  WBC 10.7, hemoglobin 11.5 and platelet 258.   Ferritin 163 and saturation 8.  Retic 1.2.  4.   Re: CMP.  GFR 44.9 mL per minute and AST 51, on Crestor.   5.   Re: Mammogram report 9/27/2023.  No mammographic evidence of malignancy.  6.   Re: Stable for observation, lung cancer.  7.  Continue care per primary care physician at the specialist.  8.  Plan of care discussed with patient and spouse, Don.  Understanding expressed.  Patient agreeable to proceed.  9.  Advance Care Planning  ACP discussion was held with the patient during this visit. Patient has an " advance directive in EMR which is still valid.   10. Injectafer 750 mg one dose.  Premed Tylenol 500 mg p.o. Monitor for infusion reactions or anaphylaxis.     11.  Return to office in 3 months with preoffice CBC with differential, CMP, and CT of the chest to follow lung nodules.        I have reviewed the assessment and plan and verified the accuracy of it. No changes to assessment and plan since the information was documented. Cyrus Palacios MD 12/12/23         I spent 32 total minutes, face-to-face, caring for Trinidad brizuela. Greater than 50% of this time involved counseling and/or coordination of care as documented within this note.            Dion Cortés MD  (Jarad Ignacio MD)  (Kimberley Rodas MD)  Jose Layne MD

## 2023-12-13 NOTE — PROGRESS NOTES
Notes are reviewed and the CT report is noted.  I will review this with the patient when she comes in to see me next week.

## 2023-12-13 NOTE — PROGRESS NOTES
The patient actually is scheduled to see me later this week in particular on Friday, December 15 and I again will review the scan with her at that time.

## 2023-12-14 ENCOUNTER — TELEPHONE (OUTPATIENT)
Dept: ONCOLOGY | Facility: CLINIC | Age: 78
End: 2023-12-14
Payer: MEDICARE

## 2023-12-14 NOTE — TELEPHONE ENCOUNTER
Caller: Trinidad Zhu    Relationship: Self    Best call back number: [phone]      What was the call regarding: PT CALLED WANTED TO KNOW IF SHE CAN START TAKING THE IRON PILLS BECAUSE HER INFUSION ISNT SCHEDULED FOR 12-21-23

## 2023-12-14 NOTE — TELEPHONE ENCOUNTER
Discussed with Trinidad that taking iron tablets this week before her iron infusion on 12/21/23 is probably not going to help with her energy level.

## 2023-12-15 ENCOUNTER — OFFICE VISIT (OUTPATIENT)
Dept: PULMONOLOGY | Facility: CLINIC | Age: 78
End: 2023-12-15
Payer: MEDICARE

## 2023-12-15 VITALS
HEART RATE: 72 BPM | OXYGEN SATURATION: 100 % | HEIGHT: 64 IN | DIASTOLIC BLOOD PRESSURE: 56 MMHG | WEIGHT: 105 LBS | BODY MASS INDEX: 17.93 KG/M2 | SYSTOLIC BLOOD PRESSURE: 102 MMHG

## 2023-12-15 DIAGNOSIS — J98.4 RESTRICTIVE LUNG DISEASE: Chronic | ICD-10-CM

## 2023-12-15 DIAGNOSIS — Z87.891 PERSONAL HISTORY OF NICOTINE DEPENDENCE: Chronic | ICD-10-CM

## 2023-12-15 DIAGNOSIS — Z85.118 PERSONAL HISTORY OF MALIGNANT NEOPLASM OF BRONCHUS AND LUNG: Chronic | ICD-10-CM

## 2023-12-15 DIAGNOSIS — R91.8 LUNG NODULES: Primary | Chronic | ICD-10-CM

## 2023-12-15 DIAGNOSIS — J47.9 BRONCHIECTASIS WITHOUT COMPLICATION: Chronic | ICD-10-CM

## 2023-12-15 PROCEDURE — 3074F SYST BP LT 130 MM HG: CPT | Performed by: INTERNAL MEDICINE

## 2023-12-15 PROCEDURE — 1160F RVW MEDS BY RX/DR IN RCRD: CPT | Performed by: INTERNAL MEDICINE

## 2023-12-15 PROCEDURE — 99214 OFFICE O/P EST MOD 30 MIN: CPT | Performed by: INTERNAL MEDICINE

## 2023-12-15 PROCEDURE — 1159F MED LIST DOCD IN RCRD: CPT | Performed by: INTERNAL MEDICINE

## 2023-12-15 PROCEDURE — 3078F DIAST BP <80 MM HG: CPT | Performed by: INTERNAL MEDICINE

## 2023-12-15 RX ORDER — CEFDINIR 300 MG/1
300 CAPSULE ORAL 2 TIMES DAILY
COMMUNITY

## 2023-12-15 NOTE — ASSESSMENT & PLAN NOTE
She can utilize a guaifenesin preparation as needed for congestion.  It is possible the nodular changes on her recent CT could be related to underlying bronchiectasis.

## 2023-12-15 NOTE — PATIENT INSTRUCTIONS
The patient's recent CT showed some new right nodular densities which were felt to be likely infectious or inflammatory and a follow-up CT is scheduled for mid March.  I will see the patient shortly thereafter.  She also has been treated for gout by Dr. Layne and did have an x-ray at [facility] which apparently showed a left pleural effusion.  I am not able to access the [facility] system at this time but I will do so next week if possible to review that film and contact the patient.  Her recent chest CT did not show any effusion on the left.  Again I will see her back in about 3 months otherwise.

## 2023-12-15 NOTE — ASSESSMENT & PLAN NOTE
Some new nodules were noted on a recent CT and I reviewed the scan with her.  A follow-up scan has been scheduled for March.  If there was a concern on follow-up imaging studies of either recurrent cancer or second primary then I did advise her that navigational bronchoscopy with the Ion robot technique was now available here in [place] per Dr. Elkins.

## 2023-12-15 NOTE — PROGRESS NOTES
Chief Complaint  Lung nodules and Personal history of lung cancer.    Subjective    History of Present Illness {CC  Problem List  Visit Diagnosis   Encounters  Notes  Medications  Labs  Result Review Imaging  Media: 23}    Trinidad Zhu presents to [facility] for lung nodules and a history of lung cancer.    History of Present Illness   The patient is coming by her  today.  I did review her recent chest CT that had been ordered per Dr. Palacios and it did show some new nodular densities on the right.  Was felt that is likely related to an infectious or inflammatory process and a follow-up scan has been scheduled for March.  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 Layne.  She did have a chest x-ray at [facility] which I cannot access now as I cannot get on the site but she states she was told she had some effusion on the left and she was placed on Lasix.  Her recent CT showed no effusion particularly on the left.  She states the Lasix seem to be making her feel very fatigued and causing some issues with low blood pressure and she wondered if she could stop it.  She is scheduled for an echo next week.  I told the short-term she could hold it but should follow her weight and if she had increased in her weight or any problems with peripheral edema then she can slowly resume it but if she had any further questions I advised her to check with Dr. Layne.  I reviewed her recent scan with her and her .  Again I pointed out the new nodules noted on the right.  I will plan on seeing her back in 3 months which will be shortly after her follow-up scan.  I advised her if she had any nodules that were increasing in size and there was concern about either recurrent cancer or a second primary that navigational bronchoscopy with Ion robot technique was now available here per Dr. Elkins.   Previously she been seen by Dr. Rodas.  She is also had some problems with anemia and may be considered for iron infusion therapy again by Dr. Palacios.  She has had the COVID-19 vaccine including 2 standard boosters, a bivalent booster, and the new spike booster in the form of the Moderna vaccine.  She is already had the flu shot this year and has had both a Prevnar 13 and Pneumovax in the past.  Prior to Admission medications    Medication Sig Start Date End Date Taking? Authorizing Provider   albuterol sulfate  (90 Base) MCG/ACT inhaler Inhale 2 puffs Every 4 (Four) Hours As Needed for Wheezing.   Yes Mary Lou Rodas MD   alendronate (FOSAMAX) 70 MG tablet Take 1 tablet by mouth Every 7 (Seven) Days. Saturdays 5/21/19  Yes Mary Lou Rodas MD   aspirin 81 MG chewable tablet Chew 1 tablet Every Night.   Yes Mary Lou Rodas MD   Budeson-Glycopyrrol-Formoterol (Breztri Aerosphere) 160-9-4.8 MCG/ACT aerosol inhaler Inhale 2 puffs 2 (Two) Times a Day. 2/15/23  Yes Dion Cortés MD   Calcium Carb-Cholecalciferol (CALCIUM 500+D PO) Take 1 tablet by mouth Daily.   Yes ProviderMary Lou MD   carvedilol (COREG) 3.125 MG tablet Take 1 tablet by mouth 2 (Two) Times a Day With Meals. 4/6/23  Yes Chuy Chong MD   cefdinir (OMNICEF) 300 MG capsule Take 1 capsule by mouth 2 (Two) Times a Day.   Yes Mary Lou Rodas MD   cetirizine (ZyrTEC) 10 MG tablet Take 1 tablet by mouth At Night As Needed.   Yes Mary Lou Rodas MD   clopidogrel (PLAVIX) 75 MG tablet Take 1 tablet by mouth Daily. 6/14/23 6/14/24 Yes Mary Lou Rodas MD   famotidine (PEPCID) 40 MG tablet Take 1 tablet by mouth 2 (Two) Times a Day. 4/15/19  Yes Mary Lou Rodas MD   furosemide (LASIX) 20 MG tablet Take 1 tablet by mouth Daily. 5/30/23  Yes Mary Lou Rodas MD   levothyroxine (SYNTHROID, LEVOTHROID) 50 MCG tablet Take 1 tablet by mouth Daily. 5/8/19  Yes Provider, Historical, MD  "  losartan-hydrochlorothiazide (HYZAAR) 100-25 MG per tablet Take 1 tablet by mouth Daily.   Yes Mary Lou Rodas MD   multivitamin with minerals tablet tablet Take 1 tablet by mouth Daily.   Yes Mary Lou Rodas MD   potassium chloride (K-DUR,KLOR-CON) 20 MEQ CR tablet  6/15/23  Yes Mary Lou Rodas MD   rosuvastatin (CRESTOR) 40 MG tablet Take 1 tablet by mouth Every Night. 21  Yes Mary Lou Rodas MD   traMADol (ULTRAM) 50 MG tablet Take 1 tablet by mouth Every 3 (Three) Hours As Needed for Moderate Pain or Severe Pain. 19  Yes Mary Lou Rodas MD       Social History     Socioeconomic History    Marital status:    Tobacco Use    Smoking status: Former     Packs/day: 1.50     Years: 20.00     Additional pack years: 0.00     Total pack years: 30.00     Types: Cigarettes     Quit date:      Years since quittin.     Passive exposure: Past    Smokeless tobacco: Never   Vaping Use    Vaping Use: Never used   Substance and Sexual Activity    Alcohol use: Yes     Comment: occasionally    Drug use: No    Sexual activity: Not Currently       Objective   Vital Signs:   /56   Pulse 72   Ht 161.3 cm (63.5\")   Wt 47.6 kg (105 lb)   SpO2 100% Comment: RA  BMI 18.31 kg/m²     Physical Exam  Vitals and nursing note reviewed.   HENT:      Head: Normocephalic.   Eyes:      Extraocular Movements: Extraocular movements intact.      Pupils: Pupils are equal, round, and reactive to light.   Cardiovascular:      Rate and Rhythm: Normal rate and regular rhythm.   Pulmonary:      Effort: Pulmonary effort is normal.      Comments: She has reasonable air movement bilaterally with no adventitious sounds heard.  Musculoskeletal:      Comments: She does have some mild chronic venous stasis changes of the right calf but no definite edema at this time.   Skin:     Comments: Again she has some chronic venous stasis changes of the right calf.   Neurological:      General: No focal " deficit present.      Mental Status: She is oriented to person, place, and time.   Psychiatric:         Mood and Affect: Mood normal.         Behavior: Behavior normal.        Result Review :    PFT Values          2022    15:00 2/15/2023    15:15   Pre Drug PFT Results   FVC 77 79   FEV1 73 78   FEF 25-75% 67 75   FEV1/FVC 73 76   Other Tests PFT Results           DLCO 54    D/VAsb 74          Results for orders placed in visit on 02/15/23    Spirometry Without Bronchodilator    Narrative  Spirometry Without Bronchodilator  Performed by: Samantha Cantor, RRT  Authorized by: Dion Cortés MD    Pre Drug % Predicted  FVC: 79%  FEV1: 78%  FEF 25-75%: 75%  FEV1/FVC: 76%    Interpretation  Spirometry  Spirometry shows mild restriction. There is reduced midflow suggesting small airway/airflow obstruction.  Overall comments: The patient's spirometry is most consistent with a mild restrictive ventilatory defect with a coexisting mild decrease in midflows.  When current studies are compared to studies performed on  of last year, she has had slight improvement in both her FVC and FEV1 compared to previous.  Midflows also show some improvement as well.      Results for orders placed in visit on 22    Full Pulmonary Function Test Without Bronchodilator    Narrative  Full Pulmonary Function Test Without Bronchodilator  Performed by: Samantha Cantor, RRT  Authorized by: Dion Cortés MD    Pre Drug % Predicted  FVC: 77%  FEV1: 73%  FEF 25-75%: 67%  FEV1/FVC: 73%  T%  RV: 262%  DLCO: 54%  D/VAsb: 74%    Interpretation  Spirometry  Spirometry shows mild restriction. There is reduced midflow suggesting small airway/airflow obstruction.  Diffusion Capacity  The patient's diffusion capacity is moderately reduced.  Diffusion capacity is mildly reduced when corrected for alveolar volume.  Overall comments: The patient's spirometry is consistent with a mild restrictive  ventilatory defect with a coexisting decrease in midflows.  Accurate lung volumes could not be obtained.  She has a moderate diffusion impairment which when corrected for alveolar volume is a mild diffusion impairment.  When current studies are compared to studies from [facility] from November 1, 2021, there has been a decline in her FVC on current baseline spirometry compared to previous pre and postbronchodilator studies but no significant change in her FEV1 compared to previous pre and postbronchodilator studies.  When corrected for alveolar volume, there has been a decline in her diffusion capacity compared to previous.      Results for orders placed during the hospital encounter of 11/01/21    Full Pulmonary Function Test With Bronchodilator & ABG    Narrative  [facility] - Pulmonary Function Test    [facility] [facility]  [place]  [address]  [phone]    Patient : Trinidad Zhu  MRN : [id number]  CSN : [id number]  Pulmonologist : Dion Cortés MD  Date : 11/1/2021    ______________________________________________________________________    Interpretation :  1.  Spirometry is consistent with a moderate obstructive ventilatory defect.  2.  There is no significant change in spirometry postbronchodilator.  3.  Lung volumes reveal an elevated expiratory reserve volume.  There is also a mild decrease in inspiratory capacity.  4.  There is a moderate diffusion impairment which when corrected for alveolar volume is a mild diffusion impairment.  5.  Arterial blood gases are within normal limits on room air.  6.  When current studies are compared to studies done at the respiratory disease clinic on September 10 of 2021, there has been slight improvement in both the patient's FVC and FEV1 on current pre and postbronchodilator studies compared to previous baseline spirometry.      Dion Cortés MD    Rest/Exercise Pulse Ox Values          1/4/2022    10:00   Rest/Exercise Pulse Ox  Results   Rest room air SAT % 99   Exercise room air SAT % 95                My interpretation of imaging:    CT Chest With Contrast Diagnostic (12/04/2023 11:03)         Assessment and Plan {CC Problem List  Visit Diagnosis  ROS  Review (Popup)  Health Maintenance  Quality  BestPractice  Medications  SmartSets  SnapShot Encounters  Media : 23}    Diagnoses and all orders for this visit:    1. Lung nodules (Primary)  Assessment & Plan:  Some new nodules were noted on a recent CT and I reviewed the scan with her.  A follow-up scan has been scheduled for March.  If there was a concern on follow-up imaging studies of either recurrent cancer or second primary then I did advise her that navigational bronchoscopy with the Ion robot technique was now available here in [place] per Dr. Elkins.      2. Bronchiectasis without complication  Assessment & Plan:  She can utilize a guaifenesin preparation as needed for congestion.  It is possible the nodular changes on her recent CT could be related to underlying bronchiectasis.      3. Restrictive lung disease  Assessment & Plan:  This is almost certainly related to her previous lung resection.      4. Personal history of malignant neoplasm of bronchus and lung  Assessment & Plan:  She is scheduled for a follow-up CT in March.      5. Personal history of nicotine dependence  Assessment & Plan:  She has not smoked since 1996.            Doin Cortés MD  12/15/2023  17:32 CST    Follow Up   Return in about 3 months (around 3/15/2024) for To see me specifically.    Patient was given instructions and counseling regarding her condition or for health maintenance advice. Please see specific information pulled into the AVS if appropriate.

## 2023-12-15 NOTE — ASSESSMENT & PLAN NOTE
Some new nodules were noted on a recent CT and I reviewed the scan with her.  A follow-up scan has been scheduled for March.  If there was a concern on follow-up imaging studies of either recurrent cancer or second primary then I did advise her that navigational bronchoscopy with the Ion robot technique was now available here in [place] per Dr. Elkins.

## 2023-12-19 NOTE — TELEPHONE ENCOUNTER
Spoke with patient who stated she was having new vascular issue. I advised the patient I was not clinical and sent her back to the MA.  
Quality 110: Preventive Care And Screening: Influenza Immunization: Influenza Immunization previously received during influenza season
Detail Level: Detailed
Quality 130: Documentation Of Current Medications In The Medical Record: Current Medications Documented

## 2023-12-21 ENCOUNTER — HOSPITAL ENCOUNTER (OUTPATIENT)
Dept: CARDIOLOGY | Facility: HOSPITAL | Age: 78
Discharge: HOME | End: 2023-12-21
Payer: MEDICARE

## 2023-12-21 ENCOUNTER — INFUSION (OUTPATIENT)
Dept: ONCOLOGY | Facility: HOSPITAL | Age: 78
End: 2023-12-21
Payer: MEDICARE

## 2023-12-21 VITALS
RESPIRATION RATE: 16 BRPM | HEART RATE: 64 BPM | HEIGHT: 64 IN | DIASTOLIC BLOOD PRESSURE: 46 MMHG | BODY MASS INDEX: 18.27 KG/M2 | WEIGHT: 107 LBS | OXYGEN SATURATION: 99 % | SYSTOLIC BLOOD PRESSURE: 91 MMHG | TEMPERATURE: 98.1 F

## 2023-12-21 DIAGNOSIS — I25.10 CORONARY ARTERY DISEASE INVOLVING NATIVE CORONARY ARTERY OF NATIVE HEART WITHOUT ANGINA PECTORIS: ICD-10-CM

## 2023-12-21 DIAGNOSIS — D50.9 IRON DEFICIENCY ANEMIA, UNSPECIFIED IRON DEFICIENCY ANEMIA TYPE: Primary | ICD-10-CM

## 2023-12-21 PROCEDURE — 25810000003 SODIUM CHLORIDE 0.9 % SOLUTION: Performed by: INTERNAL MEDICINE

## 2023-12-21 PROCEDURE — 25010000002 FERRIC CARBOXYMALTOSE 750 MG/15ML SOLUTION 15 ML VIAL: Performed by: INTERNAL MEDICINE

## 2023-12-21 PROCEDURE — 93306 TTE W/DOPPLER COMPLETE: CPT

## 2023-12-21 PROCEDURE — 63710000001 ACETAMINOPHEN 325 MG TABLET: Performed by: INTERNAL MEDICINE

## 2023-12-21 PROCEDURE — 96365 THER/PROPH/DIAG IV INF INIT: CPT

## 2023-12-21 PROCEDURE — A9270 NON-COVERED ITEM OR SERVICE: HCPCS | Performed by: INTERNAL MEDICINE

## 2023-12-21 RX ORDER — ACETAMINOPHEN 325 MG/1
650 TABLET ORAL ONCE
Status: COMPLETED | OUTPATIENT
Start: 2023-12-21 | End: 2023-12-21

## 2023-12-21 RX ORDER — SODIUM CHLORIDE 9 MG/ML
20 INJECTION, SOLUTION INTRAVENOUS ONCE
Status: COMPLETED | OUTPATIENT
Start: 2023-12-21 | End: 2023-12-21

## 2023-12-21 RX ORDER — FAMOTIDINE 10 MG/ML
20 INJECTION, SOLUTION INTRAVENOUS AS NEEDED
Status: DISCONTINUED | OUTPATIENT
Start: 2023-12-21 | End: 2023-12-21 | Stop reason: HOSPADM

## 2023-12-21 RX ORDER — DIPHENHYDRAMINE HYDROCHLORIDE 50 MG/ML
50 INJECTION INTRAMUSCULAR; INTRAVENOUS AS NEEDED
Status: DISCONTINUED | OUTPATIENT
Start: 2023-12-21 | End: 2023-12-21 | Stop reason: HOSPADM

## 2023-12-21 RX ADMIN — ACETAMINOPHEN 650 MG: 325 TABLET, FILM COATED ORAL at 10:19

## 2023-12-21 RX ADMIN — SODIUM CHLORIDE 20 ML/HR: 9 INJECTION, SOLUTION INTRAVENOUS at 10:18

## 2023-12-21 RX ADMIN — FERRIC CARBOXYMALTOSE INJECTION 712.5 MG: 50 INJECTION, SOLUTION INTRAVENOUS at 10:29

## 2023-12-22 LAB
BH CV ECHO MEAS - AO MAX PG: 6.8 MMHG
BH CV ECHO MEAS - AO MEAN PG: 4 MMHG
BH CV ECHO MEAS - AO ROOT DIAM: 3 CM
BH CV ECHO MEAS - AO V2 MAX: 130 CM/SEC
BH CV ECHO MEAS - AO V2 VTI: 26.6 CM
BH CV ECHO MEAS - AVA(I,D): 2.9 CM2
BH CV ECHO MEAS - EDV(CUBED): 32.8 ML
BH CV ECHO MEAS - EDV(MOD-SP4): 36.8 ML
BH CV ECHO MEAS - EF(MOD-SP4): 60.9 %
BH CV ECHO MEAS - ESV(CUBED): 10.6 ML
BH CV ECHO MEAS - ESV(MOD-SP4): 14.4 ML
BH CV ECHO MEAS - FS: 31.3 %
BH CV ECHO MEAS - IVS/LVPW: 1 CM
BH CV ECHO MEAS - IVSD: 0.9 CM
BH CV ECHO MEAS - LA DIMENSION: 3 CM
BH CV ECHO MEAS - LAT PEAK E' VEL: 8.8 CM/SEC
BH CV ECHO MEAS - LV DIASTOLIC VOL/BSA (35-75): 24.8 CM2
BH CV ECHO MEAS - LV MASS(C)D: 77.3 GRAMS
BH CV ECHO MEAS - LV MAX PG: 5.7 MMHG
BH CV ECHO MEAS - LV MEAN PG: 3 MMHG
BH CV ECHO MEAS - LV SYSTOLIC VOL/BSA (12-30): 9.7 CM2
BH CV ECHO MEAS - LV V1 MAX: 119 CM/SEC
BH CV ECHO MEAS - LV V1 VTI: 27 CM
BH CV ECHO MEAS - LVIDD: 3.2 CM
BH CV ECHO MEAS - LVIDS: 2.2 CM
BH CV ECHO MEAS - LVOT AREA: 2.8 CM2
BH CV ECHO MEAS - LVOT DIAM: 1.9 CM
BH CV ECHO MEAS - LVPWD: 0.9 CM
BH CV ECHO MEAS - MED PEAK E' VEL: 7.8 CM/SEC
BH CV ECHO MEAS - MV A MAX VEL: 90.8 CM/SEC
BH CV ECHO MEAS - MV DEC SLOPE: 437 CM/SEC2
BH CV ECHO MEAS - MV DEC TIME: 0.23 SEC
BH CV ECHO MEAS - MV E MAX VEL: 87 CM/SEC
BH CV ECHO MEAS - MV E/A: 0.96
BH CV ECHO MEAS - MV P1/2T: 71 MSEC
BH CV ECHO MEAS - MVA(P1/2T): 3.1 CM2
BH CV ECHO MEAS - PA V2 MAX: 103 CM/SEC
BH CV ECHO MEAS - RAP SYSTOLE: 5 MMHG
BH CV ECHO MEAS - RV MAX PG: 2.9 MMHG
BH CV ECHO MEAS - RV V1 MAX: 85.5 CM/SEC
BH CV ECHO MEAS - RV V1 VTI: 22.2 CM
BH CV ECHO MEAS - RVSP: 37.3 MMHG
BH CV ECHO MEAS - SI(MOD-SP4): 15.1 ML/M2
BH CV ECHO MEAS - SV(LVOT): 76.6 ML
BH CV ECHO MEAS - SV(MOD-SP4): 22.4 ML
BH CV ECHO MEAS - TR MAX PG: 32.3 MMHG
BH CV ECHO MEAS - TR MAX VEL: 284 CM/SEC
BH CV ECHO MEASUREMENTS AVERAGE E/E' RATIO: 10.48
BH CV XLRA - TDI S': 11.4 CM/SEC
LEFT ATRIUM VOLUME INDEX: 27.2 ML/M2

## 2024-02-28 NOTE — PROGRESS NOTES
[facility]ATOLOGY & ONCOLOGY  [address]  [facility] [address] [phone]    Patient Name: Trinidad Zhu  Encounter Date: 03/12/2024  YOB: 1945  Patient Number: [id number]      REASON FOR FOLLOW-UP: Trinidad Zhu is a pleasant 79 y.o.  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  He had undergone resection on 11/5/2021.  She is seen with Don, spouse and daughter Aide. History is obtained from patient.  Patient is a relaible historian.             Oncology/Hematology History Overview Note     DIAGNOSTIC ABNORMALITIES:Patient found to have lung nodule.   CT chest 07/20/2020. No change in 1.5 cm RIGHT upper lobe pulmonary nodule. However, there has been very slight increase in size compared to multiple prior  studies dating back to 2016. Recommend continued imaging surveillance.    Increased size of clustered nodules in the inferior RIGHT upper  lobe and new cluster of tree-in-bud nodularity in the RIGHT lower lobe. Differential includes an indolent infectious/inflammatory process such as atypical mycobacterial infection (NAE). Recommend continued follow-up  CT chest 09/03/2021. Spiculated nodule in the right upper lobe have slightly increased in size. Recommend PET/CT.  PET 09/10/2021. FDG uptake within the spiculated 2 cm right upper lobe nodule with SUV of 2.85 concerning for malignancy. Mild FDG uptake within the reticulonodular changes of the more inferior right upper lobe and right middle lobe with SUV of 1.1 favoring an inflammatory/infectious process. No evidence of distant metastasis.  CT chest 10/07/2021.  Spiculated pleural-based right upper lobe nodule that appears stable compared to 9/3/2021 examination.  Right lung nodularity with peribronchial thickening and bronchiectasis, stable.  Developing mild reticular nodular opacities posteriorly in the  left lower lobe and minimally in the right lower lobe. Acute inflammatory change suspected. Correlate with patient presentation.  CT chest 11/09/2021. Postoperative change of VATS RIGHT upper lobectomy. There is fluid/soft tissue with intermixed gas in the RIGHT lung apex extending into the RIGHT hilum. A small RIGHT pneumothorax is present with right-sided chest tube in good position.  Pathology report 11/12/2021. Lung, right upper lobectomy (frozen section control):  A.  Moderately differentiated acinar predominant nonmucinous adenocarcinoma with a peripheral lepidic component (2.5 cm).    B.  The bronchial, vascular and parenchymal surgical margins are negative for malignancy.  C.  The tumor extends up to, but does not invade, the visceral pleura.  D.  Pulmonary caseating granulomata.  E.  Emphysematous changes.  F.  Peribronchial lymph nodes (2), with noncaseating granulomata, negative for metastatic carcinoma.  AJCC stage:pT1c, pN0.         PREVIOUS INTERVENTIONS:  She had undergone right upper lobe lobectomy 11/05/2021.     Lung nodules   1/23/2019 Initial Diagnosis    Lung nodules     9/3/2021 Imaging    CT Chest  Spiculated right upper lobe nodule is redemonstrated, measuring 2.1 x 1.3 x 1.2 cm, previously 1.6 x 1.1 x 1.2 cm. Associated peripheral groundglass opacities. Emphysema. Bronchiectasis in the right upper and middle lobe redemonstrated. Right fissure-based nodule measuring 5 mm, previously 3 (image 82, series 3. Additional nodularities along the right minor fissure are similar to decrease. Mm.  No pleural effusion is present. The trachea and bronchial tree are patent.  1. Spiculated nodule in the right upper lobe have slightly increased in size. Recommend PET/CT.     9/10/2021 Procedure    09-  PFTs  Pulmonary Function Test   Pre Drug % Predicted    FVC: 80%   FEV1: 64%   FEF 25-75%: 28%   FEV1/FVC: 61.74%   Interpretation   Spirometry   Spirometry shows moderate obstruction. There is reduced  midflow suggesting small airway/airflow obstruction.   Review of FVL curve   Patient's effort is normal.     9/10/2021 Imaging    PET/CT:  IMPRESSION:  1. FDG uptake within the spiculated 2 cm right upper lobe nodule with  SUV of 2.85 concerning for malignancy.  2. Mild FDG uptake within the reticulonodular changes of the more  inferior right upper lobe and right middle lobe with SUV of 1.1 favoring  an inflammatory/infectious process.  3. No evidence of distant metastasis.     9/21/2021 Biopsy    09-  Mike bronchoscopy RUL  Lung, Right upper lobe; transbronchial biopsy:  Atypical proliferation, highly suspicious for well-differentiated adenocarcinoma  Comments:  Recuts are evaluated.  The atypical proliferation is highlighted with immunohistochemical TIF1 staining (control appropriate).  The appearance could suggest a lepidic, well differentiated, or scar-carcinoma type lesion.    Lymph node, 10R FNA:  lymph node sampling  Lavage:  inflammation/ inflammatory debris and macrophages  Broncheoalveolar lavage, RUL  Rare groups of mildly atypical epithelial cells     10/7/2021 Imaging    CT Chest:  IMPRESSION:  1. Spiculated pleural-based right upper lobe nodule that appears stable  compared to 9/3/2021 examination.  2. Right lung nodularity with peribronchial thickening and  bronchiectasis, stable.  3. Developing mild reticular nodular opacities posteriorly in the left  lower lobe and minimally in the right lower lobe. Acute inflammatory  change suspected. Correlate with patient presentation.  4. Continued follow-up recommended.     Adenocarcinoma   12/14/2021 Initial Diagnosis    Adenocarcinoma (HCC)         PAST MEDICAL HISTORY:  ALLERGIES:  Allergies   Allergen Reactions    Baclofen Other (See Comments)     MUSCULOSKELETAL THERAPY AGENTS knocked her out for a day    Other reaction(s): Unknown    Gabapentin Confusion     Other reaction(s): over sedation    Sulfa Antibiotics Rash     Mouth blisters     Sulfacetamide Sodium Rash    Levofloxacin Other (See Comments)     tendonitis, muscle pain    Amitriptyline Hcl Confusion     Other reaction(s): ambulation difficulties/confusion    Niferex [Iron Polysaccharide] Swelling     LIP SWELLING    Oxycodone-Acetaminophen GI Intolerance     Other reaction(s): vomiting     CURRENT MEDICATIONS:  Outpatient Encounter Medications as of 3/12/2024   Medication Sig Dispense Refill    albuterol sulfate  (90 Base) MCG/ACT inhaler Inhale 2 puffs Every 4 (Four) Hours As Needed for Wheezing.      alendronate (FOSAMAX) 70 MG tablet Take 1 tablet by mouth Every 7 (Seven) Days. Saturdays  3    aspirin 81 MG chewable tablet Chew 1 tablet Every Night.      Budeson-Glycopyrrol-Formoterol (Breztri Aerosphere) 160-9-4.8 MCG/ACT aerosol inhaler Inhale 2 puffs 2 (Two) Times a Day. 1 each 0    Calcium Carb-Cholecalciferol (CALCIUM 500+D PO) Take 1 tablet by mouth Daily.      carvedilol (COREG) 3.125 MG tablet Take 1 tablet by mouth 2 (Two) Times a Day With Meals. 60 tablet 0    cetirizine (ZyrTEC) 10 MG tablet Take 1 tablet by mouth At Night As Needed.      clopidogrel (PLAVIX) 75 MG tablet Take 1 tablet by mouth Daily. 30 tablet 11    famotidine (PEPCID) 40 MG tablet Take 1 tablet by mouth 2 (Two) Times a Day.  3    Ferric Maltol (ACCRUFeR) 30 MG capsule Daily.      furosemide (LASIX) 20 MG tablet Take 1 tablet by mouth Daily.      levothyroxine (SYNTHROID, LEVOTHROID) 50 MCG tablet Take 1 tablet by mouth Daily.  3    losartan (COZAAR) 50 MG tablet Take 1 tablet by mouth Daily.      multivitamin with minerals tablet tablet Take 1 tablet by mouth Daily.      rosuvastatin (CRESTOR) 40 MG tablet Take 1 tablet by mouth Every Night.      traMADol (ULTRAM) 50 MG tablet Take 1 tablet by mouth Every 3 (Three) Hours As Needed for Moderate Pain or Severe Pain.  0    [DISCONTINUED] cefdinir (OMNICEF) 300 MG capsule Take 1 capsule by mouth 2 (Two) Times a Day. (Patient not taking: Reported on  3/12/2024)      [DISCONTINUED] losartan-hydrochlorothiazide (HYZAAR) 100-25 MG per tablet Take 1 tablet by mouth Daily. (Patient not taking: Reported on 3/12/2024)      [DISCONTINUED] potassium chloride (K-DUR,KLOR-CON) 20 MEQ CR tablet  (Patient not taking: Reported on 3/12/2024)       No facility-administered encounter medications on file as of 3/12/2024.     ADULT ILLNESSES:  Patient Active Problem List   Diagnosis Code    Spinal stenosis, cervical region M48.02    Lung nodules R91.8    Bronchiectasis without complication J47.9    Underweight R63.6    Personal history of nicotine dependence Z87.891    Restrictive lung disease J98.4    Pulmonary emphysema J43.9    PAD (peripheral artery disease) I73.9    Preop testing Z01.818    Gastroesophageal reflux disease K21.9    Allergic rhinitis J30.9    ORTIZ (dyspnea on exertion) R06.09    CAD (coronary artery disease) I25.10    Hyperlipidemia E78.5    Neuropathy G62.9    Hypertension I10    Simple chronic bronchitis J41.0    Former smoker Z87.891    Lung nodule R91.1    Mass of right lung R91.8    Postoperative anemia due to acute blood loss D62    Syncope and collapse R55    Adenocarcinoma C80.1    Personal history of malignant neoplasm of bronchus and lung Z85.118    Hydropneumothorax J94.8    Lower extremity embolism I74.3    Generalized muscle weakness M62.81    Acute blood loss anemia D62    Melena K92.1    Urinary incontinence R32    Gastrointestinal hemorrhage K92.2    Iron deficiency anemia D50.9     SURGERIES:  Past Surgical History:   Procedure Laterality Date    ANTERIOR CERVICAL DISCECTOMY W/ FUSION N/A 5/22/2017    Procedure: CERVICAL DISCECTOMY ANTERIOR FUSION WITH INSTRUMENTATION;  Surgeon: NADINE Sarabia MD;  Location: [facility] OR;  Service:     AORTAGRAM Left 11/9/2020    Procedure: left lower extremtiy angiogram, hawk athrectomy, balloon angioplasty, stent placement, mynx closure;  Surgeon: Sukhdev Condon DO;  Location: [facility] HYBRID OR 12;   Service: Vascular;  Laterality: Left;    AORTAGRAM Left 3/26/2021    Procedure: LEFT LOWER EXTREMITY ANGIOGRAM, BALLOON ANGIOPLASTY, STENT PLACEMENT, MYNX CLOSURE;  Surgeon: Sukhdev Condon DO;  Location: [facility] HYBRID OR 12;  Service: Vascular;  Laterality: Left;    AORTAGRAM Left 8/6/2021    Procedure: LEFT LOWER EXTREMITY ANGIOGRAM, HAWK ATHERECTOMY, BALLOON ANGIOPLASTY, MYNX CLOSURE;  Surgeon: Sukhdev Condon DO;  Location: [facility] HYBRID OR 12;  Service: Vascular;  Laterality: Left;    AORTAGRAM Right 6/8/2022    Procedure: RIGHT LOWER EXTREMITY ANGIOGRAM, INTRAVASCULAR LITHOTRIPSY, HAWK ATHRECTOMY, BALLOON ANGIOPLASTY, MYNX CLOSURE;  Surgeon: Sukhdev Condon DO;  Location: [facility] HYBRID OR 12;  Service: Vascular;  Laterality: Right;    AORTAGRAM Left 10/21/2022    Procedure: LEFT LOWER EXTREMITY ANGIOGRAM WITH HAWK ATHRECTOMY, BALLOON ANGIOPLASTY, STENT PLACEMENT, MYNX CLOSURE;  Surgeon: Sukhdev Condon DO;  Location: [facility] HYBRID OR 12;  Service: Vascular;  Laterality: Left;    BLADDER SUSPENSION      also had pelvic reconstructive surgery    BREAST EXCISIONAL BIOPSY Right 1985    CATARACT EXTRACTION, BILATERAL      CERVICAL CORPECTOMY N/A 5/22/2017    Procedure:  ANTERIOR CERVICAL DISCECTOMY FUSION C-6 to T1,  ANTERIOR FUSION WITH INSTRUMENTATION C-5 T-1;  Surgeon: NADINE Sarabia MD;  Location: [facility] OR;  Service:     COLONOSCOPY  08/19/2015    TIC BX RT COLON    COLONOSCOPY  12/04/2013    RT COLON BX RECALL 5YR    COLONOSCOPY N/A 11/29/2016    The entire examined colon is normal; No specimens collected    COLONOSCOPY N/A 6/2/2022    Procedure: COLONOSCOPY WITH ANESTHESIA;  Surgeon: Marco Antonio Vallejo MD;  Location: [facility] ENDOSCOPY;  Service: Gastroenterology;  Laterality: N/A;  Pre: screen  Post: diverticulosis  Andrew Layne MD    COLONOSCOPY N/A 4/5/2023    Procedure: COLONOSCOPY WITH ANESTHESIA;  Surgeon: Mike Kearns MD;  Location: [facility] ENDOSCOPY;  Service: Gastroenterology;   Laterality: N/A;  preop: GI bleed;melena  post op: diverticulosis, avm  PCP: Andrew Ty    ENDOSCOPY  12/03/2015    HEALED ULCER SLANT BX    ENDOSCOPY N/A 4/1/2023    Procedure: ESOPHAGOGASTRODUODENOSCOPY WITH ANESTHESIA;  Surgeon: Patric Reyes DO;  Location:  [facility] OR;  Service: Gastroenterology;  Laterality: N/A;  PRE GI BLEED  POST gastric ulcers  DR LINN TY    FEMORAL ENDARTERECTOMY Left 10/21/2022    Procedure: LEFT LOWER EXTREMITY ANGIOGRAM;  Surgeon: Sukhdev Condon DO;  Location:  [facility] HYBRID OR 12;  Service: Vascular;  Laterality: Left;    HYSTERECTOMY      LEG THROMBECTOMY/EMBOLECTOMY Left 6/8/2022    Procedure: LOWER EXTREMITY THROMBECTOMY/EMBOLECTOMY, PATCH GRAFT TO  FEMORAL ARTERY;  Surgeon: Sukhdev Condon DO;  Location:  [facility] HYBRID OR 12;  Service: Vascular;  Laterality: Left;    LOBECTOMY Right 11/5/2021    Procedure: RIGHT THORACOSCOPY WITH DAVINCI ROBOT, RIGHT UPPER LOBE LOBECTOMY;  Surgeon: Jarad Ignacio MD;  Location:  [facility] OR;  Service: Robotics - DaVinci;  Laterality: Right;    LUNG SURGERY      OTHER SURGICAL HISTORY      KNEE CARTILAGE REMOVED    OTHER SURGICAL HISTORY      BENIGN FIBROID TUMOR OF BREAST 1985 REMOVED    TONSILLECTOMY       HEALTH MAINTENANCE ITEMS:  Health Maintenance Due   Topic Date Due    TDAP/TD VACCINES (1 - Tdap) Never done    ZOSTER VACCINE (1 of 2) Never done    DXA SCAN  06/17/2023       <no information>  Last Completed Colonoscopy            COLORECTAL CANCER SCREENING (COLONOSCOPY - Every 7 Years) Next due on 4/5/2030 04/05/2023  Surgical Procedure: COLONOSCOPY    04/05/2023  SCANNED - COLONOSCOPY    04/01/2023  Fecal Occult Blood component of Occult Blood X 1, Stool - Stool, Per Rectum    06/02/2022  COLONOSCOPY    06/02/2022  Surgical Procedure: COLONOSCOPY    Only the first 5 history entries have been loaded, but more history exists.                  Immunization History   Administered Date(s) Administered    Arexvy (RSV, Adults  "60+ yrs) 10/19/2023    COVID-19 (MODERNA) 1st,2nd,3rd Dose Monovalent 2021, 2021, 10/26/2021    COVID-19 (MODERNA) BIVALENT 12+YRS 10/03/2022    COVID-19 (MODERNA) Monovalent Original Booster 2022    COVID-19 F23 (MODERNA) 12YRS+ (SPIKEVAX) 10/31/2023    Flu Vaccine Split Quad 2020    Fluzone High Dose =>65 Years ([employer] ONLY) 10/10/2019, 10/01/2021, 10/03/2022    Fluzone High-Dose 65+yrs 10/31/2023    Fluzone Quad >6mos (Multi-dose) 10/01/2018    INFLUENZA SPLIT TRI 10/01/2019    Influenza Quad Vaccine (Inpatient) 10/13/2010    Pneumococcal Conjugate 13-Valent (PCV13) 2016    Pneumococcal Polysaccharide (PPSV23) 2015, 10/01/2019     Last Completed Mammogram       This patient has no relevant Health Maintenance data.              FAMILY HISTORY:  Family History   Problem Relation Age of Onset    Breast cancer Maternal Aunt     Heart disease Mother     Heart disease Father     GI problems Neg Hx         MALIGNANCIES    Colon cancer Neg Hx     Colon polyps Neg Hx      SOCIAL HISTORY:  Social History     Socioeconomic History    Marital status:    Tobacco Use    Smoking status: Former     Current packs/day: 0.00     Average packs/day: 1.5 packs/day for 20.0 years (30.0 ttl pk-yrs)     Types: Cigarettes     Start date:      Quit date:      Years since quittin.2     Passive exposure: Past    Smokeless tobacco: Never   Vaping Use    Vaping status: Never Used   Substance and Sexual Activity    Alcohol use: Yes     Comment: occasionally    Drug use: No    Sexual activity: Not Currently       REVIEW OF SYSTEMS:    Review of Systems   Constitutional:  Negative for fatigue, fever and unexpected weight change.        \"I am good.\"   HENT:  Negative for congestion and hearing loss.    Eyes:  Negative for discharge and redness.   Respiratory:  Negative for shortness of breath and wheezing.    Cardiovascular:  Negative for chest pain and palpitations.   Gastrointestinal:  " "Negative for abdominal pain, nausea and vomiting.   Endocrine: Negative for polydipsia and polyphagia.   Genitourinary:  Negative for difficulty urinating and dysuria.   Musculoskeletal:  Negative for gait problem and myalgias.   Skin:  Positive for pallor.   Allergic/Immunologic: Negative for food allergies.   Neurological:  Negative for dizziness and speech difficulty.   Hematological:  Negative for adenopathy. Does not bruise/bleed easily.   Psychiatric/Behavioral:  Negative for agitation, confusion and hallucinations.        VITAL SIGNS: /60   Pulse 74   Temp 98 °F (36.7 °C)   Resp 18   Ht 161.3 cm (63.5\")   Wt 49.7 kg (109 lb 8 oz)   SpO2 98%   Breastfeeding No   BMI 19.09 kg/m²  Gained 5 pounds.   Pain Score    03/12/24 1051   PainSc: 0-No pain       PHYSICAL EXAMINATION:     Physical Exam  Vitals reviewed.   Constitutional:       General: She is not in acute distress.  HENT:      Head: Normocephalic and atraumatic.   Eyes:      General: No scleral icterus.  Cardiovascular:      Rate and Rhythm: Normal rate.   Pulmonary:      Effort: No respiratory distress.      Breath sounds: No wheezing.   Abdominal:      General: Bowel sounds are normal.      Palpations: Abdomen is soft.      Tenderness: There is no abdominal tenderness.   Musculoskeletal:         General: No swelling.      Cervical back: Neck supple.   Skin:     Coloration: Skin is pale.   Neurological:      Mental Status: She is alert and oriented to person, place, and time.   Psychiatric:         Mood and Affect: Mood normal.         Behavior: Behavior normal.         Thought Content: Thought content normal.         Judgment: Judgment normal.         LABS    Lab Results - Last 18 Months   Lab Units 03/05/24  1038 12/04/23  1056 08/15/23  1516 06/05/23  0945 04/06/23  0541 04/05/23  2333 04/01/23  1245 04/01/23  0448 03/31/23  1730   HEMOGLOBIN g/dL 11.8* 11.5* 11.2* 12.1 7.7* 7.8*   < > 7.9* 5.9*   HEMATOCRIT % 36.0 36.6 36.0 39.8 24.3* " 23.8*   < > 24.6* 18.7*   MCV fL 99.4* 93.6 95.7 101.8* 93.1 90.2   < > 94.3 94.9   WBC 10*3/mm3 5.85 10.76 5.50 7.55 4.87 6.73   < > 10.04 13.74*   RDW % 14.6 13.5 13.2 14.2 16.9* 16.9*   < > 13.1 13.4   MPV fL 10.1 9.5 9.9 10.6 9.0 8.9   < > 9.3 9.4   PLATELETS 10*3/mm3 158 258 160 241 171 162   < > 246 370   IMM GRAN % % 0.2 0.4 0.2 0.3  --   --   --  0.5 0.5   NEUTROS ABS 10*3/mm3 3.23 7.47* 2.95 4.32  --   --   --  5.87 11.12*   LYMPHS ABS 10*3/mm3 1.70 1.58 1.69 2.00  --   --   --  2.52 1.60   MONOS ABS 10*3/mm3 0.68 1.31* 0.60 0.87  --   --   --  1.49* 0.94*   EOS ABS 10*3/mm3 0.19 0.30 0.22 0.30  --   --   --  0.10 0.01   BASOS ABS 10*3/mm3 0.04 0.06 0.03 0.04  --   --   --  0.01 0.00   IMMATURE GRANS (ABS) 10*3/mm3 0.01 0.04 0.01 0.02  --   --   --  0.05 0.07*   NRBC /100 WBC 0.0 0.0 0.0 0.0  --   --   --  0.0 0.1    < > = values in this interval not displayed.       Lab Results - Last 18 Months   Lab Units 03/05/24  1105 03/05/24  1038 12/04/23  1056 12/04/23  1048 08/15/23  1516 06/05/23  0945 04/02/23  0543 04/01/23  0448 03/31/23  1730 12/13/22  1139 12/05/22  1035 11/13/22 2023   GLUCOSE mg/dL  --  87 108*  --  117* 86 91 81 127*  --  96 117*   SODIUM mmol/L  --  141 138  --  144 142 137 135* 134*   < > 141 139   POTASSIUM mmol/L  --  4.2 4.3  --  3.6 3.9 3.8 3.8 4.4   < > 3.6 4.0   TOTAL CO2   --   --   --   --   --   --   --   --   --    < >  --   --    CO2 mmol/L  --  29.0 28.0  --  27.0 30.0* 27.0 26.0 25.0  --  32.0* 31.0*   CHLORIDE mmol/L  --  104 95*  --  105 101 103 99 96*   < > 100 99   ANION GAP mmol/L  --  8.0 15.0  --  12.0 11.0 7.0 10.0 13.0   < > 9.0 9.0   CREATININE mg/dL 0.70 0.63 1.24* 1.40* 0.66 0.80  0.72 0.84 0.84 0.75   < > 0.70 0.93   BUN mg/dL  --  16 28*  --  7* 15 31* 35* 37*   < > 13 15   BUN / CREAT RATIO   --  25.4* 22.6  --  10.6 20.8 36.9* 41.7* 49.3*  --  18.6 16.1   CALCIUM mg/dL  --  9.6 10.0  --  9.5 9.8 8.5* 8.7 9.6   < > 9.3 9.5   ALK PHOS U/L  --  57 74  --   --   "57  --   --  41  --  49 49   TOTAL PROTEIN g/dL  --  6.6 7.8  --   --  6.9  --   --  6.3  --  6.6 6.9   ALT (SGPT) U/L  --  29 25  --   --  16  --   --  19  --  17 20   AST (SGOT) U/L  --  39* 51*  --   --  32  --   --  22  --  28 33*   BILIRUBIN mg/dL  --  0.5 0.5  --   --  0.3  --   --  0.3  --  0.4 0.4   ALBUMIN g/dL  --  4.1 4.4  --   --  4.4  --   --  4.3  --  4.40 4.50   GLOBULIN gm/dL  --  2.5 3.4  --   --  2.5  --   --  2.0  --  2.2 2.4    < > = values in this interval not displayed.       No results for input(s): \"MSPIKE\", \"KAPPALAMB\", \"IGLFLC\", \"URICACID\", \"FREEKAPPAL\", \"CEA\", \"LDH\", \"REFLABREPO\" in the last 43024 hours.    Lab Results - Last 18 Months   Lab Units 03/05/24  1038 12/04/23  1056 04/02/23  0543 03/31/23  1730 12/05/22  1035 10/19/22  1356   IRON mcg/dL 111 32* 30*  --  77  --    TIBC mcg/dL 338 416 426  --  481  --    IRON SATURATION (TSAT) % 33 8* 7*  --  16*  --    FERRITIN ng/mL 250.50* 163.00*  --   --  42.83  --    TSH uIU/mL  --   --   --  3.090  --  0.680   FOLATE ng/mL  --   --   --   --  >20.00  --        Trinidad RONNIE Zhu reports a pain score of 0.            ASSESSMENT:  1.  Adenocarcinoma of the lung, right upper lobe.Tumor size 2 cm. PDL1 15%, Negative ALK, ROS1, and EGFR. BRAF G644V positive.  AJCC stage:1A3(pTic, pN0, cM0, G2)  Treatment status:Post right upper lobe lobectomy.  2.  Performance status of 0.  3.  30 pack years smoking history, etiology of her lung cancer. She had quit smoking. \"27 years.\"   4.  Normocytic anemia from iron deficiency due to gastrointestinal hemorrhage.  Diverticulosis in the sigmoid colon on 06/02/202.  Ferrous sulfate 12/7/2022 through 6/12/2023. 1 unit packed RBC 4/1/2023 and 4/4/2023.  Ferric gluconate 125 mg on 4/4/2023. \"It's constipating.\"  Injectafer on 12/21/2023.              PLAN:  1.   Re: Note from Dr. Cortés on 12/15/2023.  Seen for new nodular densities on the right.  Discussed navigational bronchoscopy.  Follow-up in 3 " "months.  2.   Re: CT chest report 3/5/2024. Evidence of prior right upper lobectomy and loss of right lung volume and resultant displacement of the distal trachea and cardiomediastinal  structures towards the right.  Increase in number and size of the right lower lung nodule. This represent a progressive neoplastic process/metastatic disease.  3.   Re: Heme status.  WBC 5.85, hemoglobin 11.8 and platelet 158.     4.   Re: CMP.  GFR 91.5 mL per minute and AST 39, she is on Crestor.   5.   Re: Ferritin 250.5 and saturation 33. \"Dr. Layne gave me iron pills.\" Order to stop.   6.   Re: Stable for observation, lung cancer.  7.  Continue care per primary care physician at the specialist.  8.  Plan of care discussed with patient, daughter and her spouse, Don.  Understanding expressed.  Patient agreeable to proceed.  9.  Advance Care Planning   ACP discussion was held with the patient during this visit. Patient has an advance directive in EMR which is still valid.    10. Return to office in 3 months with preoffice CBC with differential, CMP, and CT of the chest to follow lung nodules. To see Dr. Cortés 3/20/2024. Await bronchoscopy.           I have reviewed the assessment and plan and verified the accuracy of it. No changes to assessment and plan since the information was documented. Cyrus Palacios MD 03/12/24         I spent 36 total minutes, face-to-face, caring for Trinidad today. Greater than 50% of this time involved counseling and/or coordination of care as documented within this note.            Dion Cortés MD  (Jarad Ignacio MD)  (Kimberley Rodas MD)  Jose Layne MD               "

## 2024-03-05 ENCOUNTER — HOSPITAL ENCOUNTER (OUTPATIENT)
Dept: CT IMAGING | Facility: HOSPITAL | Age: 79
Discharge: HOME | End: 2024-03-05
Payer: MEDICARE

## 2024-03-05 ENCOUNTER — LAB (OUTPATIENT)
Dept: LAB | Facility: HOSPITAL | Age: 79
End: 2024-03-05
Payer: MEDICARE

## 2024-03-05 DIAGNOSIS — D50.8 IRON DEFICIENCY ANEMIA SECONDARY TO INADEQUATE DIETARY IRON INTAKE: ICD-10-CM

## 2024-03-05 DIAGNOSIS — D50.8 IRON DEFICIENCY ANEMIA SECONDARY TO INADEQUATE DIETARY IRON INTAKE: Primary | ICD-10-CM

## 2024-03-05 DIAGNOSIS — C80.1 ADENOCARCINOMA: ICD-10-CM

## 2024-03-05 DIAGNOSIS — C34.11 PRIMARY CANCER OF RIGHT UPPER LOBE OF LUNG: ICD-10-CM

## 2024-03-05 DIAGNOSIS — C80.1 ADENOCARCINOMA: Chronic | ICD-10-CM

## 2024-03-05 LAB
ALBUMIN SERPL-MCNC: 4.1 G/DL (ref 3.5–5.2)
ALBUMIN/GLOB SERPL: 1.6 G/DL
ALP SERPL-CCNC: 57 U/L (ref 39–117)
ALT SERPL W P-5'-P-CCNC: 29 U/L (ref 1–33)
ANION GAP SERPL CALCULATED.3IONS-SCNC: 8 MMOL/L (ref 5–15)
AST SERPL-CCNC: 39 U/L (ref 1–32)
BASOPHILS # BLD AUTO: 0.04 10*3/MM3 (ref 0–0.2)
BASOPHILS NFR BLD AUTO: 0.7 % (ref 0–1.5)
BILIRUB SERPL-MCNC: 0.5 MG/DL (ref 0–1.2)
BUN SERPL-MCNC: 16 MG/DL (ref 8–23)
BUN/CREAT SERPL: 25.4 (ref 7–25)
CALCIUM SPEC-SCNC: 9.6 MG/DL (ref 8.6–10.5)
CHLORIDE SERPL-SCNC: 104 MMOL/L (ref 98–107)
CO2 SERPL-SCNC: 29 MMOL/L (ref 22–29)
CREAT BLDA-MCNC: 0.7 MG/DL (ref 0.6–1.3)
CREAT SERPL-MCNC: 0.63 MG/DL (ref 0.57–1)
DEPRECATED RDW RBC AUTO: 53.8 FL (ref 37–54)
EGFRCR SERPLBLD CKD-EPI 2021: 91.5 ML/MIN/1.73
EOSINOPHIL # BLD AUTO: 0.19 10*3/MM3 (ref 0–0.4)
EOSINOPHIL NFR BLD AUTO: 3.2 % (ref 0.3–6.2)
ERYTHROCYTE [DISTWIDTH] IN BLOOD BY AUTOMATED COUNT: 14.6 % (ref 12.3–15.4)
FERRITIN SERPL-MCNC: 250.5 NG/ML (ref 13–150)
GLOBULIN UR ELPH-MCNC: 2.5 GM/DL
GLUCOSE SERPL-MCNC: 87 MG/DL (ref 65–99)
HCT VFR BLD AUTO: 36 % (ref 34–46.6)
HGB BLD-MCNC: 11.8 G/DL (ref 12–15.9)
IMM GRANULOCYTES # BLD AUTO: 0.01 10*3/MM3 (ref 0–0.05)
IMM GRANULOCYTES NFR BLD AUTO: 0.2 % (ref 0–0.5)
IRON 24H UR-MRATE: 111 MCG/DL (ref 37–145)
IRON SATN MFR SERPL: 33 % (ref 20–50)
LYMPHOCYTES # BLD AUTO: 1.7 10*3/MM3 (ref 0.7–3.1)
LYMPHOCYTES NFR BLD AUTO: 29.1 % (ref 19.6–45.3)
MCH RBC QN AUTO: 32.6 PG (ref 26.6–33)
MCHC RBC AUTO-ENTMCNC: 32.8 G/DL (ref 31.5–35.7)
MCV RBC AUTO: 99.4 FL (ref 79–97)
MONOCYTES # BLD AUTO: 0.68 10*3/MM3 (ref 0.1–0.9)
MONOCYTES NFR BLD AUTO: 11.6 % (ref 5–12)
NEUTROPHILS NFR BLD AUTO: 3.23 10*3/MM3 (ref 1.7–7)
NEUTROPHILS NFR BLD AUTO: 55.2 % (ref 42.7–76)
NRBC BLD AUTO-RTO: 0 /100 WBC (ref 0–0.2)
PLATELET # BLD AUTO: 158 10*3/MM3 (ref 140–450)
PMV BLD AUTO: 10.1 FL (ref 6–12)
POTASSIUM SERPL-SCNC: 4.2 MMOL/L (ref 3.5–5.2)
PROT SERPL-MCNC: 6.6 G/DL (ref 6–8.5)
RBC # BLD AUTO: 3.62 10*6/MM3 (ref 3.77–5.28)
SODIUM SERPL-SCNC: 141 MMOL/L (ref 136–145)
TIBC SERPL-MCNC: 338 MCG/DL (ref 298–536)
TRANSFERRIN SERPL-MCNC: 227 MG/DL (ref 200–360)
WBC NRBC COR # BLD AUTO: 5.85 10*3/MM3 (ref 3.4–10.8)

## 2024-03-05 PROCEDURE — 82728 ASSAY OF FERRITIN: CPT

## 2024-03-05 PROCEDURE — 36415 COLL VENOUS BLD VENIPUNCTURE: CPT

## 2024-03-05 PROCEDURE — 25510000001 IOPAMIDOL 61 % SOLUTION: Performed by: INTERNAL MEDICINE

## 2024-03-05 PROCEDURE — 84466 ASSAY OF TRANSFERRIN: CPT

## 2024-03-05 PROCEDURE — 85025 COMPLETE CBC W/AUTO DIFF WBC: CPT

## 2024-03-05 PROCEDURE — 71260 CT THORAX DX C+: CPT

## 2024-03-05 PROCEDURE — 82565 ASSAY OF CREATININE: CPT

## 2024-03-05 PROCEDURE — 80053 COMPREHEN METABOLIC PANEL: CPT

## 2024-03-05 PROCEDURE — 83540 ASSAY OF IRON: CPT

## 2024-03-05 RX ADMIN — IOPAMIDOL 100 ML: 612 INJECTION, SOLUTION INTRAVENOUS at 11:40

## 2024-03-11 ENCOUNTER — TELEPHONE (OUTPATIENT)
Dept: ONCOLOGY | Facility: CLINIC | Age: 79
End: 2024-03-11
Payer: MEDICARE

## 2024-03-11 RX ORDER — FERRIC MALTOL 30 MG/1
CAPSULE ORAL EVERY 24 HOURS
COMMUNITY
Start: 2024-01-04

## 2024-03-11 RX ORDER — LOSARTAN POTASSIUM 50 MG/1
1 TABLET ORAL DAILY
COMMUNITY
Start: 2023-12-19

## 2024-03-11 RX ORDER — SPIRONOLACTONE 25 MG/1
25 TABLET ORAL DAILY
COMMUNITY
Start: 2024-01-12 | End: 2024-03-12 | Stop reason: ALTCHOICE

## 2024-03-11 NOTE — TELEPHONE ENCOUNTER
Patient notified of CT results.  She is to keep appt tomorrow.  Report will be faxed to Dr Cortés as well.

## 2024-03-11 NOTE — TELEPHONE ENCOUNTER
----- Message from Cyrus Palacios MD sent at 3/5/2024  2:39 PM CST -----  Send copy to Dr. Cortés.    Keep appointment 3/12/2024.     Evidence of prior right upper lobectomy and loss of right lung volume  and resultant displacement of the distal trachea and cardiomediastinal  structures towards the right.   Increase in number and size of the right lower lung nodule as  detailed above. This represent a progressive neoplastic  process/metastatic disease.

## 2024-03-12 ENCOUNTER — OFFICE VISIT (OUTPATIENT)
Dept: ONCOLOGY | Facility: CLINIC | Age: 79
End: 2024-03-12
Payer: MEDICARE

## 2024-03-12 VITALS
HEIGHT: 64 IN | BODY MASS INDEX: 18.69 KG/M2 | TEMPERATURE: 98 F | WEIGHT: 109.5 LBS | RESPIRATION RATE: 18 BRPM | OXYGEN SATURATION: 98 % | DIASTOLIC BLOOD PRESSURE: 60 MMHG | SYSTOLIC BLOOD PRESSURE: 118 MMHG | HEART RATE: 74 BPM

## 2024-03-12 DIAGNOSIS — C34.11 PRIMARY CANCER OF RIGHT UPPER LOBE OF LUNG: Primary | ICD-10-CM

## 2024-03-12 NOTE — LETTER
March 12, 2024       No Recipients    Patient: Trinidad Zhu   YOB: 1945   Date of Visit: 3/12/2024     Dear Andrew Layne MD:       Thank you for referring Trinidad Zhu to me for evaluation. Below are the relevant portions of my assessment and plan of care.    If you have questions, please do not hesitate to call me. I look forward to following Trinidad along with you.         Sincerely,        Cyrus Palacios MD        CC:   No Recipients    Cyrus Palacios MD  03/12/24 1125  Sign when Signing Visit  [facility] H[facility]ATOLOG[facility] & [facility]  [address]  [facility] [address]  [phone]    Patient Name: Trinidad Zhu  Encounter Date: 03/12/2024  YOB: 1945  Patient Number: [id number]      REASON FOR FOLLOW-UP: Trinidad Zhu is a pleasant 79 y.o.  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  He had undergone resection on 11/5/2021.  She is seen with Don, spouse and daughter Aide. History is obtained from patient.  Patient is a relaible historian.             Oncology/Hematology History Overview Note     DIAGNOSTIC ABNORMALITIES:Patient found to have lung nodule.   CT chest 07/20/2020. No change in 1.5 cm RIGHT upper lobe pulmonary nodule. However, there has been very slight increase in size compared to multiple prior  studies dating back to 2016. Recommend continued imaging surveillance.    Increased size of clustered nodules in the inferior RIGHT upper  lobe and new cluster of tree-in-bud nodularity in the RIGHT lower lobe. Differential includes an indolent infectious/inflammatory process such as atypical mycobacterial infection (NAE). Recommend continued follow-up  CT chest 09/03/2021. Spiculated nodule in the right upper lobe have slightly increased in size. Recommend PET/CT.  PET 09/10/2021. FDG uptake within the spiculated 2 cm right upper lobe  nodule with SUV of 2.85 concerning for malignancy. Mild FDG uptake within the reticulonodular changes of the more inferior right upper lobe and right middle lobe with SUV of 1.1 favoring an inflammatory/infectious process. No evidence of distant metastasis.  CT chest 10/07/2021.  Spiculated pleural-based right upper lobe nodule that appears stable compared to 9/3/2021 examination.  Right lung nodularity with peribronchial thickening and bronchiectasis, stable.  Developing mild reticular nodular opacities posteriorly in the left lower lobe and minimally in the right lower lobe. Acute inflammatory change suspected. Correlate with patient presentation.  CT chest 11/09/2021. Postoperative change of VATS RIGHT upper lobectomy. There is fluid/soft tissue with intermixed gas in the RIGHT lung apex extending into the RIGHT hilum. A small RIGHT pneumothorax is present with right-sided chest tube in good position.  Pathology report 11/12/2021. Lung, right upper lobectomy (frozen section control):  A.  Moderately differentiated acinar predominant nonmucinous adenocarcinoma with a peripheral lepidic component (2.5 cm).    B.  The bronchial, vascular and parenchymal surgical margins are negative for malignancy.  C.  The tumor extends up to, but does not invade, the visceral pleura.  D.  Pulmonary caseating granulomata.  E.  Emphysematous changes.  F.  Peribronchial lymph nodes (2), with noncaseating granulomata, negative for metastatic carcinoma.  AJCC stage:pT1c, pN0.         PREVIOUS INTERVENTIONS:  She had undergone right upper lobe lobectomy 11/05/2021.     Lung nodules   1/23/2019 Initial Diagnosis    Lung nodules     9/3/2021 Imaging    CT Chest  Spiculated right upper lobe nodule is redemonstrated, measuring 2.1 x 1.3 x 1.2 cm, previously 1.6 x 1.1 x 1.2 cm. Associated peripheral groundglass opacities. Emphysema. Bronchiectasis in the right upper and middle lobe redemonstrated. Right fissure-based nodule measuring 5 mm,  previously 3 (image 82, series 3. Additional nodularities along the right minor fissure are similar to decrease. Mm.  No pleural effusion is present. The trachea and bronchial tree are patent.  1. Spiculated nodule in the right upper lobe have slightly increased in size. Recommend PET/CT.     9/10/2021 Procedure    09-  PFTs  Pulmonary Function Test   Pre Drug % Predicted    FVC: 80%   FEV1: 64%   FEF 25-75%: 28%   FEV1/FVC: 61.74%   Interpretation   Spirometry   Spirometry shows moderate obstruction. There is reduced midflow suggesting small airway/airflow obstruction.   Review of FVL curve   Patient's effort is normal.     9/10/2021 Imaging    PET/CT:  IMPRESSION:  1. FDG uptake within the spiculated 2 cm right upper lobe nodule with  SUV of 2.85 concerning for malignancy.  2. Mild FDG uptake within the reticulonodular changes of the more  inferior right upper lobe and right middle lobe with SUV of 1.1 favoring  an inflammatory/infectious process.  3. No evidence of distant metastasis.     9/21/2021 Biopsy    09-  Mike bronchoscopy RUL  Lung, Right upper lobe; transbronchial biopsy:  Atypical proliferation, highly suspicious for well-differentiated adenocarcinoma  Comments:  Recuts are evaluated.  The atypical proliferation is highlighted with immunohistochemical TIF1 staining (control appropriate).  The appearance could suggest a lepidic, well differentiated, or scar-carcinoma type lesion.    Lymph node, 10R FNA:  lymph node sampling  Lavage:  inflammation/ inflammatory debris and macrophages  Broncheoalveolar lavage, RUL  Rare groups of mildly atypical epithelial cells     10/7/2021 Imaging    CT Chest:  IMPRESSION:  1. Spiculated pleural-based right upper lobe nodule that appears stable  compared to 9/3/2021 examination.  2. Right lung nodularity with peribronchial thickening and  bronchiectasis, stable.  3. Developing mild reticular nodular opacities posteriorly in the left  lower lobe and  minimally in the right lower lobe. Acute inflammatory  change suspected. Correlate with patient presentation.  4. Continued follow-up recommended.     Adenocarcinoma   12/14/2021 Initial Diagnosis    Adenocarcinoma (HCC)         PAST MEDICAL HISTORY:  ALLERGIES:  Allergies   Allergen Reactions   • Baclofen Other (See Comments)     MUSCULOSKELETAL THERAPY AGENTS knocked her out for a day    Other reaction(s): Unknown   • Gabapentin Confusion     Other reaction(s): over sedation   • Sulfa Antibiotics Rash     Mouth blisters   • Sulfacetamide Sodium Rash   • Levofloxacin Other (See Comments)     tendonitis, muscle pain   • Amitriptyline Hcl Confusion     Other reaction(s): ambulation difficulties/confusion   • Niferex [Iron Polysaccharide] Swelling     LIP SWELLING   • Oxycodone-Acetaminophen GI Intolerance     Other reaction(s): vomiting     CURRENT MEDICATIONS:  Outpatient Encounter Medications as of 3/12/2024   Medication Sig Dispense Refill   • albuterol sulfate  (90 Base) MCG/ACT inhaler Inhale 2 puffs Every 4 (Four) Hours As Needed for Wheezing.     • alendronate (FOSAMAX) 70 MG tablet Take 1 tablet by mouth Every 7 (Seven) Days. Saturdays  3   • aspirin 81 MG chewable tablet Chew 1 tablet Every Night.     • Budeson-Glycopyrrol-Formoterol (Breztri Aerosphere) 160-9-4.8 MCG/ACT aerosol inhaler Inhale 2 puffs 2 (Two) Times a Day. 1 each 0   • Calcium Carb-Cholecalciferol (CALCIUM 500+D PO) Take 1 tablet by mouth Daily.     • carvedilol (COREG) 3.125 MG tablet Take 1 tablet by mouth 2 (Two) Times a Day With Meals. 60 tablet 0   • cetirizine (ZyrTEC) 10 MG tablet Take 1 tablet by mouth At Night As Needed.     • clopidogrel (PLAVIX) 75 MG tablet Take 1 tablet by mouth Daily. 30 tablet 11   • famotidine (PEPCID) 40 MG tablet Take 1 tablet by mouth 2 (Two) Times a Day.  3   • Ferric Maltol (ACCRUFeR) 30 MG capsule Daily.     • furosemide (LASIX) 20 MG tablet Take 1 tablet by mouth Daily.     • levothyroxine  (SYNTHROID, LEVOTHROID) 50 MCG tablet Take 1 tablet by mouth Daily.  3   • losartan (COZAAR) 50 MG tablet Take 1 tablet by mouth Daily.     • multivitamin with minerals tablet tablet Take 1 tablet by mouth Daily.     • rosuvastatin (CRESTOR) 40 MG tablet Take 1 tablet by mouth Every Night.     • traMADol (ULTRAM) 50 MG tablet Take 1 tablet by mouth Every 3 (Three) Hours As Needed for Moderate Pain or Severe Pain.  0   • [DISCONTINUED] cefdinir (OMNICEF) 300 MG capsule Take 1 capsule by mouth 2 (Two) Times a Day. (Patient not taking: Reported on 3/12/2024)     • [DISCONTINUED] losartan-hydrochlorothiazide (HYZAAR) 100-25 MG per tablet Take 1 tablet by mouth Daily. (Patient not taking: Reported on 3/12/2024)     • [DISCONTINUED] potassium chloride (K-DUR,KLOR-CON) 20 MEQ CR tablet  (Patient not taking: Reported on 3/12/2024)       No facility-administered encounter medications on file as of 3/12/2024.     ADULT ILLNESSES:  Patient Active Problem List   Diagnosis Code   • Spinal stenosis, cervical region M48.02   • Lung nodules R91.8   • Bronchiectasis without complication J47.9   • Underweight R63.6   • Personal history of nicotine dependence Z87.891   • Restrictive lung disease J98.4   • Pulmonary emphysema J43.9   • PAD (peripheral artery disease) I73.9   • Preop testing Z01.818   • Gastroesophageal reflux disease K21.9   • Allergic rhinitis J30.9   • ORTIZ (dyspnea on exertion) R06.09   • CAD (coronary artery disease) I25.10   • Hyperlipidemia E78.5   • Neuropathy G62.9   • Hypertension I10   • Simple chronic bronchitis J41.0   • Former smoker Z87.891   • Lung nodule R91.1   • Mass of right lung R91.8   • Postoperative anemia due to acute blood loss D62   • Syncope and collapse R55   • Adenocarcinoma C80.1   • Personal history of malignant neoplasm of bronchus and lung Z85.118   • Hydropneumothorax J94.8   • Lower extremity embolism I74.3   • Generalized muscle weakness M62.81   • Acute blood loss anemia D62   •  Melena K92.1   • Urinary incontinence R32   • Gastrointestinal hemorrhage K92.2   • Iron deficiency anemia D50.9     SURGERIES:  Past Surgical History:   Procedure Laterality Date   • ANTERIOR CERVICAL DISCECTOMY W/ FUSION N/A 5/22/2017    Procedure: CERVICAL DISCECTOMY ANTERIOR FUSION WITH INSTRUMENTATION;  Surgeon: NADINE Sarabia MD;  Location:  [facility] OR;  Service:    • AORTAGRAM Left 11/9/2020    Procedure: left lower extremtiy angiogram, hawk athrectomy, balloon angioplasty, stent placement, mynx closure;  Surgeon: Sukhdev Condon DO;  Location:  [facility] HYBRID OR 12;  Service: Vascular;  Laterality: Left;   • AORTAGRAM Left 3/26/2021    Procedure: LEFT LOWER EXTREMITY ANGIOGRAM, BALLOON ANGIOPLASTY, STENT PLACEMENT, MYNX CLOSURE;  Surgeon: Sukhdev Condon DO;  Location:  [facility] 12;  Service: Vascular;  Laterality: Left;   • AORTAGRAM Left 8/6/2021    Procedure: LEFT LOWER EXTREMITY ANGIOGRAM, HAWK ATHERECTOMY, BALLOON ANGIOPLASTY, MYNX CLOSURE;  Surgeon: Sukhdev Condon DO;  Location:  [facility] 12;  Service: Vascular;  Laterality: Left;   • AORTAGRAM Right 6/8/2022    Procedure: RIGHT LOWER EXTREMITY ANGIOGRAM, INTRAVASCULAR LITHOTRIPSY, HAWK ATHRECTOMY, BALLOON ANGIOPLASTY, MYNX CLOSURE;  Surgeon: Sukdhev Condon DO;  Location:  [facility] 12;  Service: Vascular;  Laterality: Right;   • AORTAGRAM Left 10/21/2022    Procedure: LEFT LOWER EXTREMITY ANGIOGRAM WITH HAWK ATHRECTOMY, BALLOON ANGIOPLASTY, STENT PLACEMENT, MYNX CLOSURE;  Surgeon: Sukhdev Condon DO;  Location:  [facility] 12;  Service: Vascular;  Laterality: Left;   • BLADDER SUSPENSION      also had pelvic reconstructive surgery   • BREAST EXCISIONAL BIOPSY Right 1985   • CATARACT EXTRACTION, BILATERAL     • CERVICAL CORPECTOMY N/A 5/22/2017    Procedure:  ANTERIOR CERVICAL DISCECTOMY FUSION C-6 to T1,  ANTERIOR FUSION WITH INSTRUMENTATION C-5 T-1;  Surgeon: NADINE Sarabia MD;  Location:  [facility] OR;   Service:    • COLONOSCOPY  08/19/2015    TIC BX RT COLON   • COLONOSCOPY  12/04/2013    RT COLON BX RECALL 5YR   • COLONOSCOPY N/A 11/29/2016    The entire examined colon is normal; No specimens collected   • COLONOSCOPY N/A 6/2/2022    Procedure: COLONOSCOPY WITH ANESTHESIA;  Surgeon: Marco Antonio Vallejo MD;  Location: [facility] ENDOSCOPY;  Service: Gastroenterology;  Laterality: N/A;  Pre: screen  Post: diverticulosis  Andrew Ty MD   • COLONOSCOPY N/A 4/5/2023    Procedure: COLONOSCOPY WITH ANESTHESIA;  Surgeon: Mike Kearns MD;  Location: [facility] ENDOSCOPY;  Service: Gastroenterology;  Laterality: N/A;  preop: GI bleed;melena  post op: diverticulosis, avm  PCP: Andrew Ty   • ENDOSCOPY  12/03/2015    HEALED ULCER SLANT BX   • ENDOSCOPY N/A 4/1/2023    Procedure: ESOPHAGOGASTRODUODENOSCOPY WITH ANESTHESIA;  Surgeon: Patric Reyes DO;  Location: [facility] OR;  Service: Gastroenterology;  Laterality: N/A;  PRE GI BLEED  POST gastric ulcers  DR LINN TY   • FEMORAL ENDARTERECTOMY Left 10/21/2022    Procedure: LEFT LOWER EXTREMITY ANGIOGRAM;  Surgeon: Sukhdev Condon DO;  Location: [facility] HYBRID OR 12;  Service: Vascular;  Laterality: Left;   • HYSTERECTOMY     • LEG THROMBECTOMY/EMBOLECTOMY Left 6/8/2022    Procedure: LOWER EXTREMITY THROMBECTOMY/EMBOLECTOMY, PATCH GRAFT TO  FEMORAL ARTERY;  Surgeon: Sukhdev Condon DO;  Location: [facility] HYBRID OR 12;  Service: Vascular;  Laterality: Left;   • LOBECTOMY Right 11/5/2021    Procedure: RIGHT THORACOSCOPY WITH DAVINCI ROBOT, RIGHT UPPER LOBE LOBECTOMY;  Surgeon: Jarad Ignacio MD;  Location: [facility] OR;  Service: Robotics - DaVinci;  Laterality: Right;   • LUNG SURGERY     • OTHER SURGICAL HISTORY      KNEE CARTILAGE REMOVED   • OTHER SURGICAL HISTORY      BENIGN FIBROID TUMOR OF BREAST 1985 REMOVED   • TONSILLECTOMY       HEALTH MAINTENANCE ITEMS:  Health Maintenance Due   Topic Date Due   • TDAP/TD VACCINES (1 - Tdap) Never done   • ZOSTER VACCINE (1  of 2) Never done   • DXA SCAN  06/17/2023       <no information>  Last Completed Colonoscopy            COLORECTAL CANCER SCREENING (COLONOSCOPY - Every 7 Years) Next due on 4/5/2030 04/05/2023  Surgical Procedure: COLONOSCOPY    04/05/2023  SCANNED - COLONOSCOPY    04/01/2023  Fecal Occult Blood component of Occult Blood X 1, Stool - Stool, Per Rectum    06/02/2022  COLONOSCOPY    06/02/2022  Surgical Procedure: COLONOSCOPY    Only the first 5 history entries have been loaded, but more history exists.                  Immunization History   Administered Date(s) Administered   • Arexvy (RSV, Adults 60+ yrs) 10/19/2023   • COVID-19 (MODERNA) 1st,2nd,3rd Dose Monovalent 01/19/2021, 02/17/2021, 10/26/2021   • COVID-19 (MODERNA) BIVALENT 12+YRS 10/03/2022   • COVID-19 (MODERNA) Monovalent Original Booster 05/04/2022   • COVID-19 F23 (MODERNA) 12YRS+ (SPIKEVAX) 10/31/2023   • Flu Vaccine Split Quad 11/01/2020   • Fluzone High Dose =>65 Years ([employer] ONLY) 10/10/2019, 10/01/2021, 10/03/2022   • Fluzone High-Dose 65+yrs 10/31/2023   • Fluzone Quad >6mos (Multi-dose) 10/01/2018   • INFLUENZA SPLIT TRI 10/01/2019   • Influenza Quad Vaccine (Inpatient) 10/13/2010   • Pneumococcal Conjugate 13-Valent (PCV13) 08/04/2016   • Pneumococcal Polysaccharide (PPSV23) 01/02/2015, 10/01/2019     Last Completed Mammogram       This patient has no relevant Health Maintenance data.              FAMILY HISTORY:  Family History   Problem Relation Age of Onset   • Breast cancer Maternal Aunt    • Heart disease Mother    • Heart disease Father    • GI problems Neg Hx         MALIGNANCIES   • Colon cancer Neg Hx    • Colon polyps Neg Hx      SOCIAL HISTORY:  Social History     Socioeconomic History   • Marital status:    Tobacco Use   • Smoking status: Former     Current packs/day: 0.00     Average packs/day: 1.5 packs/day for 20.0 years (30.0 ttl pk-yrs)     Types: Cigarettes     Start date: 1976     Quit date: 1996     Years  "since quittin.2     Passive exposure: Past   • Smokeless tobacco: Never   Vaping Use   • Vaping status: Never Used   Substance and Sexual Activity   • Alcohol use: Yes     Comment: occasionally   • Drug use: No   • Sexual activity: Not Currently       REVIEW OF SYSTEMS:    Review of Systems   Constitutional:  Negative for fatigue, fever and unexpected weight change.        \"I am good.\"   HENT:  Negative for congestion and hearing loss.    Eyes:  Negative for discharge and redness.   Respiratory:  Negative for shortness of breath and wheezing.    Cardiovascular:  Negative for chest pain and palpitations.   Gastrointestinal:  Negative for abdominal pain, nausea and vomiting.   Endocrine: Negative for polydipsia and polyphagia.   Genitourinary:  Negative for difficulty urinating and dysuria.   Musculoskeletal:  Negative for gait problem and myalgias.   Skin:  Positive for pallor.   Allergic/Immunologic: Negative for food allergies.   Neurological:  Negative for dizziness and speech difficulty.   Hematological:  Negative for adenopathy. Does not bruise/bleed easily.   Psychiatric/Behavioral:  Negative for agitation, confusion and hallucinations.        VITAL SIGNS: /60   Pulse 74   Temp 98 °F (36.7 °C)   Resp 18   Ht 161.3 cm (63.5\")   Wt 49.7 kg (109 lb 8 oz)   SpO2 98%   Breastfeeding No   BMI 19.09 kg/m²  Gained 5 pounds.   Pain Score    24 1051   PainSc: 0-No pain       PHYSICAL EXAMINATION:     Physical Exam  Vitals reviewed.   Constitutional:       General: She is not in acute distress.  HENT:      Head: Normocephalic and atraumatic.   Eyes:      General: No scleral icterus.  Cardiovascular:      Rate and Rhythm: Normal rate.   Pulmonary:      Effort: No respiratory distress.      Breath sounds: No wheezing.   Abdominal:      General: Bowel sounds are normal.      Palpations: Abdomen is soft.      Tenderness: There is no abdominal tenderness.   Musculoskeletal:         General: No " swelling.      Cervical back: Neck supple.   Skin:     Coloration: Skin is pale.   Neurological:      Mental Status: She is alert and oriented to person, place, and time.   Psychiatric:         Mood and Affect: Mood normal.         Behavior: Behavior normal.         Thought Content: Thought content normal.         Judgment: Judgment normal.         LABS    Lab Results - Last 18 Months   Lab Units 03/05/24  1038 12/04/23  1056 08/15/23  1516 06/05/23  0945 04/06/23  0541 04/05/23  2333 04/01/23  1245 04/01/23  0448 03/31/23  1730   HEMOGLOBIN g/dL 11.8* 11.5* 11.2* 12.1 7.7* 7.8*   < > 7.9* 5.9*   HEMATOCRIT % 36.0 36.6 36.0 39.8 24.3* 23.8*   < > 24.6* 18.7*   MCV fL 99.4* 93.6 95.7 101.8* 93.1 90.2   < > 94.3 94.9   WBC 10*3/mm3 5.85 10.76 5.50 7.55 4.87 6.73   < > 10.04 13.74*   RDW % 14.6 13.5 13.2 14.2 16.9* 16.9*   < > 13.1 13.4   MPV fL 10.1 9.5 9.9 10.6 9.0 8.9   < > 9.3 9.4   PLATELETS 10*3/mm3 158 258 160 241 171 162   < > 246 370   IMM GRAN % % 0.2 0.4 0.2 0.3  --   --   --  0.5 0.5   NEUTROS ABS 10*3/mm3 3.23 7.47* 2.95 4.32  --   --   --  5.87 11.12*   LYMPHS ABS 10*3/mm3 1.70 1.58 1.69 2.00  --   --   --  2.52 1.60   MONOS ABS 10*3/mm3 0.68 1.31* 0.60 0.87  --   --   --  1.49* 0.94*   EOS ABS 10*3/mm3 0.19 0.30 0.22 0.30  --   --   --  0.10 0.01   BASOS ABS 10*3/mm3 0.04 0.06 0.03 0.04  --   --   --  0.01 0.00   IMMATURE GRANS (ABS) 10*3/mm3 0.01 0.04 0.01 0.02  --   --   --  0.05 0.07*   NRBC /100 WBC 0.0 0.0 0.0 0.0  --   --   --  0.0 0.1    < > = values in this interval not displayed.       Lab Results - Last 18 Months   Lab Units 03/05/24  1105 03/05/24  1038 12/04/23  1056 12/04/23  1048 08/15/23  1516 06/05/23  0945 04/02/23  0543 04/01/23  0448 03/31/23  1730 12/13/22  1139 12/05/22  1035 11/13/22  2023   GLUCOSE mg/dL  --  87 108*  --  117* 86 91 81 127*  --  96 117*   SODIUM mmol/L  --  141 138  --  144 142 137 135* 134*   < > 141 139   POTASSIUM mmol/L  --  4.2 4.3  --  3.6 3.9 3.8 3.8 4.4   <  "> 3.6 4.0   TOTAL CO2   --   --   --   --   --   --   --   --   --    < >  --   --    CO2 mmol/L  --  29.0 28.0  --  27.0 30.0* 27.0 26.0 25.0  --  32.0* 31.0*   CHLORIDE mmol/L  --  104 95*  --  105 101 103 99 96*   < > 100 99   ANION GAP mmol/L  --  8.0 15.0  --  12.0 11.0 7.0 10.0 13.0   < > 9.0 9.0   CREATININE mg/dL 0.70 0.63 1.24* 1.40* 0.66 0.80  0.72 0.84 0.84 0.75   < > 0.70 0.93   BUN mg/dL  --  16 28*  --  7* 15 31* 35* 37*   < > 13 15   BUN / CREAT RATIO   --  25.4* 22.6  --  10.6 20.8 36.9* 41.7* 49.3*  --  18.6 16.1   CALCIUM mg/dL  --  9.6 10.0  --  9.5 9.8 8.5* 8.7 9.6   < > 9.3 9.5   ALK PHOS U/L  --  57 74  --   --  57  --   --  41  --  49 49   TOTAL PROTEIN g/dL  --  6.6 7.8  --   --  6.9  --   --  6.3  --  6.6 6.9   ALT (SGPT) U/L  --  29 25  --   --  16  --   --  19  --  17 20   AST (SGOT) U/L  --  39* 51*  --   --  32  --   --  22  --  28 33*   BILIRUBIN mg/dL  --  0.5 0.5  --   --  0.3  --   --  0.3  --  0.4 0.4   ALBUMIN g/dL  --  4.1 4.4  --   --  4.4  --   --  4.3  --  4.40 4.50   GLOBULIN gm/dL  --  2.5 3.4  --   --  2.5  --   --  2.0  --  2.2 2.4    < > = values in this interval not displayed.       No results for input(s): \"MSPIKE\", \"KAPPALAMB\", \"IGLFLC\", \"URICACID\", \"FREEKAPPAL\", \"CEA\", \"LDH\", \"REFLABREPO\" in the last 23021 hours.    Lab Results - Last 18 Months   Lab Units 03/05/24  1038 12/04/23  1056 04/02/23  0543 03/31/23  1730 12/05/22  1035 10/19/22  1356   IRON mcg/dL 111 32* 30*  --  77  --    TIBC mcg/dL 338 416 426  --  481  --    IRON SATURATION (TSAT) % 33 8* 7*  --  16*  --    FERRITIN ng/mL 250.50* 163.00*  --   --  42.83  --    TSH uIU/mL  --   --   --  3.090  --  0.680   FOLATE ng/mL  --   --   --   --  >20.00  --        Trinidad Zhu reports a pain score of 0.            ASSESSMENT:  1.  Adenocarcinoma of the lung, right upper lobe.Tumor size 2 cm. PDL1 15%, Negative ALK, ROS1, and EGFR. BRAF G644V positive.  AJCC stage:1A3(pTic, pN0, cM0, G2)  Treatment " "status:Post right upper lobe lobectomy.  2.  Performance status of 0.  3.  30 pack years smoking history, etiology of her lung cancer. She had quit smoking. \"27 years.\"   4.  Normocytic anemia from iron deficiency due to gastrointestinal hemorrhage.  Diverticulosis in the sigmoid colon on 06/02/202.  Ferrous sulfate 12/7/2022 through 6/12/2023. 1 unit packed RBC 4/1/2023 and 4/4/2023.  Ferric gluconate 125 mg on 4/4/2023. \"It's constipating.\"  Injectafer on 12/21/2023.              PLAN:  1.   Re: Note from Dr. Cortés on 12/15/2023.  Seen for new nodular densities on the right.  Discussed navigational bronchoscopy.  Follow-up in 3 months.  2.   Re: CT chest report 3/5/2024. Evidence of prior right upper lobectomy and loss of right lung volume and resultant displacement of the distal trachea and cardiomediastinal  structures towards the right.  Increase in number and size of the right lower lung nodule. This represent a progressive neoplastic process/metastatic disease.  3.   Re: Heme status.  WBC 5.85, hemoglobin 11.8 and platelet 158.     4.   Re: CMP.  GFR 91.5 mL per minute and AST 39, she is on Crestor.   5.   Re: Ferritin 250.5 and saturation 33. \"Dr. Layne gave me iron pills.\" Order to stop.   6.   Re: Stable for observation, lung cancer.  7.  Continue care per primary care physician at the specialist.  8.  Plan of care discussed with patient, daughter and her spouse, Don.  Understanding expressed.  Patient agreeable to proceed.  9.  Advance Care Planning  ACP discussion was held with the patient during this visit. Patient has an advance directive in EMR which is still valid.    10. Return to office in 3 months with preoffice CBC with differential, CMP, and CT of the chest to follow lung nodules. To see Dr. Cortés 3/20/2024. Await bronchoscopy.           I have reviewed the assessment and plan and verified the accuracy of it. No changes to assessment and plan since " the information was documented. Cyrus Palacios MD 03/12/24         I spent 36 total minutes, face-to-face, caring for Trinidad brizuela. Greater than 50% of this time involved counseling and/or coordination of care as documented within this note.            Dion Cortés MD  (Jarad Ignacio MD)  (Kimberley Rodas MD)  Jose Layne MD

## 2024-03-13 NOTE — PROGRESS NOTES
Dr. Elkins has reviewed her films and feels there are some areas that could be accessed via the ion robot navigational bronchoscopy approach.  I will discuss this with the patient when she comes in to see me next week.

## 2024-03-20 ENCOUNTER — OFFICE VISIT (OUTPATIENT)
Dept: PULMONOLOGY | Facility: CLINIC | Age: 79
End: 2024-03-20
Payer: MEDICARE

## 2024-03-20 VITALS
BODY MASS INDEX: 19.02 KG/M2 | DIASTOLIC BLOOD PRESSURE: 66 MMHG | HEIGHT: 64 IN | OXYGEN SATURATION: 94 % | WEIGHT: 111.4 LBS | SYSTOLIC BLOOD PRESSURE: 120 MMHG | HEART RATE: 77 BPM

## 2024-03-20 DIAGNOSIS — R91.8 LUNG NODULES: Primary | Chronic | ICD-10-CM

## 2024-03-20 DIAGNOSIS — J47.9 BRONCHIECTASIS WITHOUT COMPLICATION: Chronic | ICD-10-CM

## 2024-03-20 DIAGNOSIS — Z85.118 PERSONAL HISTORY OF MALIGNANT NEOPLASM OF BRONCHUS AND LUNG: Chronic | ICD-10-CM

## 2024-03-20 DIAGNOSIS — Z87.891 PERSONAL HISTORY OF NICOTINE DEPENDENCE: Chronic | ICD-10-CM

## 2024-03-20 DIAGNOSIS — J98.4 RESTRICTIVE LUNG DISEASE: Chronic | ICD-10-CM

## 2024-03-20 PROCEDURE — 99214 OFFICE O/P EST MOD 30 MIN: CPT | Performed by: INTERNAL MEDICINE

## 2024-03-20 PROCEDURE — 1159F MED LIST DOCD IN RCRD: CPT | Performed by: INTERNAL MEDICINE

## 2024-03-20 PROCEDURE — 3078F DIAST BP <80 MM HG: CPT | Performed by: INTERNAL MEDICINE

## 2024-03-20 PROCEDURE — 3074F SYST BP LT 130 MM HG: CPT | Performed by: INTERNAL MEDICINE

## 2024-03-20 PROCEDURE — 1160F RVW MEDS BY RX/DR IN RCRD: CPT | Performed by: INTERNAL MEDICINE

## 2024-03-20 NOTE — ASSESSMENT & PLAN NOTE
The concern of a recurrent disease based on her recent CT scan and I reviewed the scan with the patient and her  today.

## 2024-03-20 NOTE — PROGRESS NOTES
Chief Complaint  Lung nodules and History of lung cancer.    Subjective    History of Present Illness {CC  Problem List  Visit Diagnosis   Encounters  Notes  Medications  Labs  Result Review Imaging  Media: 23}    Trinidad Zhu presents to [facility] for lung nodules and a history of lung cancer.    History of Present Illness   The patient is accompanied by her  today.  A recent chest CT did show enlargement of a right lower lobe nodule also new nodules in the right lower lobe as well and I reviewed the scan with her and her .  The impression of the report was again that some new and enlarging nodules were noted in the right lower lobe although the body of report referred to the use is being on the left and we will have that corrected.  I do think a follow-up navigational bronchoscopy for diagnostic purposes would be indicated and she would like to see Dr. Rodas again and I will make that referral.  I will also get a PET scan in the interim.  She has had the COVID-19 vaccine including 2 standard boosters, a bivalent booster, and a Spikevax booster in the form of the Moderna vaccine.  She had the flu shot this past fall and has had a Prevnar vaccine, a Pneumovax, and an RSV vaccine in the past as well.  Prior to Admission medications    Medication Sig Start Date End Date Taking? Authorizing Provider   albuterol sulfate  (90 Base) MCG/ACT inhaler Inhale 2 puffs Every 4 (Four) Hours As Needed for Wheezing.   Yes ProviderMary Lou MD   alendronate (FOSAMAX) 70 MG tablet Take 1 tablet by mouth Every 7 (Seven) Days. Saturdays 5/21/19  Yes ProviderMary Lou MD   aspirin 81 MG chewable tablet Chew 1 tablet Every Night.   Yes ProviderMary Lou MD   Budeson-Glycopyrrol-Formoterol (Breztri Aerosphere) 160-9-4.8 MCG/ACT aerosol inhaler Inhale 2 puffs 2 (Two) Times a Day. 2/15/23  Yes Dion Cortés MD   Calcium  Carb-Cholecalciferol (CALCIUM 500+D PO) Take 1 tablet by mouth Daily.   Yes Mary Lou Rodas MD   carvedilol (COREG) 3.125 MG tablet Take 1 tablet by mouth 2 (Two) Times a Day With Meals. 23  Yes Chuy Chong MD   cetirizine (ZyrTEC) 10 MG tablet Take 1 tablet by mouth At Night As Needed.   Yes Mary Lou Rodas MD   clopidogrel (PLAVIX) 75 MG tablet Take 1 tablet by mouth Daily. 23 Yes Mary Lou Rodas MD   famotidine (PEPCID) 40 MG tablet Take 1 tablet by mouth 2 (Two) Times a Day. 4/15/19  Yes Mary Lou Rodas MD   Ferric Maltol (ACCRUFeR) 30 MG capsule Daily. 24  Yes Mary Lou Rodas MD   furosemide (LASIX) 20 MG tablet Take 1 tablet by mouth Daily. 23  Yes Provider, Historical, MD   levothyroxine (SYNTHROID, LEVOTHROID) 50 MCG tablet Take 1 tablet by mouth Daily. 19  Yes Mary Lou Rodas MD   losartan (COZAAR) 50 MG tablet Take 1 tablet by mouth Daily. 23  Yes Mary Lou Rodas MD   multivitamin with minerals tablet tablet Take 1 tablet by mouth Daily.   Yes Mary Lou Rodas MD   rosuvastatin (CRESTOR) 40 MG tablet Take 1 tablet by mouth Every Night. 21  Yes Mary Lou Rodas MD   traMADol (ULTRAM) 50 MG tablet Take 1 tablet by mouth Every 3 (Three) Hours As Needed for Moderate Pain or Severe Pain. 19  Yes Mary Lou Rodas MD       Social History     Socioeconomic History    Marital status:    Tobacco Use    Smoking status: Former     Current packs/day: 0.00     Average packs/day: 1.5 packs/day for 20.0 years (30.0 ttl pk-yrs)     Types: Cigarettes     Start date:      Quit date:      Years since quittin.2     Passive exposure: Past    Smokeless tobacco: Never   Vaping Use    Vaping status: Never Used   Substance and Sexual Activity    Alcohol use: Yes     Comment: occasionally    Drug use: No    Sexual activity: Not Currently       Objective   Vital Signs:   /66   Pulse 77   Ht 161.3  "cm (63.5\")   Wt 50.5 kg (111 lb 6.4 oz)   SpO2 94% Comment: RA  BMI 19.42 kg/m²     Physical Exam  Vitals and nursing note reviewed.   HENT:      Head: Normocephalic.   Eyes:      Extraocular Movements: Extraocular movements intact.      Pupils: Pupils are equal, round, and reactive to light.   Cardiovascular:      Rate and Rhythm: Normal rate and regular rhythm.   Pulmonary:      Effort: Pulmonary effort is normal.      Breath sounds: Normal breath sounds.   Musculoskeletal:         General: Normal range of motion.   Skin:     General: Skin is warm and dry.   Neurological:      General: No focal deficit present.      Mental Status: She is alert and oriented to person, place, and time.   Psychiatric:         Mood and Affect: Mood normal.         Behavior: Behavior normal.        Result Review :    PFT Values          6/22/2022    15:00 2/15/2023    15:15   Pre Drug PFT Results   FVC 77 79   FEV1 73 78   FEF 25-75% 67 75   FEV1/FVC 73 76   Other Tests PFT Results           DLCO 54    D/VAsb 74          Results for orders placed in visit on 02/15/23    Spirometry Without Bronchodilator    Narrative  Spirometry Without Bronchodilator  Performed by: Samantha Cantor, RRT  Authorized by: Dion Cortés MD    Pre Drug % Predicted  FVC: 79%  FEV1: 78%  FEF 25-75%: 75%  FEV1/FVC: 76%    Interpretation  Spirometry  Spirometry shows mild restriction. There is reduced midflow suggesting small airway/airflow obstruction.  Overall comments: The patient's spirometry is most consistent with a mild restrictive ventilatory defect with a coexisting mild decrease in midflows.  When current studies are compared to studies performed on June 22 of last year, she has had slight improvement in both her FVC and FEV1 compared to previous.  Midflows also show some improvement as well.      Results for orders placed in visit on 06/22/22    Full Pulmonary Function Test Without Bronchodilator    Narrative  Full " Pulmonary Function Test Without Bronchodilator  Performed by: Samantha Cantor, RRT  Authorized by: Dion Cortés MD    Pre Drug % Predicted  FVC: 77%  FEV1: 73%  FEF 25-75%: 67%  FEV1/FVC: 73%  T%  RV: 262%  DLCO: 54%  D/VAsb: 74%    Interpretation  Spirometry  Spirometry shows mild restriction. There is reduced midflow suggesting small airway/airflow obstruction.  Diffusion Capacity  The patient's diffusion capacity is moderately reduced.  Diffusion capacity is mildly reduced when corrected for alveolar volume.  Overall comments: The patient's spirometry is consistent with a mild restrictive ventilatory defect with a coexisting decrease in midflows.  Accurate lung volumes could not be obtained.  She has a moderate diffusion impairment which when corrected for alveolar volume is a mild diffusion impairment.  When current studies are compared to studies from [facility] from 2021, there has been a decline in her FVC on current baseline spirometry compared to previous pre and postbronchodilator studies but no significant change in her FEV1 compared to previous pre and postbronchodilator studies.  When corrected for alveolar volume, there has been a decline in her diffusion capacity compared to previous.      Results for orders placed during the hospital encounter of 21    Full Pulmonary Function Test With Bronchodilator & ABG    Narrative  [facility] - Pulmonary Function Test    [facility] [place] [facility] [place] [facility]  [place]  [address]  [phone]    Patient : Trinidad Zhu  MRN : [id number]  CSN : [id number]  Pulmonologist : Dion Cortés MD  Date : 2021    ______________________________________________________________________    Interpretation :  1.  Spirometry is consistent with a moderate obstructive ventilatory defect.  2.  There is no significant change in spirometry postbronchodilator.  3.  Lung volumes reveal an elevated expiratory reserve volume.   There is also a mild decrease in inspiratory capacity.  4.  There is a moderate diffusion impairment which when corrected for alveolar volume is a mild diffusion impairment.  5.  Arterial blood gases are within normal limits on room air.  6.  When current studies are compared to studies done at the respiratory disease clinic on September 10 of 2021, there has been slight improvement in both the patient's FVC and FEV1 on current pre and postbronchodilator studies compared to previous baseline spirometry.      Dion Cortés MD                 My interpretation of imaging:    CT Chest With Contrast Diagnostic (03/05/2024 11:39)         Assessment and Plan {CC Problem List  Visit Diagnosis  ROS  Review (Popup)  [facility]  Quality  BestPractice  Medications  SmartSets  SnapShot Encounters  Media : 23}    Diagnoses and all orders for this visit:    1. Lung nodules (Primary)  Assessment & Plan:  New and enlarging nodules are noted in the right lower lobe on her recent CT and I will refer her to Dr. Rodas for consideration of a follow-up navigational bronchoscopy.  I will also get a PET scan.    Orders:  -     Cancel: NM Pet Brain Metabolic Evaluation; Future  -     Ambulatory Referral to Pulmonology  -     NM PET/CT Skull Base to Mid Thigh; Future    2. Bronchiectasis without complication  Assessment & Plan:  She has some bronchiectatic changes noted on prior imaging studies.      3. Restrictive lung disease  Assessment & Plan:  This relates to her previous lung resection.      4. Personal history of malignant neoplasm of bronchus and lung  Assessment & Plan:  The concern of a recurrent disease based on her recent CT scan and I reviewed the scan with the patient and her  today.    Orders:  -     Cancel: NM Pet Brain Metabolic Evaluation; Future  -     Ambulatory Referral to Pulmonology  -     NM PET/CT Skull Base to Mid Thigh; Future    5. Personal history of nicotine  dependence  Assessment & Plan:  She quit smoking in 1996.            Dion Cortés MD  3/21/2024  13:26 CDT    Follow Up   Return in about 4 weeks (around 4/17/2024) for To see me specifically.    Patient was given instructions and counseling regarding her condition or for health maintenance advice. Please see specific information pulled into the AVS if appropriate.

## 2024-03-20 NOTE — PATIENT INSTRUCTIONS
The patient's recent chest CT showed some new nodules in the right lower lobe and enlargement of her previously noted nodule as well.  I am going to get a PET scan and then we will see about referring her back to Dr. Rodas for a follow-up navigational bronchoscopy.  I did review the scan with the patient and her  who accompanies her today.

## 2024-03-20 NOTE — ASSESSMENT & PLAN NOTE
New and enlarging nodules are noted in the right lower lobe on her recent CT and I will refer her to Dr. Rodas for consideration of a follow-up navigational bronchoscopy.  I will also get a PET scan.

## 2024-03-26 ENCOUNTER — HOSPITAL ENCOUNTER (OUTPATIENT)
Dept: CT IMAGING | Facility: HOSPITAL | Age: 79
Discharge: HOME | End: 2024-03-26
Payer: MEDICARE

## 2024-03-26 DIAGNOSIS — R91.8 LUNG NODULES: Chronic | ICD-10-CM

## 2024-03-26 DIAGNOSIS — Z85.118 PERSONAL HISTORY OF MALIGNANT NEOPLASM OF BRONCHUS AND LUNG: Chronic | ICD-10-CM

## 2024-03-26 PROCEDURE — 0 FLUDEOXYGLUCOSE F18 SOLUTION: Performed by: INTERNAL MEDICINE

## 2024-03-26 PROCEDURE — A9552 F18 FDG: HCPCS | Performed by: INTERNAL MEDICINE

## 2024-03-26 PROCEDURE — 78815 PET IMAGE W/CT SKULL-THIGH: CPT

## 2024-03-26 RX ADMIN — FLUDEOXYGLUCOSE F 18 1 DOSE: 200 INJECTION, SOLUTION INTRAVENOUS at 08:45

## 2024-04-05 ENCOUNTER — TELEPHONE (OUTPATIENT)
Dept: PULMONOLOGY | Facility: CLINIC | Age: 79
End: 2024-04-05
Payer: MEDICARE

## 2024-04-05 NOTE — TELEPHONE ENCOUNTER
I did call the patient just to let her know I had received her results from Dr. Rodas and Dr. 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  She had already been informed of that information by Dr. Rodas as well.  She will be seen in the office in about 2 weeks and we will see if culture results are available at that time.

## 2024-04-15 ENCOUNTER — TELEPHONE (OUTPATIENT)
Dept: PULMONOLOGY | Facility: CLINIC | Age: 79
End: 2024-04-15
Payer: MEDICARE

## 2024-04-15 NOTE — TELEPHONE ENCOUNTER
Talked with patient to make sure sent the right information.   Faxing Dr Rodas notes, biopsy and PET scan results to Dr Layne office    All sent by fax on 04/15/2024

## 2024-04-15 NOTE — TELEPHONE ENCOUNTER
Caller: Trinidad Zhu    Relationship: Self    Best call back number: [phone]     What form or medical record are you requesting:   - COPY OF BIOPSY BY     Who is requesting this form or medical record from you:  - PCP    How would you like to receive the form or medical records (pick-up, mail, fax): FAX    Timeframe paperwork needed: TOMORROW, PATIENT SEE HER PCP IN THE MORNING    Additional notes: PATIENT STATED  NORMALLY SENDS A COPY WHEN SHE SEES HIM BUT THIS YEAR IT HAPPENS THAT SHE WILL SEE HER PCP FIRST AND THEN  FRIDAY SO HER PCP WILL NEED THE COPY BEFORE THEN.

## 2024-04-19 ENCOUNTER — OFFICE VISIT (OUTPATIENT)
Dept: PULMONOLOGY | Facility: CLINIC | Age: 79
End: 2024-04-19
Payer: MEDICARE

## 2024-04-19 VITALS
WEIGHT: 110.6 LBS | SYSTOLIC BLOOD PRESSURE: 136 MMHG | DIASTOLIC BLOOD PRESSURE: 64 MMHG | OXYGEN SATURATION: 97 % | HEART RATE: 63 BPM | BODY MASS INDEX: 18.88 KG/M2 | HEIGHT: 64 IN

## 2024-04-19 DIAGNOSIS — Z85.118 PERSONAL HISTORY OF MALIGNANT NEOPLASM OF BRONCHUS AND LUNG: Chronic | ICD-10-CM

## 2024-04-19 DIAGNOSIS — J98.4 RESTRICTIVE LUNG DISEASE: Chronic | ICD-10-CM

## 2024-04-19 DIAGNOSIS — R91.8 LUNG NODULES: Chronic | ICD-10-CM

## 2024-04-19 DIAGNOSIS — R06.2 WHEEZING: Primary | Chronic | ICD-10-CM

## 2024-04-19 DIAGNOSIS — Z87.891 PERSONAL HISTORY OF NICOTINE DEPENDENCE: Chronic | ICD-10-CM

## 2024-04-19 PROCEDURE — 1160F RVW MEDS BY RX/DR IN RCRD: CPT | Performed by: INTERNAL MEDICINE

## 2024-04-19 PROCEDURE — 3078F DIAST BP <80 MM HG: CPT | Performed by: INTERNAL MEDICINE

## 2024-04-19 PROCEDURE — 3075F SYST BP GE 130 - 139MM HG: CPT | Performed by: INTERNAL MEDICINE

## 2024-04-19 PROCEDURE — 99214 OFFICE O/P EST MOD 30 MIN: CPT | Performed by: INTERNAL MEDICINE

## 2024-04-19 PROCEDURE — 1159F MED LIST DOCD IN RCRD: CPT | Performed by: INTERNAL MEDICINE

## 2024-04-19 RX ORDER — ALBUTEROL SULFATE 90 UG/1
2 AEROSOL, METERED RESPIRATORY (INHALATION) EVERY 4 HOURS PRN
Qty: 18 G | Refills: 11 | Status: SHIPPED | OUTPATIENT
Start: 2024-04-19

## 2024-04-19 NOTE — ASSESSMENT & PLAN NOTE
She did respond to Spiriva when she had some wheezing earlier this week.  She may continue that whenever she has episodes of wheezing I told her it would work best if she use it routinely if she is having recurrent problems with wheezing.  Also I e-prescribed an albuterol HFA inhaler to use as needed as well.  I will get complete pulmonary functions with and without bronchodilators at [facility] in the next few weeks.

## 2024-04-19 NOTE — PATIENT INSTRUCTIONS
The patient's recent navigational bronchoscopy was negative for malignancy and she had been notified of this by Dr. Rodas denied contact her by phone as well.  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  Follow-up cultures are pending.  She has had a bit more difficulty with shortness of breath since the procedure but also has had some wheezing which did respond to Spiriva which was provided to her by Dr. Layne at an office visit earlier this week.  I did tell her I will schedule her for PFTs with and without bronchodilators at [facility] I also e-prescribed albuterol HFA inhaler to use as rescue therapy.  She will be getting a follow-up chest CT in mid June and I will see her back in the office shortly thereafter.

## 2024-04-19 NOTE — PROGRESS NOTES
Chief Complaint  Lung nodules, Shortness of Breath, and Wheezing    Subjective    History of Present Illness {CC  Problem List  Visit Diagnosis   Encounters  Notes  Medications  Labs  Result Review Imaging  Media: 23}    Trinidad Zhu presents to [facility] for lung nodules, shortness of breath, and wheezing.    History of Present Illness   The patient is accompanied by her  today.  She had navigational bronchoscopy in [place] by Dr. Rodas with the findings of noncaseating granulomas and no evidence of malignancy.  She has a history of a prior culture revealing M. avium complex.  Current culture results are still pending in terms of her AFB cultures.  Even if cultures did confirm M avium complex again she very well may not require therapy.  She does state she has been a bit more short of breath since the procedure and had some wheezing when she was in Dr. Ornelas office Monday and was placed on Spiriva Respimat and is doing better.  I did tell that some patients do develop some increasing airways inflammation post bronchoscopy but also is likely related to some environmental factors.  I told her to continue the Spiriva she had persistent wheezing and I also E prescribe an albuterol HFA inhaler.  She has a dry powder albuterol inhaler but it does not seem to help with that much and it actually may have .  I will plan on complete PFTs with and without bronchodilators in the next few weeks.  Again her final AFB culture results are still pending from her procedure by Dr. Rodas but again her cytologies were negative.  She has had the COVID-19 vaccine including 2 standard boosters, a bivalent booster, and a Spikevax booster in the form of the Moderna vaccine.  She had the flu shot this past fall.  She has had a Prevnar vaccine, Pneumovax, and RSV vaccine in the past.  Prior to Admission medications    Medication Sig Start Date End Date  Taking? Authorizing Provider   albuterol sulfate  (90 Base) MCG/ACT inhaler Inhale 2 puffs Every 4 (Four) Hours As Needed for Wheezing.   Yes Mary Lou Rodas MD   alendronate (FOSAMAX) 70 MG tablet Take 1 tablet by mouth Every 7 (Seven) Days. Saturdays 5/21/19  Yes Mary Lou Rodas MD   aspirin 81 MG chewable tablet Chew 1 tablet Every Night.   Yes Mary Lou Rodas MD   Budeson-Glycopyrrol-Formoterol (Breztri Aerosphere) 160-9-4.8 MCG/ACT aerosol inhaler Inhale 2 puffs 2 (Two) Times a Day. 2/15/23  Yes Dion Cortés MD   Calcium Carb-Cholecalciferol (CALCIUM 500+D PO) Take 1 tablet by mouth Daily.   Yes ProviderMary Lou MD   carvedilol (COREG) 3.125 MG tablet Take 1 tablet by mouth 2 (Two) Times a Day With Meals. 4/6/23  Yes Chuy Chong MD   cetirizine (ZyrTEC) 10 MG tablet Take 1 tablet by mouth At Night As Needed.   Yes ProviderMary Lou MD   clopidogrel (PLAVIX) 75 MG tablet Take 1 tablet by mouth Daily. 6/14/23 6/14/24 Yes Mary Lou Rodas MD   famotidine (PEPCID) 40 MG tablet Take 1 tablet by mouth 2 (Two) Times a Day. 4/15/19  Yes Mary Lou Rodas MD   Ferric Maltol (ACCRUFeR) 30 MG capsule Daily. 1/4/24  Yes Mary Lou Rodas MD   furosemide (LASIX) 20 MG tablet Take 1 tablet by mouth Daily. 5/30/23  Yes Mary Lou Rodas MD   levothyroxine (SYNTHROID, LEVOTHROID) 50 MCG tablet Take 1 tablet by mouth Daily. 5/8/19  Yes Mary Lou Rodas MD   losartan (COZAAR) 50 MG tablet Take 1 tablet by mouth Daily. 12/19/23  Yes Mary Lou Rodas MD   multivitamin with minerals tablet tablet Take 1 tablet by mouth Daily.   Yes Mary Lou Rodas MD   rosuvastatin (CRESTOR) 40 MG tablet Take 1 tablet by mouth Every Night. 9/2/21  Yes Mary Lou Rodas MD   traMADol (ULTRAM) 50 MG tablet Take 1 tablet by mouth Every 3 (Three) Hours As Needed for Moderate Pain or Severe Pain. 5/28/19  Yes Provider, Mary Lou, MD   albuterol sulfate  (90  "Base) MCG/ACT inhaler Inhale 2 puffs Every 4 (Four) Hours As Needed for Wheezing or Shortness of Air. 24   Dion Cortés MD       Social History     Socioeconomic History    Marital status:    Tobacco Use    Smoking status: Former     Current packs/day: 0.00     Average packs/day: 1.5 packs/day for 20.0 years (30.0 ttl pk-yrs)     Types: Cigarettes     Start date:      Quit date:      Years since quittin.3     Passive exposure: Past    Smokeless tobacco: Never   Vaping Use    Vaping status: Never Used   Substance and Sexual Activity    Alcohol use: Yes     Comment: occasionally    Drug use: No    Sexual activity: Not Currently       Objective   Vital Signs:   /64   Pulse 63   Ht 161.3 cm (63.5\")   Wt 50.2 kg (110 lb 9.6 oz)   SpO2 97% Comment: RA  BMI 19.28 kg/m²     Physical Exam  Vitals and nursing note reviewed.   HENT:      Head: Normocephalic.   Eyes:      Extraocular Movements: Extraocular movements intact.      Pupils: Pupils are equal, round, and reactive to light.   Cardiovascular:      Rate and Rhythm: Normal rate and regular rhythm.   Pulmonary:      Effort: Pulmonary effort is normal.      Comments: Lung fields are clear with reasonable air movement bilaterally and in particular no wheezes are heard.  Musculoskeletal:         General: Normal range of motion.      Cervical back: Normal range of motion.   Skin:     General: Skin is warm and dry.   Neurological:      General: No focal deficit present.      Mental Status: She is alert and oriented to person, place, and time.   Psychiatric:         Mood and Affect: Mood normal.         Behavior: Behavior normal.        Result Review :    PFT Values          2022    15:00 2/15/2023    15:15   Pre Drug PFT Results   FVC 77 79   FEV1 73 78   FEF 25-75% 67 75   FEV1/FVC 73 76   Other Tests PFT Results           DLCO 54    D/VAsb 74          Results for orders placed in visit on 02/15/23    Spirometry " Without Bronchodilator    Narrative  Spirometry Without Bronchodilator  Performed by: Samantha Cantor, NESTOR  Authorized by: Dion Cortés MD    Pre Drug % Predicted  FVC: 79%  FEV1: 78%  FEF 25-75%: 75%  FEV1/FVC: 76%    Interpretation  Spirometry  Spirometry shows mild restriction. There is reduced midflow suggesting small airway/airflow obstruction.  Overall comments: The patient's spirometry is most consistent with a mild restrictive ventilatory defect with a coexisting mild decrease in midflows.  When current studies are compared to studies performed on  of last year, she has had slight improvement in both her FVC and FEV1 compared to previous.  Midflows also show some improvement as well.      Results for orders placed in visit on 22    Full Pulmonary Function Test Without Bronchodilator    Narrative  Full Pulmonary Function Test Without Bronchodilator  Performed by: Samantha Cantor, NESTOR  Authorized by: Dion Cortés MD    Pre Drug % Predicted  FVC: 77%  FEV1: 73%  FEF 25-75%: 67%  FEV1/FVC: 73%  T%  RV: 262%  DLCO: 54%  D/VAsb: 74%    Interpretation  Spirometry  Spirometry shows mild restriction. There is reduced midflow suggesting small airway/airflow obstruction.  Diffusion Capacity  The patient's diffusion capacity is moderately reduced.  Diffusion capacity is mildly reduced when corrected for alveolar volume.  Overall comments: The patient's spirometry is consistent with a mild restrictive ventilatory defect with a coexisting decrease in midflows.  Accurate lung volumes could not be obtained.  She has a moderate diffusion impairment which when corrected for alveolar volume is a mild diffusion impairment.  When current studies are compared to studies from [facility] from 2021, there has been a decline in her FVC on current baseline spirometry compared to previous pre and postbronchodilator studies but no significant change in her FEV1  compared to previous pre and postbronchodilator studies.  When corrected for alveolar volume, there has been a decline in her diffusion capacity compared to previous.      Results for orders placed during the hospital encounter of 11/01/21    Full Pulmonary Function Test With Bronchodilator & ABG    Narrative  [facility] - Pulmonary Function Test    [address]  [place]  [place]  [address]  [phone]    Patient : Trinidad Zhu  MRN : [id number]  CSN : [id number]  Pulmonologist : Dion Cortés MD  Date : 11/1/2021    ______________________________________________________________________    Interpretation :  1.  Spirometry is consistent with a moderate obstructive ventilatory defect.  2.  There is no significant change in spirometry postbronchodilator.  3.  Lung volumes reveal an elevated expiratory reserve volume.  There is also a mild decrease in inspiratory capacity.  4.  There is a moderate diffusion impairment which when corrected for alveolar volume is a mild diffusion impairment.  5.  Arterial blood gases are within normal limits on room air.  6.  When current studies are compared to studies done at the respiratory disease clinic on September 10 of 2021, there has been slight improvement in both the patient's FVC and FEV1 on current pre and postbronchodilator studies compared to previous baseline spirometry.      Dion Cortés MD               My interpretation of labs:   EXTERNAL MEDICAL RECORDS - SCAN - INTERVENTIONAL PULM ON [place] - SURGICAL CULTURE - 4/3/24 (04/04/2024)   EXTERNAL MEDICAL RECORDS - SCAN - INTERVENTIONAL PULM OF [place] - CYTOPATHOLOGY - 3/29/24 (04/04/2024)   Assessment and Plan {CC Problem List  Visit Diagnosis  ROS  Review (Popup)  Health Maintenance  Quality  BestPractice  Medications  SmartSets  SnapShot Encounters  Media : 23}    Diagnoses and all orders for this visit:    1. Wheezing (Primary)  Assessment & Plan:  She did respond to  Spiriva when she had some wheezing earlier this week.  She may continue that whenever she has episodes of wheezing I told her it would work best if she use it routinely if she is having recurrent problems with wheezing.  Also I e-prescribed an albuterol HFA inhaler to use as needed as well.  I will get complete pulmonary functions with and without bronchodilators at [facility] in the next few weeks.    Orders:  -     albuterol sulfate  (90 Base) MCG/ACT inhaler; Inhale 2 puffs Every 4 (Four) Hours As Needed for Wheezing or Shortness of Air.  Dispense: 18 g; Refill: 11  -     Complete PFT - Pre & Post Bronchodilator; Future    2. Restrictive lung disease  Assessment & Plan:  This relates to her previous lung resection.    Orders:  -     Complete PFT - Pre & Post Bronchodilator; Future    3. Lung nodules  Assessment & Plan:  Her recent navigational bronchoscopy was consistent with noncaseating granulomas.  Cytology was negative.  Final AFB culture results are pending.  She is scheduled for a follow-up CAT scan and an appointment Dr. Palacios in June and I will see her back shortly after her appointment with Dr. Palacios.      4. Personal history of malignant neoplasm of bronchus and lung  Assessment & Plan:  Her recent bronchoscopy was negative for evidence of recurrent disease.      5. Personal history of nicotine dependence  Assessment & Plan:  She has not smoked since 1996.            Dion Cortés MD  4/19/2024  12:56 CDT    Follow Up   Return in about 2 months (around 6/19/2024) for To see me specifically.    Patient was given instructions and counseling regarding her condition or for health maintenance advice. Please see specific information pulled into the AVS if appropriate.

## 2024-04-19 NOTE — ASSESSMENT & PLAN NOTE
Her recent navigational bronchoscopy was consistent with noncaseating granulomas.  Cytology was negative.  Final AFB culture results are pending.  She is scheduled for a follow-up CAT scan and an appointment Dr. Palacios in June and I will see her back shortly after her appointment with Dr. Palacios.

## 2024-05-27 NOTE — PROGRESS NOTES
[facility] HEMATOLOGY & ONCOLOGY  [address]  [facility] [address] [phone]    Patient Name: Trinidad Zhu  Encounter Date: 06/12/2024  YOB: 1945  Patient Number: [id number]      REASON FOR FOLLOW-UP: Trinidad Zhu is a pleasant 79 y.o.  female who is seen on follow-up for stage 1A3 adenocarcinoma lung, right upper lobe.  Patient is post resection on 11/5/2021.  She is seen with Yair, spouse. History is obtained from patient.  She is a reliable historian.           Oncology/Hematology History Overview Note     DIAGNOSTIC ABNORMALITIES:Patient found to have lung nodule.   CT chest 07/20/2020. No change in 1.5 cm RIGHT upper lobe pulmonary nodule. However, there has been very slight increase in size compared to multiple prior  studies dating back to 2016. Recommend continued imaging surveillance.    Increased size of clustered nodules in the inferior RIGHT upper  lobe and new cluster of tree-in-bud nodularity in the RIGHT lower lobe. Differential includes an indolent infectious/inflammatory process such as atypical mycobacterial infection (NAE). Recommend continued follow-up  CT chest 09/03/2021. Spiculated nodule in the right upper lobe have slightly increased in size. Recommend PET/CT.  PET 09/10/2021. FDG uptake within the spiculated 2 cm right upper lobe nodule with SUV of 2.85 concerning for malignancy. Mild FDG uptake within the reticulonodular changes of the more inferior right upper lobe and right middle lobe with SUV of 1.1 favoring an inflammatory/infectious process. No evidence of distant metastasis.  CT chest 10/07/2021.  Spiculated pleural-based right upper lobe nodule that appears stable compared to 9/3/2021 examination.  Right lung nodularity with peribronchial thickening and bronchiectasis, stable.  Developing mild reticular nodular opacities posteriorly in the left lower lobe and minimally in  the right lower lobe. Acute inflammatory change suspected. Correlate with patient presentation.  CT chest 11/09/2021. Postoperative change of VATS RIGHT upper lobectomy. There is fluid/soft tissue with intermixed gas in the RIGHT lung apex extending into the RIGHT hilum. A small RIGHT pneumothorax is present with right-sided chest tube in good position.  Pathology report 11/12/2021. Lung, right upper lobectomy (frozen section control):  A.  Moderately differentiated acinar predominant nonmucinous adenocarcinoma with a peripheral lepidic component (2.5 cm).    B.  The bronchial, vascular and parenchymal surgical margins are negative for malignancy.  C.  The tumor extends up to, but does not invade, the visceral pleura.  D.  Pulmonary caseating granulomata.  E.  Emphysematous changes.  F.  Peribronchial lymph nodes (2), with noncaseating granulomata, negative for metastatic carcinoma.  AJCC stage:pT1c, pN0.         PREVIOUS INTERVENTIONS:  She had undergone right upper lobe lobectomy 11/05/2021.     Lung nodules   1/23/2019 Initial Diagnosis    Lung nodules     9/3/2021 Imaging    CT Chest  Spiculated right upper lobe nodule is redemonstrated, measuring 2.1 x 1.3 x 1.2 cm, previously 1.6 x 1.1 x 1.2 cm. Associated peripheral groundglass opacities. Emphysema. Bronchiectasis in the right upper and middle lobe redemonstrated. Right fissure-based nodule measuring 5 mm, previously 3 (image 82, series 3. Additional nodularities along the right minor fissure are similar to decrease. Mm.  No pleural effusion is present. The trachea and bronchial tree are patent.  1. Spiculated nodule in the right upper lobe have slightly increased in size. Recommend PET/CT.     9/10/2021 Procedure    09-  PFTs  Pulmonary Function Test   Pre Drug % Predicted    FVC: 80%   FEV1: 64%   FEF 25-75%: 28%   FEV1/FVC: 61.74%   Interpretation   Spirometry   Spirometry shows moderate obstruction. There is reduced midflow suggesting small  airway/airflow obstruction.   Review of FVL curve   Patient's effort is normal.     9/10/2021 Imaging    PET/CT:  IMPRESSION:  1. FDG uptake within the spiculated 2 cm right upper lobe nodule with  SUV of 2.85 concerning for malignancy.  2. Mild FDG uptake within the reticulonodular changes of the more  inferior right upper lobe and right middle lobe with SUV of 1.1 favoring  an inflammatory/infectious process.  3. No evidence of distant metastasis.     9/21/2021 Biopsy    09-  Mike bronchoscopy RUL  Lung, Right upper lobe; transbronchial biopsy:  Atypical proliferation, highly suspicious for well-differentiated adenocarcinoma  Comments:  Recuts are evaluated.  The atypical proliferation is highlighted with immunohistochemical TIF1 staining (control appropriate).  The appearance could suggest a lepidic, well differentiated, or scar-carcinoma type lesion.    Lymph node, 10R FNA:  lymph node sampling  Lavage:  inflammation/ inflammatory debris and macrophages  Broncheoalveolar lavage, RUL  Rare groups of mildly atypical epithelial cells     10/7/2021 Imaging    CT Chest:  IMPRESSION:  1. Spiculated pleural-based right upper lobe nodule that appears stable  compared to 9/3/2021 examination.  2. Right lung nodularity with peribronchial thickening and  bronchiectasis, stable.  3. Developing mild reticular nodular opacities posteriorly in the left  lower lobe and minimally in the right lower lobe. Acute inflammatory  change suspected. Correlate with patient presentation.  4. Continued follow-up recommended.     Adenocarcinoma   12/14/2021 Initial Diagnosis    Adenocarcinoma (HCC)         PAST MEDICAL HISTORY:  ALLERGIES:  Allergies   Allergen Reactions    Baclofen Other (See Comments)     MUSCULOSKELETAL THERAPY AGENTS knocked her out for a day    Other reaction(s): Unknown    Gabapentin Confusion     Other reaction(s): over sedation    Sulfa Antibiotics Rash     Mouth blisters    Sulfacetamide Sodium Rash     Levofloxacin Other (See Comments)     tendonitis, muscle pain    Amitriptyline Hcl Confusion     Other reaction(s): ambulation difficulties/confusion    Niferex [Iron Polysaccharide] Swelling     LIP SWELLING    Oxycodone-Acetaminophen GI Intolerance     Other reaction(s): vomiting     CURRENT MEDICATIONS:  Outpatient Encounter Medications as of 6/12/2024   Medication Sig Dispense Refill    albuterol sulfate  (90 Base) MCG/ACT inhaler Inhale 2 puffs Every 4 (Four) Hours As Needed for Wheezing.      albuterol sulfate  (90 Base) MCG/ACT inhaler Inhale 2 puffs Every 4 (Four) Hours As Needed for Wheezing or Shortness of Air. 18 g 11    alendronate (FOSAMAX) 70 MG tablet Take 1 tablet by mouth Every 7 (Seven) Days. Saturdays  3    aspirin 81 MG chewable tablet Chew 1 tablet Every Night.      Calcium Carb-Cholecalciferol (CALCIUM 500+D PO) Take 1 tablet by mouth Daily.      carvedilol (COREG) 3.125 MG tablet Take 1 tablet by mouth 2 (Two) Times a Day With Meals. 60 tablet 0    cefdinir (OMNICEF) 300 MG capsule       cetirizine (ZyrTEC) 10 MG tablet Take 1 tablet by mouth At Night As Needed.      clopidogrel (PLAVIX) 75 MG tablet Take 1 tablet by mouth Daily. 30 tablet 11    famotidine (PEPCID) 40 MG tablet Take 1 tablet by mouth 2 (Two) Times a Day.  3    furosemide (LASIX) 20 MG tablet Take 1 tablet by mouth Daily.      levothyroxine (SYNTHROID, LEVOTHROID) 50 MCG tablet Take 1 tablet by mouth Daily.  3    losartan (COZAAR) 50 MG tablet Take 1 tablet by mouth Daily.      multivitamin with minerals tablet tablet Take 1 tablet by mouth Daily.      rosuvastatin (CRESTOR) 40 MG tablet Take 1 tablet by mouth Every Night.      traMADol (ULTRAM) 50 MG tablet Take 1 tablet by mouth Every 3 (Three) Hours As Needed for Moderate Pain or Severe Pain.  0    [DISCONTINUED] Budeson-Glycopyrrol-Formoterol (Breztri Aerosphere) 160-9-4.8 MCG/ACT aerosol inhaler Inhale 2 puffs 2 (Two) Times a Day. (Patient not taking:  Reported on 6/12/2024) 1 each 0    [DISCONTINUED] Ferric Maltol (ACCRUFeR) 30 MG capsule Daily. (Patient not taking: Reported on 6/12/2024)       No facility-administered encounter medications on file as of 6/12/2024.     ADULT ILLNESSES:  Patient Active Problem List   Diagnosis Code    Spinal stenosis, cervical region M48.02    Lung nodules R91.8    Bronchiectasis without complication J47.9    Underweight R63.6    Personal history of nicotine dependence Z87.891    Restrictive lung disease J98.4    Pulmonary emphysema J43.9    PAD (peripheral artery disease) I73.9    Preop testing Z01.818    Gastroesophageal reflux disease K21.9    Allergic rhinitis J30.9    ORTIZ (dyspnea on exertion) R06.09    CAD (coronary artery disease) I25.10    Hyperlipidemia E78.5    Neuropathy G62.9    Hypertension I10    Simple chronic bronchitis J41.0    Former smoker Z87.891    Lung nodule R91.1    Mass of right lung R91.8    Postoperative anemia due to acute blood loss D62    Syncope and collapse R55    Adenocarcinoma C80.1    Personal history of malignant neoplasm of bronchus and lung Z85.118    Hydropneumothorax J94.8    Lower extremity embolism I74.3    Generalized muscle weakness M62.81    Acute blood loss anemia D62    Melena K92.1    Urinary incontinence R32    Gastrointestinal hemorrhage K92.2    Iron deficiency anemia D50.9    Wheezing R06.2     SURGERIES:  Past Surgical History:   Procedure Laterality Date    ANTERIOR CERVICAL DISCECTOMY W/ FUSION N/A 5/22/2017    Procedure: CERVICAL DISCECTOMY ANTERIOR FUSION WITH INSTRUMENTATION;  Surgeon: NADINE Sarabia MD;  Location:  [facility] OR;  Service:     AORTAGRAM Left 11/9/2020    Procedure: left lower extremtiy angiogram, hawk athrectomy, balloon angioplasty, stent placement, mynx closure;  Surgeon: Sukhdev Condon DO;  Location:  [facility] HYBR[facility] OR 12;  Service: Vascular;  Laterality: Left;    AORTAGRAM Left 3/26/2021    Procedure: LEFT LOWER EXTREMITY ANGIOGRAM, BALLOON  ANGIOPLASTY, STENT PLACEMENT, MYNX CLOSURE;  Surgeon: Sukhdev Condon DO;  Location:  [facility];  Service: Vascular;  Laterality: Left;    AORTAGRAM Left 8/6/2021    Procedure: LEFT LOWER EXTREMITY ANGIOGRAM, HAWK ATHERECTOMY, BALLOON ANGIOPLASTY, MYNX CLOSURE;  Surgeon: Sukhdev Condon DO;  Location:  [facility] 12;  Service: Vascular;  Laterality: Left;    AORTAGRAM Right 6/8/2022    Procedure: RIGHT LOWER EXTREMITY ANGIOGRAM, INTRAVASCULAR LITHOTRIPSY, HAWK ATHRECTOMY, BALLOON ANGIOPLASTY, MYNX CLOSURE;  Surgeon: Sukhdev Condon DO;  Location:  [facility] 12;  Service: Vascular;  Laterality: Right;    AORTAGRAM Left 10/21/2022    Procedure: LEFT LOWER EXTREMITY ANGIOGRAM WITH HAWK ATHRECTOMY, BALLOON ANGIOPLASTY, STENT PLACEMENT, MYNX CLOSURE;  Surgeon: Sukhdev Condon DO;  Location:  [facility] 12;  Service: Vascular;  Laterality: Left;    BLADDER SUSPENSION      also had pelvic reconstructive surgery    BREAST EXCISIONAL BIOPSY Right 1985    CATARACT EXTRACTION, BILATERAL      CERVICAL CORPECTOMY N/A 5/22/2017    Procedure:  ANTERIOR CERVICAL DISCECTOMY FUSION C-6 to T1,  ANTERIOR FUSION WITH INSTRUMENTATION C-5 T-1;  Surgeon: NADINE Sarabia MD;  Location: [facility] OR;  Service:     COLONOSCOPY  08/19/2015    TIC BX RT COLON    COLONOSCOPY  12/04/2013    RT COLON BX RECALL 5YR    COLONOSCOPY N/A 11/29/2016    The entire examined colon is normal; No specimens collected    COLONOSCOPY N/A 6/2/2022    Procedure: COLONOSCOPY WITH ANESTHESIA;  Surgeon: Marco Antonio Vallejo MD;  Location: [facility] ENDOSCOPY;  Service: Gastroenterology;  Laterality: N/A;  Pre: screen  Post: diverticulosis  Andrew Layne MD    COLONOSCOPY N/A 4/5/2023    Procedure: COLONOSCOPY WITH ANESTHESIA;  Surgeon: Mike Kearns MD;  Location: [facility] ENDOSCOPY;  Service: Gastroenterology;  Laterality: N/A;  preop: GI bleed;melena  post op: diverticulosis, avm  PCP: Andrew Layne    ENDOSCOPY  12/03/2015     HEALED ULCER SLANT BX    ENDOSCOPY N/A 4/1/2023    Procedure: ESOPHAGOGASTRODUODENOSCOPY WITH ANESTHESIA;  Surgeon: Patric Reyes DO;  Location:  [facility] OR;  Service: Gastroenterology;  Laterality: N/A;  PRE GI BLEED  POST gastric ulcers  DR LINN TY    FEMORAL ENDARTERECTOMY Left 10/21/2022    Procedure: LEFT LOWER EXTREMITY ANGIOGRAM;  Surgeon: Sukhdev Condon DO;  Location:  [facility] 12;  Service: Vascular;  Laterality: Left;    HYSTERECTOMY      LEG THROMBECTOMY/EMBOLECTOMY Left 6/8/2022    Procedure: LOWER EXTREMITY THROMBECTOMY/EMBOLECTOMY, PATCH GRAFT TO  FEMORAL ARTERY;  Surgeon: Sukhdev Condon DO;  Location:  [facility] 12;  Service: Vascular;  Laterality: Left;    LOBECTOMY Right 11/5/2021    Procedure: RIGHT THORACOSCOPY WITH CNG-OneI ROBOT, RIGHT UPPER LOBE LOBECTOMY;  Surgeon: Jarad Ignacio MD;  Location:  [facility] OR;  Service: Robotics - DaVinci;  Laterality: Right;    LUNG SURGERY      OTHER SURGICAL HISTORY      KNEE CARTILAGE REMOVED    OTHER SURGICAL HISTORY      BENIGN FIBROID TUMOR OF BREAST 1985 REMOVED    TONSILLECTOMY       HEALTH MAINTENANCE ITEMS:  Health Maintenance Due   Topic Date Due    TDAP/TD VACCINES (1 - Tdap) Never done    ZOSTER VACCINE (1 of 2) Never done    DXA SCAN  06/17/2023    COVID-19 Vaccine (7 - 2023-24 season) 02/29/2024    ANNUAL WELLNESS VISIT  06/13/2024       <no information>  Last Completed Colonoscopy            COLORECTAL CANCER SCREENING (COLONOSCOPY - Every 7 Years) Next due on 4/5/2030 04/05/2023  Surgical Procedure: COLONOSCOPY    04/05/2023  SCANNED - COLONOSCOPY    04/01/2023  Fecal Occult Blood component of Occult Blood X 1, Stool - Stool, Per Rectum    06/02/2022  COLONOSCOPY    06/02/2022  Surgical Procedure: COLONOSCOPY    Only the first 5 history entries have been loaded, but more history exists.                  Immunization History   Administered Date(s) Administered    Arexvy (RSV, Adults 60+ yrs) 10/19/2023     "COVID-19 (MODERNA) 1st,2nd,3rd Dose Monovalent 2021, 2021, 10/26/2021    COVID-19 (MODERNA) BIVALENT 12+YRS 10/03/2022    COVID-19 (MODERNA) Monovalent Original Booster 2022    COVID-19 F23 (MODERNA) 12YRS+ (SPIKEVAX) 10/31/2023    Flu Vaccine Split Quad 2020    Fluzone High Dose =>65 Years ([employer] ONLY) 10/10/2019, 10/01/2021, 10/03/2022    Fluzone High-Dose 65+yrs 10/31/2023    Fluzone Quad >6mos (Multi-dose) 10/01/2018    INFLUENZA SPLIT TRI 10/01/2019    Influenza Quad Vaccine (Inpatient) 10/13/2010    Pneumococcal Conjugate 13-Valent (PCV13) 2016    Pneumococcal Polysaccharide (PPSV23) 2015, 2016, 10/01/2019     Last Completed Mammogram       This patient has no relevant Health Maintenance data.              FAMILY HISTORY:  Family History   Problem Relation Age of Onset    Breast cancer Maternal Aunt     Heart disease Mother     Heart disease Father     GI problems Neg Hx         MALIGNANCIES    Colon cancer Neg Hx     Colon polyps Neg Hx      SOCIAL HISTORY:  Social History     Socioeconomic History    Marital status:    Tobacco Use    Smoking status: Former     Current packs/day: 0.00     Average packs/day: 1.5 packs/day for 20.0 years (30.0 ttl pk-yrs)     Types: Cigarettes     Start date:      Quit date:      Years since quittin.4     Passive exposure: Past    Smokeless tobacco: Never   Vaping Use    Vaping status: Never Used   Substance and Sexual Activity    Alcohol use: Yes     Comment: occasionally    Drug use: No    Sexual activity: Not Currently       REVIEW OF SYSTEMS:    Review of Systems   Constitutional:  Positive for fatigue. Negative for fever and unexpected weight change.        \"I am tired. I am coughing.\"   HENT:  Positive for congestion. Negative for dental problem and trouble swallowing.    Eyes:  Negative for discharge and redness.   Respiratory:  Positive for cough and shortness of breath.         \"I have bronchitis.\" " "  Cardiovascular:  Negative for chest pain and leg swelling.   Gastrointestinal:  Negative for nausea and vomiting.   Endocrine: Negative for cold intolerance and heat intolerance.   Genitourinary:  Negative for dysuria and hematuria.   Musculoskeletal:  Negative for gait problem and joint swelling.   Skin:  Negative for pallor.   Allergic/Immunologic: Negative for food allergies.   Neurological:  Negative for dizziness, speech difficulty and weakness.   Hematological:  Negative for adenopathy. Does not bruise/bleed easily.   Psychiatric/Behavioral:  Negative for agitation, confusion and hallucinations.          VITAL SIGNS: /70   Pulse 70   Temp 97.8 °F (36.6 °C)   Resp 18   Ht 161.3 cm (63.5\")   Wt 49.6 kg (109 lb 6.4 oz)   SpO2 96%   Breastfeeding No   BMI 19.08 kg/m²   Pain Score    06/12/24 1047   PainSc: 0-No pain  Comment: headhache for over a week       PHYSICAL EXAMINATION:     Physical Exam  Vitals reviewed.   Constitutional:       General: She is not in acute distress.  HENT:      Head: Normocephalic and atraumatic.   Eyes:      General: No scleral icterus.  Cardiovascular:      Rate and Rhythm: Normal rate.   Pulmonary:      Breath sounds: Wheezing present.      Comments: \"3 more days of antibiotic.\"  Abdominal:      General: Bowel sounds are normal. There is no distension.      Palpations: Abdomen is soft.   Musculoskeletal:         General: No deformity.      Cervical back: Neck supple.   Skin:     Coloration: Skin is not pale.   Neurological:      Mental Status: She is alert and oriented to person, place, and time.   Psychiatric:         Mood and Affect: Mood normal.         Behavior: Behavior normal.         Thought Content: Thought content normal.         Judgment: Judgment normal.         LABS    Lab Results - Last 18 Months   Lab Units 06/06/24  1040 03/05/24  1038 12/04/23  1056 08/15/23  1516 06/05/23  0945 04/06/23  0541 04/01/23  1245 04/01/23  0448 03/31/23  1730   HEMOGLOBIN " g/dL 13.1 11.8* 11.5* 11.2* 12.1 7.7*   < > 7.9* 5.9*   HEMATOCRIT % 39.0 36.0 36.6 36.0 39.8 24.3*   < > 24.6* 18.7*   MCV fL 98.0* 99.4* 93.6 95.7 101.8* 93.1   < > 94.3 94.9   WBC 10*3/mm3 4.91 5.85 10.76 5.50 7.55 4.87   < > 10.04 13.74*   RDW % 12.2* 14.6 13.5 13.2 14.2 16.9*   < > 13.1 13.4   MPV fL 10.2 10.1 9.5 9.9 10.6 9.0   < > 9.3 9.4   PLATELETS 10*3/mm3 134* 158 258 160 241 171   < > 246 370   IMM GRAN % % 0.0 0.2 0.4 0.2 0.3  --   --  0.5 0.5   NEUTROS ABS 10*3/mm3 2.42 3.23 7.47* 2.95 4.32  --   --  5.87 11.12*   LYMPHS ABS 10*3/mm3 1.60 1.70 1.58 1.69 2.00  --   --  2.52 1.60   MONOS ABS 10*3/mm3 0.52 0.68 1.31* 0.60 0.87  --   --  1.49* 0.94*   EOS ABS 10*3/mm3 0.34 0.19 0.30 0.22 0.30  --   --  0.10 0.01   BASOS ABS 10*3/mm3 0.03 0.04 0.06 0.03 0.04  --   --  0.01 0.00   IMMATURE GRANS (ABS) 10*3/mm3 0.00 0.01 0.04 0.01 0.02  --   --  0.05 0.07*   NRBC /100 WBC  --  0.0 0.0 0.0 0.0  --   --  0.0 0.1    < > = values in this interval not displayed.       Lab Results - Last 18 Months   Lab Units 06/06/24  1112 06/06/24  1040 03/05/24  1105 03/05/24  1038 12/04/23  1056 12/04/23  1048 08/15/23  1516 06/05/23  0945 04/02/23  0543 04/01/23  0448 03/31/23  1730   GLUCOSE mg/dL  --  100*  --  87 108*  --  117* 86 91   < > 127*   SODIUM mmol/L  --  139  --  141 138  --  144 142 137   < > 134*   POTASSIUM mmol/L  --  3.9  --  4.2 4.3  --  3.6 3.9 3.8   < > 4.4   CO2 mmol/L  --  27.0  --  29.0 28.0  --  27.0 30.0* 27.0   < > 25.0   CHLORIDE mmol/L  --  103  --  104 95*  --  105 101 103   < > 96*   ANION GAP mmol/L  --  9.0  --  8.0 15.0  --  12.0 11.0 7.0   < > 13.0   CREATININE mg/dL 0.70 0.62 0.70 0.63 1.24* 1.40* 0.66 0.80  0.72 0.84   < > 0.75   BUN mg/dL  --  16  --  16 28*  --  7* 15 31*   < > 37*   BUN / CREAT RATIO   --  25.8*  --  25.4* 22.6  --  10.6 20.8 36.9*   < > 49.3*   CALCIUM mg/dL  --  9.4  --  9.6 10.0  --  9.5 9.8 8.5*   < > 9.6   ALK PHOS U/L  --  72  --  57 74  --   --  57  --   --  41  "  TOTAL PROTEIN g/dL  --  6.9  --  6.6 7.8  --   --  6.9  --   --  6.3   ALT (SGPT) U/L  --  31  --  29 25  --   --  16  --   --  19   AST (SGOT) U/L  --  41*  --  39* 51*  --   --  32  --   --  22   BILIRUBIN mg/dL  --  0.6  --  0.5 0.5  --   --  0.3  --   --  0.3   ALBUMIN g/dL  --  4.4  --  4.1 4.4  --   --  4.4  --   --  4.3   GLOBULIN gm/dL  --  2.5  --  2.5 3.4  --   --  2.5  --   --  2.0    < > = values in this interval not displayed.       No results for input(s): \"MSPIKE\", \"KAPPALAMB\", \"IGLFLC\", \"URICACID\", \"FREEKAPPAL\", \"CEA\", \"LDH\", \"REFLABREPO\" in the last 82267 hours.    Lab Results - Last 18 Months   Lab Units 03/05/24  1038 12/04/23  1056 04/02/23  0543 03/31/23  1730   IRON mcg/dL 111 32* 30*  --    TIBC mcg/dL 338 416 426  --    IRON SATURATION (TSAT) % 33 8* 7*  --    FERRITIN ng/mL 250.50* 163.00*  --   --    TSH uIU/mL  --   --   --  3.090       Trinidad Rochaley reports a pain score of 0.       ASSESSMENT:  1.  Adenocarcinoma of the lung, right upper lobe.Tumor size 2 cm. PDL1 15%, Negative ALK, ROS1, and EGFR. BRAF G644V positive.  AJCC stage:1A3(pTic, pN0, cM0, G2)  Treatment status: She had undergone right upper lobe lobectomy.  2.  Performance status of 1.  3.  30 pack years smoking history, etiology of her lung cancer. She had quit smoking. \"27 years.\"   4.  Normocytic anemia from iron deficiency due to gastrointestinal hemorrhage.  Diverticulosis in the sigmoid colon on 06/02/202.  Ferrous sulfate 12/7/2022 through 6/12/2023. 1 unit packed RBC 4/1/2023 and 4/4/2023.  Ferric gluconate 125 mg on 4/4/2023. \"It's constipating.\"  Ferric carboxymaltose on 12/21/2023.              PLAN:  1.   Re: Note from Dr. Cortés on 3/20/2024.  Patient seen for new or enlarging nodules right upper lobe.  Referred to Dr. Rodas for navigational bronchoscopy.  Obtain PET.  Dr. Rodas has contacted Dr. Cortés on 4/2/2024.  Biopsies were negative for malignancy.  She has history of positive cultures for " Mycobacterium AVM complex.    2.   Re: Note from Dr. Cortés on 4/19/2024.  Navigational bronchoscopy consistent with noncaseating granulomas.  Cytology was negative.  3.   Re: Heme status.  WBC 4.9, hemoglobin 13.1 and platelet 134.     4.   Re: CMP.  GFR 91.3 mL per minute and AST 41 from 39 from 51, she is on Crestor.   5.   Re:  CT chest report  6/6/2024. Multiple solid nodules in the right lower lobe persist but have decreased in size may be due to treatment response or were possibly infectious in etiology.  Chronic change with emphysema and mild bronchiectasis.  6.   Re: Stable for observation, lung carcinoma.  7.  Continue care per primary care physician and the other specialists.  8.  Plan of care discussed with patient and her spouse, Don.  Understanding expressed.  Patient agreeable to proceed.  9.  Advance Care Planning  ACP discussion was held with the patient during this visit. Patient has an advance directive in EMR which is still valid.   10. Return to office in 3 months with preoffice CBC with differential, CMP, and CT of the chest to follow lung nodules. To see Dr. Cortés on 6/19/2024.          I have reviewed the assessment and plan and verified the accuracy of it. No changes to assessment and plan since the information was documented. Cyrus Palacios MD 06/12/24           I spent 31 total minutes, face-to-face, caring for Trinidad brizuela. Greater than 50% of this time involved counseling and/or coordination of care as documented within this note.             (Dion Cortés MD)  (Jarad Ignacio MD)  (Kimberley oRdas MD)  Jose Layne MD

## 2024-06-05 ENCOUNTER — TELEPHONE (OUTPATIENT)
Dept: ONCOLOGY | Facility: CLINIC | Age: 79
End: 2024-06-05
Payer: MEDICARE

## 2024-06-05 NOTE — TELEPHONE ENCOUNTER
Caller: AudraTrinidad RONNIE    Relationship: Self    Best call back number: [phone]    What is the best time to reach you: ANY, LEAVE MESSAGE     Who are you requesting to speak with (clinical staff, provider,  specific staff member): CLINICAL     What was the call regarding: TRINIDAD IS CALLING STATES SHE HAS A CT TOMORROW AND SHE HAS BRONCHITIS, SHE IS ON ANTIBIOTICS AND MUSSINEX AND SHE IS FEELING BETTER  TRINIDAD IS WANTING TO KNOW IF IT IS OK FOR HER TO DO THE CT      PLEASE ADVISE

## 2024-06-06 ENCOUNTER — HOSPITAL ENCOUNTER (OUTPATIENT)
Dept: CT IMAGING | Facility: HOSPITAL | Age: 79
Discharge: HOME | End: 2024-06-06
Payer: MEDICARE

## 2024-06-06 ENCOUNTER — LAB (OUTPATIENT)
Dept: LAB | Facility: HOSPITAL | Age: 79
End: 2024-06-06
Payer: MEDICARE

## 2024-06-06 DIAGNOSIS — C34.11 PRIMARY CANCER OF RIGHT UPPER LOBE OF LUNG: ICD-10-CM

## 2024-06-06 LAB
ALBUMIN SERPL-MCNC: 4.4 G/DL (ref 3.5–5.2)
ALBUMIN/GLOB SERPL: 1.8 G/DL
ALP SERPL-CCNC: 72 U/L (ref 39–117)
ALT SERPL W P-5'-P-CCNC: 31 U/L (ref 1–33)
ANION GAP SERPL CALCULATED.3IONS-SCNC: 9 MMOL/L (ref 5–15)
AST SERPL-CCNC: 41 U/L (ref 1–32)
BASOPHILS # BLD AUTO: 0.03 10*3/MM3 (ref 0–0.2)
BASOPHILS NFR BLD AUTO: 0.6 % (ref 0–1.5)
BILIRUB SERPL-MCNC: 0.6 MG/DL (ref 0–1.2)
BUN SERPL-MCNC: 16 MG/DL (ref 8–23)
BUN/CREAT SERPL: 25.8 (ref 7–25)
CALCIUM SPEC-SCNC: 9.4 MG/DL (ref 8.6–10.5)
CHLORIDE SERPL-SCNC: 103 MMOL/L (ref 98–107)
CO2 SERPL-SCNC: 27 MMOL/L (ref 22–29)
CREAT BLDA-MCNC: 0.7 MG/DL (ref 0.6–1.3)
CREAT SERPL-MCNC: 0.62 MG/DL (ref 0.57–1)
DEPRECATED RDW RBC AUTO: 44 FL (ref 37–54)
EGFRCR SERPLBLD CKD-EPI 2021: 91.3 ML/MIN/1.73
EOSINOPHIL # BLD AUTO: 0.34 10*3/MM3 (ref 0–0.4)
EOSINOPHIL NFR BLD AUTO: 6.9 % (ref 0.3–6.2)
ERYTHROCYTE [DISTWIDTH] IN BLOOD BY AUTOMATED COUNT: 12.2 % (ref 12.3–15.4)
GLOBULIN UR ELPH-MCNC: 2.5 GM/DL
GLUCOSE SERPL-MCNC: 100 MG/DL (ref 65–99)
HCT VFR BLD AUTO: 39 % (ref 34–46.6)
HGB BLD-MCNC: 13.1 G/DL (ref 12–15.9)
IMM GRANULOCYTES # BLD AUTO: 0 10*3/MM3 (ref 0–0.05)
IMM GRANULOCYTES NFR BLD AUTO: 0 % (ref 0–0.5)
LYMPHOCYTES # BLD AUTO: 1.6 10*3/MM3 (ref 0.7–3.1)
LYMPHOCYTES NFR BLD AUTO: 32.6 % (ref 19.6–45.3)
MCH RBC QN AUTO: 32.9 PG (ref 26.6–33)
MCHC RBC AUTO-ENTMCNC: 33.6 G/DL (ref 31.5–35.7)
MCV RBC AUTO: 98 FL (ref 79–97)
MONOCYTES # BLD AUTO: 0.52 10*3/MM3 (ref 0.1–0.9)
MONOCYTES NFR BLD AUTO: 10.6 % (ref 5–12)
NEUTROPHILS NFR BLD AUTO: 2.42 10*3/MM3 (ref 1.7–7)
NEUTROPHILS NFR BLD AUTO: 49.3 % (ref 42.7–76)
PLATELET # BLD AUTO: 134 10*3/MM3 (ref 140–450)
PMV BLD AUTO: 10.2 FL (ref 6–12)
POTASSIUM SERPL-SCNC: 3.9 MMOL/L (ref 3.5–5.2)
PROT SERPL-MCNC: 6.9 G/DL (ref 6–8.5)
RBC # BLD AUTO: 3.98 10*6/MM3 (ref 3.77–5.28)
SODIUM SERPL-SCNC: 139 MMOL/L (ref 136–145)
WBC NRBC COR # BLD AUTO: 4.91 10*3/MM3 (ref 3.4–10.8)

## 2024-06-06 PROCEDURE — 25510000001 IOPAMIDOL 61 % SOLUTION: Performed by: INTERNAL MEDICINE

## 2024-06-06 PROCEDURE — 85025 COMPLETE CBC W/AUTO DIFF WBC: CPT

## 2024-06-06 PROCEDURE — 71260 CT THORAX DX C+: CPT

## 2024-06-06 PROCEDURE — 36415 COLL VENOUS BLD VENIPUNCTURE: CPT

## 2024-06-06 PROCEDURE — 80053 COMPREHEN METABOLIC PANEL: CPT

## 2024-06-06 PROCEDURE — 82565 ASSAY OF CREATININE: CPT

## 2024-06-06 RX ADMIN — IOPAMIDOL 100 ML: 612 INJECTION, SOLUTION INTRAVENOUS at 11:27

## 2024-06-07 ENCOUNTER — TELEPHONE (OUTPATIENT)
Dept: PULMONOLOGY | Facility: CLINIC | Age: 79
End: 2024-06-07
Payer: MEDICARE

## 2024-06-07 NOTE — TELEPHONE ENCOUNTER
Message relayed to patient and she voiced understanding. She states she is going to call and reschedule her PFT for later next month if they can get her in and she will also call our office back to move her office visit until after her PFT. She will call back sooner if she has any other questions or concerns.

## 2024-06-07 NOTE — TELEPHONE ENCOUNTER
Per patient she is currently on antibiotics for her cough. She states she is coughing quite a bit and isn't sure if she will be able to do the test.  She is going to see how she feels on Monday and she may have to cancel her PFT on Tuesday at the hospital.  Advised patient that Dr. Cortés is not in the office at this time but will call her back to let her know if she should reschedule her office visit  until after the PFT?

## 2024-06-07 NOTE — TELEPHONE ENCOUNTER
Hub staff attempted to follow warm transfer process and was unsuccessful     Caller: Trinidad Zhu    Relationship to patient: Self    Best call back number: [phone]     Patient is needing: SUPPOSED TO HAVE BREATHING TEST AT HOSPITAL ON TUESDAY. SHE REPORTS THAT SHE HAS BEEN COUGHING AND IS ON MEDICATION FOR IT. SHE IS WANTING TO KNOIW IF DR. ELLISON WANTS HER TO PROCEED WITH TEST OR WAIT UNTIL SHE IS FEELING BETTER. PLEASE CALL BACK TO ADVISE.

## 2024-06-07 NOTE — TELEPHONE ENCOUNTER
Please call the patient let her know I would recommend we just cancel her pulmonary functions for now and I usually would wait at least several weeks before rescheduling them.  She can also cancel her follow-up appointment with me as scheduled on the 19th.  If she is in agreement, I will put orders in early next week for PFTs to be performed sometime in July and we can see her in the office shortly thereafter.  Again I would recommend we just cancel those PFTs scheduled for next week if she is having acute bronchitic symptoms.

## 2024-06-12 ENCOUNTER — OFFICE VISIT (OUTPATIENT)
Dept: ONCOLOGY | Facility: CLINIC | Age: 79
End: 2024-06-12
Payer: MEDICARE

## 2024-06-12 VITALS
OXYGEN SATURATION: 96 % | DIASTOLIC BLOOD PRESSURE: 70 MMHG | BODY MASS INDEX: 18.68 KG/M2 | HEART RATE: 70 BPM | WEIGHT: 109.4 LBS | SYSTOLIC BLOOD PRESSURE: 138 MMHG | HEIGHT: 64 IN | RESPIRATION RATE: 18 BRPM | TEMPERATURE: 97.8 F

## 2024-06-12 DIAGNOSIS — C34.11 PRIMARY CANCER OF RIGHT UPPER LOBE OF LUNG: Primary | ICD-10-CM

## 2024-06-12 RX ORDER — CEFDINIR 300 MG/1
CAPSULE ORAL
COMMUNITY
Start: 2024-06-04

## 2024-07-10 ENCOUNTER — OFFICE VISIT (OUTPATIENT)
Dept: CARDIOLOGY | Facility: CLINIC | Age: 79
End: 2024-07-10
Payer: MEDICARE

## 2024-07-10 VITALS
HEIGHT: 64 IN | DIASTOLIC BLOOD PRESSURE: 62 MMHG | HEART RATE: 60 BPM | BODY MASS INDEX: 19.29 KG/M2 | WEIGHT: 113 LBS | SYSTOLIC BLOOD PRESSURE: 130 MMHG

## 2024-07-10 DIAGNOSIS — R06.09 DOE (DYSPNEA ON EXERTION): ICD-10-CM

## 2024-07-10 DIAGNOSIS — Z87.891 FORMER SMOKER: ICD-10-CM

## 2024-07-10 DIAGNOSIS — I25.119 CORONARY ARTERY DISEASE INVOLVING NATIVE CORONARY ARTERY OF NATIVE HEART WITH ANGINA PECTORIS: Primary | ICD-10-CM

## 2024-07-10 DIAGNOSIS — E78.2 MIXED HYPERLIPIDEMIA: ICD-10-CM

## 2024-07-10 DIAGNOSIS — I73.9 PAD (PERIPHERAL ARTERY DISEASE): ICD-10-CM

## 2024-07-10 DIAGNOSIS — I10 PRIMARY HYPERTENSION: ICD-10-CM

## 2024-07-15 NOTE — PROGRESS NOTES
[facility] - CARDIOLOGY  New Patient Initial Outpatient Eva[facility]    Primary Care Physician: Andrew Layne MD    Subjective     Chief Complaint   Patient presents with    Coronary Artery Disease        History of Present Illness  History of Present Illness  The patient presents for evaluation of multiple medical concerns.    The patient, previously under the care of Dr. yHlton, last evaluated in 05/2022. Approximately 10 to 15 years ago, she underwent a heart catheterization due to throat discomfort, which revealed a minor blockage, albeit not severe enough to necessitate intervention. Consequently, she was initiated on cholesterol medication. Dr. Hylton has conducted intermittent tests over the years. The patient's primary concern is the presence of peripheral artery disease in her legs, which necessitated an artery replacement in her right leg. Concurrently, she has stents placed in her left leg. Her current medication regimen includes aspirin, Plavix, and rosuvastatin. Approximately 6 months ago, she experienced elevated blood pressure, which was managed with medication. However, it took approximately 4 months to stabilize her blood pressure. She experiences shortness of breath, for which she uses an inhaler. The severity of her shortness of breath varies depending on the weather. She denies experiencing dizziness, lightheadedness, or syncope. Her current medications include carvedilol, Lasix once daily, losartan 50 mg once daily, and Crestor 40 mg. She does not require any medication refills at this time.    Supplemental Information  She had cancer on the top part of the right lung.    Review of Systems   Constitutional: Negative for diaphoresis, fever and malaise/fatigue.   HENT:  Negative for congestion.    Eyes:  Negative for vision loss in left eye and vision loss in right eye.   Cardiovascular:  Negative for chest pain, claudication, dyspnea on exertion, irregular heartbeat, leg swelling, orthopnea,  palpitations and syncope.   Respiratory:  Positive for shortness of breath. Negative for cough and wheezing.    Hematologic/Lymphatic: Negative for adenopathy.   Skin:  Negative for rash.   Musculoskeletal:  Negative for joint pain and joint swelling.   Gastrointestinal:  Negative for abdominal pain, diarrhea, nausea and vomiting.   Neurological:  Negative for excessive daytime sleepiness, dizziness, focal weakness, light-headedness, numbness and weakness.   Psychiatric/Behavioral:  Negative for depression. The patient does not have insomnia.         Otherwise complete ROS reviewed and negative except as mentioned in the HPI.      Past Medical History:   Past Medical History:   Diagnosis Date    Antral ulcer     Bladder cystocele     C. difficile diarrhea     CAD (coronary artery disease)     Cancer     lung 2021    Colon polyp     COPD (chronic obstructive pulmonary disease)     COVID-19 vaccine series completed     Diarrhea     Difficulty swallowing     Disease of thyroid gland     Diverticulosis     Elevated cholesterol     Gastritis     Generalized OA     GERD (gastroesophageal reflux disease)     History of bladder surgery 01/07/2020    History of transfusion     after lung surgery 2021    Hyperlipidemia     Hypertension     Liver cyst     Lung nodule     Microscopic colitis     Multiple gastric ulcers     last 5 years ago    Neck pain     Neuropathy     Normal body mass index     NSAID induced gastritis     Ulcer of pyloric antrum     UNSPECIFIED ULCER CHRONICITY    UTI (urinary tract infection)     Weight loss        Past Surgical History:  Past Surgical History:   Procedure Laterality Date    ANTERIOR CERVICAL DISCECTOMY W/ FUSION N/A 05/22/2017    Procedure: CERVICAL DISCECTOMY ANTERIOR FUSION WITH INSTRUMENTATION;  Surgeon: NADINE Sarabia MD;  Location: [facility] OR;  Service:     AORTAGRAM Left 11/09/2020    Procedure: left lower extremtiy angiogram, hawk athrectomy, balloon angioplasty, stent placement,  mynx closure;  Surgeon: Sukhdev Condon DO;  Location:  [facility] HYBRID OR 12;  Service: Vascular;  Laterality: Left;    AORTAGRAM Left 03/26/2021    Procedure: LEFT LOWER EXTREMITY ANGIOGRAM, BALLOON ANGIOPLASTY, STENT PLACEMENT, MYNX CLOSURE;  Surgeon: Sukhdev Condon DO;  Location:  [facility]BR[facility] OR 12;  Service: Vascular;  Laterality: Left;    AORTAGRAM Left 08/06/2021    Procedure: LEFT LOWER EXTREMITY ANGIOGRAM, HAWK ATHERECTOMY, BALLOON ANGIOPLASTY, MYNX CLOSURE;  Surgeon: Sukhdev Condon DO;  Location:  [facility]BRID OR 12;  Service: Vascular;  Laterality: Left;    AORTAGRAM Right 06/08/2022    Procedure: RIGHT LOWER EXTREMITY ANGIOGRAM, INTRAVASCULAR LITHOTRIPSY, HAWK ATHRECTOMY, BALLOON ANGIOPLASTY, MYNX CLOSURE;  Surgeon: Sukhdev Condon DO;  Location:  [facility]BR[facility] OR 12;  Service: Vascular;  Laterality: Right;    AORTAGRAM Left 10/21/2022    Procedure: LEFT LOWER EXTREMITY ANGIOGRAM WITH HAWK ATHRECTOMY, BALLOON ANGIOPLASTY, STENT PLACEMENT, MYNX CLOSURE;  Surgeon: Sukhdev Condon DO;  Location:  [facility]BR[facility] OR 12;  Service: Vascular;  Laterality: Left;    BLADDER SUSPENSION      also had pelvic reconstructive surgery    BREAST EXCISIONAL BIOPSY Right 1985    CARDIAC CATHETERIZATION      CATARACT EXTRACTION, BILATERAL      CERVICAL CORPECTOMY N/A 05/22/2017    Procedure:  ANTERIOR CERVICAL DISCECTOMY FUSION C-6 to T1,  ANTERIOR FUSION WITH INSTRUMENTATION C-5 T-1;  Surgeon: NADINE Sarabia MD;  Location: [facility] OR;  Service:     COLONOSCOPY  08/19/2015    TIC BX RT COLON    COLONOSCOPY  12/04/2013    RT COLON BX RECALL 5YR    COLONOSCOPY N/A 11/29/2016    The entire examined colon is normal; No specimens collected    COLONOSCOPY N/A 06/02/2022    Procedure: COLONOSCOPY WITH ANESTHESIA;  Surgeon: Marco Antonio Vallejo MD;  Location: [facility] ENDOSCOPY;  Service: Gastroenterology;  Laterality: N/A;  Pre: screen  Post: diverticulosis  Andrew Layne MD    COLONOSCOPY N/A 04/05/2023     Procedure: COLONOSCOPY WITH ANESTHESIA;  Surgeon: Mike Kearns MD;  Location:  [facility] ENDOSCOPY;  Service: Gastroenterology;  Laterality: N/A;  preop: GI bleed;melena  post op: diverticulosis, avm  PCP: Andrew Ty    ENDOSCOPY  12/03/2015    HEALED ULCER SLANT BX    ENDOSCOPY N/A 04/01/2023    Procedure: ESOPHAGOGASTRODUODENOSCOPY WITH ANESTHESIA;  Surgeon: Patric Reyes DO;  Location:  [facility] OR;  Service: Gastroenterology;  Laterality: N/A;  PRE GI BLEED  POST gastric ulcers  DR LINN TY    FEMORAL ENDARTERECTOMY Left 10/21/2022    Procedure: LEFT LOWER EXTREMITY ANGIOGRAM;  Surgeon: Sukhdev Condon DO;  Location: [facility] HYBRID OR 12;  Service: Vascular;  Laterality: Left;    HYSTERECTOMY      LEG THROMBECTOMY/EMBOLECTOMY Left 06/08/2022    Procedure: LOWER EXTREMITY THROMBECTOMY/EMBOLECTOMY, PATCH GRAFT TO  FEMORAL ARTERY;  Surgeon: Sukhdev Condon DO;  Location: [facility] HYBRID OR 12;  Service: Vascular;  Laterality: Left;    LOBECTOMY Right 11/05/2021    Procedure: RIGHT THORACOSCOPY WITH DAVINCI ROBOT, RIGHT UPPER LOBE LOBECTOMY;  Surgeon: Jarad Ignacio MD;  Location: [facility] OR;  Service: Robotics - DaVinci;  Laterality: Right;    LUNG SURGERY      OTHER SURGICAL HISTORY      KNEE CARTILAGE REMOVED    OTHER SURGICAL HISTORY      BENIGN FIBROID TUMOR OF BREAST 1985 REMOVED    TONSILLECTOMY         Family History: family history includes Breast cancer in her maternal aunt; Heart disease in her father and mother.    Social History:  reports that she quit smoking about 28 years ago. Her smoking use included cigarettes. She started smoking about 48 years ago. She has a 30 pack-year smoking history. She has been exposed to tobacco smoke. She has never used smokeless tobacco. She reports current alcohol use. She reports that she does not use drugs.    Medications:  Prior to Admission medications    Medication Sig Start Date End Date Taking? Authorizing Provider   albuterol sulfate  (90 Base)  MCG/ACT inhaler Inhale 2 puffs Every 4 (Four) Hours As Needed for Wheezing.   Yes Mary Lou Rodas MD   albuterol sulfate  (90 Base) MCG/ACT inhaler Inhale 2 puffs Every 4 (Four) Hours As Needed for Wheezing or Shortness of Air. 4/19/24  Yes Dion Cortés MD   alendronate (FOSAMAX) 70 MG tablet Take 1 tablet by mouth Every 7 (Seven) Days. Saturdays 5/21/19  Yes Mary Lou Rodas MD   aspirin 81 MG chewable tablet Chew 1 tablet Every Night.   Yes Mary Lou Rodas MD   Calcium Carb-Cholecalciferol (CALCIUM 500+D PO) Take 1 tablet by mouth Daily.   Yes Mary Lou Rodas MD   carvedilol (COREG) 3.125 MG tablet Take 1 tablet by mouth 2 (Two) Times a Day With Meals. 4/6/23  Yes Chuy Chong MD   cefdinir (OMNICEF) 300 MG capsule  6/4/24  Yes Mary Lou Rodas MD   cetirizine (ZyrTEC) 10 MG tablet Take 1 tablet by mouth At Night As Needed.   Yes Mary Lou Rodas MD   famotidine (PEPCID) 40 MG tablet Take 1 tablet by mouth 2 (Two) Times a Day. 4/15/19  Yes Mary Lou Rodas MD   furosemide (LASIX) 20 MG tablet Take 1 tablet by mouth Daily. 5/30/23  Yes Mary Lou Rodas MD   levothyroxine (SYNTHROID, LEVOTHROID) 50 MCG tablet Take 1 tablet by mouth Daily. 5/8/19  Yes Mary Lou Rodas MD   losartan (COZAAR) 50 MG tablet Take 1 tablet by mouth Daily. 12/19/23  Yes Mary Lou Rodas MD   multivitamin with minerals tablet tablet Take 1 tablet by mouth Daily.   Yes Mary Lou Rodas MD   rosuvastatin (CRESTOR) 40 MG tablet Take 1 tablet by mouth Every Night. 9/2/21  Yes Mary Lou Rodas MD   traMADol (ULTRAM) 50 MG tablet Take 1 tablet by mouth Every 3 (Three) Hours As Needed for Moderate Pain or Severe Pain. 5/28/19  Yes Mary Lou Rodas MD     Allergies:  Allergies   Allergen Reactions    Baclofen Other (See Comments)     MUSCULOSKELETAL THERAPY AGENTS knocked her out for a day    Other reaction(s): Unknown    Gabapentin Confusion     Other  "reaction(s): over sedation    Sulfa Antibiotics Rash     Mouth blisters    Sulfacetamide Sodium Rash    Levofloxacin Other (See Comments)     tendonitis, muscle pain    Amitriptyline Hcl Confusion     Other reaction(s): ambulation difficulties/confusion    Niferex [Iron Polysaccharide] Swelling     LIP SWELLING    Oxycodone-Acetaminophen GI Intolerance     Other reaction(s): vomiting       Objective     Vital Signs: /62   Pulse 60   Ht 161.3 cm (63.5\")   Wt 51.3 kg (113 lb)   BMI 19.70 kg/m²     Vitals and nursing note reviewed.   Constitutional:       Appearance: Normal and healthy appearance. Well-developed and not in distress.   Eyes:      Extraocular Movements: Extraocular movements intact.      Pupils: Pupils are equal, round, and reactive to light.   HENT:      Head: Normocephalic and atraumatic.    Mouth/Throat:      Pharynx: Oropharynx is clear.   Neck:      Vascular: JVD normal.      Trachea: Trachea normal.   Pulmonary:      Effort: Pulmonary effort is normal.      Breath sounds: Normal breath sounds. No wheezing. No rhonchi. No rales.   Cardiovascular:      PMI at left midclavicular line. Normal rate. Regular rhythm. Normal S1. Normal S2.       Murmurs: There is a grade 2/6 systolic murmur.      No gallop.  No click. No rub.   Pulses:     Dorsalis pedis: 2+ bilaterally.     Posterior tibial: 2+ bilaterally.  Abdominal:      General: Bowel sounds are normal.      Palpations: Abdomen is soft.      Tenderness: There is no abdominal tenderness.   Musculoskeletal: Normal range of motion.      Cervical back: Normal range of motion and neck supple. Skin:     General: Skin is warm and dry.      Capillary Refill: Capillary refill takes less than 2 seconds.   Feet:      Right foot:      Skin integrity: Skin integrity normal.      Left foot:      Skin integrity: Skin integrity normal.   Neurological:      Mental Status: Alert and oriented to person, place and time.      Sensory: Sensation is intact.      " Motor: Motor function is intact.      Coordination: Coordination is intact.   Psychiatric:         Speech: Speech normal.         Behavior: Behavior is cooperative.       Physical Exam      Results Reviewed:      ECG 12 Lead    Date/Time: 7/14/2024 8:43 PM  Performed by: Maurisio Royal DO    Authorized by: Maurisio Royal DO  Comparison: compared with previous ECG   Rhythm: sinus tachycardia  Ectopy: unifocal PVCs  Rate: normal  QRS axis: normal  Other findings: non-specific ST-T wave changes    Clinical impression: abnormal EKG          Results  Laboratory Studies  Cholesterol levels are good.    Imaging  Ultrasound of the heart showed normal systolic function, normal heart squeezing and relaxing, and mild pressure on the right side. Valves were normal.    Lab Results   Component Value Date    CHOL 109 03/31/2023    TRIG 97 03/31/2023    HDL 60 03/31/2023    VLDL 18 03/31/2023    LDLHDL 0.49 03/31/2023     Lab Results   Component Value Date    HGBA1C 5.4 07/05/2015       Assessment / Plan        Problem List Items Addressed This Visit       PAD (peripheral artery disease)    Overview     Added automatically from request for surgery [id number]         ORTIZ (dyspnea on exertion)    CAD (coronary artery disease) - Primary    Hyperlipidemia    Hypertension    Former smoker     Assessment & Plan  1. Coronary artery disease.  The patient's blood pressure and heart rate are within normal limits. The EKG results are also normal. The current medication regimen will be maintained.    Follow-up  A follow-up visit is scheduled for 1 year from now.      Transcribed from ambient dictation for Maurisio Royal DO by Maurisio Royal DO.  07/14/24   20:42 CDT    Patient or patient representative verbalized consent for the use of Ambient Listening during the visit with  Maurisio Royal DO for chart documentation. 7/14/2024  20:44 CDT

## 2024-07-18 ENCOUNTER — HOSPITAL ENCOUNTER (OUTPATIENT)
Dept: PULMONOLOGY | Facility: HOSPITAL | Age: 79
Discharge: HOME | End: 2024-07-18
Payer: MEDICARE

## 2024-07-18 DIAGNOSIS — R06.2 WHEEZING: Chronic | ICD-10-CM

## 2024-07-18 DIAGNOSIS — J98.4 RESTRICTIVE LUNG DISEASE: Chronic | ICD-10-CM

## 2024-07-18 PROCEDURE — 94726 PLETHYSMOGRAPHY LUNG VOLUMES: CPT

## 2024-07-18 PROCEDURE — 94729 DIFFUSING CAPACITY: CPT

## 2024-07-18 PROCEDURE — 94060 EVALUATION OF WHEEZING: CPT

## 2024-07-18 RX ORDER — ALBUTEROL SULFATE 2.5 MG/3ML
2.5 SOLUTION RESPIRATORY (INHALATION) ONCE
Status: COMPLETED | OUTPATIENT
Start: 2024-07-18 | End: 2024-07-18

## 2024-07-18 RX ADMIN — ALBUTEROL SULFATE 2.5 MG: 2.5 SOLUTION RESPIRATORY (INHALATION) at 12:09

## 2024-07-25 ENCOUNTER — OFFICE VISIT (OUTPATIENT)
Dept: PULMONOLOGY | Facility: CLINIC | Age: 79
End: 2024-07-25
Payer: MEDICARE

## 2024-07-25 VITALS
HEART RATE: 63 BPM | OXYGEN SATURATION: 98 % | HEIGHT: 64 IN | SYSTOLIC BLOOD PRESSURE: 142 MMHG | BODY MASS INDEX: 19.74 KG/M2 | WEIGHT: 115.6 LBS | DIASTOLIC BLOOD PRESSURE: 80 MMHG

## 2024-07-25 DIAGNOSIS — Z85.118 PERSONAL HISTORY OF MALIGNANT NEOPLASM OF BRONCHUS AND LUNG: Chronic | ICD-10-CM

## 2024-07-25 DIAGNOSIS — R91.8 LUNG NODULES: Primary | Chronic | ICD-10-CM

## 2024-07-25 DIAGNOSIS — Z87.891 PERSONAL HISTORY OF NICOTINE DEPENDENCE: Chronic | ICD-10-CM

## 2024-07-25 DIAGNOSIS — J47.9 BRONCHIECTASIS WITHOUT COMPLICATION: Chronic | ICD-10-CM

## 2024-07-25 PROCEDURE — 3077F SYST BP >= 140 MM HG: CPT | Performed by: INTERNAL MEDICINE

## 2024-07-25 PROCEDURE — 99214 OFFICE O/P EST MOD 30 MIN: CPT | Performed by: INTERNAL MEDICINE

## 2024-07-25 PROCEDURE — 1159F MED LIST DOCD IN RCRD: CPT | Performed by: INTERNAL MEDICINE

## 2024-07-25 PROCEDURE — 1160F RVW MEDS BY RX/DR IN RCRD: CPT | Performed by: INTERNAL MEDICINE

## 2024-07-25 PROCEDURE — 3079F DIAST BP 80-89 MM HG: CPT | Performed by: INTERNAL MEDICINE

## 2024-07-25 NOTE — PROGRESS NOTES
Chief Complaint  Lung nodules    Subjective    History of Present Illness {  Problem List  Visit Diagnosis   Encounters  Notes  Medications  Labs  Result Review Imaging  Media: 23}    Trinidad Zhu presents to [facility] for lung nodules.    History of Present Illness   The patient is accompanied by her  today.  She is doing well from a pulmonary standpoint.  She just uses her inhalers on an as-needed basis.  I did review her recent pulmonary functions with her.  She had evidence of a slightly low FEV1 to FVC ratio prebronchodilator at 68% but it was 70% postbronchodilator.  Her studies show a slight decline in spirometry, total lung capacity, and diffuse capacity when corrected for alveolar volume compared to studies done prior to her lung resection but the changes are fairly minimal and not unexpected based on the fact that she had a lung resection in the interim.  I really do not think she has a classic picture for COPD as at times her if 1 to FVC ratio on her recent studies postbronchodilator was 70% and previous studies have been in the low 70s.  Again I told her that the FEV1 to FVC has been borderline but it is not diagnostic of COPD.  Again should just continue her inhalers as needed as she states her breathing is doing well at this time.  Her most recent CT showed a decrease in the size of her lung nodules and again her previous navigational bronchoscopy just revealed some granulomatous changes and no evidence of malignancy.  She is scheduled for a follow-up CT in August and I will see her back in September.  She has had the COVID-19 vaccine including 2 standard boosters, a bivalent booster, and a Spikevax booster in the form of the Moderna vaccine.  She had the flu shot this past fall and has had a Prevnar 13, Pneumovax, and RSV vaccine in the past as well.  Prior to Admission medications    Medication Sig Start Date End Date  Taking? Authorizing Provider   albuterol sulfate  (90 Base) MCG/ACT inhaler Inhale 2 puffs Every 4 (Four) Hours As Needed for Wheezing.   Yes Mary Lou Rodas MD   albuterol sulfate  (90 Base) MCG/ACT inhaler Inhale 2 puffs Every 4 (Four) Hours As Needed for Wheezing or Shortness of Air. 4/19/24  Yes Dion Cortés MD   alendronate (FOSAMAX) 70 MG tablet Take 1 tablet by mouth Every 7 (Seven) Days. Saturdays 5/21/19  Yes Mary Lou Rodas MD   aspirin 81 MG chewable tablet Chew 1 tablet Every Night.   Yes Mary Lou Rodas MD   Calcium Carb-Cholecalciferol (CALCIUM 500+D PO) Take 1 tablet by mouth Daily.   Yes Mary Lou Rodas MD   carvedilol (COREG) 3.125 MG tablet Take 1 tablet by mouth 2 (Two) Times a Day With Meals. 4/6/23  Yes Chuy Chong MD   cetirizine (ZyrTEC) 10 MG tablet Take 1 tablet by mouth At Night As Needed.   Yes Mary Lou Rodas MD   famotidine (PEPCID) 40 MG tablet Take 1 tablet by mouth 2 (Two) Times a Day. 4/15/19  Yes Mary Lou Rodas MD   furosemide (LASIX) 20 MG tablet Take 1 tablet by mouth Daily. 5/30/23  Yes Mary Lou Rodas MD   levothyroxine (SYNTHROID, LEVOTHROID) 50 MCG tablet Take 1 tablet by mouth Daily. 5/8/19  Yes Mary Lou Rodas MD   losartan (COZAAR) 50 MG tablet Take 1 tablet by mouth Daily. 12/19/23  Yes Mary Lou Rodas MD   multivitamin with minerals tablet tablet Take 1 tablet by mouth Daily.   Yes Mary Lou Rodas MD   rosuvastatin (CRESTOR) 40 MG tablet Take 1 tablet by mouth Every Night. 9/2/21  Yes Mary Lou Rodas MD   traMADol (ULTRAM) 50 MG tablet Take 1 tablet by mouth Every 3 (Three) Hours As Needed for Moderate Pain or Severe Pain. 5/28/19  Yes Mary Lou Rodas MD   cefdinir (OMNICEF) 300 MG capsule  6/4/24   Mary Lou Rodas MD       Social History     Socioeconomic History    Marital status:    Tobacco Use    Smoking status: Former     Current packs/day: 0.00      "Average packs/day: 1.5 packs/day for 20.0 years (30.0 ttl pk-yrs)     Types: Cigarettes     Start date:      Quit date:      Years since quittin.5     Passive exposure: Past    Smokeless tobacco: Never   Vaping Use    Vaping status: Never Used   Substance and Sexual Activity    Alcohol use: Yes     Comment: occasionally    Drug use: No    Sexual activity: Not Currently       Objective   Vital Signs:   /80   Pulse 63   Ht 161.3 cm (63.5\")   Wt 52.4 kg (115 lb 9.6 oz)   SpO2 98% Comment: RA  BMI 20.15 kg/m²     Physical Exam  Vitals and nursing note reviewed.   HENT:      Head: Normocephalic.   Eyes:      Extraocular Movements: Extraocular movements intact.      Pupils: Pupils are equal, round, and reactive to light.   Cardiovascular:      Rate and Rhythm: Normal rate and regular rhythm.   Pulmonary:      Effort: Pulmonary effort is normal.      Breath sounds: Normal breath sounds.   Musculoskeletal:         General: Normal range of motion.   Skin:     General: Skin is warm and dry.   Neurological:      General: No focal deficit present.      Mental Status: She is alert and oriented to person, place, and time.   Psychiatric:         Mood and Affect: Mood normal.         Behavior: Behavior normal.        Result Review :    PFT Values          2/15/2023    15:15   Pre Drug PFT Results   FVC 79   FEV1 78   FEF 25-75% 75   FEV1/FVC 76         Results for orders placed during the hospital encounter of 24    Complete PFT - Pre & Post Bronchodilator    Narrative  [facility] - Pulmonary Function Test    [address] [place] [address]  [id number]  [phone]    Patient : Trinidad Zhu  MRN : [id number]  CSN : [id number]  Pulmonologist : Dion Cortés MD  Date : 2024    ______________________________________________________________________    Interpretation :  1.  Spirometry is consistent with a moderate obstructive ventilatory defect.  2.  There is improvement in midflows " postbronchodilator although they remain decreased.  There is actually some worsening of peak expiratory flow postbronchodilator.  Otherwise there is no significant change in spirometry postbronchodilator.  3.  Lung volumes reveal a low normal total lung capacity with a mild decrease in vital capacity suggestive of a possible borderline restrictive ventilatory defect.  There is also a very mild decrease in inspiratory capacity.  4.  There is a moderate diffusion impairment which when corrected for alveolar volume is a mild diffusion impairment.  5.  When current studies are compared to studies performed on February 15, 2023, there is no significant change in the patient's forced vital capacity compared to previous baseline values.  There has been a decline in the FEV1 compared to previous baseline values.      Dion Cortés MD      Results for orders placed in visit on 02/15/23    Spirometry Without Bronchodilator    Narrative  Spirometry Without Bronchodilator  Performed by: Samantha Cantor, RRT  Authorized by: Dion Cortés MD    Pre Drug % Predicted  FVC: 79%  FEV1: 78%  FEF 25-75%: 75%  FEV1/FVC: 76%    Interpretation  Spirometry  Spirometry shows mild restriction. There is reduced midflow suggesting small airway/airflow obstruction.  Overall comments: The patient's spirometry is most consistent with a mild restrictive ventilatory defect with a coexisting mild decrease in midflows.  When current studies are compared to studies performed on June 22 of last year, she has had slight improvement in both her FVC and FEV1 compared to previous.  Midflows also show some improvement as well.      Results for orders placed in visit on 06/22/22    Full Pulmonary Function Test Without Bronchodilator    Narrative  Full Pulmonary Function Test Without Bronchodilator  Performed by: Samantha Cantor, RRT  Authorized by: Dion Cortés MD    Pre Drug % Predicted  FVC: 77%  FEV1: 73%  FEF  25-75%: 67%  FEV1/FVC: 73%  T%  RV: 262%  DLCO: 54%  D/VAsb: 74%    Interpretation  Spirometry  Spirometry shows mild restriction. There is reduced midflow suggesting small airway/airflow obstruction.  Diffusion Capacity  The patient's diffusion capacity is moderately reduced.  Diffusion capacity is mildly reduced when corrected for alveolar volume.  Overall comments: The patient's spirometry is consistent with a mild restrictive ventilatory defect with a coexisting decrease in midflows.  Accurate lung volumes could not be obtained.  She has a moderate diffusion impairment which when corrected for alveolar volume is a mild diffusion impairment.  When current studies are compared to studies from [facility] from 2021, there has been a decline in her FVC on current baseline spirometry compared to previous pre and postbronchodilator studies but no significant change in her FEV1 compared to previous pre and postbronchodilator studies.  When corrected for alveolar volume, there has been a decline in her diffusion capacity compared to previous.                 My interpretation of imaging:    CT Chest With Contrast Diagnostic (2024 11:26)         Assessment and Plan {CC Problem List  Visit Diagnosis  ROS  Review (Popup)  [facility] Maintenance  Quality  BestPractice  Medications  SmartSets  SnapShot Encounters  Media : 23}    Diagnoses and all orders for this visit:    1. Lung nodules (Primary)  Assessment & Plan:  Her recent navigational bronchoscopy was negative for malignancy with some noncaseating granulomatous changes noted.  Final AFB and fungal culture results were negative.      2. Bronchiectasis without complication  Assessment & Plan:  This has been noted on prior chest CTs.  If she has any associated chest congestion or wheezing she can utilize her albuterol HFA as needed but has not had to use her inhalers on a regular basis recently.      3. Personal history of  malignant neoplasm of bronchus and lung  Assessment & Plan:  Again her recent bronchoscopy was negative for any evidence of recurrent disease.      4. Personal history of nicotine dependence  Assessment & Plan:  She quit smoking in 1996.            Dion Cortés MD  7/25/2024  14:04 CDT    Follow Up   Return in about 2 months (around 10/9/2024) for To see me specifically.    Patient was given instructions and counseling regarding her condition or for health maintenance advice. Please see specific information pulled into the AVS if appropriate.

## 2024-07-25 NOTE — ASSESSMENT & PLAN NOTE
This has been noted on prior chest CTs.  If she has any associated chest congestion or wheezing she can utilize her albuterol HFA as needed but has not had to use her inhalers on a regular basis recently.

## 2024-07-25 NOTE — ASSESSMENT & PLAN NOTE
Her recent navigational bronchoscopy was negative for malignancy with some noncaseating granulomatous changes noted.  Final AFB and fungal culture results were negative.

## 2024-07-25 NOTE — PATIENT INSTRUCTIONS
The patient's pulmonary functions with the patient and her  accompanies her today.  In particular compared to studies performed last week to studies done prior to her lung surgery.  She has a minimal decline in the FVC, FEV1, and total lung capacity when corrected for alveolar volume compared to previous but that would totally be expected in view of her prior lung resection.  She will continue her inhalers as needed.  Her most recent CT showed improvement and she will be getting a follow-up CT in September and I will see her back in October.

## 2024-08-13 ENCOUNTER — TRANSCRIBE ORDERS (OUTPATIENT)
Dept: ADMINISTRATIVE | Facility: HOSPITAL | Age: 79
End: 2024-08-13
Payer: MEDICARE

## 2024-08-13 DIAGNOSIS — M81.0 SENILE OSTEOPOROSIS: Primary | ICD-10-CM

## 2024-08-13 DIAGNOSIS — Z12.31 ENCOUNTER FOR SCREENING MAMMOGRAM FOR MALIGNANT NEOPLASM OF BREAST: Primary | ICD-10-CM

## 2024-09-02 NOTE — PROGRESS NOTES
[facility] GROUP HEMATOLOGY & ONCOLOGY  [address]  [place] [address] [phone]    Patient Name: Trinidad Zhu  Encounter Date: 09/10/2024  YOB: 1945  Patient Number: [id number]      REASON FOR FOLLOW-UP: Trinidad Zhu is a pleasant 79 y.o.  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  She had undergone resection on 11/5/2021.  Patient is seen with Don, spouse. History is obtained from patient.  History is considered reliable.          Oncology/Hematology History Overview Note     DIAGNOSTIC ABNORMALITIES:Patient found to have lung nodule.   CT chest 07/20/2020. No change in 1.5 cm RIGHT upper lobe pulmonary nodule. However, there has been very slight increase in size compared to multiple prior  studies dating back to 2016. Recommend continued imaging surveillance.    Increased size of clustered nodules in the inferior RIGHT upper  lobe and new cluster of tree-in-bud nodularity in the RIGHT lower lobe. Differential includes an indolent infectious/inflammatory process such as atypical mycobacterial infection (NAE). Recommend continued follow-up  CT chest 09/03/2021. Spiculated nodule in the right upper lobe have slightly increased in size. Recommend PET/CT.  PET 09/10/2021. FDG uptake within the spiculated 2 cm right upper lobe nodule with SUV of 2.85 concerning for malignancy. Mild FDG uptake within the reticulonodular changes of the more inferior right upper lobe and right middle lobe with SUV of 1.1 favoring an inflammatory/infectious process. No evidence of distant metastasis.  CT chest 10/07/2021.  Spiculated pleural-based right upper lobe nodule that appears stable compared to 9/3/2021 examination.  Right lung nodularity with peribronchial thickening and bronchiectasis, stable.  Developing mild reticular nodular opacities posteriorly in the left lower  lobe and minimally in the right lower lobe. Acute inflammatory change suspected. Correlate with patient presentation.  CT chest 11/09/2021. Postoperative change of VATS RIGHT upper lobectomy. There is fluid/soft tissue with intermixed gas in the RIGHT lung apex extending into the RIGHT hilum. A small RIGHT pneumothorax is present with right-sided chest tube in good position.  Pathology report 11/12/2021. Lung, right upper lobectomy (frozen section control):  A.  Moderately differentiated acinar predominant nonmucinous adenocarcinoma with a peripheral lepidic component (2.5 cm).    B.  The bronchial, vascular and parenchymal surgical margins are negative for malignancy.  C.  The tumor extends up to, but does not invade, the visceral pleura.  D.  Pulmonary caseating granulomata.  E.  Emphysematous changes.  F.  Peribronchial lymph nodes (2), with noncaseating granulomata, negative for metastatic carcinoma.  AJCC stage:pT1c, pN0.         PREVIOUS INTERVENTIONS:  She had undergone right upper lobe lobectomy 11/05/2021.     Lung nodules   1/23/2019 Initial Diagnosis    Lung nodules     9/3/2021 Imaging    CT Chest  Spiculated right upper lobe nodule is redemonstrated, measuring 2.1 x 1.3 x 1.2 cm, previously 1.6 x 1.1 x 1.2 cm. Associated peripheral groundglass opacities. Emphysema. Bronchiectasis in the right upper and middle lobe redemonstrated. Right fissure-based nodule measuring 5 mm, previously 3 (image 82, series 3. Additional nodularities along the right minor fissure are similar to decrease. Mm.  No pleural effusion is present. The trachea and bronchial tree are patent.  1. Spiculated nodule in the right upper lobe have slightly increased in size. Recommend PET/CT.     9/10/2021 Procedure    09-  PFTs  Pulmonary Function Test   Pre Drug % Predicted    FVC: 80%   FEV1: 64%   FEF 25-75%: 28%   FEV1/FVC: 61.74%   Interpretation   Spirometry   Spirometry shows moderate obstruction. There is reduced midflow  suggesting small airway/airflow obstruction.   Review of FVL curve   Patient's effort is normal.     9/10/2021 Imaging    PET/CT:  IMPRESSION:  1. FDG uptake within the spiculated 2 cm right upper lobe nodule with  SUV of 2.85 concerning for malignancy.  2. Mild FDG uptake within the reticulonodular changes of the more  inferior right upper lobe and right middle lobe with SUV of 1.1 favoring  an inflammatory/infectious process.  3. No evidence of distant metastasis.     9/21/2021 Biopsy    09-  Mike bronchoscopy RUL  Lung, Right upper lobe; transbronchial biopsy:  Atypical proliferation, highly suspicious for well-differentiated adenocarcinoma  Comments:  Recuts are evaluated.  The atypical proliferation is highlighted with immunohistochemical TIF1 staining (control appropriate).  The appearance could suggest a lepidic, well differentiated, or scar-carcinoma type lesion.    Lymph node, 10R FNA:  lymph node sampling  Lavage:  inflammation/ inflammatory debris and macrophages  Broncheoalveolar lavage, RUL  Rare groups of mildly atypical epithelial cells     10/7/2021 Imaging    CT Chest:  IMPRESSION:  1. Spiculated pleural-based right upper lobe nodule that appears stable  compared to 9/3/2021 examination.  2. Right lung nodularity with peribronchial thickening and  bronchiectasis, stable.  3. Developing mild reticular nodular opacities posteriorly in the left  lower lobe and minimally in the right lower lobe. Acute inflammatory  change suspected. Correlate with patient presentation.  4. Continued follow-up recommended.     Adenocarcinoma   12/14/2021 Initial Diagnosis    Adenocarcinoma (HCC)         PAST MEDICAL HISTORY:  ALLERGIES:  Allergies   Allergen Reactions    Baclofen Other (See Comments)     MUSCULOSKELETAL THERAPY AGENTS knocked her out for a day    Other reaction(s): Unknown    Gabapentin Confusion     Other reaction(s): over sedation    Sulfa Antibiotics Rash     Mouth blisters    Sulfacetamide  Sodium Rash    Levofloxacin Other (See Comments)     tendonitis, muscle pain    Amitriptyline Hcl Confusion     Other reaction(s): ambulation difficulties/confusion    Niferex [Iron Polysaccharide] Swelling     LIP SWELLING    Oxycodone-Acetaminophen GI Intolerance     Other reaction(s): vomiting     CURRENT MEDICATIONS:  Outpatient Encounter Medications as of 9/10/2024   Medication Sig Dispense Refill    albuterol sulfate  (90 Base) MCG/ACT inhaler Inhale 2 puffs Every 4 (Four) Hours As Needed for Wheezing.      albuterol sulfate  (90 Base) MCG/ACT inhaler Inhale 2 puffs Every 4 (Four) Hours As Needed for Wheezing or Shortness of Air. 18 g 11    alendronate (FOSAMAX) 70 MG tablet Take 1 tablet by mouth Every 7 (Seven) Days. Saturdays  3    aspirin 81 MG chewable tablet Chew 1 tablet Every Night.      Calcium Carb-Cholecalciferol (CALCIUM 500+D PO) Take 1 tablet by mouth Daily.      carvedilol (COREG) 3.125 MG tablet Take 1 tablet by mouth 2 (Two) Times a Day With Meals. 60 tablet 0    cetirizine (ZyrTEC) 10 MG tablet Take 1 tablet by mouth At Night As Needed.      clopidogrel (PLAVIX) 75 MG tablet Take 1 tablet by mouth Daily.      famotidine (PEPCID) 40 MG tablet Take 1 tablet by mouth 2 (Two) Times a Day.  3    furosemide (LASIX) 20 MG tablet Take 1 tablet by mouth Daily.      levothyroxine (SYNTHROID, LEVOTHROID) 50 MCG tablet Take 1 tablet by mouth Daily.  3    losartan (COZAAR) 50 MG tablet Take 1 tablet by mouth Daily.      multivitamin with minerals tablet tablet Take 1 tablet by mouth Daily.      rosuvastatin (CRESTOR) 40 MG tablet Take 1 tablet by mouth Every Night.      traMADol (ULTRAM) 50 MG tablet Take 1 tablet by mouth Every 3 (Three) Hours As Needed for Moderate Pain or Severe Pain.  0    [DISCONTINUED] cefdinir (OMNICEF) 300 MG capsule  (Patient not taking: Reported on 7/25/2024)       No facility-administered encounter medications on file as of 9/10/2024.     ADULT ILLNESSES:  Patient  Active Problem List   Diagnosis Code    Spinal stenosis, cervical region M48.02    Lung nodules R91.8    Bronchiectasis without complication J47.9    Underweight R63.6    Personal history of nicotine dependence Z87.891    Restrictive lung disease J98.4    Pulmonary emphysema J43.9    PAD (peripheral artery disease) I73.9    Preop testing Z01.818    Gastroesophageal reflux disease K21.9    Allergic rhinitis J30.9    ORTIZ (dyspnea on exertion) R06.09    CAD (coronary artery disease) I25.10    Hyperlipidemia E78.5    Neuropathy G62.9    Hypertension I10    Simple chronic bronchitis J41.0    Former smoker Z87.891    Lung nodule R91.1    Mass of right lung R91.8    Postoperative anemia due to acute blood loss D62    Syncope and collapse R55    Adenocarcinoma C80.1    Personal history of malignant neoplasm of bronchus and lung Z85.118    Hydropneumothorax J94.8    Lower extremity embolism I74.3    Generalized muscle weakness M62.81    Acute blood loss anemia D62    Melena K92.1    Urinary incontinence R32    Gastrointestinal hemorrhage K92.2    Iron deficiency anemia D50.9    Wheezing R06.2     SURGERIES:  Past Surgical History:   Procedure Laterality Date    ANTERIOR CERVICAL DISCECTOMY W/ FUSION N/A 05/22/2017    Procedure: CERVICAL DISCECTOMY ANTERIOR FUSION WITH INSTRUMENTATION;  Surgeon: NADINE Sarabia MD;  Location: [facility] OR;  Service:     AORTAGRAM Left 11/09/2020    Procedure: left lower extremtiy angiogram, hawk athrectomy, balloon angioplasty, stent placement, mynx closure;  Surgeon: Sukhdev Condon DO;  Location:  [facility] HYBRID OR 12;  Service: Vascular;  Laterality: Left;    AORTAGRAM Left 03/26/2021    Procedure: LEFT LOWER EXTREMITY ANGIOGRAM, BALLOON ANGIOPLASTY, STENT PLACEMENT, MYNX CLOSURE;  Surgeon: Sukhdev Condon DO;  Location:  [facility] HYBRID OR 12;  Service: Vascular;  Laterality: Left;    AORTAGRAM Left 08/06/2021    Procedure: LEFT LOWER EXTREMITY ANGIOGRAM, HAWK ATHERECTOMY, BALLOON  ANGIOPLASTY, MYNX CLOSURE;  Surgeon: Sukhdev Condon DO;  Location: [facility] HYBRID OR 12;  Service: Vascular;  Laterality: Left;    AORTAGRAM Right 06/08/2022    Procedure: RIGHT LOWER EXTREMITY ANGIOGRAM, INTRAVASCULAR LITHOTRIPSY, HAWK ATHRECTOMY, BALLOON ANGIOPLASTY, MYNX CLOSURE;  Surgeon: Sukhdev Condon DO;  Location:  [facility] HYBRID OR 12;  Service: Vascular;  Laterality: Right;    AORTAGRAM Left 10/21/2022    Procedure: LEFT LOWER EXTREMITY ANGIOGRAM WITH HAWK ATHRECTOMY, BALLOON ANGIOPLASTY, STENT PLACEMENT, MYNX CLOSURE;  Surgeon: Sukhdev Condon DO;  Location:  [facility] HYBRID OR 12;  Service: Vascular;  Laterality: Left;    BLADDER SUSPENSION      also had pelvic reconstructive surgery    BREAST EXCISIONAL BIOPSY Right 1985    CARDIAC CATHETERIZATION      CATARACT EXTRACTION, BILATERAL      CERVICAL CORPECTOMY N/A 05/22/2017    Procedure:  ANTERIOR CERVICAL DISCECTOMY FUSION C-6 to T1,  ANTERIOR FUSION WITH INSTRUMENTATION C-5 T-1;  Surgeon: NADINE Sarabia MD;  Location: [facility] OR;  Service:     COLONOSCOPY  08/19/2015    TIC BX RT COLON    COLONOSCOPY  12/04/2013    RT COLON BX RECALL 5YR    COLONOSCOPY N/A 11/29/2016    The entire examined colon is normal; No specimens collected    COLONOSCOPY N/A 06/02/2022    Procedure: COLONOSCOPY WITH ANESTHESIA;  Surgeon: Marco Antonio Vallejo MD;  Location: [facility] ENDOSCOPY;  Service: Gastroenterology;  Laterality: N/A;  Pre: screen  Post: diverticulosis  Andrew Layne MD    COLONOSCOPY N/A 04/05/2023    Procedure: COLONOSCOPY WITH ANESTHESIA;  Surgeon: Mike Kearns MD;  Location: [facility] ENDOSCOPY;  Service: Gastroenterology;  Laterality: N/A;  preop: GI bleed;melena  post op: diverticulosis, avm  PCP: Andrew Layne    ENDOSCOPY  12/03/2015    HEALED ULCER SLANT BX    ENDOSCOPY N/A 04/01/2023    Procedure: ESOPHAGOGASTRODUODENOSCOPY WITH ANESTHESIA;  Surgeon: Patric Reyes DO;  Location: [facility] OR;  Service: Gastroenterology;  Laterality: N/A;   PRE GI BLEED  POST gastric ulcers  DR LINN TY    FEMORAL ENDARTERECTOMY Left 10/21/2022    Procedure: LEFT LOWER EXTREMITY ANGIOGRAM;  Surgeon: Sukhdev Condon DO;  Location:  [facility] HYBRID OR 12;  Service: Vascular;  Laterality: Left;    HYSTERECTOMY      LEG THROMBECTOMY/EMBOLECTOMY Left 06/08/2022    Procedure: LOWER EXTREMITY THROMBECTOMY/EMBOLECTOMY, PATCH GRAFT TO  FEMORAL ARTERY;  Surgeon: Sukhdev Condon DO;  Location:  [facility] HYBRID OR 12;  Service: Vascular;  Laterality: Left;    LOBECTOMY Right 11/05/2021    Procedure: RIGHT THORACOSCOPY WITH DAVINCI ROBOT, RIGHT UPPER LOBE LOBECTOMY;  Surgeon: Jarad Ignacio MD;  Location:  [facility] OR;  Service: Robotics - DaVinci;  Laterality: Right;    LUNG SURGERY      OTHER SURGICAL HISTORY      KNEE CARTILAGE REMOVED    OTHER SURGICAL HISTORY      BENIGN FIBROID TUMOR OF BREAST 1985 REMOVED    TONSILLECTOMY       HEALTH MAINTENANCE ITEMS:  Health Maintenance Due   Topic Date Due    TDAP/TD VACCINES (1 - Tdap) Never done    ZOSTER VACCINE (1 of 2) Never done    DXA SCAN  06/17/2023    ANNUAL WELLNESS VISIT  06/13/2024    COVID-19 Vaccine (7 - 2023-24 season) 09/01/2024    INFLUENZA VACCINE  08/01/2024       <no information>  Last Completed Colonoscopy            COLORECTAL CANCER SCREENING (COLONOSCOPY - Every 7 Years) Next due on 4/5/2030 04/05/2023  Surgical Procedure: COLONOSCOPY    04/05/2023  SCANNED - COLONOSCOPY    04/01/2023  Fecal Occult Blood component of Occult Blood X 1, Stool - Stool, Per Rectum    06/02/2022  COLONOSCOPY    06/02/2022  Surgical Procedure: COLONOSCOPY    Only the first 5 history entries have been loaded, but more history exists.                  Immunization History   Administered Date(s) Administered    Arexvy (RSV, Adults 60+ yrs) 10/19/2023    COVID-19 (MODERNA) 1st,2nd,3rd Dose Monovalent 01/19/2021, 02/17/2021, 10/26/2021    COVID-19 (MODERNA) BIVALENT 12+YRS 10/03/2022    COVID-19 (MODERNA) Monovalent Original  Booster 2022    COVID-19 F23 (MODERNA) 12YRS+ (SPIKEVAX) 10/31/2023    Flu Vaccine Split Quad 2020    Fluzone  >6mos 10/13/2010    Fluzone High-Dose 65+YRS 10/10/2019, 10/01/2021, 10/03/2022    Fluzone High-Dose 65+yrs 10/31/2023    Fluzone Quad >6mos (Multi-dose) 10/01/2018    INFLUENZA SPLIT TRI 10/01/2019    Pneumococcal Conjugate 13-Valent (PCV13) 2016    Pneumococcal Polysaccharide (PPSV23) 2015, 2016, 10/01/2019     Last Completed Mammogram            Scheduled - MAMMOGRAM (Every 2 Years) Scheduled for 2023  Mammo Screening Digital Tomosynthesis Bilateral With CAD    2022  Mammo Screening Digital Tomosynthesis Bilateral With CAD    2021  Mammo Screening Digital Tomosynthesis Bilateral With CAD    2020  Mammo Screening Digital Tomosynthesis Bilateral With CAD    2019  Mammo Screening Digital Tomosynthesis Bilateral With CAD    Only the first 5 history entries have been loaded, but more history exists.                      FAMILY HISTORY:  Family History   Problem Relation Age of Onset    Breast cancer Maternal Aunt     Heart disease Mother     Heart disease Father     GI problems Neg Hx         MALIGNANCIES    Colon cancer Neg Hx     Colon polyps Neg Hx      SOCIAL HISTORY:  Social History     Socioeconomic History    Marital status:    Tobacco Use    Smoking status: Former     Current packs/day: 0.00     Average packs/day: 1.5 packs/day for 20.0 years (30.0 ttl pk-yrs)     Types: Cigarettes     Start date:      Quit date:      Years since quittin.7     Passive exposure: Past    Smokeless tobacco: Never   Vaping Use    Vaping status: Never Used   Substance and Sexual Activity    Alcohol use: Yes     Comment: occasionally    Drug use: No    Sexual activity: Not Currently       REVIEW OF SYSTEMS:    Review of Systems   Constitutional:  Negative for fatigue, fever and unexpected weight change.   HENT:  Negative for  "congestion and mouth sores.    Eyes:  Negative for redness.   Respiratory:  Negative for shortness of breath and wheezing.    Cardiovascular:  Negative for chest pain and palpitations.   Gastrointestinal:  Negative for constipation, diarrhea, nausea and vomiting.   Endocrine: Negative for cold intolerance and heat intolerance.   Genitourinary:  Negative for difficulty urinating and dysuria.   Musculoskeletal:  Negative for gait problem and myalgias.   Skin:  Negative for pallor.   Allergic/Immunologic: Negative for food allergies.   Neurological:  Negative for dizziness, speech difficulty and weakness.   Hematological:  Negative for adenopathy. Does not bruise/bleed easily.   Psychiatric/Behavioral:  Negative for agitation and confusion. The patient is not nervous/anxious.        VITAL SIGNS: /68   Pulse 61   Temp 97.8 °F (36.6 °C)   Resp 18   Ht 161.3 cm (63.5\")   Wt 52.2 kg (115 lb 1.6 oz)   SpO2 94%   Breastfeeding No   BMI 20.07 kg/m²  Gained 6 pounds.   Pain Score    09/10/24 1106   PainSc: 2  Comment: always some kind of pain       PHYSICAL EXAMINATION:     Physical Exam  Vitals reviewed.   Constitutional:       General: She is not in acute distress.  HENT:      Head: Normocephalic and atraumatic.   Eyes:      General: No scleral icterus.  Cardiovascular:      Rate and Rhythm: Normal rate.   Pulmonary:      Effort: No respiratory distress.      Breath sounds: No wheezing.   Abdominal:      General: Bowel sounds are normal.      Palpations: Abdomen is soft.   Musculoskeletal:      Cervical back: Neck supple.      Comments: Right leg edema, \"I had surgery. Blood flow is good.\"   Skin:     Coloration: Skin is not pale.   Neurological:      Mental Status: She is alert and oriented to person, place, and time.   Psychiatric:         Mood and Affect: Mood normal.         Behavior: Behavior normal.         Thought Content: Thought content normal.         Judgment: Judgment normal.         LABS    Lab " Results - Last 18 Months   Lab Units 09/10/24  1035 06/06/24  1040 03/05/24  1038 12/04/23  1056 08/15/23  1516 06/05/23  0945 04/01/23  1245 04/01/23  0448   HEMOGLOBIN g/dL 12.9 13.1 11.8* 11.5* 11.2* 12.1   < > 7.9*   HEMATOCRIT % 38.9 39.0 36.0 36.6 36.0 39.8   < > 24.6*   MCV fL 99.7* 98.0* 99.4* 93.6 95.7 101.8*   < > 94.3   WBC 10*3/mm3 6.61 4.91 5.85 10.76 5.50 7.55   < > 10.04   RDW % 13.0 12.2* 14.6 13.5 13.2 14.2   < > 13.1   MPV fL 9.8 10.2 10.1 9.5 9.9 10.6   < > 9.3   PLATELETS 10*3/mm3 172 134* 158 258 160 241   < > 246   IMM GRAN % % 0.2 0.0 0.2 0.4 0.2 0.3  --  0.5   NEUTROS ABS 10*3/mm3 3.18 2.42 3.23 7.47* 2.95 4.32  --  5.87   LYMPHS ABS 10*3/mm3 2.23 1.60 1.70 1.58 1.69 2.00  --  2.52   MONOS ABS 10*3/mm3 0.78 0.52 0.68 1.31* 0.60 0.87  --  1.49*   EOS ABS 10*3/mm3 0.37 0.34 0.19 0.30 0.22 0.30  --  0.10   BASOS ABS 10*3/mm3 0.04 0.03 0.04 0.06 0.03 0.04  --  0.01   IMMATURE GRANS (ABS) 10*3/mm3 0.01 0.00 0.01 0.04 0.01 0.02  --  0.05   NRBC /100 WBC 0.0  --  0.0 0.0 0.0 0.0  --  0.0    < > = values in this interval not displayed.       Lab Results - Last 18 Months   Lab Units 09/10/24  1035 06/06/24  1112 06/06/24  1040 03/05/24  1105 03/05/24  1038 12/04/23  1056 12/04/23  1048 08/15/23  1516 06/05/23  0945 04/01/23  0448 03/31/23  1730   GLUCOSE mg/dL 90  --  100*  --  87 108*  --  117* 86   < > 127*   SODIUM mmol/L 143  --  139  --  141 138  --  144 142   < > 134*   POTASSIUM mmol/L 4.3  --  3.9  --  4.2 4.3  --  3.6 3.9   < > 4.4   CO2 mmol/L 30.0*  --  27.0  --  29.0 28.0  --  27.0 30.0*   < > 25.0   CHLORIDE mmol/L 104  --  103  --  104 95*  --  105 101   < > 96*   ANION GAP mmol/L 9.0  --  9.0  --  8.0 15.0  --  12.0 11.0   < > 13.0   CREATININE mg/dL 0.74 0.70 0.62 0.70 0.63 1.24*   < > 0.66 0.80  0.72   < > 0.75   BUN mg/dL 16  --  16  --  16 28*  --  7* 15   < > 37*   BUN / CREAT RATIO  21.6  --  25.8*  --  25.4* 22.6  --  10.6 20.8   < > 49.3*   CALCIUM mg/dL 9.2  --  9.4  --  9.6  "10.0  --  9.5 9.8   < > 9.6   ALK PHOS U/L 62  --  72  --  57 74  --   --  57  --  41   TOTAL PROTEIN g/dL 6.5  --  6.9  --  6.6 7.8  --   --  6.9  --  6.3   ALT (SGPT) U/L 26  --  31  --  29 25  --   --  16  --  19   AST (SGOT) U/L 36*  --  41*  --  39* 51*  --   --  32  --  22   BILIRUBIN mg/dL 0.6  --  0.6  --  0.5 0.5  --   --  0.3  --  0.3   ALBUMIN g/dL 4.3  --  4.4  --  4.1 4.4  --   --  4.4  --  4.3   GLOBULIN gm/dL 2.2  --  2.5  --  2.5 3.4  --   --  2.5  --  2.0    < > = values in this interval not displayed.       No results for input(s): \"MSPIKE\", \"KAPPALAMB\", \"IGLFLC\", \"URICACID\", \"FREEKAPPAL\", \"CEA\", \"LDH\", \"REFLABREPO\" in the last 44231 hours.    Lab Results - Last 18 Months   Lab Units 03/05/24  1038 12/04/23  1056 04/02/23  0543 03/31/23  1730   IRON mcg/dL 111 32* 30*  --    TIBC mcg/dL 338 416 426  --    IRON SATURATION (TSAT) % 33 8* 7*  --    FERRITIN ng/mL 250.50* 163.00*  --   --    TSH uIU/mL  --   --   --  3.090       Trinidad Zhu reports a pain score of 2.  Given her pain assessment as noted, treatment options were discussed and the following options were decided upon as a follow-up plan to address the patient's pain: continuation of current treatment plan for pain.        ASSESSMENT:  1.  Adenocarcinoma of the lung, right upper lobe.Tumor size 2 cm. PDL1 15%, Negative ALK, ROS1, and EGFR. BRAF G644V positive.  AJCC stage:1A3(pTic, pN0, cM0, G2)  Treatment status: Patient is post right upper lobe lobectomy.  2.  Performance status of 1.  3.  30 pack years smoking history, etiology of her lung cancer. She had quit smoking. \"About 27 years.\"   4.  Normocytic anemia from iron deficiency due to gastrointestinal hemorrhage.  Diverticulosis in the sigmoid colon on 06/02/202.  Ferrous sulfate 12/7/2022 through 6/12/2023. 1 unit packed RBC 4/1/2023 and 4/4/2023.  Ferric gluconate 125 mg on 4/4/2023. \"It's constipating.\"  She had ferric carboxymaltose on 12/21/2023.              PLAN:  1.  "  Re: Note from Dr. Royal on 7/14/2024.  Patient seen for coronary artery disease.  Blood pressure and heart rate within normal limits.  EKG normal.  Follow-up in 1 year.  2.   Re: Note from Dr. Cortés 7/25/2024.  Her recent navigational bronchoscopy was negative for malignancy with some noncaseating granulomatous changes noted.  Final AFB and fungal culture results were negative   3.   Re:  Heme status.  WBC 6.6, hemoglobin 12.9 and platelet 172.     4.   Re: CMP.  GFR 82.9 ml/min and AST 36 from 41 from 39 from 51, she is on Crestor.   5.   Re:  CT chest report 9/5/2024. Postsurgical change of the right upper lung is stable.  A new spiculated nodules in the right lower lung mentioned above was not seen in the previous study. This may represent an area of focal infiltrate. An evolving neoplasm not excluded. A follow-up examination  in 3 months is recommended to ensure stability/resolution.  Several well-defined and poorly defined rounded and spiculated nodule/opacity in the right lung are stable since the previous study. No  significant change in size. 4. Bronchiectasis and bronchitic changes probably in the right upper  lung similar to the previous study.  6.   Re: Stable for observation, lung carcinoma.  7.  Continue care per primary care physician and the other specialists  8.  Plan of care discussed with patient and Don, spouse.  Understanding expressed. She agreed to proceed.  9.  Advance Care Planning  ACP discussion was held with the patient during this visit. Patient has an advance directive in EMR which is still valid.   10. Return to office in 3 months with preoffice CBC with differential, CMP, and CT of the chest to follow lung nodules. To see Dr. Cortés on 10/31/2024.              I have reviewed the assessment and plan and verified the accuracy of it. No changes to assessment and plan since the information was documented. Cyrus Palacios MD 09/10/24         I  spent 30 total minutes, face-to-face, caring for Trinidad today. Greater than 50% of this time involved counseling and/or coordination of care as documented within this note.       (Dion Cortés MD)  (Jarad Ignacio MD)  (Kimberley Rodas MD)  Jose Layne MD

## 2024-09-05 ENCOUNTER — HOSPITAL ENCOUNTER (OUTPATIENT)
Dept: CT IMAGING | Facility: HOSPITAL | Age: 79
Discharge: HOME | End: 2024-09-05
Admitting: INTERNAL MEDICINE
Payer: MEDICARE

## 2024-09-05 DIAGNOSIS — C34.11 PRIMARY CANCER OF RIGHT UPPER LOBE OF LUNG: ICD-10-CM

## 2024-09-05 PROCEDURE — 25510000001 IOPAMIDOL 61 % SOLUTION: Performed by: INTERNAL MEDICINE

## 2024-09-05 PROCEDURE — 71260 CT THORAX DX C+: CPT

## 2024-09-05 RX ORDER — IOPAMIDOL 612 MG/ML
100 INJECTION, SOLUTION INTRAVASCULAR
Status: COMPLETED | OUTPATIENT
Start: 2024-09-05 | End: 2024-09-05

## 2024-09-05 RX ADMIN — IOPAMIDOL 100 ML: 612 INJECTION, SOLUTION INTRAVENOUS at 12:27

## 2024-09-10 ENCOUNTER — LAB (OUTPATIENT)
Dept: LAB | Facility: HOSPITAL | Age: 79
End: 2024-09-10
Payer: MEDICARE

## 2024-09-10 ENCOUNTER — OFFICE VISIT (OUTPATIENT)
Dept: ONCOLOGY | Facility: CLINIC | Age: 79
End: 2024-09-10
Payer: MEDICARE

## 2024-09-10 VITALS
HEIGHT: 64 IN | TEMPERATURE: 97.8 F | BODY MASS INDEX: 19.65 KG/M2 | HEART RATE: 61 BPM | DIASTOLIC BLOOD PRESSURE: 68 MMHG | RESPIRATION RATE: 18 BRPM | SYSTOLIC BLOOD PRESSURE: 124 MMHG | OXYGEN SATURATION: 94 % | WEIGHT: 115.1 LBS

## 2024-09-10 DIAGNOSIS — Z85.118 PERSONAL HISTORY OF MALIGNANT NEOPLASM OF BRONCHUS AND LUNG: Primary | ICD-10-CM

## 2024-09-10 DIAGNOSIS — C34.11 PRIMARY CANCER OF RIGHT UPPER LOBE OF LUNG: Primary | ICD-10-CM

## 2024-09-10 LAB
ALBUMIN SERPL-MCNC: 4.3 G/DL (ref 3.5–5.2)
ALBUMIN/GLOB SERPL: 2 G/DL
ALP SERPL-CCNC: 62 U/L (ref 39–117)
ALT SERPL W P-5'-P-CCNC: 26 U/L (ref 1–33)
ANION GAP SERPL CALCULATED.3IONS-SCNC: 9 MMOL/L (ref 5–15)
AST SERPL-CCNC: 36 U/L (ref 1–32)
BASOPHILS # BLD AUTO: 0.04 10*3/MM3 (ref 0–0.2)
BASOPHILS NFR BLD AUTO: 0.6 % (ref 0–1.5)
BILIRUB SERPL-MCNC: 0.6 MG/DL (ref 0–1.2)
BUN SERPL-MCNC: 16 MG/DL (ref 8–23)
BUN/CREAT SERPL: 21.6 (ref 7–25)
CALCIUM SPEC-SCNC: 9.2 MG/DL (ref 8.6–10.5)
CHLORIDE SERPL-SCNC: 104 MMOL/L (ref 98–107)
CO2 SERPL-SCNC: 30 MMOL/L (ref 22–29)
CREAT SERPL-MCNC: 0.74 MG/DL (ref 0.57–1)
DEPRECATED RDW RBC AUTO: 47.6 FL (ref 37–54)
EGFRCR SERPLBLD CKD-EPI 2021: 82.9 ML/MIN/1.73
EOSINOPHIL # BLD AUTO: 0.37 10*3/MM3 (ref 0–0.4)
EOSINOPHIL NFR BLD AUTO: 5.6 % (ref 0.3–6.2)
ERYTHROCYTE [DISTWIDTH] IN BLOOD BY AUTOMATED COUNT: 13 % (ref 12.3–15.4)
GLOBULIN UR ELPH-MCNC: 2.2 GM/DL
GLUCOSE SERPL-MCNC: 90 MG/DL (ref 65–99)
HCT VFR BLD AUTO: 38.9 % (ref 34–46.6)
HGB BLD-MCNC: 12.9 G/DL (ref 12–15.9)
HOLD SPECIMEN: NORMAL
IMM GRANULOCYTES # BLD AUTO: 0.01 10*3/MM3 (ref 0–0.05)
IMM GRANULOCYTES NFR BLD AUTO: 0.2 % (ref 0–0.5)
LYMPHOCYTES # BLD AUTO: 2.23 10*3/MM3 (ref 0.7–3.1)
LYMPHOCYTES NFR BLD AUTO: 33.7 % (ref 19.6–45.3)
MCH RBC QN AUTO: 33.1 PG (ref 26.6–33)
MCHC RBC AUTO-ENTMCNC: 33.2 G/DL (ref 31.5–35.7)
MCV RBC AUTO: 99.7 FL (ref 79–97)
MONOCYTES # BLD AUTO: 0.78 10*3/MM3 (ref 0.1–0.9)
MONOCYTES NFR BLD AUTO: 11.8 % (ref 5–12)
NEUTROPHILS NFR BLD AUTO: 3.18 10*3/MM3 (ref 1.7–7)
NEUTROPHILS NFR BLD AUTO: 48.1 % (ref 42.7–76)
NRBC BLD AUTO-RTO: 0 /100 WBC (ref 0–0.2)
PLATELET # BLD AUTO: 172 10*3/MM3 (ref 140–450)
PMV BLD AUTO: 9.8 FL (ref 6–12)
POTASSIUM SERPL-SCNC: 4.3 MMOL/L (ref 3.5–5.2)
PROT SERPL-MCNC: 6.5 G/DL (ref 6–8.5)
RBC # BLD AUTO: 3.9 10*6/MM3 (ref 3.77–5.28)
SODIUM SERPL-SCNC: 143 MMOL/L (ref 136–145)
WBC NRBC COR # BLD AUTO: 6.61 10*3/MM3 (ref 3.4–10.8)

## 2024-09-10 PROCEDURE — 36415 COLL VENOUS BLD VENIPUNCTURE: CPT

## 2024-09-10 PROCEDURE — 99214 OFFICE O/P EST MOD 30 MIN: CPT | Performed by: INTERNAL MEDICINE

## 2024-09-10 PROCEDURE — 1159F MED LIST DOCD IN RCRD: CPT | Performed by: INTERNAL MEDICINE

## 2024-09-10 PROCEDURE — 80053 COMPREHEN METABOLIC PANEL: CPT

## 2024-09-10 PROCEDURE — 85025 COMPLETE CBC W/AUTO DIFF WBC: CPT

## 2024-09-10 PROCEDURE — 3078F DIAST BP <80 MM HG: CPT | Performed by: INTERNAL MEDICINE

## 2024-09-10 PROCEDURE — 3074F SYST BP LT 130 MM HG: CPT | Performed by: INTERNAL MEDICINE

## 2024-09-10 PROCEDURE — 1125F AMNT PAIN NOTED PAIN PRSNT: CPT | Performed by: INTERNAL MEDICINE

## 2024-09-10 PROCEDURE — 1160F RVW MEDS BY RX/DR IN RCRD: CPT | Performed by: INTERNAL MEDICINE

## 2024-09-10 RX ORDER — CLOPIDOGREL BISULFATE 75 MG/1
75 TABLET ORAL DAILY
COMMUNITY

## 2024-09-30 ENCOUNTER — HOSPITAL ENCOUNTER (OUTPATIENT)
Dept: MAMMOGRAPHY | Facility: HOSPITAL | Age: 79
Discharge: HOME | End: 2024-09-30
Payer: MEDICARE

## 2024-09-30 ENCOUNTER — HOSPITAL ENCOUNTER (OUTPATIENT)
Dept: BONE DENSITY | Facility: HOSPITAL | Age: 79
Discharge: HOME | End: 2024-09-30
Payer: MEDICARE

## 2024-09-30 DIAGNOSIS — M81.0 SENILE OSTEOPOROSIS: ICD-10-CM

## 2024-09-30 DIAGNOSIS — Z12.31 ENCOUNTER FOR SCREENING MAMMOGRAM FOR MALIGNANT NEOPLASM OF BREAST: ICD-10-CM

## 2024-09-30 PROCEDURE — 77067 SCR MAMMO BI INCL CAD: CPT

## 2024-09-30 PROCEDURE — 77080 DXA BONE DENSITY AXIAL: CPT

## 2024-09-30 PROCEDURE — 77063 BREAST TOMOSYNTHESIS BI: CPT

## 2024-10-31 ENCOUNTER — OFFICE VISIT (OUTPATIENT)
Dept: PULMONOLOGY | Facility: CLINIC | Age: 79
End: 2024-10-31
Payer: MEDICARE

## 2024-10-31 VITALS
SYSTOLIC BLOOD PRESSURE: 130 MMHG | HEIGHT: 64 IN | WEIGHT: 117 LBS | DIASTOLIC BLOOD PRESSURE: 82 MMHG | BODY MASS INDEX: 19.97 KG/M2 | HEART RATE: 61 BPM | OXYGEN SATURATION: 98 %

## 2024-10-31 DIAGNOSIS — Z87.891 PERSONAL HISTORY OF NICOTINE DEPENDENCE: Chronic | ICD-10-CM

## 2024-10-31 DIAGNOSIS — R91.8 LUNG NODULES: Primary | Chronic | ICD-10-CM

## 2024-10-31 DIAGNOSIS — J47.9 BRONCHIECTASIS WITHOUT COMPLICATION: Chronic | ICD-10-CM

## 2024-10-31 DIAGNOSIS — Z85.118 PERSONAL HISTORY OF MALIGNANT NEOPLASM OF BRONCHUS AND LUNG: Chronic | ICD-10-CM

## 2024-10-31 PROBLEM — J44.9 COPD, MODERATE: Status: ACTIVE | Noted: 2024-10-31

## 2024-10-31 PROCEDURE — 99214 OFFICE O/P EST MOD 30 MIN: CPT | Performed by: INTERNAL MEDICINE

## 2024-10-31 PROCEDURE — 1159F MED LIST DOCD IN RCRD: CPT | Performed by: INTERNAL MEDICINE

## 2024-10-31 PROCEDURE — 3075F SYST BP GE 130 - 139MM HG: CPT | Performed by: INTERNAL MEDICINE

## 2024-10-31 PROCEDURE — 3079F DIAST BP 80-89 MM HG: CPT | Performed by: INTERNAL MEDICINE

## 2024-10-31 PROCEDURE — 1160F RVW MEDS BY RX/DR IN RCRD: CPT | Performed by: INTERNAL MEDICINE

## 2024-10-31 RX ORDER — DICLOFENAC SODIUM 25 MG/1
25 TABLET, DELAYED RELEASE ORAL 2 TIMES DAILY
COMMUNITY
Start: 2024-10-01

## 2024-10-31 RX ORDER — PREDNISONE 20 MG/1
20 TABLET ORAL AS NEEDED
COMMUNITY

## 2024-10-31 NOTE — PATIENT INSTRUCTIONS
The patient is doing well clinically.  She is scheduled for a follow-up chest CT in early December and I will see her back few days after to review that.  She will continue her current regimen otherwise.

## 2024-10-31 NOTE — ASSESSMENT & PLAN NOTE
Some new nodular changes were noted on her recent CT performed at [facility] on September 5 which I think are likely related to a previous infectious or inflammatory process but a follow-up CT has been scheduled for early December and I will see her in the office shortly thereafter.

## 2024-10-31 NOTE — PROGRESS NOTES
Chief Complaint  History of lung cancer and Lung nodules    Subjective    History of Present Illness {CC  Problem List  Visit Diagnosis   Encounters  Notes  Medications  Labs  Result Review Imaging  Media: 23}    Trinidad Zhu presents to [facility] for lung nodules and a history of lung cancer.    History of Present Illness   The patient had a chest CT at [facility] in September which showed some new nodular changes the right base.  I suspect these relate to previous infectious or inflammatory process and are due to some scarring but a follow-up scan has been scheduled for early December.  I told her I will see her back in the office shortly thereafter to review that with her and she will be seeing Dr. Palacios as well.  Otherwise she is doing well with no complaints of shortness of breath.  She is accompanied by her  today.  She has had the COVID-19 vaccine including 2 standard boosters, a bivalent booster, and a Spikevax booster in the form of the Moderna vaccine.  She had her flu shot earlier this month and has had a Prevnar 13, Pneumovax, and RSV vaccine in the past as well.  Prior to Admission medications    Medication Sig Start Date End Date Taking? Authorizing Provider   albuterol sulfate  (90 Base) MCG/ACT inhaler Inhale 2 puffs Every 4 (Four) Hours As Needed for Wheezing.   Yes Mary Lou Rodas MD   albuterol sulfate  (90 Base) MCG/ACT inhaler Inhale 2 puffs Every 4 (Four) Hours As Needed for Wheezing or Shortness of Air. 4/19/24  Yes Dion Cortés MD   alendronate (FOSAMAX) 70 MG tablet Take 1 tablet by mouth Every 7 (Seven) Days. Saturdays 5/21/19  Yes Mary Lou Rodas MD   aspirin 81 MG chewable tablet Chew 1 tablet Every Night.   Yes Mary Lou Rodas MD   Calcium Carb-Cholecalciferol (CALCIUM 500+D PO) Take 1 tablet by mouth Daily.   Yes Mary Lou Rodas MD   carvedilol (COREG) 3.125 MG tablet  Take 1 tablet by mouth 2 (Two) Times a Day With Meals.  Patient taking differently: Take 2 tablets by mouth 2 (Two) Times a Day With Meals. 23  Yes Chuy Chong MD   cetirizine (ZyrTEC) 10 MG tablet Take 1 tablet by mouth At Night As Needed.   Yes Mary Lou Rodas MD   clopidogrel (PLAVIX) 75 MG tablet Take 1 tablet by mouth Daily.   Yes Mary Lou Rodas MD   famotidine (PEPCID) 40 MG tablet Take 1 tablet by mouth 2 (Two) Times a Day. 4/15/19  Yes Mary Lou Rodas MD   furosemide (LASIX) 20 MG tablet Take 1 tablet by mouth As Needed (edema). 23  Yes Mary Lou Rodas MD   levothyroxine (SYNTHROID, LEVOTHROID) 75 MCG tablet Take 1 tablet by mouth Daily. 19  Yes Mary Lou Rodas MD   losartan (COZAAR) 50 MG tablet Take 1 tablet by mouth Daily. 23  Yes Mary Lou Rodas MD   multivitamin with minerals tablet tablet Take 1 tablet by mouth Daily.   Yes Mary Lou Rodas MD   predniSONE (DELTASONE) 20 MG tablet Take 1 tablet by mouth As Needed (arthritis).   Yes Mary Lou Rodas MD   rosuvastatin (CRESTOR) 40 MG tablet Take 1 tablet by mouth Every Night. 21  Yes Mary Lou Rodas MD   traMADol (ULTRAM) 50 MG tablet Take 1 tablet by mouth Every 3 (Three) Hours As Needed for Moderate Pain or Severe Pain. 19  Yes Mary Lou Rodas MD   diclofenac (VOLTAREN) 25 MG EC tablet Take 1 tablet by mouth 2 (Two) Times a Day.  Patient not taking: Reported on 10/31/2024 10/1/24   Mary Lou Rodas MD       Social History     Socioeconomic History    Marital status:    Tobacco Use    Smoking status: Former     Current packs/day: 0.00     Average packs/day: 1.5 packs/day for 20.0 years (30.0 ttl pk-yrs)     Types: Cigarettes     Start date:      Quit date:      Years since quittin.8     Passive exposure: Past    Smokeless tobacco: Never   Vaping Use    Vaping status: Never Used   Substance and Sexual Activity    Alcohol use: Yes      "Comment: occasionally    Drug use: No    Sexual activity: Not Currently       Objective   Vital Signs:   /82   Pulse 61   Ht 161.3 cm (63.5\")   Wt 53.1 kg (117 lb)   SpO2 98% Comment: RA  BMI 20.40 kg/m²     Physical Exam  Vitals and nursing note reviewed.   HENT:      Head: Normocephalic.   Eyes:      Extraocular Movements: Extraocular movements intact.      Pupils: Pupils are equal, round, and reactive to light.   Cardiovascular:      Rate and Rhythm: Normal rate and regular rhythm.   Pulmonary:      Effort: Pulmonary effort is normal.      Comments: Lung fields are clear with good air movement bilaterally.  Musculoskeletal:         General: Normal range of motion.   Skin:     General: Skin is warm and dry.   Neurological:      General: No focal deficit present.      Mental Status: She is alert and oriented to person, place, and time.   Psychiatric:         Mood and Affect: Mood normal.         Behavior: Behavior normal.        Result Review :    PFT Values          2/15/2023    15:15   Pre Drug PFT Results   FVC 79   FEV1 78   FEF 25-75% 75   FEV1/FVC 76         Results for orders placed during the hospital encounter of 07/18/24    Complete PFT - Pre & Post Bronchodilator    Narrative  [facility] - Pulmonary Function Test    [address] [place] [address]  [id number]  [phone]    Patient : Trinidad Zhu  MRN : [id number]  CSN : [id number]  Pulmonologist : Dion Cortés MD  Date : 7/24/2024    ______________________________________________________________________    Interpretation :  1.  Spirometry is consistent with a moderate obstructive ventilatory defect.  2.  There is improvement in midflows postbronchodilator although they remain decreased.  There is actually some worsening of peak expiratory flow postbronchodilator.  Otherwise there is no significant change in spirometry postbronchodilator.  3.  Lung volumes reveal a low normal total lung capacity with a mild decrease in vital " capacity suggestive of a possible borderline restrictive ventilatory defect.  There is also a very mild decrease in inspiratory capacity.  4.  There is a moderate diffusion impairment which when corrected for alveolar volume is a mild diffusion impairment.  5.  When current studies are compared to studies performed on February 15, 2023, there is no significant change in the patient's forced vital capacity compared to previous baseline values.  There has been a decline in the FEV1 compared to previous baseline values.      Dion Cortés MD      Results for orders placed in visit on 02/15/23    Spirometry Without Bronchodilator    Narrative  Spirometry Without Bronchodilator  Performed by: Samantha Cantor, RRT  Authorized by: Dion Cortés MD    Pre Drug % Predicted  FVC: 79%  FEV1: 78%  FEF 25-75%: 75%  FEV1/FVC: 76%    Interpretation  Spirometry  Spirometry shows mild restriction. There is reduced midflow suggesting small airway/airflow obstruction.  Overall comments: The patient's spirometry is most consistent with a mild restrictive ventilatory defect with a coexisting mild decrease in midflows.  When current studies are compared to studies performed on  of last year, she has had slight improvement in both her FVC and FEV1 compared to previous.  Midflows also show some improvement as well.      Results for orders placed in visit on 22    Full Pulmonary Function Test Without Bronchodilator    Narrative  Full Pulmonary Function Test Without Bronchodilator  Performed by: Samantha Cantor, RRT  Authorized by: Dion Cortés MD    Pre Drug % Predicted  FVC: 77%  FEV1: 73%  FEF 25-75%: 67%  FEV1/FVC: 73%  T%  RV: 262%  DLCO: 54%  D/VAsb: 74%    Interpretation  Spirometry  Spirometry shows mild restriction. There is reduced midflow suggesting small airway/airflow obstruction.  Diffusion Capacity  The patient's diffusion capacity is moderately reduced.  Diffusion  capacity is mildly reduced when corrected for alveolar volume.  Overall comments: The patient's spirometry is consistent with a mild restrictive ventilatory defect with a coexisting decrease in midflows.  Accurate lung volumes could not be obtained.  She has a moderate diffusion impairment which when corrected for alveolar volume is a mild diffusion impairment.  When current studies are compared to studies from [facility] from November 1, 2021, there has been a decline in her FVC on current baseline spirometry compared to previous pre and postbronchodilator studies but no significant change in her FEV1 compared to previous pre and postbronchodilator studies.  When corrected for alveolar volume, there has been a decline in her diffusion capacity compared to previous.                 My interpretation of imaging:    CT Chest With Contrast Diagnostic (09/05/2024 12:27)         Assessment and Plan {CC Problem List  Visit Diagnosis  ROS  Review (Popup)  [facility] Maintenance  Quality  BestPractice  Medications  SmartSets  SnapShot Encounters  Media : 23}    Diagnoses and all orders for this visit:    1. Lung nodules (Primary)  Assessment & Plan:  Some new nodular changes were noted on her recent CT performed at [facility] on September 5 which I think are likely related to a previous infectious or inflammatory process but a follow-up CT has been scheduled for early December and I will see her in the office shortly thereafter.      2. Bronchiectasis without complication  Assessment & Plan:  She has had some bronchiectatic changes noted on prior chest CTs but is not having any significant respiratory symptoms at this time.  Previous pulmonary functions revealed a borderline obstructive ventilatory defect and she may continue her albuterol HFA as needed.      3. Personal history of malignant neoplasm of bronchus and lung  Assessment & Plan:  Again her most recent navigational bronchoscopy was negative for  evidence of recurrent or metastatic disease.  A follow-up CT has been scheduled for early December.      4. Personal history of nicotine dependence  Assessment & Plan:  She has not smoked since 1996.            Dion Cortés MD  10/31/2024  12:50 CDT    Follow Up   Return in about 6 weeks (around 12/11/2024) for To see me specifically.    Patient was given instructions and counseling regarding her condition or for health maintenance advice. Please see specific information pulled into the AVS if appropriate.

## 2024-10-31 NOTE — ASSESSMENT & PLAN NOTE
She has had some bronchiectatic changes noted on prior chest CTs but is not having any significant respiratory symptoms at this time.  Previous pulmonary functions revealed a borderline obstructive ventilatory defect and she may continue her albuterol HFA as needed.

## 2024-10-31 NOTE — ASSESSMENT & PLAN NOTE
Again her most recent navigational bronchoscopy was negative for evidence of recurrent or metastatic disease.  A follow-up CT has been scheduled for early December.

## 2024-12-06 ENCOUNTER — HOSPITAL ENCOUNTER (OUTPATIENT)
Dept: CT IMAGING | Facility: HOSPITAL | Age: 79
Discharge: HOME | End: 2024-12-06
Payer: MEDICARE

## 2024-12-06 DIAGNOSIS — Z85.118 PERSONAL HISTORY OF MALIGNANT NEOPLASM OF BRONCHUS AND LUNG: ICD-10-CM

## 2024-12-06 PROCEDURE — 25510000001 IOPAMIDOL 61 % SOLUTION: Performed by: INTERNAL MEDICINE

## 2024-12-06 PROCEDURE — 71260 CT THORAX DX C+: CPT

## 2024-12-06 RX ORDER — IOPAMIDOL 612 MG/ML
100 INJECTION, SOLUTION INTRAVASCULAR
Status: COMPLETED | OUTPATIENT
Start: 2024-12-06 | End: 2024-12-06

## 2024-12-06 RX ADMIN — IOPAMIDOL 100 ML: 612 INJECTION, SOLUTION INTRAVENOUS at 11:28

## 2024-12-11 ENCOUNTER — OFFICE VISIT (OUTPATIENT)
Dept: PULMONOLOGY | Facility: CLINIC | Age: 79
End: 2024-12-11
Payer: MEDICARE

## 2024-12-11 VITALS
DIASTOLIC BLOOD PRESSURE: 68 MMHG | HEIGHT: 64 IN | WEIGHT: 116.6 LBS | HEART RATE: 69 BPM | OXYGEN SATURATION: 98 % | BODY MASS INDEX: 19.91 KG/M2 | SYSTOLIC BLOOD PRESSURE: 122 MMHG

## 2024-12-11 DIAGNOSIS — J47.9 BRONCHIECTASIS WITHOUT COMPLICATION: Chronic | ICD-10-CM

## 2024-12-11 DIAGNOSIS — Z85.118 PERSONAL HISTORY OF MALIGNANT NEOPLASM OF BRONCHUS AND LUNG: Chronic | ICD-10-CM

## 2024-12-11 DIAGNOSIS — Z87.891 PERSONAL HISTORY OF NICOTINE DEPENDENCE: Chronic | ICD-10-CM

## 2024-12-11 DIAGNOSIS — R91.8 LUNG NODULES: Primary | Chronic | ICD-10-CM

## 2024-12-11 DIAGNOSIS — J44.9 COPD, MODERATE: Chronic | ICD-10-CM

## 2024-12-11 NOTE — ASSESSMENT & PLAN NOTE
I think some of the nodular changes may relate to some underlying bronchiectasis.  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  I would definitely not recommend therapy for M avium complex at this time.

## 2024-12-11 NOTE — PROGRESS NOTES
Chief Complaint  Lung nodules and COPD    Subjective    History of Present Illness {  Problem List  Visit Diagnosis   Encounters  Notes  Medications  Labs  Result Review Imaging  Media: 23}    Trniidad Zhu presents to [facility] for lung nodules and COPD.    History of Present Illness   The patient is accompanied by her  today.  She is doing well clinically.  Her chest CT performed last week is reviewed with the patient and her .  Shows stability of her nodular changes at the right base.  I did tell her if Dr. Palacios did feel that additional tissue was required we could refer back to Dr. Rodas for another navigational bronchoscopy but her last procedure just revealed inflammatory changes.  Otherwise I will see her back in about 3 months and if follow-up CT has not been performed in the interim I can schedule 1 at that time.  She is doing well from a pulmonary standpoint at this time.  She has had the COVID-19 vaccine including 2 standard boosters, bivalent booster, and a Spikevax booster in the form of the Moderna vaccine.  She had the flu shot in October has had a Prevnar 13, Pneumovax, and RSV vaccine in the past as well.    Current Outpatient Medications:     albuterol sulfate  (90 Base) MCG/ACT inhaler, Inhale 2 puffs Every 4 (Four) Hours As Needed for Wheezing or Shortness of Air., Disp: 18 g, Rfl: 11    alendronate (FOSAMAX) 70 MG tablet, Take 1 tablet by mouth Every 7 (Seven) Days. Saturdays, Disp: , Rfl: 3    aspirin 81 MG chewable tablet, Chew 1 tablet Every Night., Disp: , Rfl:     Calcium Carb-Cholecalciferol (CALCIUM 500+D PO), Take 1 tablet by mouth Daily., Disp: , Rfl:     carvedilol (COREG) 3.125 MG tablet, Take 1 tablet by mouth 2 (Two) Times a Day With Meals. (Patient taking differently: Take 2 tablets by mouth 2 (Two) Times a Day With Meals.), Disp: 60 tablet, Rfl: 0    cetirizine (ZyrTEC) 10 MG tablet, Take  "1 tablet by mouth At Night As Needed., Disp: , Rfl:     clopidogrel (PLAVIX) 75 MG tablet, Take 1 tablet by mouth Daily., Disp: , Rfl:     famotidine (PEPCID) 40 MG tablet, Take 1 tablet by mouth 2 (Two) Times a Day., Disp: , Rfl: 3    furosemide (LASIX) 20 MG tablet, Take 1 tablet by mouth As Needed (edema)., Disp: , Rfl:     levothyroxine (SYNTHROID, LEVOTHROID) 75 MCG tablet, Take 1 tablet by mouth Daily., Disp: , Rfl: 3    losartan (COZAAR) 50 MG tablet, Take 1 tablet by mouth Daily., Disp: , Rfl:     multivitamin with minerals tablet tablet, Take 1 tablet by mouth Daily., Disp: , Rfl:     predniSONE (DELTASONE) 20 MG tablet, Take 1 tablet by mouth As Needed (arthritis)., Disp: , Rfl:     rosuvastatin (CRESTOR) 40 MG tablet, Take 1 tablet by mouth Every Night., Disp: , Rfl:     traMADol (ULTRAM) 50 MG tablet, Take 1 tablet by mouth Every 3 (Three) Hours As Needed for Moderate Pain or Severe Pain., Disp: , Rfl: 0    albuterol sulfate  (90 Base) MCG/ACT inhaler, Inhale 2 puffs Every 4 (Four) Hours As Needed for Wheezing. (Patient not taking: Reported on 2024), Disp: , Rfl:     diclofenac (VOLTAREN) 25 MG EC tablet, Take 1 tablet by mouth 2 (Two) Times a Day. (Patient not taking: Reported on 2024), Disp: , Rfl:     Social History     Socioeconomic History    Marital status:    Tobacco Use    Smoking status: Former     Current packs/day: 0.00     Average packs/day: 1.5 packs/day for 20.0 years (30.0 ttl pk-yrs)     Types: Cigarettes     Start date:      Quit date:      Years since quittin.9     Passive exposure: Past    Smokeless tobacco: Never   Vaping Use    Vaping status: Never Used   Substance and Sexual Activity    Alcohol use: Yes     Comment: occasionally    Drug use: No    Sexual activity: Not Currently       Objective   Vital Signs:   /68   Pulse 69   Ht 161.3 cm (63.5\")   Wt 52.9 kg (116 lb 9.6 oz)   SpO2 98% Comment: RA  BMI 20.33 kg/m²     Physical " Exam  Vitals and nursing note reviewed.   HENT:      Head: Normocephalic.   Eyes:      Extraocular Movements: Extraocular movements intact.      Pupils: Pupils are equal, round, and reactive to light.   Cardiovascular:      Rate and Rhythm: Normal rate and regular rhythm.   Pulmonary:      Effort: Pulmonary effort is normal.      Comments: Lung fields are clear with good air movement bilaterally and no adventitious sounds are heard.  Musculoskeletal:         General: Normal range of motion.   Skin:     General: Skin is warm and dry.   Neurological:      General: No focal deficit present.      Mental Status: She is alert and oriented to person, place, and time.   Psychiatric:         Mood and Affect: Mood normal.         Behavior: Behavior normal.        Result Review :    PFT Values          2/15/2023    15:15   Pre Drug PFT Results   FVC 79   FEV1 78   FEF 25-75% 75   FEV1/FVC 76         Results for orders placed during the hospital encounter of 07/18/24    Complete PFT - Pre & Post Bronchodilator    Narrative  [facility] - Pulmonary Function Test    [address] [place] [address]  [address]  [phone]    Patient : Trinidad Zhu  MRN : [id number]  CSN : [id number]  Pulmonologist : Dion Cortés MD  Date : 7/24/2024    ______________________________________________________________________    Interpretation :  1.  Spirometry is consistent with a moderate obstructive ventilatory defect.  2.  There is improvement in midflows postbronchodilator although they remain decreased.  There is actually some worsening of peak expiratory flow postbronchodilator.  Otherwise there is no significant change in spirometry postbronchodilator.  3.  Lung volumes reveal a low normal total lung capacity with a mild decrease in vital capacity suggestive of a possible borderline restrictive ventilatory defect.  There is also a very mild decrease in inspiratory capacity.  4.  There is a moderate diffusion impairment which  when corrected for alveolar volume is a mild diffusion impairment.  5.  When current studies are compared to studies performed on February 15, 2023, there is no significant change in the patient's forced vital capacity compared to previous baseline values.  There has been a decline in the FEV1 compared to previous baseline values.      Dion Cortés MD      Results for orders placed in visit on 02/15/23    Spirometry Without Bronchodilator    Narrative  Spirometry Without Bronchodilator  Performed by: Samantha Cantor, RRT  Authorized by: Dion Cortés MD    Pre Drug % Predicted  FVC: 79%  FEV1: 78%  FEF 25-75%: 75%  FEV1/FVC: 76%    Interpretation  Spirometry  Spirometry shows mild restriction. There is reduced midflow suggesting small airway/airflow obstruction.  Overall comments: The patient's spirometry is most consistent with a mild restrictive ventilatory defect with a coexisting mild decrease in midflows.  When current studies are compared to studies performed on  of last year, she has had slight improvement in both her FVC and FEV1 compared to previous.  Midflows also show some improvement as well.      Results for orders placed in visit on 22    Full Pulmonary Function Test Without Bronchodilator    Narrative  Full Pulmonary Function Test Without Bronchodilator  Performed by: Samantha Cantor, RRT  Authorized by: Dion Cortés MD    Pre Drug % Predicted  FVC: 77%  FEV1: 73%  FEF 25-75%: 67%  FEV1/FVC: 73%  T%  RV: 262%  DLCO: 54%  D/VAsb: 74%    Interpretation  Spirometry  Spirometry shows mild restriction. There is reduced midflow suggesting small airway/airflow obstruction.  Diffusion Capacity  The patient's diffusion capacity is moderately reduced.  Diffusion capacity is mildly reduced when corrected for alveolar volume.  Overall comments: The patient's spirometry is consistent with a mild restrictive ventilatory defect with a coexisting decrease in  midflows.  Accurate lung volumes could not be obtained.  She has a moderate diffusion impairment which when corrected for alveolar volume is a mild diffusion impairment.  When current studies are compared to studies from [facility] from November 1, 2021, there has been a decline in her FVC on current baseline spirometry compared to previous pre and postbronchodilator studies but no significant change in her FEV1 compared to previous pre and postbronchodilator studies.  When corrected for alveolar volume, there has been a decline in her diffusion capacity compared to previous.                 My interpretation of imaging:    CT Chest With Contrast Diagnostic (12/06/2024 11:28)         Assessment and Plan {CC Problem List  Visit Diagnosis  ROS  Review (Popup)  Health Maintenance  Quality  BestPractice  Medications  SmartSets  SnapShot Encounters  Media : 23}    Diagnoses and all orders for this visit:    1. Lung nodules (Primary)  Assessment & Plan:  Her chest CT performed last week was stable and I reviewed the scan with the patient and her .      2. COPD, moderate  Assessment & Plan:  She may continue her albuterol HFA as needed.      3. Bronchiectasis without complication  Assessment & Plan:  I think some of the nodular changes may relate to some underlying bronchiectasis.  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  I would definitely not recommend therapy for M avium complex at this time.      4. Personal history of malignant neoplasm of bronchus and lung  Assessment & Plan:  Her recent chest CT was stable.      5. Personal history of nicotine dependence  Assessment & Plan:  She quit smoking in 1996.            Dion Cortés MD  12/11/2024  14:13 CST    Follow Up   Return in about 14 weeks (around 3/19/2025) for To see me specifically.    Patient was given instructions and counseling regarding her condition or for health maintenance advice. Please  see specific information pulled into the AVS if appropriate.

## 2024-12-11 NOTE — PATIENT INSTRUCTIONS
The patient's chest CT performed last week is reviewed with her  who accompanies her.  She has some chronic stable nodular changes in the right base likely due to her known M avium complex.  I told her I would just recommend a follow-up scan in approximately 3 months and I will see her back in mid March.  She is scheduled see Dr. Palacios next week and if she is scheduled follow-up scan prior that time I will review when she returns and if not I can schedule to be performed when she returns.

## 2025-01-20 NOTE — PROGRESS NOTES
[facility] GROUP HEMATOLOGY & ONCOLOGY  [address]  [place] [facility] [address] [phone]    Patient Name: Trinidad Zhu  Encounter Date: 01/28/2025  YOB: 1945  Patient Number: [id number]      REASON FOR FOLLOW-UP: Trinidad Zhu is a pleasant 79 y.o.  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  The patient had undergone resection on 11/5/2021.  Patient is seen with Yair, her spouse. History is obtained from patient.  Patient is a reliable historian.          Oncology/Hematology History Overview Note     DIAGNOSTIC ABNORMALITIES:Patient found to have lung nodule.   CT chest 07/20/2020. No change in 1.5 cm RIGHT upper lobe pulmonary nodule. However, there has been very slight increase in size compared to multiple prior  studies dating back to 2016. Recommend continued imaging surveillance.    Increased size of clustered nodules in the inferior RIGHT upper  lobe and new cluster of tree-in-bud nodularity in the RIGHT lower lobe. Differential includes an indolent infectious/inflammatory process such as atypical mycobacterial infection (NAE). Recommend continued follow-up  CT chest 09/03/2021. Spiculated nodule in the right upper lobe have slightly increased in size. Recommend PET/CT.  PET 09/10/2021. FDG uptake within the spiculated 2 cm right upper lobe nodule with SUV of 2.85 concerning for malignancy. Mild FDG uptake within the reticulonodular changes of the more inferior right upper lobe and right middle lobe with SUV of 1.1 favoring an inflammatory/infectious process. No evidence of distant metastasis.  CT chest 10/07/2021.  Spiculated pleural-based right upper lobe nodule that appears stable compared to 9/3/2021 examination.  Right lung nodularity with peribronchial thickening and bronchiectasis, stable.  Developing mild reticular nodular opacities posteriorly in the  left lower lobe and minimally in the right lower lobe. Acute inflammatory change suspected. Correlate with patient presentation.  CT chest 11/09/2021. Postoperative change of VATS RIGHT upper lobectomy. There is fluid/soft tissue with intermixed gas in the RIGHT lung apex extending into the RIGHT hilum. A small RIGHT pneumothorax is present with right-sided chest tube in good position.  Pathology report 11/12/2021. Lung, right upper lobectomy (frozen section control):  A.  Moderately differentiated acinar predominant nonmucinous adenocarcinoma with a peripheral lepidic component (2.5 cm).    B.  The bronchial, vascular and parenchymal surgical margins are negative for malignancy.  C.  The tumor extends up to, but does not invade, the visceral pleura.  D.  Pulmonary caseating granulomata.  E.  Emphysematous changes.  F.  Peribronchial lymph nodes (2), with noncaseating granulomata, negative for metastatic carcinoma.  AJCC stage:pT1c, pN0.         PREVIOUS INTERVENTIONS:  She had undergone right upper lobe lobectomy 11/05/2021.     Lung nodules   1/23/2019 Initial Diagnosis    Lung nodules     9/3/2021 Imaging    CT Chest  Spiculated right upper lobe nodule is redemonstrated, measuring 2.1 x 1.3 x 1.2 cm, previously 1.6 x 1.1 x 1.2 cm. Associated peripheral groundglass opacities. Emphysema. Bronchiectasis in the right upper and middle lobe redemonstrated. Right fissure-based nodule measuring 5 mm, previously 3 (image 82, series 3. Additional nodularities along the right minor fissure are similar to decrease. Mm.  No pleural effusion is present. The trachea and bronchial tree are patent.  1. Spiculated nodule in the right upper lobe have slightly increased in size. Recommend PET/CT.     9/10/2021 Procedure    09-  PFTs  Pulmonary Function Test   Pre Drug % Predicted    FVC: 80%   FEV1: 64%   FEF 25-75%: 28%   FEV1/FVC: 61.74%   Interpretation   Spirometry   Spirometry shows moderate obstruction. There is reduced  midflow suggesting small airway/airflow obstruction.   Review of FVL curve   Patient's effort is normal.     9/10/2021 Imaging    PET/CT:  IMPRESSION:  1. FDG uptake within the spiculated 2 cm right upper lobe nodule with  SUV of 2.85 concerning for malignancy.  2. Mild FDG uptake within the reticulonodular changes of the more  inferior right upper lobe and right middle lobe with SUV of 1.1 favoring  an inflammatory/infectious process.  3. No evidence of distant metastasis.     9/21/2021 Biopsy    09-  Mike bronchoscopy RUL  Lung, Right upper lobe; transbronchial biopsy:  Atypical proliferation, highly suspicious for well-differentiated adenocarcinoma  Comments:  Recuts are evaluated.  The atypical proliferation is highlighted with immunohistochemical TIF1 staining (control appropriate).  The appearance could suggest a lepidic, well differentiated, or scar-carcinoma type lesion.    Lymph node, 10R FNA:  lymph node sampling  Lavage:  inflammation/ inflammatory debris and macrophages  Broncheoalveolar lavage, RUL  Rare groups of mildly atypical epithelial cells     10/7/2021 Imaging    CT Chest:  IMPRESSION:  1. Spiculated pleural-based right upper lobe nodule that appears stable  compared to 9/3/2021 examination.  2. Right lung nodularity with peribronchial thickening and  bronchiectasis, stable.  3. Developing mild reticular nodular opacities posteriorly in the left  lower lobe and minimally in the right lower lobe. Acute inflammatory  change suspected. Correlate with patient presentation.  4. Continued follow-up recommended.     Adenocarcinoma   12/14/2021 Initial Diagnosis    Adenocarcinoma (HCC)         PAST MEDICAL HISTORY:  ALLERGIES:  Allergies   Allergen Reactions    Baclofen Other (See Comments)     MUSCULOSKELETAL THERAPY AGENTS knocked her out for a day    Other reaction(s): Unknown    Gabapentin Confusion     Other reaction(s): over sedation    Sulfa Antibiotics Rash     Mouth blisters     Sulfacetamide Sodium Rash    Levofloxacin Other (See Comments)     tendonitis, muscle pain    Amitriptyline Hcl Confusion     Other reaction(s): ambulation difficulties/confusion    Niferex [Iron Polysaccharide] Swelling     LIP SWELLING    Oxycodone-Acetaminophen GI Intolerance     Other reaction(s): vomiting     CURRENT MEDICATIONS:  Outpatient Encounter Medications as of 1/28/2025   Medication Sig Dispense Refill    albuterol sulfate  (90 Base) MCG/ACT inhaler Inhale 2 puffs Every 4 (Four) Hours As Needed for Wheezing.      albuterol sulfate  (90 Base) MCG/ACT inhaler Inhale 2 puffs Every 4 (Four) Hours As Needed for Wheezing or Shortness of Air. 18 g 11    alendronate (FOSAMAX) 70 MG tablet Take 1 tablet by mouth Every 7 (Seven) Days. Saturdays  3    aspirin 81 MG chewable tablet Chew 1 tablet Every Night.      Calcium Carb-Cholecalciferol (CALCIUM 500+D PO) Take 1 tablet by mouth Daily.      carvedilol (COREG) 3.125 MG tablet Take 1 tablet by mouth 2 (Two) Times a Day With Meals. (Patient taking differently: Take 2 tablets by mouth 2 (Two) Times a Day With Meals.) 60 tablet 0    cetirizine (ZyrTEC) 10 MG tablet Take 1 tablet by mouth At Night As Needed.      clopidogrel (PLAVIX) 75 MG tablet Take 1 tablet by mouth Daily.      diclofenac (VOLTAREN) 25 MG EC tablet Take 1 tablet by mouth 2 (Two) Times a Day.      famotidine (PEPCID) 40 MG tablet Take 1 tablet by mouth 2 (Two) Times a Day.  3    furosemide (LASIX) 20 MG tablet Take 1 tablet by mouth As Needed (edema).      levothyroxine (SYNTHROID, LEVOTHROID) 75 MCG tablet Take 1 tablet by mouth Daily.  3    losartan (COZAAR) 50 MG tablet Take 1 tablet by mouth Daily.      multivitamin with minerals tablet tablet Take 1 tablet by mouth Daily.      predniSONE (DELTASONE) 20 MG tablet Take 1 tablet by mouth As Needed (arthritis).      rosuvastatin (CRESTOR) 40 MG tablet Take 1 tablet by mouth Every Night.      traMADol (ULTRAM) 50 MG tablet Take 1  tablet by mouth Every 3 (Three) Hours As Needed for Moderate Pain or Severe Pain.  0     No facility-administered encounter medications on file as of 1/28/2025.     ADULT ILLNESSES:  Patient Active Problem List   Diagnosis Code    Spinal stenosis, cervical region M48.02    Lung nodules R91.8    Bronchiectasis without complication J47.9    Underweight R63.6    Personal history of nicotine dependence Z87.891    Restrictive lung disease J98.4    Pulmonary emphysema J43.9    PAD (peripheral artery disease) I73.9    Preop testing Z01.818    Gastroesophageal reflux disease K21.9    Allergic rhinitis J30.9    ORTIZ (dyspnea on exertion) R06.09    CAD (coronary artery disease) I25.10    Hyperlipidemia E78.5    Neuropathy G62.9    Hypertension I10    Simple chronic bronchitis J41.0    Former smoker Z87.891    Lung nodule R91.1    Mass of right lung R91.8    Postoperative anemia due to acute blood loss D62    Syncope and collapse R55    Adenocarcinoma C80.1    Personal history of malignant neoplasm of bronchus and lung Z85.118    Hydropneumothorax J94.8    Lower extremity embolism I74.3    Generalized muscle weakness M62.81    Acute blood loss anemia D62    Melena K92.1    Urinary incontinence R32    Gastrointestinal hemorrhage K92.2    Iron deficiency anemia D50.9    Wheezing R06.2    COPD, moderate J44.9     SURGERIES:  Past Surgical History:   Procedure Laterality Date    ANTERIOR CERVICAL DISCECTOMY W/ FUSION N/A 05/22/2017    Procedure: CERVICAL DISCECTOMY ANTERIOR FUSION WITH INSTRUMENTATION;  Surgeon: NADINE Sarabia MD;  Location:  [facility] OR;  Service:     AORTAGRAM Left 11/09/2020    Procedure: left lower extremtiy angiogram, hawk athrectomy, balloon angioplasty, stent placement, mynx closure;  Surgeon: Sukhdev Condon DO;  Location:  [facility] HYBRID OR 12;  Service: Vascular;  Laterality: Left;    AORTAGRAM Left 03/26/2021    Procedure: LEFT LOWER EXTREMITY ANGIOGRAM, BALLOON ANGIOPLASTY, STENT PLACEMENT, MYNX  CLOSURE;  Surgeon: Sukhdev Condon DO;  Location:  [facility] HYBRID OR 12;  Service: Vascular;  Laterality: Left;    AORTAGRAM Left 08/06/2021    Procedure: LEFT LOWER EXTREMITY ANGIOGRAM, HAWK ATHERECTOMY, BALLOON ANGIOPLASTY, MYNX CLOSURE;  Surgeon: Sukhdev Condon DO;  Location:  [facility] HYBRID OR 12;  Service: Vascular;  Laterality: Left;    AORTAGRAM Right 06/08/2022    Procedure: RIGHT LOWER EXTREMITY ANGIOGRAM, INTRAVASCULAR LITHOTRIPSY, HAWK ATHRECTOMY, BALLOON ANGIOPLASTY, MYNX CLOSURE;  Surgeon: Sukhdev Condon DO;  Location:  [facility] HYBRID OR 12;  Service: Vascular;  Laterality: Right;    AORTAGRAM Left 10/21/2022    Procedure: LEFT LOWER EXTREMITY ANGIOGRAM WITH HAWK ATHRECTOMY, BALLOON ANGIOPLASTY, STENT PLACEMENT, MYNX CLOSURE;  Surgeon: Sukhdev Condon DO;  Location: [facility] HYBRID OR 12;  Service: Vascular;  Laterality: Left;    BLADDER SUSPENSION      also had pelvic reconstructive surgery    BREAST EXCISIONAL BIOPSY Right 1985    CARDIAC CATHETERIZATION      CATARACT EXTRACTION, BILATERAL      CERVICAL CORPECTOMY N/A 05/22/2017    Procedure:  ANTERIOR CERVICAL DISCECTOMY FUSION C-6 to T1,  ANTERIOR FUSION WITH INSTRUMENTATION C-5 T-1;  Surgeon: NADINE Sarabia MD;  Location: [facility] OR;  Service:     COLONOSCOPY  08/19/2015    TIC BX RT COLON    COLONOSCOPY  12/04/2013    RT COLON BX RECALL 5YR    COLONOSCOPY N/A 11/29/2016    The entire examined colon is normal; No specimens collected    COLONOSCOPY N/A 06/02/2022    Procedure: COLONOSCOPY WITH ANESTHESIA;  Surgeon: Marco Antonio Vallejo MD;  Location: [facility] ENDOSCOPY;  Service: Gastroenterology;  Laterality: N/A;  Pre: screen  Post: diverticulosis  Andrew Layne MD    COLONOSCOPY N/A 04/05/2023    Procedure: COLONOSCOPY WITH ANESTHESIA;  Surgeon: Mike Kearns MD;  Location: [facility] ENDOSCOPY;  Service: Gastroenterology;  Laterality: N/A;  preop: GI bleed;melena  post op: diverticulosis, avm  PCP: Andrew Layne    ENDOSCOPY  12/03/2015     HEALED ULCER SLANT BX    ENDOSCOPY N/A 04/01/2023    Procedure: ESOPHAGOGASTRODUODENOSCOPY WITH ANESTHESIA;  Surgeon: Patric Reyes DO;  Location:  [facility] OR;  Service: Gastroenterology;  Laterality: N/A;  PRE GI BLEED  POST gastric ulcers  DR LINN TY    FEMORAL ENDARTERECTOMY Left 10/21/2022    Procedure: LEFT LOWER EXTREMITY ANGIOGRAM;  Surgeon: Sukhdev Condon DO;  Location:  [facility] HYBRID OR 12;  Service: Vascular;  Laterality: Left;    HYSTERECTOMY      LEG THROMBECTOMY/EMBOLECTOMY Left 06/08/2022    Procedure: LOWER EXTREMITY THROMBECTOMY/EMBOLECTOMY, PATCH GRAFT TO  FEMORAL ARTERY;  Surgeon: Sukhdev Condon DO;  Location:  [facility] HYBRID OR 12;  Service: Vascular;  Laterality: Left;    LOBECTOMY Right 11/05/2021    Procedure: RIGHT THORACOSCOPY WITH FrengoI ROBOT, RIGHT UPPER LOBE LOBECTOMY;  Surgeon: Jarad Ignacio MD;  Location:  [facility] OR;  Service: Robotics - DaVinci;  Laterality: Right;    LUNG SURGERY      OTHER SURGICAL HISTORY      KNEE CARTILAGE REMOVED    OTHER SURGICAL HISTORY      BENIGN FIBROID TUMOR OF BREAST 1985 REMOVED    TONSILLECTOMY       HEALTH MAINTENANCE ITEMS:  Health Maintenance Due   Topic Date Due    TDAP/TD VACCINES (1 - Tdap) Never done    ZOSTER VACCINE (1 of 2) Never done    ANNUAL WELLNESS VISIT  06/13/2024    LIPID PANEL  10/24/2024       <no information>  Last Completed Colonoscopy            COLORECTAL CANCER SCREENING (COLONOSCOPY - Every 7 Years) Tentatively due on 4/5/2030 04/05/2023  Surgical Procedure: COLONOSCOPY    04/05/2023  SCANNED - COLONOSCOPY    04/01/2023  Fecal Occult Blood component of Occult Blood X 1, Stool - Stool, Per Rectum    06/02/2022  COLONOSCOPY    06/02/2022  Surgical Procedure: COLONOSCOPY    Only the first 5 history entries have been loaded, but more history exists.                  Immunization History   Administered Date(s) Administered    Arexvy (RSV, Adults 60+ yrs) 10/19/2023    COVID-19 (MODERNA) 12YRS+ (SPIKEVAX)  10/31/2023, 10/16/2024    COVID-19 (MODERNA) 1st,2nd,3rd Dose Monovalent 2021, 2021, 10/26/2021    COVID-19 (MODERNA) BIVALENT 12+YRS 10/03/2022    COVID-19 (MODERNA) Monovalent Original Booster 2022    Flu Vaccine Split Quad 2020    Fluzone  >6mos 10/13/2010    Fluzone High-Dose 65+YRS 10/10/2019, 10/01/2021, 10/03/2022, 10/16/2024    Fluzone High-Dose 65+yrs 10/31/2023    Fluzone Quad >6mos (Multi-dose) 10/01/2018    INFLUENZA SPLIT TRI 10/01/2019    Pneumococcal Conjugate 13-Valent (PCV13) 2016    Pneumococcal Polysaccharide (PPSV23) 2015, 2016, 10/01/2019     Last Completed Mammogram            Discontinued - MAMMOGRAM  Discontinued        Frequency changed to Never automatically (Topic No Longer Applies)    2024  Mammo Screening Digital Tomosynthesis Bilateral With CAD    2023  Mammo Screening Digital Tomosynthesis Bilateral With CAD    2022  Mammo Screening Digital Tomosynthesis Bilateral With CAD    2021  Mammo Screening Digital Tomosynthesis Bilateral With CAD    Only the first 5 history entries have been loaded, but more history exists.                      FAMILY HISTORY:  Family History   Problem Relation Age of Onset    Breast cancer Maternal Aunt     Heart disease Mother     Heart disease Father     GI problems Neg Hx         MALIGNANCIES    Colon cancer Neg Hx     Colon polyps Neg Hx      SOCIAL HISTORY:  Social History     Socioeconomic History    Marital status:    Tobacco Use    Smoking status: Former     Current packs/day: 0.00     Average packs/day: 1.5 packs/day for 20.0 years (30.0 ttl pk-yrs)     Types: Cigarettes     Start date:      Quit date:      Years since quittin.0     Passive exposure: Past    Smokeless tobacco: Never   Vaping Use    Vaping status: Never Used   Substance and Sexual Activity    Alcohol use: Yes     Comment: occasionally    Drug use: No    Sexual activity: Not Currently       REVIEW OF  "SYSTEMS:    Review of Systems   Constitutional:  Negative for fatigue, fever and unexpected weight change.        \"I feel good.\"   HENT:  Negative for congestion and mouth sores.    Eyes:  Negative for discharge and redness.   Respiratory:  Negative for shortness of breath and wheezing.    Cardiovascular:  Negative for chest pain and palpitations.   Gastrointestinal:  Positive for vomiting. Negative for constipation, diarrhea and nausea.   Endocrine: Negative for cold intolerance.   Genitourinary:  Negative for difficulty urinating and dysuria.   Musculoskeletal:  Negative for gait problem and joint swelling.   Skin:  Negative for pallor.   Allergic/Immunologic: Negative for food allergies.   Neurological:  Negative for dizziness, speech difficulty and weakness.   Hematological:  Negative for adenopathy. Bruises/bleeds easily.   Psychiatric/Behavioral:  Negative for agitation and confusion. The patient is not nervous/anxious.        VITAL SIGNS: /78   Pulse 79   Temp 98.1 °F (36.7 °C) (Temporal)   Resp 18   Ht 161.3 cm (63.5\")   Wt 54.4 kg (120 lb)   SpO2 97%   Breastfeeding No   BMI 20.92 kg/m²  Gained 5 pounds.  Pain Score    01/28/25 1337   PainSc: 0-No pain       PHYSICAL EXAMINATION:     Physical Exam  Vitals reviewed.   Constitutional:       General: She is not in acute distress.  HENT:      Head: Normocephalic and atraumatic.   Eyes:      General: No scleral icterus.  Cardiovascular:      Rate and Rhythm: Normal rate.   Pulmonary:      Effort: No respiratory distress.      Breath sounds: No wheezing.   Abdominal:      General: Bowel sounds are normal. There is no distension.      Palpations: Abdomen is soft.   Musculoskeletal:         General: No swelling.      Cervical back: Neck supple.   Skin:     Coloration: Skin is not pale.   Neurological:      Mental Status: She is alert and oriented to person, place, and time.   Psychiatric:         Mood and Affect: Mood normal.         Behavior: " Behavior normal.         Thought Content: Thought content normal.         Judgment: Judgment normal.         LABS    Lab Results - Last 18 Months   Lab Units 01/28/25  1323 09/10/24  1035 06/06/24  1040 03/05/24  1038 12/04/23  1056 08/15/23  1516   HEMOGLOBIN g/dL 11.6* 12.9 13.1 11.8* 11.5* 11.2*   HEMATOCRIT % 36.2 38.9 39.0 36.0 36.6 36.0   MCV fL 98.9* 99.7* 98.0* 99.4* 93.6 95.7   WBC 10*3/mm3 7.95 6.61 4.91 5.85 10.76 5.50   RDW % 12.9 13.0 12.2* 14.6 13.5 13.2   MPV fL 10.2 9.8 10.2 10.1 9.5 9.9   PLATELETS 10*3/mm3 163 172 134* 158 258 160   IMM GRAN % % 0.3 0.2 0.0 0.2 0.4 0.2   NEUTROS ABS 10*3/mm3 4.21 3.18 2.42 3.23 7.47* 2.95   LYMPHS ABS 10*3/mm3 2.49 2.23 1.60 1.70 1.58 1.69   MONOS ABS 10*3/mm3 0.82 0.78 0.52 0.68 1.31* 0.60   EOS ABS 10*3/mm3 0.36 0.37 0.34 0.19 0.30 0.22   BASOS ABS 10*3/mm3 0.05 0.04 0.03 0.04 0.06 0.03   IMMATURE GRANS (ABS) 10*3/mm3 0.02 0.01 0.00 0.01 0.04 0.01   NRBC /100 WBC 0.0 0.0  --  0.0 0.0 0.0       Lab Results - Last 18 Months   Lab Units 09/10/24  1035 06/06/24  1112 06/06/24  1040 03/05/24  1105 03/05/24  1038 12/04/23  1056 12/04/23  1048 08/15/23  1516   GLUCOSE mg/dL 90  --  100*  --  87 108*  --  117*   SODIUM mmol/L 143  --  139  --  141 138  --  144   POTASSIUM mmol/L 4.3  --  3.9  --  4.2 4.3  --  3.6   CO2 mmol/L 30.0*  --  27.0  --  29.0 28.0  --  27.0   CHLORIDE mmol/L 104  --  103  --  104 95*  --  105   ANION GAP mmol/L 9.0  --  9.0  --  8.0 15.0  --  12.0   CREATININE mg/dL 0.74 0.70 0.62 0.70 0.63 1.24*   < > 0.66   BUN mg/dL 16  --  16  --  16 28*  --  7*   BUN / CREAT RATIO  21.6  --  25.8*  --  25.4* 22.6  --  10.6   CALCIUM mg/dL 9.2  --  9.4  --  9.6 10.0  --  9.5   ALK PHOS U/L 62  --  72  --  57 74  --   --    TOTAL PROTEIN g/dL 6.5  --  6.9  --  6.6 7.8  --   --    ALT (SGPT) U/L 26  --  31  --  29 25  --   --    AST (SGOT) U/L 36*  --  41*  --  39* 51*  --   --    BILIRUBIN mg/dL 0.6  --  0.6  --  0.5 0.5  --   --    ALBUMIN g/dL 4.3  --  4.4   "--  4.1 4.4  --   --    GLOBULIN gm/dL 2.2  --  2.5  --  2.5 3.4  --   --     < > = values in this interval not displayed.       No results for input(s): \"MSPIKE\", \"KAPPALAMB\", \"IGLFLC\", \"URICACID\", \"FREEKAPPAL\", \"CEA\", \"LDH\", \"REFLABREPO\" in the last 08896 hours.    Lab Results - Last 18 Months   Lab Units 03/05/24  1038 12/04/23  1056   IRON mcg/dL 111 32*   TIBC mcg/dL 338 416   IRON SATURATION (TSAT) % 33 8*   FERRITIN ng/mL 250.50* 163.00*       Trinidad RONNIE Zhu reports a pain score of 0.          ASSESSMENT:  1.  Adenocarcinoma of the lung, right upper lobe.Tumor size 2 cm. PDL1 15%, Negative ALK, ROS1, and EGFR. BRAF G644V positive.  AJCC stage:1A3(pTic, pN0, cM0, G2)  Treatment status: She was treated with right upper lobe lobectomy.  2.  Performance status of 0.  3.  30 pack years smoking history, etiology of her lung cancer. She had quit smoking. \"No sir.\"   4.  Normocytic anemia from iron deficiency due to gastrointestinal hemorrhage.  Diverticulosis in the sigmoid colon on 06/02/202.  Ferrous sulfate 12/7/2022 through 6/12/2023. 1 unit packed RBC 4/1/2023 and 4/4/2023.  Ferric gluconate 125 mg on 4/4/2023. \"Constipation.\"  She had ferric carboxymaltose on 12/21/2023.              PLAN:  1.   Re: Note from Dr. Cortés 12/11/2024.  Reviewed CT scan and stable.  Continue albuterol.  2.   Re: Note from Dr. Coréts 10/31/2024.  Some new nodular changes noted on her recent CT 9/5/2024.  Likely related to infectious or inflammatory process.  Follow-up in December.  3.   Re:  Heme status.  WBC 7.95, hemoglobin 11.6 and platelet 163.     4.   Re: CMP.  GFR latest 82.9 ml/min and AST latest from 36 from 41 from 39 from 51, she is on Crestor.   5.   Re: CT chest 12/6/2024.Stable exam. No change in multiple pulmonary nodules in the right lower lobe measuring up to 11 mm.  The largest nodules are spiculated. Differential includes metastatic disease versus an indolent infectious or " inflammatory process. Recommend continued follow-up.  6.   Re: Stable for observation, lung cancer.  7.  Continue care per primary care physician and the other specialists  8.  Plan of care discussed with patient and Don, her spouse.  Understanding expressed.  The patient agreed to proceed.  9.  Advance Care Planning   ACP discussion was held with the patient during this visit. Patient has an advance directive in EMR which is still valid.    10. Return to office in 3 months with preoffice CBC with differential, CMP.  CT of the chest to follow lung nodules 3/2025. To see Dr. Cortés on 3/20/2025.               I have reviewed the assessment and plan and verified the accuracy of it. No changes to assessment and plan since the information was documented. Cyrus Palacios MD 01/28/25           I spent 32 total minutes, face-to-face, caring for Trinidad brizuela. Greater than 50% of this time involved counseling and/or coordination of care as documented within this note.        (Dion Cortés MD)  (Jarad Ignacio MD)  (Kimberley Rodas MD)  Jose Layne MD

## 2025-01-28 ENCOUNTER — OFFICE VISIT (OUTPATIENT)
Dept: ONCOLOGY | Facility: CLINIC | Age: 80
End: 2025-01-28
Payer: MEDICARE

## 2025-01-28 ENCOUNTER — LAB (OUTPATIENT)
Dept: LAB | Facility: HOSPITAL | Age: 80
End: 2025-01-28
Payer: MEDICARE

## 2025-01-28 VITALS
HEART RATE: 79 BPM | DIASTOLIC BLOOD PRESSURE: 78 MMHG | OXYGEN SATURATION: 97 % | RESPIRATION RATE: 18 BRPM | WEIGHT: 120 LBS | HEIGHT: 64 IN | BODY MASS INDEX: 20.49 KG/M2 | TEMPERATURE: 98.1 F | SYSTOLIC BLOOD PRESSURE: 128 MMHG

## 2025-01-28 DIAGNOSIS — D50.8 IRON DEFICIENCY ANEMIA SECONDARY TO INADEQUATE DIETARY IRON INTAKE: ICD-10-CM

## 2025-01-28 DIAGNOSIS — Z85.118 PERSONAL HISTORY OF MALIGNANT NEOPLASM OF BRONCHUS AND LUNG: Primary | ICD-10-CM

## 2025-01-28 LAB
ALBUMIN SERPL-MCNC: 4.2 G/DL (ref 3.5–5.2)
ALBUMIN/GLOB SERPL: 1.9 G/DL
ALP SERPL-CCNC: 60 U/L (ref 39–117)
ALT SERPL W P-5'-P-CCNC: 20 U/L (ref 1–33)
ANION GAP SERPL CALCULATED.3IONS-SCNC: 11 MMOL/L (ref 5–15)
AST SERPL-CCNC: 32 U/L (ref 1–32)
BASOPHILS # BLD AUTO: 0.05 10*3/MM3 (ref 0–0.2)
BASOPHILS NFR BLD AUTO: 0.6 % (ref 0–1.5)
BILIRUB SERPL-MCNC: 0.5 MG/DL (ref 0–1.2)
BUN SERPL-MCNC: 17 MG/DL (ref 8–23)
BUN/CREAT SERPL: 22.4 (ref 7–25)
CALCIUM SPEC-SCNC: 9.2 MG/DL (ref 8.6–10.5)
CHLORIDE SERPL-SCNC: 104 MMOL/L (ref 98–107)
CO2 SERPL-SCNC: 27 MMOL/L (ref 22–29)
CREAT SERPL-MCNC: 0.76 MG/DL (ref 0.57–1)
DEPRECATED RDW RBC AUTO: 46.1 FL (ref 37–54)
EGFRCR SERPLBLD CKD-EPI 2021: 80.3 ML/MIN/1.73
EOSINOPHIL # BLD AUTO: 0.36 10*3/MM3 (ref 0–0.4)
EOSINOPHIL NFR BLD AUTO: 4.5 % (ref 0.3–6.2)
ERYTHROCYTE [DISTWIDTH] IN BLOOD BY AUTOMATED COUNT: 12.9 % (ref 12.3–15.4)
FERRITIN SERPL-MCNC: 57.77 NG/ML (ref 13–150)
GLOBULIN UR ELPH-MCNC: 2.2 GM/DL
GLUCOSE SERPL-MCNC: 84 MG/DL (ref 65–99)
HCT VFR BLD AUTO: 36.2 % (ref 34–46.6)
HGB BLD-MCNC: 11.6 G/DL (ref 12–15.9)
HOLD SPECIMEN: NORMAL
IMM GRANULOCYTES # BLD AUTO: 0.02 10*3/MM3 (ref 0–0.05)
IMM GRANULOCYTES NFR BLD AUTO: 0.3 % (ref 0–0.5)
IRON 24H UR-MRATE: 76 MCG/DL (ref 37–145)
IRON SATN MFR SERPL: 20 % (ref 20–50)
LYMPHOCYTES # BLD AUTO: 2.49 10*3/MM3 (ref 0.7–3.1)
LYMPHOCYTES NFR BLD AUTO: 31.3 % (ref 19.6–45.3)
MCH RBC QN AUTO: 31.7 PG (ref 26.6–33)
MCHC RBC AUTO-ENTMCNC: 32 G/DL (ref 31.5–35.7)
MCV RBC AUTO: 98.9 FL (ref 79–97)
MONOCYTES # BLD AUTO: 0.82 10*3/MM3 (ref 0.1–0.9)
MONOCYTES NFR BLD AUTO: 10.3 % (ref 5–12)
NEUTROPHILS NFR BLD AUTO: 4.21 10*3/MM3 (ref 1.7–7)
NEUTROPHILS NFR BLD AUTO: 53 % (ref 42.7–76)
NRBC BLD AUTO-RTO: 0 /100 WBC (ref 0–0.2)
PLATELET # BLD AUTO: 163 10*3/MM3 (ref 140–450)
PMV BLD AUTO: 10.2 FL (ref 6–12)
POTASSIUM SERPL-SCNC: 4 MMOL/L (ref 3.5–5.2)
PROT SERPL-MCNC: 6.4 G/DL (ref 6–8.5)
RBC # BLD AUTO: 3.66 10*6/MM3 (ref 3.77–5.28)
SODIUM SERPL-SCNC: 142 MMOL/L (ref 136–145)
TIBC SERPL-MCNC: 386 MCG/DL (ref 298–536)
TRANSFERRIN SERPL-MCNC: 259 MG/DL (ref 200–360)
WBC NRBC COR # BLD AUTO: 7.95 10*3/MM3 (ref 3.4–10.8)

## 2025-01-28 PROCEDURE — 82728 ASSAY OF FERRITIN: CPT

## 2025-01-28 PROCEDURE — 36415 COLL VENOUS BLD VENIPUNCTURE: CPT

## 2025-01-28 PROCEDURE — 80053 COMPREHEN METABOLIC PANEL: CPT

## 2025-01-28 PROCEDURE — 83540 ASSAY OF IRON: CPT

## 2025-01-28 PROCEDURE — 85025 COMPLETE CBC W/AUTO DIFF WBC: CPT

## 2025-01-28 PROCEDURE — 84466 ASSAY OF TRANSFERRIN: CPT

## 2025-03-17 ENCOUNTER — HOSPITAL ENCOUNTER (OUTPATIENT)
Dept: CT IMAGING | Facility: HOSPITAL | Age: 80
Discharge: HOME | End: 2025-03-17
Admitting: INTERNAL MEDICINE
Payer: MEDICARE

## 2025-03-17 DIAGNOSIS — Z85.118 PERSONAL HISTORY OF MALIGNANT NEOPLASM OF BRONCHUS AND LUNG: ICD-10-CM

## 2025-03-17 PROCEDURE — 71260 CT THORAX DX C+: CPT

## 2025-03-17 PROCEDURE — 25510000001 IOPAMIDOL 61 % SOLUTION: Performed by: INTERNAL MEDICINE

## 2025-03-17 RX ORDER — IOPAMIDOL 612 MG/ML
100 INJECTION, SOLUTION INTRAVASCULAR
Status: COMPLETED | OUTPATIENT
Start: 2025-03-17 | End: 2025-03-17

## 2025-03-17 RX ADMIN — IOPAMIDOL 100 ML: 612 INJECTION, SOLUTION INTRAVENOUS at 13:48

## 2025-03-20 ENCOUNTER — OFFICE VISIT (OUTPATIENT)
Dept: PULMONOLOGY | Facility: CLINIC | Age: 80
End: 2025-03-20
Payer: MEDICARE

## 2025-03-20 VITALS
HEIGHT: 64 IN | DIASTOLIC BLOOD PRESSURE: 76 MMHG | BODY MASS INDEX: 19.43 KG/M2 | SYSTOLIC BLOOD PRESSURE: 138 MMHG | WEIGHT: 113.8 LBS | HEART RATE: 74 BPM | OXYGEN SATURATION: 97 %

## 2025-03-20 DIAGNOSIS — R91.8 LUNG NODULES: Primary | Chronic | ICD-10-CM

## 2025-03-20 DIAGNOSIS — Z85.118 PERSONAL HISTORY OF MALIGNANT NEOPLASM OF BRONCHUS AND LUNG: Chronic | ICD-10-CM

## 2025-03-20 DIAGNOSIS — J47.9 BRONCHIECTASIS WITHOUT COMPLICATION: ICD-10-CM

## 2025-03-20 DIAGNOSIS — Z87.891 PERSONAL HISTORY OF NICOTINE DEPENDENCE: Chronic | ICD-10-CM

## 2025-03-20 DIAGNOSIS — J40 BRONCHITIS: ICD-10-CM

## 2025-03-20 PROBLEM — J47.0 BRONCHIECTASIS WITH ACUTE LOWER RESPIRATORY INFECTION: Status: ACTIVE | Noted: 2025-03-20

## 2025-03-20 RX ORDER — ONDANSETRON 8 MG/1
8 TABLET, FILM COATED ORAL
COMMUNITY
Start: 2025-02-20

## 2025-03-20 RX ORDER — HYDROXYCHLOROQUINE SULFATE 200 MG/1
200 TABLET, FILM COATED ORAL EVERY 24 HOURS
COMMUNITY
Start: 2025-03-11

## 2025-03-20 RX ORDER — DIPHENOXYLATE HYDROCHLORIDE AND ATROPINE SULFATE 2.5; .025 MG/1; MG/1
1 TABLET ORAL DAILY
COMMUNITY

## 2025-03-20 RX ORDER — CEFDINIR 300 MG/1
300 CAPSULE ORAL 2 TIMES DAILY
Qty: 20 CAPSULE | Refills: 0 | Status: SHIPPED | OUTPATIENT
Start: 2025-03-20

## 2025-03-20 NOTE — ASSESSMENT & PLAN NOTE
Trinidad Zhu  reports that she quit smoking about 29 years ago. Her smoking use included cigarettes. She started smoking about 49 years ago. She has a 30 pack-year smoking history. She has been exposed to tobacco smoke. She has never used smokeless tobacco.

## 2025-03-20 NOTE — PROGRESS NOTES
Chief Complaint  Lung nodules, COPD, and Bronchitis    Subjective    History of Present Illness {CC  Problem List  Visit Diagnosis   Encounters  Notes  Medications  Labs  Result Review Imaging  Media: 23}    Trinidad Zhu presents to [facility] for nodules, COPD, and bronchitis.    History of Present Illness   The patient is accompanied by her  today.  Her CT performed earlier this week is reviewed with her and it showed stability of her nodules with no new or suspicious lesions.  She was noted to have borderline heart size and some coronary artery calcifications and she will follow-up with Dr. Layne on that.  She asked has had issues with nausea for the past several weeks and more recently developed a cough productive of thick sputum.  She already has an appointment with Dr. Layne next Monday to address her GI issues in particular.  I did tell her I will send in a prescription for Omnicef for bronchitic symptoms and she can also follow-up with him on that next week.  Otherwise I will see her back in several months.  She has had the COVID-19 vaccine including 2 standard boosters, a bivalent booster, and 2 Spikevax boosters in the form of the Moderna vaccine.  She had the flu shot in October had a Prevnar 13, Pneumovax, and an RSV vaccine in the past as well.Trinidad Zhu  reports that she quit smoking about 29 years ago. Her smoking use included cigarettes. She started smoking about 49 years ago. She has a 30 pack-year smoking history. She has been exposed to tobacco smoke. She has never used smokeless tobacco.           Current Outpatient Medications:     albuterol sulfate  (90 Base) MCG/ACT inhaler, Inhale 2 puffs Every 4 (Four) Hours As Needed for Wheezing., Disp: , Rfl:     albuterol sulfate  (90 Base) MCG/ACT inhaler, Inhale 2 puffs Every 4 (Four) Hours As Needed for Wheezing or Shortness of Air., Disp: 18 g, Rfl:  11    alendronate (FOSAMAX) 70 MG tablet, Take 1 tablet by mouth Every 7 (Seven) Days. Saturdays, Disp: , Rfl: 3    aspirin 81 MG chewable tablet, Chew 1 tablet Every Night., Disp: , Rfl:     Calcium Carb-Cholecalciferol (CALCIUM 500+D PO), Take 1 tablet by mouth Daily., Disp: , Rfl:     carvedilol (COREG) 3.125 MG tablet, Take 1 tablet by mouth 2 (Two) Times a Day With Meals. (Patient taking differently: Take 2 tablets by mouth 2 (Two) Times a Day With Meals.), Disp: 60 tablet, Rfl: 0    cetirizine (ZyrTEC) 10 MG tablet, Take 1 tablet by mouth At Night As Needed., Disp: , Rfl:     clopidogrel (PLAVIX) 75 MG tablet, Take 1 tablet by mouth Daily., Disp: , Rfl:     Diclofenac Sodium (VOLTAREN) 1 % gel gel, Apply 4 g topically to the appropriate area as directed 4 (Four) Times a Day As Needed., Disp: , Rfl:     famotidine (PEPCID) 40 MG tablet, Take 1 tablet by mouth 2 (Two) Times a Day., Disp: , Rfl: 3    furosemide (LASIX) 20 MG tablet, Take 1 tablet by mouth As Needed (edema)., Disp: , Rfl:     levothyroxine (SYNTHROID, LEVOTHROID) 75 MCG tablet, Take 1 tablet by mouth Daily., Disp: , Rfl: 3    losartan (COZAAR) 50 MG tablet, Take 1 tablet by mouth Daily., Disp: , Rfl:     Multi Vitamin tablet tablet, Take 1 tablet by mouth Daily., Disp: , Rfl:     multivitamin with minerals tablet tablet, Take 1 tablet by mouth Daily., Disp: , Rfl:     ondansetron (ZOFRAN) 8 MG tablet, Take 1 tablet by mouth., Disp: , Rfl:     predniSONE (DELTASONE) 20 MG tablet, Take 1 tablet by mouth As Needed (arthritis)., Disp: , Rfl:     rosuvastatin (CRESTOR) 40 MG tablet, Take 1 tablet by mouth Every Night., Disp: , Rfl:     traMADol (ULTRAM) 50 MG tablet, Take 1 tablet by mouth Every 3 (Three) Hours As Needed for Moderate Pain or Severe Pain., Disp: , Rfl: 0    cefdinir (OMNICEF) 300 MG capsule, Take 1 capsule by mouth 2 (Two) Times a Day., Disp: 20 capsule, Rfl: 0    diclofenac (VOLTAREN) 25 MG EC tablet, Take 1 tablet by mouth 2 (Two)  "Times a Day. (Patient not taking: Reported on 3/20/2025), Disp: , Rfl:     hydroxychloroquine (PLAQUENIL) 200 MG tablet, Take 1 tablet by mouth Daily. (Patient not taking: Reported on 3/20/2025), Disp: , Rfl:     Social History     Socioeconomic History    Marital status:    Tobacco Use    Smoking status: Former     Current packs/day: 0.00     Average packs/day: 1.5 packs/day for 20.0 years (30.0 ttl pk-yrs)     Types: Cigarettes     Start date:      Quit date:      Years since quittin.2     Passive exposure: Past    Smokeless tobacco: Never   Vaping Use    Vaping status: Never Used   Substance and Sexual Activity    Alcohol use: Yes     Comment: occasionally    Drug use: No    Sexual activity: Not Currently       Objective   Vital Signs:   /76   Pulse 74   Ht 161.3 cm (63.5\")   Wt 51.6 kg (113 lb 12.8 oz)   SpO2 97% Comment: RA  BMI 19.84 kg/m²     Physical Exam  Vitals and nursing note reviewed.   HENT:      Head: Normocephalic.   Eyes:      Extraocular Movements: Extraocular movements intact.      Pupils: Pupils are equal, round, and reactive to light.   Cardiovascular:      Rate and Rhythm: Normal rate and regular rhythm.   Pulmonary:      Effort: Pulmonary effort is normal.      Comments: Lung fields are clear with reasonable air movement bilaterally and no adventitious sounds are heard.  Musculoskeletal:         General: Normal range of motion.   Skin:     General: Skin is warm and dry.   Neurological:      General: No focal deficit present.      Mental Status: She is alert and oriented to person, place, and time.   Psychiatric:         Mood and Affect: Mood normal.         Behavior: Behavior normal.        Result Review :          Results for orders placed during the hospital encounter of 24    Complete PFT - Pre & Post Bronchodilator    Narrative  [facility] - Pulmonary Function Test    [address] [place] [address]  [id number]  [phone]    Patient : Trinidad TRUJILLO" Audra  MRN : [id number]  CSN : [id number]  Pulmonologist : Dion Cortés MD  Date : 7/24/2024    ______________________________________________________________________    Interpretation :  1.  Spirometry is consistent with a moderate obstructive ventilatory defect.  2.  There is improvement in midflows postbronchodilator although they remain decreased.  There is actually some worsening of peak expiratory flow postbronchodilator.  Otherwise there is no significant change in spirometry postbronchodilator.  3.  Lung volumes reveal a low normal total lung capacity with a mild decrease in vital capacity suggestive of a possible borderline restrictive ventilatory defect.  There is also a very mild decrease in inspiratory capacity.  4.  There is a moderate diffusion impairment which when corrected for alveolar volume is a mild diffusion impairment.  5.  When current studies are compared to studies performed on February 15, 2023, there is no significant change in the patient's forced vital capacity compared to previous baseline values.  There has been a decline in the FEV1 compared to previous baseline values.      Dion Cortés MD      Results for orders placed in visit on 02/15/23    Spirometry Without Bronchodilator    Narrative  Spirometry Without Bronchodilator  Performed by: Samantha Cantor, RRT  Authorized by: Dion Cortés MD    Pre Drug % Predicted  FVC: 79%  FEV1: 78%  FEF 25-75%: 75%  FEV1/FVC: 76%    Interpretation  Spirometry  Spirometry shows mild restriction. There is reduced midflow suggesting small airway/airflow obstruction.  Overall comments: The patient's spirometry is most consistent with a mild restrictive ventilatory defect with a coexisting mild decrease in midflows.  When current studies are compared to studies performed on June 22 of last year, she has had slight improvement in both her FVC and FEV1 compared to previous.  Midflows also show some improvement as  well.      Results for orders placed in visit on 22    Full Pulmonary Function Test Without Bronchodilator    Narrative  Full Pulmonary Function Test Without Bronchodilator  Performed by: Samantha Cantor, RRT  Authorized by: Dion Cortés MD    Pre Drug % Predicted  FVC: 77%  FEV1: 73%  FEF 25-75%: 67%  FEV1/FVC: 73%  T%  RV: 262%  DLCO: 54%  D/VAsb: 74%    Interpretation  Spirometry  Spirometry shows mild restriction. There is reduced midflow suggesting small airway/airflow obstruction.  Diffusion Capacity  The patient's diffusion capacity is moderately reduced.  Diffusion capacity is mildly reduced when corrected for alveolar volume.  Overall comments: The patient's spirometry is consistent with a mild restrictive ventilatory defect with a coexisting decrease in midflows.  Accurate lung volumes could not be obtained.  She has a moderate diffusion impairment which when corrected for alveolar volume is a mild diffusion impairment.  When current studies are compared to studies from [facility] from 2021, there has been a decline in her FVC on current baseline spirometry compared to previous pre and postbronchodilator studies but no significant change in her FEV1 compared to previous pre and postbronchodilator studies.  When corrected for alveolar volume, there has been a decline in her diffusion capacity compared to previous.                 My interpretation of imaging:    CT Chest With Contrast Diagnostic (2025 13:48)         Assessment and Plan {CC Problem List  Visit Diagnosis  ROS  Review (Popup)  Health Maintenance  Quality  BestPractice  Medications  SmartSets  SnapShot Encounters  Media : 23}    Diagnoses and all orders for this visit:    1. Lung nodules (Primary)  Assessment & Plan:  Her most recent CT appeared stable.  I reviewed the scan with the patient and her  today.      2. Bronchiectasis without complication    3.  Bronchitis  Assessment & Plan:  Again I think she has an exacerbation of her bronchiectasis with a component of bronchitis.    Orders:  -     cefdinir (OMNICEF) 300 MG capsule; Take 1 capsule by mouth 2 (Two) Times a Day.  Dispense: 20 capsule; Refill: 0    4. Personal history of malignant neoplasm of bronchus and lung  Assessment & Plan:  Her most recent CT revealed stability of her lung nodules.      5. Personal history of nicotine dependence  Assessment & Plan:  Trinidad Zhu  reports that she quit smoking about 29 years ago. Her smoking use included cigarettes. She started smoking about 49 years ago. She has a 30 pack-year smoking history. She has been exposed to tobacco smoke. She has never used smokeless tobacco.                       Dion Cortés MD  3/20/2025  14:11 CDT    Follow Up   Return in about 4 months (around 7/20/2025) for To see me specifically.    Patient was given instructions and counseling regarding her condition or for health maintenance advice. Please see specific information pulled into the AVS if appropriate.

## 2025-03-20 NOTE — PATIENT INSTRUCTIONS
The patient's recent CAT scan performed earlier this week was stable from the standpoint of her lung nodules and I reviewed the scan with the patient and her .  There was not noted to be some borderline cardiomegaly and some coronary artery calcifications follow-up with Dr. Sanchez on that is actually scheduled to see him this coming Monday.  She also had some issues with nausea over the past several weeks and again we will follow-up with Dr. Sanchez on that.  She has had some productive cough recently and I will E prescribe Omnicef for 10-day course and then she again can follow-up with Dr. Sanchez next week.  I will see her back in 4 months otherwise.

## 2025-03-28 ENCOUNTER — TRANSCRIBE ORDERS (OUTPATIENT)
Dept: ADMINISTRATIVE | Facility: HOSPITAL | Age: 80
End: 2025-03-28
Payer: MEDICARE

## 2025-03-28 DIAGNOSIS — R11.0 NAUSEA: Primary | ICD-10-CM

## 2025-04-08 ENCOUNTER — HOSPITAL ENCOUNTER (OUTPATIENT)
Dept: NUCLEAR MEDICINE | Facility: HOSPITAL | Age: 80
Discharge: HOME | End: 2025-04-08
Payer: MEDICARE

## 2025-04-08 DIAGNOSIS — R11.0 NAUSEA: ICD-10-CM

## 2025-04-08 PROCEDURE — 34310000005 TECHNETIUM TC 99M MEBROFENIN KIT: Performed by: FAMILY MEDICINE

## 2025-04-08 PROCEDURE — 78226 HEPATOBILIARY SYSTEM IMAGING: CPT

## 2025-04-08 PROCEDURE — A9537 TC99M MEBROFENIN: HCPCS | Performed by: FAMILY MEDICINE

## 2025-04-08 RX ORDER — KIT FOR THE PREPARATION OF TECHNETIUM TC 99M MEBROFENIN 45 MG/10ML
1 INJECTION, POWDER, LYOPHILIZED, FOR SOLUTION INTRAVENOUS
Status: COMPLETED | OUTPATIENT
Start: 2025-04-08 | End: 2025-04-08

## 2025-04-08 RX ADMIN — MEBROFENIN 1 DOSE: 45 INJECTION, POWDER, LYOPHILIZED, FOR SOLUTION INTRAVENOUS at 09:00

## 2025-04-23 NOTE — PROGRESS NOTES
[facility] HEMATOLOGY & ONCOLOGY  [address]  [facility] [address] [phone]    Patient Name: Trinidad Zhu  Encounter Date: 04/29/2025  YOB: 1945  Patient Number: [id number]      REASON FOR FOLLOW-UP:  Trinidad Zhu is a pleasant 80 y.o.  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  Patient underwent resection on 11/5/2021.  Patient is seen with spouse, Yair. History is obtained from patient.  She is a reliable historian.           Oncology/Hematology History Overview Note     DIAGNOSTIC ABNORMALITIES:Patient found to have lung nodule.   CT chest 07/20/2020. No change in 1.5 cm RIGHT upper lobe pulmonary nodule. However, there has been very slight increase in size compared to multiple prior  studies dating back to 2016. Recommend continued imaging surveillance.    Increased size of clustered nodules in the inferior RIGHT upper  lobe and new cluster of tree-in-bud nodularity in the RIGHT lower lobe. Differential includes an indolent infectious/inflammatory process such as atypical mycobacterial infection (NAE). Recommend continued follow-up  CT chest 09/03/2021. Spiculated nodule in the right upper lobe have slightly increased in size. Recommend PET/CT.  PET 09/10/2021. FDG uptake within the spiculated 2 cm right upper lobe nodule with SUV of 2.85 concerning for malignancy. Mild FDG uptake within the reticulonodular changes of the more inferior right upper lobe and right middle lobe with SUV of 1.1 favoring an inflammatory/infectious process. No evidence of distant metastasis.  CT chest 10/07/2021.  Spiculated pleural-based right upper lobe nodule that appears stable compared to 9/3/2021 examination.  Right lung nodularity with peribronchial thickening and bronchiectasis, stable.  Developing mild reticular nodular opacities posteriorly in the left lower lobe  and minimally in the right lower lobe. Acute inflammatory change suspected. Correlate with patient presentation.  CT chest 11/09/2021. Postoperative change of VATS RIGHT upper lobectomy. There is fluid/soft tissue with intermixed gas in the RIGHT lung apex extending into the RIGHT hilum. A small RIGHT pneumothorax is present with right-sided chest tube in good position.  Pathology report 11/12/2021. Lung, right upper lobectomy (frozen section control):  A.  Moderately differentiated acinar predominant nonmucinous adenocarcinoma with a peripheral lepidic component (2.5 cm).    B.  The bronchial, vascular and parenchymal surgical margins are negative for malignancy.  C.  The tumor extends up to, but does not invade, the visceral pleura.  D.  Pulmonary caseating granulomata.  E.  Emphysematous changes.  F.  Peribronchial lymph nodes (2), with noncaseating granulomata, negative for metastatic carcinoma.  AJCC stage:pT1c, pN0.         PREVIOUS INTERVENTIONS:  She had undergone right upper lobe lobectomy 11/05/2021.     Lung nodules   1/23/2019 Initial Diagnosis    Lung nodules     9/3/2021 Imaging    CT Chest  Spiculated right upper lobe nodule is redemonstrated, measuring 2.1 x 1.3 x 1.2 cm, previously 1.6 x 1.1 x 1.2 cm. Associated peripheral groundglass opacities. Emphysema. Bronchiectasis in the right upper and middle lobe redemonstrated. Right fissure-based nodule measuring 5 mm, previously 3 (image 82, series 3. Additional nodularities along the right minor fissure are similar to decrease. Mm.  No pleural effusion is present. The trachea and bronchial tree are patent.  1. Spiculated nodule in the right upper lobe have slightly increased in size. Recommend PET/CT.     9/10/2021 Procedure    09-  PFTs  Pulmonary Function Test   Pre Drug % Predicted    FVC: 80%   FEV1: 64%   FEF 25-75%: 28%   FEV1/FVC: 61.74%   Interpretation   Spirometry   Spirometry shows moderate obstruction. There is reduced midflow  suggesting small airway/airflow obstruction.   Review of FVL curve   Patient's effort is normal.     9/10/2021 Imaging    PET/CT:  IMPRESSION:  1. FDG uptake within the spiculated 2 cm right upper lobe nodule with  SUV of 2.85 concerning for malignancy.  2. Mild FDG uptake within the reticulonodular changes of the more  inferior right upper lobe and right middle lobe with SUV of 1.1 favoring  an inflammatory/infectious process.  3. No evidence of distant metastasis.     9/21/2021 Biopsy    09-  Mike bronchoscopy RUL  Lung, Right upper lobe; transbronchial biopsy:  Atypical proliferation, highly suspicious for well-differentiated adenocarcinoma  Comments:  Recuts are evaluated.  The atypical proliferation is highlighted with immunohistochemical TIF1 staining (control appropriate).  The appearance could suggest a lepidic, well differentiated, or scar-carcinoma type lesion.    Lymph node, 10R FNA:  lymph node sampling  Lavage:  inflammation/ inflammatory debris and macrophages  Broncheoalveolar lavage, RUL  Rare groups of mildly atypical epithelial cells     10/7/2021 Imaging    CT Chest:  IMPRESSION:  1. Spiculated pleural-based right upper lobe nodule that appears stable  compared to 9/3/2021 examination.  2. Right lung nodularity with peribronchial thickening and  bronchiectasis, stable.  3. Developing mild reticular nodular opacities posteriorly in the left  lower lobe and minimally in the right lower lobe. Acute inflammatory  change suspected. Correlate with patient presentation.  4. Continued follow-up recommended.     Adenocarcinoma   12/14/2021 Initial Diagnosis    Adenocarcinoma (HCC)         PAST MEDICAL HISTORY:  ALLERGIES:  Allergies   Allergen Reactions    Baclofen Other (See Comments)     MUSCULOSKELETAL THERAPY AGENTS knocked her out for a day    Other reaction(s): Unknown    Gabapentin Confusion     Other reaction(s): over sedation    Sulfa Antibiotics Rash     Mouth blisters    Sulfacetamide  Sodium Rash    Levofloxacin Other (See Comments)     tendonitis, muscle pain    Amitriptyline Hcl Confusion     Other reaction(s): ambulation difficulties/confusion    Niferex [Iron Polysaccharide] Swelling     LIP SWELLING    Oxycodone-Acetaminophen GI Intolerance     Other reaction(s): vomiting     CURRENT MEDICATIONS:  Outpatient Encounter Medications as of 4/29/2025   Medication Sig Dispense Refill    albuterol sulfate  (90 Base) MCG/ACT inhaler Inhale 2 puffs Every 4 (Four) Hours As Needed for Wheezing.      albuterol sulfate  (90 Base) MCG/ACT inhaler Inhale 2 puffs Every 4 (Four) Hours As Needed for Wheezing or Shortness of Air. 18 g 11    alendronate (FOSAMAX) 70 MG tablet Take 1 tablet by mouth Every 7 (Seven) Days. Saturdays  3    aspirin 81 MG chewable tablet Chew 1 tablet Every Night.      Calcium Carb-Cholecalciferol (CALCIUM 500+D PO) Take 1 tablet by mouth Daily.      carvedilol (COREG) 3.125 MG tablet Take 1 tablet by mouth 2 (Two) Times a Day With Meals. (Patient taking differently: Take 2 tablets by mouth 2 (Two) Times a Day With Meals.) 60 tablet 0    cetirizine (ZyrTEC) 10 MG tablet Take 1 tablet by mouth At Night As Needed.      clopidogrel (PLAVIX) 75 MG tablet Take 1 tablet by mouth Daily.      diclofenac (VOLTAREN) 25 MG EC tablet Take 1 tablet by mouth 2 (Two) Times a Day.      Diclofenac Sodium (VOLTAREN) 1 % gel gel Apply 4 g topically to the appropriate area as directed 4 (Four) Times a Day As Needed.      famotidine (PEPCID) 40 MG tablet Take 1 tablet by mouth 2 (Two) Times a Day.  3    furosemide (LASIX) 20 MG tablet Take 1 tablet by mouth As Needed (edema).      hydroxychloroquine (PLAQUENIL) 200 MG tablet Take 1 tablet by mouth Daily.      levothyroxine (SYNTHROID, LEVOTHROID) 75 MCG tablet Take 1 tablet by mouth Daily.  3    losartan (COZAAR) 50 MG tablet Take 1 tablet by mouth Daily.      Multi Vitamin tablet tablet Take 1 tablet by mouth Daily.      multivitamin with  minerals tablet tablet Take 1 tablet by mouth Daily.      ondansetron (ZOFRAN) 8 MG tablet Take 1 tablet by mouth.      predniSONE (DELTASONE) 20 MG tablet Take 1 tablet by mouth As Needed (arthritis).      rosuvastatin (CRESTOR) 40 MG tablet Take 1 tablet by mouth Every Night.      traMADol (ULTRAM) 50 MG tablet Take 1 tablet by mouth Every 3 (Three) Hours As Needed for Moderate Pain or Severe Pain.  0    [DISCONTINUED] cefdinir (OMNICEF) 300 MG capsule Take 1 capsule by mouth 2 (Two) Times a Day. 20 capsule 0     No facility-administered encounter medications on file as of 4/29/2025.     ADULT ILLNESSES:  Patient Active Problem List   Diagnosis Code    Spinal stenosis, cervical region M48.02    Lung nodules R91.8    Bronchiectasis without complication J47.9    Underweight R63.6    Personal history of nicotine dependence Z87.891    Restrictive lung disease J98.4    Pulmonary emphysema J43.9    PAD (peripheral artery disease) I73.9    Preop testing Z01.818    Gastroesophageal reflux disease K21.9    Allergic rhinitis J30.9    ORTIZ (dyspnea on exertion) R06.09    CAD (coronary artery disease) I25.10    Hyperlipidemia E78.5    Neuropathy G62.9    Hypertension I10    Simple chronic bronchitis J41.0    Former smoker Z87.891    Lung nodule R91.1    Mass of right lung R91.8    Postoperative anemia due to acute blood loss D62    Syncope and collapse R55    Adenocarcinoma C80.1    Personal history of malignant neoplasm of bronchus and lung Z85.118    Hydropneumothorax J94.8    Lower extremity embolism I74.3    Generalized muscle weakness M62.81    Acute blood loss anemia D62    Melena K92.1    Urinary incontinence R32    Gastrointestinal hemorrhage K92.2    Iron deficiency anemia D50.9    Wheezing R06.2    COPD, moderate J44.9    Bronchiectasis with acute lower respiratory infection J47.0    Bronchitis J40     SURGERIES:  Past Surgical History:   Procedure Laterality Date    ANTERIOR CERVICAL DISCECTOMY W/ FUSION N/A  05/22/2017    Procedure: CERVICAL DISCECTOMY ANTERIOR FUSION WITH INSTRUMENTATION;  Surgeon: NADINE Sarabia MD;  Location:  [facility] OR;  Service:     AORTAGRAM Left 11/09/2020    Procedure: left lower extremtiy angiogram, hawk athrectomy, balloon angioplasty, stent placement, mynx closure;  Surgeon: Sukhdev Condon DO;  Location:  [facility] HYBRID OR 12;  Service: Vascular;  Laterality: Left;    AORTAGRAM Left 03/26/2021    Procedure: LEFT LOWER EXTREMITY ANGIOGRAM, BALLOON ANGIOPLASTY, STENT PLACEMENT, MYNX CLOSURE;  Surgeon: Sukhdev Condon DO;  Location:  [facility] 12;  Service: Vascular;  Laterality: Left;    AORTAGRAM Left 08/06/2021    Procedure: LEFT LOWER EXTREMITY ANGIOGRAM, HAWK ATHERECTOMY, BALLOON ANGIOPLASTY, MYNX CLOSURE;  Surgeon: Sukhdev Condon DO;  Location:  [facility]BR[facility] OR 12;  Service: Vascular;  Laterality: Left;    AORTAGRAM Right 06/08/2022    Procedure: RIGHT LOWER EXTREMITY ANGIOGRAM, INTRAVASCULAR LITHOTRIPSY, HAWK ATHRECTOMY, BALLOON ANGIOPLASTY, MYNX CLOSURE;  Surgeon: Sukhdev Condon DO;  Location:  [facility] 12;  Service: Vascular;  Laterality: Right;    AORTAGRAM Left 10/21/2022    Procedure: LEFT LOWER EXTREMITY ANGIOGRAM WITH HAWK ATHRECTOMY, BALLOON ANGIOPLASTY, STENT PLACEMENT, MYNX CLOSURE;  Surgeon: Sukhdev Condon DO;  Location:  [facility] 12;  Service: Vascular;  Laterality: Left;    BLADDER SUSPENSION      also had pelvic reconstructive surgery    BREAST EXCISIONAL BIOPSY Right 1985    CARDIAC CATHETERIZATION      CATARACT EXTRACTION, BILATERAL      CERVICAL CORPECTOMY N/A 05/22/2017    Procedure:  ANTERIOR CERVICAL DISCECTOMY FUSION C-6 to T1,  ANTERIOR FUSION WITH INSTRUMENTATION C-5 T-1;  Surgeon: NADINE Sarabia MD;  Location:  [facility] OR;  Service:     COLONOSCOPY  08/19/2015    TIC BX RT COLON    COLONOSCOPY  12/04/2013    RT COLON BX RECALL 5YR    COLONOSCOPY N/A 11/29/2016    The entire examined colon is normal; No specimens  collected    COLONOSCOPY N/A 06/02/2022    Procedure: COLONOSCOPY WITH ANESTHESIA;  Surgeon: Marco Antonio Vallejo MD;  Location: [facility] ENDOSCOPY;  Service: Gastroenterology;  Laterality: N/A;  Pre: screen  Post: diverticulosis  Andrew Ty MD    COLONOSCOPY N/A 04/05/2023    Procedure: COLONOSCOPY WITH ANESTHESIA;  Surgeon: Mike Kearns MD;  Location:  [facility];  Service: Gastroenterology;  Laterality: N/A;  preop: GI bleed;melena  post op: diverticulosis, avm  PCP: Andrew Ty    ENDOSCOPY  12/03/2015    HEALED ULCER SLANT BX    ENDOSCOPY N/A 04/01/2023    Procedure: ESOPHAGOGASTRODUODENOSCOPY WITH ANESTHESIA;  Surgeon: Patric Reyes DO;  Location: [facility] OR;  Service: Gastroenterology;  Laterality: N/A;  PRE GI BLEED  POST gastric ulcers  DR LINN TY    FEMORAL ENDARTERECTOMY Left 10/21/2022    Procedure: LEFT LOWER EXTREMITY ANGIOGRAM;  Surgeon: Sukhdev Condon DO;  Location: [facility] HYBRID OR 12;  Service: Vascular;  Laterality: Left;    HYSTERECTOMY      LEG THROMBECTOMY/EMBOLECTOMY Left 06/08/2022    Procedure: LOWER EXTREMITY THROMBECTOMY/EMBOLECTOMY, PATCH GRAFT TO  FEMORAL ARTERY;  Surgeon: Sukhdev Condon DO;  Location: [facility] HYBRID OR 12;  Service: Vascular;  Laterality: Left;    LOBECTOMY Right 11/05/2021    Procedure: RIGHT THORACOSCOPY WITH DAVINCI ROBOT, RIGHT UPPER LOBE LOBECTOMY;  Surgeon: Jarad Ignacio MD;  Location: [facility] OR;  Service: Robotics - DaVinci;  Laterality: Right;    LUNG SURGERY      OTHER SURGICAL HISTORY      KNEE CARTILAGE REMOVED    OTHER SURGICAL HISTORY      BENIGN FIBROID TUMOR OF BREAST 1985 REMOVED    TONSILLECTOMY       HEALTH MAINTENANCE ITEMS:  Health Maintenance Due   Topic Date Due    TDAP/TD VACCINES (1 - Tdap) Never done    ZOSTER VACCINE (1 of 2) Never done    ANNUAL WELLNESS VISIT  06/13/2024    LIPID PANEL  10/24/2024    COVID-19 Vaccine (9 - 2024-25 season) 04/16/2025       <no information>  Last Completed Colonoscopy             Needs Review       COLORECTAL CANCER SCREENING (COLONOSCOPY - Every 7 Years) Tentatively due on 4/5/2030 04/05/2023  SCANNED - COLONOSCOPY    04/05/2023  Surgical Procedure: COLONOSCOPY    04/01/2023  Fecal Occult Blood component of Occult Blood X 1, Stool - Stool, Per Rectum    06/02/2022  Surgical Procedure: COLONOSCOPY    06/02/2022  COLONOSCOPY     Only the first 5 history entries have been loaded, but more history exists.                        Immunization History   Administered Date(s) Administered    Arexvy (RSV, Adults 60+ yrs) 10/19/2023    COVID-19 (MODERNA) 12YRS+ (SPIKEVAX) 10/31/2023, 10/16/2024    COVID-19 (MODERNA) 1st,2nd,3rd Dose Monovalent 01/19/2021, 02/17/2021, 10/13/2021, 10/26/2021    COVID-19 (MODERNA) BIVALENT 12+YRS 10/03/2022    COVID-19 (MODERNA) Monovalent Original Booster 05/04/2022    Flu Vaccine Split Quad 11/01/2020    Fluzone  >6mos 10/13/2010    Fluzone High-Dose 65+YRS 10/10/2019, 10/01/2021, 10/03/2022, 10/16/2024    Fluzone High-Dose 65+yrs 10/31/2023    Fluzone Quad >6mos (Multi-dose) 10/01/2018    INFLUENZA SPLIT TRI 10/01/2019    Influenza, Unspecified 10/13/2021    Pneumococcal Conjugate 13-Valent (PCV13) 08/04/2016    Pneumococcal Polysaccharide (PPSV23) 01/02/2015, 08/04/2016, 10/01/2019     Last Completed Mammogram            Completed or No Longer Recommended       MAMMOGRAM  Discontinued        Frequency changed to Never automatically (Topic No Longer Applies)    09/30/2024  Mammo Screening Digital Tomosynthesis Bilateral With CAD    09/27/2023  Mammo Screening Digital Tomosynthesis Bilateral With CAD    06/30/2022  Mammo Screening Digital Tomosynthesis Bilateral With CAD    06/17/2021  Mammo Screening Digital Tomosynthesis Bilateral With CAD      Only the first 5 history entries have been loaded, but more history exists.                              FAMILY HISTORY:  Family History   Problem Relation Age of Onset    Breast cancer Maternal Aunt     Heart disease  "Mother     Heart disease Father     GI problems Neg Hx         MALIGNANCIES    Colon cancer Neg Hx     Colon polyps Neg Hx      SOCIAL HISTORY:  Social History     Socioeconomic History    Marital status:    Tobacco Use    Smoking status: Former     Current packs/day: 0.00     Average packs/day: 1.5 packs/day for 20.0 years (30.0 ttl pk-yrs)     Types: Cigarettes     Start date:      Quit date:      Years since quittin.3     Passive exposure: Past    Smokeless tobacco: Never   Vaping Use    Vaping status: Never Used   Substance and Sexual Activity    Alcohol use: Yes     Comment: occasionally    Drug use: No    Sexual activity: Not Currently       REVIEW OF SYSTEMS:    Review of Systems   Constitutional:  Negative for chills, fatigue and fever.        \"Feel pretty good.\"   HENT:  Negative for congestion and mouth sores.    Eyes:  Negative for discharge and redness.   Respiratory:  Negative for shortness of breath and wheezing.    Cardiovascular:  Negative for chest pain and palpitations.   Gastrointestinal:  Negative for abdominal pain, nausea and vomiting.   Endocrine: Negative for cold intolerance and heat intolerance.   Genitourinary:  Negative for difficulty urinating and dysuria.   Musculoskeletal:  Negative for gait problem and myalgias.   Skin:  Negative for pallor.   Allergic/Immunologic: Negative for food allergies.   Neurological:  Negative for dizziness and speech difficulty.   Hematological:  Negative for adenopathy.   Psychiatric/Behavioral:  Negative for agitation and confusion. The patient is not nervous/anxious.        VITAL SIGNS: /70   Pulse 70   Temp 97.5 °F (36.4 °C)   Resp 16   Ht 161.3 cm (63.5\")   Wt 51.8 kg (114 lb 3.2 oz)   SpO2 96%   Breastfeeding No   BMI 19.91 kg/m²  Lost 6 pounds. \"I was sick to my stomach last month. Just happened.\"  Pain Score    25 1019   PainSc: 0-No pain       PHYSICAL EXAMINATION:     Physical Exam  Vitals reviewed. "   Constitutional:       General: She is not in acute distress.  HENT:      Head: Normocephalic and atraumatic.   Eyes:      General: No scleral icterus.  Cardiovascular:      Rate and Rhythm: Normal rate.   Pulmonary:      Effort: No respiratory distress.      Breath sounds: No wheezing.   Abdominal:      General: Bowel sounds are normal.      Palpations: Abdomen is soft.      Tenderness: There is no abdominal tenderness.   Musculoskeletal:         General: No swelling.   Skin:     Coloration: Skin is not pale.   Neurological:      Mental Status: She is alert and oriented to person, place, and time.   Psychiatric:         Mood and Affect: Mood normal.         Behavior: Behavior normal.         Thought Content: Thought content normal.         Judgment: Judgment normal.         LABS    Lab Results - Last 18 Months   Lab Units 04/29/25  1008 01/28/25  1323 09/10/24  1035 06/06/24  1040 03/05/24  1038 12/04/23  1056   HEMOGLOBIN g/dL 12.4 11.6* 12.9 13.1 11.8* 11.5*   HEMATOCRIT % 37.8 36.2 38.9 39.0 36.0 36.6   MCV fL 97.7* 98.9* 99.7* 98.0* 99.4* 93.6   WBC 10*3/mm3 6.98 7.95 6.61 4.91 5.85 10.76   RDW % 13.9 12.9 13.0 12.2* 14.6 13.5   MPV fL 9.8 10.2 9.8 10.2 10.1 9.5   PLATELETS 10*3/mm3 154 163 172 134* 158 258   IMM GRAN % % 0.3 0.3 0.2 0.0 0.2 0.4   NEUTROS ABS 10*3/mm3 3.88 4.21 3.18 2.42 3.23 7.47*   LYMPHS ABS 10*3/mm3 1.91 2.49 2.23 1.60 1.70 1.58   MONOS ABS 10*3/mm3 0.92* 0.82 0.78 0.52 0.68 1.31*   EOS ABS 10*3/mm3 0.22 0.36 0.37 0.34 0.19 0.30   BASOS ABS 10*3/mm3 0.03 0.05 0.04 0.03 0.04 0.06   IMMATURE GRANS (ABS) 10*3/mm3 0.02 0.02 0.01 0.00 0.01 0.04   NRBC /100 WBC 0.0 0.0 0.0  --  0.0 0.0       Lab Results - Last 18 Months   Lab Units 04/29/25  1008 01/28/25  1323 09/10/24  1035 06/06/24  1112 06/06/24  1040 03/05/24  1105 03/05/24  1038 12/04/23  1056   GLUCOSE mg/dL 77 84 90  --  100*  --  87 108*   SODIUM mmol/L 141 142 143  --  139  --  141 138   POTASSIUM mmol/L 4.1 4.0 4.3  --  3.9  --  4.2  "4.3   CO2 mmol/L 30.0* 27.0 30.0*  --  27.0  --  29.0 28.0   CHLORIDE mmol/L 103 104 104  --  103  --  104 95*   ANION GAP mmol/L 8.0 11.0 9.0  --  9.0  --  8.0 15.0   CREATININE mg/dL 0.76 0.76 0.74 0.70 0.62 0.70 0.63 1.24*   BUN mg/dL 16 17 16  --  16  --  16 28*   BUN / CREAT RATIO  21.1 22.4 21.6  --  25.8*  --  25.4* 22.6   CALCIUM mg/dL 9.4 9.2 9.2  --  9.4  --  9.6 10.0   ALK PHOS U/L 49 60 62  --  72  --  57 74   TOTAL PROTEIN g/dL 6.6 6.4 6.5  --  6.9  --  6.6 7.8   ALT (SGPT) U/L 29 20 26  --  31  --  29 25   AST (SGOT) U/L 36* 32 36*  --  41*  --  39* 51*   BILIRUBIN mg/dL 0.5 0.5 0.6  --  0.6  --  0.5 0.5   ALBUMIN g/dL 4.1 4.2 4.3  --  4.4  --  4.1 4.4   GLOBULIN gm/dL 2.5 2.2 2.2  --  2.5  --  2.5 3.4       No results for input(s): \"MSPIKE\", \"KAPPALAMB\", \"IGLFLC\", \"URICACID\", \"FREEKAPPAL\", \"CEA\", \"LDH\", \"REFLABREPO\" in the last [id number] hours.    Lab Results - Last 18 Months   Lab Units 01/28/25  1323 03/05/24  1038 12/04/23  1056   IRON mcg/dL 76 111 32*   TIBC mcg/dL 386 338 416   IRON SATURATION (TSAT) % 20 33 8*   FERRITIN ng/mL 57.77 250.50* 163.00*       Trinidad Zhu reports a pain score of 0.        ASSESSMENT:  1.  Adenocarcinoma of the lung, right upper lobe.Tumor size 2 cm. PDL1 15%, Negative ALK, ROS1, and EGFR. BRAF G644V positive.  AJCC stage:1A3(pTic, pN0, cM0, G2)  Treatment status: She had undergone right upper lobe lobectomy.  2.  Performance status of 0.  3.  30 pack years smoking history, etiology of her lung cancer. She had quit smoking. \"None.\"   4.  Normocytic anemia from iron deficiency due to gastrointestinal hemorrhage.  Diverticulosis in the sigmoid colon on 06/02/202.  Ferrous sulfate 12/7/2022 through 6/12/2023. 1 unit packed RBC 4/1/2023 and 4/4/2023.  Ferric gluconate 125 mg on 4/4/2023. \"Constipation.\"  Treated with ferric carboxymaltose on 12/21/2023.              PLAN:  1.   Re: Note from Dr. Jorge Ewing on 3/5/2025.  Patient seen for surveillance of " lower extremity occlusive disease.  No claudicating or rest pain.  Duplex scanning showed right leg graft is patent and the left stents are patent.  Follow-up in 6 months.  2.   Re: Note from Dr. Cortés on 3/20/2025.  Most recent CT appears stable.  Given Omnicef for bronchitis.  3.   Re:  Heme status.  WBC 6.98, hemoglobin 12.4 and platelet 154.     4.   Re: CMP.  GFR 79.8 from 80.3 ml/min and AST 36 from 32 from 36, she is on Crestor.   5.   Re: CT chest on 3/17/2025. Similar postoperative changes RIGHT upper lung. Similar pulmonary nodules and clustered tree-in-bud opacities compared to 12/6/2024. Borderline heart size with scattered coronary artery calcifications.  6.   Re: Stable for observation, adenocarcinoma.  7.  Continue care per primary care physician and the other specialists.  8.  Plan of care discussed with patient and spouse, Don.  Understanding expressed.  She agreed to proceed.  9. Advance Care Planning  ACP discussion was held with the patient during this visit. Patient has an advance directive in EMR which is still valid.   10. Return to office in 3 months with preoffice CBC with differential, CMP.  CT of the chest to follow lung nodules mid 6/2025. To see Dr. Cortés on 7/2025.             I have reviewed the assessment and plan and verified the accuracy of it. No changes to assessment and plan since the information was documented. Cyrus Palacios MD 04/29/25         I spent 33 total minutes, face-to-face, caring for Trinidad brizuela. Greater than 50% of this time involved counseling and/or coordination of care as documented within this note.          (Dion Cortés MD)  (Jarad Ignacio MD)  (Kimberley Rodas MD)  Jose Layne MD

## 2025-04-29 ENCOUNTER — OFFICE VISIT (OUTPATIENT)
Dept: ONCOLOGY | Facility: CLINIC | Age: 80
End: 2025-04-29
Payer: MEDICARE

## 2025-04-29 ENCOUNTER — LAB (OUTPATIENT)
Dept: LAB | Facility: HOSPITAL | Age: 80
End: 2025-04-29
Payer: MEDICARE

## 2025-04-29 VITALS
BODY MASS INDEX: 19.5 KG/M2 | OXYGEN SATURATION: 96 % | TEMPERATURE: 97.5 F | HEART RATE: 70 BPM | WEIGHT: 114.2 LBS | DIASTOLIC BLOOD PRESSURE: 70 MMHG | SYSTOLIC BLOOD PRESSURE: 128 MMHG | HEIGHT: 64 IN | RESPIRATION RATE: 16 BRPM

## 2025-04-29 DIAGNOSIS — C34.11 PRIMARY CANCER OF RIGHT UPPER LOBE OF LUNG: Primary | ICD-10-CM

## 2025-04-29 DIAGNOSIS — Z85.118 PERSONAL HISTORY OF MALIGNANT NEOPLASM OF BRONCHUS AND LUNG: Primary | ICD-10-CM

## 2025-04-29 LAB
ALBUMIN SERPL-MCNC: 4.1 G/DL (ref 3.5–5.2)
ALBUMIN/GLOB SERPL: 1.6 G/DL
ALP SERPL-CCNC: 49 U/L (ref 39–117)
ALT SERPL W P-5'-P-CCNC: 29 U/L (ref 1–33)
ANION GAP SERPL CALCULATED.3IONS-SCNC: 8 MMOL/L (ref 5–15)
AST SERPL-CCNC: 36 U/L (ref 1–32)
BASOPHILS # BLD AUTO: 0.03 10*3/MM3 (ref 0–0.2)
BASOPHILS NFR BLD AUTO: 0.4 % (ref 0–1.5)
BILIRUB SERPL-MCNC: 0.5 MG/DL (ref 0–1.2)
BUN SERPL-MCNC: 16 MG/DL (ref 8–23)
BUN/CREAT SERPL: 21.1 (ref 7–25)
CALCIUM SPEC-SCNC: 9.4 MG/DL (ref 8.6–10.5)
CHLORIDE SERPL-SCNC: 103 MMOL/L (ref 98–107)
CO2 SERPL-SCNC: 30 MMOL/L (ref 22–29)
CREAT SERPL-MCNC: 0.76 MG/DL (ref 0.57–1)
DEPRECATED RDW RBC AUTO: 49.6 FL (ref 37–54)
EGFRCR SERPLBLD CKD-EPI 2021: 79.8 ML/MIN/1.73
EOSINOPHIL # BLD AUTO: 0.22 10*3/MM3 (ref 0–0.4)
EOSINOPHIL NFR BLD AUTO: 3.2 % (ref 0.3–6.2)
ERYTHROCYTE [DISTWIDTH] IN BLOOD BY AUTOMATED COUNT: 13.9 % (ref 12.3–15.4)
GLOBULIN UR ELPH-MCNC: 2.5 GM/DL
GLUCOSE SERPL-MCNC: 77 MG/DL (ref 65–99)
HCT VFR BLD AUTO: 37.8 % (ref 34–46.6)
HGB BLD-MCNC: 12.4 G/DL (ref 12–15.9)
HOLD SPECIMEN: NORMAL
IMM GRANULOCYTES # BLD AUTO: 0.02 10*3/MM3 (ref 0–0.05)
IMM GRANULOCYTES NFR BLD AUTO: 0.3 % (ref 0–0.5)
LYMPHOCYTES # BLD AUTO: 1.91 10*3/MM3 (ref 0.7–3.1)
LYMPHOCYTES NFR BLD AUTO: 27.4 % (ref 19.6–45.3)
MCH RBC QN AUTO: 32 PG (ref 26.6–33)
MCHC RBC AUTO-ENTMCNC: 32.8 G/DL (ref 31.5–35.7)
MCV RBC AUTO: 97.7 FL (ref 79–97)
MONOCYTES # BLD AUTO: 0.92 10*3/MM3 (ref 0.1–0.9)
MONOCYTES NFR BLD AUTO: 13.2 % (ref 5–12)
NEUTROPHILS NFR BLD AUTO: 3.88 10*3/MM3 (ref 1.7–7)
NEUTROPHILS NFR BLD AUTO: 55.5 % (ref 42.7–76)
NRBC BLD AUTO-RTO: 0 /100 WBC (ref 0–0.2)
PLATELET # BLD AUTO: 154 10*3/MM3 (ref 140–450)
PMV BLD AUTO: 9.8 FL (ref 6–12)
POTASSIUM SERPL-SCNC: 4.1 MMOL/L (ref 3.5–5.2)
PROT SERPL-MCNC: 6.6 G/DL (ref 6–8.5)
RBC # BLD AUTO: 3.87 10*6/MM3 (ref 3.77–5.28)
SODIUM SERPL-SCNC: 141 MMOL/L (ref 136–145)
WBC NRBC COR # BLD AUTO: 6.98 10*3/MM3 (ref 3.4–10.8)

## 2025-04-29 PROCEDURE — 1160F RVW MEDS BY RX/DR IN RCRD: CPT | Performed by: INTERNAL MEDICINE

## 2025-04-29 PROCEDURE — 1159F MED LIST DOCD IN RCRD: CPT | Performed by: INTERNAL MEDICINE

## 2025-04-29 PROCEDURE — 3074F SYST BP LT 130 MM HG: CPT | Performed by: INTERNAL MEDICINE

## 2025-04-29 PROCEDURE — 3078F DIAST BP <80 MM HG: CPT | Performed by: INTERNAL MEDICINE

## 2025-04-29 PROCEDURE — 80053 COMPREHEN METABOLIC PANEL: CPT

## 2025-04-29 PROCEDURE — 36415 COLL VENOUS BLD VENIPUNCTURE: CPT

## 2025-04-29 PROCEDURE — 99214 OFFICE O/P EST MOD 30 MIN: CPT | Performed by: INTERNAL MEDICINE

## 2025-04-29 PROCEDURE — 1126F AMNT PAIN NOTED NONE PRSNT: CPT | Performed by: INTERNAL MEDICINE

## 2025-04-29 PROCEDURE — G2211 COMPLEX E/M VISIT ADD ON: HCPCS | Performed by: INTERNAL MEDICINE

## 2025-04-29 PROCEDURE — 85025 COMPLETE CBC W/AUTO DIFF WBC: CPT

## 2025-06-05 NOTE — TELEPHONE ENCOUNTER
Problem: Chronic Conditions and Co-morbidities  Goal: Patient's chronic conditions and co-morbidity symptoms are monitored and maintained or improved  6/5/2025 0009 by Demetrice Olivo RN  Outcome: Progressing  6/4/2025 1535 by Ct Berkowitz RN  Outcome: Progressing     Problem: Discharge Planning  Goal: Discharge to home or other facility with appropriate resources  6/5/2025 0009 by Demetrice Olivo RN  Outcome: Progressing  6/4/2025 1535 by Ct Berkowitz RN  Outcome: Progressing     Problem: Safety - Adult  Goal: Free from fall injury  6/5/2025 0009 by Demetrice Olivo RN  Outcome: Progressing  6/4/2025 1535 by Ct Berkowitz RN  Outcome: Progressing     Problem: ABCDS Injury Assessment  Goal: Absence of physical injury  6/5/2025 0009 by Demetrice Olivo RN  Outcome: Progressing  6/4/2025 1535 by Ct Berkowitz RN  Outcome: Progressing     Problem: Skin/Tissue Integrity  Goal: Skin integrity remains intact  Description: 1.  Monitor for areas of redness and/or skin breakdown2.  Assess vascular access sites hourly3.  Every 4-6 hours minimum:  Change oxygen saturation probe site4.  Every 4-6 hours:  If on nasal continuous positive airway pressure, respiratory therapy assess nares and determine need for appliance change or resting period  6/5/2025 0009 by Demetrice Olivo RN  Outcome: Progressing  Flowsheets (Taken 6/4/2025 1924)  Skin Integrity Remains Intact: Monitor for areas of redness and/or skin breakdown  6/4/2025 1535 by Ct Berkowitz RN  Outcome: Progressing     Problem: Pain  Goal: Verbalizes/displays adequate comfort level or baseline comfort level  6/5/2025 0009 by Demetrice Olivo, RN  Outcome: Progressing  6/4/2025 1535 by Ct Berkowitz RN  Outcome: Progressing     Problem: Risk for Elopement  Goal: Patient will not exit the unit/facility without proper excort  6/5/2025 0009 by Demetrice Olivo RN  Outcome: Progressing  6/4/2025 1535 by Ct Berkowitz RN  Outcome: Progressing      Patient left a message stating that she was confused about whether to use the Breztri or not? If she doesn't need to use it if it isn't helping does she need to use anything else?    She also stated that she went to Dr Layne's office yesterday for lab work and she lost her breath. She passed out to the ground.  caught her fall. She thinks maybe heat related.  Then last night in her bedroom she lost her breath and passed out. She went to the ground and hit dresser. She doesn't know what that could be related to.

## 2025-06-16 ENCOUNTER — HOSPITAL ENCOUNTER (OUTPATIENT)
Dept: CT IMAGING | Facility: HOSPITAL | Age: 80
Discharge: HOME | End: 2025-06-16
Admitting: INTERNAL MEDICINE
Payer: MEDICARE

## 2025-06-16 DIAGNOSIS — C34.11 PRIMARY CANCER OF RIGHT UPPER LOBE OF LUNG: ICD-10-CM

## 2025-06-16 PROCEDURE — 71260 CT THORAX DX C+: CPT

## 2025-06-16 PROCEDURE — 25510000001 IOPAMIDOL 61 % SOLUTION: Performed by: INTERNAL MEDICINE

## 2025-06-16 RX ORDER — IOPAMIDOL 612 MG/ML
100 INJECTION, SOLUTION INTRAVASCULAR
Status: COMPLETED | OUTPATIENT
Start: 2025-06-16 | End: 2025-06-16

## 2025-06-16 RX ADMIN — IOPAMIDOL 100 ML: 612 INJECTION, SOLUTION INTRAVENOUS at 10:02

## 2025-06-17 ENCOUNTER — TELEPHONE (OUTPATIENT)
Dept: PULMONOLOGY | Facility: CLINIC | Age: 80
End: 2025-06-17
Payer: MEDICARE

## 2025-06-17 NOTE — TELEPHONE ENCOUNTER
I did review the patient's recent CAT scan.  I do think the new nodular densities likely represent areas of inspissated mucus in some small airways.  I did contact her with that information and told her she could try Mucinex if she is having difficulty clearing secretions.  I think a follow-up CAT scan in 2 to 3 months could certainly be performed and if any of these areas have not resolved or in particular if they appear to be worsening then navigational bronchoscopy by Dr. Royal or Dr. Elkins could be performed.  She is scheduled to see Dr. Palacios in follow-up in August and also will see me shortly thereafter and again follow-up imaging studies can be arranged at that time.  Again I discussed this with the patient and her  this morning and they expressed understanding.

## 2025-07-21 ENCOUNTER — TELEPHONE (OUTPATIENT)
Dept: PULMONOLOGY | Facility: CLINIC | Age: 80
End: 2025-07-21
Payer: MEDICARE

## 2025-07-22 RX ORDER — ALBUTEROL SULFATE 90 UG/1
2 INHALANT RESPIRATORY (INHALATION) EVERY 4 HOURS PRN
Qty: 9 G | Refills: 0 | Status: SHIPPED | OUTPATIENT
Start: 2025-07-22

## 2025-07-22 NOTE — TELEPHONE ENCOUNTER
Please send refill to [facility]  Requested Prescriptions     Pending Prescriptions Disp Refills    albuterol sulfate  (90 Base) MCG/ACT inhaler [Pharmacy Med Name: Albuterol Sulfate  (90 Base) MCG/ACT Inhalation Aerosol Solution] 9 g 0     Sig: INHALE 2 PUFFS BY MOUTH EVERY 4 HOURS AS NEEDED FOR WHEEZING OR  SHORTNESS  OF  AIR      Last office visit with prescribing clinician: 3/20/2025   Last telemedicine visit with prescribing clinician: Visit date not found   Next office visit with prescribing clinician: 8/26/2025                         Would you like a call back once the refill request has been completed: [] Yes [] No    If the office needs to give you a call back, can they leave a voicemail: [] Yes [] No    Airam Alvarez MA  07/22/25, 14:12 CDT

## 2025-08-05 ENCOUNTER — OFFICE VISIT (OUTPATIENT)
Dept: ONCOLOGY | Facility: CLINIC | Age: 80
End: 2025-08-05
Payer: MEDICARE

## 2025-08-05 ENCOUNTER — LAB (OUTPATIENT)
Dept: LAB | Facility: HOSPITAL | Age: 80
End: 2025-08-05
Payer: MEDICARE

## 2025-08-05 VITALS
HEART RATE: 74 BPM | BODY MASS INDEX: 19.97 KG/M2 | SYSTOLIC BLOOD PRESSURE: 122 MMHG | TEMPERATURE: 96.4 F | WEIGHT: 117 LBS | HEIGHT: 64 IN | OXYGEN SATURATION: 97 % | RESPIRATION RATE: 16 BRPM | DIASTOLIC BLOOD PRESSURE: 64 MMHG

## 2025-08-05 DIAGNOSIS — C34.11 PRIMARY CANCER OF RIGHT UPPER LOBE OF LUNG: Primary | ICD-10-CM

## 2025-08-05 DIAGNOSIS — D50.8 IRON DEFICIENCY ANEMIA SECONDARY TO INADEQUATE DIETARY IRON INTAKE: ICD-10-CM

## 2025-08-05 LAB
ALBUMIN SERPL-MCNC: 4.1 G/DL (ref 3.5–5.2)
ALBUMIN/GLOB SERPL: 1.9 G/DL
ALP SERPL-CCNC: 50 U/L (ref 39–117)
ALT SERPL W P-5'-P-CCNC: 25 U/L (ref 1–33)
ANION GAP SERPL CALCULATED.3IONS-SCNC: 15 MMOL/L (ref 5–15)
AST SERPL-CCNC: 40 U/L (ref 1–32)
BASOPHILS # BLD AUTO: 0.04 10*3/MM3 (ref 0–0.2)
BASOPHILS NFR BLD AUTO: 0.7 % (ref 0–1.5)
BILIRUB SERPL-MCNC: 0.5 MG/DL (ref 0–1.2)
BUN SERPL-MCNC: 11.9 MG/DL (ref 8–23)
BUN/CREAT SERPL: 14.5 (ref 7–25)
CALCIUM SPEC-SCNC: 9.1 MG/DL (ref 8.6–10.5)
CHLORIDE SERPL-SCNC: 103 MMOL/L (ref 98–107)
CO2 SERPL-SCNC: 24 MMOL/L (ref 22–29)
CREAT SERPL-MCNC: 0.82 MG/DL (ref 0.57–1)
DEPRECATED RDW RBC AUTO: 44.8 FL (ref 37–54)
EGFRCR SERPLBLD CKD-EPI 2021: 72.9 ML/MIN/1.73
EOSINOPHIL # BLD AUTO: 0.33 10*3/MM3 (ref 0–0.4)
EOSINOPHIL NFR BLD AUTO: 6.1 % (ref 0.3–6.2)
ERYTHROCYTE [DISTWIDTH] IN BLOOD BY AUTOMATED COUNT: 12.2 % (ref 12.3–15.4)
FERRITIN SERPL-MCNC: 49.21 NG/ML (ref 13–150)
GLOBULIN UR ELPH-MCNC: 2.2 GM/DL
GLUCOSE SERPL-MCNC: 89 MG/DL (ref 65–99)
HCT VFR BLD AUTO: 36.2 % (ref 34–46.6)
HGB BLD-MCNC: 11.9 G/DL (ref 12–15.9)
HOLD SPECIMEN: NORMAL
IMM GRANULOCYTES # BLD AUTO: 0.01 10*3/MM3 (ref 0–0.05)
IMM GRANULOCYTES NFR BLD AUTO: 0.2 % (ref 0–0.5)
IRON 24H UR-MRATE: 79 MCG/DL (ref 37–145)
IRON SATN MFR SERPL: 20 % (ref 20–50)
LYMPHOCYTES # BLD AUTO: 1.94 10*3/MM3 (ref 0.7–3.1)
LYMPHOCYTES NFR BLD AUTO: 36 % (ref 19.6–45.3)
MCH RBC QN AUTO: 32.9 PG (ref 26.6–33)
MCHC RBC AUTO-ENTMCNC: 32.9 G/DL (ref 31.5–35.7)
MCV RBC AUTO: 100 FL (ref 79–97)
MONOCYTES # BLD AUTO: 0.72 10*3/MM3 (ref 0.1–0.9)
MONOCYTES NFR BLD AUTO: 13.4 % (ref 5–12)
NEUTROPHILS NFR BLD AUTO: 2.35 10*3/MM3 (ref 1.7–7)
NEUTROPHILS NFR BLD AUTO: 43.6 % (ref 42.7–76)
NRBC BLD AUTO-RTO: 0 /100 WBC (ref 0–0.2)
PLATELET # BLD AUTO: 151 10*3/MM3 (ref 140–450)
PMV BLD AUTO: 10.2 FL (ref 6–12)
POTASSIUM SERPL-SCNC: 3.7 MMOL/L (ref 3.5–5.2)
PROT SERPL-MCNC: 6.3 G/DL (ref 6–8.5)
RBC # BLD AUTO: 3.62 10*6/MM3 (ref 3.77–5.28)
SODIUM SERPL-SCNC: 142 MMOL/L (ref 136–145)
TIBC SERPL-MCNC: 401 MCG/DL (ref 298–536)
TRANSFERRIN SERPL-MCNC: 269 MG/DL (ref 200–360)
WBC NRBC COR # BLD AUTO: 5.39 10*3/MM3 (ref 3.4–10.8)

## 2025-08-05 PROCEDURE — 85025 COMPLETE CBC W/AUTO DIFF WBC: CPT

## 2025-08-05 PROCEDURE — 3078F DIAST BP <80 MM HG: CPT | Performed by: INTERNAL MEDICINE

## 2025-08-05 PROCEDURE — 84466 ASSAY OF TRANSFERRIN: CPT

## 2025-08-05 PROCEDURE — 99214 OFFICE O/P EST MOD 30 MIN: CPT | Performed by: INTERNAL MEDICINE

## 2025-08-05 PROCEDURE — 36415 COLL VENOUS BLD VENIPUNCTURE: CPT

## 2025-08-05 PROCEDURE — 82728 ASSAY OF FERRITIN: CPT

## 2025-08-05 PROCEDURE — 1126F AMNT PAIN NOTED NONE PRSNT: CPT | Performed by: INTERNAL MEDICINE

## 2025-08-05 PROCEDURE — 80053 COMPREHEN METABOLIC PANEL: CPT

## 2025-08-05 PROCEDURE — 3074F SYST BP LT 130 MM HG: CPT | Performed by: INTERNAL MEDICINE

## 2025-08-05 PROCEDURE — 83540 ASSAY OF IRON: CPT

## 2025-08-05 PROCEDURE — 1160F RVW MEDS BY RX/DR IN RCRD: CPT | Performed by: INTERNAL MEDICINE

## 2025-08-05 PROCEDURE — G2211 COMPLEX E/M VISIT ADD ON: HCPCS | Performed by: INTERNAL MEDICINE

## 2025-08-05 PROCEDURE — 1159F MED LIST DOCD IN RCRD: CPT | Performed by: INTERNAL MEDICINE

## 2025-08-14 ENCOUNTER — OFFICE VISIT (OUTPATIENT)
Dept: CARDIOLOGY | Facility: CLINIC | Age: 80
End: 2025-08-14
Payer: MEDICARE

## 2025-08-14 VITALS
WEIGHT: 120 LBS | HEIGHT: 63 IN | OXYGEN SATURATION: 97 % | HEART RATE: 75 BPM | SYSTOLIC BLOOD PRESSURE: 140 MMHG | BODY MASS INDEX: 21.26 KG/M2 | RESPIRATION RATE: 18 BRPM | DIASTOLIC BLOOD PRESSURE: 78 MMHG

## 2025-08-14 DIAGNOSIS — E78.2 MIXED HYPERLIPIDEMIA: ICD-10-CM

## 2025-08-14 DIAGNOSIS — I10 PRIMARY HYPERTENSION: ICD-10-CM

## 2025-08-14 DIAGNOSIS — I25.119 CORONARY ARTERY DISEASE INVOLVING NATIVE CORONARY ARTERY OF NATIVE HEART WITH ANGINA PECTORIS: Primary | ICD-10-CM

## 2025-08-14 DIAGNOSIS — I73.9 PAD (PERIPHERAL ARTERY DISEASE): ICD-10-CM

## 2025-08-14 PROCEDURE — 1160F RVW MEDS BY RX/DR IN RCRD: CPT | Performed by: NURSE PRACTITIONER

## 2025-08-14 PROCEDURE — 93000 ELECTROCARDIOGRAM COMPLETE: CPT | Performed by: NURSE PRACTITIONER

## 2025-08-14 PROCEDURE — 3077F SYST BP >= 140 MM HG: CPT | Performed by: NURSE PRACTITIONER

## 2025-08-14 PROCEDURE — 99214 OFFICE O/P EST MOD 30 MIN: CPT | Performed by: NURSE PRACTITIONER

## 2025-08-14 PROCEDURE — 1159F MED LIST DOCD IN RCRD: CPT | Performed by: NURSE PRACTITIONER

## 2025-08-14 PROCEDURE — 3078F DIAST BP <80 MM HG: CPT | Performed by: NURSE PRACTITIONER

## 2025-08-14 RX ORDER — CARVEDILOL 6.25 MG/1
6.25 TABLET ORAL 2 TIMES DAILY WITH MEALS
COMMUNITY

## 2025-08-26 ENCOUNTER — OFFICE VISIT (OUTPATIENT)
Dept: PULMONOLOGY | Facility: CLINIC | Age: 80
End: 2025-08-26
Payer: MEDICARE

## 2025-08-26 VITALS
BODY MASS INDEX: 20.91 KG/M2 | SYSTOLIC BLOOD PRESSURE: 152 MMHG | DIASTOLIC BLOOD PRESSURE: 82 MMHG | HEIGHT: 63 IN | WEIGHT: 118 LBS | OXYGEN SATURATION: 93 % | HEART RATE: 59 BPM

## 2025-08-26 DIAGNOSIS — J44.9 COPD, MODERATE: Chronic | ICD-10-CM

## 2025-08-26 DIAGNOSIS — Z87.891 PERSONAL HISTORY OF NICOTINE DEPENDENCE: Chronic | ICD-10-CM

## 2025-08-26 DIAGNOSIS — J47.9 BRONCHIECTASIS WITHOUT COMPLICATION: Chronic | ICD-10-CM

## 2025-08-26 DIAGNOSIS — Z85.118 PERSONAL HISTORY OF MALIGNANT NEOPLASM OF BRONCHUS AND LUNG: Chronic | ICD-10-CM

## 2025-08-26 DIAGNOSIS — R91.8 LUNG NODULES: Primary | Chronic | ICD-10-CM

## (undated) DEVICE — GLV SURG BIOGEL LTX PF 7 1/2

## (undated) DEVICE — MODEL AT P65, P/N 701554-001KIT CONTENTS: HAND CONTROLLER, 3-WAY HIGH-PRESSURE STOPCOCK WITH ROTATING END AND PREMIUM HIGH-PRESSURE TUBING: Brand: ANGIOTOUCH® KIT

## (undated) DEVICE — SUREFORM 45: Brand: SUREFORM

## (undated) DEVICE — A2000 MULTI-USE SYRINGE KIT, P/N 701277-003KIT CONTENTS: 100ML CONTRAST RESERVOIR AND TUBING WITH CONTRAST SPIKE AND CLAMP: Brand: A2000 MULTI-USE SYRINGE KIT

## (undated) DEVICE — DRP SURG UTIL W/TPE 2/LAYR 15X26IN DISP

## (undated) DEVICE — PAD ENDOVASCULAR: Brand: MEDLINE INDUSTRIES, INC.

## (undated) DEVICE — ST MIC/INTRO ACC SHRP/NDL TUNG/TP NITNL 5F 45CM 7CM

## (undated) DEVICE — GLV SURG TRIUMPH ORTHO W/ALOE PF LTX 8.5 STRL

## (undated) DEVICE — SPNG GZ STRL 2S 4X4 12PLY

## (undated) DEVICE — CATH FLSH OMNI SFT 5F 90CM

## (undated) DEVICE — BALN EVERCROSS OTW .035 5F 4X60MM 135CM

## (undated) DEVICE — WIPE INST 3X3IN 2MM BX/20

## (undated) DEVICE — DRSNG SURESITE WNDW 4X4.5

## (undated) DEVICE — BALN PTA CHOCOLATE OTW .014 5F 3X120MM

## (undated) DEVICE — MASK,OXYGEN,MED CONC,ADLT,7' TUB, UC: Brand: PENDING

## (undated) DEVICE — BALN NANOCROSS OTW .014 4F 3X210 150CM

## (undated) DEVICE — 4F 80 CM LEMAITRE EMBOLECTOMY CATHETER, EIFU: Brand: LEMAITRE EMBOLECTOMY CATHETER

## (undated) DEVICE — BG BANDED WRUBBER BAND AND TP 36X54IN

## (undated) DEVICE — MODEL BT2000 P/N 700287-012KIT CONTENTS: MANIFOLD WITH SALINE AND CONTRAST PORTS, SALINE TUBING WITH SPIKE AND HAND SYRINGE, TRANSDUCER: Brand: BT2000 AUTOMATED MANIFOLD KIT

## (undated) DEVICE — DEV EPS SPIDERFX 5MM OTW320/RX190CM

## (undated) DEVICE — NAVICROSS SUPPORT CATHETER: Brand: NAVICROSS

## (undated) DEVICE — TOWEL,OR,DSP,ST,BLUE,DLX,10/PK,8PK/CS: Brand: MEDLINE

## (undated) DEVICE — DEV CLS VASC MYNXCONTROL 6FTO7F

## (undated) DEVICE — TROC ANCHORPORT BLADELES W/GRIP 12X120MM

## (undated) DEVICE — INFLATION DEVICE: Brand: ENCORE™ 26

## (undated) DEVICE — ANTIBACTERIAL UNDYED BRAIDED (POLYGLACTIN 910), SYNTHETIC ABSORBABLE SUTURE: Brand: COATED VICRYL

## (undated) DEVICE — TOTAL TRAY, 16FR 10ML SIL FOLEY, URN: Brand: MEDLINE

## (undated) DEVICE — CODMAN® SURGICAL PATTIES 1/2" X1 1/2" (1.27CM X 3.81CM): Brand: CODMAN®

## (undated) DEVICE — SENSR O2 OXIMAX FNGR A/ 18IN NONSTR

## (undated) DEVICE — ATHERECTOMY H1-LX HAWKONE 7F EXT TIP US: Brand: HAWKONE™

## (undated) DEVICE — CUFF,BP,DISP,1 TUBE,ADULT,HP: Brand: MEDLINE

## (undated) DEVICE — MONOPOLAR METZENBAUM SCISSOR, MINI BLADE TIP, DISPOSABLE: Brand: MONOPOLAR METZENBAUM SCISSOR, MINI BLADE TIP, DISPOSABLE

## (undated) DEVICE — KT CATH FEM/ART M5/PLS 6F 5.5X60MM 135CM

## (undated) DEVICE — KT CLN CLEANOR SCPE

## (undated) DEVICE — Device

## (undated) DEVICE — BAPTIST TURNOVER KIT: Brand: MEDLINE INDUSTRIES, INC.

## (undated) DEVICE — ARM DRAPE

## (undated) DEVICE — CATH IV JELCO 18GA 10 1 3/4 IN

## (undated) DEVICE — PROXIMATE RH ROTATING HEAD SKIN STAPLERS (35 WIDE) CONTAINS 35 STAINLESS STEEL STAPLES: Brand: PROXIMATE

## (undated) DEVICE — PINNACLE R/O II INTRODUCER SHEATH WITH RADIOPAQUE MARKER: Brand: PINNACLE

## (undated) DEVICE — CATHETER,URETHRAL,REDRUBBER,STRL,18FR: Brand: MEDLINE

## (undated) DEVICE — Device: Brand: ASAHI GLADIUS18

## (undated) DEVICE — 3F 80 CM LEMAITRE EMBOLECTOMY CATHETER, EIFU: Brand: LEMAITRE EMBOLECTOMY CATHETER

## (undated) DEVICE — TRAP FLD MINIVAC MEGADYNE 100ML

## (undated) DEVICE — CANN VESL ACRN TP 4MM

## (undated) DEVICE — GLV SURG NEOLON 2G PF LF 7.5 STRL

## (undated) DEVICE — 3M™ IOBAN™ 2 ANTIMICROBIAL INCISE DRAPE 6651EZ: Brand: IOBAN™ 2

## (undated) DEVICE — REDUCER: Brand: ENDOWRIST

## (undated) DEVICE — GLV SURG SENSICARE W/ALOE PF LF 7.5 STRL

## (undated) DEVICE — NDL HYPO PRECISIONGLIDE REG 25G 1 1/2

## (undated) DEVICE — DISPOSABLE GRASPER CARTRIDGE: Brand: DIRECT DRIVE REPOSABLE GRASPERS

## (undated) DEVICE — DRSNG SURESITE WNDW 2.38X2.75

## (undated) DEVICE — CLTH CLENS READYCLEANSE PERI CARE PK/5

## (undated) DEVICE — 3M™ STERI-DRAPE™ ISOLATION BAG, 10 PER CARTON / 4 CARTONS PER CASE, 1003: Brand: 3M™ STERI-DRAPE™

## (undated) DEVICE — 3.0MM PRECISION NEURO (MATCH HEAD)

## (undated) DEVICE — TP SILK DURAPORE 3IN

## (undated) DEVICE — SUT SILK 2/0 SH 30IN K833H

## (undated) DEVICE — RADIFOCUS GLIDEWIRE ADVANTAGE GUIDEWIRE: Brand: GLIDEWIRE ADVANTAGE

## (undated) DEVICE — SUT PROLN 5/0 C1 DA 24IN 8725H

## (undated) DEVICE — DEV EPS SPIDERFX 6MM OTW320/RX190CM

## (undated) DEVICE — GLIDESHEATH SLENDER STAINLESS STEEL KIT: Brand: GLIDESHEATH SLENDER

## (undated) DEVICE — SUT MNCRYL 4/0 PS2 27IN UD MCP426H

## (undated) DEVICE — DESTINATION RENAL GUIDING SHEATH: Brand: DESTINATION

## (undated) DEVICE — NDL HYPO PRECISIONGLIDE REG 22G 1 1/2

## (undated) DEVICE — PENCL ES MEGADINE EZ/CLEAN BUTN W/HOLSTR 10FT

## (undated) DEVICE — 24 FR STRAIGHT – SOFT PVC CATHETER: Brand: PVC THORACIC CATHETERS

## (undated) DEVICE — DRAPE,UTILITY,TAPE,15X26,STERILE: Brand: MEDLINE

## (undated) DEVICE — CATH ATHRCTMY HAWK1 6F 2TO4X151MM

## (undated) DEVICE — OASIS DRAIN, SINGLE, INLINE & ATS COMPATIBLE: Brand: OASIS

## (undated) DEVICE — APPL CHLORAPREP HI/LITE 26ML ORNG

## (undated) DEVICE — TBG SMPL FLTR LINE NASL 02/C02 A/ BX/100

## (undated) DEVICE — PROXIMATE RH ROTATING HEAD SKIN STAPLERS (35 REGULAR) CONTAINS 35 STAINLESS STEEL STAPLES: Brand: PROXIMATE

## (undated) DEVICE — CONMED SCOPE SAVER BITE BLOCK, 20X27 MM: Brand: SCOPE SAVER

## (undated) DEVICE — TBG PENCL TELESCP MEGADYNE SMOKE EVAC 10FT

## (undated) DEVICE — SUT SILK 4/0 SUTUPAK TIES 24IN SA73H

## (undated) DEVICE — GLV SURG TRIUMPH MICRO PF LTX 8.5 STRL

## (undated) DEVICE — DEV EPS SPIDERFX 4MM OTW320/RX190CM

## (undated) DEVICE — PIN DISTRACT TI 14MM STRL

## (undated) DEVICE — GLV SURG NEOLON 2G PF LF 8 STRL

## (undated) DEVICE — PK TURNOVER RM ADV

## (undated) DEVICE — DRSNG TELFA PAD NONADH STR 1S 3X8IN

## (undated) DEVICE — SUT PROLN 5/0 RB1 D/A 36IN 8556H

## (undated) DEVICE — CANNULA SEAL

## (undated) DEVICE — SNAP KOVER: Brand: UNBRANDED

## (undated) DEVICE — THE CHANNEL CLEANING BRUSH IS A NYLON FLEXI BRUSH ATTACHED TO A FLEXIBLE PLASTIC SHEATH DESIGNED TO SAFELY REMOVE DEBRIS FROM FLEXIBLE ENDOSCOPES.

## (undated) DEVICE — SYR LL TP 10ML STRL

## (undated) DEVICE — SUT SILK 2/0 FS BLK 18IN 685G

## (undated) DEVICE — APPL CHLORAPREP W/TINT 26ML ORNG

## (undated) DEVICE — SUT PROLN 4/0 RB1 D/A 36IN 8557H

## (undated) DEVICE — SUT SILK 3/0 SUTUPAK TIES 24IN SA74H

## (undated) DEVICE — GLV SURG BIOGEL M LTX PF 7 1/2

## (undated) DEVICE — Device: Brand: DEFENDO AIR/WATER/SUCTION AND BIOPSY VALVE

## (undated) DEVICE — GLV SURG TRIUMPH NATURAL W/ALOE PF LTX 8 STRL

## (undated) DEVICE — DAVINCI: Brand: MEDLINE INDUSTRIES, INC.

## (undated) DEVICE — ELECTRD NDL EDGE/INSUL/PFTE.787MM 2.84IN

## (undated) DEVICE — SYR CONTRL PRESS/LO FIX/M/LL W/THMB/RNG 10ML

## (undated) DEVICE — SUT PROLN 6/0 4/24IN BV1 MON BL M8805

## (undated) DEVICE — DESTINATION PERIPHERAL GUIDING SHEATH: Brand: DESTINATION

## (undated) DEVICE — GAUZE,SPONGE,4"X4",16PLY,XRAY,STRL,LF: Brand: MEDLINE

## (undated) DEVICE — CATH IV ANGIO FEP 12G 3IN LTBLU 10PK

## (undated) DEVICE — SPNG LAP CIGARETTE KITTNER 5MM STRL PK/5

## (undated) DEVICE — FRCP BX RADJAW4 NDL 2.8 240 STD OG

## (undated) DEVICE — SUREFORM 45 CURVED-TIP: Brand: SUREFORM

## (undated) DEVICE — BLADELESS OBTURATOR: Brand: WECK VISTA

## (undated) DEVICE — NEEDLE, QUINCKE 22GX3.5": Brand: MEDLINE INDUSTRIES, INC.

## (undated) DEVICE — CONTAINER,SPECIMEN,OR STERILE,4OZ: Brand: MEDLINE

## (undated) DEVICE — ST TBG AIRSEAL FLTR TRI LUM

## (undated) DEVICE — HALTR TRACT HD DLX PAD UNIV

## (undated) DEVICE — RESERVOIR,SUCTION,100CC,SILICONE: Brand: MEDLINE

## (undated) DEVICE — SUT SILK 2/0 SUTUPAK TIES 24IN SA75H

## (undated) DEVICE — ADHS SKIN PREMIERPRO EXOFIN TOPICAL HI/VISC .5ML

## (undated) DEVICE — GOWN,NON-REINFORCED,SIRUS,SET IN SLV,XXL: Brand: MEDLINE

## (undated) DEVICE — YANKAUER,BULB TIP WITH VENT: Brand: ARGYLE

## (undated) DEVICE — SEAL

## (undated) DEVICE — BALN EVERCROSS OTW .035 5F 5X200 135

## (undated) DEVICE — ELECTRD BLD EZ CLN STD 2.5IN

## (undated) DEVICE — WIPE THERAWASH SLV SPEC CARE 2PK

## (undated) DEVICE — TIP COVER ACCESSORY

## (undated) DEVICE — STERILE ULTRASOUND GEL, SAFEWRAP: Brand: ECOVUE

## (undated) DEVICE — ST TB EXT STANDARDBORE 30IN

## (undated) DEVICE — SUT VIC 0 UR6 27IN VCP603H

## (undated) DEVICE — BALN EVERCROSS OTW .035 5F 5X100 135

## (undated) DEVICE — GLV SURG SENSICARE MICRO PF LF 9 STRL

## (undated) DEVICE — DRP SPECIAL PROC 4PC W/FC POUCH

## (undated) DEVICE — PK SPINE CERV ANT 30

## (undated) DEVICE — PACK,UNIVERSAL,NO GOWNS: Brand: MEDLINE

## (undated) DEVICE — STRIP,CLOSURE,WOUND,MEDI-STRIP,1/2X4: Brand: MEDLINE

## (undated) DEVICE — SPNG DISSCT CHRRY 3/8IN STRL PK/5

## (undated) DEVICE — PAD MAJOR VASCULAR: Brand: MEDLINE INDUSTRIES, INC.

## (undated) DEVICE — LP VESL MAXI 2.5X1MM RED 2PK

## (undated) DEVICE — GLV SURG DERMASSURE GRN LF PF 8.0